# Patient Record
Sex: FEMALE | Race: WHITE | Employment: OTHER | URBAN - METROPOLITAN AREA
[De-identification: names, ages, dates, MRNs, and addresses within clinical notes are randomized per-mention and may not be internally consistent; named-entity substitution may affect disease eponyms.]

---

## 2017-01-27 LAB
CREATININE, URINE: 82.8
CREATININE, URINE: 82.8
MICROALBUMIN/CREAT 24H UR: 22.8 MG/G{CREAT}
MICROALBUMIN/CREAT 24H UR: 22.8 MG/G{CREAT}
MICROALBUMIN/CREAT UR-RTO: 28
MICROALBUMIN/CREAT UR-RTO: 28

## 2017-03-21 ENCOUNTER — APPOINTMENT (OUTPATIENT)
Dept: CT IMAGING | Age: 57
End: 2017-03-21
Payer: COMMERCIAL

## 2017-03-21 ENCOUNTER — HOSPITAL ENCOUNTER (EMERGENCY)
Age: 57
Discharge: HOME | End: 2017-03-21
Attending: STUDENT IN AN ORGANIZED HEALTH CARE EDUCATION/TRAINING PROGRAM
Payer: COMMERCIAL

## 2017-03-21 VITALS
WEIGHT: 175 LBS | OXYGEN SATURATION: 97 % | TEMPERATURE: 98.4 F | SYSTOLIC BLOOD PRESSURE: 142 MMHG | BODY MASS INDEX: 32.2 KG/M2 | RESPIRATION RATE: 21 BRPM | HEART RATE: 104 BPM | HEIGHT: 62 IN | DIASTOLIC BLOOD PRESSURE: 108 MMHG

## 2017-03-21 DIAGNOSIS — G89.29 CHRONIC LEFT SHOULDER PAIN: ICD-10-CM

## 2017-03-21 DIAGNOSIS — M25.512 CHRONIC LEFT SHOULDER PAIN: ICD-10-CM

## 2017-03-21 DIAGNOSIS — R00.0 SINUS TACHYCARDIA: Primary | ICD-10-CM

## 2017-03-21 DIAGNOSIS — F17.200 TOBACCO DEPENDENCE: ICD-10-CM

## 2017-03-21 LAB
ALBUMIN SERPL-MCNC: 4.1 G/DL (ref 3.9–4.9)
ALP BLD-CCNC: 77 U/L (ref 40–130)
ALT SERPL-CCNC: 47 U/L (ref 0–33)
ANION GAP SERPL CALCULATED.3IONS-SCNC: 15 MEQ/L (ref 7–13)
APTT: 26 SEC (ref 21.6–35.4)
AST SERPL-CCNC: 21 U/L (ref 0–35)
BASOPHILS ABSOLUTE: 0.2 K/UL (ref 0–0.2)
BASOPHILS RELATIVE PERCENT: 1.2 %
BILIRUB SERPL-MCNC: 0.1 MG/DL (ref 0–1.2)
BUN BLDV-MCNC: 10 MG/DL (ref 6–20)
C-REACTIVE PROTEIN, HIGH SENSITIVITY: 3.1 MG/L (ref 0–5)
CALCIUM SERPL-MCNC: 9.6 MG/DL (ref 8.6–10.2)
CHLORIDE BLD-SCNC: 98 MEQ/L (ref 98–107)
CO2: 27 MEQ/L (ref 22–29)
CREAT SERPL-MCNC: 0.71 MG/DL (ref 0.5–0.9)
EOSINOPHILS ABSOLUTE: 0.1 K/UL (ref 0–0.7)
EOSINOPHILS RELATIVE PERCENT: 1.1 %
GFR AFRICAN AMERICAN: >60
GFR AFRICAN AMERICAN: >60
GFR NON-AFRICAN AMERICAN: >60
GFR NON-AFRICAN AMERICAN: >60
GLOBULIN: 2.6 G/DL (ref 2.3–3.5)
GLUCOSE BLD-MCNC: 98 MG/DL (ref 74–109)
HCT VFR BLD CALC: 40.5 % (ref 37–47)
HEMOGLOBIN: 13.3 G/DL (ref 12–16)
INR BLD: 0.9
LACTIC ACID: 1.5 MMOL/L (ref 0.5–2.2)
LYMPHOCYTES ABSOLUTE: 2.5 K/UL (ref 1–4.8)
LYMPHOCYTES RELATIVE PERCENT: 20.5 %
MAGNESIUM: 1.7 MG/DL (ref 1.7–2.3)
MCH RBC QN AUTO: 27.2 PG (ref 27–31.3)
MCHC RBC AUTO-ENTMCNC: 32.9 % (ref 33–37)
MCV RBC AUTO: 82.6 FL (ref 82–100)
MONOCYTES ABSOLUTE: 1 K/UL (ref 0.2–0.8)
MONOCYTES RELATIVE PERCENT: 8 %
NEUTROPHILS ABSOLUTE: 8.6 K/UL (ref 1.4–6.5)
NEUTROPHILS RELATIVE PERCENT: 69.2 %
PDW BLD-RTO: 15.4 % (ref 11.5–14.5)
PERFORMED ON: NORMAL
PLATELET # BLD: 267 K/UL (ref 130–400)
POC CREATININE WHOLE BLOOD: 0.9
POC CREATININE: 0.9 MG/DL (ref 0.6–1.1)
POC SAMPLE TYPE: NORMAL
POTASSIUM SERPL-SCNC: 3.5 MEQ/L (ref 3.5–5.1)
PRO-BNP: 16 PG/ML
PROTHROMBIN TIME: 10 SEC (ref 8.1–13.7)
RBC # BLD: 4.91 M/UL (ref 4.2–5.4)
SODIUM BLD-SCNC: 140 MEQ/L (ref 132–144)
TOTAL PROTEIN: 6.7 G/DL (ref 6.4–8.1)
WBC # BLD: 12.4 K/UL (ref 4.8–10.8)

## 2017-03-21 PROCEDURE — 93005 ELECTROCARDIOGRAM TRACING: CPT

## 2017-03-21 PROCEDURE — 83880 ASSAY OF NATRIURETIC PEPTIDE: CPT

## 2017-03-21 PROCEDURE — 83735 ASSAY OF MAGNESIUM: CPT

## 2017-03-21 PROCEDURE — 86141 C-REACTIVE PROTEIN HS: CPT

## 2017-03-21 PROCEDURE — 85730 THROMBOPLASTIN TIME PARTIAL: CPT

## 2017-03-21 PROCEDURE — 94760 N-INVAS EAR/PLS OXIMETRY 1: CPT

## 2017-03-21 PROCEDURE — 6360000004 HC RX CONTRAST MEDICATION: Performed by: RADIOLOGY

## 2017-03-21 PROCEDURE — 83605 ASSAY OF LACTIC ACID: CPT

## 2017-03-21 PROCEDURE — 6370000000 HC RX 637 (ALT 250 FOR IP): Performed by: STUDENT IN AN ORGANIZED HEALTH CARE EDUCATION/TRAINING PROGRAM

## 2017-03-21 PROCEDURE — 96375 TX/PRO/DX INJ NEW DRUG ADDON: CPT

## 2017-03-21 PROCEDURE — 2500000003 HC RX 250 WO HCPCS: Performed by: STUDENT IN AN ORGANIZED HEALTH CARE EDUCATION/TRAINING PROGRAM

## 2017-03-21 PROCEDURE — 99285 EMERGENCY DEPT VISIT HI MDM: CPT

## 2017-03-21 PROCEDURE — 80053 COMPREHEN METABOLIC PANEL: CPT

## 2017-03-21 PROCEDURE — 96374 THER/PROPH/DIAG INJ IV PUSH: CPT

## 2017-03-21 PROCEDURE — 36415 COLL VENOUS BLD VENIPUNCTURE: CPT

## 2017-03-21 PROCEDURE — 85025 COMPLETE CBC W/AUTO DIFF WBC: CPT

## 2017-03-21 PROCEDURE — S0028 INJECTION, FAMOTIDINE, 20 MG: HCPCS | Performed by: STUDENT IN AN ORGANIZED HEALTH CARE EDUCATION/TRAINING PROGRAM

## 2017-03-21 PROCEDURE — 85610 PROTHROMBIN TIME: CPT

## 2017-03-21 PROCEDURE — 6360000002 HC RX W HCPCS: Performed by: STUDENT IN AN ORGANIZED HEALTH CARE EDUCATION/TRAINING PROGRAM

## 2017-03-21 PROCEDURE — 71275 CT ANGIOGRAPHY CHEST: CPT

## 2017-03-21 RX ORDER — DIPHENHYDRAMINE HYDROCHLORIDE 50 MG/ML
25 INJECTION INTRAMUSCULAR; INTRAVENOUS ONCE
Status: COMPLETED | OUTPATIENT
Start: 2017-03-21 | End: 2017-03-21

## 2017-03-21 RX ORDER — FENTANYL 50 UG/H
1 PATCH TRANSDERMAL
Status: DISCONTINUED | OUTPATIENT
Start: 2017-03-21 | End: 2017-03-21 | Stop reason: HOSPADM

## 2017-03-21 RX ORDER — METHYLPREDNISOLONE SODIUM SUCCINATE 125 MG/2ML
125 INJECTION, POWDER, LYOPHILIZED, FOR SOLUTION INTRAMUSCULAR; INTRAVENOUS ONCE
Status: COMPLETED | OUTPATIENT
Start: 2017-03-21 | End: 2017-03-21

## 2017-03-21 RX ADMIN — IOPAMIDOL 100 ML: 755 INJECTION, SOLUTION INTRAVENOUS at 17:57

## 2017-03-21 RX ADMIN — METHYLPREDNISOLONE SODIUM SUCCINATE 125 MG: 125 INJECTION, POWDER, FOR SOLUTION INTRAMUSCULAR; INTRAVENOUS at 17:18

## 2017-03-21 RX ADMIN — DIPHENHYDRAMINE HYDROCHLORIDE 25 MG: 50 INJECTION, SOLUTION INTRAMUSCULAR; INTRAVENOUS at 17:18

## 2017-03-21 RX ADMIN — FAMOTIDINE 20 MG: 10 INJECTION, SOLUTION INTRAVENOUS at 17:18

## 2017-03-21 ASSESSMENT — ENCOUNTER SYMPTOMS
COUGH: 0
VOMITING: 0
DIARRHEA: 0
BACK PAIN: 0
NAUSEA: 0
CHEST TIGHTNESS: 0
SHORTNESS OF BREATH: 0
TROUBLE SWALLOWING: 0
ABDOMINAL PAIN: 0
SINUS PRESSURE: 0

## 2017-03-21 ASSESSMENT — PAIN DESCRIPTION - PAIN TYPE: TYPE: CHRONIC PAIN

## 2017-03-21 ASSESSMENT — PAIN DESCRIPTION - DESCRIPTORS: DESCRIPTORS: ACHING;STABBING

## 2017-03-21 ASSESSMENT — PAIN DESCRIPTION - LOCATION: LOCATION: SHOULDER

## 2017-03-21 ASSESSMENT — PAIN DESCRIPTION - ORIENTATION: ORIENTATION: LEFT

## 2017-03-21 ASSESSMENT — PAIN SCALES - GENERAL
PAINLEVEL_OUTOF10: 9
PAINLEVEL_OUTOF10: 9

## 2017-03-23 LAB
EKG ATRIAL RATE: 113 BPM
EKG P AXIS: 77 DEGREES
EKG P-R INTERVAL: 132 MS
EKG Q-T INTERVAL: 356 MS
EKG QRS DURATION: 92 MS
EKG QTC CALCULATION (BAZETT): 488 MS
EKG R AXIS: 62 DEGREES
EKG T AXIS: 42 DEGREES
EKG VENTRICULAR RATE: 113 BPM

## 2017-03-25 ENCOUNTER — APPOINTMENT (OUTPATIENT)
Dept: GENERAL RADIOLOGY | Age: 57
End: 2017-03-25
Payer: COMMERCIAL

## 2017-03-25 ENCOUNTER — HOSPITAL ENCOUNTER (EMERGENCY)
Age: 57
Discharge: HOME | End: 2017-03-25
Payer: COMMERCIAL

## 2017-03-25 VITALS
OXYGEN SATURATION: 94 % | BODY MASS INDEX: 32.2 KG/M2 | HEIGHT: 62 IN | DIASTOLIC BLOOD PRESSURE: 96 MMHG | WEIGHT: 175 LBS | HEART RATE: 118 BPM | TEMPERATURE: 99.1 F | SYSTOLIC BLOOD PRESSURE: 169 MMHG | RESPIRATION RATE: 18 BRPM

## 2017-03-25 DIAGNOSIS — R00.0 TACHYCARDIA: ICD-10-CM

## 2017-03-25 DIAGNOSIS — E87.6 HYPOKALEMIA: ICD-10-CM

## 2017-03-25 DIAGNOSIS — M25.512 CHRONIC PAIN IN LEFT SHOULDER: Primary | ICD-10-CM

## 2017-03-25 DIAGNOSIS — G89.29 CHRONIC PAIN IN LEFT SHOULDER: Primary | ICD-10-CM

## 2017-03-25 LAB
AMPHETAMINE SCREEN, URINE: ABNORMAL
ANION GAP SERPL CALCULATED.3IONS-SCNC: 10 MEQ/L (ref 7–13)
BARBITURATE SCREEN URINE: ABNORMAL
BASOPHILS ABSOLUTE: 0.2 K/UL (ref 0–0.2)
BASOPHILS RELATIVE PERCENT: 1.5 %
BENZODIAZEPINE SCREEN, URINE: ABNORMAL
BILIRUBIN URINE: NEGATIVE
BLOOD, URINE: NEGATIVE
BUN BLDV-MCNC: 10 MG/DL (ref 6–20)
CALCIUM SERPL-MCNC: 8.4 MG/DL (ref 8.6–10.2)
CANNABINOID SCREEN URINE: POSITIVE
CHLORIDE BLD-SCNC: 98 MEQ/L (ref 98–107)
CLARITY: CLEAR
CO2: 28 MEQ/L (ref 22–29)
COCAINE METABOLITE SCREEN URINE: ABNORMAL
COLOR: YELLOW
CREAT SERPL-MCNC: 0.53 MG/DL (ref 0.5–0.9)
EOSINOPHILS ABSOLUTE: 0.1 K/UL (ref 0–0.7)
EOSINOPHILS RELATIVE PERCENT: 1 %
EPITHELIAL CELLS, UA: NORMAL /HPF
GFR AFRICAN AMERICAN: >60
GFR NON-AFRICAN AMERICAN: >60
GLUCOSE BLD-MCNC: 117 MG/DL (ref 74–109)
GLUCOSE URINE: NEGATIVE MG/DL
HCT VFR BLD CALC: 39.9 % (ref 37–47)
HEMOGLOBIN: 13.2 G/DL (ref 12–16)
KETONES, URINE: NEGATIVE MG/DL
LEUKOCYTE ESTERASE, URINE: ABNORMAL
LYMPHOCYTES ABSOLUTE: 2.1 K/UL (ref 1–4.8)
LYMPHOCYTES RELATIVE PERCENT: 20.2 %
Lab: ABNORMAL
MCH RBC QN AUTO: 27.4 PG (ref 27–31.3)
MCHC RBC AUTO-ENTMCNC: 33.1 % (ref 33–37)
MCV RBC AUTO: 82.9 FL (ref 82–100)
MONOCYTES ABSOLUTE: 0.9 K/UL (ref 0.2–0.8)
MONOCYTES RELATIVE PERCENT: 9.2 %
NEUTROPHILS ABSOLUTE: 6.9 K/UL (ref 1.4–6.5)
NEUTROPHILS RELATIVE PERCENT: 68.1 %
NITRITE, URINE: NEGATIVE
OPIATE SCREEN URINE: ABNORMAL
PDW BLD-RTO: 15.5 % (ref 11.5–14.5)
PH UA: 5.5 (ref 5–9)
PHENCYCLIDINE SCREEN URINE: ABNORMAL
PLATELET # BLD: 231 K/UL (ref 130–400)
POTASSIUM SERPL-SCNC: 2.9 MEQ/L (ref 3.5–5.1)
PROTEIN UA: NEGATIVE MG/DL
RBC # BLD: 4.81 M/UL (ref 4.2–5.4)
RBC UA: NORMAL /HPF (ref 0–2)
SODIUM BLD-SCNC: 136 MEQ/L (ref 132–144)
SPECIFIC GRAVITY UA: 1 (ref 1–1.03)
TOTAL CK: 55 U/L (ref 0–170)
TROPONIN: <0.01 NG/ML (ref 0–0.01)
UROBILINOGEN, URINE: 0.2 E.U./DL
WBC # BLD: 10.2 K/UL (ref 4.8–10.8)
WBC UA: NORMAL /HPF (ref 0–5)

## 2017-03-25 PROCEDURE — 85025 COMPLETE CBC W/AUTO DIFF WBC: CPT

## 2017-03-25 PROCEDURE — 99284 EMERGENCY DEPT VISIT MOD MDM: CPT

## 2017-03-25 PROCEDURE — 6370000000 HC RX 637 (ALT 250 FOR IP): Performed by: NURSE PRACTITIONER

## 2017-03-25 PROCEDURE — 80048 BASIC METABOLIC PNL TOTAL CA: CPT

## 2017-03-25 PROCEDURE — 81001 URINALYSIS AUTO W/SCOPE: CPT

## 2017-03-25 PROCEDURE — 80307 DRUG TEST PRSMV CHEM ANLYZR: CPT

## 2017-03-25 PROCEDURE — 84484 ASSAY OF TROPONIN QUANT: CPT

## 2017-03-25 PROCEDURE — 71020 XR CHEST STANDARD TWO VW: CPT

## 2017-03-25 PROCEDURE — 93005 ELECTROCARDIOGRAM TRACING: CPT

## 2017-03-25 PROCEDURE — 2580000003 HC RX 258: Performed by: NURSE PRACTITIONER

## 2017-03-25 PROCEDURE — 82550 ASSAY OF CK (CPK): CPT

## 2017-03-25 PROCEDURE — 36415 COLL VENOUS BLD VENIPUNCTURE: CPT

## 2017-03-25 RX ORDER — ASPIRIN 81 MG
1 TABLET,CHEWABLE ORAL 3 TIMES DAILY PRN
Qty: 42.5 G | Refills: 0 | Status: SHIPPED | OUTPATIENT
Start: 2017-03-25 | End: 2017-05-30

## 2017-03-25 RX ORDER — POTASSIUM BICARBONATE 25 MEQ/1
50 TABLET, EFFERVESCENT ORAL ONCE
Status: COMPLETED | OUTPATIENT
Start: 2017-03-25 | End: 2017-03-25

## 2017-03-25 RX ORDER — ACETAMINOPHEN 500 MG
1000 TABLET ORAL EVERY 6 HOURS PRN
Qty: 30 TABLET | Refills: 0 | Status: SHIPPED | OUTPATIENT
Start: 2017-03-25 | End: 2017-09-25

## 2017-03-25 RX ORDER — HYDROCODONE BITARTRATE AND ACETAMINOPHEN 5; 325 MG/1; MG/1
1 TABLET ORAL ONCE
Status: COMPLETED | OUTPATIENT
Start: 2017-03-25 | End: 2017-03-25

## 2017-03-25 RX ORDER — 0.9 % SODIUM CHLORIDE 0.9 %
2000 INTRAVENOUS SOLUTION INTRAVENOUS ONCE
Status: COMPLETED | OUTPATIENT
Start: 2017-03-25 | End: 2017-03-25

## 2017-03-25 RX ADMIN — HYDROCODONE BITARTRATE AND ACETAMINOPHEN 1 TABLET: 5; 325 TABLET ORAL at 11:37

## 2017-03-25 RX ADMIN — SODIUM CHLORIDE 2000 ML: 900 INJECTION, SOLUTION INTRAVENOUS at 11:44

## 2017-03-25 RX ADMIN — POTASSIUM BICARBONATE 50 MEQ: 25 TABLET, EFFERVESCENT ORAL at 13:20

## 2017-03-25 ASSESSMENT — ENCOUNTER SYMPTOMS
DIARRHEA: 0
ABDOMINAL PAIN: 0
NAUSEA: 0
SHORTNESS OF BREATH: 0
COUGH: 0
BACK PAIN: 0
VOMITING: 0

## 2017-03-25 ASSESSMENT — PAIN SCALES - GENERAL
PAINLEVEL_OUTOF10: 9
PAINLEVEL_OUTOF10: 9
PAINLEVEL_OUTOF10: 1
PAINLEVEL_OUTOF10: 9

## 2017-03-25 ASSESSMENT — PAIN DESCRIPTION - ORIENTATION: ORIENTATION: LEFT

## 2017-03-25 ASSESSMENT — PAIN DESCRIPTION - FREQUENCY: FREQUENCY: CONTINUOUS

## 2017-03-25 ASSESSMENT — PAIN DESCRIPTION - DESCRIPTORS: DESCRIPTORS: STABBING

## 2017-03-25 ASSESSMENT — PAIN DESCRIPTION - PAIN TYPE
TYPE: ACUTE PAIN
TYPE: CHRONIC PAIN

## 2017-03-25 ASSESSMENT — PAIN DESCRIPTION - LOCATION: LOCATION: SHOULDER

## 2017-03-25 ASSESSMENT — PAIN DESCRIPTION - ONSET: ONSET: ON-GOING

## 2017-03-27 LAB
EKG ATRIAL RATE: 120 BPM
EKG P AXIS: 71 DEGREES
EKG P-R INTERVAL: 134 MS
EKG Q-T INTERVAL: 350 MS
EKG QRS DURATION: 92 MS
EKG QTC CALCULATION (BAZETT): 494 MS
EKG R AXIS: 68 DEGREES
EKG T AXIS: 45 DEGREES
EKG VENTRICULAR RATE: 120 BPM

## 2017-04-06 LAB
CHOLESTEROL, TOTAL: 173 MG/DL
CHOLESTEROL/HDL RATIO: 0.98
HDLC SERPL-MCNC: 56 MG/DL (ref 35–70)
LDL CHOLESTEROL CALCULATED: 55 MG/DL (ref 0–160)
TRIGL SERPL-MCNC: 309 MG/DL
VLDLC SERPL CALC-MCNC: 62 MG/DL

## 2017-04-15 ENCOUNTER — HOSPITAL ENCOUNTER (EMERGENCY)
Age: 57
Discharge: HOME | End: 2017-04-15
Attending: STUDENT IN AN ORGANIZED HEALTH CARE EDUCATION/TRAINING PROGRAM
Payer: COMMERCIAL

## 2017-04-15 ENCOUNTER — APPOINTMENT (OUTPATIENT)
Dept: CT IMAGING | Age: 57
End: 2017-04-15
Payer: COMMERCIAL

## 2017-04-15 VITALS
HEART RATE: 112 BPM | BODY MASS INDEX: 33.13 KG/M2 | OXYGEN SATURATION: 92 % | SYSTOLIC BLOOD PRESSURE: 139 MMHG | RESPIRATION RATE: 18 BRPM | TEMPERATURE: 98.2 F | HEIGHT: 62 IN | WEIGHT: 180 LBS | DIASTOLIC BLOOD PRESSURE: 94 MMHG

## 2017-04-15 DIAGNOSIS — R10.9 LEFT FLANK PAIN: ICD-10-CM

## 2017-04-15 DIAGNOSIS — R35.0 URINARY FREQUENCY: Primary | ICD-10-CM

## 2017-04-15 LAB
AMPHETAMINE SCREEN, URINE: ABNORMAL
BACTERIA: NORMAL /HPF
BARBITURATE SCREEN URINE: ABNORMAL
BENZODIAZEPINE SCREEN, URINE: ABNORMAL
BILIRUBIN URINE: NEGATIVE
BLOOD, URINE: NEGATIVE
CANNABINOID SCREEN URINE: POSITIVE
CHP ED QC CHECK: YES
CLARITY: CLEAR
COCAINE METABOLITE SCREEN URINE: ABNORMAL
COLOR: YELLOW
EPITHELIAL CELLS, UA: NORMAL /HPF
GLUCOSE URINE: NEGATIVE MG/DL
KETONES, URINE: NEGATIVE MG/DL
LEUKOCYTE ESTERASE, URINE: ABNORMAL
Lab: ABNORMAL
NITRITE, URINE: NEGATIVE
OPIATE SCREEN URINE: ABNORMAL
PH UA: 6.5 (ref 5–9)
PHENCYCLIDINE SCREEN URINE: ABNORMAL
PREGNANCY TEST URINE, POC: NEGATIVE
PROTEIN UA: NEGATIVE MG/DL
RBC UA: NORMAL /HPF (ref 0–2)
SPECIFIC GRAVITY UA: 1.01 (ref 1–1.03)
URINE REFLEX TO CULTURE: YES
UROBILINOGEN, URINE: 0.2 E.U./DL
WBC UA: NORMAL /HPF (ref 0–5)

## 2017-04-15 PROCEDURE — 87086 URINE CULTURE/COLONY COUNT: CPT

## 2017-04-15 PROCEDURE — 6370000000 HC RX 637 (ALT 250 FOR IP): Performed by: STUDENT IN AN ORGANIZED HEALTH CARE EDUCATION/TRAINING PROGRAM

## 2017-04-15 PROCEDURE — 87186 SC STD MICRODIL/AGAR DIL: CPT

## 2017-04-15 PROCEDURE — 81001 URINALYSIS AUTO W/SCOPE: CPT

## 2017-04-15 PROCEDURE — 99284 EMERGENCY DEPT VISIT MOD MDM: CPT

## 2017-04-15 PROCEDURE — 87077 CULTURE AEROBIC IDENTIFY: CPT

## 2017-04-15 PROCEDURE — 74150 CT ABDOMEN W/O CONTRAST: CPT

## 2017-04-15 PROCEDURE — 80307 DRUG TEST PRSMV CHEM ANLYZR: CPT

## 2017-04-15 RX ORDER — PHENAZOPYRIDINE HYDROCHLORIDE 200 MG/1
200 TABLET, FILM COATED ORAL ONCE
Status: COMPLETED | OUTPATIENT
Start: 2017-04-15 | End: 2017-04-15

## 2017-04-15 RX ORDER — PHENAZOPYRIDINE HYDROCHLORIDE 200 MG/1
200 TABLET, FILM COATED ORAL 3 TIMES DAILY PRN
Qty: 6 TABLET | Refills: 0 | Status: SHIPPED | OUTPATIENT
Start: 2017-04-15 | End: 2017-04-18

## 2017-04-15 RX ORDER — LIDOCAINE 50 MG/G
1 PATCH TOPICAL DAILY
Qty: 5 PATCH | Refills: 0 | Status: SHIPPED | OUTPATIENT
Start: 2017-04-15 | End: 2017-05-30

## 2017-04-15 RX ADMIN — PHENAZOPYRIDINE HYDROCHLORIDE 200 MG: 200 TABLET, FILM COATED ORAL at 09:21

## 2017-04-15 ASSESSMENT — PAIN DESCRIPTION - FREQUENCY: FREQUENCY: CONTINUOUS

## 2017-04-15 ASSESSMENT — ENCOUNTER SYMPTOMS
VOMITING: 0
BACK PAIN: 1
TROUBLE SWALLOWING: 0
SHORTNESS OF BREATH: 0
SINUS PRESSURE: 0
CHEST TIGHTNESS: 0
DIARRHEA: 0
COUGH: 0
NAUSEA: 0
ABDOMINAL PAIN: 0

## 2017-04-15 ASSESSMENT — PAIN DESCRIPTION - ORIENTATION: ORIENTATION: RIGHT;LEFT;MID

## 2017-04-15 ASSESSMENT — PAIN DESCRIPTION - ONSET: ONSET: ON-GOING

## 2017-04-15 ASSESSMENT — PAIN SCALES - GENERAL
PAINLEVEL_OUTOF10: 7
PAINLEVEL_OUTOF10: 9

## 2017-04-15 ASSESSMENT — PAIN DESCRIPTION - DESCRIPTORS: DESCRIPTORS: PRESSURE

## 2017-04-15 ASSESSMENT — PAIN DESCRIPTION - PAIN TYPE: TYPE: ACUTE PAIN;CHRONIC PAIN

## 2017-04-15 ASSESSMENT — PAIN DESCRIPTION - LOCATION: LOCATION: BACK

## 2017-04-17 LAB
ORGANISM: ABNORMAL
URINE CULTURE, ROUTINE: ABNORMAL

## 2017-04-26 LAB
AVERAGE GLUCOSE: NORMAL
HBA1C MFR BLD: 6.2 %

## 2017-05-21 ENCOUNTER — HOSPITAL ENCOUNTER (EMERGENCY)
Age: 57
Discharge: HOME | End: 2017-05-21
Payer: COMMERCIAL

## 2017-05-21 VITALS
OXYGEN SATURATION: 94 % | SYSTOLIC BLOOD PRESSURE: 162 MMHG | HEIGHT: 62 IN | BODY MASS INDEX: 32.02 KG/M2 | HEART RATE: 123 BPM | RESPIRATION RATE: 18 BRPM | TEMPERATURE: 99.6 F | DIASTOLIC BLOOD PRESSURE: 92 MMHG | WEIGHT: 174 LBS

## 2017-05-21 DIAGNOSIS — Z76.0 ENCOUNTER FOR MEDICATION REFILL: Primary | ICD-10-CM

## 2017-05-21 DIAGNOSIS — F13.939 WITHDRAWAL FROM SEDATIVE, HYPNOTIC, OR ANXIOLYTIC DRUG (HCC): ICD-10-CM

## 2017-05-21 PROCEDURE — 99283 EMERGENCY DEPT VISIT LOW MDM: CPT

## 2017-05-21 RX ORDER — CARISOPRODOL 350 MG/1
350 TABLET ORAL 4 TIMES DAILY PRN
Qty: 12 TABLET | Refills: 0 | Status: SHIPPED | OUTPATIENT
Start: 2017-05-21 | End: 2017-05-24

## 2017-05-21 RX ORDER — FLUCONAZOLE 150 MG/1
150 TABLET ORAL ONCE
Qty: 1 TABLET | Refills: 0 | Status: SHIPPED | OUTPATIENT
Start: 2017-05-21 | End: 2017-05-21

## 2017-05-21 ASSESSMENT — PAIN DESCRIPTION - PAIN TYPE: TYPE: ACUTE PAIN

## 2017-05-21 ASSESSMENT — PAIN DESCRIPTION - DESCRIPTORS: DESCRIPTORS: ACHING;SHOOTING;SHARP

## 2017-05-21 ASSESSMENT — PAIN DESCRIPTION - ONSET: ONSET: ON-GOING

## 2017-05-21 ASSESSMENT — ENCOUNTER SYMPTOMS
WHEEZING: 0
CHEST TIGHTNESS: 0

## 2017-05-21 ASSESSMENT — PAIN SCALES - GENERAL: PAINLEVEL_OUTOF10: 8

## 2017-05-21 ASSESSMENT — PAIN DESCRIPTION - FREQUENCY: FREQUENCY: CONTINUOUS

## 2017-05-21 ASSESSMENT — PAIN DESCRIPTION - LOCATION: LOCATION: HEAD;NECK;SHOULDER

## 2017-05-21 ASSESSMENT — PAIN DESCRIPTION - ORIENTATION: ORIENTATION: LEFT

## 2017-05-26 ENCOUNTER — HOSPITAL ENCOUNTER (EMERGENCY)
Age: 57
Discharge: HOME | End: 2017-05-26
Attending: EMERGENCY MEDICINE
Payer: COMMERCIAL

## 2017-05-26 VITALS
BODY MASS INDEX: 32.2 KG/M2 | WEIGHT: 175 LBS | HEIGHT: 62 IN | DIASTOLIC BLOOD PRESSURE: 78 MMHG | OXYGEN SATURATION: 96 % | SYSTOLIC BLOOD PRESSURE: 136 MMHG | RESPIRATION RATE: 16 BRPM | TEMPERATURE: 98.9 F | HEART RATE: 112 BPM

## 2017-05-26 DIAGNOSIS — R51.9 CHRONIC NONINTRACTABLE HEADACHE, UNSPECIFIED HEADACHE TYPE: ICD-10-CM

## 2017-05-26 DIAGNOSIS — G89.29 CHRONIC NONINTRACTABLE HEADACHE, UNSPECIFIED HEADACHE TYPE: ICD-10-CM

## 2017-05-26 DIAGNOSIS — R60.9 DEPENDENT EDEMA: Primary | ICD-10-CM

## 2017-05-26 PROCEDURE — 6360000002 HC RX W HCPCS: Performed by: EMERGENCY MEDICINE

## 2017-05-26 PROCEDURE — 99284 EMERGENCY DEPT VISIT MOD MDM: CPT

## 2017-05-26 PROCEDURE — 6370000000 HC RX 637 (ALT 250 FOR IP): Performed by: EMERGENCY MEDICINE

## 2017-05-26 RX ORDER — FUROSEMIDE 40 MG/1
40 TABLET ORAL ONCE
Status: COMPLETED | OUTPATIENT
Start: 2017-05-26 | End: 2017-05-26

## 2017-05-26 RX ORDER — FUROSEMIDE 10 MG/ML
40 INJECTION INTRAMUSCULAR; INTRAVENOUS ONCE
Status: DISCONTINUED | OUTPATIENT
Start: 2017-05-26 | End: 2017-05-26

## 2017-05-26 RX ORDER — DEXAMETHASONE 4 MG/1
8 TABLET ORAL ONCE
Status: COMPLETED | OUTPATIENT
Start: 2017-05-26 | End: 2017-05-26

## 2017-05-26 RX ORDER — FUROSEMIDE 40 MG/1
40 TABLET ORAL DAILY
Qty: 30 TABLET | Refills: 0 | Status: SHIPPED | OUTPATIENT
Start: 2017-05-26 | End: 2017-05-30 | Stop reason: SDUPTHER

## 2017-05-26 RX ADMIN — DEXAMETHASONE 8 MG: 4 TABLET ORAL at 12:40

## 2017-05-26 RX ADMIN — FUROSEMIDE 40 MG: 40 TABLET ORAL at 12:40

## 2017-05-26 ASSESSMENT — PAIN DESCRIPTION - LOCATION: LOCATION: HEAD

## 2017-05-26 ASSESSMENT — PAIN DESCRIPTION - PAIN TYPE: TYPE: ACUTE PAIN

## 2017-05-26 ASSESSMENT — PAIN SCALES - GENERAL: PAINLEVEL_OUTOF10: 9

## 2017-05-26 ASSESSMENT — PAIN DESCRIPTION - DESCRIPTORS: DESCRIPTORS: ACHING

## 2017-05-26 ASSESSMENT — ENCOUNTER SYMPTOMS
SHORTNESS OF BREATH: 0
COUGH: 0
VOMITING: 0

## 2017-05-29 ENCOUNTER — HOSPITAL ENCOUNTER (EMERGENCY)
Age: 57
Discharge: HOME | End: 2017-05-29
Payer: COMMERCIAL

## 2017-05-29 VITALS
HEIGHT: 62 IN | BODY MASS INDEX: 32.2 KG/M2 | WEIGHT: 175 LBS | HEART RATE: 128 BPM | DIASTOLIC BLOOD PRESSURE: 73 MMHG | SYSTOLIC BLOOD PRESSURE: 172 MMHG | TEMPERATURE: 99.2 F | OXYGEN SATURATION: 96 % | RESPIRATION RATE: 20 BRPM

## 2017-05-29 DIAGNOSIS — Z76.0 ENCOUNTER FOR MEDICATION REFILL: Primary | ICD-10-CM

## 2017-05-29 PROCEDURE — 96374 THER/PROPH/DIAG INJ IV PUSH: CPT

## 2017-05-29 PROCEDURE — 6360000002 HC RX W HCPCS: Performed by: PHYSICIAN ASSISTANT

## 2017-05-29 PROCEDURE — 99281 EMR DPT VST MAYX REQ PHY/QHP: CPT

## 2017-05-29 RX ORDER — TIZANIDINE 4 MG/1
8 TABLET ORAL EVERY 8 HOURS PRN
Qty: 20 TABLET | Refills: 0 | Status: SHIPPED | OUTPATIENT
Start: 2017-05-29 | End: 2017-05-30

## 2017-05-29 RX ORDER — ORPHENADRINE CITRATE 30 MG/ML
60 INJECTION INTRAMUSCULAR; INTRAVENOUS ONCE
Status: COMPLETED | OUTPATIENT
Start: 2017-05-29 | End: 2017-05-29

## 2017-05-29 RX ADMIN — ORPHENADRINE CITRATE 60 MG: 30 INJECTION INTRAMUSCULAR; INTRAVENOUS at 11:31

## 2017-05-29 ASSESSMENT — PAIN DESCRIPTION - ORIENTATION: ORIENTATION: LOWER

## 2017-05-29 ASSESSMENT — PAIN DESCRIPTION - LOCATION: LOCATION: BACK

## 2017-05-29 ASSESSMENT — ENCOUNTER SYMPTOMS
GASTROINTESTINAL NEGATIVE: 1
EYES NEGATIVE: 1
RESPIRATORY NEGATIVE: 1

## 2017-05-29 ASSESSMENT — PAIN SCALES - GENERAL: PAINLEVEL_OUTOF10: 7

## 2017-05-30 ENCOUNTER — OFFICE VISIT (OUTPATIENT)
Dept: FAMILY MEDICINE CLINIC | Age: 57
End: 2017-05-30

## 2017-05-30 VITALS
SYSTOLIC BLOOD PRESSURE: 118 MMHG | HEART RATE: 115 BPM | DIASTOLIC BLOOD PRESSURE: 76 MMHG | HEIGHT: 62 IN | OXYGEN SATURATION: 97 %

## 2017-05-30 DIAGNOSIS — G43.809 OTHER MIGRAINE WITHOUT STATUS MIGRAINOSUS, NOT INTRACTABLE: ICD-10-CM

## 2017-05-30 DIAGNOSIS — Q07.00 ARNOLD-CHIARI DEFORMITY (HCC): ICD-10-CM

## 2017-05-30 DIAGNOSIS — R14.0 BLOATING: ICD-10-CM

## 2017-05-30 DIAGNOSIS — E03.9 HYPOTHYROIDISM, UNSPECIFIED TYPE: ICD-10-CM

## 2017-05-30 DIAGNOSIS — I26.99 PULMONARY EMBOLISM AND INFARCTION (HCC): ICD-10-CM

## 2017-05-30 DIAGNOSIS — E66.9 OBESITY (BMI 30-39.9): ICD-10-CM

## 2017-05-30 DIAGNOSIS — Z86.69 H/O ENCEPHALOPATHY: ICD-10-CM

## 2017-05-30 DIAGNOSIS — M62.838 MUSCLE SPASM: ICD-10-CM

## 2017-05-30 DIAGNOSIS — J30.1 SEASONAL ALLERGIC RHINITIS DUE TO POLLEN: ICD-10-CM

## 2017-05-30 DIAGNOSIS — F41.9 ANXIETY: ICD-10-CM

## 2017-05-30 DIAGNOSIS — E78.5 HYPERLIPIDEMIA WITH TARGET LDL LESS THAN 100: ICD-10-CM

## 2017-05-30 DIAGNOSIS — I63.9 CEREBRAL INFARCTION, UNSPECIFIED MECHANISM (HCC): ICD-10-CM

## 2017-05-30 DIAGNOSIS — R60.9 EDEMA, UNSPECIFIED TYPE: ICD-10-CM

## 2017-05-30 DIAGNOSIS — J44.9 CHRONIC OBSTRUCTIVE PULMONARY DISEASE, UNSPECIFIED COPD TYPE (HCC): ICD-10-CM

## 2017-05-30 DIAGNOSIS — I69.354 HEMIPARESIS AFFECTING LEFT SIDE AS LATE EFFECT OF CEREBROVASCULAR ACCIDENT (HCC): Primary | ICD-10-CM

## 2017-05-30 PROBLEM — G43.909 MIGRAINE WITHOUT STATUS MIGRAINOSUS, NOT INTRACTABLE: Status: ACTIVE | Noted: 2017-05-30

## 2017-05-30 PROCEDURE — 99214 OFFICE O/P EST MOD 30 MIN: CPT | Performed by: FAMILY MEDICINE

## 2017-05-30 RX ORDER — PRAMIPEXOLE DIHYDROCHLORIDE 0.5 MG/1
0.5 TABLET ORAL
COMMUNITY
Start: 2017-05-17 | End: 2017-07-31 | Stop reason: SDUPTHER

## 2017-05-30 RX ORDER — FOLIC ACID 1 MG/1
1 TABLET ORAL
COMMUNITY
Start: 2015-12-14 | End: 2017-09-19 | Stop reason: SDUPTHER

## 2017-05-30 RX ORDER — PHENOL 1.4 %
1 AEROSOL, SPRAY (ML) MUCOUS MEMBRANE
COMMUNITY
Start: 2016-09-09 | End: 2017-11-15 | Stop reason: SDUPTHER

## 2017-05-30 RX ORDER — FLUCONAZOLE 150 MG/1
150 TABLET ORAL ONCE
Qty: 1 TABLET | Refills: 0 | Status: SHIPPED | OUTPATIENT
Start: 2017-05-30 | End: 2017-05-30

## 2017-05-30 RX ORDER — CHLORHEXIDINE GLUCONATE 0.12 MG/ML
15 RINSE ORAL
COMMUNITY
Start: 2016-04-27 | End: 2017-06-20 | Stop reason: ALTCHOICE

## 2017-05-30 RX ORDER — SIMETHICONE 180 MG
1 CAPSULE ORAL 2 TIMES DAILY
Qty: 60 CAPSULE | Refills: 5 | Status: SHIPPED | OUTPATIENT
Start: 2017-05-30 | End: 2018-01-09 | Stop reason: SDUPTHER

## 2017-05-30 RX ORDER — TIZANIDINE 4 MG/1
8 TABLET ORAL EVERY 8 HOURS PRN
Qty: 180 TABLET | Refills: 5 | Status: SHIPPED | OUTPATIENT
Start: 2017-05-30 | End: 2017-09-07 | Stop reason: SDUPTHER

## 2017-05-30 RX ORDER — RABEPRAZOLE SODIUM 20 MG/1
20 TABLET, DELAYED RELEASE ORAL
COMMUNITY
Start: 2017-04-24 | End: 2017-09-29 | Stop reason: SDUPTHER

## 2017-05-30 RX ORDER — CARISOPRODOL 350 MG/1
TABLET ORAL
COMMUNITY
Start: 2017-05-21 | End: 2017-05-30 | Stop reason: SDUPTHER

## 2017-05-30 RX ORDER — LORATADINE AND PSEUDOEPHEDRINE 10; 240 MG/1; MG/1
1 TABLET, EXTENDED RELEASE ORAL DAILY
Qty: 30 TABLET | Refills: 5 | Status: SHIPPED | OUTPATIENT
Start: 2017-05-30 | End: 2017-11-02 | Stop reason: ALTCHOICE

## 2017-05-30 RX ORDER — 0.9 % SODIUM CHLORIDE 0.9 %
VIAL (ML) INJECTION
COMMUNITY
Start: 2017-04-18 | End: 2017-07-19 | Stop reason: ALTCHOICE

## 2017-05-30 RX ORDER — L. ACIDOPHILUS/BIFID. ANIMALIS 2B CELL
1 CAPSULE ORAL DAILY
Qty: 30 CAPSULE | Refills: 5 | Status: SHIPPED | OUTPATIENT
Start: 2017-05-30 | End: 2017-11-02 | Stop reason: ALTCHOICE

## 2017-05-30 RX ORDER — CARISOPRODOL 350 MG/1
350 TABLET ORAL DAILY PRN
Qty: 12 TABLET | Refills: 5 | Status: SHIPPED | OUTPATIENT
Start: 2017-05-30 | End: 2019-12-13

## 2017-05-30 RX ORDER — AMLODIPINE BESYLATE 2.5 MG/1
2.5 TABLET ORAL
COMMUNITY
Start: 2017-04-10 | End: 2018-06-06 | Stop reason: SDUPTHER

## 2017-05-30 RX ORDER — BENZONATATE 100 MG/1
CAPSULE ORAL
COMMUNITY
Start: 2016-06-08 | End: 2017-06-18

## 2017-05-30 RX ORDER — FUROSEMIDE 40 MG/1
40 TABLET ORAL DAILY
Qty: 30 TABLET | Refills: 5 | Status: SHIPPED | OUTPATIENT
Start: 2017-05-30 | End: 2017-12-06 | Stop reason: SDUPTHER

## 2017-05-31 ENCOUNTER — TELEPHONE (OUTPATIENT)
Dept: FAMILY MEDICINE CLINIC | Age: 57
End: 2017-05-31

## 2017-06-06 RX ORDER — LORATADINE 10 MG/1
10 TABLET ORAL DAILY
Qty: 30 TABLET | Refills: 5 | Status: SHIPPED | OUTPATIENT
Start: 2017-06-06 | End: 2017-11-02 | Stop reason: ALTCHOICE

## 2017-06-10 ENCOUNTER — APPOINTMENT (OUTPATIENT)
Dept: GENERAL RADIOLOGY | Age: 57
End: 2017-06-10
Payer: COMMERCIAL

## 2017-06-10 ENCOUNTER — HOSPITAL ENCOUNTER (EMERGENCY)
Age: 57
Discharge: HOME | End: 2017-06-10
Attending: EMERGENCY MEDICINE
Payer: COMMERCIAL

## 2017-06-10 VITALS
SYSTOLIC BLOOD PRESSURE: 137 MMHG | TEMPERATURE: 97.9 F | DIASTOLIC BLOOD PRESSURE: 82 MMHG | BODY MASS INDEX: 32.2 KG/M2 | WEIGHT: 175 LBS | HEIGHT: 62 IN | OXYGEN SATURATION: 94 % | HEART RATE: 107 BPM | RESPIRATION RATE: 18 BRPM

## 2017-06-10 DIAGNOSIS — S90.02XA CONTUSION OF LEFT ANKLE, INITIAL ENCOUNTER: Primary | ICD-10-CM

## 2017-06-10 PROCEDURE — 73610 X-RAY EXAM OF ANKLE: CPT

## 2017-06-10 PROCEDURE — 6370000000 HC RX 637 (ALT 250 FOR IP): Performed by: EMERGENCY MEDICINE

## 2017-06-10 PROCEDURE — 99283 EMERGENCY DEPT VISIT LOW MDM: CPT

## 2017-06-10 RX ORDER — ACETAMINOPHEN 500 MG
1000 TABLET ORAL ONCE
Status: COMPLETED | OUTPATIENT
Start: 2017-06-10 | End: 2017-06-10

## 2017-06-10 RX ADMIN — ACETAMINOPHEN 1000 MG: 500 TABLET ORAL at 12:01

## 2017-06-10 ASSESSMENT — ENCOUNTER SYMPTOMS
WHEEZING: 0
ABDOMINAL DISTENTION: 0
SHORTNESS OF BREATH: 0
RHINORRHEA: 0
EYE DISCHARGE: 0
ABDOMINAL PAIN: 0
VOMITING: 0
COLOR CHANGE: 0
PHOTOPHOBIA: 0
FACIAL SWELLING: 0

## 2017-06-10 ASSESSMENT — PAIN SCALES - GENERAL
PAINLEVEL_OUTOF10: 9
PAINLEVEL_OUTOF10: 2

## 2017-06-10 ASSESSMENT — PAIN DESCRIPTION - ORIENTATION: ORIENTATION: LEFT

## 2017-06-10 ASSESSMENT — PAIN DESCRIPTION - LOCATION: LOCATION: ANKLE

## 2017-06-12 ENCOUNTER — OFFICE VISIT (OUTPATIENT)
Dept: FAMILY MEDICINE CLINIC | Age: 57
End: 2017-06-12

## 2017-06-12 VITALS
OXYGEN SATURATION: 97 % | WEIGHT: 175 LBS | DIASTOLIC BLOOD PRESSURE: 74 MMHG | HEIGHT: 62 IN | HEART RATE: 103 BPM | BODY MASS INDEX: 32.2 KG/M2 | SYSTOLIC BLOOD PRESSURE: 122 MMHG

## 2017-06-12 DIAGNOSIS — J30.1 SEASONAL ALLERGIC RHINITIS DUE TO POLLEN: Primary | ICD-10-CM

## 2017-06-12 PROCEDURE — 96372 THER/PROPH/DIAG INJ SC/IM: CPT | Performed by: FAMILY MEDICINE

## 2017-06-12 PROCEDURE — 99213 OFFICE O/P EST LOW 20 MIN: CPT | Performed by: FAMILY MEDICINE

## 2017-06-12 RX ORDER — METHYLPREDNISOLONE ACETATE 80 MG/ML
80 INJECTION, SUSPENSION INTRA-ARTICULAR; INTRALESIONAL; INTRAMUSCULAR; SOFT TISSUE ONCE
Status: COMPLETED | OUTPATIENT
Start: 2017-06-12 | End: 2017-06-12

## 2017-06-12 RX ADMIN — METHYLPREDNISOLONE ACETATE 80 MG: 80 INJECTION, SUSPENSION INTRA-ARTICULAR; INTRALESIONAL; INTRAMUSCULAR; SOFT TISSUE at 16:28

## 2017-06-12 ASSESSMENT — PATIENT HEALTH QUESTIONNAIRE - PHQ9
SUM OF ALL RESPONSES TO PHQ QUESTIONS 1-9: 0
SUM OF ALL RESPONSES TO PHQ9 QUESTIONS 1 & 2: 0
1. LITTLE INTEREST OR PLEASURE IN DOING THINGS: 0
2. FEELING DOWN, DEPRESSED OR HOPELESS: 0

## 2017-06-13 ENCOUNTER — TELEPHONE (OUTPATIENT)
Dept: FAMILY MEDICINE CLINIC | Age: 57
End: 2017-06-13

## 2017-06-14 RX ORDER — FLUCONAZOLE 150 MG/1
150 TABLET ORAL ONCE
Qty: 1 TABLET | Refills: 0 | Status: SHIPPED | OUTPATIENT
Start: 2017-06-14 | End: 2017-06-14

## 2017-06-14 RX ORDER — GUAIFENESIN 600 MG/1
600 TABLET, EXTENDED RELEASE ORAL 2 TIMES DAILY
Qty: 60 TABLET | Refills: 5 | Status: SHIPPED | OUTPATIENT
Start: 2017-06-14 | End: 2018-01-12 | Stop reason: SDUPTHER

## 2017-06-15 ENCOUNTER — HOSPITAL ENCOUNTER (EMERGENCY)
Age: 57
Discharge: HOME | End: 2017-06-15
Payer: COMMERCIAL

## 2017-06-15 VITALS
OXYGEN SATURATION: 95 % | TEMPERATURE: 99.5 F | HEART RATE: 124 BPM | WEIGHT: 180 LBS | RESPIRATION RATE: 14 BRPM | BODY MASS INDEX: 33.13 KG/M2 | DIASTOLIC BLOOD PRESSURE: 87 MMHG | HEIGHT: 62 IN | SYSTOLIC BLOOD PRESSURE: 140 MMHG

## 2017-06-15 DIAGNOSIS — S93.402A SPRAIN OF LEFT ANKLE, UNSPECIFIED LIGAMENT, INITIAL ENCOUNTER: Primary | ICD-10-CM

## 2017-06-15 PROCEDURE — 6360000002 HC RX W HCPCS: Performed by: NURSE PRACTITIONER

## 2017-06-15 PROCEDURE — 96372 THER/PROPH/DIAG INJ SC/IM: CPT

## 2017-06-15 PROCEDURE — 99282 EMERGENCY DEPT VISIT SF MDM: CPT

## 2017-06-15 RX ORDER — ORPHENADRINE CITRATE 30 MG/ML
60 INJECTION INTRAMUSCULAR; INTRAVENOUS ONCE
Status: COMPLETED | OUTPATIENT
Start: 2017-06-15 | End: 2017-06-15

## 2017-06-15 RX ORDER — LANOLIN ALCOHOL/MO/W.PET/CERES
1000 CREAM (GRAM) TOPICAL DAILY
COMMUNITY
End: 2017-11-01 | Stop reason: SDUPTHER

## 2017-06-15 RX ORDER — ONDANSETRON 4 MG/1
4 TABLET, ORALLY DISINTEGRATING ORAL EVERY 8 HOURS PRN
COMMUNITY
End: 2017-08-03 | Stop reason: SDUPTHER

## 2017-06-15 RX ADMIN — ORPHENADRINE CITRATE 60 MG: 30 INJECTION INTRAMUSCULAR; INTRAVENOUS at 11:49

## 2017-06-15 ASSESSMENT — ENCOUNTER SYMPTOMS
ABDOMINAL PAIN: 0
SHORTNESS OF BREATH: 0
COUGH: 0
BACK PAIN: 0

## 2017-06-15 ASSESSMENT — PAIN DESCRIPTION - ORIENTATION: ORIENTATION: LEFT

## 2017-06-15 ASSESSMENT — PAIN DESCRIPTION - PAIN TYPE: TYPE: ACUTE PAIN;CHRONIC PAIN

## 2017-06-15 ASSESSMENT — PAIN DESCRIPTION - LOCATION: LOCATION: ANKLE;BACK;SHOULDER

## 2017-06-15 ASSESSMENT — PAIN SCALES - GENERAL: PAINLEVEL_OUTOF10: 9

## 2017-06-16 ENCOUNTER — OFFICE VISIT (OUTPATIENT)
Dept: FAMILY MEDICINE CLINIC | Age: 57
End: 2017-06-16

## 2017-06-16 VITALS
BODY MASS INDEX: 32.2 KG/M2 | WEIGHT: 175 LBS | HEART RATE: 87 BPM | SYSTOLIC BLOOD PRESSURE: 120 MMHG | OXYGEN SATURATION: 97 % | DIASTOLIC BLOOD PRESSURE: 80 MMHG | HEIGHT: 62 IN | TEMPERATURE: 99 F

## 2017-06-16 DIAGNOSIS — E03.9 HYPOTHYROIDISM, UNSPECIFIED TYPE: ICD-10-CM

## 2017-06-16 DIAGNOSIS — S93.402A SPRAIN OF LEFT ANKLE, UNSPECIFIED LIGAMENT, INITIAL ENCOUNTER: ICD-10-CM

## 2017-06-16 DIAGNOSIS — M25.512 CHRONIC LEFT SHOULDER PAIN: Primary | ICD-10-CM

## 2017-06-16 DIAGNOSIS — G89.29 CHRONIC MIDLINE LOW BACK PAIN WITHOUT SCIATICA: ICD-10-CM

## 2017-06-16 DIAGNOSIS — M54.50 CHRONIC MIDLINE LOW BACK PAIN WITHOUT SCIATICA: ICD-10-CM

## 2017-06-16 DIAGNOSIS — G89.29 CHRONIC LEFT SHOULDER PAIN: Primary | ICD-10-CM

## 2017-06-16 PROCEDURE — 99213 OFFICE O/P EST LOW 20 MIN: CPT | Performed by: NURSE PRACTITIONER

## 2017-06-16 RX ORDER — HYDROCODONE BITARTRATE AND ACETAMINOPHEN 5; 325 MG/1; MG/1
1 TABLET ORAL EVERY 8 HOURS PRN
Qty: 9 TABLET | Refills: 0 | Status: SHIPPED | OUTPATIENT
Start: 2017-06-16 | End: 2017-06-19

## 2017-06-16 ASSESSMENT — ENCOUNTER SYMPTOMS
SHORTNESS OF BREATH: 0
BACK PAIN: 1
COUGH: 0

## 2017-06-18 ENCOUNTER — APPOINTMENT (OUTPATIENT)
Dept: GENERAL RADIOLOGY | Age: 57
End: 2017-06-18
Payer: COMMERCIAL

## 2017-06-18 ENCOUNTER — HOSPITAL ENCOUNTER (EMERGENCY)
Age: 57
Discharge: HOME | End: 2017-06-18
Payer: COMMERCIAL

## 2017-06-18 VITALS
HEART RATE: 118 BPM | WEIGHT: 180 LBS | RESPIRATION RATE: 14 BRPM | DIASTOLIC BLOOD PRESSURE: 84 MMHG | OXYGEN SATURATION: 97 % | BODY MASS INDEX: 32.92 KG/M2 | TEMPERATURE: 98.4 F | SYSTOLIC BLOOD PRESSURE: 162 MMHG

## 2017-06-18 DIAGNOSIS — G89.29 OTHER CHRONIC PAIN: ICD-10-CM

## 2017-06-18 DIAGNOSIS — J44.9 CHRONIC OBSTRUCTIVE PULMONARY DISEASE, UNSPECIFIED COPD TYPE (HCC): Primary | ICD-10-CM

## 2017-06-18 PROCEDURE — 99283 EMERGENCY DEPT VISIT LOW MDM: CPT

## 2017-06-18 PROCEDURE — 71020 XR CHEST STANDARD TWO VW: CPT

## 2017-06-18 RX ORDER — PREDNISONE 50 MG/1
50 TABLET ORAL DAILY
Qty: 5 TABLET | Refills: 0 | Status: SHIPPED | OUTPATIENT
Start: 2017-06-18 | End: 2017-06-20 | Stop reason: ALTCHOICE

## 2017-06-18 RX ORDER — ALBUTEROL SULFATE 90 UG/1
AEROSOL, METERED RESPIRATORY (INHALATION)
Qty: 1 INHALER | Refills: 1 | Status: SHIPPED | OUTPATIENT
Start: 2017-06-18 | End: 2017-11-02

## 2017-06-18 RX ORDER — BENZONATATE 100 MG/1
100 CAPSULE ORAL 3 TIMES DAILY PRN
Qty: 20 CAPSULE | Refills: 0 | Status: SHIPPED | OUTPATIENT
Start: 2017-06-18 | End: 2018-01-15

## 2017-06-18 ASSESSMENT — PAIN DESCRIPTION - FREQUENCY: FREQUENCY: CONTINUOUS

## 2017-06-18 ASSESSMENT — ENCOUNTER SYMPTOMS
NAUSEA: 0
STRIDOR: 0
CHOKING: 0
SORE THROAT: 0
EYE DISCHARGE: 0
DIARRHEA: 0
WHEEZING: 0
ABDOMINAL DISTENTION: 0
COUGH: 0
COLOR CHANGE: 0
ABDOMINAL PAIN: 0
VOMITING: 0
SHORTNESS OF BREATH: 1
CONSTIPATION: 0
RHINORRHEA: 0

## 2017-06-18 ASSESSMENT — PAIN DESCRIPTION - LOCATION: LOCATION: BACK;CHEST

## 2017-06-18 ASSESSMENT — PAIN DESCRIPTION - ORIENTATION: ORIENTATION: UPPER;LOWER

## 2017-06-18 ASSESSMENT — PAIN SCALES - GENERAL: PAINLEVEL_OUTOF10: 9

## 2017-06-20 ENCOUNTER — OFFICE VISIT (OUTPATIENT)
Dept: FAMILY MEDICINE CLINIC | Age: 57
End: 2017-06-20

## 2017-06-20 VITALS
DIASTOLIC BLOOD PRESSURE: 74 MMHG | SYSTOLIC BLOOD PRESSURE: 122 MMHG | BODY MASS INDEX: 32.9 KG/M2 | TEMPERATURE: 98.3 F | HEIGHT: 62 IN | OXYGEN SATURATION: 96 % | WEIGHT: 178.8 LBS | HEART RATE: 108 BPM

## 2017-06-20 DIAGNOSIS — J44.9 CHRONIC OBSTRUCTIVE PULMONARY DISEASE, UNSPECIFIED COPD TYPE (HCC): Primary | ICD-10-CM

## 2017-06-20 DIAGNOSIS — S32.010A COMPRESSION FRACTURE OF L1 LUMBAR VERTEBRA, CLOSED, INITIAL ENCOUNTER (HCC): ICD-10-CM

## 2017-06-20 DIAGNOSIS — M19.012 OSTEOARTHRITIS OF LEFT SHOULDER, UNSPECIFIED OSTEOARTHRITIS TYPE: ICD-10-CM

## 2017-06-20 PROCEDURE — 99213 OFFICE O/P EST LOW 20 MIN: CPT | Performed by: NURSE PRACTITIONER

## 2017-06-20 RX ORDER — TERAZOSIN 2 MG/1
CAPSULE ORAL
Refills: 3 | COMMUNITY
Start: 2017-06-15 | End: 2018-06-06 | Stop reason: SDUPTHER

## 2017-06-20 RX ORDER — LEVALBUTEROL INHALATION SOLUTION 0.63 MG/3ML
0.63 SOLUTION RESPIRATORY (INHALATION)
COMMUNITY
Start: 2016-12-03 | End: 2018-07-29

## 2017-06-20 RX ORDER — BISACODYL 10 MG
10 SUPPOSITORY, RECTAL RECTAL
COMMUNITY
Start: 2017-05-12 | End: 2017-11-07 | Stop reason: SDUPTHER

## 2017-06-20 RX ORDER — GENTAMICIN SULFATE 40 MG/ML
INJECTION, SOLUTION INTRAMUSCULAR; INTRAVENOUS
Refills: 5 | COMMUNITY
Start: 2017-06-15 | End: 2017-09-25

## 2017-06-20 RX ORDER — IBUPROFEN 800 MG/1
800 TABLET ORAL
COMMUNITY
Start: 2017-05-12 | End: 2017-06-27 | Stop reason: ALTCHOICE

## 2017-06-20 RX ORDER — LOPERAMIDE HYDROCHLORIDE 2 MG/1
2 TABLET ORAL PRN
COMMUNITY
Start: 2016-01-18 | End: 2021-03-10

## 2017-06-20 RX ORDER — HYDROCODONE BITARTRATE AND ACETAMINOPHEN 5; 325 MG/1; MG/1
1 TABLET ORAL EVERY 6 HOURS PRN
Qty: 28 TABLET | Refills: 0 | Status: SHIPPED | OUTPATIENT
Start: 2017-06-20 | End: 2017-06-27 | Stop reason: ALTCHOICE

## 2017-06-20 RX ORDER — LEVALBUTEROL TARTRATE 45 UG/1
AEROSOL, METERED ORAL
COMMUNITY
Start: 2017-06-19 | End: 2017-06-20 | Stop reason: SDUPTHER

## 2017-06-20 ASSESSMENT — ENCOUNTER SYMPTOMS
BACK PAIN: 1
SHORTNESS OF BREATH: 0
COUGH: 0

## 2017-06-21 ENCOUNTER — TELEPHONE (OUTPATIENT)
Dept: FAMILY MEDICINE CLINIC | Age: 57
End: 2017-06-21

## 2017-06-21 DIAGNOSIS — S32.010A COMPRESSION FRACTURE OF L1 LUMBAR VERTEBRA, CLOSED, INITIAL ENCOUNTER (HCC): Primary | ICD-10-CM

## 2017-06-23 ENCOUNTER — TELEPHONE (OUTPATIENT)
Dept: FAMILY MEDICINE CLINIC | Age: 57
End: 2017-06-23

## 2017-06-23 RX ORDER — NAPROXEN 500 MG/1
500 TABLET ORAL 2 TIMES DAILY WITH MEALS
Qty: 60 TABLET | Refills: 0 | Status: SHIPPED | OUTPATIENT
Start: 2017-06-23 | End: 2017-06-27 | Stop reason: ALTCHOICE

## 2017-06-26 DIAGNOSIS — M19.012 OSTEOARTHRITIS OF LEFT SHOULDER, UNSPECIFIED OSTEOARTHRITIS TYPE: ICD-10-CM

## 2017-06-26 DIAGNOSIS — S32.010A COMPRESSION FRACTURE OF L1 LUMBAR VERTEBRA, CLOSED, INITIAL ENCOUNTER (HCC): ICD-10-CM

## 2017-06-26 RX ORDER — HYDROCODONE BITARTRATE AND ACETAMINOPHEN 5; 325 MG/1; MG/1
1 TABLET ORAL EVERY 6 HOURS PRN
Qty: 28 TABLET | Refills: 0 | OUTPATIENT
Start: 2017-06-26 | End: 2017-07-03

## 2017-06-27 ENCOUNTER — HOSPITAL ENCOUNTER (EMERGENCY)
Age: 57
Discharge: HOME | End: 2017-06-27
Payer: COMMERCIAL

## 2017-06-27 VITALS
BODY MASS INDEX: 32.2 KG/M2 | TEMPERATURE: 98 F | WEIGHT: 175 LBS | OXYGEN SATURATION: 99 % | SYSTOLIC BLOOD PRESSURE: 169 MMHG | HEIGHT: 62 IN | DIASTOLIC BLOOD PRESSURE: 80 MMHG | RESPIRATION RATE: 16 BRPM | HEART RATE: 112 BPM

## 2017-06-27 DIAGNOSIS — M25.512 ACUTE PAIN OF LEFT SHOULDER: ICD-10-CM

## 2017-06-27 DIAGNOSIS — M54.50 ACUTE BILATERAL LOW BACK PAIN WITHOUT SCIATICA: Primary | ICD-10-CM

## 2017-06-27 PROCEDURE — 6370000000 HC RX 637 (ALT 250 FOR IP): Performed by: NURSE PRACTITIONER

## 2017-06-27 PROCEDURE — 99283 EMERGENCY DEPT VISIT LOW MDM: CPT

## 2017-06-27 RX ORDER — NAPROXEN 500 MG/1
500 TABLET ORAL 2 TIMES DAILY
Qty: 20 TABLET | Refills: 0 | Status: SHIPPED | OUTPATIENT
Start: 2017-06-27 | End: 2017-09-19

## 2017-06-27 RX ORDER — HYDROCODONE BITARTRATE AND ACETAMINOPHEN 5; 325 MG/1; MG/1
1 TABLET ORAL EVERY 6 HOURS PRN
Qty: 10 TABLET | Refills: 0 | Status: SHIPPED | OUTPATIENT
Start: 2017-06-27 | End: 2017-07-04

## 2017-06-27 RX ORDER — HYDROCODONE BITARTRATE AND ACETAMINOPHEN 5; 325 MG/1; MG/1
1 TABLET ORAL ONCE
Status: COMPLETED | OUTPATIENT
Start: 2017-06-27 | End: 2017-06-27

## 2017-06-27 RX ADMIN — HYDROCODONE BITARTRATE AND ACETAMINOPHEN 1 TABLET: 5; 325 TABLET ORAL at 11:42

## 2017-06-27 ASSESSMENT — PAIN SCALES - GENERAL
PAINLEVEL_OUTOF10: 9
PAINLEVEL_OUTOF10: 8

## 2017-06-27 ASSESSMENT — ENCOUNTER SYMPTOMS
ABDOMINAL PAIN: 0
COUGH: 0
BACK PAIN: 1
SHORTNESS OF BREATH: 0

## 2017-06-27 ASSESSMENT — PAIN DESCRIPTION - LOCATION: LOCATION: BACK

## 2017-06-29 ENCOUNTER — OFFICE VISIT (OUTPATIENT)
Dept: FAMILY MEDICINE CLINIC | Age: 57
End: 2017-06-29

## 2017-06-29 VITALS
DIASTOLIC BLOOD PRESSURE: 102 MMHG | WEIGHT: 175 LBS | OXYGEN SATURATION: 95 % | BODY MASS INDEX: 32.2 KG/M2 | HEART RATE: 116 BPM | HEIGHT: 62 IN | SYSTOLIC BLOOD PRESSURE: 160 MMHG

## 2017-06-29 PROCEDURE — 99213 OFFICE O/P EST LOW 20 MIN: CPT | Performed by: FAMILY MEDICINE

## 2017-06-29 RX ORDER — HYDROCODONE BITARTRATE AND ACETAMINOPHEN 5; 325 MG/1; MG/1
1 TABLET ORAL EVERY 6 HOURS PRN
Qty: 10 TABLET | Refills: 0 | Status: CANCELLED | OUTPATIENT
Start: 2017-06-29 | End: 2017-07-06

## 2017-06-29 RX ORDER — OXYCODONE HYDROCHLORIDE AND ACETAMINOPHEN 5; 325 MG/1; MG/1
1 TABLET ORAL 2 TIMES DAILY PRN
Qty: 60 TABLET | Refills: 0 | Status: SHIPPED | OUTPATIENT
Start: 2017-06-29 | End: 2017-07-19 | Stop reason: ALTCHOICE

## 2017-07-03 RX ORDER — BUDESONIDE AND FORMOTEROL FUMARATE DIHYDRATE 160; 4.5 UG/1; UG/1
2 AEROSOL RESPIRATORY (INHALATION) 2 TIMES DAILY
Qty: 1 INHALER | Refills: 5 | Status: SHIPPED | OUTPATIENT
Start: 2017-07-03 | End: 2018-01-09 | Stop reason: SDUPTHER

## 2017-07-05 ENCOUNTER — TELEPHONE (OUTPATIENT)
Dept: FAMILY MEDICINE CLINIC | Age: 57
End: 2017-07-05

## 2017-07-07 ENCOUNTER — TELEPHONE (OUTPATIENT)
Dept: FAMILY MEDICINE CLINIC | Age: 57
End: 2017-07-07

## 2017-07-08 ENCOUNTER — HOSPITAL ENCOUNTER (EMERGENCY)
Age: 57
Discharge: HOME | End: 2017-07-08
Payer: COMMERCIAL

## 2017-07-08 VITALS
SYSTOLIC BLOOD PRESSURE: 136 MMHG | WEIGHT: 175 LBS | HEART RATE: 109 BPM | OXYGEN SATURATION: 96 % | BODY MASS INDEX: 32.01 KG/M2 | DIASTOLIC BLOOD PRESSURE: 72 MMHG | TEMPERATURE: 99.2 F

## 2017-07-08 DIAGNOSIS — M54.50 ACUTE BILATERAL LOW BACK PAIN WITHOUT SCIATICA: Primary | ICD-10-CM

## 2017-07-08 PROCEDURE — 99283 EMERGENCY DEPT VISIT LOW MDM: CPT

## 2017-07-08 RX ORDER — LIDOCAINE 50 MG/G
1 PATCH TOPICAL DAILY
Qty: 15 PATCH | Refills: 0 | Status: SHIPPED | OUTPATIENT
Start: 2017-07-08 | End: 2018-07-24 | Stop reason: ALTCHOICE

## 2017-07-08 ASSESSMENT — PAIN DESCRIPTION - FREQUENCY: FREQUENCY: CONTINUOUS

## 2017-07-08 ASSESSMENT — ENCOUNTER SYMPTOMS
SHORTNESS OF BREATH: 0
ABDOMINAL PAIN: 0
COUGH: 0
BACK PAIN: 1

## 2017-07-08 ASSESSMENT — PAIN DESCRIPTION - DESCRIPTORS: DESCRIPTORS: ACHING

## 2017-07-08 ASSESSMENT — PAIN DESCRIPTION - PAIN TYPE: TYPE: CHRONIC PAIN

## 2017-07-08 ASSESSMENT — PAIN DESCRIPTION - LOCATION: LOCATION: BACK

## 2017-07-12 ENCOUNTER — HOSPITAL ENCOUNTER (EMERGENCY)
Age: 57
Discharge: HOME | End: 2017-07-12
Attending: EMERGENCY MEDICINE
Payer: COMMERCIAL

## 2017-07-12 ENCOUNTER — TELEPHONE (OUTPATIENT)
Dept: FAMILY MEDICINE CLINIC | Age: 57
End: 2017-07-12

## 2017-07-12 ENCOUNTER — HOSPITAL ENCOUNTER (OUTPATIENT)
Dept: ORTHOPEDIC SURGERY | Age: 57
Discharge: HOME | End: 2017-07-12
Payer: COMMERCIAL

## 2017-07-12 VITALS
HEART RATE: 111 BPM | SYSTOLIC BLOOD PRESSURE: 157 MMHG | BODY MASS INDEX: 32.2 KG/M2 | HEIGHT: 62 IN | OXYGEN SATURATION: 94 % | DIASTOLIC BLOOD PRESSURE: 90 MMHG | WEIGHT: 175 LBS | RESPIRATION RATE: 18 BRPM | TEMPERATURE: 98.8 F

## 2017-07-12 DIAGNOSIS — M54.50 BILATERAL LOW BACK PAIN WITHOUT SCIATICA, UNSPECIFIED CHRONICITY: Primary | ICD-10-CM

## 2017-07-12 DIAGNOSIS — M25.512 PAIN IN JOINT OF LEFT SHOULDER: ICD-10-CM

## 2017-07-12 PROCEDURE — 96372 THER/PROPH/DIAG INJ SC/IM: CPT

## 2017-07-12 PROCEDURE — 73030 X-RAY EXAM OF SHOULDER: CPT

## 2017-07-12 PROCEDURE — 6370000000 HC RX 637 (ALT 250 FOR IP): Performed by: EMERGENCY MEDICINE

## 2017-07-12 PROCEDURE — 6360000002 HC RX W HCPCS: Performed by: EMERGENCY MEDICINE

## 2017-07-12 PROCEDURE — 99283 EMERGENCY DEPT VISIT LOW MDM: CPT

## 2017-07-12 RX ORDER — DEXAMETHASONE SODIUM PHOSPHATE 10 MG/ML
10 INJECTION, SOLUTION INTRAMUSCULAR; INTRAVENOUS ONCE
Status: DISCONTINUED | OUTPATIENT
Start: 2017-07-12 | End: 2017-07-12

## 2017-07-12 RX ORDER — ORPHENADRINE CITRATE 30 MG/ML
60 INJECTION INTRAMUSCULAR; INTRAVENOUS ONCE
Status: COMPLETED | OUTPATIENT
Start: 2017-07-12 | End: 2017-07-12

## 2017-07-12 RX ORDER — IBUPROFEN 400 MG/1
800 TABLET ORAL ONCE
Status: COMPLETED | OUTPATIENT
Start: 2017-07-12 | End: 2017-07-12

## 2017-07-12 RX ADMIN — IBUPROFEN 800 MG: 400 TABLET, FILM COATED ORAL at 18:51

## 2017-07-12 RX ADMIN — ORPHENADRINE CITRATE 60 MG: 30 INJECTION INTRAMUSCULAR; INTRAVENOUS at 18:49

## 2017-07-12 ASSESSMENT — ENCOUNTER SYMPTOMS
VOMITING: 0
PHOTOPHOBIA: 0
COLOR CHANGE: 0
FACIAL SWELLING: 0
ABDOMINAL DISTENTION: 0
ABDOMINAL PAIN: 0
BACK PAIN: 1
RHINORRHEA: 0
WHEEZING: 0
EYE DISCHARGE: 0
SHORTNESS OF BREATH: 0

## 2017-07-12 ASSESSMENT — PAIN DESCRIPTION - PAIN TYPE: TYPE: ACUTE PAIN

## 2017-07-12 ASSESSMENT — PAIN DESCRIPTION - DESCRIPTORS: DESCRIPTORS: SHARP;SHOOTING

## 2017-07-12 ASSESSMENT — PAIN SCALES - GENERAL
PAINLEVEL_OUTOF10: 9
PAINLEVEL_OUTOF10: 9

## 2017-07-12 ASSESSMENT — PAIN DESCRIPTION - LOCATION: LOCATION: BACK

## 2017-07-12 ASSESSMENT — PAIN DESCRIPTION - FREQUENCY: FREQUENCY: CONTINUOUS

## 2017-07-14 ENCOUNTER — TELEPHONE (OUTPATIENT)
Dept: FAMILY MEDICINE CLINIC | Age: 57
End: 2017-07-14

## 2017-07-14 DIAGNOSIS — M54.5 CHRONIC LOW BACK PAIN, UNSPECIFIED BACK PAIN LATERALITY, WITH SCIATICA PRESENCE UNSPECIFIED: Primary | ICD-10-CM

## 2017-07-14 DIAGNOSIS — G89.29 CHRONIC LOW BACK PAIN, UNSPECIFIED BACK PAIN LATERALITY, WITH SCIATICA PRESENCE UNSPECIFIED: Primary | ICD-10-CM

## 2017-07-19 ENCOUNTER — OFFICE VISIT (OUTPATIENT)
Dept: FAMILY MEDICINE CLINIC | Age: 57
End: 2017-07-19

## 2017-07-19 VITALS
BODY MASS INDEX: 31.47 KG/M2 | OXYGEN SATURATION: 96 % | HEIGHT: 62 IN | HEART RATE: 111 BPM | WEIGHT: 171 LBS | SYSTOLIC BLOOD PRESSURE: 122 MMHG | DIASTOLIC BLOOD PRESSURE: 76 MMHG | TEMPERATURE: 99.3 F

## 2017-07-19 DIAGNOSIS — M54.50 CHRONIC MIDLINE LOW BACK PAIN WITHOUT SCIATICA: ICD-10-CM

## 2017-07-19 DIAGNOSIS — L03.116 CELLULITIS OF LEFT LOWER EXTREMITY: Primary | ICD-10-CM

## 2017-07-19 DIAGNOSIS — G89.29 CHRONIC MIDLINE LOW BACK PAIN WITHOUT SCIATICA: ICD-10-CM

## 2017-07-19 PROCEDURE — 99213 OFFICE O/P EST LOW 20 MIN: CPT | Performed by: NURSE PRACTITIONER

## 2017-07-19 RX ORDER — FLUCONAZOLE 150 MG/1
150 TABLET ORAL ONCE
Qty: 1 TABLET | Refills: 0 | Status: SHIPPED | OUTPATIENT
Start: 2017-07-19 | End: 2017-08-04 | Stop reason: SDUPTHER

## 2017-07-19 RX ORDER — IBUPROFEN 800 MG/1
800 TABLET ORAL
COMMUNITY
Start: 2017-07-14 | End: 2017-09-19

## 2017-07-19 RX ORDER — HYPROMELLOSE 0 G/G
GEL OPHTHALMIC
Refills: 3 | COMMUNITY
Start: 2017-07-13 | End: 2017-09-19 | Stop reason: SDUPTHER

## 2017-07-19 ASSESSMENT — ENCOUNTER SYMPTOMS
SHORTNESS OF BREATH: 0
COLOR CHANGE: 1
COUGH: 0

## 2017-07-24 ENCOUNTER — TELEPHONE (OUTPATIENT)
Dept: FAMILY MEDICINE CLINIC | Age: 57
End: 2017-07-24

## 2017-07-24 RX ORDER — FLUCONAZOLE 150 MG/1
150 TABLET ORAL ONCE
Qty: 1 TABLET | Refills: 0 | Status: SHIPPED | OUTPATIENT
Start: 2017-07-24 | End: 2018-06-21 | Stop reason: SDUPTHER

## 2017-07-28 RX ORDER — OXYCODONE HYDROCHLORIDE AND ACETAMINOPHEN 5; 325 MG/1; MG/1
1 TABLET ORAL 2 TIMES DAILY PRN
Qty: 60 TABLET | Refills: 0 | Status: SHIPPED | OUTPATIENT
Start: 2017-07-28 | End: 2017-08-22 | Stop reason: SDUPTHER

## 2017-07-31 ENCOUNTER — OFFICE VISIT (OUTPATIENT)
Dept: FAMILY MEDICINE CLINIC | Age: 57
End: 2017-07-31

## 2017-07-31 VITALS
SYSTOLIC BLOOD PRESSURE: 138 MMHG | DIASTOLIC BLOOD PRESSURE: 82 MMHG | WEIGHT: 171 LBS | HEART RATE: 107 BPM | BODY MASS INDEX: 31.47 KG/M2 | OXYGEN SATURATION: 97 % | HEIGHT: 62 IN

## 2017-07-31 DIAGNOSIS — S32.000S LUMBAR COMPRESSION FRACTURE, SEQUELA: ICD-10-CM

## 2017-07-31 DIAGNOSIS — Z11.59 ENCOUNTER FOR HEPATITIS C SCREENING TEST FOR LOW RISK PATIENT: ICD-10-CM

## 2017-07-31 DIAGNOSIS — E87.6 HYPOKALEMIA: ICD-10-CM

## 2017-07-31 DIAGNOSIS — G25.81 RLS (RESTLESS LEGS SYNDROME): ICD-10-CM

## 2017-07-31 DIAGNOSIS — E03.9 HYPOTHYROIDISM, UNSPECIFIED TYPE: ICD-10-CM

## 2017-07-31 DIAGNOSIS — Z11.4 SCREENING FOR HIV (HUMAN IMMUNODEFICIENCY VIRUS): ICD-10-CM

## 2017-07-31 DIAGNOSIS — R25.2 CRAMP OF BOTH LOWER EXTREMITIES: Primary | ICD-10-CM

## 2017-07-31 PROCEDURE — 99213 OFFICE O/P EST LOW 20 MIN: CPT | Performed by: FAMILY MEDICINE

## 2017-07-31 PROCEDURE — 96372 THER/PROPH/DIAG INJ SC/IM: CPT | Performed by: FAMILY MEDICINE

## 2017-07-31 RX ORDER — PRAMIPEXOLE DIHYDROCHLORIDE 0.5 MG/1
0.5 TABLET ORAL EVERY EVENING
Qty: 90 TABLET | Refills: 2 | Status: SHIPPED | OUTPATIENT
Start: 2017-07-31 | End: 2018-01-23 | Stop reason: SDUPTHER

## 2017-07-31 RX ORDER — LEVOTHYROXINE SODIUM 0.2 MG/1
300 TABLET ORAL DAILY
Qty: 135 TABLET | Refills: 2 | Status: SHIPPED | OUTPATIENT
Start: 2017-07-31 | End: 2017-08-01 | Stop reason: SDUPTHER

## 2017-07-31 RX ORDER — METHYLPREDNISOLONE ACETATE 80 MG/ML
80 INJECTION, SUSPENSION INTRA-ARTICULAR; INTRALESIONAL; INTRAMUSCULAR; SOFT TISSUE ONCE
Status: COMPLETED | OUTPATIENT
Start: 2017-07-31 | End: 2017-07-31

## 2017-07-31 RX ORDER — POTASSIUM CHLORIDE 20 MEQ/1
20 TABLET, EXTENDED RELEASE ORAL DAILY
Qty: 180 TABLET | Refills: 2 | Status: SHIPPED | OUTPATIENT
Start: 2017-07-31 | End: 2018-10-07 | Stop reason: SDUPTHER

## 2017-07-31 RX ADMIN — METHYLPREDNISOLONE ACETATE 80 MG: 80 INJECTION, SUSPENSION INTRA-ARTICULAR; INTRALESIONAL; INTRAMUSCULAR; SOFT TISSUE at 14:27

## 2017-08-01 ENCOUNTER — TELEPHONE (OUTPATIENT)
Dept: FAMILY MEDICINE CLINIC | Age: 57
End: 2017-08-01

## 2017-08-01 DIAGNOSIS — E03.9 HYPOTHYROIDISM, UNSPECIFIED TYPE: ICD-10-CM

## 2017-08-01 LAB
ANION GAP SERPL CALCULATED.3IONS-SCNC: 19 MEQ/L (ref 7–13)
BUN BLDV-MCNC: 8 MG/DL (ref 6–20)
CALCIUM SERPL-MCNC: 9.1 MG/DL (ref 8.6–10.2)
CHLORIDE BLD-SCNC: 98 MEQ/L (ref 98–107)
CO2: 24 MEQ/L (ref 22–29)
CREAT SERPL-MCNC: 0.52 MG/DL (ref 0.5–0.9)
GFR AFRICAN AMERICAN: >60
GFR NON-AFRICAN AMERICAN: >60
GLUCOSE BLD-MCNC: 94 MG/DL (ref 74–109)
HEPATITIS C ANTIBODY INTERPRETATION: NORMAL
POTASSIUM SERPL-SCNC: 3.7 MEQ/L (ref 3.5–5.1)
SODIUM BLD-SCNC: 141 MEQ/L (ref 132–144)
TSH SERPL DL<=0.05 MIU/L-ACNC: 0.35 UIU/ML (ref 0.27–4.2)

## 2017-08-01 RX ORDER — MISOPROSTOL 100 UG/1
TABLET ORAL
Qty: 270 TABLET | Refills: 3 | Status: SHIPPED | OUTPATIENT
Start: 2017-08-01 | End: 2017-11-02

## 2017-08-01 RX ORDER — LEVOTHYROXINE SODIUM 0.2 MG/1
TABLET ORAL
Qty: 144 TABLET | Refills: 2 | Status: SHIPPED | OUTPATIENT
Start: 2017-08-01 | End: 2018-05-23 | Stop reason: SDUPTHER

## 2017-08-02 LAB — HIV-1 AND HIV-2 ANTIBODIES: NEGATIVE

## 2017-08-03 RX ORDER — ONDANSETRON 4 MG/1
4 TABLET, ORALLY DISINTEGRATING ORAL EVERY 8 HOURS PRN
Qty: 30 TABLET | Refills: 2 | Status: SHIPPED | OUTPATIENT
Start: 2017-08-03 | End: 2018-06-01 | Stop reason: SDUPTHER

## 2017-08-04 RX ORDER — FLUCONAZOLE 150 MG/1
150 TABLET ORAL ONCE
Qty: 1 TABLET | Refills: 0 | Status: SHIPPED | OUTPATIENT
Start: 2017-08-04 | End: 2017-08-04

## 2017-08-14 ENCOUNTER — TELEPHONE (OUTPATIENT)
Dept: FAMILY MEDICINE CLINIC | Age: 57
End: 2017-08-14

## 2017-08-14 RX ORDER — DIAZEPAM 5 MG/1
TABLET ORAL
Qty: 1 TABLET | Refills: 0 | Status: SHIPPED | OUTPATIENT
Start: 2017-08-14 | End: 2017-11-02 | Stop reason: ALTCHOICE

## 2017-08-17 ENCOUNTER — HOSPITAL ENCOUNTER (EMERGENCY)
Age: 57
Discharge: HOME | End: 2017-08-17
Payer: COMMERCIAL

## 2017-08-17 VITALS
SYSTOLIC BLOOD PRESSURE: 134 MMHG | DIASTOLIC BLOOD PRESSURE: 82 MMHG | RESPIRATION RATE: 18 BRPM | BODY MASS INDEX: 31.09 KG/M2 | OXYGEN SATURATION: 95 % | WEIGHT: 170 LBS | TEMPERATURE: 99.2 F | HEART RATE: 109 BPM

## 2017-08-17 DIAGNOSIS — M54.41 CHRONIC BILATERAL LOW BACK PAIN WITH BILATERAL SCIATICA: Primary | ICD-10-CM

## 2017-08-17 DIAGNOSIS — M54.42 CHRONIC BILATERAL LOW BACK PAIN WITH BILATERAL SCIATICA: Primary | ICD-10-CM

## 2017-08-17 DIAGNOSIS — G89.29 CHRONIC BILATERAL LOW BACK PAIN WITH BILATERAL SCIATICA: Primary | ICD-10-CM

## 2017-08-17 PROCEDURE — 96372 THER/PROPH/DIAG INJ SC/IM: CPT

## 2017-08-17 PROCEDURE — 99282 EMERGENCY DEPT VISIT SF MDM: CPT

## 2017-08-17 PROCEDURE — 6360000002 HC RX W HCPCS: Performed by: NURSE PRACTITIONER

## 2017-08-17 RX ORDER — ORPHENADRINE CITRATE 30 MG/ML
60 INJECTION INTRAMUSCULAR; INTRAVENOUS ONCE
Status: COMPLETED | OUTPATIENT
Start: 2017-08-17 | End: 2017-08-17

## 2017-08-17 RX ADMIN — ORPHENADRINE CITRATE 60 MG: 30 INJECTION INTRAMUSCULAR; INTRAVENOUS at 12:27

## 2017-08-17 ASSESSMENT — PAIN DESCRIPTION - ORIENTATION: ORIENTATION: LOWER

## 2017-08-17 ASSESSMENT — PAIN SCALES - GENERAL: PAINLEVEL_OUTOF10: 10

## 2017-08-17 ASSESSMENT — PAIN DESCRIPTION - LOCATION: LOCATION: BACK

## 2017-08-17 ASSESSMENT — ENCOUNTER SYMPTOMS
COUGH: 0
BACK PAIN: 1
ABDOMINAL PAIN: 0
SHORTNESS OF BREATH: 0

## 2017-08-17 ASSESSMENT — PAIN DESCRIPTION - PAIN TYPE: TYPE: CHRONIC PAIN

## 2017-08-25 RX ORDER — OXYCODONE HYDROCHLORIDE AND ACETAMINOPHEN 5; 325 MG/1; MG/1
1 TABLET ORAL 2 TIMES DAILY PRN
Qty: 30 TABLET | Refills: 0 | Status: SHIPPED | OUTPATIENT
Start: 2017-08-25 | End: 2017-09-07 | Stop reason: SDUPTHER

## 2017-08-28 ENCOUNTER — OFFICE VISIT (OUTPATIENT)
Dept: FAMILY MEDICINE CLINIC | Age: 57
End: 2017-08-28

## 2017-08-28 VITALS
BODY MASS INDEX: 31.23 KG/M2 | SYSTOLIC BLOOD PRESSURE: 110 MMHG | WEIGHT: 169.7 LBS | OXYGEN SATURATION: 95 % | TEMPERATURE: 99.6 F | HEIGHT: 62 IN | HEART RATE: 109 BPM | DIASTOLIC BLOOD PRESSURE: 80 MMHG

## 2017-08-28 DIAGNOSIS — J30.9 ALLERGIC RHINITIS, UNSPECIFIED ALLERGIC RHINITIS TRIGGER, UNSPECIFIED RHINITIS SEASONALITY: Primary | ICD-10-CM

## 2017-08-28 DIAGNOSIS — S32.000S LUMBAR COMPRESSION FRACTURE, SEQUELA: ICD-10-CM

## 2017-08-28 PROCEDURE — 96372 THER/PROPH/DIAG INJ SC/IM: CPT | Performed by: FAMILY MEDICINE

## 2017-08-28 PROCEDURE — 99213 OFFICE O/P EST LOW 20 MIN: CPT | Performed by: FAMILY MEDICINE

## 2017-08-28 RX ORDER — METHYLPREDNISOLONE ACETATE 80 MG/ML
80 INJECTION, SUSPENSION INTRA-ARTICULAR; INTRALESIONAL; INTRAMUSCULAR; SOFT TISSUE ONCE
Status: COMPLETED | OUTPATIENT
Start: 2017-08-28 | End: 2017-08-28

## 2017-08-28 RX ORDER — NAPROXEN 500 MG/1
TABLET ORAL
Qty: 180 TABLET | Refills: 5 | Status: SHIPPED | OUTPATIENT
Start: 2017-08-28 | End: 2017-09-19

## 2017-08-28 RX ADMIN — METHYLPREDNISOLONE ACETATE 80 MG: 80 INJECTION, SUSPENSION INTRA-ARTICULAR; INTRALESIONAL; INTRAMUSCULAR; SOFT TISSUE at 11:10

## 2017-08-30 ENCOUNTER — TELEPHONE (OUTPATIENT)
Dept: FAMILY MEDICINE CLINIC | Age: 57
End: 2017-08-30

## 2017-08-30 RX ORDER — FLUCONAZOLE 150 MG/1
150 TABLET ORAL ONCE
Qty: 1 TABLET | Refills: 0 | Status: SHIPPED | OUTPATIENT
Start: 2017-08-30 | End: 2017-09-13 | Stop reason: SDUPTHER

## 2017-08-30 RX ORDER — DOXYCYCLINE 100 MG/1
100 CAPSULE ORAL 2 TIMES DAILY
Qty: 20 CAPSULE | Refills: 0 | Status: SHIPPED | OUTPATIENT
Start: 2017-08-30 | End: 2017-09-25

## 2017-08-31 ENCOUNTER — TELEPHONE (OUTPATIENT)
Dept: FAMILY MEDICINE CLINIC | Age: 57
End: 2017-08-31

## 2017-08-31 RX ORDER — BENZONATATE 200 MG/1
200 CAPSULE ORAL 3 TIMES DAILY PRN
Qty: 30 CAPSULE | Refills: 0 | Status: SHIPPED | OUTPATIENT
Start: 2017-08-31 | End: 2017-09-10

## 2017-09-05 ENCOUNTER — TELEPHONE (OUTPATIENT)
Dept: FAMILY MEDICINE CLINIC | Age: 57
End: 2017-09-05

## 2017-09-06 ENCOUNTER — TELEPHONE (OUTPATIENT)
Dept: FAMILY MEDICINE CLINIC | Age: 57
End: 2017-09-06

## 2017-09-06 DIAGNOSIS — I69.354 HEMIPARESIS AFFECTING LEFT SIDE AS LATE EFFECT OF CEREBROVASCULAR ACCIDENT (HCC): Primary | ICD-10-CM

## 2017-09-07 ENCOUNTER — TELEPHONE (OUTPATIENT)
Dept: FAMILY MEDICINE CLINIC | Age: 57
End: 2017-09-07

## 2017-09-07 DIAGNOSIS — M62.838 MUSCLE SPASM: ICD-10-CM

## 2017-09-07 RX ORDER — TIZANIDINE 4 MG/1
8 TABLET ORAL EVERY 8 HOURS PRN
Qty: 180 TABLET | Refills: 5 | Status: SHIPPED | OUTPATIENT
Start: 2017-09-07 | End: 2018-05-31

## 2017-09-07 RX ORDER — OXYCODONE HYDROCHLORIDE AND ACETAMINOPHEN 5; 325 MG/1; MG/1
1 TABLET ORAL 2 TIMES DAILY PRN
Qty: 14 TABLET | Refills: 0 | Status: SHIPPED | OUTPATIENT
Start: 2017-09-07 | End: 2017-09-19 | Stop reason: SDUPTHER

## 2017-09-13 DIAGNOSIS — M54.9 CHRONIC BACK PAIN, UNSPECIFIED BACK LOCATION, UNSPECIFIED BACK PAIN LATERALITY: Primary | ICD-10-CM

## 2017-09-13 DIAGNOSIS — G89.29 CHRONIC BACK PAIN, UNSPECIFIED BACK LOCATION, UNSPECIFIED BACK PAIN LATERALITY: Primary | ICD-10-CM

## 2017-09-14 ENCOUNTER — HOSPITAL ENCOUNTER (EMERGENCY)
Age: 57
Discharge: HOME | End: 2017-09-14
Attending: EMERGENCY MEDICINE
Payer: MEDICARE

## 2017-09-14 ENCOUNTER — APPOINTMENT (OUTPATIENT)
Dept: GENERAL RADIOLOGY | Age: 57
End: 2017-09-14
Payer: MEDICARE

## 2017-09-14 VITALS
WEIGHT: 165 LBS | RESPIRATION RATE: 18 BRPM | SYSTOLIC BLOOD PRESSURE: 129 MMHG | DIASTOLIC BLOOD PRESSURE: 84 MMHG | OXYGEN SATURATION: 95 % | HEART RATE: 100 BPM | TEMPERATURE: 98.8 F | BODY MASS INDEX: 30.36 KG/M2 | HEIGHT: 62 IN

## 2017-09-14 DIAGNOSIS — S90.02XA CONTUSION OF ANKLE, LEFT: Primary | ICD-10-CM

## 2017-09-14 PROCEDURE — 99283 EMERGENCY DEPT VISIT LOW MDM: CPT

## 2017-09-14 PROCEDURE — 73610 X-RAY EXAM OF ANKLE: CPT

## 2017-09-14 RX ORDER — FLUCONAZOLE 150 MG/1
150 TABLET ORAL ONCE
Qty: 1 TABLET | Refills: 0 | Status: SHIPPED | OUTPATIENT
Start: 2017-09-14 | End: 2017-10-02 | Stop reason: SDUPTHER

## 2017-09-14 ASSESSMENT — ENCOUNTER SYMPTOMS
COUGH: 0
DIARRHEA: 0
BACK PAIN: 0
SHORTNESS OF BREATH: 0
FACIAL SWELLING: 0
ABDOMINAL PAIN: 0

## 2017-09-14 ASSESSMENT — PAIN DESCRIPTION - DESCRIPTORS: DESCRIPTORS: ACHING

## 2017-09-14 ASSESSMENT — PAIN DESCRIPTION - LOCATION: LOCATION: ANKLE;FOOT

## 2017-09-14 ASSESSMENT — PAIN SCALES - GENERAL: PAINLEVEL_OUTOF10: 9

## 2017-09-14 ASSESSMENT — PAIN DESCRIPTION - PAIN TYPE: TYPE: ACUTE PAIN

## 2017-09-14 ASSESSMENT — PAIN DESCRIPTION - ORIENTATION: ORIENTATION: LEFT

## 2017-09-18 RX ORDER — OXYCODONE HYDROCHLORIDE AND ACETAMINOPHEN 5; 325 MG/1; MG/1
1 TABLET ORAL 2 TIMES DAILY PRN
Qty: 14 TABLET | Refills: 0 | Status: CANCELLED | OUTPATIENT
Start: 2017-09-18

## 2017-09-19 ENCOUNTER — OFFICE VISIT (OUTPATIENT)
Dept: FAMILY MEDICINE CLINIC | Age: 57
End: 2017-09-19

## 2017-09-19 VITALS
SYSTOLIC BLOOD PRESSURE: 128 MMHG | HEIGHT: 62 IN | DIASTOLIC BLOOD PRESSURE: 74 MMHG | HEART RATE: 110 BPM | OXYGEN SATURATION: 97 % | BODY MASS INDEX: 30.55 KG/M2 | WEIGHT: 166 LBS

## 2017-09-19 DIAGNOSIS — F41.9 ANXIETY: ICD-10-CM

## 2017-09-19 DIAGNOSIS — M48.00 SPINAL STENOSIS, UNSPECIFIED SPINAL REGION: Primary | ICD-10-CM

## 2017-09-19 DIAGNOSIS — Z23 NEEDS FLU SHOT: ICD-10-CM

## 2017-09-19 DIAGNOSIS — H04.129 DRY EYE: ICD-10-CM

## 2017-09-19 DIAGNOSIS — Z72.0 TOBACCO USE: ICD-10-CM

## 2017-09-19 DIAGNOSIS — J44.9 CHRONIC OBSTRUCTIVE PULMONARY DISEASE, UNSPECIFIED COPD TYPE (HCC): ICD-10-CM

## 2017-09-19 PROCEDURE — 90688 IIV4 VACCINE SPLT 0.5 ML IM: CPT | Performed by: FAMILY MEDICINE

## 2017-09-19 PROCEDURE — G0008 ADMIN INFLUENZA VIRUS VAC: HCPCS | Performed by: FAMILY MEDICINE

## 2017-09-19 PROCEDURE — 99213 OFFICE O/P EST LOW 20 MIN: CPT | Performed by: FAMILY MEDICINE

## 2017-09-19 RX ORDER — MELOXICAM 15 MG/1
15 TABLET ORAL DAILY
Qty: 30 TABLET | Refills: 3 | Status: SHIPPED | OUTPATIENT
Start: 2017-09-19 | End: 2017-11-13 | Stop reason: SDUPTHER

## 2017-09-19 RX ORDER — HYPROMELLOSE 0 G/G
GEL OPHTHALMIC
Qty: 10 G | Refills: 3 | Status: SHIPPED | OUTPATIENT
Start: 2017-09-19 | End: 2018-11-20

## 2017-09-19 RX ORDER — VARENICLINE TARTRATE 1 MG/1
1 TABLET, FILM COATED ORAL 2 TIMES DAILY
Qty: 60 TABLET | Refills: 2 | Status: SHIPPED | OUTPATIENT
Start: 2017-09-19 | End: 2017-11-02

## 2017-09-19 RX ORDER — OXYCODONE HYDROCHLORIDE AND ACETAMINOPHEN 5; 325 MG/1; MG/1
1 TABLET ORAL 2 TIMES DAILY PRN
Qty: 30 TABLET | Refills: 0 | Status: SHIPPED | OUTPATIENT
Start: 2017-09-19 | End: 2017-09-25

## 2017-09-19 RX ORDER — FOLIC ACID 1 MG/1
1 TABLET ORAL DAILY
Qty: 30 TABLET | Refills: 5 | Status: SHIPPED | OUTPATIENT
Start: 2017-09-19 | End: 2018-06-06 | Stop reason: SDUPTHER

## 2017-09-19 RX ORDER — MELOXICAM 15 MG/1
TABLET ORAL
Qty: 90 TABLET | Refills: 3 | OUTPATIENT
Start: 2017-09-19

## 2017-09-19 RX ORDER — VARENICLINE TARTRATE 25 MG
KIT ORAL
Qty: 41 EACH | Refills: 0 | Status: SHIPPED | OUTPATIENT
Start: 2017-09-19 | End: 2017-10-18 | Stop reason: SDUPTHER

## 2017-09-22 ENCOUNTER — TELEPHONE (OUTPATIENT)
Dept: FAMILY MEDICINE CLINIC | Age: 57
End: 2017-09-22

## 2017-09-22 RX ORDER — FLUCONAZOLE 100 MG/1
100 TABLET ORAL DAILY
Qty: 7 TABLET | Refills: 0 | Status: SHIPPED | OUTPATIENT
Start: 2017-09-22 | End: 2017-10-13 | Stop reason: SDUPTHER

## 2017-09-25 ENCOUNTER — HOSPITAL ENCOUNTER (EMERGENCY)
Age: 57
Discharge: HOME | End: 2017-09-25
Payer: MEDICARE

## 2017-09-25 VITALS
WEIGHT: 160 LBS | RESPIRATION RATE: 18 BRPM | SYSTOLIC BLOOD PRESSURE: 132 MMHG | TEMPERATURE: 98.6 F | BODY MASS INDEX: 29.44 KG/M2 | HEIGHT: 62 IN | DIASTOLIC BLOOD PRESSURE: 72 MMHG | HEART RATE: 98 BPM | OXYGEN SATURATION: 95 %

## 2017-09-25 DIAGNOSIS — K08.89 PAIN, DENTAL: ICD-10-CM

## 2017-09-25 DIAGNOSIS — K02.9 DENTAL CARIES: Primary | ICD-10-CM

## 2017-09-25 PROCEDURE — 99282 EMERGENCY DEPT VISIT SF MDM: CPT

## 2017-09-25 RX ORDER — ACETAMINOPHEN 325 MG/1
650 TABLET ORAL EVERY 6 HOURS PRN
Qty: 20 TABLET | Refills: 0 | Status: ON HOLD | OUTPATIENT
Start: 2017-09-25 | End: 2018-06-16 | Stop reason: HOSPADM

## 2017-09-25 RX ORDER — CLINDAMYCIN HYDROCHLORIDE 150 MG/1
150 CAPSULE ORAL 4 TIMES DAILY
Qty: 40 CAPSULE | Refills: 0 | Status: SHIPPED | OUTPATIENT
Start: 2017-09-25 | End: 2017-11-03 | Stop reason: SDUPTHER

## 2017-09-25 ASSESSMENT — PAIN SCALES - GENERAL: PAINLEVEL_OUTOF10: 10

## 2017-09-25 ASSESSMENT — PAIN DESCRIPTION - LOCATION: LOCATION: TEETH

## 2017-09-25 ASSESSMENT — PAIN DESCRIPTION - DESCRIPTORS: DESCRIPTORS: ACHING

## 2017-09-25 ASSESSMENT — ENCOUNTER SYMPTOMS
GASTROINTESTINAL NEGATIVE: 1
EYES NEGATIVE: 1
RESPIRATORY NEGATIVE: 1

## 2017-09-25 ASSESSMENT — PAIN DESCRIPTION - PAIN TYPE: TYPE: ACUTE PAIN

## 2017-09-25 ASSESSMENT — PAIN DESCRIPTION - ORIENTATION: ORIENTATION: LEFT;LOWER

## 2017-09-27 RX ORDER — OXYCODONE HYDROCHLORIDE AND ACETAMINOPHEN 5; 325 MG/1; MG/1
1 TABLET ORAL 2 TIMES DAILY PRN
Qty: 30 TABLET | Refills: 0 | OUTPATIENT
Start: 2017-09-27

## 2017-09-29 ENCOUNTER — HOSPITAL ENCOUNTER (EMERGENCY)
Age: 57
Discharge: HOME | End: 2017-09-29
Attending: EMERGENCY MEDICINE
Payer: COMMERCIAL

## 2017-09-29 VITALS
SYSTOLIC BLOOD PRESSURE: 148 MMHG | RESPIRATION RATE: 16 BRPM | HEART RATE: 102 BPM | OXYGEN SATURATION: 96 % | DIASTOLIC BLOOD PRESSURE: 88 MMHG | HEIGHT: 62 IN | TEMPERATURE: 98.9 F | BODY MASS INDEX: 29.44 KG/M2 | WEIGHT: 160 LBS

## 2017-09-29 DIAGNOSIS — K08.89 TOOTHACHE: Primary | ICD-10-CM

## 2017-09-29 PROCEDURE — 99282 EMERGENCY DEPT VISIT SF MDM: CPT

## 2017-09-29 RX ORDER — RABEPRAZOLE SODIUM 20 MG/1
TABLET, DELAYED RELEASE ORAL
Qty: 90 TABLET | Refills: 2 | Status: SHIPPED | OUTPATIENT
Start: 2017-09-29 | End: 2017-11-02 | Stop reason: ALTCHOICE

## 2017-09-29 RX ORDER — RABEPRAZOLE SODIUM 20 MG/1
20 TABLET, DELAYED RELEASE ORAL DAILY
Qty: 30 TABLET | Refills: 5 | Status: SHIPPED | OUTPATIENT
Start: 2017-09-29 | End: 2017-09-29 | Stop reason: SDUPTHER

## 2017-09-29 ASSESSMENT — PAIN DESCRIPTION - PAIN TYPE: TYPE: ACUTE PAIN

## 2017-09-29 ASSESSMENT — ENCOUNTER SYMPTOMS
ABDOMINAL DISTENTION: 0
VOMITING: 0
ABDOMINAL PAIN: 0
RHINORRHEA: 0
PHOTOPHOBIA: 0
COLOR CHANGE: 0
FACIAL SWELLING: 0
SHORTNESS OF BREATH: 0
WHEEZING: 0
EYE DISCHARGE: 0

## 2017-09-29 ASSESSMENT — PAIN DESCRIPTION - ORIENTATION: ORIENTATION: RIGHT;MID;UPPER

## 2017-09-29 ASSESSMENT — PAIN DESCRIPTION - FREQUENCY: FREQUENCY: CONTINUOUS

## 2017-09-29 ASSESSMENT — PAIN DESCRIPTION - ONSET: ONSET: ON-GOING

## 2017-09-29 ASSESSMENT — PAIN DESCRIPTION - DESCRIPTORS: DESCRIPTORS: STABBING;THROBBING

## 2017-09-29 ASSESSMENT — PAIN SCALES - GENERAL: PAINLEVEL_OUTOF10: 10

## 2017-09-29 ASSESSMENT — PAIN DESCRIPTION - LOCATION: LOCATION: TEETH

## 2017-09-29 NOTE — ED NOTES
Pt given discharge instructions and cab voucher. Pt verbalized understanding.    Pt left er with cane to wait room to wait for cab     Rob Mandujano RN  45/29/05 6927

## 2017-09-29 NOTE — ED PROVIDER NOTES
as noted above the remainder of the review of systems was reviewed and negative. PAST MEDICAL HISTORY     Past Medical History:   Diagnosis Date    Acid reflux     Arnold-Chiari deformity ([facility] [address] 2000    surgery    Chronic back pain greater than 3 months duration     COPD (chronic obstructive pulmonary disease) (HCC)     Dr Elmer Calle at [address] CVA (cerebral infarction)     left hemiparesis, due to ? estrogen + smoking    Depression     Dr Gerhardt Cass H/O encephalopathy     Headache     Dr Silverio Verma    Hepatitis B surface antigen positive     Hyperlipidemia LDL goal < 100     Hypothyroidism     Laryngitis, chronic 2012    scoped by Dr Jeri Riedel, fungal    Marijuana smoker in remission [facility])     Obesity (BMI 30-39. 9)     Pulmonary embolism and infarction (HCC)     Rhinitis, chronic     Rotator cuff tear     Left    S/P colonoscopy with polypectomy 2010    Dr Harpal Baron without myelopathy     Vertebral compression fracture ([facility] [address]          SURGICAL HISTORY       Past Surgical History:   Procedure Laterality Date     SECTION      CRANIOTOMY      Arnold-Chiary malformation    FEMUR FRACTURE SURGERY      TONSILLECTOMY      UPPER GASTROINTESTINAL ENDOSCOPY  8/5/15    w/bx          CURRENT MEDICATIONS       Previous Medications    ACETAMINOPHEN (TYLENOL) 325 MG TABLET    Take 2 tablets by mouth every 6 hours as needed for Pain    ALBUTEROL SULFATE HFA (PROAIR HFA) 108 (90 BASE) MCG/ACT INHALER    Use every 4 hours while awake for 7-10 days then PRN wheezing  Dispense with SPACER and Instruct on use. May sub Ventolin or Proventil as needed per [employer]. AMLODIPINE (NORVASC) 2.5 MG TABLET    Take 2.5 mg by mouth    ASCORBIC ACID (VITAMIN C) 500 MG TABLET    Take 500 mg by mouth daily. ASPIRIN 81 MG TABLET    Take 1 tablet by mouth daily    AZELASTINE (ASTELIN) 137 MCG/SPRAY NASAL SPRAY    1 spray by Nasal route 2 times daily.  Use in each nostril as directed     BENZONATATE (TESSALON PERLES) 100 MG CAPSULE    Take 1 capsule by mouth 3 times daily as needed for Cough    BISACODYL (DULCOLAX) 10 MG SUPPOSITORY    Place 10 mg rectally    BUDESONIDE-FORMOTEROL (SYMBICORT) 160-4.5 MCG/ACT AERO    Inhale 2 puffs into the lungs 2 times daily    CARISOPRODOL (SOMA) 350 MG TABLET    Take 1 tablet by mouth daily as needed (migraine)    CHOLECALCIFEROL (VITAMIN D3) 2000 UNITS TABS        CLINDAMYCIN (CLEOCIN) 150 MG CAPSULE    Take 1 capsule by mouth 4 times daily for 10 days    CRESTOR 20 MG TABLET        DIAZEPAM (VALIUM) 5 MG TABLET    1 tab by mouth 60 minutes prior to MRI    FLUCONAZOLE (DIFLUCAN) 100 MG TABLET    Take 1 tablet by mouth daily for 7 days    FOLIC ACID (FOLVITE) 1 MG TABLET    Take 1 tablet by mouth daily    FUROSEMIDE (LASIX) 40 MG TABLET    Take 1 tablet by mouth daily    GENTEAL SEVERE 0.3 % GEL    10PLACE 1 DROP IN BOTH EYES Q 8 H    GUAIFENESIN (MUCINEX) 600 MG EXTENDED RELEASE TABLET    Take 1 tablet by mouth 2 times daily    IPRATROPIUM (ATROVENT) 0.02 % NEBULIZER SOLUTION    0.5 mg    LACTOBACILLUS (ULTIMATE PROBIOTIC FORMULA) CAPS    TK 1 C PO ONCE D    LEVALBUTEROL (XOPENEX HFA) 45 MCG/ACT INHALER    Inhale 2 puffs into the lungs every 4 hours as needed. LEVALBUTEROL (XOPENEX) 0.63 MG/3ML NEBULIZATION    0.63 mg    LEVOTHYROXINE (SYNTHROID) 200 MCG TABLET    TAKE 1 AND 1/2 TABLETS BY MOUTH DAILY    LIDOCAINE (LIDODERM) 5 %    Place 1 patch onto the skin daily 12 hours on, 12 hours off.     LIDOCAINE VISCOUS (XYLOCAINE) 2 % SOLUTION    Take 1 mL by mouth as needed for Irritation or Dental Pain (apply to gum with q-tip)    LOPERAMIDE (IMODIUM A-D) 2 MG TABLET    Take 2 mg by mouth    LORATADINE (CLARITIN) 10 MG TABLET    Take 1 tablet by mouth daily    LORATADINE-PSEUDOEPHEDRINE (CLARITIN-D 24 HOUR)  MG PER EXTENDED RELEASE TABLET    Take 1 tablet by mouth daily    LYRICA 150 MG CAPSULE        MAGNESIUM OXIDE (MAG-OX) mood and affect. DIAGNOSTIC RESULTS     EKG: All EKG's are interpreted by the Emergency Department Physician who either signs or Co-signs this chart in the absence of a cardiologist.        RADIOLOGY:   Non-plain film images such as CT, Ultrasound and MRI are read by the radiologist. Plain radiographic images are visualized and preliminarily interpreted by the emergency physician with the below findings:        Interpretation per the Radiologist below, if available at the time of this note:    No orders to display         ED BEDSIDE ULTRASOUND:   Performed by ED Physician - none    LABS:  Labs Reviewed - No data to display    All other labs were within normal range or not returned as of this dictation. EMERGENCY DEPARTMENT COURSE and DIFFERENTIAL DIAGNOSIS/MDM:   Vitals:    Vitals:    09/29/17 0752   Pulse: 102   Resp: 16   Temp: 98.9 °F (37.2 °C)   TempSrc: Oral   SpO2: 96%   Weight: 160 lb (72.6 kg)   Height: 5' 2\" (1.575 m)       Patient is encouraged to add Tylenol and with Motrin and continue using the topical anesthetic until she is able to see her dentist next Tuesday. She is agreeable to this plan and declines any medications here in the emergency department. She is discharged home in stable condition. MDM    CRITICAL CARE TIME   Total Critical Care time was 0 minutes, excluding separately reportable procedures. There was a high probability of clinically significant/life threatening deterioration in the patient's condition which required my urgent intervention. CONSULTS:  None    PROCEDURES:  Unless otherwise noted below, none     Procedures    FINAL IMPRESSION      1.  Toothache          DISPOSITION/PLAN   DISPOSITION Decision to Discharge    PATIENT REFERRED TO:        dentist this tuesday as planned      DISCHARGE MEDICATIONS:  New Prescriptions    No medications on file          (Please note that portions of this note were completed with a voice recognition program.  Efforts were made to edit the dictations but occasionally words are mis-transcribed.)    Erica Agosto DO (electronically signed)  Attending Emergency Physician         Erica Agosto DO  09/29/17 7586

## 2017-09-29 NOTE — ED AVS SNAPSHOT
After Visit Summary  (Discharge Instructions)    Medication List for Home    Based on the information you provided to us as well as any changes during this visit, the following is your updated medication list.  Compare this with your prescription bottles at home. If you have any questions or concerns, contact your primary care physician's office. Daily Medication List (This medication list can be shared with any Healthcare provider who is helping you manage your medications)      ASK your doctor about these medications if you have questions     acetaminophen 325 MG tablet   Commonly known as:  APAP EXTRA STRENGTH   Take 2 tablets by mouth every 6 hours as needed for Pain       albuterol sulfate  (90 Base) MCG/ACT inhaler   Commonly known as:  PROAIR HFA   Use every 4 hours while awake for 7-10 days then PRN wheezing  Dispense with SPACER and Instruct on use. May sub Ventolin or Proventil as needed per [employer]. amLODIPine 2.5 MG tablet   Commonly known as:  NORVASC   Take 2.5 mg by mouth       ascorbic acid 500 MG tablet   Commonly known as:  VITAMIN C   Take 500 mg by mouth daily. aspirin 81 MG tablet   Take 1 tablet by mouth daily       azelastine 0.1 % nasal spray   Commonly known as:  ASTELIN   1 spray by Nasal route 2 times daily.  Use in each nostril as directed       benzonatate 100 MG capsule   Commonly known as:  TESSALON PERLES   Take 1 capsule by mouth 3 times daily as needed for Cough       bisacodyl 10 MG suppository   Commonly known as:  DULCOLAX   Place 10 mg rectally       budesonide-formoterol 160-4.5 MCG/ACT Aero   Commonly known as:  SYMBICORT   Inhale 2 puffs into the lungs 2 times daily       carisoprodol 350 MG tablet   Commonly known as:  SOMA   Take 1 tablet by mouth daily as needed (migraine)       clindamycin 150 MG capsule   Commonly known as:  CLEOCIN   Take 1 capsule by mouth 4 times daily for 10 days       CRESTOR 20 MG tablet Generic drug:  rosuvastatin       diazepam 5 MG tablet   Commonly known as:  VALIUM   1 tab by mouth 60 minutes prior to MRI       fluconazole 100 MG tablet   Commonly known as:  DIFLUCAN   Take 1 tablet by mouth daily for 7 days       folic acid 1 MG tablet   Commonly known as:  FOLVITE   Take 1 tablet by mouth daily       furosemide 40 MG tablet   Commonly known as:  LASIX   Take 1 tablet by mouth daily       GENTEAL SEVERE 0.3 % Gel   Generic drug:  Hypromellose   10PLACE 1 DROP IN BOTH EYES Q 8 H       guaiFENesin 600 MG extended release tablet   Commonly known as:  MUCINEX   Take 1 tablet by mouth 2 times daily       ipratropium 0.02 % nebulizer solution   Commonly known as:  ATROVENT   0.5 mg       levalbuterol 0.63 MG/3ML nebulization   Commonly known as:  XOPENEX   0.63 mg       levalbuterol 45 MCG/ACT inhaler   Commonly known as:  XOPENEX HFA   Inhale 2 puffs into the lungs every 4 hours as needed.        levothyroxine 200 MCG tablet   Commonly known as:  SYNTHROID   TAKE 1 AND 1/2 TABLETS BY MOUTH DAILY       lidocaine 5 %   Commonly known as:  LIDODERM   Place 1 patch onto the skin daily 12 hours on, 12 hours off.       lidocaine viscous 2 % solution   Commonly known as:  XYLOCAINE   Take 1 mL by mouth as needed for Irritation or Dental Pain (apply to gum with q-tip)       loperamide 2 MG tablet   Commonly known as:  IMODIUM A-D   Take 2 mg by mouth       loratadine 10 MG tablet   Commonly known as:  CLARITIN   Take 1 tablet by mouth daily       loratadine-pseudoephedrine  MG per extended release tablet   Commonly known as:  CLARITIN-D 24 HOUR   Take 1 tablet by mouth daily       LYRICA 150 MG capsule   Generic drug:  pregabalin       magnesium oxide 400 (240 Mg) MG tablet   Commonly known as:  MAG-OX       Melatonin 10 MG Tabs   Take 1 tablet by mouth       meloxicam 15 MG tablet   Commonly known as:  MOBIC   Take 1 tablet by mouth daily       metFORMIN 500 MG tablet Commonly known as:  GLUCOPHAGE       mirtazapine 30 MG tablet   Commonly known as:  REMERON   Take 30 mg by mouth nightly. misoprostol 100 MCG tablet   Commonly known as:  CYTOTEC   take 1 tablet by mouth three times a day       Multivitamin/Iron Tabs   Take 1 tablet by mouth daily. ondansetron 4 MG disintegrating tablet   Commonly known as:  ZOFRAN-ODT   Take 1 tablet by mouth every 8 hours as needed for Nausea or Vomiting       ONE TOUCH ULTRA TEST strip   Generic drug:  glucose blood VI test strips       ONE TOUCH ULTRASOFT LANCETS Misc       potassium chloride 20 MEQ extended release tablet   Commonly known as:  KLOR-CON M   Take 1 tablet by mouth daily       pramipexole 0.5 MG tablet   Commonly known as:  MIRAPEX   Take 1 tablet by mouth every evening       RABEprazole 20 MG tablet   Commonly known as:  ACIPHEX   Take 20 mg by mouth       Simethicone 180 MG Caps   Commonly known as:  GAS-X ULTRA STRENGTH   Take 180 mg by mouth 2 times daily       terazosin 2 MG capsule   Commonly known as:  HYTRIN   TK 1 C PO D HS       tiZANidine 4 MG tablet   Commonly known as:  ZANAFLEX   Take 2 tablets by mouth every 8 hours as needed (muscle spasm)       triamcinolone 0.1 % ointment   Commonly known as:  KENALOG   Apply topically 2 times daily       TRUBIOTICS Caps   Take 1 capsule by mouth daily       ULTIMATE PROBIOTIC FORMULA Caps   TK 1 C PO ONCE D       * varenicline 0.5 MG X 11 & 1 MG X 42 tablet   Commonly known as:  CHANTIX STARTING MONTH MAURILIO   Take by mouth as directed       * varenicline 1 MG tablet   Commonly known as:  CHANTIX CONTINUING MONTH MAURILIO   Take 1 tablet by mouth 2 times daily       vitamin B-12 1000 MCG tablet   Commonly known as:  CYANOCOBALAMIN   Take 1,000 mcg by mouth daily       Vitamin D3 2000 units Tabs       * Notice: This list has 2 medication(s) that are the same as other medications prescribed for you.  Read the directions carefully, and ask your doctor or other care provider to review them with you. Allergies as of 2017        Reactions    Latex     Imitrex [Sumatriptan] Anaphylaxis    Antivert [Meclizine Hcl]     Azithromycin     Bacitracin     Cefdinir     Codeine     Cyclosporine     Flagyl [Metronidazole]     Iodine     Iv Dye [Iodides]     Ketorolac Tromethamine     Neomycin     Petroleum Jelly [Skin Protectants, Misc.]     Provera [Medroxyprogesterone Acetate] Other (See Comments)    Caused massive stroke    Quinolones     Sulfa Antibiotics     Trovan [Trovafloxacin]     Ultram [Tramadol Hcl]     Zomig [Zolmitriptan]     Bactrim Nausea And Vomiting, Rash    Cefuroxime Axetil Rash      Immunizations as of 2017     Name Date Dose VIS Date Route    Influenza, Quadv, 3 Years and older, IM 2017 0.5 mL 2015 Intramuscular         After Visit Summary    This summary was created for you. Thank you for entrusting your care to us. The following information includes details about your hospital/visit stay along with steps you should take to help with your recovery once you leave the hospital.  In this packet, you will find information about the topics listed below:    · Instructions about your medications including a list of your home medications  · A summary of your hospital visit  · Follow-up appointments once you have left the hospital  · Your care plan at home      You may receive a survey regarding the care you received during your stay. Your input is valuable to us. We encourage you to complete and return your survey in the envelope provided. We hope you will choose us in the future for your healthcare needs.           Patient Information     [place] [facility] [place] 1960      Care Provided at:     Name Address Phone       [address] [address] [phone]            Your Visit    Here you will find information about your visit, including the reason for Preventive Care        Date Due    Tetanus Combination Vaccine (1 - Tdap) 2/10/1981    Mammograms are recommended every 2 years for low/average risk patients aged 48 - 69, and every year for high risk patients per updated national guidelines. However these guidelines can be individualized by your provider. 8/17/2014    Pap Smear 9/12/2015    Cholesterol Screening 12/15/2020    Colonoscopy 9/14/2021                 Care Plan Once You Return Home    This section includes instructions you will need to follow once you leave the hospital.  Your care team will discuss these with you, so you and those caring for you know how to best care for your health needs at home. This section may also include educational information about certain health topics that may be of help to you. Important Information if you smoke or are exposed to smoking       SMOKING: QUIT SMOKING. THIS IS THE MOST IMPORTANT ACTION YOU CAN TAKE TO IMPROVE YOUR CURRENT AND FUTURE HEALTH. Call the [facility] RunAlong at [facility] NOW ([phone])    Smoking harms nonsmokers. When nonsmokers are around people who smoke, they absorb nicotine, carbon monoxide, and other ingredients of tobacco smoke. DO NOT SMOKE AROUND CHILDREN     Children exposed to secondhand smoke are at an increased risk of:  Sudden Infant Death Syndrome (SIDS), acute respiratory infections, inflammation of the middle ear, and severe asthma. Over a longer time, it causes heart disease and lung cancer. There is no safe level of exposure to secondhand smoke. Important information for a smoker       SMOKING: QUIT SMOKING. THIS IS THE MOST IMPORTANT ACTION YOU CAN TAKE TO IMPROVE YOUR CURRENT AND FUTURE HEALTH. Call the [facility] RunAlong at [facility] NOW ([phone])    Smoking harms nonsmokers.  When nonsmokers are around people who smoke, they absorb nicotine, carbon monoxide, and other ingredients of tobacco smoke.     DO NOT SMOKE AROUND CHILDREN     Children exposed to secondhand smoke are at an increased risk of:  Sudden Infant Death Syndrome (SIDS), acute respiratory infections, inflammation of the middle ear, and severe asthma. Over a longer time, it causes heart disease and lung cancer. There is no safe level of exposure to secondhand smoke. [employer] Signup     Our records indicate that you have an active [employer] account. You can view your After Visit Summary by going to [email or web address] and logging in with your [employer] username and password. If you don't have a [employer] username and password but a parent or guardian has access to your record, the parent or guardian should login with their own [employer] username and password and access your record to view the After Visit Summary. Additional Information  If you have questions, please contact the physician practice where you receive care. Remember, [employer] is NOT to be used for urgent needs. For medical emergencies, dial 911. For questions regarding your MyChart account call [phone]. If you have a clinical question, please call your doctor's office. View your information online  ? Review your current list of  medications, immunization, and allergies. ? Review your future test results online . ? Review your discharge instructions provided by your caregivers at discharge    Certain functionality such as prescription refills, scheduling appointments or sending messages to your provider are not activated if your provider does not use [employer] in his/her office    For questions regarding your [employer] account call [phone]. If you have a clinical question, please call your doctor's office.          The information on all pages of the After Visit Summary has been reviewed with me, the patient and/or responsible adult, by my health care If you do not have an account, please click on the \"Sign Up Now\" link. Current as of: August 11, 2016  Content Version: 11.3  © 1819-2219 [employer]. Care instructions adapted under license by [facility] ([facility]). If you have questions about a medical condition or this instruction, always ask your healthcare professional. [address] any warranty or liability for your use of this information.

## 2017-10-02 RX ORDER — FLUCONAZOLE 150 MG/1
150 TABLET ORAL ONCE
Qty: 1 TABLET | Refills: 0 | Status: SHIPPED | OUTPATIENT
Start: 2017-10-02 | End: 2017-10-02

## 2017-10-03 ENCOUNTER — TELEPHONE (OUTPATIENT)
Dept: FAMILY MEDICINE CLINIC | Age: 57
End: 2017-10-03

## 2017-10-03 NOTE — TELEPHONE ENCOUNTER
Pt calling back aware of the message about pain medication. States that she doesn't see pain management until . Pt had another call placed me on hold. She said she has reservations about seeing Dr Henok Brar because she likes to do the spinal blocks and she doesn't feel she can go through that again.  n

## 2017-10-03 NOTE — TELEPHONE ENCOUNTER
Pt calling back states that she forgot she had me on hold. See's pain management 11/2/20177 at 10:30 this is with Dr Davis Cervantes. She is saying that  Riding the bus is wrecking her back and causing pain. I again told pt that the pain medication was not going to be filled.  She then said me either and the call ended

## 2017-10-06 RX ORDER — PREGABALIN 150 MG/1
150 CAPSULE ORAL 2 TIMES DAILY
Qty: 60 CAPSULE | Refills: 5 | Status: SHIPPED | OUTPATIENT
Start: 2017-10-06 | End: 2018-04-09 | Stop reason: SDUPTHER

## 2017-10-11 ENCOUNTER — OFFICE VISIT (OUTPATIENT)
Dept: FAMILY MEDICINE CLINIC | Age: 57
End: 2017-10-11

## 2017-10-11 VITALS
HEART RATE: 106 BPM | WEIGHT: 167 LBS | DIASTOLIC BLOOD PRESSURE: 82 MMHG | SYSTOLIC BLOOD PRESSURE: 130 MMHG | BODY MASS INDEX: 30.73 KG/M2 | HEIGHT: 62 IN | OXYGEN SATURATION: 97 %

## 2017-10-11 PROCEDURE — 4004F PT TOBACCO SCREEN RCVD TLK: CPT | Performed by: FAMILY MEDICINE

## 2017-10-11 PROCEDURE — 3017F COLORECTAL CA SCREEN DOC REV: CPT | Performed by: FAMILY MEDICINE

## 2017-10-11 PROCEDURE — G8598 ASA/ANTIPLAT THER USED: HCPCS | Performed by: FAMILY MEDICINE

## 2017-10-11 PROCEDURE — G8484 FLU IMMUNIZE NO ADMIN: HCPCS | Performed by: FAMILY MEDICINE

## 2017-10-11 PROCEDURE — 99213 OFFICE O/P EST LOW 20 MIN: CPT | Performed by: FAMILY MEDICINE

## 2017-10-11 PROCEDURE — G8417 CALC BMI ABV UP PARAM F/U: HCPCS | Performed by: FAMILY MEDICINE

## 2017-10-11 PROCEDURE — 3014F SCREEN MAMMO DOC REV: CPT | Performed by: FAMILY MEDICINE

## 2017-10-11 PROCEDURE — G8427 DOCREV CUR MEDS BY ELIG CLIN: HCPCS | Performed by: FAMILY MEDICINE

## 2017-10-11 RX ORDER — OXYCODONE HYDROCHLORIDE AND ACETAMINOPHEN 5; 325 MG/1; MG/1
1 TABLET ORAL EVERY 6 HOURS PRN
Qty: 28 TABLET | Refills: 0 | Status: SHIPPED | OUTPATIENT
Start: 2017-10-11 | End: 2017-10-31 | Stop reason: SDUPTHER

## 2017-10-11 NOTE — PROGRESS NOTES
Chief Complaint   Patient presents with    Discuss Medications     cut back on aspirin, bleed bad, legs brusing easily     Back Pain     tired of her back pain       HPI:  Osbaldo Briseno is a 56 y.o. female     Having a hard time dealing with pain  She has upcoming appt with Dr Henok Brar    Follow up lumbar vertebral fracture  Saw Dr. Alfredo Pardo in [place]    He doesn't want to consider surgery until she has failed pain mgmt      She has EXTENSIVE medical history that is contained within [facility] Everywhere    She sees MULTIPLE specialists through [facility]  She does have an appt with internal med at [facility] on the books for tomorrow    She is on disability  Has power wheelchair    Ambulating with walker today   Does this within the home when she can          Past Medical History:   Diagnosis Date    Acid reflux     Arnold-Chiari deformity ([facility] [address] 2000    surgery    Chronic back pain greater than 3 months duration     COPD (chronic obstructive pulmonary disease) ([facility] [address]     Dr Charanjit Strauss at [address] CVA (cerebral infarction)     left hemiparesis, due to ? estrogen + smoking    Depression     Dr Reid Miller H/O encephalopathy 2001    Headache     Dr Mattie Crowe    Hepatitis B surface antigen positive     Hyperlipidemia LDL goal < 100     Hypothyroidism 2001    Laryngitis, chronic 2012    scoped by Dr Tunde Castillo, fungal    Marijuana smoker in remission [facility]) 2011    Obesity (BMI 30-39. 9)     Pulmonary embolism and infarction (HCC)     Rhinitis, chronic     Rotator cuff tear     Left    S/P colonoscopy with polypectomy 2010    Dr Kati Harrington without myelopathy     Vertebral compression fracture [facility])      Past Surgical History:   Procedure Laterality Date     SECTION      CRANIOTOMY      Arnold-Chiary malformation    FEMUR FRACTURE SURGERY      TONSILLECTOMY      UPPER GASTROINTESTINAL ENDOSCOPY  8/5/15    w/bx      Family History   Problem Relation Age of Onset    Osteoarthritis Mother     Asthma Mother     Diabetes Mother     Hypertension Mother     Osteoarthritis Father     Hypertension Father     Asthma Brother     Asthma Sister     Hypertension Sister      Social History     Social History    Marital status:      Spouse name: N/A    Number of children: 1    Years of education: N/A     Occupational History    SSI      Social History Main Topics    Smoking status: Current Every Day Smoker     Packs/day: 0.50     Types: Cigarettes    Smokeless tobacco: Never Used    Alcohol use No    Drug use: No    Sexual activity: Not Asked     Other Topics Concern    None     Social History Narrative    None     Current Outpatient Prescriptions   Medication Sig Dispense Refill    oxyCODONE-acetaminophen (PERCOCET) 5-325 MG per tablet Take 1 tablet by mouth every 6 hours as needed for Pain .  28 tablet 0    pregabalin (LYRICA) 150 MG capsule Take 1 capsule by mouth 2 times daily 60 capsule 5    RABEprazole (ACIPHEX) 20 MG tablet TAKE 1 TABLET BY MOUTH DAILY 90 tablet 2    acetaminophen (TYLENOL) 325 MG tablet Take 2 tablets by mouth every 6 hours as needed for Pain 20 tablet 0    lidocaine viscous (XYLOCAINE) 2 % solution Take 1 mL by mouth as needed for Irritation or Dental Pain (apply to gum with q-tip) 50 mL 0    folic acid (FOLVITE) 1 MG tablet Take 1 tablet by mouth daily 30 tablet 5    meloxicam (MOBIC) 15 MG tablet Take 1 tablet by mouth daily 30 tablet 3    GENTEAL SEVERE 0.3 % GEL 10PLACE 1 DROP IN BOTH EYES Q 8 H 10 g 3    varenicline (CHANTIX STARTING MONTH MAURILIO) 0.5 MG X 11 & 1 MG X 42 tablet Take by mouth as directed 41 each 0    varenicline (CHANTIX CONTINUING MONTH MAURILIO) 1 MG tablet Take 1 tablet by mouth 2 times daily 60 tablet 2    tiZANidine (ZANAFLEX) 4 MG tablet Take 2 tablets by mouth every 8 hours as needed (muscle spasm) 180 tablet 5    diazepam (VALIUM) 5 MG tablet 1 tab by mouth 60 minutes prior to MRI 1 tablet 0    ondansetron (ZOFRAN-ODT) 4 MG disintegrating tablet Take 1 tablet by mouth every 8 hours as needed for Nausea or Vomiting 30 tablet 2    misoprostol (CYTOTEC) 100 MCG tablet take 1 tablet by mouth three times a day 270 tablet 3    levothyroxine (SYNTHROID) 200 MCG tablet TAKE 1 AND 1/2 TABLETS BY MOUTH DAILY 144 tablet 2    pramipexole (MIRAPEX) 0.5 MG tablet Take 1 tablet by mouth every evening 90 tablet 2    potassium chloride (KLOR-CON M) 20 MEQ extended release tablet Take 1 tablet by mouth daily 180 tablet 2    aspirin 81 MG tablet Take 1 tablet by mouth daily 30 tablet 3    lidocaine (LIDODERM) 5 % Place 1 patch onto the skin daily 12 hours on, 12 hours off. 15 patch 0    budesonide-formoterol (SYMBICORT) 160-4.5 MCG/ACT AERO Inhale 2 puffs into the lungs 2 times daily 1 Inhaler 5    ipratropium (ATROVENT) 0.02 % nebulizer solution 0.5 mg      bisacodyl (DULCOLAX) 10 MG suppository Place 10 mg rectally      levalbuterol (XOPENEX) 0.63 MG/3ML nebulization 0.63 mg      loperamide (IMODIUM A-D) 2 MG tablet Take 2 mg by mouth      terazosin (HYTRIN) 2 MG capsule TK 1 C PO D HS  3    albuterol sulfate HFA (PROAIR HFA) 108 (90 Base) MCG/ACT inhaler Use every 4 hours while awake for 7-10 days then PRN wheezing  Dispense with SPACER and Instruct on use. May sub Ventolin or Proventil as needed per [employer].  1 Inhaler 1    vitamin B-12 (CYANOCOBALAMIN) 1000 MCG tablet Take 1,000 mcg by mouth daily      guaiFENesin (MUCINEX) 600 MG extended release tablet Take 1 tablet by mouth 2 times daily 60 tablet 5    triamcinolone (KENALOG) 0.1 % ointment Apply topically 2 times daily 15 g 5    Lactobacillus (ULTIMATE PROBIOTIC FORMULA) CAPS TK 1 C PO ONCE D  5    loratadine (CLARITIN) 10 MG tablet Take 1 tablet by mouth daily 30 tablet 5    amLODIPine (NORVASC) 2.5 MG tablet Take 2.5 mg by mouth      Melatonin 10 MG TABS Take 1 tablet by mouth      Probiotic Product (TRUBIOTICS) CAPS Take 1 capsule by mouth daily 30 Review of Systems:   General ROS: some fatigue  Respiratory ROS: per HPI  Cardiovascular ROS: no chest pain or dyspnea on exertion  Gastrointestinal ROS: no abdominal pain, change in bowel habits, or black or bloody stools  Genito-Urinary ROS: no dysuria, trouble voiding  Musculoskeletal ROS: chronic aches and pains  Neurological ROS: left side weakness s/p stroke and subsequent major leg injury    In general patient otherwise reports feeling well. Physical Exam:  /82 (Site: Right Arm)   Pulse 106   Ht 5' 2\" (1.575 m)   Wt 167 lb (75.8 kg)   LMP  (LMP Unknown)   SpO2 97%   Breastfeeding? No   BMI 30.54 kg/m²     Gen: Well, NAD, Alert, Oriented x 3   HEENT: EOMI, eyes clear, MMM, boggy nasal mucosa, missing many teeth  Skin: without rash or jaundice  Neck: no significant lymphadenopathy or thyromegaly  Lungs: CTA B w/out Rales/Wheezes/Rhonchi, Good respiratory effort   Heart: RRR, S1S2, w/out M/R/G, non-displaced PMI   Neuro: no tremor, left side weakness    Lab Results   Component Value Date    WBC 10.2 03/25/2017    HGB 13.2 03/25/2017    HCT 39.9 03/25/2017     03/25/2017    CHOL 94 12/15/2015    TRIG 76 12/15/2015    HDL 56 12/15/2015    ALT 41 (H) 05/23/2017    AST 25 05/23/2017     07/31/2017    K 3.7 07/31/2017    CL 98 07/31/2017    CREATININE 0.52 07/31/2017    BUN 8 07/31/2017    CO2 24 07/31/2017    TSH 0.346 07/31/2017    INR 0.9 03/21/2017    LABA1C 5.7 02/02/2014         A&P  1.  [facility]  oxyCODONE-acetaminophen (PERCOCET) 5-325 MG per tablet       Since has pain mgmt appt will give 7 day supply percocet at a time until seen    Will anticipate that she'll need refills    She is seeing MULTIPLE [facility] specialists    Stephen Cooper MD

## 2017-10-13 RX ORDER — FLUCONAZOLE 100 MG/1
100 TABLET ORAL DAILY
Qty: 7 TABLET | Refills: 0 | Status: SHIPPED | OUTPATIENT
Start: 2017-10-13 | End: 2018-03-08 | Stop reason: SDUPTHER

## 2017-10-18 DIAGNOSIS — Z72.0 TOBACCO USE: ICD-10-CM

## 2017-10-18 RX ORDER — VARENICLINE TARTRATE
KIT
Qty: 53 TABLET | Refills: 0 | Status: SHIPPED | OUTPATIENT
Start: 2017-10-18 | End: 2017-11-02

## 2017-10-29 PROBLEM — I73.9 PERIPHERAL VASCULAR DISEASE (HCC): Status: ACTIVE | Noted: 2017-10-29

## 2017-10-29 PROBLEM — E11.9 DIABETES MELLITUS, TYPE II (HCC): Status: ACTIVE | Noted: 2017-10-29

## 2017-10-29 PROBLEM — I26.99 PULMONARY EMBOLISM WITH INFARCTION (HCC): Status: ACTIVE | Noted: 2017-08-22

## 2017-10-29 PROBLEM — F43.20 ANACLITIC DEPRESSION: Status: ACTIVE | Noted: 2017-08-22

## 2017-10-29 PROBLEM — M75.02 ADHESIVE CAPSULITIS OF LEFT SHOULDER: Status: ACTIVE | Noted: 2017-07-07

## 2017-10-29 PROBLEM — Z86.73 H/O: CVA (CEREBROVASCULAR ACCIDENT): Status: ACTIVE | Noted: 2017-03-13

## 2017-10-29 PROBLEM — I35.9 AORTIC VALVE DISORDER: Status: ACTIVE | Noted: 2017-10-29

## 2017-10-29 PROBLEM — J30.9 ALLERGIC RHINITIS: Status: ACTIVE | Noted: 2017-10-29

## 2017-10-29 PROBLEM — S32.009A FRACTURE OF LUMBAR VERTEBRA (HCC): Status: ACTIVE | Noted: 2017-07-27

## 2017-10-29 PROBLEM — G93.5 COMPRESSION OF BRAIN (HCC): Status: ACTIVE | Noted: 2017-10-29

## 2017-10-29 PROBLEM — F31.9 BIPOLAR DISORDER (HCC): Status: ACTIVE | Noted: 2017-10-29

## 2017-10-29 PROBLEM — Q89.1 CONGENITAL ANOMALIES OF ADRENAL GLAND: Status: ACTIVE | Noted: 2017-10-29

## 2017-10-29 PROBLEM — G93.1 HYPOXIC BRAIN INJURY (HCC): Status: ACTIVE | Noted: 2017-10-29

## 2017-10-29 PROBLEM — M19.012 PRIMARY OSTEOARTHRITIS OF LEFT SHOULDER: Status: ACTIVE | Noted: 2017-07-07

## 2017-10-31 DIAGNOSIS — M62.838 MUSCLE SPASM: ICD-10-CM

## 2017-10-31 RX ORDER — TIZANIDINE 4 MG/1
TABLET ORAL
Qty: 180 TABLET | Refills: 0 | Status: SHIPPED | OUTPATIENT
Start: 2017-10-31 | End: 2017-11-02 | Stop reason: SDUPTHER

## 2017-11-01 PROBLEM — Z79.899 HIGH RISK MEDICATION USE: Status: ACTIVE | Noted: 2017-11-01

## 2017-11-01 RX ORDER — OXYCODONE HYDROCHLORIDE AND ACETAMINOPHEN 5; 325 MG/1; MG/1
1 TABLET ORAL EVERY 6 HOURS PRN
Qty: 28 TABLET | Refills: 0 | Status: SHIPPED | OUTPATIENT
Start: 2017-11-01 | End: 2017-11-02 | Stop reason: ALTCHOICE

## 2017-11-02 ENCOUNTER — OFFICE VISIT (OUTPATIENT)
Dept: PHYSICAL MEDICINE AND REHAB | Age: 57
End: 2017-11-02

## 2017-11-02 VITALS
WEIGHT: 160 LBS | SYSTOLIC BLOOD PRESSURE: 120 MMHG | DIASTOLIC BLOOD PRESSURE: 84 MMHG | HEIGHT: 62 IN | BODY MASS INDEX: 29.44 KG/M2

## 2017-11-02 DIAGNOSIS — I26.99 PULMONARY EMBOLISM WITH INFARCTION (HCC): ICD-10-CM

## 2017-11-02 DIAGNOSIS — R33.9 CHRONIC RETENTION OF URINE: ICD-10-CM

## 2017-11-02 DIAGNOSIS — I69.90 LATE EFFECTS OF CVA (CEREBROVASCULAR ACCIDENT): ICD-10-CM

## 2017-11-02 DIAGNOSIS — S32.010G: ICD-10-CM

## 2017-11-02 DIAGNOSIS — F41.9 ANXIETY: ICD-10-CM

## 2017-11-02 DIAGNOSIS — M79.10 MYALGIA: ICD-10-CM

## 2017-11-02 DIAGNOSIS — E55.9 VITAMIN D DEFICIENCY: ICD-10-CM

## 2017-11-02 DIAGNOSIS — G44.001 INTRACTABLE CLUSTER HEADACHE SYNDROME, UNSPECIFIED CHRONICITY PATTERN: ICD-10-CM

## 2017-11-02 DIAGNOSIS — G93.5 COMPRESSION OF BRAIN (HCC): ICD-10-CM

## 2017-11-02 DIAGNOSIS — G89.29 CHRONIC MIDLINE THORACIC BACK PAIN: ICD-10-CM

## 2017-11-02 DIAGNOSIS — M70.62 TROCHANTERIC BURSITIS OF LEFT HIP: ICD-10-CM

## 2017-11-02 DIAGNOSIS — F12.90 MARIJUANA USE: Chronic | ICD-10-CM

## 2017-11-02 DIAGNOSIS — I63.89 CEREBRAL INFARCTION DUE TO OTHER MECHANISM (HCC): ICD-10-CM

## 2017-11-02 DIAGNOSIS — S32.000A CLOSED WEDGE FRACTURE OF LUMBAR VERTEBRA, UNSPECIFIED LUMBAR VERTEBRAL LEVEL, INITIAL ENCOUNTER (HCC): ICD-10-CM

## 2017-11-02 DIAGNOSIS — G93.1 HYPOXIC BRAIN INJURY (HCC): ICD-10-CM

## 2017-11-02 DIAGNOSIS — M19.012 PRIMARY OSTEOARTHRITIS OF LEFT SHOULDER: ICD-10-CM

## 2017-11-02 DIAGNOSIS — M54.2 NECK PAIN: ICD-10-CM

## 2017-11-02 DIAGNOSIS — S32.000D CLOSED WEDGE COMPRESSION FRACTURE OF LUMBAR VERTEBRA WITH ROUTINE HEALING, UNSPECIFIED LUMBAR VERTEBRAL LEVEL, SUBSEQUENT ENCOUNTER: ICD-10-CM

## 2017-11-02 DIAGNOSIS — Q07.00 ARNOLD-CHIARI DEFORMITY (HCC): Primary | ICD-10-CM

## 2017-11-02 DIAGNOSIS — M54.6 CHRONIC MIDLINE THORACIC BACK PAIN: ICD-10-CM

## 2017-11-02 DIAGNOSIS — M96.1 POST-LAMINECTOMY SYNDROME: ICD-10-CM

## 2017-11-02 DIAGNOSIS — F31.71 BIPOLAR DISORDER, IN PARTIAL REMISSION, MOST RECENT EPISODE HYPOMANIC (HCC): ICD-10-CM

## 2017-11-02 PROBLEM — R14.0 BLOATING: Status: RESOLVED | Noted: 2017-05-30 | Resolved: 2017-11-02

## 2017-11-02 PROCEDURE — G8484 FLU IMMUNIZE NO ADMIN: HCPCS | Performed by: PHYSICAL MEDICINE & REHABILITATION

## 2017-11-02 PROCEDURE — 99205 OFFICE O/P NEW HI 60 MIN: CPT | Performed by: PHYSICAL MEDICINE & REHABILITATION

## 2017-11-02 PROCEDURE — G8598 ASA/ANTIPLAT THER USED: HCPCS | Performed by: PHYSICAL MEDICINE & REHABILITATION

## 2017-11-02 PROCEDURE — 3014F SCREEN MAMMO DOC REV: CPT | Performed by: PHYSICAL MEDICINE & REHABILITATION

## 2017-11-02 PROCEDURE — G8417 CALC BMI ABV UP PARAM F/U: HCPCS | Performed by: PHYSICAL MEDICINE & REHABILITATION

## 2017-11-02 PROCEDURE — G8427 DOCREV CUR MEDS BY ELIG CLIN: HCPCS | Performed by: PHYSICAL MEDICINE & REHABILITATION

## 2017-11-02 PROCEDURE — 4004F PT TOBACCO SCREEN RCVD TLK: CPT | Performed by: PHYSICAL MEDICINE & REHABILITATION

## 2017-11-02 PROCEDURE — 3017F COLORECTAL CA SCREEN DOC REV: CPT | Performed by: PHYSICAL MEDICINE & REHABILITATION

## 2017-11-02 RX ORDER — LANOLIN ALCOHOL/MO/W.PET/CERES
1000 CREAM (GRAM) TOPICAL DAILY
Qty: 30 TABLET | Refills: 5 | Status: SHIPPED | OUTPATIENT
Start: 2017-11-02 | End: 2018-04-02 | Stop reason: SDUPTHER

## 2017-11-02 RX ORDER — ARIPIPRAZOLE 10 MG/1
TABLET ORAL
Refills: 0 | COMMUNITY
Start: 2017-10-17 | End: 2019-07-05 | Stop reason: SDUPTHER

## 2017-11-02 RX ORDER — ESOMEPRAZOLE MAGNESIUM 20 MG/1
20 FOR SUSPENSION ORAL 2 TIMES DAILY
COMMUNITY
End: 2018-06-06 | Stop reason: SDUPTHER

## 2017-11-02 RX ORDER — GENTAMICIN SULFATE 40 MG/ML
INJECTION, SOLUTION INTRAMUSCULAR; INTRAVENOUS
Refills: 3 | COMMUNITY
Start: 2017-10-23 | End: 2018-07-29

## 2017-11-02 RX ORDER — ESCITALOPRAM OXALATE 20 MG/1
TABLET ORAL
Refills: 0 | COMMUNITY
Start: 2017-10-17 | End: 2019-07-05 | Stop reason: SDUPTHER

## 2017-11-02 RX ORDER — MAGNESIUM HYDROXIDE 1200 MG/15ML
LIQUID ORAL
Refills: 0 | COMMUNITY
Start: 2017-10-18 | End: 2018-11-20

## 2017-11-02 RX ORDER — IBUPROFEN 800 MG/1
TABLET ORAL
Refills: 3 | Status: ON HOLD | COMMUNITY
Start: 2017-10-13 | End: 2018-06-16 | Stop reason: HOSPADM

## 2017-11-02 RX ORDER — LEVOCETIRIZINE DIHYDROCHLORIDE 5 MG/1
TABLET, FILM COATED ORAL
Refills: 3 | COMMUNITY
Start: 2017-10-30 | End: 2019-05-01 | Stop reason: SDUPTHER

## 2017-11-02 RX ORDER — ERGOCALCIFEROL 1.25 MG/1
CAPSULE ORAL
Refills: 1 | COMMUNITY
Start: 2017-10-11 | End: 2018-03-06 | Stop reason: SDUPTHER

## 2017-11-02 ASSESSMENT — ENCOUNTER SYMPTOMS
SHORTNESS OF BREATH: 0
ABDOMINAL PAIN: 0
NAUSEA: 0
PHOTOPHOBIA: 1
WHEEZING: 0
ALLERGIC/IMMUNOLOGIC NEGATIVE: 1
APNEA: 0
BACK PAIN: 1
BOWEL INCONTINENCE: 0
DIARRHEA: 0
CONSTIPATION: 0
SINUS PRESSURE: 1
CHEST TIGHTNESS: 0
VOMITING: 0
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
## 2017-11-03 ENCOUNTER — OFFICE VISIT (OUTPATIENT)
Dept: FAMILY MEDICINE CLINIC | Age: 57
End: 2017-11-03

## 2017-11-03 VITALS
SYSTOLIC BLOOD PRESSURE: 138 MMHG | HEART RATE: 108 BPM | WEIGHT: 129 LBS | BODY MASS INDEX: 23.74 KG/M2 | HEIGHT: 62 IN | DIASTOLIC BLOOD PRESSURE: 80 MMHG | OXYGEN SATURATION: 97 %

## 2017-11-03 DIAGNOSIS — E11.9 TYPE 2 DIABETES MELLITUS WITHOUT COMPLICATION, WITHOUT LONG-TERM CURRENT USE OF INSULIN (HCC): ICD-10-CM

## 2017-11-03 DIAGNOSIS — K08.89 TOOTH PAIN: Primary | ICD-10-CM

## 2017-11-03 PROCEDURE — G8427 DOCREV CUR MEDS BY ELIG CLIN: HCPCS | Performed by: FAMILY MEDICINE

## 2017-11-03 PROCEDURE — 3017F COLORECTAL CA SCREEN DOC REV: CPT | Performed by: FAMILY MEDICINE

## 2017-11-03 PROCEDURE — 3044F HG A1C LEVEL LT 7.0%: CPT | Performed by: FAMILY MEDICINE

## 2017-11-03 PROCEDURE — 3014F SCREEN MAMMO DOC REV: CPT | Performed by: FAMILY MEDICINE

## 2017-11-03 PROCEDURE — G8420 CALC BMI NORM PARAMETERS: HCPCS | Performed by: FAMILY MEDICINE

## 2017-11-03 PROCEDURE — G8598 ASA/ANTIPLAT THER USED: HCPCS | Performed by: FAMILY MEDICINE

## 2017-11-03 PROCEDURE — 99213 OFFICE O/P EST LOW 20 MIN: CPT | Performed by: FAMILY MEDICINE

## 2017-11-03 PROCEDURE — 4004F PT TOBACCO SCREEN RCVD TLK: CPT | Performed by: FAMILY MEDICINE

## 2017-11-03 PROCEDURE — G8484 FLU IMMUNIZE NO ADMIN: HCPCS | Performed by: FAMILY MEDICINE

## 2017-11-03 RX ORDER — FLUCONAZOLE 150 MG/1
150 TABLET ORAL
Qty: 3 TABLET | Refills: 0 | Status: SHIPPED | OUTPATIENT
Start: 2017-11-03 | End: 2018-01-22 | Stop reason: SDUPTHER

## 2017-11-03 RX ORDER — CLINDAMYCIN HYDROCHLORIDE 150 MG/1
150 CAPSULE ORAL 4 TIMES DAILY
Qty: 40 CAPSULE | Refills: 0 | Status: SHIPPED | OUTPATIENT
Start: 2017-11-03 | End: 2017-11-13

## 2017-11-03 NOTE — PROGRESS NOTES
 FEMUR FRACTURE SURGERY  2003    SPINE SURGERY      TONSILLECTOMY      UPPER GASTROINTESTINAL ENDOSCOPY  8/5/15    w/bx      Family History   Problem Relation Age of Onset    Osteoarthritis Mother     Asthma Mother     Diabetes Mother     Hypertension Mother     Cancer Mother      lung    Osteoarthritis Father     Hypertension Father     Other Father      PE    Asthma Brother     Asthma Sister     Hypertension Sister      Social History     Social History    Marital status:      Spouse name: N/A    Number of children: 1    Years of education: N/A     Occupational History    SSI      Social History Main Topics    Smoking status: Current Every Day Smoker     Packs/day: 0.50     Types: Cigarettes    Smokeless tobacco: Never Used    Alcohol use No    Drug use: No    Sexual activity: Not Asked     Other Topics Concern    None     Social History Narrative    None     Current Outpatient Prescriptions   Medication Sig Dispense Refill    clindamycin (CLEOCIN) 150 MG capsule Take 1 capsule by mouth 4 times daily for 10 days 40 capsule 0    fluconazole (DIFLUCAN) 150 MG tablet Take 1 tablet by mouth every 72 hours for 3 doses 3 tablet 0    vitamin B-12 (CYANOCOBALAMIN) 1000 MCG tablet Take 1 tablet by mouth daily 30 tablet 5    esomeprazole Magnesium (NEXIUM) 20 MG PACK Take 20 mg by mouth 2 times daily      escitalopram (LEXAPRO) 20 MG tablet TK 1 T PO QAM  0    ARIPiprazole (ABILIFY) 10 MG tablet TK 1 T PO QAM  0    vitamin D (ERGOCALCIFEROL) 63903 units CAPS capsule TK 1 C PO 1 TIME EACH WK  1    gentamicin (GARAMYCIN) 40 MG/ML injection U 80 MG BID FOR 5 DOSES  3    ibuprofen (ADVIL;MOTRIN) 800 MG tablet TK 1 T PO TID PRN  3    levocetirizine (XYZAL) 5 MG tablet TK 1 T PO QD PRN  3    sodium chloride 0.9 % irrigation USE AS DIRECTED  0    pregabalin (LYRICA) 150 MG capsule Take 1 capsule by mouth 2 times daily 60 capsule 5    acetaminophen (TYLENOL) 325 MG tablet Take 2 tablets by mouth every 6 hours as needed for Pain 20 tablet 0    lidocaine viscous (XYLOCAINE) 2 % solution Take 1 mL by mouth as needed for Irritation or Dental Pain (apply to gum with q-tip) 50 mL 0    folic acid (FOLVITE) 1 MG tablet Take 1 tablet by mouth daily 30 tablet 5    meloxicam (MOBIC) 15 MG tablet Take 1 tablet by mouth daily 30 tablet 3    GENTEAL SEVERE 0.3 % GEL 10PLACE 1 DROP IN BOTH EYES Q 8 H 10 g 3    tiZANidine (ZANAFLEX) 4 MG tablet Take 2 tablets by mouth every 8 hours as needed (muscle spasm) 180 tablet 5    ondansetron (ZOFRAN-ODT) 4 MG disintegrating tablet Take 1 tablet by mouth every 8 hours as needed for Nausea or Vomiting 30 tablet 2    levothyroxine (SYNTHROID) 200 MCG tablet TAKE 1 AND 1/2 TABLETS BY MOUTH DAILY (Patient taking differently: TAKE 1  TABLETS BY MOUTH DAILY) 144 tablet 2    pramipexole (MIRAPEX) 0.5 MG tablet Take 1 tablet by mouth every evening 90 tablet 2    potassium chloride (KLOR-CON M) 20 MEQ extended release tablet Take 1 tablet by mouth daily 180 tablet 2    lidocaine (LIDODERM) 5 % Place 1 patch onto the skin daily 12 hours on, 12 hours off. 15 patch 0    budesonide-formoterol (SYMBICORT) 160-4.5 MCG/ACT AERO Inhale 2 puffs into the lungs 2 times daily 1 Inhaler 5    ipratropium (ATROVENT) 0.02 % nebulizer solution 0.5 mg      bisacodyl (DULCOLAX) 10 MG suppository Place 10 mg rectally      levalbuterol (XOPENEX) 0.63 MG/3ML nebulization 0.63 mg      loperamide (IMODIUM A-D) 2 MG tablet Take 2 mg by mouth      terazosin (HYTRIN) 2 MG capsule TK 1 C PO D HS  3    benzonatate (TESSALON PERLES) 100 MG capsule Take 1 capsule by mouth 3 times daily as needed for Cough 20 capsule 0    guaiFENesin (MUCINEX) 600 MG extended release tablet Take 1 tablet by mouth 2 times daily 60 tablet 5    triamcinolone (KENALOG) 0.1 % ointment Apply topically 2 times daily 15 g 5    Lactobacillus (ULTIMATE PROBIOTIC FORMULA) CAPS TK 1 C PO ONCE D  5   

## 2017-11-07 RX ORDER — BISACODYL 10 MG
10 SUPPOSITORY, RECTAL RECTAL DAILY PRN
Qty: 28 SUPPOSITORY | Refills: 5 | Status: SHIPPED | OUTPATIENT
Start: 2017-11-07 | End: 2018-01-15

## 2017-11-08 ENCOUNTER — TELEPHONE (OUTPATIENT)
Dept: FAMILY MEDICINE CLINIC | Age: 57
End: 2017-11-08

## 2017-11-08 NOTE — TELEPHONE ENCOUNTER
Pt calling wants to know if [facility] can be called in for her for headaches pr migraines ?  [facility]ses [facility] pt can be reached at [phone]

## 2017-11-08 NOTE — TELEPHONE ENCOUNTER
Pt calling and states that she has diarrhea. Would like to know if you can prescribe an antibiotic for her? I told pt about Imodium and the B.R.A.T. diet. Please advise.

## 2017-11-09 NOTE — TELEPHONE ENCOUNTER
She is getting this filled by the headache clinic at [facility]. This is drug seeking behavior and will not be tolerated.

## 2017-11-13 ENCOUNTER — PROCEDURE VISIT (OUTPATIENT)
Dept: PHYSICAL MEDICINE AND REHAB | Age: 57
End: 2017-11-13

## 2017-11-13 DIAGNOSIS — E55.9 VITAMIN D DEFICIENCY: ICD-10-CM

## 2017-11-13 DIAGNOSIS — F12.90 MARIJUANA USE: Chronic | ICD-10-CM

## 2017-11-13 DIAGNOSIS — M54.31 SCIATICA OF RIGHT SIDE: Primary | ICD-10-CM

## 2017-11-13 LAB
HCT VFR BLD CALC: 41.1 % (ref 37–47)
HEMOGLOBIN: 13.3 G/DL (ref 12–16)
MCH RBC QN AUTO: 27.2 PG (ref 27–31.3)
MCHC RBC AUTO-ENTMCNC: 32.4 % (ref 33–37)
MCV RBC AUTO: 83.9 FL (ref 82–100)
PDW BLD-RTO: 15.2 % (ref 11.5–14.5)
PLATELET # BLD: 299 K/UL (ref 130–400)
RBC # BLD: 4.9 M/UL (ref 4.2–5.4)
VITAMIN D 25-HYDROXY: 36.3 NG/ML (ref 30–100)
WBC # BLD: 11 K/UL (ref 4.8–10.8)

## 2017-11-13 PROCEDURE — 64445 NJX AA&/STRD SCIATIC NRV IMG: CPT | Performed by: PHYSICAL MEDICINE & REHABILITATION

## 2017-11-15 ENCOUNTER — OFFICE VISIT (OUTPATIENT)
Dept: FAMILY MEDICINE CLINIC | Age: 57
End: 2017-11-15

## 2017-11-15 VITALS
BODY MASS INDEX: 29.66 KG/M2 | DIASTOLIC BLOOD PRESSURE: 70 MMHG | TEMPERATURE: 99.1 F | HEIGHT: 62 IN | OXYGEN SATURATION: 96 % | HEART RATE: 107 BPM | WEIGHT: 161.2 LBS | SYSTOLIC BLOOD PRESSURE: 124 MMHG

## 2017-11-15 DIAGNOSIS — G89.29 ACUTE EXACERBATION OF CHRONIC LOW BACK PAIN: Primary | ICD-10-CM

## 2017-11-15 DIAGNOSIS — M54.50 ACUTE EXACERBATION OF CHRONIC LOW BACK PAIN: Primary | ICD-10-CM

## 2017-11-15 DIAGNOSIS — Z12.31 ENCOUNTER FOR SCREENING MAMMOGRAM FOR BREAST CANCER: ICD-10-CM

## 2017-11-15 DIAGNOSIS — G47.00 INSOMNIA, UNSPECIFIED TYPE: ICD-10-CM
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
## 2017-11-15 LAB
6-ACETYLMORPHINE: NOT DETECTED
7-AMINOCLONAZEPAM: NOT DETECTED
ALPHA-OH-ALPRAZOLAM: NOT DETECTED
ALPRAZOLAM: NOT DETECTED
AMPHETAMINE: NOT DETECTED
BARBITURATES: NOT DETECTED
BENZOYLECGONINE: NOT DETECTED
BUPRENORPHINE: NOT DETECTED
CARISOPRODOL: NOT DETECTED
CLONAZEPAM: NOT DETECTED
CODEINE: NOT DETECTED
CREATININE URINE: 22.9 MG/DL (ref 20–400)
DIAZEPAM: NOT DETECTED
EER PAIN MGT DRUG PANEL, HIGH RES/EMIT U: NORMAL
ETHYL GLUCURONIDE: NOT DETECTED
FENTANYL: NOT DETECTED
HYDROCODONE: NOT DETECTED
HYDROMORPHONE: NOT DETECTED
LORAZEPAM: NOT DETECTED
MARIJUANA METABOLITE: PRESENT
MDA: NOT DETECTED
MDEA: NOT DETECTED
MDMA URINE: NOT DETECTED
MEPERIDINE: NOT DETECTED
METHADONE: NOT DETECTED
METHAMPHETAMINE: NOT DETECTED
METHYLPHENIDATE: NOT DETECTED
MIDAZOLAM: NOT DETECTED
MORPHINE: NOT DETECTED
NORBUPRENORPHINE, FREE: NOT DETECTED
NORDIAZEPAM: NOT DETECTED
NORFENTANYL: NOT DETECTED
NORHYDROCODONE, URINE: NOT DETECTED
NOROXYCODONE: NOT DETECTED
NOROXYMORPHONE, URINE: NOT DETECTED
OXAZEPAM: NOT DETECTED
OXYCODONE: NOT DETECTED
OXYMORPHONE: NOT DETECTED
PAIN MANAGEMENT DRUG PANEL: NORMAL
PCP: NOT DETECTED
PHENTERMINE: NOT DETECTED
PROPOXYPHENE: NOT DETECTED
TAPENTADOL, URINE: NOT DETECTED
TAPENTADOL-O-SULFATE, URINE: NOT DETECTED
TEMAZEPAM: NOT DETECTED
TRAMADOL: NOT DETECTED
ZOLPIDEM: NOT DETECTED

## 2017-11-15 PROCEDURE — 3014F SCREEN MAMMO DOC REV: CPT | Performed by: NURSE PRACTITIONER

## 2017-11-15 PROCEDURE — G8427 DOCREV CUR MEDS BY ELIG CLIN: HCPCS | Performed by: NURSE PRACTITIONER

## 2017-11-15 PROCEDURE — 3017F COLORECTAL CA SCREEN DOC REV: CPT | Performed by: NURSE PRACTITIONER

## 2017-11-15 PROCEDURE — G8484 FLU IMMUNIZE NO ADMIN: HCPCS | Performed by: NURSE PRACTITIONER

## 2017-11-15 PROCEDURE — G8417 CALC BMI ABV UP PARAM F/U: HCPCS | Performed by: NURSE PRACTITIONER

## 2017-11-15 PROCEDURE — G8598 ASA/ANTIPLAT THER USED: HCPCS | Performed by: NURSE PRACTITIONER

## 2017-11-15 PROCEDURE — 4004F PT TOBACCO SCREEN RCVD TLK: CPT | Performed by: NURSE PRACTITIONER

## 2017-11-15 PROCEDURE — 99213 OFFICE O/P EST LOW 20 MIN: CPT | Performed by: NURSE PRACTITIONER

## 2017-11-15 RX ORDER — PHENOL 1.4 %
1 AEROSOL, SPRAY (ML) MUCOUS MEMBRANE NIGHTLY
Qty: 30 TABLET | Refills: 3 | Status: SHIPPED | OUTPATIENT
Start: 2017-11-15 | End: 2018-03-19 | Stop reason: SDUPTHER

## 2017-11-15 ASSESSMENT — ENCOUNTER SYMPTOMS
COUGH: 0
BACK PAIN: 1
SHORTNESS OF BREATH: 0
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
## 2017-11-16 LAB
AVERAGE GLUCOSE: NORMAL
HBA1C MFR BLD: 5.7 %

## 2017-11-20 ENCOUNTER — HOSPITAL ENCOUNTER (OUTPATIENT)
Dept: OCCUPATIONAL THERAPY | Age: 57
Setting detail: THERAPIES SERIES
Discharge: HOME | End: 2017-11-20
Payer: COMMERCIAL

## 2017-11-20 PROCEDURE — 97166 OT EVAL MOD COMPLEX 45 MIN: CPT

## 2017-11-20 PROCEDURE — G8991 OTHER PT/OT GOAL STATUS: HCPCS

## 2017-11-20 PROCEDURE — G8990 OTHER PT/OT CURRENT STATUS: HCPCS
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
## 2017-11-21 ENCOUNTER — TELEPHONE (OUTPATIENT)
Dept: FAMILY MEDICINE CLINIC | Age: 57
End: 2017-11-21

## 2017-11-27 ENCOUNTER — PROCEDURE VISIT (OUTPATIENT)
Dept: PHYSICAL MEDICINE AND REHAB | Age: 57
End: 2017-11-27

## 2017-11-27 DIAGNOSIS — M62.838 MUSCLE SPASM: ICD-10-CM

## 2017-11-27 DIAGNOSIS — M79.7 FIBROMYALGIA: Primary | ICD-10-CM

## 2017-11-27 DIAGNOSIS — M79.10 MYALGIA: ICD-10-CM

## 2017-11-27 PROCEDURE — 20552 NJX 1/MLT TRIGGER POINT 1/2: CPT | Performed by: PHYSICAL MEDICINE & REHABILITATION

## 2017-11-27 RX ORDER — TIZANIDINE 4 MG/1
TABLET ORAL
Qty: 180 TABLET | Refills: 0 | Status: SHIPPED | OUTPATIENT
Start: 2017-11-27 | End: 2017-12-13 | Stop reason: SDUPTHER

## 2017-11-27 NOTE — TELEPHONE ENCOUNTER
Pt calling states that she went to the pharmacy to get her script for  percocet. Asking if Dr was in this morning. I explained to pt that when scripts are requested we ask for a 72 hr time frame.

## 2017-11-28 ENCOUNTER — TELEPHONE (OUTPATIENT)
Dept: FAMILY MEDICINE CLINIC | Age: 57
End: 2017-11-28

## 2017-11-28 RX ORDER — OXYCODONE HYDROCHLORIDE AND ACETAMINOPHEN 5; 325 MG/1; MG/1
1 TABLET ORAL EVERY 6 HOURS PRN
Qty: 28 TABLET | Refills: 0 | OUTPATIENT
Start: 2017-11-28

## 2017-11-28 RX ORDER — SELENIUM 50 MCG
TABLET ORAL
Qty: 30 CAPSULE | Refills: 0 | Status: SHIPPED | OUTPATIENT
Start: 2017-11-28 | End: 2018-04-30

## 2017-11-28 NOTE — TELEPHONE ENCOUNTER
Pt lost her cane and needs a new one ordered. Pt states her insurance [employer]) needs for us to contact them to give them a code to order a new cane. Pt requesting padded handle, single prong cane.       [phone]

## 2017-11-28 NOTE — TELEPHONE ENCOUNTER
Dr. Patrick Carr has clearly documented that she should not be on this ongoing and should be focusing on non-narcotic pain relief

## 2017-11-28 NOTE — TELEPHONE ENCOUNTER
Pt states that her back is killing her and it is physicaly and emotionally draining. States that you usually give her small script and she makes it work. She wanted to let you know but she is aware that does not mean you are going to fill this.

## 2017-11-29 ENCOUNTER — HOSPITAL ENCOUNTER (OUTPATIENT)
Dept: OCCUPATIONAL THERAPY | Age: 57
Setting detail: THERAPIES SERIES
Discharge: HOME | End: 2017-11-29
Payer: COMMERCIAL

## 2017-11-29 PROCEDURE — 97140 MANUAL THERAPY 1/> REGIONS: CPT

## 2017-11-29 NOTE — PROGRESS NOTES
Occupational Therapy  Daily Treatment Note  Date: 2017  Patient Name: Magali Pineda  :  1960  MRN: [id number]       Subjective   OT Visit Information  Onset Date: 01  OT Insurance Information: [id number]  Total # of Visits Approved: 30  Total # of Visits to Date: 2  Progress Note Counter: 2 after evaluation  Subjective  Subjective: \"My back and my knees are killing me\" per pt report   General Comment  Pt reported \" My primary doctor contacted me and said that Dr Crystal Joseph doesn't want taking the Percocet anymore. \" and \"The shots don't help, they're a waste of time\"    Comments: Pt reports she was unable to schedule an appointment the week of  as OT had no open schedule times. Treatment Activities: Direct treatment of manual techniques completed with pt in side lying and supine. Techniques included unilateral leg pulls,  (pt required use of bolster under one LE as the other was treated), bilateral psoas, transverse planes, deep releases at lateral trunk musculature and repeat leg pulls. Pt reported decreased pain at end of treatment session. Assessment Pt required frequent changes of position for comfort during manual therapy. Pt reported decreased pain at the end of treatment stating, \" I feel better, it's more comfortable\" re: back               Plan Continue with established treatment plan. Goals addressed- 1 and 2  Long term goals  Time Frame for Long term goals : 2-3 x a week for 6-8 weeks  Long term goal 1: Pt will participate in therapeutic interventions to decrease pain of indicated areas to 2-3/10 on VAS. Long term goal 2: Pt will report decreased frequency of pain for daily activities   Long term goal 3: Pt will describe 5-6 body mechanics principles she can incorporate into her daily lifestyle.   Long term goal 4: Pt will identify 4-5 relaxation techniques she can use independently as an adjunct to the medical management of pain   Long

## 2017-12-05 ENCOUNTER — TELEPHONE (OUTPATIENT)
Dept: FAMILY MEDICINE CLINIC | Age: 57
End: 2017-12-05

## 2017-12-05 NOTE — TELEPHONE ENCOUNTER
Pt calling and needs prescription for stockings for her swollen calfs. Pt uses Debbi Narayan. Can you please do?

## 2017-12-06 DIAGNOSIS — R60.9 EDEMA, UNSPECIFIED TYPE: ICD-10-CM

## 2017-12-07 RX ORDER — FUROSEMIDE 40 MG/1
40 TABLET ORAL DAILY
Qty: 30 TABLET | Refills: 5 | Status: SHIPPED | OUTPATIENT
Start: 2017-12-07 | End: 2018-01-12 | Stop reason: SDUPTHER

## 2017-12-07 RX ORDER — SELENIUM 50 MCG
TABLET ORAL
Qty: 30 CAPSULE | Refills: 5 | Status: SHIPPED | OUTPATIENT
Start: 2017-12-07 | End: 2018-10-20 | Stop reason: SDUPTHER

## 2017-12-11 ENCOUNTER — HOSPITAL ENCOUNTER (OUTPATIENT)
Dept: OCCUPATIONAL THERAPY | Age: 57
Setting detail: THERAPIES SERIES
Discharge: HOME | End: 2017-12-11
Payer: COMMERCIAL

## 2017-12-13 ENCOUNTER — APPOINTMENT (OUTPATIENT)
Dept: OCCUPATIONAL THERAPY | Age: 57
End: 2017-12-13
Payer: COMMERCIAL

## 2017-12-13 ENCOUNTER — OFFICE VISIT (OUTPATIENT)
Dept: FAMILY MEDICINE CLINIC | Age: 57
End: 2017-12-13

## 2017-12-13 VITALS
DIASTOLIC BLOOD PRESSURE: 64 MMHG | HEART RATE: 102 BPM | SYSTOLIC BLOOD PRESSURE: 130 MMHG | WEIGHT: 167 LBS | HEIGHT: 62 IN | BODY MASS INDEX: 30.73 KG/M2 | OXYGEN SATURATION: 98 %

## 2017-12-13 DIAGNOSIS — M62.838 MUSCLE SPASM: ICD-10-CM

## 2017-12-13 DIAGNOSIS — I69.90 LATE EFFECTS OF CVA (CEREBROVASCULAR ACCIDENT): ICD-10-CM

## 2017-12-13 DIAGNOSIS — S32.009G CLOSED FRACTURE OF LUMBAR VERTEBRA WITH DELAYED HEALING, UNSPECIFIED FRACTURE MORPHOLOGY, UNSPECIFIED LUMBAR VERTEBRAL LEVEL, SUBSEQUENT ENCOUNTER: Primary | ICD-10-CM

## 2017-12-13 PROCEDURE — G8417 CALC BMI ABV UP PARAM F/U: HCPCS | Performed by: FAMILY MEDICINE

## 2017-12-13 PROCEDURE — 99213 OFFICE O/P EST LOW 20 MIN: CPT | Performed by: FAMILY MEDICINE

## 2017-12-13 PROCEDURE — G8427 DOCREV CUR MEDS BY ELIG CLIN: HCPCS | Performed by: FAMILY MEDICINE

## 2017-12-13 PROCEDURE — 4004F PT TOBACCO SCREEN RCVD TLK: CPT | Performed by: FAMILY MEDICINE

## 2017-12-13 PROCEDURE — 3014F SCREEN MAMMO DOC REV: CPT | Performed by: FAMILY MEDICINE

## 2017-12-13 PROCEDURE — G8484 FLU IMMUNIZE NO ADMIN: HCPCS | Performed by: FAMILY MEDICINE

## 2017-12-13 PROCEDURE — G8598 ASA/ANTIPLAT THER USED: HCPCS | Performed by: FAMILY MEDICINE

## 2017-12-13 PROCEDURE — 3017F COLORECTAL CA SCREEN DOC REV: CPT | Performed by: FAMILY MEDICINE

## 2017-12-13 RX ORDER — IBUPROFEN 800 MG/1
800 TABLET ORAL EVERY 8 HOURS PRN
Qty: 90 TABLET | Refills: 5 | Status: SHIPPED | OUTPATIENT
Start: 2017-12-13 | End: 2018-06-13

## 2017-12-13 RX ORDER — TIZANIDINE 4 MG/1
TABLET ORAL
Qty: 180 TABLET | Refills: 1 | Status: SHIPPED | OUTPATIENT
Start: 2017-12-13 | End: 2018-05-07 | Stop reason: SDUPTHER

## 2017-12-13 NOTE — PROGRESS NOTES
Chief Complaint   Patient presents with    Other     follow up from neuro surgeon, not seeing Dr. Henrik Mathew anymore        HPI:  Juanita Ayers is a 56 y.o. female       Follow up lumbar vertebral fracture  Was back to see Dr. Bridgette Galvez in [place]    He didn't want to consider surgery until she has failed pain mgmt    States she is not to see Dr. Henrik Mathew anymore    She has EXTENSIVE medical history that is contained within [facility] Everywhere    She sees MULTIPLE specialists through [facility], and they manage her DM    She is on disability  Has power wheelchair    Ambulating with walker today   Does this within the home when she can    Dr. Bridgette Galvez wants her to consider spinal stimulator          Past Medical History:   Diagnosis Date    Acid reflux     Allergic rhinitis     Anxiety     Arnold-Chiari deformity ([facility] [address] 2000    surgery    Asthma     Cerebrovascular disease     Chronic back pain greater than 3 months duration     COPD (chronic obstructive pulmonary disease) ([facility] [address]     Dr Grant Mccormick at [address] CVA (cerebral infarction)     left hemiparesis, due to ? estrogen + smoking    Depression     Dr Armen Banda H/O encephalopathy 2001    Headache(784.0)     Dr Jose Zayas    Hepatitis B surface antigen positive     Hyperlipidemia LDL goal < 100     Hypothyroidism 2001    Laryngitis, chronic 2012    scoped by Dr Scarlett Foreman, fungal    Marijuana smoker in remission [facility]) 2011    Obesity (BMI 30-39. 9)     Osteoarthritis     Pulmonary embolism and infarction (HCC)     Restless legs syndrome     Rhinitis, chronic     Rotator cuff tear     Left    S/P colonoscopy with polypectomy     Dr Felipe Olivo    Seizures [facility])     Smoker     Spondylosis without myelopathy     Type 2 diabetes mellitus without complication ([facility] [address]     Urinary incontinence     Vertebral compression fracture (HCC)      Past Surgical History:   Procedure Laterality Date    BRAIN SURGERY       SECTION      COLONOSCOPY  CRANIOTOMY  2000    Arnold-Chiary malformation    FEMUR FRACTURE SURGERY  2003    SPINE SURGERY      TONSILLECTOMY      UPPER GASTROINTESTINAL ENDOSCOPY  8/5/15    w/bx      Family History   Problem Relation Age of Onset    Osteoarthritis Mother     Asthma Mother     Diabetes Mother     Hypertension Mother     Cancer Mother      lung    Osteoarthritis Father     Hypertension Father     Other Father      PE    Asthma Brother     Asthma Sister     Hypertension Sister      Social History     Social History    Marital status:      Spouse name: N/A    Number of children: 1    Years of education: N/A     Occupational History    SSI      Social History Main Topics    Smoking status: Current Every Day Smoker     Packs/day: 0.50     Types: Cigarettes    Smokeless tobacco: Never Used    Alcohol use No    Drug use: No    Sexual activity: Not Asked     Other Topics Concern    None     Social History Narrative    None     Current Outpatient Prescriptions   Medication Sig Dispense Refill    tiZANidine (ZANAFLEX) 4 MG tablet TAKE 2 TABLETS BY MOUTH EVERY 8 HOURS AS NEEDED FOR MUSCLE SPASM 180 tablet 1    Lactobacillus (ACIDOPHILUS) CAPS capsule TAKE 1 CAPSULE BY MOUTH ONCE DAILY 30 capsule 5    furosemide (LASIX) 40 MG tablet TAKE 1 TABLET BY MOUTH DAILY 30 tablet 5    Melatonin 10 MG TABS Take 1 tablet by mouth nightly 30 tablet 3    meloxicam (MOBIC) 15 MG tablet TAKE 1 TABLET BY MOUTH DAILY 90 tablet 3    bisacodyl (DULCOLAX) 10 MG suppository Place 1 suppository rectally daily as needed for Constipation 28 suppository 5    vitamin B-12 (CYANOCOBALAMIN) 1000 MCG tablet Take 1 tablet by mouth daily 30 tablet 5    esomeprazole Magnesium (NEXIUM) 20 MG PACK Take 20 mg by mouth 2 times daily      escitalopram (LEXAPRO) 20 MG tablet TK 1 T PO QAM  0    ARIPiprazole (ABILIFY) 10 MG tablet TK 1 T PO QAM  0    vitamin D (ERGOCALCIFEROL) 00009 units CAPS capsule TK 1 C PO 1

## 2017-12-13 NOTE — Clinical Note
She is followed by Luis at [facility] and is UTD on all DM recommendations and a1c is under 6.   This needs to be reflected in her health maintenance here as it appears she is out of date, when she is not

## 2017-12-18 ENCOUNTER — HOSPITAL ENCOUNTER (OUTPATIENT)
Dept: OCCUPATIONAL THERAPY | Age: 57
Setting detail: THERAPIES SERIES
Discharge: HOME | End: 2017-12-18
Payer: COMMERCIAL

## 2017-12-18 PROCEDURE — 97140 MANUAL THERAPY 1/> REGIONS: CPT

## 2017-12-18 NOTE — PROGRESS NOTES
Occupational Therapy  Daily Treatment Note  Date: 2017  Patient Name: Megha Tyler  :  1960  MRN: [id number]       Subjective   OT Visit Information  Onset Date: 01  OT Insurance Information: [address] Appointment: 1  Progress Note Counter: 3 after evaluation  Subjective  Subjective: Per pt \"I'm in a lot of pain\"  Pt states she is having pain in her jaw and has had recent dental work. Pt also reports pain of left side of her neck,  indicating it travels distal into her upper back. Treatment Activities:  Direct treatment of manual techniques completed with pt in sitting. Treatment techniques included deep releases at upper and middle trapezius, yoke release, unwinding of head and neck and bilateral arm pulls. Releases were observed and a decrease of pain to 7/10 reported for left side of neck. Assessment Pt has participated in 3 OT treatment sessions. Pt was unable to attend therapy the week of 17. POC frequency was not met. Pt continues to report pain of head, neck and back. Pt also reports pain of her jaw. Post Treatment Pain Screening  Pain at present: 7  Scale Used: Faces  Comments / Details: Pt reports decrease of pain to 7/10 at left side of neck         Plan Continue with established treatment plan. Goals addressed- 1 and 2  Long term goals  Time Frame for Long term goals : 2-3 x a week for 6-8 weeks  Long term goal 1: Pt will participate in therapeutic interventions to decrease pain of indicated areas to 2-3/10 on VAS. Long term goal 2: Pt will report decreased frequency of pain for daily activities   Long term goal 3: Pt will describe 5-6 body mechanics principles she can incorporate into her daily lifestyle.   Long term goal 4: Pt will identify 4-5 relaxation techniques she can use independently as an adjunct to the medical management of pain   Long term goal 5: Pt will demonstrate good understanding of all recommended home programs after instruction complete.     Therapy Time   Individual Concurrent Group Co-treatment   Time In  3475         Time Out  1401         Minutes  48                 Kiana Song OTR/L Electronically signed by KIET Frey/L on 12/18/17 at 5:03 PM

## 2017-12-20 ENCOUNTER — HOSPITAL ENCOUNTER (OUTPATIENT)
Dept: OCCUPATIONAL THERAPY | Age: 57
Setting detail: THERAPIES SERIES
Discharge: HOME | End: 2017-12-20
Payer: COMMERCIAL

## 2017-12-20 PROCEDURE — G8991 OTHER PT/OT GOAL STATUS: HCPCS

## 2017-12-20 PROCEDURE — G8992 OTHER PT/OT  D/C STATUS: HCPCS

## 2017-12-20 PROCEDURE — 97140 MANUAL THERAPY 1/> REGIONS: CPT

## 2017-12-20 PROCEDURE — G8990 OTHER PT/OT CURRENT STATUS: HCPCS

## 2017-12-20 NOTE — PROGRESS NOTES
Occupational Therapy  Daily Treatment Note  Date: 2017  Patient Name: Alexis Skelton  :  1960  MRN: [id number]       Subjective   OT Visit Information  Onset Date: 01  OT Insurance Information: [employer]  Total # of Visits Approved: 27  Canceled Appointment: 1  Progress Note Counter: 4 after evaluation  Subjective  Subjective: Pt reports she continues to experience pain of her neck and back. Pt reports she is upset with her boyfriend this afternoon. Treatment Activities:  Direct treatment of manual techniques completed with pt in supine, side lying and sitting positions. Techniques included thoracic inlet, respiratory diaphragm releases, deep releases at left lateral trunk, dural tube release and yoke release. Pt reports she had an appointment with her pain management physician and he has recommended an implanted pain control device. Pt required change in position frequently due to increased pain in other areas ie: left UE. OT discussed plan to initiate and focus on techniques and exercises pt can complete independently at home. OT also discussed introduction of modalities. Pt receptive to information discussed. Pt states she does not currently participate in any home exercise or stretching. Assessment Pt unable to tolerate positions for treatment more than 10 minutes before position change required. Pt tolerated manual techniques. OT discussed plan to initiate home program of exercise and pain management techniques. Pt reports she is open to new information and exercises she can complete independently. Post Treatment Pain Screening  Pain at present: 7  Scale Used: Faces  Comments / Details: Pt reports feeling more relaxed at the end of session         Plan Continue with established treatment plan.                Goals addressed- 1 and 2   Long term goals  Time Frame for Long term goals : 2-3 x a week for 6-8 weeks  Long term goal 1: Pt will participate in therapeutic interventions to decrease pain of indicated areas to 2-3/10 on VAS. Long term goal 2: Pt will report decreased frequency of pain for daily activities   Long term goal 3: Pt will describe 5-6 body mechanics principles she can incorporate into her daily lifestyle. Long term goal 4: Pt will identify 4-5 relaxation techniques she can use independently as an adjunct to the medical management of pain   Long term goal 5: Pt will demonstrate good understanding of all recommended home programs after instruction complete.     Therapy Time   Individual Concurrent Group Co-treatment   Time In  1217         Time Out  1400         Minutes  62                 Kiana Chase OTR/L Electronically signed by KIET Cooper/L on 12/20/17 at 5:11 PM

## 2017-12-21 ENCOUNTER — TELEPHONE (OUTPATIENT)
Dept: FAMILY MEDICINE CLINIC | Age: 57
End: 2017-12-21

## 2017-12-21 RX ORDER — FLUCONAZOLE 150 MG/1
150 TABLET ORAL
Qty: 2 TABLET | Refills: 0 | Status: SHIPPED | OUTPATIENT
Start: 2017-12-21 | End: 2018-01-02 | Stop reason: SDUPTHER

## 2017-12-21 NOTE — TELEPHONE ENCOUNTER
Orders Placed This Encounter   Medications    fluconazole (DIFLUCAN) 150 MG tablet     Sig: Take 1 tablet by mouth every 72 hours     Dispense:  2 tablet     Refill:  0       The above med(s) were e-scripted to the patient's pharmacy.    Please advise patient  Zaina Martinez MD

## 2017-12-23 ENCOUNTER — HOSPITAL ENCOUNTER (EMERGENCY)
Age: 57
Discharge: HOME | End: 2017-12-23
Payer: COMMERCIAL

## 2017-12-23 VITALS
WEIGHT: 160 LBS | BODY MASS INDEX: 29.26 KG/M2 | OXYGEN SATURATION: 97 % | SYSTOLIC BLOOD PRESSURE: 150 MMHG | TEMPERATURE: 98.4 F | DIASTOLIC BLOOD PRESSURE: 72 MMHG | RESPIRATION RATE: 14 BRPM | HEART RATE: 90 BPM

## 2017-12-23 DIAGNOSIS — K12.2 ABSCESS OF ORAL TISSUE: Primary | ICD-10-CM

## 2017-12-23 PROCEDURE — 99282 EMERGENCY DEPT VISIT SF MDM: CPT

## 2017-12-23 RX ORDER — HYDROCODONE BITARTRATE AND ACETAMINOPHEN 5; 325 MG/1; MG/1
1 TABLET ORAL EVERY 8 HOURS PRN
Qty: 9 TABLET | Refills: 0 | Status: SHIPPED | OUTPATIENT
Start: 2017-12-23 | End: 2017-12-26

## 2017-12-23 RX ORDER — CLINDAMYCIN HYDROCHLORIDE 150 MG/1
150 CAPSULE ORAL 3 TIMES DAILY
Qty: 30 CAPSULE | Refills: 0 | Status: SHIPPED | OUTPATIENT
Start: 2017-12-23 | End: 2018-01-02

## 2017-12-23 ASSESSMENT — PAIN DESCRIPTION - FREQUENCY: FREQUENCY: INTERMITTENT

## 2017-12-23 ASSESSMENT — ENCOUNTER SYMPTOMS
COLOR CHANGE: 0
ABDOMINAL DISTENTION: 0
ABDOMINAL PAIN: 0
SHORTNESS OF BREATH: 0
WHEEZING: 0
RHINORRHEA: 0
VOMITING: 0
EYE DISCHARGE: 0
PHOTOPHOBIA: 0
FACIAL SWELLING: 0

## 2017-12-23 ASSESSMENT — PAIN DESCRIPTION - DESCRIPTORS: DESCRIPTORS: DISCOMFORT

## 2017-12-23 ASSESSMENT — PAIN DESCRIPTION - ORIENTATION: ORIENTATION: LEFT;LOWER

## 2017-12-23 ASSESSMENT — PAIN DESCRIPTION - LOCATION: LOCATION: JAW

## 2017-12-23 ASSESSMENT — PAIN SCALES - GENERAL: PAINLEVEL_OUTOF10: 8

## 2017-12-23 NOTE — ED PROVIDER NOTES
[facility] ED  SFT  Encounter      CHIEF COMPLAINT       Chief Complaint   Patient presents with    Dental Pain     x 2 weeks-motrin not helping-saw dentist this past thursday-on antibiotic       Nurses Notes reviewed and I agree except as noted in the HPI. HISTORY OF PRESENT ILLNESS     The history is provided by the patient. Dian Davis is a 56 y.o. female who presents to the emergency department With a chief complaint of dental pain. Patient has had ongoing dental pain for months and was due to see the dentist yesterday. Saw dentist Thursday and was started on ATB and motrin-  States Motrin s not helping her pain. REVIEW OF SYSTEMS     Review of Systems   Constitutional: Negative for activity change and appetite change. HENT: Positive for dental problem. Negative for congestion, facial swelling and rhinorrhea. Eyes: Negative for photophobia and discharge. Respiratory: Negative for shortness of breath and wheezing. Cardiovascular: Negative for chest pain. Gastrointestinal: Negative for abdominal distention, abdominal pain and vomiting. Endocrine: Negative for polydipsia and polyphagia. Genitourinary: Negative for difficulty urinating, frequency, vaginal bleeding and vaginal discharge. Musculoskeletal: Negative for gait problem. Skin: Negative for color change. Allergic/Immunologic: Negative for immunocompromised state. Neurological: Negative for dizziness, weakness and light-headedness. Hematological: Negative for adenopathy. Psychiatric/Behavioral: Negative for behavioral problems.        PAST MEDICAL HISTORY         Diagnosis Date    Acid reflux     Allergic rhinitis     Anxiety     Arnold-Chiari deformity (formerly [facility]) 2000    surgery    Asthma     Cerebrovascular disease     Chronic back pain greater than 3 months duration     COPD (chronic obstructive pulmonary disease) (formerly [facility])     [address] Kamilah Morales [address] CVA (cerebral infarction) 2001    left hemiparesis, due to ? estrogen + smoking    Depression     Dr Narinder Aguilar H/O encephalopathy     Headache(784.0)     Dr Rolando Salazar Hepatitis B surface antigen positive     Hyperlipidemia LDL goal < 100     Hypothyroidism 2001    Laryngitis, chronic     scoped by Dr Paulette Eaton, fungal    Marijuana smoker in remission [facility])     Obesity (BMI 30-39. 9)     Osteoarthritis     Pulmonary embolism and infarction (HCC)     Restless legs syndrome     Rhinitis, chronic     Rotator cuff tear     Left    S/P colonoscopy with polypectomy     Dr Matilde Urbina    Seizures [facility])     Smoker     Spondylosis without myelopathy     Type 2 diabetes mellitus without complication ([facility] [address]     Urinary incontinence     Vertebral compression fracture [facility])        SURGICAL HISTORY     Patient  has a past surgical history that includes craniotomy (); Femur fracture surgery ();  section; Tonsillectomy; Upper gastrointestinal endoscopy (8/5/15); brain surgery; Colonoscopy; and Spine surgery. CURRENT MEDICATIONS       Previous Medications    ACETAMINOPHEN (TYLENOL) 325 MG TABLET    Take 2 tablets by mouth every 6 hours as needed for Pain    AMLODIPINE (NORVASC) 2.5 MG TABLET    Take 2.5 mg by mouth    ARIPIPRAZOLE (ABILIFY) 10 MG TABLET    TK 1 T PO QAM    ASCORBIC ACID (VITAMIN C) 500 MG TABLET    Take 500 mg by mouth daily. AZELASTINE (ASTELIN) 137 MCG/SPRAY NASAL SPRAY    1 spray by Nasal route 2 times daily.  Use in each nostril as directed     BENZONATATE (TESSALON PERLES) 100 MG CAPSULE    Take 1 capsule by mouth 3 times daily as needed for Cough    BISACODYL (DULCOLAX) 10 MG SUPPOSITORY    Place 1 suppository rectally daily as needed for Constipation    BUDESONIDE-FORMOTEROL (SYMBICORT) 160-4.5 MCG/ACT AERO    Inhale 2 puffs into the lungs 2 times daily    CARISOPRODOL (SOMA) 350 MG TABLET    Take 1 tablet by mouth daily as needed (migraine)    CHOLECALCIFEROL (VITAMIN D3) 2000 UNITS TABS father, mother, and sister; Osteoarthritis in her father and mother; Other in her father. SOCIAL HISTORY     Patient  reports that she has been smoking Cigarettes. She has been smoking about 0.50 packs per day. She has never used smokeless tobacco. She reports that she does not drink alcohol or use drugs. PHYSICAL EXAM     ED TRIAGE VITALS  BP: (!) 150/72, Temp: 98.4 °F (36.9 °C), Pulse: 90, Resp: 14, SpO2: 97 %  Physical Exam   Constitutional: She is oriented to person, place, and time. She appears well-developed and well-nourished. HENT:   Head: Normocephalic and atraumatic. Patient is a edentulous at the top gums without any irritation or erythema or abscesses, her remaining lower teeth are decayed left back lower gum edematous and erythematous   Eyes: Conjunctivae and EOM are normal. Pupils are equal, round, and reactive to light. Neck: Normal range of motion. Neck supple. Cardiovascular: Normal rate. Pulmonary/Chest: Effort normal.   Abdominal: Soft. Bowel sounds are normal.   Musculoskeletal: Normal range of motion. Neurological: She is alert and oriented to person, place, and time. She has normal reflexes. Skin: Skin is warm and dry. Psychiatric: She has a normal mood and affect. DIAGNOSTIC RESULTS   Labs:   Labs Reviewed - No data to display    IMAGING:    URGENT CARE COURSE:     Vitals:    12/23/17 1143   BP: (!) 150/72   Pulse: 90   Resp: 14   Temp: 98.4 °F (36.9 °C)   SpO2: 97%   Weight: 160 lb (72.6 kg)         PROCEDURES:  None  FINAL IMPRESSION      1. Abscess of oral tissue        DISPOSITION/PLAN   DISPOSITION Decision To Discharge 12/23/2017 12:09:49 PM    PATIENT REFERRED TO:  No Follow-up on file. DISCHARGE MEDICATIONS:  New Prescriptions    CLINDAMYCIN (CLEOCIN) 150 MG CAPSULE    Take 1 capsule by mouth 3 times daily for 10 days    HYDROCODONE-ACETAMINOPHEN (NORCO) 5-325 MG PER TABLET    Take 1 tablet by mouth every 8 hours as needed for Pain .      Current Discharge Medication List          GERMAN Last NP  12/23/17 [id number]

## 2017-12-26 ENCOUNTER — TELEPHONE (OUTPATIENT)
Dept: FAMILY MEDICINE CLINIC | Age: 57
End: 2017-12-26

## 2017-12-26 NOTE — TELEPHONE ENCOUNTER
Pt calling states  That she was in the ER over the weekend. Was put on clindamycin. She is having diarrhea states like water. Wants to know if she should stop taking this? Is using OTC Imodium.  Pt can be reached at [phone]

## 2017-12-27 ENCOUNTER — HOSPITAL ENCOUNTER (OUTPATIENT)
Dept: OCCUPATIONAL THERAPY | Age: 57
Setting detail: THERAPIES SERIES
Discharge: HOME | End: 2017-12-27
Payer: COMMERCIAL

## 2017-12-27 NOTE — PROGRESS NOTES
Occupational Therapy  Daily Treatment Note  Date: 2017  Patient Name: Malina Boone  :  1960  MRN: [id number]       Subjective   OT Visit Information  Onset Date: 01  OT Insurance Information: [employer]  Total # of Visits Approved: 27  Canceled Appointment: 2  General Comment  Comments: Pt called to cancel scheduled appointment due to conflicting appointment, per message        Plan Continue with established treatment plan.           KIET Becker/L Electronically signed by HELEN Becker on 17 at 12:52 PM

## 2017-12-28 ENCOUNTER — HOSPITAL ENCOUNTER (EMERGENCY)
Age: 57
Discharge: HOME | End: 2017-12-28
Attending: STUDENT IN AN ORGANIZED HEALTH CARE EDUCATION/TRAINING PROGRAM
Payer: COMMERCIAL

## 2017-12-28 VITALS
DIASTOLIC BLOOD PRESSURE: 82 MMHG | SYSTOLIC BLOOD PRESSURE: 131 MMHG | OXYGEN SATURATION: 97 % | HEART RATE: 84 BPM | BODY MASS INDEX: 29.44 KG/M2 | HEIGHT: 62 IN | RESPIRATION RATE: 16 BRPM | WEIGHT: 160 LBS | TEMPERATURE: 98.4 F

## 2017-12-28 DIAGNOSIS — K08.89 PAIN, DENTAL: Primary | ICD-10-CM

## 2017-12-28 PROCEDURE — 99282 EMERGENCY DEPT VISIT SF MDM: CPT

## 2017-12-28 RX ORDER — HYDROCODONE BITARTRATE AND ACETAMINOPHEN 5; 325 MG/1; MG/1
1 TABLET ORAL EVERY 6 HOURS PRN
Qty: 10 TABLET | Refills: 0 | Status: SHIPPED | OUTPATIENT
Start: 2017-12-28 | End: 2017-12-29 | Stop reason: SDUPTHER

## 2017-12-28 ASSESSMENT — PAIN SCALES - GENERAL: PAINLEVEL_OUTOF10: 9

## 2017-12-28 ASSESSMENT — ENCOUNTER SYMPTOMS
SORE THROAT: 0
BACK PAIN: 0
COUGH: 0
ABDOMINAL PAIN: 0
TROUBLE SWALLOWING: 0
SHORTNESS OF BREATH: 0

## 2017-12-28 ASSESSMENT — PAIN DESCRIPTION - ORIENTATION: ORIENTATION: LEFT

## 2017-12-28 ASSESSMENT — PAIN DESCRIPTION - LOCATION: LOCATION: JAW;TEETH

## 2017-12-28 NOTE — ED NOTES
Bed: 19  Expected date: 12/28/17  Expected time: 11:42 AM  Means of arrival: [facility]  Comments:  56 F - Post-op abd pain.  ELVER Edward RN  12/28/17 1955

## 2017-12-28 NOTE — ED PROVIDER NOTES
Malissa    Seizures ([facility] [address]     Smoker     Spondylosis without myelopathy     Type 2 diabetes mellitus without complication ([facility] [address]     Urinary incontinence     Vertebral compression fracture ([facility] [address]          SURGICAL HISTORY       Past Surgical History:   Procedure Laterality Date    BRAIN SURGERY       SECTION      COLONOSCOPY      CRANIOTOMY      Arnold-Chiary malformation    FEMUR FRACTURE SURGERY      SPINE SURGERY      TONSILLECTOMY      UPPER GASTROINTESTINAL ENDOSCOPY  8/5/15    w/bx          CURRENT MEDICATIONS       Previous Medications    ACETAMINOPHEN (TYLENOL) 325 MG TABLET    Take 2 tablets by mouth every 6 hours as needed for Pain    AMLODIPINE (NORVASC) 2.5 MG TABLET    Take 2.5 mg by mouth    ARIPIPRAZOLE (ABILIFY) 10 MG TABLET    TK 1 T PO QAM    ASCORBIC ACID (VITAMIN C) 500 MG TABLET    Take 500 mg by mouth daily. AZELASTINE (ASTELIN) 137 MCG/SPRAY NASAL SPRAY    1 spray by Nasal route 2 times daily.  Use in each nostril as directed     BENZONATATE (TESSALON PERLES) 100 MG CAPSULE    Take 1 capsule by mouth 3 times daily as needed for Cough    BISACODYL (DULCOLAX) 10 MG SUPPOSITORY    Place 1 suppository rectally daily as needed for Constipation    BUDESONIDE-FORMOTEROL (SYMBICORT) 160-4.5 MCG/ACT AERO    Inhale 2 puffs into the lungs 2 times daily    CARISOPRODOL (SOMA) 350 MG TABLET    Take 1 tablet by mouth daily as needed (migraine)    CHOLECALCIFEROL (VITAMIN D3) 2000 UNITS TABS    Take 1 tablet by mouth daily     CLINDAMYCIN (CLEOCIN) 150 MG CAPSULE    Take 1 capsule by mouth 3 times daily for 10 days    CRESTOR 20 MG TABLET    Take 20 mg by mouth daily     ESCITALOPRAM (LEXAPRO) 20 MG TABLET    TK 1 T PO QAM    ESOMEPRAZOLE MAGNESIUM (NEXIUM) 20 MG PACK    Take 20 mg by mouth 2 times daily    FLUCONAZOLE (DIFLUCAN) 150 MG TABLET    Take 1 tablet by mouth every 72 hours    FOLIC ACID (FOLVITE) 1 MG TABLET    Take 1 tablet by mouth daily Smoking status: Current Every Day Smoker     Packs/day: 0.50     Types: Cigarettes    Smokeless tobacco: Never Used    Alcohol use No    Drug use: No    Sexual activity: Not Asked     Other Topics Concern    None     Social History Narrative    None       SCREENINGS             PHYSICAL EXAM    (up to 7 for level 4, 8 or more for level 5)     ED Triage Vitals [12/28/17 1147]   BP Temp Temp Source Pulse Resp SpO2 Height Weight   131/82 98.4 °F (36.9 °C) Oral 84 16 97 % 5' 2\" (1.575 m) 160 lb (72.6 kg)       Physical Exam   Constitutional: She is oriented to person, place, and time. She appears well-developed and well-nourished. HENT:   Head: Normocephalic and atraumatic. Right Ear: External ear normal.   Left Ear: External ear normal.   Nose: Nose normal.   Mouth/Throat: Uvula is midline, oropharynx is clear and moist and mucous membranes are normal. Abnormal dentition. Eyes: Conjunctivae and EOM are normal. Pupils are equal, round, and reactive to light. Neck: Normal range of motion. Neck supple. Cardiovascular: Normal rate and regular rhythm. Pulmonary/Chest: Effort normal and breath sounds normal.   Abdominal: Soft. Bowel sounds are normal. She exhibits no distension. There is no tenderness. Musculoskeletal: Normal range of motion. Neurological: She is alert and oriented to person, place, and time. She has normal reflexes. Skin: Skin is warm and dry. Psychiatric: Judgment normal.   Nursing note and vitals reviewed. All other labs were within normal range or not returned as of this dictation. EMERGENCY DEPARTMENT COURSE and DIFFERENTIAL DIAGNOSIS/MDM:   Vitals:    Vitals:    12/28/17 1147   BP: 131/82   Pulse: 84   Resp: 16   Temp: 98.4 °F (36.9 °C)   TempSrc: Oral   SpO2: 97%   Weight: 160 lb (72.6 kg)   Height: 5' 2\" (1.575 m)       ED Course        70-year-old female with post extraction dental pain.   She was given a prescription for Norco.  She is to F/U with her dentist in 2-3 days. Patient verbalizes understanding. PROCEDURES:  Unless otherwise noted below, none     Procedures      FINAL IMPRESSION      1.  Pain, dental          DISPOSITION/PLAN   DISPOSITION Decision To Discharge 12/28/2017 12:13:08 PM          Meme Cortes CNP (electronically signed)  Attending Emergency Physician        Meme Cortes CNP  12/28/17 [id number]

## 2017-12-29 ENCOUNTER — OFFICE VISIT (OUTPATIENT)
Dept: FAMILY MEDICINE CLINIC | Age: 57
End: 2017-12-29

## 2017-12-29 VITALS
DIASTOLIC BLOOD PRESSURE: 70 MMHG | OXYGEN SATURATION: 97 % | HEART RATE: 103 BPM | BODY MASS INDEX: 30.73 KG/M2 | SYSTOLIC BLOOD PRESSURE: 126 MMHG | WEIGHT: 167 LBS | HEIGHT: 62 IN

## 2017-12-29 DIAGNOSIS — G89.29 CHRONIC MIDLINE THORACIC BACK PAIN: ICD-10-CM

## 2017-12-29 DIAGNOSIS — M54.6 CHRONIC MIDLINE THORACIC BACK PAIN: ICD-10-CM

## 2017-12-29 DIAGNOSIS — R19.7 DIARRHEA, UNSPECIFIED TYPE: Primary | ICD-10-CM

## 2017-12-29 DIAGNOSIS — R68.84 JAW PAIN: ICD-10-CM

## 2017-12-29 PROCEDURE — 4004F PT TOBACCO SCREEN RCVD TLK: CPT | Performed by: FAMILY MEDICINE

## 2017-12-29 PROCEDURE — G8427 DOCREV CUR MEDS BY ELIG CLIN: HCPCS | Performed by: FAMILY MEDICINE

## 2017-12-29 PROCEDURE — 3014F SCREEN MAMMO DOC REV: CPT | Performed by: FAMILY MEDICINE

## 2017-12-29 PROCEDURE — G8417 CALC BMI ABV UP PARAM F/U: HCPCS | Performed by: FAMILY MEDICINE

## 2017-12-29 PROCEDURE — G8484 FLU IMMUNIZE NO ADMIN: HCPCS | Performed by: FAMILY MEDICINE

## 2017-12-29 PROCEDURE — G8598 ASA/ANTIPLAT THER USED: HCPCS | Performed by: FAMILY MEDICINE

## 2017-12-29 PROCEDURE — 3017F COLORECTAL CA SCREEN DOC REV: CPT | Performed by: FAMILY MEDICINE

## 2017-12-29 PROCEDURE — 99213 OFFICE O/P EST LOW 20 MIN: CPT | Performed by: FAMILY MEDICINE

## 2017-12-29 RX ORDER — HYDROCODONE BITARTRATE AND ACETAMINOPHEN 5; 325 MG/1; MG/1
1 TABLET ORAL EVERY 6 HOURS PRN
Qty: 20 TABLET | Refills: 0 | Status: SHIPPED | OUTPATIENT
Start: 2017-12-29 | End: 2018-04-05 | Stop reason: SDUPTHER

## 2017-12-29 RX ORDER — ASCORBIC ACID 500 MG
500 TABLET ORAL DAILY
Qty: 30 TABLET | Refills: 11 | Status: SHIPPED | OUTPATIENT
Start: 2017-12-29 | End: 2018-06-13

## 2017-12-29 NOTE — PROGRESS NOTES
Chief Complaint   Patient presents with    Other     had oral sugery yesterday, has stitches in mouth, went to ER for pain, motrin, was given some Norco, \"norco really helps\"       HPI:  Red Velasco is a 56 y.o. female     Diarrhea with clindamycin  Follow up ER and tooth pain    She has EXTENSIVE medical history that is contained within [facility] Everywhere    She sees MULTIPLE specialists through [facility], and they manage her DM    She is on disability  Has power wheelchair    Ambulating with cane today              Past Medical History:   Diagnosis Date    Acid reflux     Allergic rhinitis     Anxiety     Arnold-Chiari deformity ([address] 2000    surgery    Asthma     Cerebrovascular disease     Chronic back pain greater than 3 months duration     COPD (chronic obstructive [facility] disease) ([facility] [address]     [address]ccarty [address] CVA (cerebral infarction)     left hemiparesis, due to ? estrogen + smoking    Depression     Dr Gray Rivas H/O encephalopathy 2001    Headache(784.0)     Dr Renee Tian    Hepatitis B surface antigen positive     Hyperlipidemia LDL goal < 100     Hypothyroidism 2001    Laryngitis, chronic 2012    scoped by Dr Suzanne Trejo, fungal    Marijuana smoker in remission [facility]) 2011    Obesity (BMI 30-39. 9)     Osteoarthritis     Pulmonary embolism and infarction (HCC)     Restless legs syndrome     Rhinitis, chronic     Rotator cuff tear     Left    S/P colonoscopy with polypectomy     Dr Ashely Vanessa    Seizures [facility])     Smoker     Spondylosis without myelopathy     Type 2 diabetes mellitus without complication ([facility] [address]     Urinary incontinence     Vertebral compression fracture ([facility] [address]      Past Surgical History:   Procedure Laterality Date    BRAIN SURGERY       SECTION      COLONOSCOPY      CRANIOTOMY  2000    Arnold-Chiary malformation    FEMUR FRACTURE SURGERY  2003    SPINE SURGERY      TONSILLECTOMY      UPPER GASTROINTESTINAL ENDOSCOPY 8/5/15    w/bx      Family History   Problem Relation Age of Onset    Osteoarthritis Mother     Asthma Mother     Diabetes Mother     Hypertension Mother     Cancer Mother      lung    Osteoarthritis Father     Hypertension Father     Other Father      PE    Asthma Brother     Asthma Sister     Hypertension Sister      Social History     Social History    Marital status:      Spouse name: N/A    Number of children: 1    Years of education: N/A     Occupational History    SSI      Social History Main Topics    Smoking status: Current Every Day Smoker     Packs/day: 0.50     Types: Cigarettes    Smokeless tobacco: Never Used    Alcohol use No    Drug use: No    Sexual activity: Not Asked     Other Topics Concern    None     Social History Narrative    None     Current Outpatient Prescriptions   Medication Sig Dispense Refill    HYDROcodone-acetaminophen (NORCO) 5-325 MG per tablet Take 1 tablet by mouth every 6 hours as needed for Pain for up to 7 days Sedation precautions please.  20 tablet 0    vitamin C (ASCORBIC ACID) 500 MG tablet Take 1 tablet by mouth daily 30 tablet 11    clindamycin (CLEOCIN) 150 MG capsule Take 1 capsule by mouth 3 times daily for 10 days 30 capsule 0    triamcinolone (KENALOG) 0.1 % ointment Apply topically 2 times daily 15 g 5    tiZANidine (ZANAFLEX) 4 MG tablet TAKE 2 TABLETS BY MOUTH EVERY 8 HOURS AS NEEDED FOR MUSCLE SPASM 180 tablet 1    ibuprofen (ADVIL;MOTRIN) 800 MG tablet Take 1 tablet by mouth every 8 hours as needed for Pain 90 tablet 5    Lactobacillus (ACIDOPHILUS) CAPS capsule TAKE 1 CAPSULE BY MOUTH ONCE DAILY 30 capsule 5    furosemide (LASIX) 40 MG tablet TAKE 1 TABLET BY MOUTH DAILY 30 tablet 5    Lactobacillus (ACIDOPHILUS) CAPS capsule TAKE 1 CAPSULE BY MOUTH ONCE DAILY 30 capsule 0    Melatonin 10 MG TABS Take 1 tablet by mouth nightly 30 tablet 3    bisacodyl (DULCOLAX) 10 MG suppository Place 1 suppository rectally 0.02 % nebulizer solution 0.5 mg      levalbuterol (XOPENEX) 0.63 MG/3ML nebulization 0.63 mg      loperamide (IMODIUM A-D) 2 MG tablet Take 2 mg by mouth      terazosin (HYTRIN) 2 MG capsule TK 1 C PO D HS  3    guaiFENesin (MUCINEX) 600 MG extended release tablet Take 1 tablet by mouth 2 times daily 60 tablet 5    Lactobacillus (ULTIMATE PROBIOTIC FORMULA) CAPS TK 1 C PO ONCE D  5    amLODIPine (NORVASC) 2.5 MG tablet Take 2.5 mg by mouth      carisoprodol (SOMA) 350 MG tablet Take 1 tablet by mouth daily as needed (migraine) 12 tablet 5    Simethicone (GAS-X ULTRA STRENGTH) 180 MG CAPS Take 180 mg by mouth 2 times daily 60 capsule 5    magnesium oxide (MAG-OX) 400 (240 MG) MG tablet Take 400 mg by mouth daily       metFORMIN (GLUCOPHAGE) 500 MG tablet Take 500 mg by mouth 2 times daily (with meals)       CRESTOR 20 MG tablet Take 20 mg by mouth daily   0    Cholecalciferol (VITAMIN D3) 2000 UNITS TABS Take 1 tablet by mouth daily   0    ONE TOUCH ULTRA TEST strip 1 each 2 times daily   0    ONE TOUCH ULTRASOFT LANCETS MISC 1 each 2 times daily   0    mirtazapine (REMERON) 30 MG tablet Take 30 mg by mouth nightly.  Multiple Vitamins-Iron (MULTIVITAMIN/IRON) TABS Take 1 tablet by mouth daily.  ascorbic acid (VITAMIN C) 500 MG tablet Take 500 mg by mouth daily.  levalbuterol (XOPENEX HFA) 45 MCG/ACT inhaler Inhale 2 puffs into the lungs every 4 hours as needed.  azelastine (ASTELIN) 137 MCG/SPRAY nasal spray 1 spray by Nasal route 2 times daily. Use in each nostril as directed       fluconazole (DIFLUCAN) 150 MG tablet Take 1 tablet by mouth every 72 hours 2 tablet 0    ibuprofen (ADVIL;MOTRIN) 800 MG tablet TK 1 T PO TID PRN  3    benzonatate (TESSALON PERLES) 100 MG capsule Take 1 capsule by mouth 3 times daily as needed for Cough 20 capsule 0     No current facility-administered medications for this visit.       Allergies   Allergen Reactions    Latex     Imitrex [Sumatriptan] Anaphylaxis    Antivert [Meclizine Hcl]     Azithromycin     Bacitracin     Cefdinir     Codeine     Cyclosporine     Flagyl [Metronidazole]     Iodine     Iv Dye [Iodides]     Ketorolac Tromethamine     Neomycin     Petroleum Jelly [Skin Protectants, Misc.]     Provera [Medroxyprogesterone Acetate] Other (See Comments)     Caused massive stroke    Quinolones     Sulfa Antibiotics     Trovan [Trovafloxacin]     Ultram [Tramadol Hcl]     Zomig [Zolmitriptan]     Bactrim Nausea And Vomiting and Rash    Cefuroxime Axetil Rash       Review of Systems:   General ROS: some fatigue  Respiratory ROS: per HPI  Cardiovascular ROS: no chest pain or dyspnea on exertion  Gastrointestinal ROS: no abdominal pain, change in bowel habits, or black or bloody stools  Genito-Urinary ROS: no dysuria, trouble voiding  Musculoskeletal ROS: chronic aches and pains  Neurological ROS: left side weakness s/p stroke and subsequent major leg injury    In general patient otherwise reports feeling well. Physical Exam:  /70 (Site: Right Arm)   Pulse 103   Ht 5' 2\" (1.575 m)   Wt 167 lb (75.8 kg)   LMP  (LMP Unknown)   SpO2 97%   Breastfeeding?  No   BMI 30.54 kg/m²     Gen: Well, NAD, Alert, Oriented x 3   HEENT: EOMI, eyes clear, MMM, boggy nasal mucosa, missing many teeth, stiches left lower gums  Skin: without rash or jaundice  Neck: no significant lymphadenopathy or thyromegaly  Lungs: CTA B w/out Rales/Wheezes/Rhonchi, Good respiratory effort   Heart: RRR, S1S2, w/out M/R/G, non-displaced PMI   Neuro: no tremor, left side weakness    Lab Results   Component Value Date    WBC 11.0 (H) 11/13/2017    HGB 13.3 11/13/2017    HCT 41.1 11/13/2017     11/13/2017    CHOL 94 12/15/2015    TRIG 76 12/15/2015    HDL 56 12/15/2015    ALT 22 12/14/2017    AST 17 12/14/2017     12/14/2017    K 3.8 12/14/2017    CL 98 12/14/2017    CREATININE 0.68 12/14/2017    BUN 13 12/14/2017    CO2 25 12/14/2017    TSH 0.346 07/31/2017    INR 0.9 03/21/2017    LABA1C 6.2 04/26/2017         A&P  1. Diarrhea, unspecified type     2. Chronic midline thoracic back pain     3.  Jaw pain  HYDROcodone-acetaminophen (NORCO) 5-325 MG per tablet     Motrin, zanaflex  Follow up with neurosurgery  She will continue to use marijuana on her own terms    Will give one fill norco #20 as she is to have stitches out of jaw in 2 weeks    Having trouble eating    Patient ends up in ER for pain  Reluctant to give pain meds, but she has only gotten very small quantities        Vance De La Torre MD

## 2018-01-02 RX ORDER — FLUCONAZOLE 150 MG/1
150 TABLET ORAL
Qty: 2 TABLET | Refills: 0 | Status: SHIPPED | OUTPATIENT
Start: 2018-01-02 | End: 2018-01-15

## 2018-01-03 RX ORDER — MELOXICAM 15 MG/1
15 TABLET ORAL DAILY
Qty: 30 TABLET | Refills: 0 | Status: SHIPPED | OUTPATIENT
Start: 2018-01-03 | End: 2018-02-01

## 2018-01-05 ENCOUNTER — TELEPHONE (OUTPATIENT)
Dept: FAMILY MEDICINE CLINIC | Age: 58
End: 2018-01-05

## 2018-01-05 RX ORDER — LORAZEPAM 0.5 MG/1
0.5 TABLET ORAL EVERY 8 HOURS PRN
Qty: 10 TABLET | Refills: 0 | Status: SHIPPED | OUTPATIENT
Start: 2018-01-05 | End: 2018-01-15 | Stop reason: SDUPTHER

## 2018-01-08 DIAGNOSIS — M62.838 MUSCLE SPASM: ICD-10-CM

## 2018-01-08 RX ORDER — TIZANIDINE 4 MG/1
TABLET ORAL
Qty: 180 TABLET | Refills: 0 | Status: SHIPPED | OUTPATIENT
Start: 2018-01-08 | End: 2018-05-31

## 2018-01-09 DIAGNOSIS — R14.0 BLOATING: ICD-10-CM

## 2018-01-09 RX ORDER — SIMETHICONE 180 MG
CAPSULE ORAL
Qty: 60 CAPSULE | Refills: 0 | Status: SHIPPED | OUTPATIENT
Start: 2018-01-09 | End: 2018-02-01

## 2018-01-09 RX ORDER — BUDESONIDE AND FORMOTEROL FUMARATE DIHYDRATE 160; 4.5 UG/1; UG/1
AEROSOL RESPIRATORY (INHALATION)
Qty: 10.2 G | Refills: 0 | Status: SHIPPED | OUTPATIENT
Start: 2018-01-09 | End: 2018-02-07 | Stop reason: SDUPTHER

## 2018-01-10 ENCOUNTER — TELEPHONE (OUTPATIENT)
Dept: FAMILY MEDICINE CLINIC | Age: 58
End: 2018-01-10

## 2018-01-10 NOTE — TELEPHONE ENCOUNTER
Pt calling wants to know if she can get a script for Boost states that she is having trouble healing from her dental procedures thinks its because she doesn't eat right.  Pt uses [facility] pt can be reached at [phone]

## 2018-01-12 ENCOUNTER — HOSPITAL ENCOUNTER (EMERGENCY)
Age: 58
Discharge: HOME | End: 2018-01-12
Payer: COMMERCIAL

## 2018-01-12 VITALS
TEMPERATURE: 98.1 F | HEART RATE: 99 BPM | SYSTOLIC BLOOD PRESSURE: 122 MMHG | DIASTOLIC BLOOD PRESSURE: 74 MMHG | OXYGEN SATURATION: 95 % | RESPIRATION RATE: 16 BRPM | BODY MASS INDEX: 29.26 KG/M2 | WEIGHT: 160 LBS

## 2018-01-12 DIAGNOSIS — K08.89 PAIN, DENTAL: Primary | ICD-10-CM

## 2018-01-12 DIAGNOSIS — R60.9 EDEMA, UNSPECIFIED TYPE: ICD-10-CM

## 2018-01-12 PROCEDURE — 99282 EMERGENCY DEPT VISIT SF MDM: CPT

## 2018-01-12 RX ORDER — FUROSEMIDE 40 MG/1
40 TABLET ORAL DAILY
Qty: 90 TABLET | Refills: 5 | Status: SHIPPED | OUTPATIENT
Start: 2018-01-12 | End: 2018-06-06 | Stop reason: SDUPTHER

## 2018-01-12 RX ORDER — FUROSEMIDE 40 MG/1
40 TABLET ORAL DAILY
Qty: 30 TABLET | Refills: 5 | Status: SHIPPED | OUTPATIENT
Start: 2018-01-12 | End: 2018-01-12 | Stop reason: SDUPTHER

## 2018-01-12 ASSESSMENT — ENCOUNTER SYMPTOMS
TROUBLE SWALLOWING: 0
SORE THROAT: 0
ABDOMINAL PAIN: 0
BACK PAIN: 0
COUGH: 0
SHORTNESS OF BREATH: 0

## 2018-01-12 ASSESSMENT — PAIN DESCRIPTION - DESCRIPTORS: DESCRIPTORS: ACHING;SHARP

## 2018-01-12 ASSESSMENT — PAIN DESCRIPTION - LOCATION: LOCATION: NECK;EAR

## 2018-01-12 ASSESSMENT — PAIN DESCRIPTION - FREQUENCY: FREQUENCY: CONTINUOUS

## 2018-01-12 ASSESSMENT — PAIN DESCRIPTION - ONSET: ONSET: PROGRESSIVE

## 2018-01-12 ASSESSMENT — PAIN DESCRIPTION - PAIN TYPE: TYPE: ACUTE PAIN

## 2018-01-12 ASSESSMENT — PAIN SCALES - GENERAL: PAINLEVEL_OUTOF10: 9

## 2018-01-12 ASSESSMENT — PAIN DESCRIPTION - ORIENTATION: ORIENTATION: LEFT

## 2018-01-12 NOTE — ED PROVIDER NOTES
tablet by mouth every 8 hours as needed for Nausea or Vomiting    ONE TOUCH ULTRA TEST STRIP    1 each 2 times daily     ONE TOUCH ULTRASOFT LANCETS MISC    1 each 2 times daily     POTASSIUM CHLORIDE (KLOR-CON M) 20 MEQ EXTENDED RELEASE TABLET    Take 1 tablet by mouth daily    PRAMIPEXOLE (MIRAPEX) 0.5 MG TABLET    Take 1 tablet by mouth every evening    PREGABALIN (LYRICA) 150 MG CAPSULE    Take 1 capsule by mouth 2 times daily    SIMETHICONE 180 MG CAPS    TAKE 1 CAPSULE BY MOUTH TWICE DAILY    SODIUM CHLORIDE 0.9 % IRRIGATION    USE AS DIRECTED    SYMBICORT 160-4.5 MCG/ACT AERO    INHALE 2 PUFFS INTO THE LUNGS TWICE DAILY    TERAZOSIN (HYTRIN) 2 MG CAPSULE    TK 1 C PO D HS    TIZANIDINE (ZANAFLEX) 4 MG TABLET    Take 2 tablets by mouth every 8 hours as needed (muscle spasm)    TIZANIDINE (ZANAFLEX) 4 MG TABLET    TAKE 2 TABLETS BY MOUTH EVERY 8 HOURS AS NEEDED FOR MUSCLE SPASM    TIZANIDINE (ZANAFLEX) 4 MG TABLET    TAKE 2 TABLETS BY MOUTH EVERY 8 HOURS AS NEEDED FOR MUSCLE SPASM    TRIAMCINOLONE (KENALOG) 0.1 % OINTMENT    Apply topically 2 times daily    VITAMIN B-12 (CYANOCOBALAMIN) 1000 MCG TABLET    Take 1 tablet by mouth daily    VITAMIN C (ASCORBIC ACID) 500 MG TABLET    Take 1 tablet by mouth daily    VITAMIN D (ERGOCALCIFEROL) 20777 UNITS CAPS CAPSULE    TK 1 C PO 1 TIME EACH WK       ALLERGIES     Latex; Imitrex [sumatriptan]; Antivert [meclizine hcl]; Azithromycin; Bacitracin; Cefdinir; Codeine; Cyclosporine; Flagyl [metronidazole]; Iodine; Iv dye [iodides]; Ketorolac tromethamine; Neomycin; Petroleum jelly [skin protectants, misc.]; Provera [medroxyprogesterone acetate]; Quinolones; Sulfa antibiotics; Trovan [trovafloxacin]; Ultram [tramadol hcl]; Zomig [zolmitriptan];  Bactrim; and Cefuroxime axetil    FAMILY HISTORY       Family History   Problem Relation Age of Onset    Osteoarthritis Mother     Asthma Mother     Diabetes Mother     Hypertension Mother     Cancer Mother      lung   

## 2018-01-15 ENCOUNTER — OFFICE VISIT (OUTPATIENT)
Dept: FAMILY MEDICINE CLINIC | Age: 58
End: 2018-01-15

## 2018-01-15 VITALS
OXYGEN SATURATION: 97 % | HEIGHT: 62 IN | DIASTOLIC BLOOD PRESSURE: 84 MMHG | HEART RATE: 106 BPM | BODY MASS INDEX: 30.66 KG/M2 | WEIGHT: 166.6 LBS | SYSTOLIC BLOOD PRESSURE: 120 MMHG

## 2018-01-15 DIAGNOSIS — J44.9 CHRONIC OBSTRUCTIVE PULMONARY DISEASE, UNSPECIFIED COPD TYPE (HCC): ICD-10-CM

## 2018-01-15 DIAGNOSIS — E03.9 HYPOTHYROIDISM, UNSPECIFIED TYPE: ICD-10-CM

## 2018-01-15 DIAGNOSIS — F41.9 ANXIETY: Primary | ICD-10-CM

## 2018-01-15 PROCEDURE — 3014F SCREEN MAMMO DOC REV: CPT | Performed by: FAMILY MEDICINE

## 2018-01-15 PROCEDURE — G8484 FLU IMMUNIZE NO ADMIN: HCPCS | Performed by: FAMILY MEDICINE

## 2018-01-15 PROCEDURE — G8427 DOCREV CUR MEDS BY ELIG CLIN: HCPCS | Performed by: FAMILY MEDICINE

## 2018-01-15 PROCEDURE — 4004F PT TOBACCO SCREEN RCVD TLK: CPT | Performed by: FAMILY MEDICINE

## 2018-01-15 PROCEDURE — G8599 NO ASA/ANTIPLAT THER USE RNG: HCPCS | Performed by: FAMILY MEDICINE

## 2018-01-15 PROCEDURE — 99213 OFFICE O/P EST LOW 20 MIN: CPT | Performed by: FAMILY MEDICINE

## 2018-01-15 PROCEDURE — 3017F COLORECTAL CA SCREEN DOC REV: CPT | Performed by: FAMILY MEDICINE

## 2018-01-15 PROCEDURE — G8926 SPIRO NO PERF OR DOC: HCPCS | Performed by: FAMILY MEDICINE

## 2018-01-15 PROCEDURE — G8417 CALC BMI ABV UP PARAM F/U: HCPCS | Performed by: FAMILY MEDICINE

## 2018-01-15 PROCEDURE — 3023F SPIROM DOC REV: CPT | Performed by: FAMILY MEDICINE

## 2018-01-15 RX ORDER — LORAZEPAM 0.5 MG/1
0.5 TABLET ORAL DAILY PRN
Qty: 20 TABLET | Refills: 1 | Status: SHIPPED | OUTPATIENT
Start: 2018-01-15 | End: 2018-02-12 | Stop reason: SDUPTHER

## 2018-01-15 NOTE — PROGRESS NOTES
Chief Complaint   Patient presents with    Other     check up, discuss Ativan, makes her feel better       HPI:  Kathy Abdi is a 56 y.o. female       Follow up ER and tooth pain  She had her gum stitches out  Has viscous lidocaine    Had to reschedule her CT of chest (cancer screen)  Ride didn't show up    She has EXTENSIVE medical history that is contained within [facility] Everywhere    She sees MULTIPLE specialists through [facility], and they manage her DM    She is on disability  Has power wheelchair    Ambulating with cane today      States her breathing has been great    Would like to have ativan for prn use    Helped calm her down    Awaiting follow up with Dr. Felipe Mojica                  Past Medical History:   Diagnosis Date    Acid reflux     Allergic rhinitis     Anxiety     Arnold-Chiari deformity ([facility] [address] 2000    surgery    Asthma     Cerebrovascular disease     Chronic back pain greater than 3 months duration     COPD (chronic obstructive pulmonary disease) ([facility] [address]     [address] Maryann Cordon [address] CVA (cerebral infarction)     left hemiparesis, due to ? estrogen + smoking    Depression     Dr Valeri Rosado H/O encephalopathy 2001    Headache(784.0)     Dr Lora Flores    Hepatitis B surface antigen positive     Hyperlipidemia LDL goal < 100     Hypothyroidism 2001    Laryngitis, chronic 2012    scoped by Dr Андрей Braden, fungal    Marijuana smoker in remission [facility]) 2011    Obesity (BMI 30-39. 9)     Osteoarthritis     Pulmonary embolism and infarction (HCC)     Restless legs syndrome     Rhinitis, chronic     Rotator cuff tear     Left    S/P colonoscopy with polypectomy 2010    Dr Jose Juan Miller    Seizures [facility])     Smoker     Spondylosis without myelopathy     Type 2 diabetes mellitus without complication ([facility] [address]     Urinary incontinence     Vertebral compression fracture (HCC)      Past Surgical History:   Procedure Laterality Date    BRAIN SURGERY       SECTION      COLONOSCOPY      CRANIOTOMY  2000    Arnold-Chiary malformation    FEMUR FRACTURE SURGERY  2003    SPINE SURGERY      TONSILLECTOMY      UPPER GASTROINTESTINAL ENDOSCOPY  8/5/15    w/bx      Family History   Problem Relation Age of Onset    Osteoarthritis Mother     Asthma Mother     Diabetes Mother     Hypertension Mother     Cancer Mother      lung    Osteoarthritis Father     Hypertension Father     Other Father      PE    Asthma Brother     Asthma Sister     Hypertension Sister      Social History     Social History    Marital status:      Spouse name: N/A    Number of children: 1    Years of education: N/A     Occupational History    SSI      Social History Main Topics    Smoking status: Current Every Day Smoker     Packs/day: 0.50     Types: Cigarettes    Smokeless tobacco: Never Used    Alcohol use No    Drug use: No    Sexual activity: Not Asked     Other Topics Concern    None     Social History Narrative    None     Current Outpatient Prescriptions   Medication Sig Dispense Refill    LORazepam (ATIVAN) 0.5 MG tablet Take 1 tablet by mouth daily as needed for Anxiety for up to 60 days.  20 tablet 1    MUCINEX 600 MG extended release tablet TAKE 1 TABLET BY MOUTH TWICE DAILY 60 tablet 0    furosemide (LASIX) 40 MG tablet TAKE 1 TABLET BY MOUTH DAILY 90 tablet 5    SYMBICORT 160-4.5 MCG/ACT AERO INHALE 2 PUFFS INTO THE LUNGS TWICE DAILY 10.2 g 0    Simethicone 180 MG CAPS TAKE 1 CAPSULE BY MOUTH TWICE DAILY 60 capsule 0    tiZANidine (ZANAFLEX) 4 MG tablet TAKE 2 TABLETS BY MOUTH EVERY 8 HOURS AS NEEDED FOR MUSCLE SPASM 180 tablet 0    meloxicam (MOBIC) 15 MG tablet TAKE 1 TABLET BY MOUTH DAILY 30 tablet 0    vitamin C (ASCORBIC ACID) 500 MG tablet Take 1 tablet by mouth daily 30 tablet 11    triamcinolone (KENALOG) 0.1 % ointment Apply topically 2 times daily 15 g 5    tiZANidine (ZANAFLEX) 4 MG tablet TAKE 2 TABLETS BY MOUTH EVERY 8 HOURS AS NEEDED FOR MUSCLE SPASM 180 tablet 1    ibuprofen (ADVIL;MOTRIN) 800 MG tablet Take 1 tablet by mouth every 8 hours as needed for Pain 90 tablet 5    Lactobacillus (ACIDOPHILUS) CAPS capsule TAKE 1 CAPSULE BY MOUTH ONCE DAILY 30 capsule 5    Lactobacillus (ACIDOPHILUS) CAPS capsule TAKE 1 CAPSULE BY MOUTH ONCE DAILY 30 capsule 0    Melatonin 10 MG TABS Take 1 tablet by mouth nightly 30 tablet 3    vitamin B-12 (CYANOCOBALAMIN) 1000 MCG tablet Take 1 tablet by mouth daily 30 tablet 5    esomeprazole Magnesium (NEXIUM) 20 MG PACK Take 20 mg by mouth 2 times daily      escitalopram (LEXAPRO) 20 MG tablet TK 1 T PO QAM  0    ARIPiprazole (ABILIFY) 10 MG tablet TK 1 T PO QAM  0    vitamin D (ERGOCALCIFEROL) 35705 units CAPS capsule TK 1 C PO 1 TIME EACH WK  1    gentamicin (GARAMYCIN) 40 MG/ML injection U 80 MG BID FOR 5 DOSES  3    ibuprofen (ADVIL;MOTRIN) 800 MG tablet TK 1 T PO TID PRN  3    levocetirizine (XYZAL) 5 MG tablet TK 1 T PO QD PRN  3    sodium chloride 0.9 % irrigation USE AS DIRECTED  0    pregabalin (LYRICA) 150 MG capsule Take 1 capsule by mouth 2 times daily 60 capsule 5    acetaminophen (TYLENOL) 325 MG tablet Take 2 tablets by mouth every 6 hours as needed for Pain 20 tablet 0    lidocaine viscous (XYLOCAINE) 2 % solution Take 1 mL by mouth as needed for Irritation or Dental Pain (apply to gum with q-tip) 50 mL 0    folic acid (FOLVITE) 1 MG tablet Take 1 tablet by mouth daily 30 tablet 5    GENTEAL SEVERE 0.3 % GEL 10PLACE 1 DROP IN BOTH EYES Q 8 H 10 g 3    tiZANidine (ZANAFLEX) 4 MG tablet Take 2 tablets by mouth every 8 hours as needed (muscle spasm) 180 tablet 5    ondansetron (ZOFRAN-ODT) 4 MG disintegrating tablet Take 1 tablet by mouth every 8 hours as needed for Nausea or Vomiting 30 tablet 2    levothyroxine (SYNTHROID) 200 MCG tablet TAKE 1 AND 1/2 TABLETS BY MOUTH DAILY (Patient taking differently: TAKE 1  TABLETS BY MOUTH DAILY) 144 tablet 2    pramipexole (MIRAPEX) 0.5 MG

## 2018-01-17 NOTE — PROGRESS NOTES
Spoke to pt states she would like me to cancel order. Having GYN order for her at [facility].

## 2018-01-19 ENCOUNTER — TELEPHONE (OUTPATIENT)
Dept: FAMILY MEDICINE CLINIC | Age: 58
End: 2018-01-19

## 2018-01-19 DIAGNOSIS — M54.6 CHRONIC MIDLINE THORACIC BACK PAIN: Primary | ICD-10-CM

## 2018-01-19 DIAGNOSIS — G89.29 CHRONIC MIDLINE THORACIC BACK PAIN: Primary | ICD-10-CM

## 2018-01-22 DIAGNOSIS — K08.89 TOOTH PAIN: ICD-10-CM

## 2018-01-22 RX ORDER — FLUCONAZOLE 150 MG/1
150 TABLET ORAL
Qty: 3 TABLET | Refills: 0 | Status: SHIPPED | OUTPATIENT
Start: 2018-01-22 | End: 2018-02-15 | Stop reason: SDUPTHER

## 2018-01-23 DIAGNOSIS — G25.81 RLS (RESTLESS LEGS SYNDROME): ICD-10-CM

## 2018-01-23 RX ORDER — PRAMIPEXOLE DIHYDROCHLORIDE 0.5 MG/1
0.5 TABLET ORAL EVERY EVENING
Qty: 90 TABLET | Refills: 0 | Status: SHIPPED | OUTPATIENT
Start: 2018-01-23 | End: 2018-06-06 | Stop reason: SDUPTHER

## 2018-02-01 ENCOUNTER — OFFICE VISIT (OUTPATIENT)
Dept: FAMILY MEDICINE CLINIC | Age: 58
End: 2018-02-01
Payer: MEDICARE

## 2018-02-01 VITALS
HEART RATE: 117 BPM | OXYGEN SATURATION: 95 % | HEIGHT: 62 IN | BODY MASS INDEX: 30.55 KG/M2 | SYSTOLIC BLOOD PRESSURE: 120 MMHG | WEIGHT: 166 LBS | DIASTOLIC BLOOD PRESSURE: 76 MMHG

## 2018-02-01 DIAGNOSIS — M19.90 ARTHRITIS: Primary | ICD-10-CM

## 2018-02-01 DIAGNOSIS — R53.83 FATIGUE, UNSPECIFIED TYPE: ICD-10-CM

## 2018-02-01 PROCEDURE — G8484 FLU IMMUNIZE NO ADMIN: HCPCS | Performed by: FAMILY MEDICINE

## 2018-02-01 PROCEDURE — G8417 CALC BMI ABV UP PARAM F/U: HCPCS | Performed by: FAMILY MEDICINE

## 2018-02-01 PROCEDURE — 3017F COLORECTAL CA SCREEN DOC REV: CPT | Performed by: FAMILY MEDICINE

## 2018-02-01 PROCEDURE — 4004F PT TOBACCO SCREEN RCVD TLK: CPT | Performed by: FAMILY MEDICINE

## 2018-02-01 PROCEDURE — 96372 THER/PROPH/DIAG INJ SC/IM: CPT | Performed by: FAMILY MEDICINE

## 2018-02-01 PROCEDURE — 99213 OFFICE O/P EST LOW 20 MIN: CPT | Performed by: FAMILY MEDICINE

## 2018-02-01 PROCEDURE — G8599 NO ASA/ANTIPLAT THER USE RNG: HCPCS | Performed by: FAMILY MEDICINE

## 2018-02-01 PROCEDURE — 3014F SCREEN MAMMO DOC REV: CPT | Performed by: FAMILY MEDICINE

## 2018-02-01 PROCEDURE — G8427 DOCREV CUR MEDS BY ELIG CLIN: HCPCS | Performed by: FAMILY MEDICINE

## 2018-02-01 RX ORDER — CYANOCOBALAMIN 1000 UG/ML
1000 INJECTION INTRAMUSCULAR; SUBCUTANEOUS ONCE
Status: COMPLETED | OUTPATIENT
Start: 2018-02-01 | End: 2018-02-01

## 2018-02-01 RX ORDER — METHYLPREDNISOLONE ACETATE 80 MG/ML
80 INJECTION, SUSPENSION INTRA-ARTICULAR; INTRALESIONAL; INTRAMUSCULAR; SOFT TISSUE ONCE
Status: COMPLETED | OUTPATIENT
Start: 2018-02-01 | End: 2018-02-01

## 2018-02-01 RX ADMIN — METHYLPREDNISOLONE ACETATE 80 MG: 80 INJECTION, SUSPENSION INTRA-ARTICULAR; INTRALESIONAL; INTRAMUSCULAR; SOFT TISSUE at 12:23

## 2018-02-01 RX ADMIN — CYANOCOBALAMIN 1000 MCG: 1000 INJECTION INTRAMUSCULAR; SUBCUTANEOUS at 12:22

## 2018-02-01 NOTE — PROGRESS NOTES
CRANIOTOMY  2000    Arnold-Chiary malformation    FEMUR FRACTURE SURGERY  2003    SPINE SURGERY      TONSILLECTOMY      UPPER GASTROINTESTINAL ENDOSCOPY  8/5/15    w/bx      Family History   Problem Relation Age of Onset    Osteoarthritis Mother     Asthma Mother     Diabetes Mother     Hypertension Mother     Cancer Mother      lung    Osteoarthritis Father     Hypertension Father     Other Father      PE    Asthma Brother     Asthma Sister     Hypertension Sister      Social History     Social History    Marital status:      Spouse name: N/A    Number of children: 1    Years of education: N/A     Occupational History    SSI      Social History Main Topics    Smoking status: Current Every Day Smoker     Packs/day: 0.50     Types: Cigarettes    Smokeless tobacco: Never Used    Alcohol use No    Drug use: No    Sexual activity: Not Asked     Other Topics Concern    None     Social History Narrative    None     Current Outpatient Prescriptions   Medication Sig Dispense Refill    pramipexole (MIRAPEX) 0.5 MG tablet TAKE 1 TABLET BY MOUTH EVERY EVENING 90 tablet 0    LORazepam (ATIVAN) 0.5 MG tablet Take 1 tablet by mouth daily as needed for Anxiety for up to 60 days.  20 tablet 1    MUCINEX 600 MG extended release tablet TAKE 1 TABLET BY MOUTH TWICE DAILY 60 tablet 0    furosemide (LASIX) 40 MG tablet TAKE 1 TABLET BY MOUTH DAILY 90 tablet 5    SYMBICORT 160-4.5 MCG/ACT AERO INHALE 2 PUFFS INTO THE LUNGS TWICE DAILY 10.2 g 0    tiZANidine (ZANAFLEX) 4 MG tablet TAKE 2 TABLETS BY MOUTH EVERY 8 HOURS AS NEEDED FOR MUSCLE SPASM 180 tablet 0    vitamin C (ASCORBIC ACID) 500 MG tablet Take 1 tablet by mouth daily 30 tablet 11    triamcinolone (KENALOG) 0.1 % ointment Apply topically 2 times daily 15 g 5    tiZANidine (ZANAFLEX) 4 MG tablet TAKE 2 TABLETS BY MOUTH EVERY 8 HOURS AS NEEDED FOR MUSCLE SPASM 180 tablet 1    ibuprofen (ADVIL;MOTRIN) 800 MG tablet Take 1 lidocaine (LIDODERM) 5 % Place 1 patch onto the skin daily 12 hours on, 12 hours off. 15 patch 0    ipratropium (ATROVENT) 0.02 % nebulizer solution 0.5 mg      levalbuterol (XOPENEX) 0.63 MG/3ML nebulization 0.63 mg      loperamide (IMODIUM A-D) 2 MG tablet Take 2 mg by mouth      terazosin (HYTRIN) 2 MG capsule TK 1 C PO D HS  3    Lactobacillus (ULTIMATE PROBIOTIC FORMULA) CAPS TK 1 C PO ONCE D  5    amLODIPine (NORVASC) 2.5 MG tablet Take 2.5 mg by mouth      carisoprodol (SOMA) 350 MG tablet Take 1 tablet by mouth daily as needed (migraine) 12 tablet 5    magnesium oxide (MAG-OX) 400 (240 MG) MG tablet Take 400 mg by mouth daily       metFORMIN (GLUCOPHAGE) 500 MG tablet Take 500 mg by mouth 2 times daily (with meals)       CRESTOR 20 MG tablet Take 20 mg by mouth daily   0    Cholecalciferol (VITAMIN D3) 2000 UNITS TABS Take 1 tablet by mouth daily   0    ONE TOUCH ULTRA TEST strip 1 each 2 times daily   0    ONE TOUCH ULTRASOFT LANCETS MISC 1 each 2 times daily   0    mirtazapine (REMERON) 30 MG tablet Take 30 mg by mouth nightly.  Multiple Vitamins-Iron (MULTIVITAMIN/IRON) TABS Take 1 tablet by mouth daily.  ascorbic acid (VITAMIN C) 500 MG tablet Take 500 mg by mouth daily.  levalbuterol (XOPENEX HFA) 45 MCG/ACT inhaler Inhale 2 puffs into the lungs every 4 hours as needed.          Current Facility-Administered Medications   Medication Dose Route Frequency Provider Last Rate Last Dose    cyanocobalamin injection 1,000 mcg  1,000 mcg Intramuscular Once Yarelis Reagan MD        methylPREDNISolone acetate (DEPO-MEDROL) injection 80 mg  80 mg Intramuscular Once Yarelis Reagan MD         Allergies   Allergen Reactions    Latex     Imitrex [Sumatriptan] Anaphylaxis    Antivert [Meclizine Hcl]     Azithromycin     Bacitracin     Cefdinir     Codeine     Cyclosporine     Flagyl [Metronidazole]     Iodine     Iv Dye [Iodides]     Ketorolac Tromethamine     Neomycin    

## 2018-02-06 DIAGNOSIS — M62.838 MUSCLE SPASM: ICD-10-CM

## 2018-02-06 RX ORDER — TIZANIDINE 4 MG/1
TABLET ORAL
Qty: 180 TABLET | Refills: 0 | Status: SHIPPED | OUTPATIENT
Start: 2018-02-06 | End: 2018-05-31

## 2018-02-07 DIAGNOSIS — R14.0 BLOATING: ICD-10-CM

## 2018-02-07 RX ORDER — BUDESONIDE AND FORMOTEROL FUMARATE DIHYDRATE 160; 4.5 UG/1; UG/1
AEROSOL RESPIRATORY (INHALATION)
Qty: 10.2 G | Refills: 0 | Status: SHIPPED | OUTPATIENT
Start: 2018-02-07 | End: 2018-03-01 | Stop reason: SDUPTHER

## 2018-02-07 RX ORDER — SIMETHICONE 180 MG
CAPSULE ORAL
Qty: 60 CAPSULE | Refills: 0 | Status: SHIPPED | OUTPATIENT
Start: 2018-02-07 | End: 2018-03-19 | Stop reason: SDUPTHER

## 2018-02-12 DIAGNOSIS — F41.9 ANXIETY: ICD-10-CM

## 2018-02-14 RX ORDER — LORAZEPAM 0.5 MG/1
0.5 TABLET ORAL DAILY PRN
Qty: 20 TABLET | Refills: 1 | Status: SHIPPED | OUTPATIENT
Start: 2018-02-14 | End: 2018-03-29 | Stop reason: SDUPTHER

## 2018-02-15 DIAGNOSIS — K08.89 TOOTH PAIN: ICD-10-CM

## 2018-02-15 RX ORDER — FLUCONAZOLE 150 MG/1
150 TABLET ORAL
Qty: 3 TABLET | Refills: 0 | Status: SHIPPED | OUTPATIENT
Start: 2018-02-15 | End: 2018-02-22

## 2018-02-21 ENCOUNTER — TELEPHONE (OUTPATIENT)
Dept: FAMILY MEDICINE CLINIC | Age: 58
End: 2018-02-21

## 2018-02-21 NOTE — TELEPHONE ENCOUNTER
Pt's lidocaine is denied. Insurance will cover Diclofenac do you want to change?   Call back [phone] #[phone]  Ref: [id number]

## 2018-02-25 ENCOUNTER — HOSPITAL ENCOUNTER (EMERGENCY)
Age: 58
Discharge: HOME | End: 2018-02-25
Payer: COMMERCIAL

## 2018-02-25 ENCOUNTER — APPOINTMENT (OUTPATIENT)
Dept: GENERAL RADIOLOGY | Age: 58
End: 2018-02-25
Payer: COMMERCIAL

## 2018-02-25 VITALS
HEIGHT: 62 IN | HEART RATE: 97 BPM | WEIGHT: 159 LBS | RESPIRATION RATE: 18 BRPM | TEMPERATURE: 98.7 F | OXYGEN SATURATION: 96 % | SYSTOLIC BLOOD PRESSURE: 125 MMHG | BODY MASS INDEX: 29.26 KG/M2 | DIASTOLIC BLOOD PRESSURE: 81 MMHG

## 2018-02-25 DIAGNOSIS — S90.32XA CONTUSION OF LEFT FOOT, INITIAL ENCOUNTER: Primary | ICD-10-CM

## 2018-02-25 PROCEDURE — 99283 EMERGENCY DEPT VISIT LOW MDM: CPT

## 2018-02-25 PROCEDURE — 73630 X-RAY EXAM OF FOOT: CPT

## 2018-02-25 ASSESSMENT — ENCOUNTER SYMPTOMS
SHORTNESS OF BREATH: 0
ABDOMINAL PAIN: 0
BACK PAIN: 0
COUGH: 0

## 2018-02-25 ASSESSMENT — PAIN DESCRIPTION - LOCATION: LOCATION: FOOT

## 2018-02-25 ASSESSMENT — PAIN SCALES - GENERAL: PAINLEVEL_OUTOF10: 9

## 2018-02-25 ASSESSMENT — PAIN DESCRIPTION - ORIENTATION: ORIENTATION: LEFT
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
## 2018-02-28 ENCOUNTER — HOSPITAL ENCOUNTER (EMERGENCY)
Age: 58
Discharge: HOME | End: 2018-02-28
Payer: COMMERCIAL

## 2018-02-28 VITALS
DIASTOLIC BLOOD PRESSURE: 95 MMHG | HEART RATE: 94 BPM | HEIGHT: 62 IN | BODY MASS INDEX: 29.26 KG/M2 | TEMPERATURE: 98.8 F | RESPIRATION RATE: 18 BRPM | WEIGHT: 159 LBS | OXYGEN SATURATION: 95 % | SYSTOLIC BLOOD PRESSURE: 138 MMHG

## 2018-02-28 DIAGNOSIS — S90.32XD CONTUSION OF LEFT FOOT, SUBSEQUENT ENCOUNTER: Primary | ICD-10-CM

## 2018-02-28 PROCEDURE — 29540 STRAPPING ANKLE &/FOOT: CPT

## 2018-02-28 PROCEDURE — 99283 EMERGENCY DEPT VISIT LOW MDM: CPT

## 2018-02-28 RX ORDER — ACETAMINOPHEN 500 MG
1000 TABLET ORAL EVERY 6 HOURS PRN
Qty: 30 TABLET | Refills: 0 | Status: SHIPPED | OUTPATIENT
Start: 2018-02-28 | End: 2018-07-24 | Stop reason: DRUGHIGH

## 2018-02-28 ASSESSMENT — PAIN DESCRIPTION - PAIN TYPE: TYPE: ACUTE PAIN

## 2018-02-28 ASSESSMENT — PAIN DESCRIPTION - ORIENTATION: ORIENTATION: LEFT

## 2018-02-28 ASSESSMENT — PAIN SCALES - GENERAL: PAINLEVEL_OUTOF10: 9

## 2018-02-28 ASSESSMENT — PAIN DESCRIPTION - LOCATION: LOCATION: FOOT

## 2018-02-28 NOTE — ED TRIAGE NOTES
Pt in ed24 via w/c, c/o left foot pain, slight swelling and discoloration noted.  Positive pedal pulse, < 3 sec cap refill

## 2018-03-02 ASSESSMENT — ENCOUNTER SYMPTOMS
BACK PAIN: 0
COUGH: 0
DIARRHEA: 0
CONSTIPATION: 0
NAUSEA: 0
ABDOMINAL PAIN: 0
TROUBLE SWALLOWING: 0
SHORTNESS OF BREATH: 0
VOICE CHANGE: 0
SORE THROAT: 0
VOMITING: 0
COLOR CHANGE: 0

## 2018-03-06 ENCOUNTER — OFFICE VISIT (OUTPATIENT)
Dept: FAMILY MEDICINE CLINIC | Age: 58
End: 2018-03-06
Payer: COMMERCIAL

## 2018-03-06 VITALS
WEIGHT: 162 LBS | HEIGHT: 62 IN | DIASTOLIC BLOOD PRESSURE: 70 MMHG | HEART RATE: 107 BPM | SYSTOLIC BLOOD PRESSURE: 132 MMHG | BODY MASS INDEX: 29.81 KG/M2 | OXYGEN SATURATION: 93 %

## 2018-03-06 DIAGNOSIS — F12.90 MARIJUANA USE: ICD-10-CM

## 2018-03-06 DIAGNOSIS — M19.90 ARTHRITIS: ICD-10-CM

## 2018-03-06 DIAGNOSIS — R53.83 FATIGUE, UNSPECIFIED TYPE: ICD-10-CM

## 2018-03-06 DIAGNOSIS — E53.8 B12 DEFICIENCY: ICD-10-CM

## 2018-03-06 DIAGNOSIS — E55.9 VITAMIN D DEFICIENCY: Primary | ICD-10-CM

## 2018-03-06 DIAGNOSIS — J44.9 CHRONIC OBSTRUCTIVE PULMONARY DISEASE, UNSPECIFIED COPD TYPE (HCC): ICD-10-CM

## 2018-03-06 PROCEDURE — 99213 OFFICE O/P EST LOW 20 MIN: CPT | Performed by: FAMILY MEDICINE

## 2018-03-06 PROCEDURE — 3014F SCREEN MAMMO DOC REV: CPT | Performed by: FAMILY MEDICINE

## 2018-03-06 PROCEDURE — 4004F PT TOBACCO SCREEN RCVD TLK: CPT | Performed by: FAMILY MEDICINE

## 2018-03-06 PROCEDURE — 3023F SPIROM DOC REV: CPT | Performed by: FAMILY MEDICINE

## 2018-03-06 PROCEDURE — G8417 CALC BMI ABV UP PARAM F/U: HCPCS | Performed by: FAMILY MEDICINE

## 2018-03-06 PROCEDURE — G8427 DOCREV CUR MEDS BY ELIG CLIN: HCPCS | Performed by: FAMILY MEDICINE

## 2018-03-06 PROCEDURE — G8926 SPIRO NO PERF OR DOC: HCPCS | Performed by: FAMILY MEDICINE

## 2018-03-06 PROCEDURE — 3017F COLORECTAL CA SCREEN DOC REV: CPT | Performed by: FAMILY MEDICINE

## 2018-03-06 PROCEDURE — 96372 THER/PROPH/DIAG INJ SC/IM: CPT | Performed by: FAMILY MEDICINE

## 2018-03-06 PROCEDURE — G8599 NO ASA/ANTIPLAT THER USE RNG: HCPCS | Performed by: FAMILY MEDICINE

## 2018-03-06 PROCEDURE — G8484 FLU IMMUNIZE NO ADMIN: HCPCS | Performed by: FAMILY MEDICINE

## 2018-03-06 RX ORDER — ERGOCALCIFEROL 1.25 MG/1
CAPSULE ORAL
Qty: 30 CAPSULE | Refills: 1 | Status: SHIPPED | OUTPATIENT
Start: 2018-03-06 | End: 2018-04-05 | Stop reason: SDUPTHER

## 2018-03-06 RX ORDER — METHYLPREDNISOLONE ACETATE 80 MG/ML
80 INJECTION, SUSPENSION INTRA-ARTICULAR; INTRALESIONAL; INTRAMUSCULAR; SOFT TISSUE ONCE
Status: COMPLETED | OUTPATIENT
Start: 2018-03-06 | End: 2018-03-06

## 2018-03-06 RX ORDER — MELOXICAM 15 MG/1
15 TABLET ORAL DAILY
Qty: 30 TABLET | Refills: 5 | Status: SHIPPED | OUTPATIENT
Start: 2018-03-06 | End: 2018-05-23 | Stop reason: SDUPTHER

## 2018-03-06 RX ORDER — CYANOCOBALAMIN 1000 UG/ML
1000 INJECTION INTRAMUSCULAR; SUBCUTANEOUS ONCE
Status: COMPLETED | OUTPATIENT
Start: 2018-03-06 | End: 2018-03-06

## 2018-03-06 RX ADMIN — CYANOCOBALAMIN 1000 MCG: 1000 INJECTION INTRAMUSCULAR; SUBCUTANEOUS at 10:57

## 2018-03-06 RX ADMIN — METHYLPREDNISOLONE ACETATE 80 MG: 80 INJECTION, SUSPENSION INTRA-ARTICULAR; INTRALESIONAL; INTRAMUSCULAR; SOFT TISSUE at 10:58

## 2018-03-06 NOTE — PROGRESS NOTES
[Sumatriptan] Anaphylaxis    Antivert [Meclizine Hcl]     Azithromycin     Bacitracin     Cefdinir     Codeine     Cyclosporine     Flagyl [Metronidazole]     Iodine     Iv Dye [Iodides]     Ketorolac Tromethamine     Neomycin     Petroleum Jelly [Skin Protectants, Misc.]     Provera [Medroxyprogesterone Acetate] Other (See Comments)     Caused massive stroke    Quinolones     Sulfa Antibiotics     Trovan [Trovafloxacin]     Ultram [Tramadol Hcl]     Zomig [Zolmitriptan]     Bactrim Nausea And Vomiting and Rash    Cefuroxime Axetil Rash       Review of Systems:   General ROS: some fatigue  Respiratory ROS: per HPI  Cardiovascular ROS: no chest pain or dyspnea on exertion  Gastrointestinal ROS: no abdominal pain, change in bowel habits, or black or bloody stools  Genito-Urinary ROS: no dysuria, trouble voiding  Musculoskeletal ROS: chronic aches and pains  Neurological ROS: left side weakness s/p stroke and subsequent major leg injury    In general patient otherwise reports feeling well. Physical Exam:  /70 (Site: Right Arm)   Pulse 107   Ht 5' 2\" (1.575 m)   Wt 162 lb (73.5 kg)   LMP  (LMP Unknown)   SpO2 93%   Breastfeeding?  No   BMI 29.63 kg/m²     Gen: Well, NAD, Alert, Oriented x 3   HEENT: EOMI, eyes clear, MMM, boggy nasal mucosa, missing many teeth  Skin: without rash or jaundice  Neck: no significant lymphadenopathy or thyromegaly  Lungs: CTA B w/out Rales/Wheezes/Rhonchi, Good respiratory effort   Heart: RRR, S1S2, w/out M/R/G, non-displaced PMI   Neuro: no tremor, left side weakness    Lab Results   Component Value Date    WBC 11.0 (H) 11/13/2017    HGB 13.3 11/13/2017    HCT 41.1 11/13/2017     11/13/2017    CHOL 94 12/15/2015    TRIG 76 12/15/2015    HDL 56 12/15/2015    ALT 22 12/14/2017    AST 17 12/14/2017     12/14/2017    K 3.8 12/14/2017    CL 98 12/14/2017    CREATININE 0.68 12/14/2017    BUN 13 12/14/2017    CO2 25 12/14/2017    TSH 0.346 07/31/2017    INR 0.9 03/21/2017    LABA1C 6.2 04/26/2017         A&P  No diagnosis found.          She will continue to use marijuana on her own terms    Patient ends up in ER for pain  Not refilling narcotics         Skylar Boggs MD

## 2018-03-08 RX ORDER — FLUCONAZOLE 100 MG/1
100 TABLET ORAL DAILY
Qty: 7 TABLET | Refills: 0 | Status: SHIPPED | OUTPATIENT
Start: 2018-03-08 | End: 2018-05-03 | Stop reason: SDUPTHER

## 2018-03-19 ENCOUNTER — TELEPHONE (OUTPATIENT)
Dept: FAMILY MEDICINE CLINIC | Age: 58
End: 2018-03-19

## 2018-03-21 ENCOUNTER — HOSPITAL ENCOUNTER (OUTPATIENT)
Dept: MRI IMAGING | Age: 58
Discharge: HOME | End: 2018-03-23
Payer: COMMERCIAL

## 2018-03-21 DIAGNOSIS — M75.102 TEAR OF LEFT ROTATOR CUFF, UNSPECIFIED TEAR EXTENT: ICD-10-CM

## 2018-03-21 PROCEDURE — 73221 MRI JOINT UPR EXTREM W/O DYE: CPT

## 2018-03-22 ENCOUNTER — TELEPHONE (OUTPATIENT)
Dept: FAMILY MEDICINE CLINIC | Age: 58
End: 2018-03-22

## 2018-03-22 NOTE — TELEPHONE ENCOUNTER
[facility] calling to see if we received the Omega 3 capsule PA that was sent on 3/14, and to see if it was accepted or denied. Please advise.  [phone]

## 2018-03-26 ENCOUNTER — HOSPITAL ENCOUNTER (EMERGENCY)
Age: 58
Discharge: HOME | End: 2018-03-26
Payer: COMMERCIAL

## 2018-03-26 VITALS
TEMPERATURE: 98.5 F | RESPIRATION RATE: 18 BRPM | HEART RATE: 106 BPM | DIASTOLIC BLOOD PRESSURE: 65 MMHG | OXYGEN SATURATION: 95 % | WEIGHT: 160 LBS | HEIGHT: 62 IN | BODY MASS INDEX: 29.44 KG/M2 | SYSTOLIC BLOOD PRESSURE: 120 MMHG

## 2018-03-26 DIAGNOSIS — G89.29 CHRONIC BILATERAL LOW BACK PAIN, WITH SCIATICA PRESENCE UNSPECIFIED: Primary | ICD-10-CM

## 2018-03-26 DIAGNOSIS — M54.5 CHRONIC BILATERAL LOW BACK PAIN, WITH SCIATICA PRESENCE UNSPECIFIED: Primary | ICD-10-CM

## 2018-03-26 PROCEDURE — 99283 EMERGENCY DEPT VISIT LOW MDM: CPT

## 2018-03-26 PROCEDURE — 96372 THER/PROPH/DIAG INJ SC/IM: CPT

## 2018-03-26 PROCEDURE — 6360000002 HC RX W HCPCS: Performed by: PHYSICIAN ASSISTANT

## 2018-03-26 RX ORDER — ORPHENADRINE CITRATE 30 MG/ML
60 INJECTION INTRAMUSCULAR; INTRAVENOUS ONCE
Status: COMPLETED | OUTPATIENT
Start: 2018-03-26 | End: 2018-03-26

## 2018-03-26 RX ADMIN — ORPHENADRINE CITRATE 60 MG: 30 INJECTION INTRAMUSCULAR; INTRAVENOUS at 15:04

## 2018-03-26 ASSESSMENT — ENCOUNTER SYMPTOMS
BACK PAIN: 1
TROUBLE SWALLOWING: 0
ABDOMINAL PAIN: 0
SHORTNESS OF BREATH: 0
VOMITING: 0
SORE THROAT: 0
PHOTOPHOBIA: 0
COUGH: 0

## 2018-03-26 ASSESSMENT — PAIN SCALES - GENERAL: PAINLEVEL_OUTOF10: 9

## 2018-03-26 ASSESSMENT — PAIN DESCRIPTION - PAIN TYPE: TYPE: CHRONIC PAIN

## 2018-03-26 ASSESSMENT — PAIN DESCRIPTION - LOCATION: LOCATION: BACK

## 2018-03-26 NOTE — ED PROVIDER NOTES
(GARAMYCIN) 40 MG/ML INJECTION    U 80 MG BID FOR 5 DOSES    GENTEAL SEVERE 0.3 % GEL    10PLACE 1 DROP IN BOTH EYES Q 8 H    IBUPROFEN (ADVIL;MOTRIN) 800 MG TABLET    TK 1 T PO TID PRN    IBUPROFEN (ADVIL;MOTRIN) 800 MG TABLET    Take 1 tablet by mouth every 8 hours as needed for Pain    IPRATROPIUM (ATROVENT) 0.02 % NEBULIZER SOLUTION    0.5 mg    LACTOBACILLUS (ACIDOPHILUS) CAPS CAPSULE    TAKE 1 CAPSULE BY MOUTH ONCE DAILY    LACTOBACILLUS (ACIDOPHILUS) CAPS CAPSULE    TAKE 1 CAPSULE BY MOUTH ONCE DAILY    LACTOBACILLUS (ULTIMATE PROBIOTIC FORMULA) CAPS    TK 1 C PO ONCE D    LEVALBUTEROL (XOPENEX HFA) 45 MCG/ACT INHALER    Inhale 2 puffs into the lungs every 4 hours as needed. LEVALBUTEROL (XOPENEX) 0.63 MG/3ML NEBULIZATION    0.63 mg    LEVOCETIRIZINE (XYZAL) 5 MG TABLET    TK 1 T PO QD PRN    LEVOTHYROXINE (SYNTHROID) 200 MCG TABLET    TAKE 1 AND 1/2 TABLETS BY MOUTH DAILY    LIDOCAINE (LIDODERM) 5 %    Place 1 patch onto the skin daily 12 hours on, 12 hours off. LIDOCAINE VISCOUS (XYLOCAINE) 2 % SOLUTION    Take 1 mL by mouth as needed for Irritation or Dental Pain (apply to gum with q-tip)    LOPERAMIDE (IMODIUM A-D) 2 MG TABLET    Take 2 mg by mouth    LORATADINE (CLARITIN) 10 MG TABLET    TAKE 1 TABLET BY MOUTH DAILY    LORAZEPAM (ATIVAN) 0.5 MG TABLET    Take 1 tablet by mouth daily as needed for Anxiety for up to 60 days. MAGNESIUM OXIDE (MAG-OX) 400 (240 MG) MG TABLET    Take 400 mg by mouth daily     MELATONIN 10 MG TABS    TAKE 1 TABLET BY MOUTH EVERY NIGHT    MELOXICAM (MOBIC) 15 MG TABLET    Take 1 tablet by mouth daily    METFORMIN (GLUCOPHAGE) 500 MG TABLET    Take 500 mg by mouth 2 times daily (with meals)     MIRTAZAPINE (REMERON) 30 MG TABLET    Take 30 mg by mouth nightly. MUCINEX 600 MG EXTENDED RELEASE TABLET    TAKE 1 TABLET BY MOUTH TWICE DAILY    MULTIPLE VITAMINS-IRON (MULTIVITAMIN/IRON) TABS    Take 1 tablet by mouth daily.       NUTRITIONAL SUPPLEMENTS (BOOST HIGH

## 2018-03-29 DIAGNOSIS — F41.9 ANXIETY: ICD-10-CM

## 2018-03-30 RX ORDER — LORAZEPAM 0.5 MG/1
0.5 TABLET ORAL DAILY PRN
Qty: 20 TABLET | Refills: 1 | Status: SHIPPED | OUTPATIENT
Start: 2018-03-30 | End: 2018-05-15 | Stop reason: SDUPTHER

## 2018-04-02 ENCOUNTER — HOSPITAL ENCOUNTER (OUTPATIENT)
Dept: PHYSICAL THERAPY | Age: 58
Setting detail: THERAPIES SERIES
Discharge: HOME | End: 2018-04-02
Payer: COMMERCIAL

## 2018-04-02 PROCEDURE — 97162 PT EVAL MOD COMPLEX 30 MIN: CPT

## 2018-04-02 PROCEDURE — G8978 MOBILITY CURRENT STATUS: HCPCS

## 2018-04-02 PROCEDURE — G8979 MOBILITY GOAL STATUS: HCPCS

## 2018-04-02 PROCEDURE — 97110 THERAPEUTIC EXERCISES: CPT

## 2018-04-02 ASSESSMENT — PAIN DESCRIPTION - PAIN TYPE: TYPE: CHRONIC PAIN

## 2018-04-02 ASSESSMENT — PAIN DESCRIPTION - LOCATION: LOCATION: BACK

## 2018-04-02 ASSESSMENT — PAIN SCALES - GENERAL: PAINLEVEL_OUTOF10: 7

## 2018-04-02 ASSESSMENT — PAIN DESCRIPTION - DESCRIPTORS: DESCRIPTORS: SORE

## 2018-04-02 ASSESSMENT — PAIN DESCRIPTION - ORIENTATION: ORIENTATION: MID;LOWER

## 2018-04-03 RX ORDER — LANOLIN ALCOHOL/MO/W.PET/CERES
1000 CREAM (GRAM) TOPICAL DAILY
Qty: 30 TABLET | Refills: 0 | Status: SHIPPED | OUTPATIENT
Start: 2018-04-03 | End: 2018-06-08 | Stop reason: SDUPTHER

## 2018-04-04 ENCOUNTER — HOSPITAL ENCOUNTER (OUTPATIENT)
Dept: ORTHOPEDIC SURGERY | Age: 58
Discharge: HOME | End: 2018-04-06
Payer: COMMERCIAL

## 2018-04-04 DIAGNOSIS — M25.512 PAIN IN JOINT OF LEFT SHOULDER: ICD-10-CM

## 2018-04-04 PROCEDURE — 73030 X-RAY EXAM OF SHOULDER: CPT

## 2018-04-05 ENCOUNTER — OFFICE VISIT (OUTPATIENT)
Dept: FAMILY MEDICINE CLINIC | Age: 58
End: 2018-04-05
Payer: COMMERCIAL

## 2018-04-05 VITALS
HEIGHT: 62 IN | WEIGHT: 161.6 LBS | DIASTOLIC BLOOD PRESSURE: 84 MMHG | BODY MASS INDEX: 29.74 KG/M2 | HEART RATE: 106 BPM | SYSTOLIC BLOOD PRESSURE: 138 MMHG | OXYGEN SATURATION: 98 %

## 2018-04-05 DIAGNOSIS — E03.9 HYPOTHYROIDISM, UNSPECIFIED TYPE: ICD-10-CM

## 2018-04-05 DIAGNOSIS — R53.83 FATIGUE, UNSPECIFIED TYPE: ICD-10-CM

## 2018-04-05 DIAGNOSIS — E55.9 VITAMIN D DEFICIENCY: ICD-10-CM

## 2018-04-05 DIAGNOSIS — M19.012 GLENOHUMERAL ARTHRITIS, LEFT: Primary | ICD-10-CM

## 2018-04-05 PROCEDURE — G8427 DOCREV CUR MEDS BY ELIG CLIN: HCPCS | Performed by: FAMILY MEDICINE

## 2018-04-05 PROCEDURE — G8599 NO ASA/ANTIPLAT THER USE RNG: HCPCS | Performed by: FAMILY MEDICINE

## 2018-04-05 PROCEDURE — G8417 CALC BMI ABV UP PARAM F/U: HCPCS | Performed by: FAMILY MEDICINE

## 2018-04-05 PROCEDURE — 3017F COLORECTAL CA SCREEN DOC REV: CPT | Performed by: FAMILY MEDICINE

## 2018-04-05 PROCEDURE — 3014F SCREEN MAMMO DOC REV: CPT | Performed by: FAMILY MEDICINE

## 2018-04-05 PROCEDURE — 4004F PT TOBACCO SCREEN RCVD TLK: CPT | Performed by: FAMILY MEDICINE

## 2018-04-05 PROCEDURE — 99213 OFFICE O/P EST LOW 20 MIN: CPT | Performed by: FAMILY MEDICINE

## 2018-04-05 PROCEDURE — 96372 THER/PROPH/DIAG INJ SC/IM: CPT | Performed by: FAMILY MEDICINE

## 2018-04-05 RX ORDER — CYANOCOBALAMIN 1000 UG/ML
1000 INJECTION INTRAMUSCULAR; SUBCUTANEOUS ONCE
Status: COMPLETED | OUTPATIENT
Start: 2018-04-05 | End: 2018-04-05

## 2018-04-05 RX ORDER — ERGOCALCIFEROL 1.25 MG/1
CAPSULE ORAL
Qty: 8 CAPSULE | Refills: 12 | Status: SHIPPED | OUTPATIENT
Start: 2018-04-05 | End: 2018-11-20

## 2018-04-05 RX ORDER — HYDROCODONE BITARTRATE AND ACETAMINOPHEN 5; 325 MG/1; MG/1
1 TABLET ORAL EVERY 6 HOURS PRN
Qty: 28 TABLET | Refills: 0 | Status: SHIPPED | OUTPATIENT
Start: 2018-04-05 | End: 2018-04-30 | Stop reason: SDUPTHER

## 2018-04-05 RX ADMIN — CYANOCOBALAMIN 1000 MCG: 1000 INJECTION INTRAMUSCULAR; SUBCUTANEOUS at 11:37

## 2018-04-11 ENCOUNTER — HOSPITAL ENCOUNTER (OUTPATIENT)
Dept: PHYSICAL THERAPY | Age: 58
Setting detail: THERAPIES SERIES
Discharge: HOME | End: 2018-04-11
Payer: COMMERCIAL

## 2018-04-11 PROCEDURE — 97110 THERAPEUTIC EXERCISES: CPT

## 2018-04-16 ENCOUNTER — HOSPITAL ENCOUNTER (OUTPATIENT)
Dept: PHYSICAL THERAPY | Age: 58
Setting detail: THERAPIES SERIES
Discharge: HOME | End: 2018-04-16
Payer: COMMERCIAL

## 2018-04-16 PROCEDURE — 97110 THERAPEUTIC EXERCISES: CPT

## 2018-04-16 ASSESSMENT — PAIN DESCRIPTION - PAIN TYPE: TYPE: CHRONIC PAIN

## 2018-04-16 ASSESSMENT — PAIN DESCRIPTION - LOCATION: LOCATION: BACK

## 2018-04-16 ASSESSMENT — PAIN SCALES - GENERAL: PAINLEVEL_OUTOF10: 5

## 2018-04-16 ASSESSMENT — PAIN DESCRIPTION - ORIENTATION: ORIENTATION: LOWER

## 2018-04-16 ASSESSMENT — PAIN DESCRIPTION - DESCRIPTORS: DESCRIPTORS: ACHING

## 2018-04-18 ENCOUNTER — HOSPITAL ENCOUNTER (OUTPATIENT)
Dept: PHYSICAL THERAPY | Age: 58
Setting detail: THERAPIES SERIES
Discharge: HOME | End: 2018-04-18
Payer: COMMERCIAL

## 2018-04-18 PROCEDURE — 97110 THERAPEUTIC EXERCISES: CPT

## 2018-04-18 ASSESSMENT — PAIN DESCRIPTION - PAIN TYPE: TYPE: CHRONIC PAIN

## 2018-04-18 ASSESSMENT — PAIN SCALES - GENERAL: PAINLEVEL_OUTOF10: 5

## 2018-04-18 ASSESSMENT — PAIN DESCRIPTION - DESCRIPTORS: DESCRIPTORS: ACHING

## 2018-04-18 ASSESSMENT — PAIN DESCRIPTION - LOCATION: LOCATION: BACK

## 2018-04-23 ENCOUNTER — APPOINTMENT (OUTPATIENT)
Dept: PHYSICAL THERAPY | Age: 58
End: 2018-04-23
Payer: COMMERCIAL

## 2018-04-24 ENCOUNTER — HOSPITAL ENCOUNTER (OUTPATIENT)
Dept: PHYSICAL THERAPY | Age: 58
Setting detail: THERAPIES SERIES
Discharge: HOME | End: 2018-04-24
Payer: COMMERCIAL

## 2018-04-24 ENCOUNTER — HOSPITAL ENCOUNTER (OUTPATIENT)
Dept: GENERAL RADIOLOGY | Age: 58
Discharge: HOME | End: 2018-04-26
Payer: MEDICARE

## 2018-04-24 DIAGNOSIS — M75.102 TEAR OF LEFT ROTATOR CUFF, UNSPECIFIED TEAR EXTENT: ICD-10-CM

## 2018-04-24 DIAGNOSIS — M19.012 OSTEOARTHRITIS OF LEFT SHOULDER, UNSPECIFIED OSTEOARTHRITIS TYPE: ICD-10-CM

## 2018-04-24 PROCEDURE — 20610 DRAIN/INJ JOINT/BURSA W/O US: CPT

## 2018-04-24 PROCEDURE — 2500000003 HC RX 250 WO HCPCS: Performed by: ORTHOPAEDIC SURGERY

## 2018-04-24 PROCEDURE — 6360000002 HC RX W HCPCS: Performed by: ORTHOPAEDIC SURGERY

## 2018-04-24 RX ORDER — TRIAMCINOLONE ACETONIDE 40 MG/ML
40 INJECTION, SUSPENSION INTRA-ARTICULAR; INTRAMUSCULAR ONCE
Status: COMPLETED | OUTPATIENT
Start: 2018-04-24 | End: 2018-04-24

## 2018-04-24 RX ORDER — BUPIVACAINE HYDROCHLORIDE 5 MG/ML
30 INJECTION, SOLUTION EPIDURAL; INTRACAUDAL ONCE
Status: COMPLETED | OUTPATIENT
Start: 2018-04-24 | End: 2018-04-24

## 2018-04-24 RX ORDER — LIDOCAINE HYDROCHLORIDE 20 MG/ML
20 INJECTION, SOLUTION INFILTRATION; PERINEURAL ONCE
Status: COMPLETED | OUTPATIENT
Start: 2018-04-24 | End: 2018-04-24

## 2018-04-24 RX ADMIN — LIDOCAINE HYDROCHLORIDE 6 ML: 20 INJECTION, SOLUTION EPIDURAL; INFILTRATION; INTRACAUDAL; PERINEURAL at 13:57

## 2018-04-24 RX ADMIN — TRIAMCINOLONE ACETONIDE 40 MG: 40 INJECTION, SUSPENSION INTRA-ARTICULAR; INTRAMUSCULAR at 13:57

## 2018-04-24 RX ADMIN — BUPIVACAINE HYDROCHLORIDE 3 ML: 5 INJECTION, SOLUTION EPIDURAL; INTRACAUDAL at 13:56

## 2018-04-24 ASSESSMENT — PAIN SCALES - GENERAL
PAINLEVEL_OUTOF10: 5
PAINLEVEL_OUTOF10: 5

## 2018-04-25 ENCOUNTER — APPOINTMENT (OUTPATIENT)
Dept: PHYSICAL THERAPY | Age: 58
End: 2018-04-25
Payer: COMMERCIAL

## 2018-04-26 ENCOUNTER — HOSPITAL ENCOUNTER (OUTPATIENT)
Dept: PHYSICAL THERAPY | Age: 58
Setting detail: THERAPIES SERIES
Discharge: HOME | End: 2018-04-26
Payer: COMMERCIAL

## 2018-04-30 ENCOUNTER — OFFICE VISIT (OUTPATIENT)
Dept: FAMILY MEDICINE CLINIC | Age: 58
End: 2018-04-30
Payer: COMMERCIAL

## 2018-04-30 VITALS
HEIGHT: 62 IN | DIASTOLIC BLOOD PRESSURE: 80 MMHG | SYSTOLIC BLOOD PRESSURE: 138 MMHG | HEART RATE: 97 BPM | WEIGHT: 174.2 LBS | BODY MASS INDEX: 32.06 KG/M2 | OXYGEN SATURATION: 98 %

## 2018-04-30 DIAGNOSIS — E03.9 HYPOTHYROIDISM, UNSPECIFIED TYPE: ICD-10-CM

## 2018-04-30 DIAGNOSIS — R53.83 OTHER FATIGUE: ICD-10-CM

## 2018-04-30 DIAGNOSIS — I63.89 CEREBRAL INFARCTION DUE TO OTHER MECHANISM (HCC): ICD-10-CM

## 2018-04-30 DIAGNOSIS — M19.012 GLENOHUMERAL ARTHRITIS, LEFT: ICD-10-CM

## 2018-04-30 DIAGNOSIS — J44.9 CHRONIC OBSTRUCTIVE PULMONARY DISEASE, UNSPECIFIED COPD TYPE (HCC): Primary | ICD-10-CM

## 2018-04-30 PROCEDURE — G8926 SPIRO NO PERF OR DOC: HCPCS | Performed by: FAMILY MEDICINE

## 2018-04-30 PROCEDURE — 3023F SPIROM DOC REV: CPT | Performed by: FAMILY MEDICINE

## 2018-04-30 PROCEDURE — 4004F PT TOBACCO SCREEN RCVD TLK: CPT | Performed by: FAMILY MEDICINE

## 2018-04-30 PROCEDURE — 3017F COLORECTAL CA SCREEN DOC REV: CPT | Performed by: FAMILY MEDICINE

## 2018-04-30 PROCEDURE — 96372 THER/PROPH/DIAG INJ SC/IM: CPT | Performed by: FAMILY MEDICINE

## 2018-04-30 PROCEDURE — G8427 DOCREV CUR MEDS BY ELIG CLIN: HCPCS | Performed by: FAMILY MEDICINE

## 2018-04-30 PROCEDURE — G8417 CALC BMI ABV UP PARAM F/U: HCPCS | Performed by: FAMILY MEDICINE

## 2018-04-30 PROCEDURE — 99213 OFFICE O/P EST LOW 20 MIN: CPT | Performed by: FAMILY MEDICINE

## 2018-04-30 PROCEDURE — G8599 NO ASA/ANTIPLAT THER USE RNG: HCPCS | Performed by: FAMILY MEDICINE

## 2018-04-30 RX ORDER — HYDROCODONE BITARTRATE AND ACETAMINOPHEN 5; 325 MG/1; MG/1
1 TABLET ORAL EVERY 6 HOURS PRN
Qty: 28 TABLET | Refills: 0 | Status: SHIPPED | OUTPATIENT
Start: 2018-04-30 | End: 2018-05-14 | Stop reason: SDUPTHER

## 2018-04-30 RX ORDER — CYANOCOBALAMIN 1000 UG/ML
1000 INJECTION INTRAMUSCULAR; SUBCUTANEOUS ONCE
Status: COMPLETED | OUTPATIENT
Start: 2018-04-30 | End: 2018-04-30

## 2018-04-30 RX ADMIN — CYANOCOBALAMIN 1000 MCG: 1000 INJECTION INTRAMUSCULAR; SUBCUTANEOUS at 13:33

## 2018-05-01 ENCOUNTER — HOSPITAL ENCOUNTER (OUTPATIENT)
Dept: PHYSICAL THERAPY | Age: 58
Setting detail: THERAPIES SERIES
Discharge: HOME | End: 2018-05-01
Payer: COMMERCIAL

## 2018-05-01 PROCEDURE — 97110 THERAPEUTIC EXERCISES: CPT

## 2018-05-01 PROCEDURE — G8980 MOBILITY D/C STATUS: HCPCS

## 2018-05-01 PROCEDURE — G8979 MOBILITY GOAL STATUS: HCPCS

## 2018-05-01 PROCEDURE — G8978 MOBILITY CURRENT STATUS: HCPCS

## 2018-05-01 ASSESSMENT — PAIN DESCRIPTION - DESCRIPTORS: DESCRIPTORS: ACHING;TIGHTNESS;THROBBING

## 2018-05-01 ASSESSMENT — PAIN DESCRIPTION - PAIN TYPE: TYPE: CHRONIC PAIN

## 2018-05-01 ASSESSMENT — PAIN DESCRIPTION - FREQUENCY: FREQUENCY: CONTINUOUS

## 2018-05-01 ASSESSMENT — PAIN SCALES - GENERAL: PAINLEVEL_OUTOF10: 5

## 2018-05-01 ASSESSMENT — PAIN DESCRIPTION - ORIENTATION: ORIENTATION: LOWER

## 2018-05-01 ASSESSMENT — PAIN DESCRIPTION - LOCATION: LOCATION: BACK

## 2018-05-03 ENCOUNTER — TELEPHONE (OUTPATIENT)
Dept: FAMILY MEDICINE CLINIC | Age: 58
End: 2018-05-03

## 2018-05-03 RX ORDER — FLUCONAZOLE 100 MG/1
100 TABLET ORAL DAILY
Qty: 7 TABLET | Refills: 0 | Status: SHIPPED | OUTPATIENT
Start: 2018-05-03 | End: 2018-08-24 | Stop reason: SDUPTHER

## 2018-05-05 ENCOUNTER — HOSPITAL ENCOUNTER (EMERGENCY)
Age: 58
Discharge: HOME | End: 2018-05-05
Attending: FAMILY MEDICINE
Payer: COMMERCIAL

## 2018-05-05 VITALS
HEART RATE: 86 BPM | RESPIRATION RATE: 18 BRPM | WEIGHT: 160 LBS | BODY MASS INDEX: 29.44 KG/M2 | TEMPERATURE: 99 F | DIASTOLIC BLOOD PRESSURE: 63 MMHG | SYSTOLIC BLOOD PRESSURE: 127 MMHG | OXYGEN SATURATION: 95 % | HEIGHT: 62 IN

## 2018-05-05 DIAGNOSIS — N30.00 ACUTE CYSTITIS WITHOUT HEMATURIA: Primary | ICD-10-CM

## 2018-05-05 LAB
BACTERIA: ABNORMAL /HPF
BACTERIA: ABNORMAL /HPF
BILIRUBIN URINE: NEGATIVE
BILIRUBIN URINE: NEGATIVE
BLOOD, URINE: NEGATIVE
BLOOD, URINE: NEGATIVE
CLARITY: CLEAR
CLARITY: CLEAR
COLOR: YELLOW
COLOR: YELLOW
EPITHELIAL CELLS, UA: ABNORMAL /HPF
EPITHELIAL CELLS, UA: ABNORMAL /HPF
GLUCOSE URINE: NEGATIVE MG/DL
GLUCOSE URINE: NEGATIVE MG/DL
KETONES, URINE: NEGATIVE MG/DL
KETONES, URINE: NEGATIVE MG/DL
LEUKOCYTE ESTERASE, URINE: ABNORMAL
LEUKOCYTE ESTERASE, URINE: ABNORMAL
NITRITE, URINE: NEGATIVE
NITRITE, URINE: NEGATIVE
PH UA: 6 (ref 5–9)
PH UA: 6 (ref 5–9)
PROTEIN UA: NEGATIVE MG/DL
PROTEIN UA: NEGATIVE MG/DL
RBC UA: ABNORMAL /HPF (ref 0–2)
RBC UA: ABNORMAL /HPF (ref 0–2)
SPECIFIC GRAVITY UA: 1 (ref 1–1.03)
SPECIFIC GRAVITY UA: 1 (ref 1–1.03)
URINE REFLEX TO CULTURE: YES
UROBILINOGEN, URINE: 0.2 E.U./DL
UROBILINOGEN, URINE: 0.2 E.U./DL
WBC UA: ABNORMAL /HPF (ref 0–5)
WBC UA: ABNORMAL /HPF (ref 0–5)

## 2018-05-05 PROCEDURE — 87077 CULTURE AEROBIC IDENTIFY: CPT

## 2018-05-05 PROCEDURE — 87186 SC STD MICRODIL/AGAR DIL: CPT

## 2018-05-05 PROCEDURE — 99283 EMERGENCY DEPT VISIT LOW MDM: CPT

## 2018-05-05 PROCEDURE — 81001 URINALYSIS AUTO W/SCOPE: CPT

## 2018-05-05 PROCEDURE — 87086 URINE CULTURE/COLONY COUNT: CPT

## 2018-05-05 RX ORDER — DOXYCYCLINE 100 MG/1
100 TABLET ORAL 2 TIMES DAILY
Qty: 20 TABLET | Refills: 0 | Status: SHIPPED | OUTPATIENT
Start: 2018-05-05 | End: 2018-05-15

## 2018-05-05 ASSESSMENT — ENCOUNTER SYMPTOMS
RESPIRATORY NEGATIVE: 1
GASTROINTESTINAL NEGATIVE: 1
VOMITING: 0
NAUSEA: 0

## 2018-05-05 ASSESSMENT — PAIN DESCRIPTION - LOCATION: LOCATION: BACK

## 2018-05-05 ASSESSMENT — PAIN DESCRIPTION - FREQUENCY: FREQUENCY: CONTINUOUS

## 2018-05-05 ASSESSMENT — PAIN SCALES - GENERAL: PAINLEVEL_OUTOF10: 8

## 2018-05-05 ASSESSMENT — PAIN DESCRIPTION - DESCRIPTORS: DESCRIPTORS: ACHING

## 2018-05-05 ASSESSMENT — PAIN DESCRIPTION - ORIENTATION: ORIENTATION: LOWER

## 2018-05-05 ASSESSMENT — PAIN DESCRIPTION - PAIN TYPE: TYPE: ACUTE PAIN

## 2018-05-07 ENCOUNTER — HOSPITAL ENCOUNTER (OUTPATIENT)
Dept: PHYSICAL THERAPY | Age: 58
Setting detail: THERAPIES SERIES
Discharge: HOME | End: 2018-05-07
Payer: COMMERCIAL

## 2018-05-07 ENCOUNTER — TELEPHONE (OUTPATIENT)
Dept: FAMILY MEDICINE CLINIC | Age: 58
End: 2018-05-07

## 2018-05-07 DIAGNOSIS — M62.838 MUSCLE SPASM: ICD-10-CM

## 2018-05-07 RX ORDER — TIZANIDINE 4 MG/1
TABLET ORAL
Qty: 180 TABLET | Refills: 0 | Status: SHIPPED | OUTPATIENT
Start: 2018-05-07 | End: 2018-05-31

## 2018-05-08 LAB
ORGANISM: ABNORMAL
URINE CULTURE, ROUTINE: ABNORMAL
URINE CULTURE, ROUTINE: ABNORMAL

## 2018-05-14 DIAGNOSIS — M19.012 GLENOHUMERAL ARTHRITIS, LEFT: ICD-10-CM

## 2018-05-14 RX ORDER — HYDROCODONE BITARTRATE AND ACETAMINOPHEN 5; 325 MG/1; MG/1
1 TABLET ORAL EVERY 6 HOURS PRN
Qty: 28 TABLET | Refills: 0 | Status: SHIPPED | OUTPATIENT
Start: 2018-05-14 | End: 2018-06-12 | Stop reason: SDUPTHER

## 2018-05-15 DIAGNOSIS — F41.9 ANXIETY: ICD-10-CM

## 2018-05-15 RX ORDER — LORAZEPAM 0.5 MG/1
0.5 TABLET ORAL DAILY PRN
Qty: 20 TABLET | Refills: 1 | Status: SHIPPED | OUTPATIENT
Start: 2018-05-15 | End: 2018-06-12 | Stop reason: SDUPTHER

## 2018-05-23 DIAGNOSIS — E03.9 HYPOTHYROIDISM, UNSPECIFIED TYPE: ICD-10-CM

## 2018-05-23 DIAGNOSIS — M19.90 ARTHRITIS: ICD-10-CM

## 2018-05-23 RX ORDER — MELOXICAM 15 MG/1
15 TABLET ORAL DAILY
Qty: 90 TABLET | Refills: 5 | Status: SHIPPED | OUTPATIENT
Start: 2018-05-23 | End: 2018-06-06 | Stop reason: SDUPTHER

## 2018-05-23 RX ORDER — LEVOTHYROXINE SODIUM 0.2 MG/1
TABLET ORAL
Qty: 144 TABLET | Refills: 0 | Status: SHIPPED | OUTPATIENT
Start: 2018-05-23 | End: 2018-11-15 | Stop reason: SDUPTHER

## 2018-05-24 ENCOUNTER — TELEPHONE (OUTPATIENT)
Dept: FAMILY MEDICINE CLINIC | Age: 58
End: 2018-05-24

## 2018-05-24 RX ORDER — AZITHROMYCIN 250 MG/1
TABLET, FILM COATED ORAL
Qty: 1 PACKET | Refills: 0 | Status: SHIPPED | OUTPATIENT
Start: 2018-05-24 | End: 2018-05-31

## 2018-05-24 RX ORDER — FLUCONAZOLE 150 MG/1
150 TABLET ORAL
Qty: 2 TABLET | Refills: 0 | Status: SHIPPED | OUTPATIENT
Start: 2018-05-24 | End: 2018-06-01 | Stop reason: SDUPTHER

## 2018-05-29 RX ORDER — LANOLIN ALCOHOL/MO/W.PET/CERES
400 CREAM (GRAM) TOPICAL DAILY
Qty: 30 TABLET | Refills: 5 | Status: SHIPPED | OUTPATIENT
Start: 2018-05-29 | End: 2020-02-21 | Stop reason: SDUPTHER

## 2018-05-30 DIAGNOSIS — M62.838 MUSCLE SPASM: ICD-10-CM

## 2018-05-30 RX ORDER — TIZANIDINE 4 MG/1
TABLET ORAL
Qty: 540 TABLET | Refills: 2 | Status: ON HOLD | OUTPATIENT
Start: 2018-05-30 | End: 2018-11-30 | Stop reason: HOSPADM

## 2018-05-31 ENCOUNTER — OFFICE VISIT (OUTPATIENT)
Dept: FAMILY MEDICINE CLINIC | Age: 58
End: 2018-05-31
Payer: COMMERCIAL

## 2018-05-31 VITALS
SYSTOLIC BLOOD PRESSURE: 138 MMHG | WEIGHT: 161 LBS | BODY MASS INDEX: 29.63 KG/M2 | DIASTOLIC BLOOD PRESSURE: 82 MMHG | HEIGHT: 62 IN | OXYGEN SATURATION: 97 % | HEART RATE: 106 BPM

## 2018-05-31 DIAGNOSIS — F31.71 BIPOLAR DISORDER, IN PARTIAL REMISSION, MOST RECENT EPISODE HYPOMANIC (HCC): ICD-10-CM

## 2018-05-31 DIAGNOSIS — G43.809 OTHER MIGRAINE WITHOUT STATUS MIGRAINOSUS, NOT INTRACTABLE: ICD-10-CM

## 2018-05-31 DIAGNOSIS — I69.354 HEMIPARESIS AFFECTING LEFT SIDE AS LATE EFFECT OF CEREBROVASCULAR ACCIDENT (HCC): ICD-10-CM

## 2018-05-31 DIAGNOSIS — I26.99 PULMONARY EMBOLISM AND INFARCTION (HCC): ICD-10-CM

## 2018-05-31 DIAGNOSIS — F32.A DEPRESSION, UNSPECIFIED DEPRESSION TYPE: ICD-10-CM

## 2018-05-31 DIAGNOSIS — Q07.00 ARNOLD-CHIARI DEFORMITY (HCC): ICD-10-CM

## 2018-05-31 DIAGNOSIS — M19.012 GLENOHUMERAL ARTHRITIS, LEFT: ICD-10-CM

## 2018-05-31 DIAGNOSIS — I73.9 PERIPHERAL VASCULAR DISEASE (HCC): ICD-10-CM

## 2018-05-31 DIAGNOSIS — M19.012 PRIMARY OSTEOARTHRITIS OF LEFT SHOULDER: Primary | ICD-10-CM

## 2018-05-31 DIAGNOSIS — K05.329 CHRONIC PERIODONTITIS, GENERALIZED: ICD-10-CM

## 2018-05-31 DIAGNOSIS — E03.9 HYPOTHYROIDISM, UNSPECIFIED TYPE: ICD-10-CM

## 2018-05-31 DIAGNOSIS — I63.89 CEREBRAL INFARCTION DUE TO OTHER MECHANISM (HCC): ICD-10-CM

## 2018-05-31 DIAGNOSIS — E11.9 TYPE 2 DIABETES MELLITUS WITHOUT COMPLICATION, WITHOUT LONG-TERM CURRENT USE OF INSULIN (HCC): ICD-10-CM

## 2018-05-31 DIAGNOSIS — J44.9 CHRONIC OBSTRUCTIVE PULMONARY DISEASE, UNSPECIFIED COPD TYPE (HCC): ICD-10-CM

## 2018-05-31 DIAGNOSIS — F41.9 ANXIETY: ICD-10-CM

## 2018-05-31 DIAGNOSIS — J30.1 SEASONAL ALLERGIC RHINITIS DUE TO POLLEN, UNSPECIFIED CHRONICITY: ICD-10-CM

## 2018-05-31 PROCEDURE — 2022F DILAT RTA XM EVC RTNOPTHY: CPT | Performed by: FAMILY MEDICINE

## 2018-05-31 PROCEDURE — 4004F PT TOBACCO SCREEN RCVD TLK: CPT | Performed by: FAMILY MEDICINE

## 2018-05-31 PROCEDURE — 3023F SPIROM DOC REV: CPT | Performed by: FAMILY MEDICINE

## 2018-05-31 PROCEDURE — 99213 OFFICE O/P EST LOW 20 MIN: CPT | Performed by: FAMILY MEDICINE

## 2018-05-31 PROCEDURE — G8926 SPIRO NO PERF OR DOC: HCPCS | Performed by: FAMILY MEDICINE

## 2018-05-31 PROCEDURE — G8599 NO ASA/ANTIPLAT THER USE RNG: HCPCS | Performed by: FAMILY MEDICINE

## 2018-05-31 PROCEDURE — G8417 CALC BMI ABV UP PARAM F/U: HCPCS | Performed by: FAMILY MEDICINE

## 2018-05-31 PROCEDURE — 3046F HEMOGLOBIN A1C LEVEL >9.0%: CPT | Performed by: FAMILY MEDICINE

## 2018-05-31 PROCEDURE — 3017F COLORECTAL CA SCREEN DOC REV: CPT | Performed by: FAMILY MEDICINE

## 2018-05-31 PROCEDURE — G8427 DOCREV CUR MEDS BY ELIG CLIN: HCPCS | Performed by: FAMILY MEDICINE

## 2018-06-01 RX ORDER — ONDANSETRON 4 MG/1
4 TABLET, ORALLY DISINTEGRATING ORAL EVERY 8 HOURS PRN
Qty: 30 TABLET | Refills: 2 | Status: SHIPPED | OUTPATIENT
Start: 2018-06-01 | End: 2019-04-15 | Stop reason: SDUPTHER

## 2018-06-01 RX ORDER — FLUCONAZOLE 150 MG/1
150 TABLET ORAL
Qty: 2 TABLET | Refills: 0 | Status: SHIPPED | OUTPATIENT
Start: 2018-06-01 | End: 2018-06-05

## 2018-06-01 RX ORDER — HYDROCODONE BITARTRATE AND ACETAMINOPHEN 5; 325 MG/1; MG/1
1 TABLET ORAL EVERY 6 HOURS PRN
Qty: 28 TABLET | Refills: 0 | OUTPATIENT
Start: 2018-06-01 | End: 2018-06-08

## 2018-06-03 ENCOUNTER — HOSPITAL ENCOUNTER (EMERGENCY)
Age: 58
Discharge: HOME | End: 2018-06-03
Payer: COMMERCIAL

## 2018-06-03 VITALS
HEART RATE: 101 BPM | HEIGHT: 62 IN | WEIGHT: 165 LBS | OXYGEN SATURATION: 97 % | BODY MASS INDEX: 30.36 KG/M2 | SYSTOLIC BLOOD PRESSURE: 134 MMHG | TEMPERATURE: 97.9 F | DIASTOLIC BLOOD PRESSURE: 82 MMHG | RESPIRATION RATE: 16 BRPM

## 2018-06-03 DIAGNOSIS — M25.512 CHRONIC LEFT SHOULDER PAIN: Primary | ICD-10-CM

## 2018-06-03 DIAGNOSIS — G89.29 CHRONIC LEFT SHOULDER PAIN: Primary | ICD-10-CM

## 2018-06-03 PROCEDURE — 99282 EMERGENCY DEPT VISIT SF MDM: CPT

## 2018-06-03 ASSESSMENT — PAIN DESCRIPTION - LOCATION: LOCATION: SHOULDER

## 2018-06-03 ASSESSMENT — PAIN SCALES - GENERAL: PAINLEVEL_OUTOF10: 9

## 2018-06-03 ASSESSMENT — PAIN DESCRIPTION - ORIENTATION: ORIENTATION: LEFT

## 2018-06-03 ASSESSMENT — ENCOUNTER SYMPTOMS
RESPIRATORY NEGATIVE: 1
GASTROINTESTINAL NEGATIVE: 1
EYES NEGATIVE: 1
ALLERGIC/IMMUNOLOGIC NEGATIVE: 1

## 2018-06-03 ASSESSMENT — PAIN DESCRIPTION - FREQUENCY: FREQUENCY: CONTINUOUS

## 2018-06-03 ASSESSMENT — PAIN DESCRIPTION - PAIN TYPE: TYPE: CHRONIC PAIN

## 2018-06-06 DIAGNOSIS — M19.90 ARTHRITIS: ICD-10-CM

## 2018-06-06 DIAGNOSIS — G25.81 RLS (RESTLESS LEGS SYNDROME): ICD-10-CM

## 2018-06-06 DIAGNOSIS — R60.9 EDEMA, UNSPECIFIED TYPE: ICD-10-CM

## 2018-06-06 RX ORDER — TERAZOSIN 2 MG/1
2 CAPSULE ORAL NIGHTLY
Qty: 90 CAPSULE | Refills: 1 | Status: SHIPPED | OUTPATIENT
Start: 2018-06-06 | End: 2019-01-15 | Stop reason: SDUPTHER

## 2018-06-06 RX ORDER — ESOMEPRAZOLE MAGNESIUM 20 MG/1
20 FOR SUSPENSION ORAL 2 TIMES DAILY
Qty: 180 PACKET | Refills: 1 | Status: SHIPPED | OUTPATIENT
Start: 2018-06-06 | End: 2018-06-13

## 2018-06-06 RX ORDER — FOLIC ACID 1 MG/1
1 TABLET ORAL DAILY
Qty: 90 TABLET | Refills: 1 | Status: SHIPPED | OUTPATIENT
Start: 2018-06-06 | End: 2018-11-28 | Stop reason: SDUPTHER

## 2018-06-06 RX ORDER — PRAMIPEXOLE DIHYDROCHLORIDE 0.5 MG/1
0.5 TABLET ORAL EVERY EVENING
Qty: 90 TABLET | Refills: 0 | Status: SHIPPED | OUTPATIENT
Start: 2018-06-06 | End: 2018-07-25 | Stop reason: SDUPTHER

## 2018-06-06 RX ORDER — FUROSEMIDE 40 MG/1
40 TABLET ORAL DAILY
Qty: 90 TABLET | Refills: 5 | Status: SHIPPED | OUTPATIENT
Start: 2018-06-06 | End: 2019-05-06 | Stop reason: SDUPTHER

## 2018-06-06 RX ORDER — MELOXICAM 15 MG/1
15 TABLET ORAL DAILY
Qty: 90 TABLET | Refills: 5 | Status: ON HOLD | OUTPATIENT
Start: 2018-06-06 | End: 2018-11-30 | Stop reason: HOSPADM

## 2018-06-06 RX ORDER — AMLODIPINE BESYLATE 2.5 MG/1
2.5 TABLET ORAL DAILY
Qty: 90 TABLET | Refills: 1 | Status: SHIPPED | OUTPATIENT
Start: 2018-06-06 | End: 2018-09-05 | Stop reason: SDUPTHER

## 2018-06-08 DIAGNOSIS — G89.4 CHRONIC PAIN SYNDROME: Primary | ICD-10-CM

## 2018-06-08 RX ORDER — LANOLIN ALCOHOL/MO/W.PET/CERES
1000 CREAM (GRAM) TOPICAL DAILY
Qty: 90 TABLET | Refills: 3 | Status: SHIPPED | OUTPATIENT
Start: 2018-06-08 | End: 2021-03-10

## 2018-06-08 RX ORDER — PREGABALIN 150 MG/1
150 CAPSULE ORAL 2 TIMES DAILY
Qty: 180 CAPSULE | Refills: 1 | Status: SHIPPED | OUTPATIENT
Start: 2018-06-08 | End: 2018-11-20

## 2018-06-12 DIAGNOSIS — M19.012 GLENOHUMERAL ARTHRITIS, LEFT: ICD-10-CM

## 2018-06-12 DIAGNOSIS — F41.9 ANXIETY: ICD-10-CM

## 2018-06-12 RX ORDER — HYDROCODONE BITARTRATE AND ACETAMINOPHEN 5; 325 MG/1; MG/1
1 TABLET ORAL EVERY 6 HOURS PRN
Qty: 20 TABLET | Refills: 0 | Status: SHIPPED | OUTPATIENT
Start: 2018-06-12 | End: 2018-06-13

## 2018-06-12 RX ORDER — ESOMEPRAZOLE MAGNESIUM 40 MG/1
80 CAPSULE, DELAYED RELEASE ORAL DAILY
Qty: 180 CAPSULE | Refills: 1 | Status: SHIPPED | OUTPATIENT
Start: 2018-06-12 | End: 2018-06-28 | Stop reason: SDUPTHER

## 2018-06-12 RX ORDER — LORAZEPAM 0.5 MG/1
0.5 TABLET ORAL DAILY PRN
Qty: 20 TABLET | Refills: 1 | Status: SHIPPED | OUTPATIENT
Start: 2018-06-12 | End: 2018-06-13

## 2018-06-13 ENCOUNTER — TELEPHONE (OUTPATIENT)
Dept: FAMILY MEDICINE CLINIC | Age: 58
End: 2018-06-13

## 2018-06-13 ENCOUNTER — HOSPITAL ENCOUNTER (OUTPATIENT)
Dept: PREADMISSION TESTING | Age: 58
Discharge: HOME | DRG: 483 | End: 2018-06-17
Payer: MEDICARE

## 2018-06-13 VITALS
DIASTOLIC BLOOD PRESSURE: 82 MMHG | BODY MASS INDEX: 29.81 KG/M2 | SYSTOLIC BLOOD PRESSURE: 156 MMHG | HEART RATE: 113 BPM | OXYGEN SATURATION: 95 % | WEIGHT: 162 LBS | HEIGHT: 62 IN | RESPIRATION RATE: 16 BRPM | TEMPERATURE: 97.5 F

## 2018-06-13 DIAGNOSIS — M70.62 TROCHANTERIC BURSITIS OF LEFT HIP: ICD-10-CM

## 2018-06-13 DIAGNOSIS — J44.9 CHRONIC OBSTRUCTIVE PULMONARY DISEASE, UNSPECIFIED COPD TYPE (HCC): Primary | ICD-10-CM

## 2018-06-13 PROBLEM — M19.012 DJD OF LEFT SHOULDER: Status: ACTIVE | Noted: 2018-06-13

## 2018-06-13 LAB
ABO/RH: NORMAL
ANION GAP SERPL CALCULATED.3IONS-SCNC: 14 MEQ/L (ref 7–13)
ANTIBODY SCREEN: NORMAL
BACTERIA: NORMAL /HPF
BILIRUBIN URINE: NEGATIVE
BLOOD, URINE: NEGATIVE
BUN BLDV-MCNC: 16 MG/DL (ref 6–20)
CALCIUM SERPL-MCNC: 9 MG/DL (ref 8.6–10.2)
CHLORIDE BLD-SCNC: 99 MEQ/L (ref 98–107)
CLARITY: CLEAR
CO2: 28 MEQ/L (ref 22–29)
COLOR: YELLOW
CREAT SERPL-MCNC: 0.59 MG/DL (ref 0.5–0.9)
EKG ATRIAL RATE: 98 BPM
EKG P AXIS: 81 DEGREES
EKG P-R INTERVAL: 138 MS
EKG Q-T INTERVAL: 356 MS
EKG QRS DURATION: 92 MS
EKG QTC CALCULATION (BAZETT): 454 MS
EKG R AXIS: 58 DEGREES
EKG T AXIS: 41 DEGREES
EKG VENTRICULAR RATE: 98 BPM
GFR AFRICAN AMERICAN: >60
GFR NON-AFRICAN AMERICAN: >60
GLUCOSE BLD-MCNC: 107 MG/DL (ref 74–109)
GLUCOSE URINE: NEGATIVE MG/DL
HCT VFR BLD CALC: 40.4 % (ref 37–47)
HEMOGLOBIN: 13.2 G/DL (ref 12–16)
INR BLD: 1
KETONES, URINE: NEGATIVE MG/DL
LEUKOCYTE ESTERASE, URINE: ABNORMAL
MCH RBC QN AUTO: 27.8 PG (ref 27–31.3)
MCHC RBC AUTO-ENTMCNC: 32.7 % (ref 33–37)
MCV RBC AUTO: 85 FL (ref 82–100)
NITRITE, URINE: NEGATIVE
PDW BLD-RTO: 15.7 % (ref 11.5–14.5)
PH UA: 5 (ref 5–9)
PLATELET # BLD: 260 K/UL (ref 130–400)
POTASSIUM SERPL-SCNC: 3.7 MEQ/L (ref 3.5–5.1)
PROTEIN UA: NEGATIVE MG/DL
PROTHROMBIN TIME: 10.2 SEC (ref 9.6–12.3)
RBC # BLD: 4.76 M/UL (ref 4.2–5.4)
RBC UA: NORMAL /HPF (ref 0–2)
SODIUM BLD-SCNC: 141 MEQ/L (ref 132–144)
SPECIFIC GRAVITY UA: 1 (ref 1–1.03)
URINE REFLEX TO CULTURE: YES
UROBILINOGEN, URINE: 0.2 E.U./DL
WBC # BLD: 9.8 K/UL (ref 4.8–10.8)
WBC UA: NORMAL /HPF (ref 0–5)

## 2018-06-13 PROCEDURE — 80048 BASIC METABOLIC PNL TOTAL CA: CPT

## 2018-06-13 PROCEDURE — 87086 URINE CULTURE/COLONY COUNT: CPT

## 2018-06-13 PROCEDURE — 81001 URINALYSIS AUTO W/SCOPE: CPT

## 2018-06-13 PROCEDURE — 85027 COMPLETE CBC AUTOMATED: CPT

## 2018-06-13 PROCEDURE — 86850 RBC ANTIBODY SCREEN: CPT

## 2018-06-13 PROCEDURE — 86900 BLOOD TYPING SEROLOGIC ABO: CPT

## 2018-06-13 PROCEDURE — 85610 PROTHROMBIN TIME: CPT

## 2018-06-13 PROCEDURE — 93005 ELECTROCARDIOGRAM TRACING: CPT

## 2018-06-13 PROCEDURE — 86901 BLOOD TYPING SEROLOGIC RH(D): CPT

## 2018-06-13 RX ORDER — SODIUM CHLORIDE, SODIUM LACTATE, POTASSIUM CHLORIDE, CALCIUM CHLORIDE 600; 310; 30; 20 MG/100ML; MG/100ML; MG/100ML; MG/100ML
INJECTION, SOLUTION INTRAVENOUS CONTINUOUS
Status: CANCELLED | OUTPATIENT
Start: 2018-06-13

## 2018-06-13 RX ORDER — SODIUM CHLORIDE 0.9 % (FLUSH) 0.9 %
10 SYRINGE (ML) INJECTION EVERY 12 HOURS SCHEDULED
Status: CANCELLED | OUTPATIENT
Start: 2018-06-13

## 2018-06-13 RX ORDER — LIDOCAINE HYDROCHLORIDE 10 MG/ML
1 INJECTION, SOLUTION EPIDURAL; INFILTRATION; INTRACAUDAL; PERINEURAL
Status: CANCELLED | OUTPATIENT
Start: 2018-06-13 | End: 2018-06-13

## 2018-06-13 RX ORDER — SODIUM CHLORIDE 0.9 % (FLUSH) 0.9 %
10 SYRINGE (ML) INJECTION PRN
Status: CANCELLED | OUTPATIENT
Start: 2018-06-13

## 2018-06-14 LAB — URINE CULTURE, ROUTINE: NORMAL

## 2018-06-14 PROCEDURE — 93010 ELECTROCARDIOGRAM REPORT: CPT | Performed by: INTERNAL MEDICINE

## 2018-06-15 ENCOUNTER — HOSPITAL ENCOUNTER (INPATIENT)
Age: 58
LOS: 1 days | Discharge: HOME | DRG: 483 | End: 2018-06-16
Attending: ORTHOPAEDIC SURGERY | Admitting: ORTHOPAEDIC SURGERY
Payer: MEDICARE

## 2018-06-15 ENCOUNTER — ANESTHESIA EVENT (OUTPATIENT)
Dept: OPERATING ROOM | Age: 58
DRG: 483 | End: 2018-06-15
Payer: MEDICARE

## 2018-06-15 ENCOUNTER — APPOINTMENT (OUTPATIENT)
Dept: GENERAL RADIOLOGY | Age: 58
DRG: 483 | End: 2018-06-15
Attending: ORTHOPAEDIC SURGERY
Payer: MEDICARE

## 2018-06-15 ENCOUNTER — ANESTHESIA (OUTPATIENT)
Dept: OPERATING ROOM | Age: 58
DRG: 483 | End: 2018-06-15
Payer: MEDICARE

## 2018-06-15 VITALS — OXYGEN SATURATION: 96 % | TEMPERATURE: 99 F | DIASTOLIC BLOOD PRESSURE: 66 MMHG | SYSTOLIC BLOOD PRESSURE: 170 MMHG

## 2018-06-15 PROBLEM — Z96.611 STATUS POST TOTAL SHOULDER REPLACEMENT, RIGHT: Status: ACTIVE | Noted: 2018-06-15

## 2018-06-15 PROBLEM — Z96.612 STATUS POST TOTAL SHOULDER REPLACEMENT, LEFT: Status: ACTIVE | Noted: 2018-06-15

## 2018-06-15 LAB
GLUCOSE BLD-MCNC: 131 MG/DL (ref 60–115)
GLUCOSE BLD-MCNC: 133 MG/DL (ref 60–115)
GLUCOSE BLD-MCNC: 219 MG/DL (ref 60–115)
GLUCOSE BLD-MCNC: 88 MG/DL (ref 60–115)
PERFORMED ON: ABNORMAL
PERFORMED ON: NORMAL

## 2018-06-15 PROCEDURE — 3600000014 HC SURGERY LEVEL 4 ADDTL 15MIN: Performed by: ORTHOPAEDIC SURGERY

## 2018-06-15 PROCEDURE — 6360000002 HC RX W HCPCS: Performed by: NURSE PRACTITIONER

## 2018-06-15 PROCEDURE — 3700000000 HC ANESTHESIA ATTENDED CARE: Performed by: ORTHOPAEDIC SURGERY

## 2018-06-15 PROCEDURE — 1210000000 HC MED SURG R&B

## 2018-06-15 PROCEDURE — 73030 X-RAY EXAM OF SHOULDER: CPT

## 2018-06-15 PROCEDURE — 2500000003 HC RX 250 WO HCPCS: Performed by: ORTHOPAEDIC SURGERY

## 2018-06-15 PROCEDURE — C1713 ANCHOR/SCREW BN/BN,TIS/BN: HCPCS | Performed by: ORTHOPAEDIC SURGERY

## 2018-06-15 PROCEDURE — 88305 TISSUE EXAM BY PATHOLOGIST: CPT

## 2018-06-15 PROCEDURE — 6370000000 HC RX 637 (ALT 250 FOR IP): Performed by: ORTHOPAEDIC SURGERY

## 2018-06-15 PROCEDURE — 0LS40ZZ REPOSITION LEFT UPPER ARM TENDON, OPEN APPROACH: ICD-10-PCS | Performed by: ORTHOPAEDIC SURGERY

## 2018-06-15 PROCEDURE — 2580000003 HC RX 258: Performed by: NURSE PRACTITIONER

## 2018-06-15 PROCEDURE — C1776 JOINT DEVICE (IMPLANTABLE): HCPCS | Performed by: ORTHOPAEDIC SURGERY

## 2018-06-15 PROCEDURE — 7100000000 HC PACU RECOVERY - FIRST 15 MIN: Performed by: ORTHOPAEDIC SURGERY

## 2018-06-15 PROCEDURE — 2580000003 HC RX 258: Performed by: ORTHOPAEDIC SURGERY

## 2018-06-15 PROCEDURE — 2500000003 HC RX 250 WO HCPCS: Performed by: ANESTHESIOLOGY

## 2018-06-15 PROCEDURE — 2500000003 HC RX 250 WO HCPCS: Performed by: NURSE ANESTHETIST, CERTIFIED REGISTERED

## 2018-06-15 PROCEDURE — 3700000001 HC ADD 15 MINUTES (ANESTHESIA): Performed by: ORTHOPAEDIC SURGERY

## 2018-06-15 PROCEDURE — 3600000004 HC SURGERY LEVEL 4 BASE: Performed by: ORTHOPAEDIC SURGERY

## 2018-06-15 PROCEDURE — 88311 DECALCIFY TISSUE: CPT

## 2018-06-15 PROCEDURE — 0RRK0JZ REPLACEMENT OF LEFT SHOULDER JOINT WITH SYNTHETIC SUBSTITUTE, OPEN APPROACH: ICD-10-PCS | Performed by: ORTHOPAEDIC SURGERY

## 2018-06-15 PROCEDURE — 6370000000 HC RX 637 (ALT 250 FOR IP): Performed by: INTERNAL MEDICINE

## 2018-06-15 PROCEDURE — 7100000001 HC PACU RECOVERY - ADDTL 15 MIN: Performed by: ORTHOPAEDIC SURGERY

## 2018-06-15 PROCEDURE — 2580000003 HC RX 258: Performed by: ANESTHESIOLOGY

## 2018-06-15 PROCEDURE — 64415 NJX AA&/STRD BRCH PLXS IMG: CPT | Performed by: ANESTHESIOLOGY

## 2018-06-15 PROCEDURE — 6360000002 HC RX W HCPCS: Performed by: ORTHOPAEDIC SURGERY

## 2018-06-15 PROCEDURE — 6360000002 HC RX W HCPCS: Performed by: NURSE ANESTHETIST, CERTIFIED REGISTERED

## 2018-06-15 PROCEDURE — 6360000002 HC RX W HCPCS: Performed by: ANESTHESIOLOGY

## 2018-06-15 DEVICE — CEMENT BNE RADIOPAQUE FAST SET ACRYL RESIN HI VISC SIMPLEXHV: Type: IMPLANTABLE DEVICE | Site: SHOULDER | Status: FUNCTIONAL

## 2018-06-15 DEVICE — PIN FIX L60MM DIA3MM STD S STL THRD SGL SHRP FOR HUM RESECT: Type: IMPLANTABLE DEVICE | Status: FUNCTIONAL

## 2018-06-15 DEVICE — IMPLANTABLE DEVICE: Type: IMPLANTABLE DEVICE | Status: FUNCTIONAL

## 2018-06-15 DEVICE — HEAD HUM H18MM OD53MM ID46MM SHLDR CO CHROM MOD NECKLESS: Type: IMPLANTABLE DEVICE | Site: SHOULDER | Status: FUNCTIONAL

## 2018-06-15 DEVICE — IMPLANTABLE DEVICE: Type: IMPLANTABLE DEVICE | Site: SHOULDER | Status: FUNCTIONAL

## 2018-06-15 DEVICE — BASE GLEN SM COMPHSVE MOD HYBRID: Type: IMPLANTABLE DEVICE | Site: SHOULDER | Status: FUNCTIONAL

## 2018-06-15 DEVICE — STEM HUM 9MM MINI SHLDR CO CHROM COMPHSVE REV PRI CEM: Type: IMPLANTABLE DEVICE | Site: SHOULDER | Status: FUNCTIONAL

## 2018-06-15 RX ORDER — ONDANSETRON 2 MG/ML
INJECTION INTRAMUSCULAR; INTRAVENOUS PRN
Status: DISCONTINUED | OUTPATIENT
Start: 2018-06-15 | End: 2018-06-15 | Stop reason: SDUPTHER

## 2018-06-15 RX ORDER — LIDOCAINE HYDROCHLORIDE 10 MG/ML
INJECTION, SOLUTION INFILTRATION; PERINEURAL PRN
Status: DISCONTINUED | OUTPATIENT
Start: 2018-06-15 | End: 2018-06-15 | Stop reason: SDUPTHER

## 2018-06-15 RX ORDER — SODIUM CHLORIDE, SODIUM LACTATE, POTASSIUM CHLORIDE, CALCIUM CHLORIDE 600; 310; 30; 20 MG/100ML; MG/100ML; MG/100ML; MG/100ML
INJECTION, SOLUTION INTRAVENOUS CONTINUOUS
Status: DISCONTINUED | OUTPATIENT
Start: 2018-06-15 | End: 2018-06-15

## 2018-06-15 RX ORDER — OXYCODONE HYDROCHLORIDE AND ACETAMINOPHEN 5; 325 MG/1; MG/1
2 TABLET ORAL EVERY 4 HOURS PRN
Status: DISCONTINUED | OUTPATIENT
Start: 2018-06-15 | End: 2018-06-16 | Stop reason: HOSPADM

## 2018-06-15 RX ORDER — ONDANSETRON 2 MG/ML
4 INJECTION INTRAMUSCULAR; INTRAVENOUS
Status: DISCONTINUED | OUTPATIENT
Start: 2018-06-15 | End: 2018-06-15 | Stop reason: HOSPADM

## 2018-06-15 RX ORDER — OXYCODONE HYDROCHLORIDE AND ACETAMINOPHEN 5; 325 MG/1; MG/1
1 TABLET ORAL EVERY 4 HOURS PRN
Status: DISCONTINUED | OUTPATIENT
Start: 2018-06-15 | End: 2018-06-16 | Stop reason: HOSPADM

## 2018-06-15 RX ORDER — KETOROLAC TROMETHAMINE 30 MG/ML
30 INJECTION, SOLUTION INTRAMUSCULAR; INTRAVENOUS EVERY 6 HOURS
Status: ACTIVE | OUTPATIENT
Start: 2018-06-15 | End: 2018-06-16

## 2018-06-15 RX ORDER — PREGABALIN 150 MG/1
150 CAPSULE ORAL 2 TIMES DAILY
Status: DISCONTINUED | OUTPATIENT
Start: 2018-06-15 | End: 2018-06-16 | Stop reason: HOSPADM

## 2018-06-15 RX ORDER — ONDANSETRON 2 MG/ML
4 INJECTION INTRAMUSCULAR; INTRAVENOUS EVERY 6 HOURS PRN
Status: DISCONTINUED | OUTPATIENT
Start: 2018-06-15 | End: 2018-06-16 | Stop reason: HOSPADM

## 2018-06-15 RX ORDER — ACETAMINOPHEN 325 MG/1
650 TABLET ORAL EVERY 6 HOURS
Status: DISCONTINUED | OUTPATIENT
Start: 2018-06-15 | End: 2018-06-16 | Stop reason: HOSPADM

## 2018-06-15 RX ORDER — SODIUM CHLORIDE 0.9 % (FLUSH) 0.9 %
10 SYRINGE (ML) INJECTION PRN
Status: DISCONTINUED | OUTPATIENT
Start: 2018-06-15 | End: 2018-06-15 | Stop reason: HOSPADM

## 2018-06-15 RX ORDER — BUDESONIDE AND FORMOTEROL FUMARATE DIHYDRATE 160; 4.5 UG/1; UG/1
1 AEROSOL RESPIRATORY (INHALATION) DAILY
Status: DISCONTINUED | OUTPATIENT
Start: 2018-06-15 | End: 2018-06-15 | Stop reason: CLARIF

## 2018-06-15 RX ORDER — METOCLOPRAMIDE HYDROCHLORIDE 5 MG/ML
10 INJECTION INTRAMUSCULAR; INTRAVENOUS
Status: COMPLETED | OUTPATIENT
Start: 2018-06-15 | End: 2018-06-15

## 2018-06-15 RX ORDER — SIMETHICONE 80 MG
180 TABLET,CHEWABLE ORAL DAILY
Status: DISCONTINUED | OUTPATIENT
Start: 2018-06-15 | End: 2018-06-16 | Stop reason: HOSPADM

## 2018-06-15 RX ORDER — HYDROCODONE BITARTRATE AND ACETAMINOPHEN 5; 325 MG/1; MG/1
2 TABLET ORAL PRN
Status: DISCONTINUED | OUTPATIENT
Start: 2018-06-15 | End: 2018-06-15 | Stop reason: HOSPADM

## 2018-06-15 RX ORDER — SENNA AND DOCUSATE SODIUM 50; 8.6 MG/1; MG/1
1 TABLET, FILM COATED ORAL DAILY
Status: DISCONTINUED | OUTPATIENT
Start: 2018-06-15 | End: 2018-06-16 | Stop reason: HOSPADM

## 2018-06-15 RX ORDER — MAGNESIUM HYDROXIDE 1200 MG/15ML
LIQUID ORAL CONTINUOUS PRN
Status: COMPLETED | OUTPATIENT
Start: 2018-06-15 | End: 2018-06-15

## 2018-06-15 RX ORDER — MEPERIDINE HYDROCHLORIDE 25 MG/ML
12.5 INJECTION INTRAMUSCULAR; INTRAVENOUS; SUBCUTANEOUS EVERY 5 MIN PRN
Status: DISCONTINUED | OUTPATIENT
Start: 2018-06-15 | End: 2018-06-15 | Stop reason: HOSPADM

## 2018-06-15 RX ORDER — LIDOCAINE HYDROCHLORIDE 10 MG/ML
1 INJECTION, SOLUTION EPIDURAL; INFILTRATION; INTRACAUDAL; PERINEURAL
Status: COMPLETED | OUTPATIENT
Start: 2018-06-15 | End: 2018-06-15

## 2018-06-15 RX ORDER — SODIUM CHLORIDE 0.9 % (FLUSH) 0.9 %
10 SYRINGE (ML) INJECTION EVERY 12 HOURS SCHEDULED
Status: DISCONTINUED | OUTPATIENT
Start: 2018-06-15 | End: 2018-06-16 | Stop reason: HOSPADM

## 2018-06-15 RX ORDER — DEXAMETHASONE SODIUM PHOSPHATE 10 MG/ML
INJECTION INTRAMUSCULAR; INTRAVENOUS PRN
Status: DISCONTINUED | OUTPATIENT
Start: 2018-06-15 | End: 2018-06-15 | Stop reason: SDUPTHER

## 2018-06-15 RX ORDER — DEXTROSE MONOHYDRATE 25 G/50ML
12.5 INJECTION, SOLUTION INTRAVENOUS PRN
Status: DISCONTINUED | OUTPATIENT
Start: 2018-06-15 | End: 2018-06-16 | Stop reason: HOSPADM

## 2018-06-15 RX ORDER — NICOTINE POLACRILEX 4 MG
15 LOZENGE BUCCAL PRN
Status: DISCONTINUED | OUTPATIENT
Start: 2018-06-15 | End: 2018-06-16 | Stop reason: HOSPADM

## 2018-06-15 RX ORDER — AMLODIPINE BESYLATE 2.5 MG/1
2.5 TABLET ORAL DAILY
Status: DISCONTINUED | OUTPATIENT
Start: 2018-06-15 | End: 2018-06-16 | Stop reason: HOSPADM

## 2018-06-15 RX ORDER — ROPIVACAINE HYDROCHLORIDE 5 MG/ML
INJECTION, SOLUTION EPIDURAL; INFILTRATION; PERINEURAL PRN
Status: DISCONTINUED | OUTPATIENT
Start: 2018-06-15 | End: 2018-06-15 | Stop reason: SDUPTHER

## 2018-06-15 RX ORDER — TIZANIDINE 4 MG/1
4 TABLET ORAL EVERY 8 HOURS PRN
Status: DISCONTINUED | OUTPATIENT
Start: 2018-06-15 | End: 2018-06-16 | Stop reason: HOSPADM

## 2018-06-15 RX ORDER — FENTANYL CITRATE 50 UG/ML
50 INJECTION, SOLUTION INTRAMUSCULAR; INTRAVENOUS EVERY 10 MIN PRN
Status: DISCONTINUED | OUTPATIENT
Start: 2018-06-15 | End: 2018-06-15 | Stop reason: HOSPADM

## 2018-06-15 RX ORDER — MORPHINE SULFATE 2 MG/ML
2 INJECTION, SOLUTION INTRAMUSCULAR; INTRAVENOUS
Status: DISCONTINUED | OUTPATIENT
Start: 2018-06-15 | End: 2018-06-16 | Stop reason: HOSPADM

## 2018-06-15 RX ORDER — SODIUM CHLORIDE 0.9 % (FLUSH) 0.9 %
10 SYRINGE (ML) INJECTION PRN
Status: DISCONTINUED | OUTPATIENT
Start: 2018-06-15 | End: 2018-06-16 | Stop reason: HOSPADM

## 2018-06-15 RX ORDER — MIRTAZAPINE 15 MG/1
30 TABLET, FILM COATED ORAL NIGHTLY
Status: DISCONTINUED | OUTPATIENT
Start: 2018-06-15 | End: 2018-06-16 | Stop reason: HOSPADM

## 2018-06-15 RX ORDER — ARIPIPRAZOLE 10 MG/1
10 TABLET ORAL DAILY
Status: DISCONTINUED | OUTPATIENT
Start: 2018-06-15 | End: 2018-06-16 | Stop reason: HOSPADM

## 2018-06-15 RX ORDER — DIPHENHYDRAMINE HYDROCHLORIDE 50 MG/ML
12.5 INJECTION INTRAMUSCULAR; INTRAVENOUS
Status: DISCONTINUED | OUTPATIENT
Start: 2018-06-15 | End: 2018-06-15 | Stop reason: HOSPADM

## 2018-06-15 RX ORDER — DEXTROSE MONOHYDRATE 50 MG/ML
100 INJECTION, SOLUTION INTRAVENOUS PRN
Status: DISCONTINUED | OUTPATIENT
Start: 2018-06-15 | End: 2018-06-16 | Stop reason: HOSPADM

## 2018-06-15 RX ORDER — FENTANYL CITRATE 50 UG/ML
INJECTION, SOLUTION INTRAMUSCULAR; INTRAVENOUS PRN
Status: DISCONTINUED | OUTPATIENT
Start: 2018-06-15 | End: 2018-06-15 | Stop reason: SDUPTHER

## 2018-06-15 RX ORDER — SODIUM CHLORIDE 0.9 % (FLUSH) 0.9 %
10 SYRINGE (ML) INJECTION EVERY 12 HOURS SCHEDULED
Status: DISCONTINUED | OUTPATIENT
Start: 2018-06-15 | End: 2018-06-15 | Stop reason: HOSPADM

## 2018-06-15 RX ORDER — PRAMIPEXOLE DIHYDROCHLORIDE 0.5 MG/1
0.5 TABLET ORAL EVERY EVENING
Status: DISCONTINUED | OUTPATIENT
Start: 2018-06-15 | End: 2018-06-16 | Stop reason: HOSPADM

## 2018-06-15 RX ORDER — DOCUSATE SODIUM 100 MG/1
100 CAPSULE, LIQUID FILLED ORAL 2 TIMES DAILY
Status: DISCONTINUED | OUTPATIENT
Start: 2018-06-15 | End: 2018-06-16 | Stop reason: HOSPADM

## 2018-06-15 RX ORDER — HYDROCODONE BITARTRATE AND ACETAMINOPHEN 5; 325 MG/1; MG/1
1 TABLET ORAL PRN
Status: DISCONTINUED | OUTPATIENT
Start: 2018-06-15 | End: 2018-06-15 | Stop reason: HOSPADM

## 2018-06-15 RX ORDER — ROCURONIUM BROMIDE 10 MG/ML
INJECTION, SOLUTION INTRAVENOUS PRN
Status: DISCONTINUED | OUTPATIENT
Start: 2018-06-15 | End: 2018-06-15 | Stop reason: SDUPTHER

## 2018-06-15 RX ORDER — TIZANIDINE 4 MG/1
2 TABLET ORAL DAILY PRN
Status: DISCONTINUED | OUTPATIENT
Start: 2018-06-15 | End: 2018-06-16 | Stop reason: HOSPADM

## 2018-06-15 RX ORDER — PROPOFOL 10 MG/ML
INJECTION, EMULSION INTRAVENOUS PRN
Status: DISCONTINUED | OUTPATIENT
Start: 2018-06-15 | End: 2018-06-15 | Stop reason: SDUPTHER

## 2018-06-15 RX ORDER — CARISOPRODOL 350 MG/1
350 TABLET ORAL DAILY PRN
Status: DISCONTINUED | OUTPATIENT
Start: 2018-06-15 | End: 2018-06-15 | Stop reason: CLARIF

## 2018-06-15 RX ORDER — MORPHINE SULFATE 4 MG/ML
4 INJECTION, SOLUTION INTRAMUSCULAR; INTRAVENOUS
Status: DISCONTINUED | OUTPATIENT
Start: 2018-06-15 | End: 2018-06-16 | Stop reason: HOSPADM

## 2018-06-15 RX ORDER — MIDAZOLAM HYDROCHLORIDE 1 MG/ML
INJECTION INTRAMUSCULAR; INTRAVENOUS PRN
Status: DISCONTINUED | OUTPATIENT
Start: 2018-06-15 | End: 2018-06-15 | Stop reason: SDUPTHER

## 2018-06-15 RX ORDER — LEVOTHYROXINE SODIUM 0.1 MG/1
200 TABLET ORAL DAILY
Status: DISCONTINUED | OUTPATIENT
Start: 2018-06-15 | End: 2018-06-16 | Stop reason: HOSPADM

## 2018-06-15 RX ORDER — SODIUM CHLORIDE 9 MG/ML
INJECTION, SOLUTION INTRAVENOUS CONTINUOUS
Status: DISCONTINUED | OUTPATIENT
Start: 2018-06-15 | End: 2018-06-16 | Stop reason: HOSPADM

## 2018-06-15 RX ADMIN — ROPIVACAINE HYDROCHLORIDE 40 ML: 5 INJECTION, SOLUTION EPIDURAL; INFILTRATION; PERINEURAL at 10:24

## 2018-06-15 RX ADMIN — DEXTROSE MONOHYDRATE 2 G: 50 INJECTION, SOLUTION INTRAVENOUS at 17:50

## 2018-06-15 RX ADMIN — AMLODIPINE BESYLATE 2.5 MG: 2.5 TABLET ORAL at 17:50

## 2018-06-15 RX ADMIN — ONDANSETRON 4 MG: 2 INJECTION INTRAMUSCULAR; INTRAVENOUS at 12:28

## 2018-06-15 RX ADMIN — PROPOFOL 150 MG: 10 INJECTION, EMULSION INTRAVENOUS at 11:19

## 2018-06-15 RX ADMIN — LIDOCAINE HYDROCHLORIDE 50 MG: 10 INJECTION, SOLUTION INFILTRATION; PERINEURAL at 11:19

## 2018-06-15 RX ADMIN — TIZANIDINE 4 MG: 4 TABLET ORAL at 17:50

## 2018-06-15 RX ADMIN — SODIUM CHLORIDE, POTASSIUM CHLORIDE, SODIUM LACTATE AND CALCIUM CHLORIDE: 600; 310; 30; 20 INJECTION, SOLUTION INTRAVENOUS at 12:20

## 2018-06-15 RX ADMIN — ONDANSETRON 4 MG: 2 INJECTION INTRAMUSCULAR; INTRAVENOUS at 15:22

## 2018-06-15 RX ADMIN — SODIUM CHLORIDE, POTASSIUM CHLORIDE, SODIUM LACTATE AND CALCIUM CHLORIDE: 600; 310; 30; 20 INJECTION, SOLUTION INTRAVENOUS at 09:53

## 2018-06-15 RX ADMIN — SODIUM CHLORIDE, POTASSIUM CHLORIDE, SODIUM LACTATE AND CALCIUM CHLORIDE: 600; 310; 30; 20 INJECTION, SOLUTION INTRAVENOUS at 10:00

## 2018-06-15 RX ADMIN — DEXTROSE MONOHYDRATE 2 G: 50 INJECTION, SOLUTION INTRAVENOUS at 23:36

## 2018-06-15 RX ADMIN — SODIUM CHLORIDE: 9 INJECTION, SOLUTION INTRAVENOUS at 17:50

## 2018-06-15 RX ADMIN — OXYCODONE HYDROCHLORIDE AND ACETAMINOPHEN 2 TABLET: 5; 325 TABLET ORAL at 15:28

## 2018-06-15 RX ADMIN — DEXAMETHASONE SODIUM PHOSPHATE 4 MG: 10 INJECTION INTRAMUSCULAR; INTRAVENOUS at 11:30

## 2018-06-15 RX ADMIN — LIDOCAINE HYDROCHLORIDE 0.2 ML: 10 INJECTION, SOLUTION EPIDURAL; INFILTRATION; INTRACAUDAL; PERINEURAL at 09:52

## 2018-06-15 RX ADMIN — MIDAZOLAM HYDROCHLORIDE 2 MG: 1 INJECTION, SOLUTION INTRAMUSCULAR; INTRAVENOUS at 10:20

## 2018-06-15 RX ADMIN — ROCURONIUM BROMIDE 40 MG: 10 INJECTION INTRAVENOUS at 11:19

## 2018-06-15 RX ADMIN — MIRTAZAPINE 30 MG: 15 TABLET, FILM COATED ORAL at 21:01

## 2018-06-15 RX ADMIN — METOCLOPRAMIDE 10 MG: 5 INJECTION, SOLUTION INTRAMUSCULAR; INTRAVENOUS at 13:35

## 2018-06-15 RX ADMIN — FENTANYL CITRATE 50 MCG: 50 INJECTION, SOLUTION INTRAMUSCULAR; INTRAVENOUS at 13:46

## 2018-06-15 RX ADMIN — SUGAMMADEX 200 MG: 100 INJECTION, SOLUTION INTRAVENOUS at 13:00

## 2018-06-15 RX ADMIN — PRAMIPEXOLE DIHYDROCHLORIDE 0.5 MG: 0.5 TABLET ORAL at 17:50

## 2018-06-15 RX ADMIN — PREGABALIN 150 MG: 150 CAPSULE ORAL at 21:10

## 2018-06-15 RX ADMIN — VANCOMYCIN HYDROCHLORIDE 1000 MG: 1 INJECTION, POWDER, LYOPHILIZED, FOR SOLUTION INTRAVENOUS at 10:28

## 2018-06-15 RX ADMIN — FENTANYL CITRATE 100 MCG: 50 INJECTION, SOLUTION INTRAMUSCULAR; INTRAVENOUS at 10:20

## 2018-06-15 RX ADMIN — OXYCODONE HYDROCHLORIDE AND ACETAMINOPHEN 1 TABLET: 5; 325 TABLET ORAL at 21:01

## 2018-06-15 ASSESSMENT — PULMONARY FUNCTION TESTS
PIF_VALUE: 20
PIF_VALUE: 21
PIF_VALUE: 8
PIF_VALUE: 21
PIF_VALUE: 21
PIF_VALUE: 19
PIF_VALUE: 22
PIF_VALUE: 21
PIF_VALUE: 0
PIF_VALUE: 19
PIF_VALUE: 21
PIF_VALUE: 22
PIF_VALUE: 1
PIF_VALUE: 22
PIF_VALUE: 21
PIF_VALUE: 22
PIF_VALUE: 21
PIF_VALUE: 20
PIF_VALUE: 20
PIF_VALUE: 21
PIF_VALUE: 18
PIF_VALUE: 19
PIF_VALUE: 21
PIF_VALUE: 22
PIF_VALUE: 22
PIF_VALUE: 21
PIF_VALUE: 22
PIF_VALUE: 20
PIF_VALUE: 20
PIF_VALUE: 22
PIF_VALUE: 20
PIF_VALUE: 20
PIF_VALUE: 22
PIF_VALUE: 21
PIF_VALUE: 18
PIF_VALUE: 21
PIF_VALUE: 22
PIF_VALUE: 21
PIF_VALUE: 19
PIF_VALUE: 21
PIF_VALUE: 1
PIF_VALUE: 19
PIF_VALUE: 20
PIF_VALUE: 2
PIF_VALUE: 21
PIF_VALUE: 21
PIF_VALUE: 22
PIF_VALUE: 25
PIF_VALUE: 22
PIF_VALUE: 20
PIF_VALUE: 22
PIF_VALUE: 21
PIF_VALUE: 22
PIF_VALUE: 22
PIF_VALUE: 21
PIF_VALUE: 0
PIF_VALUE: 22
PIF_VALUE: 6
PIF_VALUE: 19
PIF_VALUE: 22
PIF_VALUE: 18
PIF_VALUE: 0
PIF_VALUE: 17
PIF_VALUE: 21
PIF_VALUE: 22
PIF_VALUE: 21
PIF_VALUE: 24
PIF_VALUE: 20
PIF_VALUE: 19
PIF_VALUE: 2
PIF_VALUE: 22
PIF_VALUE: 22
PIF_VALUE: 21
PIF_VALUE: 22
PIF_VALUE: 21
PIF_VALUE: 20
PIF_VALUE: 38
PIF_VALUE: 0
PIF_VALUE: 21
PIF_VALUE: 22
PIF_VALUE: 21
PIF_VALUE: 26
PIF_VALUE: 21
PIF_VALUE: 20
PIF_VALUE: 17
PIF_VALUE: 43
PIF_VALUE: 21
PIF_VALUE: 22
PIF_VALUE: 22
PIF_VALUE: 1
PIF_VALUE: 22
PIF_VALUE: 19
PIF_VALUE: 1
PIF_VALUE: 22
PIF_VALUE: 25
PIF_VALUE: 2
PIF_VALUE: 21
PIF_VALUE: 3
PIF_VALUE: 21
PIF_VALUE: 24
PIF_VALUE: 22
PIF_VALUE: 21
PIF_VALUE: 30
PIF_VALUE: 22
PIF_VALUE: 19
PIF_VALUE: 0
PIF_VALUE: 21
PIF_VALUE: 22
PIF_VALUE: 39
PIF_VALUE: 19
PIF_VALUE: 21
PIF_VALUE: 21
PIF_VALUE: 0
PIF_VALUE: 22
PIF_VALUE: 18
PIF_VALUE: 21
PIF_VALUE: 22

## 2018-06-15 ASSESSMENT — ENCOUNTER SYMPTOMS
BLURRED VISION: 0
HEARTBURN: 0
COUGH: 0

## 2018-06-15 ASSESSMENT — PAIN - FUNCTIONAL ASSESSMENT: PAIN_FUNCTIONAL_ASSESSMENT: 0-10

## 2018-06-15 ASSESSMENT — PAIN DESCRIPTION - DESCRIPTORS: DESCRIPTORS: ACHING

## 2018-06-15 ASSESSMENT — PAIN SCALES - GENERAL
PAINLEVEL_OUTOF10: 10
PAINLEVEL_OUTOF10: 10
PAINLEVEL_OUTOF10: 4
PAINLEVEL_OUTOF10: 8
PAINLEVEL_OUTOF10: 8
PAINLEVEL_OUTOF10: 10

## 2018-06-15 ASSESSMENT — COPD QUESTIONNAIRES: CAT_SEVERITY: NO INTERVAL CHANGE

## 2018-06-16 VITALS
WEIGHT: 162 LBS | DIASTOLIC BLOOD PRESSURE: 89 MMHG | RESPIRATION RATE: 18 BRPM | HEIGHT: 62 IN | OXYGEN SATURATION: 97 % | BODY MASS INDEX: 29.81 KG/M2 | SYSTOLIC BLOOD PRESSURE: 158 MMHG | HEART RATE: 98 BPM | TEMPERATURE: 98.1 F

## 2018-06-16 LAB
ANION GAP SERPL CALCULATED.3IONS-SCNC: 10 MEQ/L (ref 7–13)
ANISOCYTOSIS: ABNORMAL
BASOPHILS ABSOLUTE: 0.1 K/UL (ref 0–0.2)
BASOPHILS RELATIVE PERCENT: 0.7 %
BUN BLDV-MCNC: 12 MG/DL (ref 6–20)
CALCIUM SERPL-MCNC: 8.7 MG/DL (ref 8.6–10.2)
CHLORIDE BLD-SCNC: 104 MEQ/L (ref 98–107)
CO2: 30 MEQ/L (ref 22–29)
CREAT SERPL-MCNC: 0.5 MG/DL (ref 0.5–0.9)
EOSINOPHILS ABSOLUTE: 0 K/UL (ref 0–0.7)
EOSINOPHILS RELATIVE PERCENT: 0.3 %
GFR AFRICAN AMERICAN: >60
GFR NON-AFRICAN AMERICAN: >60
GLUCOSE BLD-MCNC: 168 MG/DL (ref 60–115)
GLUCOSE BLD-MCNC: 93 MG/DL (ref 74–109)
GLUCOSE BLD-MCNC: 98 MG/DL (ref 60–115)
HCT VFR BLD CALC: 35.7 % (ref 37–47)
HEMOGLOBIN: 10 G/DL (ref 12–16)
LYMPHOCYTES ABSOLUTE: 1.6 K/UL (ref 1–4.8)
LYMPHOCYTES RELATIVE PERCENT: 19.7 %
MCH RBC QN AUTO: 24 PG (ref 27–31.3)
MCHC RBC AUTO-ENTMCNC: 28 % (ref 33–37)
MCV RBC AUTO: 85.6 FL (ref 82–100)
MICROCYTES: ABNORMAL
MONOCYTES ABSOLUTE: 0.8 K/UL (ref 0.2–0.8)
MONOCYTES RELATIVE PERCENT: 9.3 %
NEUTROPHILS ABSOLUTE: 5.7 K/UL (ref 1.4–6.5)
NEUTROPHILS RELATIVE PERCENT: 70 %
PDW BLD-RTO: 15.6 % (ref 11.5–14.5)
PERFORMED ON: ABNORMAL
PERFORMED ON: NORMAL
PLATELET # BLD: 220 K/UL (ref 130–400)
POTASSIUM REFLEX MAGNESIUM: 3.7 MEQ/L (ref 3.5–5.1)
RBC # BLD: 4.17 M/UL (ref 4.2–5.4)
SODIUM BLD-SCNC: 144 MEQ/L (ref 132–144)
WBC # BLD: 8.1 K/UL (ref 4.8–10.8)

## 2018-06-16 PROCEDURE — 94640 AIRWAY INHALATION TREATMENT: CPT

## 2018-06-16 PROCEDURE — 2580000003 HC RX 258: Performed by: ORTHOPAEDIC SURGERY

## 2018-06-16 PROCEDURE — 80048 BASIC METABOLIC PNL TOTAL CA: CPT

## 2018-06-16 PROCEDURE — 6370000000 HC RX 637 (ALT 250 FOR IP): Performed by: INTERNAL MEDICINE

## 2018-06-16 PROCEDURE — G8980 MOBILITY D/C STATUS: HCPCS

## 2018-06-16 PROCEDURE — 6370000000 HC RX 637 (ALT 250 FOR IP): Performed by: ORTHOPAEDIC SURGERY

## 2018-06-16 PROCEDURE — G8979 MOBILITY GOAL STATUS: HCPCS

## 2018-06-16 PROCEDURE — 94760 N-INVAS EAR/PLS OXIMETRY 1: CPT

## 2018-06-16 PROCEDURE — 36415 COLL VENOUS BLD VENIPUNCTURE: CPT

## 2018-06-16 PROCEDURE — 97162 PT EVAL MOD COMPLEX 30 MIN: CPT

## 2018-06-16 PROCEDURE — 2700000000 HC OXYGEN THERAPY PER DAY

## 2018-06-16 PROCEDURE — 85025 COMPLETE CBC W/AUTO DIFF WBC: CPT

## 2018-06-16 PROCEDURE — G8978 MOBILITY CURRENT STATUS: HCPCS

## 2018-06-16 RX ADMIN — SENNOSIDES AND DOCUSATE SODIUM 1 TABLET: 8.6; 5 TABLET ORAL at 07:18

## 2018-06-16 RX ADMIN — OXYCODONE HYDROCHLORIDE AND ACETAMINOPHEN 2 TABLET: 5; 325 TABLET ORAL at 07:19

## 2018-06-16 RX ADMIN — PREGABALIN 150 MG: 150 CAPSULE ORAL at 07:19

## 2018-06-16 RX ADMIN — LEVOTHYROXINE SODIUM 200 MCG: 100 TABLET ORAL at 06:18

## 2018-06-16 RX ADMIN — SIMETHICONE CHEW TAB 80 MG 180 MG: 80 TABLET ORAL at 07:18

## 2018-06-16 RX ADMIN — ACETAMINOPHEN 650 MG: 325 TABLET ORAL at 07:18

## 2018-06-16 RX ADMIN — ARIPIPRAZOLE 10 MG: 10 TABLET ORAL at 07:19

## 2018-06-16 RX ADMIN — DOCUSATE SODIUM 100 MG: 100 CAPSULE, LIQUID FILLED ORAL at 07:19

## 2018-06-16 RX ADMIN — AMLODIPINE BESYLATE 2.5 MG: 2.5 TABLET ORAL at 07:27

## 2018-06-16 RX ADMIN — Medication 10 ML: at 07:18

## 2018-06-16 RX ADMIN — Medication 2 PUFF: at 05:34

## 2018-06-16 RX ADMIN — OXYCODONE HYDROCHLORIDE AND ACETAMINOPHEN 1 TABLET: 5; 325 TABLET ORAL at 03:54

## 2018-06-16 RX ADMIN — SODIUM CHLORIDE: 9 INJECTION, SOLUTION INTRAVENOUS at 06:18

## 2018-06-16 RX ADMIN — TIZANIDINE 4 MG: 4 TABLET ORAL at 07:19

## 2018-06-16 ASSESSMENT — PAIN SCALES - GENERAL
PAINLEVEL_OUTOF10: 10
PAINLEVEL_OUTOF10: 10
PAINLEVEL_OUTOF10: 5
PAINLEVEL_OUTOF10: 10

## 2018-06-16 ASSESSMENT — PAIN DESCRIPTION - PAIN TYPE: TYPE: ACUTE PAIN;SURGICAL PAIN

## 2018-06-16 ASSESSMENT — PAIN DESCRIPTION - ORIENTATION: ORIENTATION: LEFT

## 2018-06-16 ASSESSMENT — PAIN DESCRIPTION - DESCRIPTORS: DESCRIPTORS: ACHING

## 2018-06-16 ASSESSMENT — PAIN DESCRIPTION - FREQUENCY: FREQUENCY: INTERMITTENT

## 2018-06-16 ASSESSMENT — PAIN DESCRIPTION - LOCATION: LOCATION: SHOULDER

## 2018-06-17 ENCOUNTER — CARE COORDINATION (OUTPATIENT)
Dept: CASE MANAGEMENT | Age: 58
End: 2018-06-17

## 2018-06-17 DIAGNOSIS — Z96.612 STATUS POST TOTAL SHOULDER REPLACEMENT, LEFT: Primary | ICD-10-CM

## 2018-06-17 PROCEDURE — 1111F DSCHRG MED/CURRENT MED MERGE: CPT

## 2018-06-18 ENCOUNTER — TELEPHONE (OUTPATIENT)
Dept: FAMILY MEDICINE CLINIC | Age: 58
End: 2018-06-18

## 2018-06-21 ENCOUNTER — TELEPHONE (OUTPATIENT)
Dept: FAMILY MEDICINE CLINIC | Age: 58
End: 2018-06-21

## 2018-06-21 ENCOUNTER — OFFICE VISIT (OUTPATIENT)
Dept: FAMILY MEDICINE CLINIC | Age: 58
End: 2018-06-21
Payer: COMMERCIAL

## 2018-06-21 VITALS
OXYGEN SATURATION: 97 % | HEIGHT: 62 IN | WEIGHT: 161 LBS | DIASTOLIC BLOOD PRESSURE: 80 MMHG | SYSTOLIC BLOOD PRESSURE: 156 MMHG | BODY MASS INDEX: 29.63 KG/M2 | HEART RATE: 123 BPM

## 2018-06-21 DIAGNOSIS — I69.90 LATE EFFECTS OF CVA (CEREBROVASCULAR ACCIDENT): ICD-10-CM

## 2018-06-21 DIAGNOSIS — Z96.612 STATUS POST TOTAL SHOULDER REPLACEMENT, LEFT: ICD-10-CM

## 2018-06-21 DIAGNOSIS — I69.354 HEMIPARESIS AFFECTING LEFT SIDE AS LATE EFFECT OF CEREBROVASCULAR ACCIDENT (HCC): ICD-10-CM

## 2018-06-21 DIAGNOSIS — E06.3 HYPOTHYROIDISM DUE TO HASHIMOTO'S THYROIDITIS: ICD-10-CM

## 2018-06-21 DIAGNOSIS — F41.9 ANXIETY: ICD-10-CM

## 2018-06-21 DIAGNOSIS — E03.8 HYPOTHYROIDISM DUE TO HASHIMOTO'S THYROIDITIS: ICD-10-CM

## 2018-06-21 DIAGNOSIS — I73.9 PERIPHERAL VASCULAR DISEASE (HCC): ICD-10-CM

## 2018-06-21 DIAGNOSIS — Z86.73 H/O: CVA (CEREBROVASCULAR ACCIDENT): ICD-10-CM

## 2018-06-21 DIAGNOSIS — F12.90 MARIJUANA USE: ICD-10-CM

## 2018-06-21 DIAGNOSIS — J44.9 CHRONIC OBSTRUCTIVE PULMONARY DISEASE, UNSPECIFIED COPD TYPE (HCC): ICD-10-CM

## 2018-06-21 DIAGNOSIS — E11.9 TYPE 2 DIABETES MELLITUS WITHOUT COMPLICATION, WITHOUT LONG-TERM CURRENT USE OF INSULIN (HCC): ICD-10-CM

## 2018-06-21 DIAGNOSIS — M19.012 OSTEOARTHRITIS OF LEFT SHOULDER, UNSPECIFIED OSTEOARTHRITIS TYPE: Primary | ICD-10-CM

## 2018-06-21 DIAGNOSIS — F31.71 BIPOLAR DISORDER, IN PARTIAL REMISSION, MOST RECENT EPISODE HYPOMANIC (HCC): ICD-10-CM

## 2018-06-21 PROCEDURE — 99213 OFFICE O/P EST LOW 20 MIN: CPT | Performed by: FAMILY MEDICINE

## 2018-06-21 PROCEDURE — 1111F DSCHRG MED/CURRENT MED MERGE: CPT | Performed by: FAMILY MEDICINE

## 2018-06-21 RX ORDER — LORAZEPAM 0.5 MG/1
0.5 TABLET ORAL 2 TIMES DAILY PRN
Qty: 30 TABLET | Refills: 1 | Status: SHIPPED | OUTPATIENT
Start: 2018-06-21 | End: 2018-07-27 | Stop reason: SDUPTHER

## 2018-06-21 RX ORDER — OXYCODONE HYDROCHLORIDE AND ACETAMINOPHEN 5; 325 MG/1; MG/1
TABLET ORAL
Refills: 0 | COMMUNITY
Start: 2018-06-16 | End: 2018-09-20

## 2018-06-21 RX ORDER — FLUCONAZOLE 150 MG/1
150 TABLET ORAL ONCE
Qty: 1 TABLET | Refills: 0 | Status: SHIPPED | OUTPATIENT
Start: 2018-06-21 | End: 2018-06-21

## 2018-06-25 ENCOUNTER — TELEPHONE (OUTPATIENT)
Dept: FAMILY MEDICINE CLINIC | Age: 58
End: 2018-06-25

## 2018-06-28 RX ORDER — IBUPROFEN 800 MG/1
800 TABLET ORAL EVERY 8 HOURS PRN
Qty: 90 TABLET | Refills: 0 | Status: SHIPPED | OUTPATIENT
Start: 2018-06-28 | End: 2018-07-28 | Stop reason: SDUPTHER

## 2018-06-28 RX ORDER — ESOMEPRAZOLE MAGNESIUM 40 MG/1
80 CAPSULE, DELAYED RELEASE ORAL DAILY
Qty: 180 CAPSULE | Refills: 1 | Status: SHIPPED | OUTPATIENT
Start: 2018-06-28 | End: 2018-07-06 | Stop reason: SDUPTHER

## 2018-07-06 ENCOUNTER — TELEPHONE (OUTPATIENT)
Dept: FAMILY MEDICINE CLINIC | Age: 58
End: 2018-07-06

## 2018-07-06 RX ORDER — ESOMEPRAZOLE MAGNESIUM 40 MG/1
80 CAPSULE, DELAYED RELEASE ORAL DAILY
Qty: 180 CAPSULE | Refills: 1 | Status: CANCELLED | OUTPATIENT
Start: 2018-07-06

## 2018-07-06 RX ORDER — ESOMEPRAZOLE MAGNESIUM 40 MG/1
80 CAPSULE, DELAYED RELEASE ORAL DAILY
Qty: 180 CAPSULE | Refills: 1 | Status: SHIPPED | OUTPATIENT
Start: 2018-07-06 | End: 2019-04-09 | Stop reason: SDUPTHER

## 2018-07-07 DIAGNOSIS — G25.81 RLS (RESTLESS LEGS SYNDROME): ICD-10-CM

## 2018-07-07 DIAGNOSIS — M62.838 MUSCLE SPASM: ICD-10-CM

## 2018-07-09 RX ORDER — TIZANIDINE 4 MG/1
TABLET ORAL
Qty: 180 TABLET | Refills: 0 | Status: SHIPPED | OUTPATIENT
Start: 2018-07-09 | End: 2018-07-27 | Stop reason: ALTCHOICE

## 2018-07-09 RX ORDER — PRAMIPEXOLE DIHYDROCHLORIDE 0.5 MG/1
0.5 TABLET ORAL EVERY EVENING
Qty: 90 TABLET | Refills: 0 | Status: SHIPPED | OUTPATIENT
Start: 2018-07-09 | End: 2019-04-09 | Stop reason: SDUPTHER

## 2018-07-12 RX ORDER — LANOLIN ALCOHOL/MO/W.PET/CERES
1000 CREAM (GRAM) TOPICAL DAILY
Qty: 30 TABLET | Refills: 0 | Status: SHIPPED | OUTPATIENT
Start: 2018-07-12 | End: 2018-09-10 | Stop reason: SDUPTHER

## 2018-07-19 RX ORDER — BUDESONIDE AND FORMOTEROL FUMARATE DIHYDRATE 160; 4.5 UG/1; UG/1
2 AEROSOL RESPIRATORY (INHALATION) 2 TIMES DAILY
Qty: 3 INHALER | Refills: 3 | Status: SHIPPED | OUTPATIENT
Start: 2018-07-19 | End: 2018-12-03

## 2018-07-20 ENCOUNTER — TELEPHONE (OUTPATIENT)
Dept: FAMILY MEDICINE CLINIC | Age: 58
End: 2018-07-20

## 2018-07-24 ENCOUNTER — TELEPHONE (OUTPATIENT)
Dept: FAMILY MEDICINE CLINIC | Age: 58
End: 2018-07-24

## 2018-07-24 ENCOUNTER — APPOINTMENT (OUTPATIENT)
Dept: GENERAL RADIOLOGY | Age: 58
End: 2018-07-24
Payer: MEDICARE

## 2018-07-24 ENCOUNTER — HOSPITAL ENCOUNTER (EMERGENCY)
Age: 58
Discharge: HOME | End: 2018-07-24
Payer: MEDICARE

## 2018-07-24 VITALS
HEIGHT: 62 IN | WEIGHT: 155 LBS | DIASTOLIC BLOOD PRESSURE: 82 MMHG | OXYGEN SATURATION: 95 % | HEART RATE: 102 BPM | BODY MASS INDEX: 28.52 KG/M2 | TEMPERATURE: 98.5 F | RESPIRATION RATE: 16 BRPM | SYSTOLIC BLOOD PRESSURE: 143 MMHG

## 2018-07-24 DIAGNOSIS — S70.02XA CONTUSION OF LEFT HIP, INITIAL ENCOUNTER: ICD-10-CM

## 2018-07-24 DIAGNOSIS — S70.01XA CONTUSION OF RIGHT HIP, INITIAL ENCOUNTER: Primary | ICD-10-CM

## 2018-07-24 PROCEDURE — 6370000000 HC RX 637 (ALT 250 FOR IP): Performed by: PHYSICIAN ASSISTANT

## 2018-07-24 PROCEDURE — 73522 X-RAY EXAM HIPS BI 3-4 VIEWS: CPT

## 2018-07-24 PROCEDURE — 99283 EMERGENCY DEPT VISIT LOW MDM: CPT

## 2018-07-24 RX ORDER — ACETAMINOPHEN 325 MG/1
650 TABLET ORAL ONCE
Status: COMPLETED | OUTPATIENT
Start: 2018-07-24 | End: 2018-07-24

## 2018-07-24 RX ADMIN — ACETAMINOPHEN 650 MG: 325 TABLET ORAL at 17:20

## 2018-07-24 ASSESSMENT — PAIN SCALES - GENERAL
PAINLEVEL_OUTOF10: 10
PAINLEVEL_OUTOF10: 10

## 2018-07-24 ASSESSMENT — ENCOUNTER SYMPTOMS
EYES NEGATIVE: 1
RESPIRATORY NEGATIVE: 1
GASTROINTESTINAL NEGATIVE: 1

## 2018-07-24 ASSESSMENT — PAIN DESCRIPTION - DESCRIPTORS: DESCRIPTORS: ACHING

## 2018-07-24 ASSESSMENT — PAIN DESCRIPTION - LOCATION: LOCATION: HIP

## 2018-07-24 ASSESSMENT — PAIN DESCRIPTION - PAIN TYPE: TYPE: CHRONIC PAIN;ACUTE PAIN

## 2018-07-24 ASSESSMENT — PAIN DESCRIPTION - FREQUENCY: FREQUENCY: CONTINUOUS

## 2018-07-24 ASSESSMENT — PAIN DESCRIPTION - ORIENTATION: ORIENTATION: RIGHT;LEFT
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
## 2018-07-24 NOTE — ED TRIAGE NOTES
Pt states she fell about 2 weeks ago. Pt states she missed her dr joyner today to inquire about how she gest admitted into a rehab. Pt is a+o x4 msps intact pt states she is having bilat hip pain that has been going on x1 month but has increased since fall. Pt denies any fever or chills at this time.

## 2018-07-24 NOTE — ED PROVIDER NOTES
DISINTEGRATING TABLET    Take 1 tablet by mouth every 8 hours as needed for Nausea or Vomiting    ONE TOUCH ULTRA TEST STRIP    1 each 2 times daily     ONE TOUCH ULTRASOFT LANCETS MISC    1 each 2 times daily     OXYCODONE-ACETAMINOPHEN (PERCOCET) 5-325 MG PER TABLET    TK 1 TO 2 TS PO Q 6 H PRN    POTASSIUM CHLORIDE (KLOR-CON M) 20 MEQ EXTENDED RELEASE TABLET    Take 1 tablet by mouth daily    PRAMIPEXOLE (MIRAPEX) 0.5 MG TABLET    Take 1 tablet by mouth every evening    PRAMIPEXOLE (MIRAPEX) 0.5 MG TABLET    TAKE 1 TABLET BY MOUTH EVERY EVENING    PREGABALIN (LYRICA) 150 MG CAPSULE    Take 1 capsule by mouth 2 times daily for 180 days. Keyanna Rowe SIMETHICONE 180 MG CAPS    TAKE 1 CAPSULE BY MOUTH TWICE DAILY    SODIUM CHLORIDE 0.9 % IRRIGATION    USE AS DIRECTED    TERAZOSIN (HYTRIN) 2 MG CAPSULE    Take 1 capsule by mouth nightly    TIZANIDINE (ZANAFLEX) 4 MG TABLET    TAKE 2 TABLETS BY MOUTH EVERY 8 HOURS AS NEEDED FOR MUSCLE SPASM    TIZANIDINE (ZANAFLEX) 4 MG TABLET    TAKE 2 TABLETS BY MOUTH EVERY 8 HOURS AS NEEDED FOR MUSCLE SPASM    TRIAMCINOLONE (KENALOG) 0.1 % OINTMENT    Apply topically 2 times daily    VITAMIN B-12 (CYANOCOBALAMIN) 1000 MCG TABLET    Take 1 tablet by mouth daily    VITAMIN B-12 (CYANOCOBALAMIN) 1000 MCG TABLET    TAKE 1 TABLET BY MOUTH DAILY    VITAMIN D (ERGOCALCIFEROL) 48332 UNITS CAPS CAPSULE    TK 1 C PO TWICE WEEKLY       ALLERGIES     Latex; Imitrex [sumatriptan]; Zomig [zolmitriptan]; Antivert [meclizine hcl]; Flagyl [metronidazole]; Ketorolac tromethamine; Provera [medroxyprogesterone acetate]; Ultram [tramadol hcl]; Bacitracin; Bactrim; Cefdinir; Cefuroxime axetil; Codeine; Cyclosporine; Iodine; Iv dye [iodides]; Neomycin; Petroleum jelly [skin protectants, misc.]; Quinolones; Sulfa antibiotics;  Toradol [ketorolac tromethamine]; and Trovan [trovafloxacin]    FAMILY HISTORY       Family History   Problem Relation Age of Onset    Osteoarthritis Mother     Asthma Mother     Diabetes place, and time. No cranial nerve deficit. Skin: Skin is warm and dry. No rash noted. No erythema. Psychiatric: She has a normal mood and affect. Judgment normal.         All other labs were within normal range or not returned as of this dictation. EMERGENCY DEPARTMENT COURSE and DIFFERENTIAL DIAGNOSIS/MDM:   Vitals:    Vitals:    07/24/18 1545   BP: (!) 143/82   Pulse: 102   Resp: 16   Temp: 98.5 °F (36.9 °C)   TempSrc: Oral   SpO2: 95%   Weight: 155 lb (70.3 kg)   Height: 5' 2\" (1.575 m)          Patient given Tylenol for pain while in the emergency room. X-ray reveals no acute fracture. Hardware intact on the left hip. Patient has physical therapy appointment scheduled for the morning. She is advised to be evaluated for possible extended care facility placement. Patient also already has an appt scehduled with a family doctor in 2 days for re-evaluation and treatment. Patient states she has a long term boyfriend that lives with her at home. Return if symptoms worsen. Patient instructed to move all coffee tables and other hard surfaces that pose a safety risk. She verbalizes understanding of discharge and has no further questions. Patient ambulated to bathroom prior to discharge. PROCEDURES:  Unless otherwise noted below, none     Procedures      FINAL IMPRESSION      1. Contusion of right hip, initial encounter    2.  Contusion of left hip, initial encounter          DISPOSITION/PLAN   DISPOSITION            Toshia Webster PA-C (electronically signed)  Attending Emergency Physician  [facility]  07/24/18 9844

## 2018-07-25 DIAGNOSIS — G25.81 RLS (RESTLESS LEGS SYNDROME): ICD-10-CM

## 2018-07-25 RX ORDER — PRAMIPEXOLE DIHYDROCHLORIDE 0.5 MG/1
0.5 TABLET ORAL EVERY EVENING
Qty: 90 TABLET | Refills: 5 | Status: SHIPPED | OUTPATIENT
Start: 2018-07-25 | End: 2018-11-20

## 2018-07-26 ENCOUNTER — CARE COORDINATION (OUTPATIENT)
Dept: CARE COORDINATION | Age: 58
End: 2018-07-26

## 2018-07-26 NOTE — CARE COORDINATION
Ambulatory Care Coordination  ED Follow up Call    Reason for ED visit:  Contusion of Bilateral Hips   Status:     not changed: Pt reports no change in her condition. Pt c/o bilateral hip and leg pain. Pt rates pain a 10 out of 10. Pt describes pain as being \"achy and sharp\". Pain is relieved by \"nothing\". Pt states that she did not  medications ordered in ED d/t her insurance not covering them. Pt also is c/o \"Extreme\" bilateral leg weakness; pt stated that her leg movements are very stiff. Pt states that she is using her walker to get around the house. Did you call your PCP prior to going to the ED? No      Did you receive a discharge instructions from the Emergency Room? Yes  Review of Instructions:     Understands what to report/when to return?:  Yes   Understands discharge instructions?:  Yes   Following discharge instructions?:  Yes   If not why? Are there any new complaints of pain? No  New Pain Meds? No    Constipation prophylaxis needed? N/A    If you have a wound is the dressing clean, dry, and intact? N/A  Understands wound care regimen? N/A    Are there any other complaints/concerns that you wish to tell your provider? Pt has several requests, which she states she will talk with PCP at appt tomorrow. FU appts/Provider:    Future Appointments  Date Time Provider Tessy Jean   7/27/2018 4:15 PM Celia Skinner MD [facility]   9/20/2018 2:45 PM Celia Skinner MD [facility] [place]       New Medications?:   Yes:     Acetaminophen 325 mg, take 650 mg Q 6 hrs PRN   Capsaicin 0.035% cream, apply topically TID      Medication Reconciliation by phone - Yes  Understands Medications? Yes  Taking Medications? No, Pt did not fill scripts  Can you swallow your pills? Yes    Any further needs in the home i.e. Equipment?   Yes    Link to services in community?:  N/A   Which services:

## 2018-07-27 ENCOUNTER — OFFICE VISIT (OUTPATIENT)
Dept: FAMILY MEDICINE CLINIC | Age: 58
End: 2018-07-27
Payer: MEDICARE

## 2018-07-27 VITALS
OXYGEN SATURATION: 98 % | HEART RATE: 113 BPM | SYSTOLIC BLOOD PRESSURE: 146 MMHG | HEIGHT: 62 IN | BODY MASS INDEX: 30.4 KG/M2 | DIASTOLIC BLOOD PRESSURE: 88 MMHG | WEIGHT: 165.2 LBS

## 2018-07-27 DIAGNOSIS — G89.29 CHRONIC LOW BACK PAIN, UNSPECIFIED BACK PAIN LATERALITY, WITH SCIATICA PRESENCE UNSPECIFIED: ICD-10-CM

## 2018-07-27 DIAGNOSIS — E11.51 TYPE 2 DIABETES MELLITUS WITH DIABETIC PERIPHERAL ANGIOPATHY WITHOUT GANGRENE, WITHOUT LONG-TERM CURRENT USE OF INSULIN (HCC): ICD-10-CM

## 2018-07-27 DIAGNOSIS — M54.5 CHRONIC LOW BACK PAIN, UNSPECIFIED BACK PAIN LATERALITY, WITH SCIATICA PRESENCE UNSPECIFIED: ICD-10-CM

## 2018-07-27 DIAGNOSIS — F31.71 BIPOLAR DISORDER, IN PARTIAL REMISSION, MOST RECENT EPISODE HYPOMANIC (HCC): ICD-10-CM

## 2018-07-27 DIAGNOSIS — F41.9 ANXIETY: ICD-10-CM

## 2018-07-27 DIAGNOSIS — E03.9 HYPOTHYROIDISM, UNSPECIFIED TYPE: ICD-10-CM

## 2018-07-27 DIAGNOSIS — I69.354 HEMIPARESIS AFFECTING LEFT SIDE AS LATE EFFECT OF CEREBROVASCULAR ACCIDENT (HCC): ICD-10-CM

## 2018-07-27 DIAGNOSIS — J44.9 CHRONIC OBSTRUCTIVE PULMONARY DISEASE, UNSPECIFIED COPD TYPE (HCC): Primary | ICD-10-CM

## 2018-07-27 DIAGNOSIS — I73.9 PERIPHERAL VASCULAR DISEASE (HCC): ICD-10-CM

## 2018-07-27 PROCEDURE — G8427 DOCREV CUR MEDS BY ELIG CLIN: HCPCS | Performed by: FAMILY MEDICINE

## 2018-07-27 PROCEDURE — G8599 NO ASA/ANTIPLAT THER USE RNG: HCPCS | Performed by: FAMILY MEDICINE

## 2018-07-27 PROCEDURE — 3046F HEMOGLOBIN A1C LEVEL >9.0%: CPT | Performed by: FAMILY MEDICINE

## 2018-07-27 PROCEDURE — 3023F SPIROM DOC REV: CPT | Performed by: FAMILY MEDICINE

## 2018-07-27 PROCEDURE — 2022F DILAT RTA XM EVC RTNOPTHY: CPT | Performed by: FAMILY MEDICINE

## 2018-07-27 PROCEDURE — 99214 OFFICE O/P EST MOD 30 MIN: CPT | Performed by: FAMILY MEDICINE

## 2018-07-27 PROCEDURE — 3017F COLORECTAL CA SCREEN DOC REV: CPT | Performed by: FAMILY MEDICINE

## 2018-07-27 PROCEDURE — G8926 SPIRO NO PERF OR DOC: HCPCS | Performed by: FAMILY MEDICINE

## 2018-07-27 PROCEDURE — 4004F PT TOBACCO SCREEN RCVD TLK: CPT | Performed by: FAMILY MEDICINE

## 2018-07-27 PROCEDURE — G8417 CALC BMI ABV UP PARAM F/U: HCPCS | Performed by: FAMILY MEDICINE

## 2018-07-27 RX ORDER — LORAZEPAM 0.5 MG/1
0.5 TABLET ORAL 2 TIMES DAILY PRN
Qty: 30 TABLET | Refills: 1 | Status: SHIPPED | OUTPATIENT
Start: 2018-07-27 | End: 2018-09-05 | Stop reason: SDUPTHER

## 2018-07-27 RX ORDER — FLUCONAZOLE 150 MG/1
150 TABLET ORAL
Qty: 3 TABLET | Refills: 0 | Status: SHIPPED | OUTPATIENT
Start: 2018-07-27 | End: 2018-07-29

## 2018-07-27 NOTE — PROGRESS NOTES
lumbar vertebra ([address]    H/O: CVA (cerebrovascular accident)    Hearing difficulty    History of HPV infection    Hypothyroidism due to Hashimoto's thyroiditis    Hypoxic brain injury ([address]    Late effects of CVA (cerebrovascular accident)    Neurogenic bladder    Nonallopathic lesion of cervical region, not elsewhere classified    Nonallopathic lesion of sacral region    Peripheral vascular disease ([address]    Post-laminectomy syndrome    Primary osteoarthritis of left shoulder    Pulmonary embolism (HCC)    Pulmonary embolism with infarction ([address]    Recurrent UTI    Retention of urine    Trochanteric bursitis of left hip    Vitamin D deficiency    High risk medication use - 11/01/17 OARRS PM&R, 11/13/17 Tox Screen: positive marijuana PM&R    Marijuana use    Chronic midline thoracic back pain    Vertebral compression fracture (HCC)    Closed wedge compression fracture of first lumbar vertebra with delayed healing    DJD of left shoulder    Status post total shoulder replacement, right    Status post total shoulder replacement, left       Allergies   Allergen Reactions    Latex     Imitrex [Sumatriptan] Anaphylaxis    Zomig [Zolmitriptan] Anaphylaxis    Antivert [Meclizine Hcl] Other (See Comments)     confusion    Flagyl [Metronidazole] Nausea Only     Nausea with rash    Ketorolac Tromethamine Nausea Only    Provera [Medroxyprogesterone Acetate] Other (See Comments)     Caused massive stroke    Ultram [Tramadol Hcl] Nausea Only    Bacitracin Rash    Bactrim Nausea And Vomiting and Rash    Cefdinir Rash    Cefuroxime Axetil Rash    Codeine Rash    Cyclosporine Rash    Iodine Rash     Rash with SOB    Iv Dye [Iodides] Rash     Rash with SOB    Neomycin Rash    Petroleum Jelly [Skin Protectants, Misc.] Rash    Quinolones Rash    Sulfa Antibiotics Rash    Toradol [Ketorolac Tromethamine] Rash    Trovan [Trovafloxacin] Rash       Medications listed as ordered at the time of discharge from hospital   Olga Lidia Wagner   Home Medication Instructions TAMIA:    Printed on:07/27/18 6175   Medication Information                      Acetaminophen (APAP) 325 MG TABS  Take 650 mg by mouth every 6 hours as needed for Pain             amLODIPine (NORVASC) 2.5 MG tablet  Take 1 tablet by mouth daily             ARIPiprazole (ABILIFY) 10 MG tablet  TK 1 T PO QAM             ascorbic acid (VITAMIN C) 500 MG tablet  Take 500 mg by mouth daily.                budesonide-formoterol (SYMBICORT) 160-4.5 MCG/ACT AERO  Inhale 2 puffs into the lungs 2 times daily             Capsaicin (ZOSTRIX) 0.035 % CREA cream  Apply topically 3 times daily Okay to substitute with alternate percentage if prescribed unavailable             carisoprodol (SOMA) 350 MG tablet  Take 1 tablet by mouth daily as needed (migraine)             Cholecalciferol (VITAMIN D3) 2000 UNITS TABS  Take 1 tablet by mouth daily              CRESTOR 20 MG tablet  Take 20 mg by mouth daily              escitalopram (LEXAPRO) 20 MG tablet  TK 1 T PO QAM             esomeprazole (NEXIUM) 40 MG delayed release capsule  Take 2 capsules by mouth daily             fluconazole (DIFLUCAN) 150 MG tablet  Take 1 tablet by mouth every 72 hours for 3 doses             folic acid (FOLVITE) 1 MG tablet  Take 1 tablet by mouth daily             furosemide (LASIX) 40 MG tablet  Take 1 tablet by mouth daily             gentamicin (GARAMYCIN) 40 MG/ML injection  U 80 MG BID FOR 5 DOSES             GENTEAL SEVERE 0.3 % GEL  10PLACE 1 DROP IN BOTH EYES Q 8 H             Handicap Placard MISC  by Does not apply route Exp 5 years             ibuprofen (ADVIL;MOTRIN) 800 MG tablet  TAKE 1 TABLET BY MOUTH EVERY 8 HOURS AS NEEDED FOR PAIN             ipratropium (ATROVENT) 0.02 % nebulizer solution  0.5 mg             Lactobacillus (ACIDOPHILUS) CAPS capsule  TAKE 1 CAPSULE BY MOUTH ONCE DAILY             levalbuterol (XOPENEX HFA) 45 MCG/ACT inhaler  Inhale 2 puffs into the lungs every 4 hours as needed. levalbuterol (XOPENEX) 0.63 MG/3ML nebulization  0.63 mg             levocetirizine (XYZAL) 5 MG tablet  TK 1 T PO QD PRN             levothyroxine (SYNTHROID) 200 MCG tablet  TAKE 1 AND 1/2 TABLETS BY MOUTH DAILY             lidocaine viscous (XYLOCAINE) 2 % solution  Take 1 mL by mouth as needed for Irritation or Dental Pain (apply to gum with q-tip)             loperamide (IMODIUM A-D) 2 MG tablet  Take 2 mg by mouth             LORazepam (ATIVAN) 0.5 MG tablet  Take 1 tablet by mouth 2 times daily as needed for Anxiety for up to 30 days. .             magnesium oxide (MAG-OX) 400 (240 Mg) MG tablet  Take 1 tablet by mouth daily             Melatonin 10 MG TABS  TAKE 1 TABLET BY MOUTH EVERY NIGHT             meloxicam (MOBIC) 15 MG tablet  Take 1 tablet by mouth daily             metFORMIN (GLUCOPHAGE) 500 MG tablet  Take 1 tablet by mouth 2 times daily (with meals)             mirtazapine (REMERON) 30 MG tablet  Take 30 mg by mouth nightly. MUCINEX 600 MG extended release tablet  TAKE 1 TABLET BY MOUTH TWICE DAILY             Multiple Vitamins-Iron (MULTIVITAMIN/IRON) TABS  Take 1 tablet by mouth daily.                Nutritional Supplements (BOOST HIGH PROTEIN) LIQD  DRINK 1 CAN BY MOUTH TWICE DAILY             ondansetron (ZOFRAN-ODT) 4 MG disintegrating tablet  Take 1 tablet by mouth every 8 hours as needed for Nausea or Vomiting             ONE TOUCH ULTRA TEST strip  1 each 2 times daily              ONE TOUCH ULTRASOFT LANCETS MISC  1 each 2 times daily              oxyCODONE-acetaminophen (PERCOCET) 5-325 MG per tablet  TK 1 TO 2 TS PO Q 6 H PRN             potassium chloride (KLOR-CON M) 20 MEQ extended release tablet  Take 1 tablet by mouth daily             pramipexole (MIRAPEX) 0.5 MG tablet  TAKE 1 TABLET BY MOUTH EVERY EVENING             pramipexole (MIRAPEX) 0.5 MG tablet  TAKE 1 TABLET BY MOUTH  EVERY EVENING             pregabalin up          Miah Rockwell MD

## 2018-07-29 ENCOUNTER — HOSPITAL ENCOUNTER (EMERGENCY)
Age: 58
Discharge: HOME | End: 2018-07-29
Attending: EMERGENCY MEDICINE
Payer: MEDICARE

## 2018-07-29 VITALS
BODY MASS INDEX: 27.6 KG/M2 | HEIGHT: 62 IN | TEMPERATURE: 99.7 F | SYSTOLIC BLOOD PRESSURE: 138 MMHG | HEART RATE: 124 BPM | DIASTOLIC BLOOD PRESSURE: 81 MMHG | OXYGEN SATURATION: 93 % | RESPIRATION RATE: 18 BRPM | WEIGHT: 150 LBS

## 2018-07-29 DIAGNOSIS — M54.9 CHRONIC BACK PAIN GREATER THAN 3 MONTHS DURATION: Primary | ICD-10-CM

## 2018-07-29 DIAGNOSIS — G89.29 CHRONIC BACK PAIN GREATER THAN 3 MONTHS DURATION: Primary | ICD-10-CM

## 2018-07-29 PROCEDURE — 96372 THER/PROPH/DIAG INJ SC/IM: CPT

## 2018-07-29 PROCEDURE — 6360000002 HC RX W HCPCS: Performed by: EMERGENCY MEDICINE

## 2018-07-29 PROCEDURE — 99284 EMERGENCY DEPT VISIT MOD MDM: CPT

## 2018-07-29 RX ORDER — ORPHENADRINE CITRATE 30 MG/ML
60 INJECTION INTRAMUSCULAR; INTRAVENOUS ONCE
Status: COMPLETED | OUTPATIENT
Start: 2018-07-29 | End: 2018-07-29

## 2018-07-29 RX ADMIN — ORPHENADRINE CITRATE 60 MG: 30 INJECTION INTRAMUSCULAR; INTRAVENOUS at 15:20

## 2018-07-29 ASSESSMENT — ENCOUNTER SYMPTOMS
WHEEZING: 0
RHINORRHEA: 0
EYE DISCHARGE: 0
ABDOMINAL PAIN: 0
COLOR CHANGE: 0
PHOTOPHOBIA: 0
ABDOMINAL DISTENTION: 0
SHORTNESS OF BREATH: 0
FACIAL SWELLING: 0
BACK PAIN: 1
VOMITING: 0

## 2018-07-29 ASSESSMENT — PAIN DESCRIPTION - ORIENTATION: ORIENTATION: RIGHT;LEFT;MID

## 2018-07-29 ASSESSMENT — PAIN DESCRIPTION - DESCRIPTORS
DESCRIPTORS: SHARP;SHOOTING
DESCRIPTORS: SHARP;SHOOTING

## 2018-07-29 ASSESSMENT — PAIN DESCRIPTION - PAIN TYPE
TYPE: CHRONIC PAIN
TYPE: ACUTE PAIN;CHRONIC PAIN

## 2018-07-29 ASSESSMENT — PAIN SCALES - GENERAL: PAINLEVEL_OUTOF10: 10

## 2018-07-29 ASSESSMENT — PAIN SCALES - WONG BAKER: WONGBAKER_NUMERICALRESPONSE: 8

## 2018-07-29 ASSESSMENT — PAIN DESCRIPTION - LOCATION
LOCATION: BACK
LOCATION: BACK;HIP

## 2018-07-29 ASSESSMENT — PAIN DESCRIPTION - FREQUENCY
FREQUENCY: CONTINUOUS
FREQUENCY: CONTINUOUS

## 2018-07-29 NOTE — ED PROVIDER NOTES
[facility] ED  eMERGENCY dEPARTMENT eNCOUnter      Pt Name: Leanna Phillips  MRN: [id number]  [place] 1960  Date of evaluation: 7/29/2018  Provider: Sharon Andrea, [address] [place] [address]       Chief Complaint   Patient presents with    Extremity Weakness     pt c/o increased generalized weakness and increased chronic back pain for the past 2 weeks         HISTORY OF PRESENT ILLNESS   (Location/Symptom, Timing/Onset, Context/Setting, Quality, Duration, Modifying Factors, Severity)  Note limiting factors. Leanna Phillips is a 66 y.o. female who presents to the emergency department Well known to us. Patient is stating that she is having trouble walking secondary to an exacerbation of her chronic back pain. She denies any new trauma. She has presented multiple times under my personal care with similar symptoms. She has plans to be admitted to an extended care facility in the near future for rehabilitation. Her boyfriend at home is able to help her with her activities of daily living in the interim. HPI    Nursing Notes were reviewed. REVIEW OF SYSTEMS    (2-9 systems for level 4, 10 or more for level 5)     Review of Systems   Constitutional: Negative for activity change and appetite change. HENT: Negative for congestion, facial swelling and rhinorrhea. Eyes: Negative for photophobia and discharge. Respiratory: Negative for shortness of breath and wheezing. Cardiovascular: Negative for chest pain. Gastrointestinal: Negative for abdominal distention, abdominal pain and vomiting. Endocrine: Negative for polydipsia and polyphagia. Genitourinary: Negative for difficulty urinating, frequency, vaginal bleeding and vaginal discharge. Musculoskeletal: Positive for back pain. Negative for gait problem. Skin: Negative for color change. Allergic/Immunologic: Negative for immunocompromised state. Neurological: Negative for dizziness, weakness and light-headedness. LOPERAMIDE (IMODIUM A-D) 2 MG TABLET    Take 2 mg by mouth    LORAZEPAM (ATIVAN) 0.5 MG TABLET    Take 1 tablet by mouth 2 times daily as needed for Anxiety for up to 30 days. Lucas Ridaniel MAGNESIUM OXIDE (MAG-OX) 400 (240 MG) MG TABLET    Take 1 tablet by mouth daily    MELATONIN 10 MG TABS    TAKE 1 TABLET BY MOUTH EVERY NIGHT    MELOXICAM (MOBIC) 15 MG TABLET    Take 1 tablet by mouth daily    METFORMIN (GLUCOPHAGE) 500 MG TABLET    Take 1 tablet by mouth 2 times daily (with meals)    MIRTAZAPINE (REMERON) 30 MG TABLET    Take 30 mg by mouth nightly. MUCINEX 600 MG EXTENDED RELEASE TABLET    TAKE 1 TABLET BY MOUTH TWICE DAILY    MULTIPLE VITAMINS-IRON (MULTIVITAMIN/IRON) TABS    Take 1 tablet by mouth daily. ONDANSETRON (ZOFRAN-ODT) 4 MG DISINTEGRATING TABLET    Take 1 tablet by mouth every 8 hours as needed for Nausea or Vomiting    ONE TOUCH ULTRA TEST STRIP    1 each 2 times daily     ONE TOUCH ULTRASOFT LANCETS MISC    1 each 2 times daily     OXYCODONE-ACETAMINOPHEN (PERCOCET) 5-325 MG PER TABLET    TK 1 TO 2 TS PO Q 6 H PRN    POTASSIUM CHLORIDE (KLOR-CON M) 20 MEQ EXTENDED RELEASE TABLET    Take 1 tablet by mouth daily    PRAMIPEXOLE (MIRAPEX) 0.5 MG TABLET    TAKE 1 TABLET BY MOUTH EVERY EVENING    PRAMIPEXOLE (MIRAPEX) 0.5 MG TABLET    TAKE 1 TABLET BY MOUTH  EVERY EVENING    PREGABALIN (LYRICA) 150 MG CAPSULE    Take 1 capsule by mouth 2 times daily for 180 days. Lucas Wilks     SIMETHICONE 180 MG CAPS    TAKE 1 CAPSULE BY MOUTH TWICE DAILY    SODIUM CHLORIDE 0.9 % IRRIGATION    USE AS DIRECTED    TERAZOSIN (HYTRIN) 2 MG CAPSULE    Take 1 capsule by mouth nightly    TIZANIDINE (ZANAFLEX) 4 MG TABLET    TAKE 2 TABLETS BY MOUTH EVERY 8 HOURS AS NEEDED FOR MUSCLE SPASM    VITAMIN B-12 (CYANOCOBALAMIN) 1000 MCG TABLET    Take 1 tablet by mouth daily    VITAMIN B-12 (CYANOCOBALAMIN) 1000 MCG TABLET    TAKE 1 TABLET BY MOUTH DAILY    VITAMIN D (ERGOCALCIFEROL) 40891 UNITS CAPS CAPSULE    TK 1 C PO TWICE WEEKLY

## 2018-07-30 ENCOUNTER — TELEPHONE (OUTPATIENT)
Dept: FAMILY MEDICINE CLINIC | Age: 58
End: 2018-07-30

## 2018-07-30 RX ORDER — IBUPROFEN 800 MG/1
800 TABLET ORAL EVERY 8 HOURS PRN
Qty: 90 TABLET | Refills: 0 | Status: SHIPPED | OUTPATIENT
Start: 2018-07-30 | End: 2018-09-07 | Stop reason: SDUPTHER

## 2018-07-30 NOTE — TELEPHONE ENCOUNTER
Edilia Arevalo, from [facility], stating the pt is requesting a skilled nursing facility. Please advise. Edilia Arevalo [phone].

## 2018-07-30 NOTE — TELEPHONE ENCOUNTER
Pt calling to request washable pads for a twin size bed, and a bedside free-standing toilet. Pt states when she wakes up in the morning, she sometimes doesn't make it to the bathroom on time. Please advise.

## 2018-07-31 ENCOUNTER — TELEPHONE (OUTPATIENT)
Dept: FAMILY MEDICINE CLINIC | Age: 58
End: 2018-07-31

## 2018-07-31 NOTE — TELEPHONE ENCOUNTER
Pt is currently at the [facility]  They are sending her downtown to THE [facility]  Pt lost all control of bowels and bladder over the weekend. She just wanted you to be aware on where she will be.

## 2018-08-03 NOTE — TELEPHONE ENCOUNTER
Patient currently at [facility], 6th floor, [place]. She will most likely be transferred to inpatient rehab as she is currently only able to sit at the side of the bed for a couple of minutes.

## 2018-08-13 ENCOUNTER — TELEPHONE (OUTPATIENT)
Dept: FAMILY MEDICINE CLINIC | Age: 58
End: 2018-08-13

## 2018-08-13 NOTE — TELEPHONE ENCOUNTER
Pt calling to let Dr Ramu Cassidy know she had to have a back decompression. And requesting a meds list be faxed to [facility]. On alex eng. To Dr Kelly Mason Pt does not know the number for the fax.

## 2018-08-24 RX ORDER — FLUCONAZOLE 100 MG/1
100 TABLET ORAL DAILY
Qty: 7 TABLET | Refills: 0 | Status: SHIPPED | OUTPATIENT
Start: 2018-08-24 | End: 2018-08-30

## 2018-08-24 NOTE — TELEPHONE ENCOUNTER
Pt has been discharged from [facility] today, pt is requesting a patch for a opened wound that is located where her buttock and thigh meet. Please send to jen.

## 2018-08-27 ENCOUNTER — TELEPHONE (OUTPATIENT)
Dept: FAMILY MEDICINE CLINIC | Age: 58
End: 2018-08-27

## 2018-08-27 NOTE — TELEPHONE ENCOUNTER
Need to confirm with the NH what they were using    Will she have homecare?   They usually order this sort of wound care supply in the home care orders    Most pharmacies don't carry these and has to go to wound clinic

## 2018-08-30 ENCOUNTER — OFFICE VISIT (OUTPATIENT)
Dept: FAMILY MEDICINE CLINIC | Age: 58
End: 2018-08-30
Payer: MEDICARE

## 2018-08-30 VITALS
OXYGEN SATURATION: 95 % | HEIGHT: 62 IN | HEART RATE: 89 BPM | BODY MASS INDEX: 27.44 KG/M2 | SYSTOLIC BLOOD PRESSURE: 124 MMHG | DIASTOLIC BLOOD PRESSURE: 66 MMHG

## 2018-08-30 DIAGNOSIS — T81.89XA RETAINED SUTURE, INITIAL ENCOUNTER: ICD-10-CM

## 2018-08-30 DIAGNOSIS — G83.4 CAUDA EQUINA COMPRESSION (HCC): ICD-10-CM

## 2018-08-30 DIAGNOSIS — Z18.9 RETAINED SUTURE, INITIAL ENCOUNTER: ICD-10-CM

## 2018-08-30 DIAGNOSIS — L89.322 DECUBITUS ULCER OF LEFT BUTTOCK, STAGE 2 (HCC): Primary | ICD-10-CM

## 2018-08-30 PROCEDURE — 4004F PT TOBACCO SCREEN RCVD TLK: CPT | Performed by: FAMILY MEDICINE

## 2018-08-30 PROCEDURE — G8417 CALC BMI ABV UP PARAM F/U: HCPCS | Performed by: FAMILY MEDICINE

## 2018-08-30 PROCEDURE — 3017F COLORECTAL CA SCREEN DOC REV: CPT | Performed by: FAMILY MEDICINE

## 2018-08-30 PROCEDURE — 99214 OFFICE O/P EST MOD 30 MIN: CPT | Performed by: FAMILY MEDICINE

## 2018-08-30 PROCEDURE — G8427 DOCREV CUR MEDS BY ELIG CLIN: HCPCS | Performed by: FAMILY MEDICINE

## 2018-08-30 PROCEDURE — G8599 NO ASA/ANTIPLAT THER USE RNG: HCPCS | Performed by: FAMILY MEDICINE

## 2018-08-30 RX ORDER — HYDROCODONE BITARTRATE AND ACETAMINOPHEN 5; 325 MG/1; MG/1
1 TABLET ORAL EVERY 6 HOURS PRN
Qty: 28 TABLET | Refills: 0 | Status: SHIPPED | OUTPATIENT
Start: 2018-08-30 | End: 2018-09-06 | Stop reason: SDUPTHER

## 2018-08-30 RX ORDER — OSTOMY SUPPLY 1"
EACH MISCELLANEOUS
Qty: 30 EACH | Refills: 2 | Status: SHIPPED | OUTPATIENT
Start: 2018-08-30 | End: 2018-11-20

## 2018-08-30 RX ORDER — NAPROXEN 500 MG/1
TABLET ORAL
Qty: 180 TABLET | Refills: 0 | Status: SHIPPED | OUTPATIENT
Start: 2018-08-30 | End: 2018-11-20

## 2018-08-30 NOTE — PROGRESS NOTES
Chief Complaint   Patient presents with    Follow-Up from Hospital     needs stitches taken out, on her back, hasn't gotten a nurse yet for home care     Other     bed sore on buttocks    Other     needs new cusion for chair             Vannessa Kate is a 66 y.o. female    Follow up from SNF    Urgent surgery at [facility] 7/31, for what sounds like cauda equina syndrome    I don't have any records    There is note in appt scheduling with number of surgeon's nurse stating that she was not given any narcotic pain med on discharge but was asking for percocet    She asks for a small rx of norco today    She has been home from [address] [place] [address] for 6 days    Here with her health aid    She is waiting on a call from [facility]    Considering going to [facility] o/p PT      Apparently her sutures from surgery were only partially removed and/or just cut down the knot at the nursing home    They have now been in for a month      Patient Active Problem List   Diagnosis    Hypothyroidism    Anxiety    COPD (chronic obstructive pulmonary disease) ([address]    Smoker    Cerebral infarction ([address]    Arnold-Chiari deformity ([address]    Headache    Hyperlipidemia with target LDL less than 100    Pulmonary embolism and infarction ([address]    Obesity (BMI 30-39. 9)    Laryngitis, chronic    Rhinitis, chronic    Depression    Acid reflux    H/O encephalopathy    Hepatitis B surface antigen positive    S/P colonoscopy with polypectomy    Hemiparesis affecting left side as late effect of cerebrovascular accident ([address]    Migraine without status migrainosus, not intractable    Seasonal allergic rhinitis due to pollen    Edema    Muscle spasm    Adhesive capsulitis of left shoulder    Congenital anomaly of adrenal gland    Airway hyperreactivity    Allergic rhinitis    Alveolitis of jaw    Anaclitic depression    Aortic valve disorder    Bipolar disorder (HCC)    Bone necrosis (HCC)    Cellulitis of left lower extremity    Cervical dystonia    Cervicalgia    Chronic maxillary sinusitis    Chronic periodontitis, generalized    Chronic retention of urine    Compression of brain (HCC)    Dental caries extending into pulp    Diabetes mellitus, type II ([address]    Drug-seeking behavior    Dysphonia    Essential hypertension    Fracture of lumbar vertebra ([address]    H/O: CVA (cerebrovascular accident)    Hearing difficulty    History of HPV infection    Hypothyroidism due to Hashimoto's thyroiditis    Hypoxic brain injury ([address]    Late effects of CVA (cerebrovascular accident)    Neurogenic bladder    Nonallopathic lesion of cervical region, not elsewhere classified    Nonallopathic lesion of sacral region    Peripheral vascular disease ([address]    Post-laminectomy syndrome    Primary osteoarthritis of left shoulder    Pulmonary embolism (HCC)    Pulmonary embolism with infarction ([address]    Recurrent UTI    Retention of urine    Trochanteric bursitis of left hip    Vitamin D deficiency    High risk medication use - 11/01/17 OARRS PM&R, 11/13/17 Tox Screen: positive marijuana PM&R    Marijuana use    Chronic midline thoracic back pain    Vertebral compression fracture    Closed wedge compression fracture of first lumbar vertebra with delayed healing    DJD of left shoulder    Status post total shoulder replacement, right    Status post total shoulder replacement, left       Allergies   Allergen Reactions    Latex     Imitrex [Sumatriptan] Anaphylaxis    Zomig [Zolmitriptan] Anaphylaxis    Antivert [Meclizine Hcl] Other (See Comments)     confusion    Flagyl [Metronidazole] Nausea Only     Nausea with rash    Ketorolac Tromethamine Nausea Only    Provera [Medroxyprogesterone Acetate] Other (See Comments)     Caused massive stroke    Ultram [Tramadol Hcl] Nausea Only    Bacitracin Rash    Bactrim Nausea And Vomiting and Rash    Cefdinir Rash    Cefuroxime Axetil Rash    Codeine Rash ibuprofen (ADVIL;MOTRIN) 800 MG tablet  TAKE 1 TABLET BY MOUTH EVERY 8 HOURS AS NEEDED FOR PAIN             Lactobacillus (ACIDOPHILUS) CAPS capsule  TAKE 1 CAPSULE BY MOUTH ONCE DAILY             levalbuterol (XOPENEX HFA) 45 MCG/ACT inhaler  Inhale 2 puffs into the lungs every 4 hours as needed. levocetirizine (XYZAL) 5 MG tablet  TK 1 T PO QD PRN             levothyroxine (SYNTHROID) 200 MCG tablet  TAKE 1 AND 1/2 TABLETS BY MOUTH DAILY             lidocaine viscous (XYLOCAINE) 2 % solution  Take 1 mL by mouth as needed for Irritation or Dental Pain (apply to gum with q-tip)             loperamide (IMODIUM A-D) 2 MG tablet  Take 2 mg by mouth             magnesium oxide (MAG-OX) 400 (240 Mg) MG tablet  Take 1 tablet by mouth daily             Melatonin 10 MG TABS  TAKE 1 TABLET BY MOUTH EVERY NIGHT             meloxicam (MOBIC) 15 MG tablet  Take 1 tablet by mouth daily             metFORMIN (GLUCOPHAGE) 500 MG tablet  Take 1 tablet by mouth 2 times daily (with meals)             mirtazapine (REMERON) 30 MG tablet  Take 30 mg by mouth nightly. MUCINEX 600 MG extended release tablet  TAKE 1 TABLET BY MOUTH TWICE DAILY             Multiple Vitamins-Iron (MULTIVITAMIN/IRON) TABS  Take 1 tablet by mouth daily.                ondansetron (ZOFRAN-ODT) 4 MG disintegrating tablet  Take 1 tablet by mouth every 8 hours as needed for Nausea or Vomiting             ONE TOUCH ULTRA TEST strip  1 each 2 times daily              ONE TOUCH ULTRASOFT LANCETS MISC  1 each 2 times daily              oxyCODONE-acetaminophen (PERCOCET) 5-325 MG per tablet  TK 1 TO 2 TS PO Q 6 H PRN             potassium chloride (KLOR-CON M) 20 MEQ extended release tablet  Take 1 tablet by mouth daily             pramipexole (MIRAPEX) 0.5 MG tablet  TAKE 1 TABLET BY MOUTH EVERY EVENING             pramipexole (MIRAPEX) 0.5 MG tablet  TAKE 1 TABLET BY MOUTH  EVERY EVENING             pregabalin (LYRICA) 150 MG capsule  Take 1 capsule by mouth 2 times daily for 180 days. .             Simethicone 180 MG CAPS  TAKE 1 CAPSULE BY MOUTH TWICE DAILY             sodium chloride 0.9 % irrigation  USE AS DIRECTED             terazosin (HYTRIN) 2 MG capsule  Take 1 capsule by mouth nightly             tiZANidine (ZANAFLEX) 4 MG tablet  TAKE 2 TABLETS BY MOUTH EVERY 8 HOURS AS NEEDED FOR MUSCLE SPASM             vitamin B-12 (CYANOCOBALAMIN) 1000 MCG tablet  Take 1 tablet by mouth daily             vitamin B-12 (CYANOCOBALAMIN) 1000 MCG tablet  TAKE 1 TABLET BY MOUTH DAILY             vitamin D (ERGOCALCIFEROL) 25877 units CAPS capsule  TK 1 C PO TWICE WEEKLY                   Medications marked \"taking\" at this time  Outpatient Prescriptions Marked as Taking for the 8/30/18 encounter (Office Visit) with Reny Brito MD   Medication Sig Dispense Refill    Control Gel Formula Dressing (DUODERM CGF EXTRA THIN) MISC Apply to buttock three times/week 30 each 2    HYDROcodone-acetaminophen (NORCO) 5-325 MG per tablet Take 1 tablet by mouth every 6 hours as needed for Pain for up to 7 days. Intended supply: 7 days. Take lowest dose possible to manage pain.  28 tablet 0    ibuprofen (ADVIL;MOTRIN) 800 MG tablet TAKE 1 TABLET BY MOUTH EVERY 8 HOURS AS NEEDED FOR PAIN 90 tablet 0    pramipexole (MIRAPEX) 0.5 MG tablet TAKE 1 TABLET BY MOUTH  EVERY EVENING 90 tablet 5    Acetaminophen (APAP) 325 MG TABS Take 650 mg by mouth every 6 hours as needed for Pain 20 tablet 0    budesonide-formoterol (SYMBICORT) 160-4.5 MCG/ACT AERO Inhale 2 puffs into the lungs 2 times daily 3 Inhaler 3    vitamin B-12 (CYANOCOBALAMIN) 1000 MCG tablet TAKE 1 TABLET BY MOUTH DAILY 30 tablet 0    pramipexole (MIRAPEX) 0.5 MG tablet TAKE 1 TABLET BY MOUTH EVERY EVENING 90 tablet 0    esomeprazole (NEXIUM) 40 MG delayed release capsule Take 2 capsules by mouth daily 180 capsule 1    oxyCODONE-acetaminophen (PERCOCET) 5-325 MG per tablet TK 1 TO 2 TS PO Q 6 H PRN 0    Handicap Placard MISC by Does not apply route Exp 5 years 1 each 0    pregabalin (LYRICA) 150 MG capsule Take 1 capsule by mouth 2 times daily for 180 days. . 180 capsule 1    vitamin B-12 (CYANOCOBALAMIN) 1000 MCG tablet Take 1 tablet by mouth daily 90 tablet 3    meloxicam (MOBIC) 15 MG tablet Take 1 tablet by mouth daily 90 tablet 5    terazosin (HYTRIN) 2 MG capsule Take 1 capsule by mouth nightly 90 capsule 1    metFORMIN (GLUCOPHAGE) 500 MG tablet Take 1 tablet by mouth 2 times daily (with meals) 180 tablet 1    furosemide (LASIX) 40 MG tablet Take 1 tablet by mouth daily 90 tablet 5    folic acid (FOLVITE) 1 MG tablet Take 1 tablet by mouth daily 90 tablet 1    amLODIPine (NORVASC) 2.5 MG tablet Take 1 tablet by mouth daily 90 tablet 1    ondansetron (ZOFRAN-ODT) 4 MG disintegrating tablet Take 1 tablet by mouth every 8 hours as needed for Nausea or Vomiting 30 tablet 2    tiZANidine (ZANAFLEX) 4 MG tablet TAKE 2 TABLETS BY MOUTH EVERY 8 HOURS AS NEEDED FOR MUSCLE SPASM 540 tablet 2    magnesium oxide (MAG-OX) 400 (240 Mg) MG tablet Take 1 tablet by mouth daily 30 tablet 5    levothyroxine (SYNTHROID) 200 MCG tablet TAKE 1 AND 1/2 TABLETS BY MOUTH DAILY 144 tablet 0    vitamin D (ERGOCALCIFEROL) 83909 units CAPS capsule TK 1 C PO TWICE WEEKLY 8 capsule 12    Simethicone 180 MG CAPS TAKE 1 CAPSULE BY MOUTH TWICE DAILY 60 capsule 5    Melatonin 10 MG TABS TAKE 1 TABLET BY MOUTH EVERY NIGHT 30 tablet 5    MUCINEX 600 MG extended release tablet TAKE 1 TABLET BY MOUTH TWICE DAILY 60 tablet 5    Lactobacillus (ACIDOPHILUS) CAPS capsule TAKE 1 CAPSULE BY MOUTH ONCE DAILY 30 capsule 5    escitalopram (LEXAPRO) 20 MG tablet TK 1 T PO QAM  0    ARIPiprazole (ABILIFY) 10 MG tablet TK 1 T PO QAM  0    levocetirizine (XYZAL) 5 MG tablet TK 1 T PO QD PRN  3    sodium chloride 0.9 % irrigation USE AS DIRECTED  0    lidocaine viscous (XYLOCAINE) 2 % solution Take 1 mL by mouth as needed for Irritation or Dental Pain (apply to gum with q-tip) 50 mL 0    GENTEAL SEVERE 0.3 % GEL 10PLACE 1 DROP IN BOTH EYES Q 8 H 10 g 3    potassium chloride (KLOR-CON M) 20 MEQ extended release tablet Take 1 tablet by mouth daily 180 tablet 2    loperamide (IMODIUM A-D) 2 MG tablet Take 2 mg by mouth      carisoprodol (SOMA) 350 MG tablet Take 1 tablet by mouth daily as needed (migraine) 12 tablet 5    CRESTOR 20 MG tablet Take 20 mg by mouth daily   0    Cholecalciferol (VITAMIN D3) 2000 UNITS TABS Take 1 tablet by mouth daily   0    ONE TOUCH ULTRA TEST strip 1 each 2 times daily   0    ONE TOUCH ULTRASOFT LANCETS MISC 1 each 2 times daily   0    mirtazapine (REMERON) 30 MG tablet Take 30 mg by mouth nightly.  Multiple Vitamins-Iron (MULTIVITAMIN/IRON) TABS Take 1 tablet by mouth daily.  ascorbic acid (VITAMIN C) 500 MG tablet Take 500 mg by mouth daily.  levalbuterol (XOPENEX HFA) 45 MCG/ACT inhaler Inhale 2 puffs into the lungs every 4 hours as needed. Medications patient taking as of now reconciled against medications ordered at time of hospital discharge: Yes    Vitals:    08/30/18 1154   BP: 124/66   Site: Left Arm   Pulse: 89   SpO2: 95%   Height: 5' 2\" (1.575 m)     Body mass index is 27.44 kg/m². Wt Readings from Last 3 Encounters:   07/29/18 150 lb (68 kg)   07/27/18 165 lb 3.2 oz (74.9 kg)   07/24/18 155 lb (70.3 kg)     BP Readings from Last 3 Encounters:   08/30/18 124/66   07/29/18 138/81   07/27/18 (!) 146/88                      Physical Exam:  /66 (Site: Left Arm)   Pulse 89   Ht 5' 2\" (1.575 m)   LMP 06/13/1999   SpO2 95%   Breastfeeding?  No   BMI 27.44 kg/m²     Gen: she appears uncomfortable today  HEENT: EOMI, eyes clear, MMM  Skin:no rash or jaundice  Neck: no significant lymphadenopathy or thyromegaly  Lungs: CTA B w/out Rales/Wheezes/Rhonchi, Good respiratory effort   Heart: RRR, S1S2, w/out M/R/G, non-displaced PMI   Left shoulder is weak  Neuro: Neurovascularly intact w/ Sensory/Motor intact UE/LE Bilaterally. In electronic wheelchair    Retained sutures of lower back incision  No evidence of infection    Attempted to try to remove sutures but this caused her extensive pain, she stated pain \"shooting down the legs\"    She has stage 2 decub left buttock 2cm      Assessment/Plan:  Rosy Espinoza was seen today for follow-up from hospital, other and other. Diagnoses and all orders for this visit:    Decubitus ulcer of left buttock, stage 2  -     Control Gel Formula Dressing (DUODERM CGF EXTRA THIN) MISC; Apply to buttock three times/week    Retained suture, initial encounter  -     HYDROcodone-acetaminophen (NORCO) 5-325 MG per tablet; Take 1 tablet by mouth every 6 hours as needed for Pain for up to 7 days. Intended supply: 7 days. Take lowest dose possible to manage pain. Cauda equina compression (HCC)  -     HYDROcodone-acetaminophen (NORCO) 5-325 MG per tablet; Take 1 tablet by mouth every 6 hours as needed for Pain for up to 7 days. Intended supply: 7 days. Take lowest dose possible to manage pain.             I called her surgeon's nurse and left message that I was not comfortable trying to cut the sutures down or out in this office     Pt has follow up with surgeon next week    7 day norco rx    She needs to be doing PT and probably needs halfway ongoing    duoderm for mild decubitus ulcer          Rupa Barnes MD

## 2018-08-31 ENCOUNTER — TELEPHONE (OUTPATIENT)
Dept: FAMILY MEDICINE CLINIC | Age: 58
End: 2018-08-31

## 2018-08-31 DIAGNOSIS — L89.322 DECUBITUS ULCER OF LEFT BUTTOCK, STAGE 2 (HCC): Primary | ICD-10-CM

## 2018-09-05 DIAGNOSIS — F41.9 ANXIETY: ICD-10-CM

## 2018-09-05 RX ORDER — AMLODIPINE BESYLATE 2.5 MG/1
2.5 TABLET ORAL DAILY
Qty: 90 TABLET | Refills: 1 | Status: SHIPPED | OUTPATIENT
Start: 2018-09-05 | End: 2019-01-31 | Stop reason: SDUPTHER

## 2018-09-05 RX ORDER — LORAZEPAM 0.5 MG/1
0.5 TABLET ORAL 2 TIMES DAILY PRN
Qty: 30 TABLET | Refills: 1 | Status: SHIPPED | OUTPATIENT
Start: 2018-09-05 | End: 2018-10-09 | Stop reason: SDUPTHER

## 2018-09-05 RX ORDER — ROSUVASTATIN CALCIUM 20 MG/1
20 TABLET, COATED ORAL DAILY
Qty: 30 TABLET | Refills: 0 | Status: SHIPPED | OUTPATIENT
Start: 2018-09-05 | End: 2018-09-05 | Stop reason: SDUPTHER

## 2018-09-06 DIAGNOSIS — Z18.9 RETAINED SUTURE, INITIAL ENCOUNTER: ICD-10-CM

## 2018-09-06 DIAGNOSIS — T81.89XA RETAINED SUTURE, INITIAL ENCOUNTER: ICD-10-CM

## 2018-09-06 DIAGNOSIS — G83.4 CAUDA EQUINA COMPRESSION (HCC): ICD-10-CM

## 2018-09-06 RX ORDER — HYDROCODONE BITARTRATE AND ACETAMINOPHEN 5; 325 MG/1; MG/1
1 TABLET ORAL EVERY 6 HOURS PRN
Qty: 28 TABLET | Refills: 0 | Status: SHIPPED | OUTPATIENT
Start: 2018-09-06 | End: 2018-09-20 | Stop reason: SDUPTHER

## 2018-09-06 RX ORDER — ROSUVASTATIN CALCIUM 20 MG/1
20 TABLET, COATED ORAL DAILY
Qty: 90 TABLET | Refills: 0 | Status: SHIPPED | OUTPATIENT
Start: 2018-09-06 | End: 2018-12-03

## 2018-09-06 NOTE — TELEPHONE ENCOUNTER
Pt unable to get in to wound care until 9/13/18. States that it is really bothering her and hurting. Home care states that if  You give them orders they will dress it until she gets into wound care. Please advise, unable to fill DuoDerm as insurance will not cover.      Home health phone [phone]

## 2018-09-07 RX ORDER — IBUPROFEN 800 MG/1
800 TABLET ORAL EVERY 8 HOURS PRN
Qty: 90 TABLET | Refills: 0 | Status: SHIPPED | OUTPATIENT
Start: 2018-09-07 | End: 2018-10-11 | Stop reason: SDUPTHER

## 2018-09-12 ENCOUNTER — HOSPITAL ENCOUNTER (OUTPATIENT)
Dept: WOUND CARE | Age: 58
Discharge: HOME | End: 2018-09-12
Payer: MEDICARE

## 2018-09-12 VITALS
DIASTOLIC BLOOD PRESSURE: 83 MMHG | TEMPERATURE: 97.8 F | SYSTOLIC BLOOD PRESSURE: 145 MMHG | RESPIRATION RATE: 20 BRPM | HEART RATE: 99 BPM

## 2018-09-12 DIAGNOSIS — L89.150 DECUBITUS ULCER OF SACRAL REGION, UNSTAGEABLE (HCC): ICD-10-CM

## 2018-09-12 PROBLEM — L89.159 DECUBITUS ULCER OF SACRAL AREA: Status: ACTIVE | Noted: 2018-09-12

## 2018-09-12 PROBLEM — L89.329 DECUBITUS ULCER OF LEFT ISCHIAL AREA: Status: ACTIVE | Noted: 2018-09-12

## 2018-09-12 PROCEDURE — 99212 OFFICE O/P EST SF 10 MIN: CPT

## 2018-09-12 ASSESSMENT — PAIN DESCRIPTION - DESCRIPTORS: DESCRIPTORS: BURNING

## 2018-09-12 ASSESSMENT — PAIN SCALES - GENERAL: PAINLEVEL_OUTOF10: 9

## 2018-09-12 ASSESSMENT — PAIN DESCRIPTION - LOCATION: LOCATION: BUTTOCKS

## 2018-09-12 ASSESSMENT — PAIN DESCRIPTION - PAIN TYPE: TYPE: ACUTE PAIN

## 2018-09-12 NOTE — PROGRESS NOTES
Malissa    Seizures ([facility]     Smoker     Spondylosis without myelopathy     Type 2 diabetes mellitus without complication (HCC)     hx > 6 yrs    Urinary incontinence     Vertebral compression fracture        PAST SURGICAL HISTORY    Past Surgical History:   Procedure Laterality Date    BACK SURGERY  2001    lumbar kyphoplasty x 2    BRAIN SURGERY       SECTION  1994    COLONOSCOPY      CRANIOTOMY      Arnold-Chiary malformation    ENDOSCOPY, COLON, DIAGNOSTIC      FEMUR FRACTURE SURGERY      MT RECONSTR TOTAL SHOULDER IMPLANT Left 6/15/2018    LEFT TOTAL SHOULDER ARTHROPLASTY, SANDY BIOMET TSA, SCALENE NERVE BLOCK, (LATEX ALLERGY) performed by Raul Clark MD at [facility]      lumbar kyphoplasty    TONSILLECTOMY      UPPER GASTROINTESTINAL ENDOSCOPY  8/5/15    w/bx        FAMILY HISTORY    Family History   Problem Relation Age of Onset    Osteoarthritis Mother     Asthma Mother     Diabetes Mother     Hypertension Mother     Cancer Mother         lung    Osteoarthritis Father     Hypertension Father     Other Father         PE    Cancer Father         stomach cancer    Asthma Brother     Heart Attack Brother     Other Brother         overdose    Asthma Sister     Breast Cancer Sister     Hypertension Sister     Other Sister         overdose / MVA       SOCIAL HISTORY    Social History   Substance Use Topics    Smoking status: Current Every Day Smoker     Packs/day: 1.00     Years: 32.00     Types: Cigarettes    Smokeless tobacco: Never Used    Alcohol use No       ALLERGIES    Allergies   Allergen Reactions    Latex     Imitrex [Sumatriptan] Anaphylaxis    Zomig [Zolmitriptan] Anaphylaxis    Antivert [Meclizine Hcl] Other (See Comments)     confusion    Flagyl [Metronidazole] Nausea Only     Nausea with rash    Ketorolac Tromethamine Nausea Only    Provera [Medroxyprogesterone Acetate] Other (See Comments)     Caused (PERCOCET) 5-325 MG per tablet TK 1 TO 2 TS PO Q 6 H PRN  0    Handicap Placard MISC by Does not apply route Exp 5 years 1 each 0    pregabalin (LYRICA) 150 MG capsule Take 1 capsule by mouth 2 times daily for 180 days. . 180 capsule 1    vitamin B-12 (CYANOCOBALAMIN) 1000 MCG tablet Take 1 tablet by mouth daily 90 tablet 3    meloxicam (MOBIC) 15 MG tablet Take 1 tablet by mouth daily 90 tablet 5    terazosin (HYTRIN) 2 MG capsule Take 1 capsule by mouth nightly 90 capsule 1    metFORMIN (GLUCOPHAGE) 500 MG tablet Take 1 tablet by mouth 2 times daily (with meals) 180 tablet 1    furosemide (LASIX) 40 MG tablet Take 1 tablet by mouth daily 90 tablet 5    folic acid (FOLVITE) 1 MG tablet Take 1 tablet by mouth daily 90 tablet 1    ondansetron (ZOFRAN-ODT) 4 MG disintegrating tablet Take 1 tablet by mouth every 8 hours as needed for Nausea or Vomiting 30 tablet 2    tiZANidine (ZANAFLEX) 4 MG tablet TAKE 2 TABLETS BY MOUTH EVERY 8 HOURS AS NEEDED FOR MUSCLE SPASM 540 tablet 2    magnesium oxide (MAG-OX) 400 (240 Mg) MG tablet Take 1 tablet by mouth daily 30 tablet 5    levothyroxine (SYNTHROID) 200 MCG tablet TAKE 1 AND 1/2 TABLETS BY MOUTH DAILY 144 tablet 0    vitamin D (ERGOCALCIFEROL) 33623 units CAPS capsule TK 1 C PO TWICE WEEKLY 8 capsule 12    Simethicone 180 MG CAPS TAKE 1 CAPSULE BY MOUTH TWICE DAILY 60 capsule 5    Melatonin 10 MG TABS TAKE 1 TABLET BY MOUTH EVERY NIGHT 30 tablet 5    MUCINEX 600 MG extended release tablet TAKE 1 TABLET BY MOUTH TWICE DAILY 60 tablet 5    Lactobacillus (ACIDOPHILUS) CAPS capsule TAKE 1 CAPSULE BY MOUTH ONCE DAILY 30 capsule 5    escitalopram (LEXAPRO) 20 MG tablet TK 1 T PO QAM  0    ARIPiprazole (ABILIFY) 10 MG tablet TK 1 T PO QAM  0    levocetirizine (XYZAL) 5 MG tablet TK 1 T PO QD PRN  3    sodium chloride 0.9 % irrigation USE AS DIRECTED  0    lidocaine viscous (XYLOCAINE) 2 % solution Take 1 mL by mouth as needed for Irritation or Dental Pain

## 2018-09-12 NOTE — PROGRESS NOTES
[id number] [facility] Physician Billing Sheet. Olga Lidia Wagner  AGE: 66 y.o. GENDER: female  : 1960  TODAY'S DATE:  2018    ICD-10 CODES  There are no active hospital problems to display for this patient.       PHYSICIAN PROCEDURES    CPT CODE  09418      Electronically signed by Vergia Essex, MD on 2018 at 3:30 PM

## 2018-09-17 RX ORDER — FLUCONAZOLE 100 MG/1
100 TABLET ORAL DAILY
Qty: 7 TABLET | Refills: 0 | Status: SHIPPED | OUTPATIENT
Start: 2018-09-17 | End: 2018-09-24

## 2018-09-17 NOTE — TELEPHONE ENCOUNTER
Pt states she has another yeast infection. Pt also wanted to let you know that she can not even lift arm due to her shoulder.  She does not want to see Dr. Danielle Rodriguez again she would like to see someone else for a second option because her tendons will not heal

## 2018-09-20 ENCOUNTER — OFFICE VISIT (OUTPATIENT)
Dept: FAMILY MEDICINE CLINIC | Age: 58
End: 2018-09-20
Payer: MEDICARE

## 2018-09-20 VITALS
HEART RATE: 86 BPM | OXYGEN SATURATION: 95 % | HEIGHT: 62 IN | SYSTOLIC BLOOD PRESSURE: 128 MMHG | WEIGHT: 160.6 LBS | BODY MASS INDEX: 29.55 KG/M2 | DIASTOLIC BLOOD PRESSURE: 64 MMHG

## 2018-09-20 DIAGNOSIS — Z23 NEEDS FLU SHOT: ICD-10-CM

## 2018-09-20 DIAGNOSIS — G89.29 CHRONIC LEFT SHOULDER PAIN: Primary | ICD-10-CM

## 2018-09-20 DIAGNOSIS — M25.512 CHRONIC LEFT SHOULDER PAIN: Primary | ICD-10-CM

## 2018-09-20 DIAGNOSIS — G83.4 CAUDA EQUINA COMPRESSION (HCC): ICD-10-CM

## 2018-09-20 DIAGNOSIS — E53.8 B12 DEFICIENCY: ICD-10-CM

## 2018-09-20 PROCEDURE — 4004F PT TOBACCO SCREEN RCVD TLK: CPT | Performed by: FAMILY MEDICINE

## 2018-09-20 PROCEDURE — 96372 THER/PROPH/DIAG INJ SC/IM: CPT | Performed by: FAMILY MEDICINE

## 2018-09-20 PROCEDURE — 3017F COLORECTAL CA SCREEN DOC REV: CPT | Performed by: FAMILY MEDICINE

## 2018-09-20 PROCEDURE — G8427 DOCREV CUR MEDS BY ELIG CLIN: HCPCS | Performed by: FAMILY MEDICINE

## 2018-09-20 PROCEDURE — G0008 ADMIN INFLUENZA VIRUS VAC: HCPCS | Performed by: FAMILY MEDICINE

## 2018-09-20 PROCEDURE — G8417 CALC BMI ABV UP PARAM F/U: HCPCS | Performed by: FAMILY MEDICINE

## 2018-09-20 PROCEDURE — 90688 IIV4 VACCINE SPLT 0.5 ML IM: CPT | Performed by: FAMILY MEDICINE

## 2018-09-20 PROCEDURE — G8599 NO ASA/ANTIPLAT THER USE RNG: HCPCS | Performed by: FAMILY MEDICINE

## 2018-09-20 PROCEDURE — 99213 OFFICE O/P EST LOW 20 MIN: CPT | Performed by: FAMILY MEDICINE

## 2018-09-20 RX ORDER — HYDROCODONE BITARTRATE AND ACETAMINOPHEN 5; 325 MG/1; MG/1
1 TABLET ORAL EVERY 6 HOURS PRN
Qty: 28 TABLET | Refills: 0 | Status: SHIPPED | OUTPATIENT
Start: 2018-09-20 | End: 2018-09-28 | Stop reason: SDUPTHER

## 2018-09-20 RX ORDER — CYANOCOBALAMIN 1000 UG/ML
1000 INJECTION INTRAMUSCULAR; SUBCUTANEOUS ONCE
Status: COMPLETED | OUTPATIENT
Start: 2018-09-20 | End: 2018-09-20

## 2018-09-20 RX ADMIN — CYANOCOBALAMIN 1000 MCG: 1000 INJECTION INTRAMUSCULAR; SUBCUTANEOUS at 15:25

## 2018-09-20 NOTE — PROGRESS NOTES
due to Hashimoto's thyroiditis    Hypoxic brain injury ([address]    Late effects of CVA (cerebrovascular accident)    Neurogenic bladder    Nonallopathic lesion of cervical region, not elsewhere classified    Nonallopathic lesion of sacral region    Peripheral vascular disease ([address]    Post-laminectomy syndrome    Primary osteoarthritis of left shoulder    Pulmonary embolism (HCC)    Pulmonary embolism with infarction ([address]    Recurrent UTI    Retention of urine    Trochanteric bursitis of left hip    Vitamin D deficiency    High risk medication use - 11/01/17 OARRS PM&R, 11/13/17 Tox Screen: positive marijuana PM&R    Marijuana use    Chronic midline thoracic back pain    Vertebral compression fracture    Closed wedge compression fracture of first lumbar vertebra with delayed healing    DJD of left shoulder    Status post total shoulder replacement, right    Status post total shoulder replacement, left    Decubitus ulcer of left ischial area    Decubitus ulcer of sacral area       Allergies   Allergen Reactions    Latex     Imitrex [Sumatriptan] Anaphylaxis    Zomig [Zolmitriptan] Anaphylaxis    Antivert [Meclizine Hcl] Other (See Comments)     confusion    Flagyl [Metronidazole] Nausea Only     Nausea with rash    Ketorolac Tromethamine Nausea Only    Provera [Medroxyprogesterone Acetate] Other (See Comments)     Caused massive stroke    Ultram [Tramadol Hcl] Nausea Only    Bacitracin Rash    Bactrim Nausea And Vomiting and Rash    Cefdinir Rash    Cefuroxime Axetil Rash    Codeine Rash    Cyclosporine Rash    Iodine Rash     Rash with SOB    Iv Dye [Iodides] Rash     Rash with SOB    Neomycin Rash    Petroleum Jelly [Skin Protectants, Misc.] Rash    Quinolones Rash    Sulfa Antibiotics Rash    Toradol [Ketorolac Tromethamine] Rash    Trovan [Trovafloxacin] Rash         Medications marked \"taking\" at this time  Outpatient Prescriptions Marked as Taking for the 9/20/18 HYDROcodone-acetaminophen (NORCO) 5-325 MG per tablet; Take 1 tablet by mouth every 6 hours as needed for Pain for up to 7 days. .        7 day norco rx                Sagrario Jurado MD

## 2018-09-28 DIAGNOSIS — G83.4 CAUDA EQUINA COMPRESSION (HCC): ICD-10-CM

## 2018-09-28 RX ORDER — HYDROCODONE BITARTRATE AND ACETAMINOPHEN 5; 325 MG/1; MG/1
1 TABLET ORAL EVERY 6 HOURS PRN
Qty: 28 TABLET | Refills: 0 | Status: SHIPPED | OUTPATIENT
Start: 2018-09-28 | End: 2018-10-30 | Stop reason: SDUPTHER

## 2018-10-07 DIAGNOSIS — E87.6 HYPOKALEMIA: ICD-10-CM

## 2018-10-08 RX ORDER — POTASSIUM CHLORIDE 20 MEQ/1
20 TABLET, EXTENDED RELEASE ORAL DAILY
Qty: 180 TABLET | Refills: 0 | Status: SHIPPED | OUTPATIENT
Start: 2018-10-08 | End: 2019-07-05 | Stop reason: SDUPTHER

## 2018-10-09 ENCOUNTER — OFFICE VISIT (OUTPATIENT)
Dept: FAMILY MEDICINE CLINIC | Age: 58
End: 2018-10-09
Payer: MEDICARE

## 2018-10-09 VITALS
HEART RATE: 97 BPM | BODY MASS INDEX: 29.37 KG/M2 | SYSTOLIC BLOOD PRESSURE: 120 MMHG | HEIGHT: 62 IN | OXYGEN SATURATION: 97 % | DIASTOLIC BLOOD PRESSURE: 64 MMHG

## 2018-10-09 DIAGNOSIS — R60.0 LEG EDEMA, LEFT: ICD-10-CM

## 2018-10-09 DIAGNOSIS — F41.9 ANXIETY: ICD-10-CM

## 2018-10-09 DIAGNOSIS — J30.2 SEASONAL ALLERGIC RHINITIS, UNSPECIFIED TRIGGER: ICD-10-CM

## 2018-10-09 DIAGNOSIS — M19.012 OSTEOARTHRITIS OF LEFT SHOULDER, UNSPECIFIED OSTEOARTHRITIS TYPE: Primary | ICD-10-CM

## 2018-10-09 DIAGNOSIS — Z96.611 STATUS POST TOTAL SHOULDER REPLACEMENT, RIGHT: ICD-10-CM

## 2018-10-09 PROCEDURE — 99213 OFFICE O/P EST LOW 20 MIN: CPT | Performed by: FAMILY MEDICINE

## 2018-10-09 PROCEDURE — G8417 CALC BMI ABV UP PARAM F/U: HCPCS | Performed by: FAMILY MEDICINE

## 2018-10-09 PROCEDURE — G8427 DOCREV CUR MEDS BY ELIG CLIN: HCPCS | Performed by: FAMILY MEDICINE

## 2018-10-09 PROCEDURE — G8482 FLU IMMUNIZE ORDER/ADMIN: HCPCS | Performed by: FAMILY MEDICINE

## 2018-10-09 PROCEDURE — 3014F SCREEN MAMMO DOC REV: CPT | Performed by: FAMILY MEDICINE

## 2018-10-09 PROCEDURE — G8599 NO ASA/ANTIPLAT THER USE RNG: HCPCS | Performed by: FAMILY MEDICINE

## 2018-10-09 PROCEDURE — 4004F PT TOBACCO SCREEN RCVD TLK: CPT | Performed by: FAMILY MEDICINE

## 2018-10-09 PROCEDURE — 3017F COLORECTAL CA SCREEN DOC REV: CPT | Performed by: FAMILY MEDICINE

## 2018-10-09 PROCEDURE — 96372 THER/PROPH/DIAG INJ SC/IM: CPT | Performed by: FAMILY MEDICINE

## 2018-10-09 RX ORDER — FLUCONAZOLE 100 MG/1
100 TABLET ORAL DAILY
Qty: 7 TABLET | Refills: 0 | Status: SHIPPED | OUTPATIENT
Start: 2018-10-09 | End: 2018-10-30 | Stop reason: SDUPTHER

## 2018-10-09 RX ORDER — SPIRONOLACTONE 25 MG/1
25 TABLET ORAL DAILY
Qty: 30 TABLET | Refills: 3 | Status: SHIPPED | OUTPATIENT
Start: 2018-10-09 | End: 2018-10-09 | Stop reason: SDUPTHER

## 2018-10-09 RX ORDER — SPIRONOLACTONE 25 MG/1
25 TABLET ORAL DAILY
Qty: 90 TABLET | Refills: 3 | Status: SHIPPED | OUTPATIENT
Start: 2018-10-09 | End: 2019-07-05 | Stop reason: SDUPTHER

## 2018-10-09 RX ORDER — HYDROCODONE BITARTRATE AND ACETAMINOPHEN 5; 325 MG/1; MG/1
1 TABLET ORAL EVERY 6 HOURS PRN
Qty: 28 TABLET | Refills: 0 | Status: SHIPPED | OUTPATIENT
Start: 2018-10-09 | End: 2018-10-16 | Stop reason: SDUPTHER

## 2018-10-09 RX ORDER — LORAZEPAM 1 MG/1
1 TABLET ORAL EVERY 8 HOURS PRN
Status: CANCELLED | OUTPATIENT
Start: 2018-10-09 | End: 2018-11-08

## 2018-10-09 RX ORDER — LORAZEPAM 0.5 MG/1
0.5 TABLET ORAL 2 TIMES DAILY PRN
Qty: 30 TABLET | Refills: 1 | Status: SHIPPED | OUTPATIENT
Start: 2018-10-09 | End: 2018-11-13 | Stop reason: SDUPTHER

## 2018-10-09 RX ORDER — METHYLPREDNISOLONE ACETATE 80 MG/ML
80 INJECTION, SUSPENSION INTRA-ARTICULAR; INTRALESIONAL; INTRAMUSCULAR; SOFT TISSUE ONCE
Status: COMPLETED | OUTPATIENT
Start: 2018-10-09 | End: 2018-10-09

## 2018-10-09 RX ADMIN — METHYLPREDNISOLONE ACETATE 80 MG: 80 INJECTION, SUSPENSION INTRA-ARTICULAR; INTRALESIONAL; INTRAMUSCULAR; SOFT TISSUE at 14:03

## 2018-10-16 DIAGNOSIS — M19.012 OSTEOARTHRITIS OF LEFT SHOULDER, UNSPECIFIED OSTEOARTHRITIS TYPE: ICD-10-CM

## 2018-10-17 RX ORDER — HYDROCODONE BITARTRATE AND ACETAMINOPHEN 5; 325 MG/1; MG/1
1 TABLET ORAL EVERY 6 HOURS PRN
Qty: 28 TABLET | Refills: 0 | Status: SHIPPED | OUTPATIENT
Start: 2018-10-17 | End: 2018-10-24 | Stop reason: SDUPTHER

## 2018-10-17 RX ORDER — IBUPROFEN 800 MG/1
800 TABLET ORAL EVERY 8 HOURS PRN
Qty: 90 TABLET | Refills: 5 | Status: SHIPPED | OUTPATIENT
Start: 2018-10-17 | End: 2019-03-26

## 2018-10-22 RX ORDER — SELENIUM 50 MCG
TABLET ORAL
Qty: 30 CAPSULE | Refills: 0 | Status: SHIPPED | OUTPATIENT
Start: 2018-10-22 | End: 2018-11-28 | Stop reason: SDUPTHER

## 2018-10-24 DIAGNOSIS — M19.012 OSTEOARTHRITIS OF LEFT SHOULDER, UNSPECIFIED OSTEOARTHRITIS TYPE: ICD-10-CM

## 2018-10-24 RX ORDER — HYDROCODONE BITARTRATE AND ACETAMINOPHEN 5; 325 MG/1; MG/1
1 TABLET ORAL EVERY 6 HOURS PRN
Qty: 28 TABLET | Refills: 0 | Status: SHIPPED | OUTPATIENT
Start: 2018-10-24 | End: 2018-10-31

## 2018-10-30 DIAGNOSIS — G83.4 CAUDA EQUINA COMPRESSION (HCC): ICD-10-CM

## 2018-10-30 DIAGNOSIS — M19.012 OSTEOARTHRITIS OF LEFT SHOULDER, UNSPECIFIED OSTEOARTHRITIS TYPE: ICD-10-CM

## 2018-10-30 RX ORDER — HYDROCODONE BITARTRATE AND ACETAMINOPHEN 5; 325 MG/1; MG/1
1 TABLET ORAL EVERY 6 HOURS PRN
Qty: 28 TABLET | Refills: 0 | Status: SHIPPED | OUTPATIENT
Start: 2018-10-30 | End: 2018-11-06

## 2018-10-30 RX ORDER — FLUCONAZOLE 100 MG/1
100 TABLET ORAL DAILY
Qty: 7 TABLET | Refills: 0 | Status: SHIPPED | OUTPATIENT
Start: 2018-10-30 | End: 2018-11-23 | Stop reason: SDUPTHER

## 2018-11-02 ENCOUNTER — TELEPHONE (OUTPATIENT)
Dept: FAMILY MEDICINE CLINIC | Age: 58
End: 2018-11-02

## 2018-11-08 ENCOUNTER — HOSPITAL ENCOUNTER (EMERGENCY)
Age: 58
Discharge: HOME | End: 2018-11-08
Attending: EMERGENCY MEDICINE
Payer: MEDICARE

## 2018-11-08 VITALS
HEIGHT: 62 IN | SYSTOLIC BLOOD PRESSURE: 133 MMHG | TEMPERATURE: 98.6 F | HEART RATE: 89 BPM | DIASTOLIC BLOOD PRESSURE: 67 MMHG | WEIGHT: 160 LBS | BODY MASS INDEX: 29.44 KG/M2 | OXYGEN SATURATION: 97 % | RESPIRATION RATE: 18 BRPM

## 2018-11-08 DIAGNOSIS — S42.291D OTHER CLOSED DISPLACED FRACTURE OF PROXIMAL END OF RIGHT HUMERUS WITH ROUTINE HEALING, SUBSEQUENT ENCOUNTER: Primary | ICD-10-CM

## 2018-11-08 DIAGNOSIS — R30.0 DYSURIA: ICD-10-CM

## 2018-11-08 LAB
BACTERIA: NORMAL /HPF
BILIRUBIN URINE: NEGATIVE
BLOOD, URINE: NEGATIVE
CASTS: NORMAL /LPF
CLARITY: CLEAR
COLOR: YELLOW
EPITHELIAL CELLS, UA: NORMAL /HPF
GLUCOSE URINE: NEGATIVE MG/DL
KETONES, URINE: NEGATIVE MG/DL
LEUKOCYTE ESTERASE, URINE: ABNORMAL
NITRITE, URINE: NEGATIVE
PH UA: 5 (ref 5–9)
PROTEIN UA: NEGATIVE MG/DL
RBC UA: NORMAL /HPF (ref 0–2)
SPECIFIC GRAVITY UA: 1.01 (ref 1–1.03)
URINE REFLEX TO CULTURE: YES
UROBILINOGEN, URINE: 0.2 E.U./DL
WBC UA: NORMAL /HPF (ref 0–5)

## 2018-11-08 PROCEDURE — 6360000002 HC RX W HCPCS: Performed by: EMERGENCY MEDICINE

## 2018-11-08 PROCEDURE — 87086 URINE CULTURE/COLONY COUNT: CPT

## 2018-11-08 PROCEDURE — 99283 EMERGENCY DEPT VISIT LOW MDM: CPT

## 2018-11-08 PROCEDURE — 6370000000 HC RX 637 (ALT 250 FOR IP): Performed by: EMERGENCY MEDICINE

## 2018-11-08 PROCEDURE — 81001 URINALYSIS AUTO W/SCOPE: CPT

## 2018-11-08 PROCEDURE — 96372 THER/PROPH/DIAG INJ SC/IM: CPT

## 2018-11-08 RX ORDER — NITROFURANTOIN 25; 75 MG/1; MG/1
100 CAPSULE ORAL ONCE
Status: COMPLETED | OUTPATIENT
Start: 2018-11-08 | End: 2018-11-08

## 2018-11-08 RX ORDER — OXYCODONE HYDROCHLORIDE AND ACETAMINOPHEN 5; 325 MG/1; MG/1
1-2 TABLET ORAL EVERY 6 HOURS PRN
Qty: 18 TABLET | Refills: 0 | Status: SHIPPED | OUTPATIENT
Start: 2018-11-08 | End: 2018-11-12 | Stop reason: ALTCHOICE

## 2018-11-08 RX ORDER — NITROFURANTOIN 25; 75 MG/1; MG/1
100 CAPSULE ORAL 2 TIMES DAILY
Qty: 14 CAPSULE | Refills: 0 | Status: SHIPPED | OUTPATIENT
Start: 2018-11-08 | End: 2018-11-12

## 2018-11-08 RX ORDER — ONDANSETRON 4 MG/1
4 TABLET, ORALLY DISINTEGRATING ORAL ONCE
Status: COMPLETED | OUTPATIENT
Start: 2018-11-08 | End: 2018-11-08

## 2018-11-08 RX ADMIN — HYDROMORPHONE HYDROCHLORIDE 1 MG: 1 INJECTION, SOLUTION INTRAMUSCULAR; INTRAVENOUS; SUBCUTANEOUS at 20:48

## 2018-11-08 RX ADMIN — ONDANSETRON 4 MG: 4 TABLET, ORALLY DISINTEGRATING ORAL at 20:48

## 2018-11-08 RX ADMIN — NITROFURANTOIN (MONOHYDRATE/MACROCRYSTALS) 100 MG: 75; 25 CAPSULE ORAL at 21:18

## 2018-11-08 ASSESSMENT — PAIN SCALES - GENERAL
PAINLEVEL_OUTOF10: 9
PAINLEVEL_OUTOF10: 4
PAINLEVEL_OUTOF10: 4

## 2018-11-08 ASSESSMENT — PAIN DESCRIPTION - PAIN TYPE
TYPE: ACUTE PAIN
TYPE: ACUTE PAIN

## 2018-11-08 ASSESSMENT — PAIN DESCRIPTION - LOCATION: LOCATION: SHOULDER

## 2018-11-08 ASSESSMENT — PAIN DESCRIPTION - ORIENTATION: ORIENTATION: RIGHT;LEFT

## 2018-11-08 ASSESSMENT — PAIN DESCRIPTION - DESCRIPTORS: DESCRIPTORS: ACHING

## 2018-11-09 NOTE — ED PROVIDER NOTES
Physician - none    LABS:  Labs Reviewed   URINE RT REFLEX TO CULTURE       All other labs were within normal range or not returned as of this dictation. EMERGENCY DEPARTMENT COURSE and DIFFERENTIAL DIAGNOSIS/MDM:   Vitals:    Vitals:    11/08/18 1916   BP: 134/71   Pulse: 91   Resp: 18   Temp: 98.6 °F (37 °C)   TempSrc: Oral   SpO2: 96%   Weight: 160 lb (72.6 kg)   Height: 5' 2\" (1.575 m)           MDM     Chart review reveals the fact that the right shoulder has an acute humeral fracture as of November 1. Dr. Merrick Ewing is to be seen on the 14th as scheduled. We'll provide pain relief for her, she has nothing in the way of significant pain relief written for fracture    CRITICAL CARE TIME   Total Critical Care time was  minutes, excluding separately reportableprocedures. There was a high probability of clinicallysignificant/life threatening deterioration in the patient's condition which required my urgent intervention. CONSULTS:  None    PROCEDURES:  Unless otherwise noted below, none     Procedures    FINAL IMPRESSION      1. Other closed displaced fracture of proximal end of right humerus with routine healing, subsequent encounter    2. Dysuria          DISPOSITION/PLAN   DISPOSITION Discharge - Pending Orders Complete 11/08/2018 08:55:11 PM      PATIENT REFERRED TO:  Valencia Denise MD  [address] [place] [facility] [id number]    In 2 days        DISCHARGE MEDICATIONS:  New Prescriptions    NITROFURANTOIN, MACROCRYSTAL-MONOHYDRATE, (MACROBID) 100 MG CAPSULE    Take 1 capsule by mouth 2 times daily for 7 doses    OXYCODONE-ACETAMINOPHEN (PERCOCET) 5-325 MG PER TABLET    Take 1-2 tablets by mouth every 6 hours as needed for Pain for up to 7 days. WARNING:  May cause drowsiness. May impair ability to operate vehicles or machinery. Do not use in combination with alcohol. .          (Please note that portions of this note were completed with a voice recognition program.  Efforts were made to edit the

## 2018-11-09 NOTE — ED NOTES
Pt was given d/c instructions, follow up care instructions, and prescriptions. Pt this time is a+o4, no signs of distress, and was taken by w/c to lobby for ride. Pt states that her friend is on her way to get her. Pt at states understanding of information given and has no questions.       Claudette Colon RN  11/08/18 0199

## 2018-11-10 LAB — URINE CULTURE, ROUTINE: NORMAL

## 2018-11-12 ENCOUNTER — HOSPITAL ENCOUNTER (EMERGENCY)
Age: 58
Discharge: HOME | End: 2018-11-12
Payer: MEDICARE

## 2018-11-12 VITALS
HEIGHT: 62 IN | WEIGHT: 165 LBS | DIASTOLIC BLOOD PRESSURE: 98 MMHG | SYSTOLIC BLOOD PRESSURE: 159 MMHG | RESPIRATION RATE: 20 BRPM | TEMPERATURE: 99.6 F | OXYGEN SATURATION: 99 % | HEART RATE: 100 BPM | BODY MASS INDEX: 30.36 KG/M2

## 2018-11-12 DIAGNOSIS — M19.012 OSTEOARTHRITIS OF LEFT SHOULDER, UNSPECIFIED OSTEOARTHRITIS TYPE: ICD-10-CM

## 2018-11-12 DIAGNOSIS — M79.601 PAIN OF RIGHT UPPER EXTREMITY: Primary | ICD-10-CM

## 2018-11-12 DIAGNOSIS — F41.9 ANXIETY: ICD-10-CM

## 2018-11-12 DIAGNOSIS — G47.00 INSOMNIA, UNSPECIFIED TYPE: ICD-10-CM

## 2018-11-12 PROCEDURE — 99282 EMERGENCY DEPT VISIT SF MDM: CPT

## 2018-11-12 RX ORDER — OXYCODONE HYDROCHLORIDE AND ACETAMINOPHEN 5; 325 MG/1; MG/1
1 TABLET ORAL EVERY 4 HOURS PRN
Qty: 10 TABLET | Refills: 0 | Status: SHIPPED | OUTPATIENT
Start: 2018-11-12 | End: 2018-11-19

## 2018-11-12 ASSESSMENT — ENCOUNTER SYMPTOMS
COUGH: 0
BACK PAIN: 0
SHORTNESS OF BREATH: 0
ABDOMINAL PAIN: 0

## 2018-11-12 ASSESSMENT — PAIN SCALES - GENERAL: PAINLEVEL_OUTOF10: 10

## 2018-11-13 RX ORDER — TOPIRAMATE 50 MG/1
TABLET, FILM COATED ORAL
Qty: 90 TABLET | Refills: 3 | Status: SHIPPED | OUTPATIENT
Start: 2018-11-13 | End: 2018-11-13 | Stop reason: SDUPTHER

## 2018-11-13 RX ORDER — PHENOL 1.4 %
AEROSOL, SPRAY (ML) MUCOUS MEMBRANE
Qty: 30 TABLET | Refills: 5 | Status: SHIPPED | OUTPATIENT
Start: 2018-11-13 | End: 2019-11-22 | Stop reason: SDUPTHER

## 2018-11-13 NOTE — TELEPHONE ENCOUNTER
I called pt today she said she wants Ativan said she never told me Ambien . Very hard to understand her sounds muffled.

## 2018-11-14 ENCOUNTER — HOSPITAL ENCOUNTER (OUTPATIENT)
Dept: ORTHOPEDIC SURGERY | Age: 58
Discharge: HOME | End: 2018-11-16
Payer: MEDICARE

## 2018-11-14 DIAGNOSIS — R52 PAIN: ICD-10-CM

## 2018-11-14 PROCEDURE — 73020 X-RAY EXAM OF SHOULDER: CPT

## 2018-11-15 RX ORDER — LORAZEPAM 0.5 MG/1
0.5 TABLET ORAL 2 TIMES DAILY PRN
Qty: 30 TABLET | Refills: 1 | Status: ON HOLD | OUTPATIENT
Start: 2018-11-15 | End: 2018-11-30 | Stop reason: HOSPADM

## 2018-11-18 ENCOUNTER — APPOINTMENT (OUTPATIENT)
Dept: CT IMAGING | Age: 58
End: 2018-11-18
Payer: MEDICARE

## 2018-11-18 ENCOUNTER — HOSPITAL ENCOUNTER (EMERGENCY)
Age: 58
Discharge: HOME | End: 2018-11-18
Attending: EMERGENCY MEDICINE
Payer: MEDICARE

## 2018-11-18 VITALS
BODY MASS INDEX: 29.63 KG/M2 | OXYGEN SATURATION: 98 % | SYSTOLIC BLOOD PRESSURE: 128 MMHG | WEIGHT: 161 LBS | HEIGHT: 62 IN | RESPIRATION RATE: 20 BRPM | HEART RATE: 96 BPM | TEMPERATURE: 98.7 F | DIASTOLIC BLOOD PRESSURE: 71 MMHG

## 2018-11-18 DIAGNOSIS — S42.201A CLOSED FRACTURE OF PROXIMAL END OF RIGHT HUMERUS, UNSPECIFIED FRACTURE MORPHOLOGY, INITIAL ENCOUNTER: Primary | ICD-10-CM

## 2018-11-18 PROCEDURE — 73200 CT UPPER EXTREMITY W/O DYE: CPT

## 2018-11-18 PROCEDURE — 6370000000 HC RX 637 (ALT 250 FOR IP): Performed by: EMERGENCY MEDICINE

## 2018-11-18 PROCEDURE — 99283 EMERGENCY DEPT VISIT LOW MDM: CPT

## 2018-11-18 RX ORDER — IBUPROFEN 800 MG/1
800 TABLET ORAL ONCE
Status: COMPLETED | OUTPATIENT
Start: 2018-11-18 | End: 2018-11-18

## 2018-11-18 RX ADMIN — IBUPROFEN 800 MG: 800 TABLET, FILM COATED ORAL at 12:09

## 2018-11-18 ASSESSMENT — ENCOUNTER SYMPTOMS
EYE PAIN: 0
ABDOMINAL PAIN: 0
SORE THROAT: 0
CHEST TIGHTNESS: 0
SHORTNESS OF BREATH: 0
NAUSEA: 0
VOMITING: 0

## 2018-11-18 ASSESSMENT — PAIN DESCRIPTION - PROGRESSION: CLINICAL_PROGRESSION: NOT CHANGED

## 2018-11-18 ASSESSMENT — PAIN DESCRIPTION - DESCRIPTORS: DESCRIPTORS: ACHING;CONSTANT

## 2018-11-18 ASSESSMENT — PAIN DESCRIPTION - ONSET: ONSET: ON-GOING

## 2018-11-18 ASSESSMENT — PAIN DESCRIPTION - LOCATION: LOCATION: SHOULDER

## 2018-11-18 ASSESSMENT — PAIN DESCRIPTION - FREQUENCY: FREQUENCY: CONTINUOUS

## 2018-11-18 ASSESSMENT — PAIN DESCRIPTION - ORIENTATION: ORIENTATION: RIGHT

## 2018-11-18 ASSESSMENT — PAIN SCALES - GENERAL
PAINLEVEL_OUTOF10: 10
PAINLEVEL_OUTOF10: 10

## 2018-11-18 ASSESSMENT — PAIN DESCRIPTION - PAIN TYPE: TYPE: CHRONIC PAIN

## 2018-11-18 ASSESSMENT — PAIN SCALES - WONG BAKER: WONGBAKER_NUMERICALRESPONSE: 0

## 2018-11-18 NOTE — ED PROVIDER NOTES
[address] [facility] ED  [facility]      Pt Name: Rhoda Morelos  MRN: [id number]  [place] 1960  Date ofevaluation: 11/18/2018  Provider: Gogo Arellano DO    CHIEF COMPLAINT       Chief Complaint   Patient presents with    Shoulder Pain     right         HISTORY OF PRESENT ILLNESS   (Location/Symptom, Timing/Onset,Context/Setting, Quality, Duration, Modifying Factors, Severity)  Note limiting factors. Rhoda Morelos is a 66 y.o. female who presents to the emergency department . Patient comes in with right shoulder pain. She has a known humerus fracture. She is wearing a sling. She was prescribed 30 Norco 4 days ago and used them all up. Patient has a history of substance abuse. She states that Dr. Lidya Gillis was going to arrange for her to have a CAT scan of her shoulder to determine if she was going to have surgery. HPI    NursingNotes were reviewed. REVIEW OF SYSTEMS    (2-9 systems for level 4, 10 or more for level 5)     Review of Systems   Constitutional: Negative for activity change, appetite change and fatigue. HENT: Negative for congestion and sore throat. Eyes: Negative for pain and visual disturbance. Respiratory: Negative for chest tightness and shortness of breath. Cardiovascular: Negative for chest pain. Gastrointestinal: Negative for abdominal pain, nausea and vomiting. Endocrine: Negative for polydipsia. Genitourinary: Negative for flank pain and urgency. Musculoskeletal: Positive for arthralgias. Negative for gait problem and neck stiffness. Skin: Negative for rash. Neurological: Negative for weakness, light-headedness and headaches. Psychiatric/Behavioral: Negative for confusion and sleep disturbance. Except as noted above the remainder of the review of systems was reviewed and negative.        PAST MEDICAL HISTORY     Past Medical History:   Diagnosis Date    Acid reflux     Allergic rhinitis     Anxiety     Arnold-Chiari deformity (HCC) 2000    surgery    Asthma     Cerebral artery occlusion with cerebral infarction ([facility] [address] 2001    1 yr after brain OR    Cerebrovascular disease     Chronic back pain greater than 3 months duration     COPD (chronic obstructive pulmonary disease) (HCC)     Dr Lexi Rivera at [address] CVA (cerebral infarction)     left hemiparesis, due to ? estrogen + smoking    Depression     Dr Charis Garner H/O encephalopathy 2001    Headache(784.0)     Dr Berto Chau Hepatitis B surface antigen positive     Hx of blood clots 2010    PE / trx with coumadin x 6 months    Hyperlipidemia LDL goal < 100     meds > 10 yrs    Hypertension     meds > 2 yrs    Hypothyroidism 2001    meds > 18 yrs    Laryngitis, chronic 2012    scoped by Dr Molly Rosario, fungal    Marijuana smoker in remission [facility])     Obesity (BMI 30-39. 9)     Osteoarthritis     Pulmonary embolism and infarction (HCC)     Restless legs syndrome     Rhinitis, chronic     Rotator cuff tear     Left    S/P colonoscopy with polypectomy     Dr Lokesh Freedman    Seizures [facility])     Smoker     Spondylosis without myelopathy     Type 2 diabetes mellitus without complication (HCC)     hx > 6 yrs    Urinary incontinence     Vertebral compression fracture (HCC)          SURGICALHISTORY       Past Surgical History:   Procedure Laterality Date    BACK SURGERY  2001    lumbar kyphoplasty x 2    BRAIN SURGERY       SECTION      COLONOSCOPY      CRANIOTOMY      Arnold-Chiary malformation    ENDOSCOPY, COLON, DIAGNOSTIC      FEMUR FRACTURE SURGERY  2003    AL RECONSTR TOTAL SHOULDER IMPLANT Left 6/15/2018    LEFT TOTAL SHOULDER ARTHROPLASTY, SANDY BIOMET TSA, SCALENE NERVE BLOCK, (LATEX ALLERGY) performed by Diane Mahajan MD at [address] [facility]      lumbar kyphoplasty    [facility] ENDOSCOPY  8/5/15    w/bx          CURRENT MEDICATIONS 400 (240 MG) MG TABLET    Take 1 tablet by mouth daily    MELATONIN 10 MG TABS    TAKE 1 TABLET BY MOUTH EVERY NIGHT    MELOXICAM (MOBIC) 15 MG TABLET    Take 1 tablet by mouth daily    METFORMIN (GLUCOPHAGE) 500 MG TABLET    Take 1 tablet by mouth 2 times daily (with meals)    MIRTAZAPINE (REMERON) 30 MG TABLET    Take 30 mg by mouth nightly. MUCINEX 600 MG EXTENDED RELEASE TABLET    TAKE 1 TABLET BY MOUTH TWICE DAILY    MULTIPLE VITAMINS-IRON (MULTIVITAMIN/IRON) TABS    Take 1 tablet by mouth daily. NAPROXEN (NAPROSYN) 500 MG TABLET    TAKE 1 TABLET BY MOUTH TWICE DAILY WITH MEALS    ONDANSETRON (ZOFRAN-ODT) 4 MG DISINTEGRATING TABLET    Take 1 tablet by mouth every 8 hours as needed for Nausea or Vomiting    ONE TOUCH ULTRA TEST STRIP    1 each 2 times daily     ONE TOUCH ULTRASOFT LANCETS MISC    1 each 2 times daily     OXYCODONE-ACETAMINOPHEN (PERCOCET) 5-325 MG PER TABLET    Take 1 tablet by mouth every 4 hours as needed for Pain for up to 7 days. Jolapippa Long POTASSIUM CHLORIDE (KLOR-CON M) 20 MEQ EXTENDED RELEASE TABLET    TAKE 1 TABLET BY MOUTH DAILY    PRAMIPEXOLE (MIRAPEX) 0.5 MG TABLET    TAKE 1 TABLET BY MOUTH EVERY EVENING    PRAMIPEXOLE (MIRAPEX) 0.5 MG TABLET    TAKE 1 TABLET BY MOUTH  EVERY EVENING    PREGABALIN (LYRICA) 150 MG CAPSULE    Take 1 capsule by mouth 2 times daily for 180 days. Jolaine Long     ROSUVASTATIN (CRESTOR) 20 MG TABLET    TAKE 1 TABLET BY MOUTH DAILY    ROSUVASTATIN (CRESTOR) 20 MG TABLET    TAKE 1 TABLET BY MOUTH DAILY    SIMETHICONE 180 MG CAPS    TAKE 1 CAPSULE BY MOUTH TWICE DAILY    SODIUM CHLORIDE 0.9 % IRRIGATION    USE AS DIRECTED    SPIRONOLACTONE (ALDACTONE) 25 MG TABLET    TAKE 1 TABLET BY MOUTH DAILY    TERAZOSIN (HYTRIN) 2 MG CAPSULE    Take 1 capsule by mouth nightly    TIZANIDINE (ZANAFLEX) 4 MG TABLET    TAKE 2 TABLETS BY MOUTH EVERY 8 HOURS AS NEEDED FOR MUSCLE SPASM    TOPIRAMATE (TOPAMAX) 50 MG TABLET    TAKE 1 TABLET BY MOUTH DAILY IN THE MORNING AND 2 TABLETS DAILY AT BEDTIME    VITAMIN B-12 (CYANOCOBALAMIN) 1000 MCG TABLET    Take 1 tablet by mouth daily    VITAMIN B-12 (CYANOCOBALAMIN) 1000 MCG TABLET    TAKE 1 TABLET BY MOUTH DAILY    VITAMIN D (ERGOCALCIFEROL) 01053 UNITS CAPS CAPSULE    TK 1 C PO TWICE WEEKLY       ALLERGIES     Latex; Imitrex [sumatriptan]; Zomig [zolmitriptan]; Antivert [meclizine hcl]; Flagyl [metronidazole]; Ketorolac tromethamine; Provera [medroxyprogesterone acetate]; Ultram [tramadol hcl]; Bacitracin; Bactrim; Cefdinir; Cefuroxime axetil; Codeine; Cyclosporine; Iodine; Iv dye [iodides]; Neomycin; Petroleum jelly [skin protectants, misc.]; Quinolones; Sulfa antibiotics;  Toradol [ketorolac tromethamine]; and Trovan [trovafloxacin]    FAMILY HISTORY       Family History   Problem Relation Age of Onset    Osteoarthritis Mother     Asthma Mother     Diabetes Mother     Hypertension Mother     Cancer Mother         lung    Osteoarthritis Father     Hypertension Father     Other Father         PE    Cancer Father         stomach cancer    Asthma Brother     Heart Attack Brother     Other Brother         overdose    Asthma Sister     Breast Cancer Sister     Hypertension Sister     Other Sister         overdose / MVA          SOCIAL HISTORY       Social History     Social History    Marital status:      Spouse name: N/A    Number of children: 1    Years of education: N/A     Occupational History    SSI      Social History Main Topics    Smoking status: Current Every Day Smoker     Packs/day: 1.00     Years: 32.00     Types: Cigarettes    Smokeless tobacco: Never Used    Alcohol use No    Drug use: No      Comment: daily for pain    Sexual activity: Not Asked     Other Topics Concern    None     Social History Narrative    None       SCREENINGS             PHYSICAL EXAM    (up to 7 for level 4, 8 or more for level 5)     ED Triage Vitals [11/18/18 1155]   BP Temp Temp Source Pulse Resp SpO2 Height Weight   128/71 98.7 °F (37.1 °C) Oral 96 20 98 % 5' 2\" (1.575 m) 161 lb (73 kg)       Physical Exam   Constitutional: She is oriented to person, place, and time. She appears well-developed and well-nourished. No distress. HENT:   Head: Normocephalic and atraumatic. Right Ear: External ear normal.   Left Ear: External ear normal.   Mouth/Throat: No oropharyngeal exudate. Eyes: Pupils are equal, round, and reactive to light. Conjunctivae are normal.   Neck: Normal range of motion. Neck supple. No JVD present. No tracheal deviation present. No thyromegaly present. Cardiovascular: Normal rate and normal heart sounds. No murmur heard. Pulmonary/Chest: Effort normal and breath sounds normal. No respiratory distress. She has no wheezes. Abdominal: Soft. Bowel sounds are normal. There is no tenderness. There is no guarding. Musculoskeletal: She exhibits tenderness. She exhibits no edema. Right upper extremity is in a sling   Neurological: She is alert and oriented to person, place, and time. No cranial nerve deficit. Skin: Skin is warm and dry. No rash noted. She is not diaphoretic. Psychiatric: She has a normal mood and affect.  Her behavior is normal.       DIAGNOSTIC RESULTS     EKG: All EKG's are interpreted by the Emergency Department Physician who either signs or Co-signsthis chart in the absence of a cardiologist.        RADIOLOGY:   Coleman Bolder such as CT, Ultrasound and MRI are read by the radiologist. Zara Plata radiographic images are visualized and preliminarily interpreted by the emergency physician with the below findings:    CT of the right shoulder shows the humeral neck fracture    Interpretation per the Radiologist below, if available at the time ofthis note:    [facility]    (Results Pending)         ED BEDSIDE ULTRASOUND:   Performed by ED Physician - none    LABS:  Labs Reviewed - No data to display    All other labs were within normal range or not returned as of this

## 2018-11-19 RX ORDER — CHLORZOXAZONE 250 MG/1
250 TABLET ORAL 4 TIMES DAILY PRN
Qty: 240 TABLET | Refills: 0 | Status: SHIPPED | OUTPATIENT
Start: 2018-11-19 | End: 2018-11-20

## 2018-11-20 ENCOUNTER — TELEPHONE (OUTPATIENT)
Dept: FAMILY MEDICINE CLINIC | Age: 58
End: 2018-11-20

## 2018-11-20 ENCOUNTER — OFFICE VISIT (OUTPATIENT)
Dept: FAMILY MEDICINE CLINIC | Age: 58
End: 2018-11-20
Payer: MEDICARE

## 2018-11-20 VITALS
SYSTOLIC BLOOD PRESSURE: 132 MMHG | HEART RATE: 99 BPM | HEIGHT: 62 IN | OXYGEN SATURATION: 95 % | DIASTOLIC BLOOD PRESSURE: 74 MMHG | WEIGHT: 161 LBS | BODY MASS INDEX: 29.63 KG/M2

## 2018-11-20 DIAGNOSIS — L89.152 SACRAL DECUBITUS ULCER, STAGE II (HCC): ICD-10-CM

## 2018-11-20 DIAGNOSIS — R29.6 RECURRENT FALLS: ICD-10-CM

## 2018-11-20 DIAGNOSIS — E11.51 TYPE 2 DIABETES MELLITUS WITH DIABETIC PERIPHERAL ANGIOPATHY WITHOUT GANGRENE, WITHOUT LONG-TERM CURRENT USE OF INSULIN (HCC): ICD-10-CM

## 2018-11-20 DIAGNOSIS — J44.9 CHRONIC OBSTRUCTIVE PULMONARY DISEASE, UNSPECIFIED COPD TYPE (HCC): ICD-10-CM

## 2018-11-20 DIAGNOSIS — F31.71 BIPOLAR DISORDER, IN PARTIAL REMISSION, MOST RECENT EPISODE HYPOMANIC (HCC): ICD-10-CM

## 2018-11-20 DIAGNOSIS — S42.209A CLOSED FRACTURE OF PROXIMAL END OF HUMERUS, UNSPECIFIED FRACTURE MORPHOLOGY, UNSPECIFIED LATERALITY, INITIAL ENCOUNTER: Primary | ICD-10-CM

## 2018-11-20 DIAGNOSIS — I69.354 HEMIPARESIS AFFECTING LEFT SIDE AS LATE EFFECT OF CEREBROVASCULAR ACCIDENT (HCC): ICD-10-CM

## 2018-11-20 DIAGNOSIS — I63.89 CEREBRAL INFARCTION DUE TO OTHER MECHANISM (HCC): ICD-10-CM

## 2018-11-20 DIAGNOSIS — M87.9 BONE NECROSIS (HCC): ICD-10-CM

## 2018-11-20 PROCEDURE — 3045F PR MOST RECENT HEMOGLOBIN A1C LEVEL 7.0-9.0%: CPT | Performed by: FAMILY MEDICINE

## 2018-11-20 PROCEDURE — G8482 FLU IMMUNIZE ORDER/ADMIN: HCPCS | Performed by: FAMILY MEDICINE

## 2018-11-20 PROCEDURE — 4004F PT TOBACCO SCREEN RCVD TLK: CPT | Performed by: FAMILY MEDICINE

## 2018-11-20 PROCEDURE — G8599 NO ASA/ANTIPLAT THER USE RNG: HCPCS | Performed by: FAMILY MEDICINE

## 2018-11-20 PROCEDURE — G8926 SPIRO NO PERF OR DOC: HCPCS | Performed by: FAMILY MEDICINE

## 2018-11-20 PROCEDURE — 3023F SPIROM DOC REV: CPT | Performed by: FAMILY MEDICINE

## 2018-11-20 PROCEDURE — 2022F DILAT RTA XM EVC RTNOPTHY: CPT | Performed by: FAMILY MEDICINE

## 2018-11-20 PROCEDURE — 3014F SCREEN MAMMO DOC REV: CPT | Performed by: FAMILY MEDICINE

## 2018-11-20 PROCEDURE — G8417 CALC BMI ABV UP PARAM F/U: HCPCS | Performed by: FAMILY MEDICINE

## 2018-11-20 PROCEDURE — G8427 DOCREV CUR MEDS BY ELIG CLIN: HCPCS | Performed by: FAMILY MEDICINE

## 2018-11-20 PROCEDURE — 3017F COLORECTAL CA SCREEN DOC REV: CPT | Performed by: FAMILY MEDICINE

## 2018-11-20 PROCEDURE — 99214 OFFICE O/P EST MOD 30 MIN: CPT | Performed by: FAMILY MEDICINE

## 2018-11-23 RX ORDER — FLUCONAZOLE 100 MG/1
100 TABLET ORAL DAILY
Qty: 7 TABLET | Refills: 0 | Status: SHIPPED | OUTPATIENT
Start: 2018-11-23 | End: 2019-01-07 | Stop reason: SDUPTHER

## 2018-11-27 RX ORDER — CHLORZOXAZONE 250 MG/1
250 TABLET ORAL 4 TIMES DAILY PRN
Qty: 240 TABLET | Refills: 2 | Status: SHIPPED | OUTPATIENT
Start: 2018-11-27 | End: 2018-12-03 | Stop reason: SDUPTHER

## 2018-11-28 ENCOUNTER — HOSPITAL ENCOUNTER (OUTPATIENT)
Age: 58
Setting detail: OBSERVATION
Discharge: NURSING, REHAB OR LONG-TERM CARE | End: 2018-11-30
Attending: INTERNAL MEDICINE | Admitting: INTERNAL MEDICINE
Payer: MEDICARE

## 2018-11-28 ENCOUNTER — APPOINTMENT (OUTPATIENT)
Dept: GENERAL RADIOLOGY | Age: 58
End: 2018-11-28
Payer: MEDICARE

## 2018-11-28 DIAGNOSIS — M25.512 BILATERAL SHOULDER PAIN, UNSPECIFIED CHRONICITY: ICD-10-CM

## 2018-11-28 DIAGNOSIS — G61.0 GUILLAIN BARRÉ SYNDROME (HCC): Primary | ICD-10-CM

## 2018-11-28 DIAGNOSIS — M25.511 BILATERAL SHOULDER PAIN, UNSPECIFIED CHRONICITY: ICD-10-CM

## 2018-11-28 DIAGNOSIS — R09.02 HYPOXIA: ICD-10-CM

## 2018-11-28 DIAGNOSIS — R53.1 GENERALIZED WEAKNESS: ICD-10-CM

## 2018-11-28 DIAGNOSIS — Z78.9 IMPAIRED MOBILITY AND ADLS: ICD-10-CM

## 2018-11-28 DIAGNOSIS — R33.8 ACUTE URINARY RETENTION: ICD-10-CM

## 2018-11-28 DIAGNOSIS — R29.6 MULTIPLE FALLS: ICD-10-CM

## 2018-11-28 DIAGNOSIS — Z74.09 IMPAIRED MOBILITY AND ADLS: ICD-10-CM

## 2018-11-28 LAB
ALBUMIN SERPL-MCNC: 3.7 G/DL (ref 3.9–4.9)
ALP BLD-CCNC: 156 U/L (ref 40–130)
ALT SERPL-CCNC: 9 U/L (ref 0–33)
AMPHETAMINE SCREEN, URINE: ABNORMAL
ANION GAP SERPL CALCULATED.3IONS-SCNC: 13 MEQ/L (ref 7–13)
AST SERPL-CCNC: 13 U/L (ref 0–35)
BARBITURATE SCREEN URINE: ABNORMAL
BASOPHILS ABSOLUTE: 0 K/UL (ref 0–0.2)
BASOPHILS RELATIVE PERCENT: 0.1 %
BENZODIAZEPINE SCREEN, URINE: ABNORMAL
BILIRUB SERPL-MCNC: <0.2 MG/DL (ref 0–1.2)
BILIRUBIN URINE: NEGATIVE
BLOOD, URINE: NEGATIVE
BUN BLDV-MCNC: 25 MG/DL (ref 6–20)
CALCIUM SERPL-MCNC: 8.8 MG/DL (ref 8.6–10.2)
CANNABINOID SCREEN URINE: POSITIVE
CHLORIDE BLD-SCNC: 103 MEQ/L (ref 98–107)
CLARITY: CLEAR
CO2: 24 MEQ/L (ref 22–29)
COCAINE METABOLITE SCREEN URINE: ABNORMAL
COLOR: YELLOW
CREAT SERPL-MCNC: 0.82 MG/DL (ref 0.5–0.9)
EKG ATRIAL RATE: 84 BPM
EKG P AXIS: 70 DEGREES
EKG P-R INTERVAL: 146 MS
EKG Q-T INTERVAL: 374 MS
EKG QRS DURATION: 96 MS
EKG QTC CALCULATION (BAZETT): 441 MS
EKG R AXIS: 40 DEGREES
EKG T AXIS: 36 DEGREES
EKG VENTRICULAR RATE: 84 BPM
EOSINOPHILS ABSOLUTE: 0.3 K/UL (ref 0–0.7)
EOSINOPHILS RELATIVE PERCENT: 3.4 %
GFR AFRICAN AMERICAN: >60
GFR NON-AFRICAN AMERICAN: >60
GLOBULIN: 3.1 G/DL (ref 2.3–3.5)
GLUCOSE BLD-MCNC: 119 MG/DL (ref 74–109)
GLUCOSE URINE: NEGATIVE MG/DL
HCT VFR BLD CALC: 30.3 % (ref 37–47)
HEMOGLOBIN: 9.7 G/DL (ref 12–16)
KETONES, URINE: NEGATIVE MG/DL
LACTIC ACID: 1 MMOL/L (ref 0.5–2.2)
LACTIC ACID: 1.3 MMOL/L (ref 0.5–2.2)
LEUKOCYTE ESTERASE, URINE: NEGATIVE
LYMPHOCYTES ABSOLUTE: 2.4 K/UL (ref 1–4.8)
LYMPHOCYTES RELATIVE PERCENT: 27.2 %
Lab: ABNORMAL
MCH RBC QN AUTO: 25.1 PG (ref 27–31.3)
MCHC RBC AUTO-ENTMCNC: 32.1 % (ref 33–37)
MCV RBC AUTO: 78 FL (ref 82–100)
MONOCYTES ABSOLUTE: 0.8 K/UL (ref 0.2–0.8)
MONOCYTES RELATIVE PERCENT: 8.5 %
NEUTROPHILS ABSOLUTE: 5.4 K/UL (ref 1.4–6.5)
NEUTROPHILS RELATIVE PERCENT: 60.8 %
NITRITE, URINE: NEGATIVE
OPIATE SCREEN URINE: ABNORMAL
PDW BLD-RTO: 19.5 % (ref 11.5–14.5)
PH UA: 5.5 (ref 5–9)
PHENCYCLIDINE SCREEN URINE: ABNORMAL
PLATELET # BLD: 260 K/UL (ref 130–400)
POTASSIUM SERPL-SCNC: 4.8 MEQ/L (ref 3.5–5.1)
PROTEIN UA: NEGATIVE MG/DL
RBC # BLD: 3.88 M/UL (ref 4.2–5.4)
SODIUM BLD-SCNC: 140 MEQ/L (ref 132–144)
SPECIFIC GRAVITY UA: 1.01 (ref 1–1.03)
TOTAL CK: 57 U/L (ref 0–170)
TOTAL PROTEIN: 6.8 G/DL (ref 6.4–8.1)
TROPONIN: <0.01 NG/ML (ref 0–0.01)
TSH SERPL DL<=0.05 MIU/L-ACNC: 0.62 UIU/ML (ref 0.27–4.2)
URINE REFLEX TO CULTURE: NORMAL
UROBILINOGEN, URINE: 0.2 E.U./DL
WBC # BLD: 9 K/UL (ref 4.8–10.8)

## 2018-11-28 PROCEDURE — 81003 URINALYSIS AUTO W/O SCOPE: CPT

## 2018-11-28 PROCEDURE — 99285 EMERGENCY DEPT VISIT HI MDM: CPT

## 2018-11-28 PROCEDURE — 83605 ASSAY OF LACTIC ACID: CPT

## 2018-11-28 PROCEDURE — 84484 ASSAY OF TROPONIN QUANT: CPT

## 2018-11-28 PROCEDURE — 80307 DRUG TEST PRSMV CHEM ANLYZR: CPT

## 2018-11-28 PROCEDURE — 96374 THER/PROPH/DIAG INJ IV PUSH: CPT

## 2018-11-28 PROCEDURE — 6360000002 HC RX W HCPCS: Performed by: NURSE PRACTITIONER

## 2018-11-28 PROCEDURE — 82550 ASSAY OF CK (CPK): CPT

## 2018-11-28 PROCEDURE — 71045 X-RAY EXAM CHEST 1 VIEW: CPT

## 2018-11-28 PROCEDURE — 93005 ELECTROCARDIOGRAM TRACING: CPT

## 2018-11-28 PROCEDURE — 36415 COLL VENOUS BLD VENIPUNCTURE: CPT

## 2018-11-28 PROCEDURE — 85025 COMPLETE CBC W/AUTO DIFF WBC: CPT

## 2018-11-28 PROCEDURE — 84443 ASSAY THYROID STIM HORMONE: CPT

## 2018-11-28 PROCEDURE — 87040 BLOOD CULTURE FOR BACTERIA: CPT

## 2018-11-28 PROCEDURE — 80053 COMPREHEN METABOLIC PANEL: CPT

## 2018-11-28 PROCEDURE — 2580000003 HC RX 258: Performed by: NURSE PRACTITIONER

## 2018-11-28 RX ORDER — FOLIC ACID 1 MG/1
1 TABLET ORAL DAILY
Qty: 90 TABLET | Refills: 1 | Status: SHIPPED | OUTPATIENT
Start: 2018-11-28 | End: 2019-07-05 | Stop reason: SDUPTHER

## 2018-11-28 RX ORDER — SELENIUM 50 MCG
TABLET ORAL
Qty: 30 CAPSULE | Refills: 0 | Status: SHIPPED | OUTPATIENT
Start: 2018-11-28 | End: 2019-05-22

## 2018-11-28 RX ORDER — 0.9 % SODIUM CHLORIDE 0.9 %
1000 INTRAVENOUS SOLUTION INTRAVENOUS ONCE
Status: COMPLETED | OUTPATIENT
Start: 2018-11-28 | End: 2018-11-28

## 2018-11-28 RX ORDER — MORPHINE SULFATE 2 MG/ML
2 INJECTION, SOLUTION INTRAMUSCULAR; INTRAVENOUS
Status: COMPLETED | OUTPATIENT
Start: 2018-11-28 | End: 2018-11-29

## 2018-11-28 RX ADMIN — SODIUM CHLORIDE 1000 ML: 9 INJECTION, SOLUTION INTRAVENOUS at 20:40

## 2018-11-28 RX ADMIN — MORPHINE SULFATE 2 MG: 2 INJECTION, SOLUTION INTRAMUSCULAR; INTRAVENOUS at 21:58

## 2018-11-28 ASSESSMENT — PAIN DESCRIPTION - LOCATION: LOCATION: BACK;ABDOMEN

## 2018-11-28 ASSESSMENT — PAIN DESCRIPTION - DESCRIPTORS: DESCRIPTORS: BURNING

## 2018-11-28 ASSESSMENT — PAIN SCALES - GENERAL
PAINLEVEL_OUTOF10: 9
PAINLEVEL_OUTOF10: 9

## 2018-11-28 NOTE — TELEPHONE ENCOUNTER
Pt calling asking for a script for a toilet wand. States that this will help her wipe after using the bathroom.  Uses [facility]

## 2018-11-29 ENCOUNTER — APPOINTMENT (OUTPATIENT)
Dept: ULTRASOUND IMAGING | Age: 58
End: 2018-11-29
Payer: MEDICARE

## 2018-11-29 ENCOUNTER — APPOINTMENT (OUTPATIENT)
Dept: MRI IMAGING | Age: 58
End: 2018-11-29
Payer: MEDICARE

## 2018-11-29 PROBLEM — G61.0 GBS (GUILLAIN-BARRE SYNDROME) (HCC): Status: ACTIVE | Noted: 2018-11-29

## 2018-11-29 LAB
FOLATE: >20 NG/ML (ref 7.3–26.1)
VITAMIN B-12: 899 PG/ML (ref 232–1245)

## 2018-11-29 PROCEDURE — 6360000002 HC RX W HCPCS: Performed by: PSYCHIATRY & NEUROLOGY

## 2018-11-29 PROCEDURE — G0378 HOSPITAL OBSERVATION PER HR: HCPCS

## 2018-11-29 PROCEDURE — 97530 THERAPEUTIC ACTIVITIES: CPT

## 2018-11-29 PROCEDURE — 6370000000 HC RX 637 (ALT 250 FOR IP): Performed by: INTERNAL MEDICINE

## 2018-11-29 PROCEDURE — 96376 TX/PRO/DX INJ SAME DRUG ADON: CPT

## 2018-11-29 PROCEDURE — 94761 N-INVAS EAR/PLS OXIMETRY MLT: CPT

## 2018-11-29 PROCEDURE — 72141 MRI NECK SPINE W/O DYE: CPT

## 2018-11-29 PROCEDURE — G8979 MOBILITY GOAL STATUS: HCPCS

## 2018-11-29 PROCEDURE — G8988 SELF CARE GOAL STATUS: HCPCS

## 2018-11-29 PROCEDURE — G8987 SELF CARE CURRENT STATUS: HCPCS

## 2018-11-29 PROCEDURE — 94640 AIRWAY INHALATION TREATMENT: CPT

## 2018-11-29 PROCEDURE — 97163 PT EVAL HIGH COMPLEX 45 MIN: CPT

## 2018-11-29 PROCEDURE — 2580000003 HC RX 258: Performed by: INTERNAL MEDICINE

## 2018-11-29 PROCEDURE — 82607 VITAMIN B-12: CPT

## 2018-11-29 PROCEDURE — 97167 OT EVAL HIGH COMPLEX 60 MIN: CPT

## 2018-11-29 PROCEDURE — 96375 TX/PRO/DX INJ NEW DRUG ADDON: CPT

## 2018-11-29 PROCEDURE — G8978 MOBILITY CURRENT STATUS: HCPCS

## 2018-11-29 PROCEDURE — 6360000002 HC RX W HCPCS: Performed by: NURSE PRACTITIONER

## 2018-11-29 PROCEDURE — 84446 ASSAY OF VITAMIN E: CPT

## 2018-11-29 PROCEDURE — 36415 COLL VENOUS BLD VENIPUNCTURE: CPT

## 2018-11-29 PROCEDURE — 93880 EXTRACRANIAL BILAT STUDY: CPT

## 2018-11-29 PROCEDURE — 82746 ASSAY OF FOLIC ACID SERUM: CPT

## 2018-11-29 PROCEDURE — 2700000000 HC OXYGEN THERAPY PER DAY

## 2018-11-29 PROCEDURE — 93010 ELECTROCARDIOGRAM REPORT: CPT | Performed by: INTERNAL MEDICINE

## 2018-11-29 RX ORDER — LIDOCAINE HYDROCHLORIDE 20 MG/ML
5 INJECTION, SOLUTION INFILTRATION; PERINEURAL
Status: DISCONTINUED | OUTPATIENT
Start: 2018-11-29 | End: 2018-11-29

## 2018-11-29 RX ORDER — PREGABALIN 150 MG/1
150 CAPSULE ORAL DAILY
Status: DISCONTINUED | OUTPATIENT
Start: 2018-11-29 | End: 2018-11-30 | Stop reason: HOSPADM

## 2018-11-29 RX ORDER — AMLODIPINE BESYLATE 2.5 MG/1
2.5 TABLET ORAL DAILY
Status: DISCONTINUED | OUTPATIENT
Start: 2018-11-29 | End: 2018-11-30 | Stop reason: HOSPADM

## 2018-11-29 RX ORDER — ARIPIPRAZOLE 10 MG/1
10 TABLET ORAL DAILY
Status: DISCONTINUED | OUTPATIENT
Start: 2018-11-29 | End: 2018-11-30 | Stop reason: HOSPADM

## 2018-11-29 RX ORDER — ONDANSETRON 2 MG/ML
4 INJECTION INTRAMUSCULAR; INTRAVENOUS EVERY 6 HOURS PRN
Status: DISCONTINUED | OUTPATIENT
Start: 2018-11-29 | End: 2018-11-29 | Stop reason: SDUPTHER

## 2018-11-29 RX ORDER — IBUPROFEN 400 MG/1
800 TABLET ORAL EVERY 8 HOURS PRN
Status: DISCONTINUED | OUTPATIENT
Start: 2018-11-29 | End: 2018-11-30 | Stop reason: HOSPADM

## 2018-11-29 RX ORDER — SODIUM CHLORIDE 0.9 % (FLUSH) 0.9 %
10 SYRINGE (ML) INJECTION EVERY 12 HOURS SCHEDULED
Status: DISCONTINUED | OUTPATIENT
Start: 2018-11-29 | End: 2018-11-30 | Stop reason: HOSPADM

## 2018-11-29 RX ORDER — MIRTAZAPINE 30 MG/1
30 TABLET, FILM COATED ORAL NIGHTLY
Status: DISCONTINUED | OUTPATIENT
Start: 2018-11-29 | End: 2018-11-30 | Stop reason: HOSPADM

## 2018-11-29 RX ORDER — ONDANSETRON 2 MG/ML
4 INJECTION INTRAMUSCULAR; INTRAVENOUS EVERY 6 HOURS PRN
Status: DISCONTINUED | OUTPATIENT
Start: 2018-11-29 | End: 2018-11-30 | Stop reason: HOSPADM

## 2018-11-29 RX ORDER — ESCITALOPRAM OXALATE 20 MG/1
20 TABLET ORAL DAILY
Status: DISCONTINUED | OUTPATIENT
Start: 2018-11-29 | End: 2018-11-30 | Stop reason: HOSPADM

## 2018-11-29 RX ORDER — PANTOPRAZOLE SODIUM 40 MG/1
40 TABLET, DELAYED RELEASE ORAL
Status: DISCONTINUED | OUTPATIENT
Start: 2018-11-29 | End: 2018-11-30 | Stop reason: HOSPADM

## 2018-11-29 RX ORDER — LORAZEPAM 2 MG/ML
1 INJECTION INTRAMUSCULAR
Status: COMPLETED | OUTPATIENT
Start: 2018-11-29 | End: 2018-11-29

## 2018-11-29 RX ORDER — LEVOTHYROXINE SODIUM 0.2 MG/1
200 TABLET ORAL DAILY
Status: DISCONTINUED | OUTPATIENT
Start: 2018-11-29 | End: 2018-11-30 | Stop reason: HOSPADM

## 2018-11-29 RX ORDER — PRAMIPEXOLE DIHYDROCHLORIDE 0.25 MG/1
0.5 TABLET ORAL EVERY EVENING
Status: DISCONTINUED | OUTPATIENT
Start: 2018-11-29 | End: 2018-11-30 | Stop reason: HOSPADM

## 2018-11-29 RX ORDER — SODIUM CHLORIDE 0.9 % (FLUSH) 0.9 %
10 SYRINGE (ML) INJECTION PRN
Status: DISCONTINUED | OUTPATIENT
Start: 2018-11-29 | End: 2018-11-30 | Stop reason: HOSPADM

## 2018-11-29 RX ORDER — ACETAMINOPHEN 325 MG/1
650 TABLET ORAL EVERY 6 HOURS PRN
Status: DISCONTINUED | OUTPATIENT
Start: 2018-11-29 | End: 2018-11-30 | Stop reason: HOSPADM

## 2018-11-29 RX ORDER — ROSUVASTATIN CALCIUM 20 MG/1
20 TABLET, COATED ORAL DAILY
Status: DISCONTINUED | OUTPATIENT
Start: 2018-11-29 | End: 2018-11-30 | Stop reason: HOSPADM

## 2018-11-29 RX ORDER — PANTOPRAZOLE SODIUM 40 MG/1
40 TABLET, DELAYED RELEASE ORAL
Status: DISCONTINUED | OUTPATIENT
Start: 2018-11-30 | End: 2018-11-29

## 2018-11-29 RX ADMIN — AMLODIPINE BESYLATE 2.5 MG: 2.5 TABLET ORAL at 11:33

## 2018-11-29 RX ADMIN — IBUPROFEN 800 MG: 400 TABLET, FILM COATED ORAL at 12:51

## 2018-11-29 RX ADMIN — ARIPIPRAZOLE 10 MG: 10 TABLET ORAL at 11:33

## 2018-11-29 RX ADMIN — Medication 10 ML: at 22:02

## 2018-11-29 RX ADMIN — Medication 10 ML: at 09:59

## 2018-11-29 RX ADMIN — PANTOPRAZOLE SODIUM 40 MG: 40 TABLET, DELAYED RELEASE ORAL at 12:51

## 2018-11-29 RX ADMIN — METFORMIN HYDROCHLORIDE 500 MG: 500 TABLET ORAL at 18:20

## 2018-11-29 RX ADMIN — PRAMIPEXOLE DIHYDROCHLORIDE 0.5 MG: 0.25 TABLET ORAL at 18:20

## 2018-11-29 RX ADMIN — MORPHINE SULFATE 2 MG: 2 INJECTION, SOLUTION INTRAMUSCULAR; INTRAVENOUS at 05:28

## 2018-11-29 RX ADMIN — MIRTAZAPINE 30 MG: 30 TABLET, FILM COATED ORAL at 22:01

## 2018-11-29 RX ADMIN — Medication 2 PUFF: at 20:12

## 2018-11-29 RX ADMIN — LORAZEPAM 1 MG: 2 INJECTION INTRAMUSCULAR; INTRAVENOUS at 13:40

## 2018-11-29 RX ADMIN — ROSUVASTATIN CALCIUM 20 MG: 20 TABLET, FILM COATED ORAL at 22:01

## 2018-11-29 RX ADMIN — ESCITALOPRAM OXALATE 20 MG: 20 TABLET ORAL at 11:34

## 2018-11-29 RX ADMIN — PREGABALIN 150 MG: 150 CAPSULE ORAL at 11:33

## 2018-11-29 RX ADMIN — LEVOTHYROXINE SODIUM 200 MCG: 200 TABLET ORAL at 11:33

## 2018-11-29 ASSESSMENT — ENCOUNTER SYMPTOMS
NAUSEA: 0
COUGH: 0
SORE THROAT: 0
COLOR CHANGE: 0
CHEST TIGHTNESS: 0
CONSTIPATION: 0
RHINORRHEA: 0
SINUS PAIN: 0
BACK PAIN: 0
WHEEZING: 0
SHORTNESS OF BREATH: 0
DIARRHEA: 0
ABDOMINAL PAIN: 1
SINUS PRESSURE: 0
VOMITING: 0
TROUBLE SWALLOWING: 0
ABDOMINAL DISTENTION: 1

## 2018-11-29 ASSESSMENT — PAIN SCALES - GENERAL
PAINLEVEL_OUTOF10: 9
PAINLEVEL_OUTOF10: 9
PAINLEVEL_OUTOF10: 7
PAINLEVEL_OUTOF10: 9

## 2018-11-29 ASSESSMENT — PAIN DESCRIPTION - ONSET: ONSET: ON-GOING

## 2018-11-29 ASSESSMENT — PAIN DESCRIPTION - ORIENTATION
ORIENTATION: RIGHT;LEFT
ORIENTATION: RIGHT;LEFT

## 2018-11-29 ASSESSMENT — PAIN DESCRIPTION - DESCRIPTORS
DESCRIPTORS: SHARP;SHOOTING
DESCRIPTORS: SHARP

## 2018-11-29 ASSESSMENT — PAIN DESCRIPTION - LOCATION
LOCATION: SHOULDER
LOCATION: ARM

## 2018-11-29 ASSESSMENT — PAIN DESCRIPTION - PAIN TYPE: TYPE: CHRONIC PAIN;ACUTE PAIN

## 2018-11-29 ASSESSMENT — PAIN DESCRIPTION - FREQUENCY: FREQUENCY: CONTINUOUS

## 2018-11-29 ASSESSMENT — PAIN DESCRIPTION - PROGRESSION: CLINICAL_PROGRESSION: GRADUALLY WORSENING

## 2018-11-29 NOTE — CONSULTS
the  left upper and lower extremities from the same. She then sustained a  rotator cuff injury on the left. This was nontraumatic. The right one  appears to be traumatic of a recent fall. She has a history of anxiety,  previous history of cerebellar infarctions, abnormal MRI with white  matter disease, she has a history of fibromyalgia, hepatitis B,  hyperlipidemia, hypertension, laryngitis, rest legs syndrome, question  of seizures, urinary incontinence. MEDICATIONS:  Are those of Norvasc, she is on Abilify, Lexapro,  Synthroid, Glucophage, Remeron, Zofran, Mirapex, Lyrica, and Crestor. PHYSICAL EXAMINATION:  GENERAL:  She is in no distress. NECK:  Supple. There are no meningeal signs. CARDIOVASCULAR SYSTEM:  S1 and S2 are normal.  No murmurs are  appreciated. No carotid bruits. RESPIRATORY SYSTEM:  Clear to auscultation. CNS EXAMINATION:  She is awake and alert and oriented to self, place,  and month. She is mildly dysarthric. There is no nystagmus even with  upward movement. Tongue is midline. Palate moves symmetrically. She  has some minor vestibular findings on movement of her head. EXTREMITIES:  Examination of extremities reveals no pronator drift. Fine finger movements are symmetrical.  Strength is 5/5. She is  significantly hyperreflexic throughout. There are no sensory levels. Reflexes are asymmetric to the left compared to the right. We did not  attempt to walk her as yet. She has limitation of shoulder abduction  bilaterally due to the surgical interventions. These are not  significant though. Lab examination is therefore reviewed. Her urine drug screen is  positive for cannabinoids. Urine cultures are pending. TSH level is  normal at 0.62 with a CPK level of 57. Comprehensive metabolic profile,  sodium 140, potassium 4.8, BUN of 25, creatinine 0.82. WBC count of  9.0, hemoglobin 9.7, hematocrit 30.3, and platelets of 195,030.     IMAGE STUDIES:  We do not have any

## 2018-11-29 NOTE — ED PROVIDER NOTES
[address] [facility] ED  eMERGENCY dEPARTMENT eNCOUnter      Pt Name: Shawna Chris  MRN: [id number]  [place] 1960  Date of evaluation: 11/28/2018  Provider: Chicho Redman       Chief Complaint   Patient presents with    Fatigue     pt states she feels tired and \"out if it\" since Friday. HISTORY OF PRESENT ILLNESS   (Location/Symptom, Timing/Onset,Context/Setting, Quality, Duration, Modifying Factors, Severity)  Note limiting factors. Shawna Chris is a 66 y.o. female who presents to the emergency department For complaint of increasing generalized weakness and fatigue starting Friday notably much worse over the past 24 hours. Patient reports that she was being treated for urinary tract infection has finished antibiotics 2 days ago but states that she feels very rundown and weak and fatigued. Patient additionally states that she is a pain 9 out of 10 of the right arm due to a recent fracture that is aren't and treatment per orthopedics. Patient reports that she continues to have urgency frequency without any dysuria or obvious blood in urine. She states recently her urine has been decreased she is having increasing difficulty with urinating producing urine. Patient was recently diagnosed with a left humeral fracture approximately one month ago with a new right humeral fracture occurring last week. Patient states she continues to have general weakness and multiple falls daily having difficulty using her upper extremities. She denies any other abdominal complaints and denies nausea vomiting or diarrhea. She denies any headaches dizziness lightheadedness denies any chest pain shortness breath cough or congestion. Nursing Notes were reviewed. REVIEW OF SYSTEMS    (2-9 systems for level 4, 10 or more for level 5)     Review of Systems   Constitutional: Negative for activity change, appetite change, chills, diaphoresis, fatigue and fever. 1. Guillain Barré syndrome ([facility] [address]    2. Generalized weakness    3. Bilateral shoulder pain, unspecified chronicity    4. Acute urinary retention    5. Multiple falls    6. Impaired mobility and ADLs    7.  Hypoxia          DISPOSITION/PLAN   DISPOSITION Admitted 11/29/2018 03:04:11 AM      PATIENT REFERRED TO:  Kiko Perkins MD  [address]  [phone]            DISCHARGE MEDICATIONS:  New Prescriptions    No medications on file          (Please notethat portions of this note were completed with a voice recognition program.  Efforts were made to edit the dictations but occasionally words are mis-transcribed.)    MAYURI Bee CNP (electronically signed)  Attending Emergency Physician         MAYURI Bee CNP  11/29/18 1402 MAYURI Persaud CNP  11/29/18 [id number]

## 2018-11-29 NOTE — PROGRESS NOTES
Improve B UE strength and endurance to Fair in order to participate in self-care activities as projected. [x]  Access appropriate D/C site with as few architectural barriers as possible. Treatment Plan will consist of:   [x] ADL Training   [] Strengthening   [x] Endurance   [x] Transfer Training   [x]  DME ed      [x] HEP  [x] Manual Therapy   [x] AROM/PROM    [x] Coordination   [] Cognitive Training   [x]Safety training   [] Other :    Patient Goal: D/C with therapy  Discussed and agreed upon: [x] Yes   [] No Comment:     Assessment/Discharge Disposition:     Performance deficits / Impairments: Decreased functional mobility , Decreased ROM, Decreased ADL status, Decreased strength, Decreased safe awareness, Decreased cognition, Decreased endurance, Decreased balance, Decreased high-level IADLs, Decreased fine motor control, Decreased coordination  Prognosis: Good  Discharge Recommendations: Continue to assess pending progress  History: multi comorb  Exam: 11 deficits  Assistance / Modification: Max A      Barriers to Improvement:  B UE fxs      Discharge Recommendations:  therapy    Six Click Score  How much help for putting on and taking off regular lower body clothing?: A Lot  How much help for Bathing?: A Lot  How much help for Toileting?: A Lot     How much help for taking care of personal grooming?: A Lot  How much help for eating meals?: A Lot             Recommended DME:  [] W/W   [] Marga Murrell   [] Rollator   [] W/C   [] Yoel Sin  [] Shower Chair   []Dressing AD []  BSC  [x] Other:has AD  Plan:Times per week: 1-3x,      G-Codes:  OT G-codes  Functional Limitation: Self care  Self Care Current Status (): At least 80 percent but less than 100 percent impaired, limited or restricted  Self Care Goal Status ():  At least 20 percent but less than 40 percent impaired, limited or restricted    Time in:  11:15  Time out:  11:36  Total minutes:  21  Timed treatment minutes:  10  Tx: pt instructed on NWB

## 2018-11-29 NOTE — ED TRIAGE NOTES
Pt to ER with c/o feeling tired and weak. Pt states she thinks she has a UTI however she just finished an antibiotic for a UTi 2 days ago.  Pt states \"Im not peeing like I should be\"

## 2018-11-29 NOTE — H&P
tablet by mouth every 8 hours as needed for Nausea or Vomiting 6/1/18  Yes Elva Reed MD   tiZANidine (ZANAFLEX) 4 MG tablet TAKE 2 TABLETS BY MOUTH EVERY 8 HOURS AS NEEDED FOR MUSCLE SPASM 5/30/18  Yes Elva Reed MD   magnesium oxide (MAG-OX) 400 (240 Mg) MG tablet Take 1 tablet by mouth daily 5/29/18  Yes Elva Reed MD   Simethicone 180 MG CAPS TAKE 1 CAPSULE BY MOUTH TWICE DAILY 3/19/18  Yes Elva Reed MD   MUCINEX 600 MG extended release tablet TAKE 1 TABLET BY MOUTH TWICE DAILY 2/12/18  Yes Elva Reed MD   escitalopram (LEXAPRO) 20 MG tablet one tab at night 10/17/17  Yes Historical Provider, MD   ARIPiprazole (ABILIFY) 10 MG tablet take 1 tab q night 10/17/17  Yes Historical Provider, MD   levocetirizine (XYZAL) 5 MG tablet take 1 tab daily 10/30/17  Yes Historical Provider, MD   loperamide (IMODIUM A-D) 2 MG tablet Take 2 mg by mouth as needed for Diarrhea  1/18/16  Yes Historical Provider, MD   carisoprodol (SOMA) 350 MG tablet Take 1 tablet by mouth daily as needed (migraine) 5/30/17  Yes Elva Reed MD   Cholecalciferol (VITAMIN D3) 2000 UNITS TABS Take 1 tablet by mouth daily  8/6/15  Yes Historical Provider, MD   ONE TOUCH ULTRA TEST strip 1 each 2 times daily  8/16/15  Yes Historical Provider, MD   ONE TOUCH ULTRASOFT LANCETS MISC 1 each 2 times daily  8/28/15  Yes Historical Provider, MD   mirtazapine (REMERON) 30 MG tablet Take 30 mg by mouth nightly. Yes Historical Provider, MD   ascorbic acid (VITAMIN C) 500 MG tablet Take 500 mg by mouth daily. Yes Historical Provider, MD   chlorzoxazone (PARAFON FORTE) 250 MG tablet Take 1 tablet by mouth 4 times daily as needed for Muscle spasms 11/27/18   Elva Reed MD   lidocaine viscous (XYLOCAINE) 2 % solution Take 1 mL by mouth as needed for Irritation or Dental Pain (apply to gum with q-tip) 9/25/17   Cathie Esqueda PA-C       Allergies:  Latex; Imitrex [sumatriptan]; Zomig [zolmitriptan]; Antivert [meclizine hcl];  Flagyl [metronidazole]; Ketorolac tromethamine; Provera [medroxyprogesterone acetate]; Ultram [tramadol hcl]; Bacitracin; Bactrim; Cefdinir; Cefuroxime axetil; Codeine; Cyclosporine; Iodine; Iv dye [iodides]; Neomycin; Petroleum jelly [skin protectants, misc.]; Quinolones; Sulfa antibiotics; Toradol [ketorolac tromethamine]; and Vlad Stalls [trovafloxacin]    Social History:   TOBACCO:   reports that she has been smoking Cigarettes. She has a 32.00 pack-year smoking history. She has never used smokeless tobacco.  ETOH:   reports that she does not drink alcohol. OCCUPATION:  none    Family History:   Family History   Problem Relation Age of Onset    Osteoarthritis Mother     Asthma Mother     Diabetes Mother     Hypertension Mother     Cancer Mother         lung    Osteoarthritis Father     Hypertension Father     Other Father         PE    Cancer Father         stomach cancer    Asthma Brother     Heart Attack Brother     Other Brother         overdose    Asthma Sister     Breast Cancer Sister     Hypertension Sister     Other Sister         overdose / MVA       REVIEW OF SYSTEMS:  Ten systems reviewed and negative except for as above. Physical Exam:    Vitals: BP (!) 173/74   Pulse 86   Temp 98.4 °F (36.9 °C) (Oral)   Resp 18   Ht 5' 2\" (1.575 m)   Wt 161 lb (73 kg)   LMP 06/13/1999   SpO2 97%   BMI 29.45 kg/m²   Constitutional: alert, appears stated age and cooperative  Skin: Skin color, texture, turgor normal. No rashes or lesions  Eyes:Eye: Normal external eye, conjunctiva, EZRA. Decreased occular ROM, however possible limitation due to cognitive status  ENT: Head: Normocephalic, no lesions, without obvious abnormality.   Neck: no adenopathy, no carotid bruit, no JVD, supple, symmetrical, trachea midline and thyroid not enlarged, symmetric, no tenderness/mass/nodules  Respiratory: clear to auscultation bilaterally  Cardiovascular: regular rate and rhythm, S1, S2 normal, no murmur, click, rub or gallop  Gastrointestinal: soft, non-tender; bowel sounds normal; no masses,  no organomegaly  Genitourinary: Deferred  Musculoskeletal:extremities normal, atraumatic, no cyanosis or edema  Neurologic: Mental status AAOx3 No facial asymmetry or droop. Normal muscle strength b/l. Psychiatric: odd mood and affect. poor insight and judgement  Hematologic: No obvious bruising or bleeding    Recent Labs      11/28/18 2000   WBC  9.0   HGB  9.7*   PLT  260     Recent Labs      11/28/18 2000   NA  140   K  4.8   CL  103   CO2  24   BUN  25*   CREATININE  0.82   GLUCOSE  119*   AST  13   ALT  9   BILITOT  <0.2   ALKPHOS  156*     Troponin T:   Recent Labs      11/28/18 2000   TROPONINI  <0.010     Lactate:1.3  ABGs:   Lab Results   Component Value Date    PHART 7.458 12/22/2015    PO2ART 73 12/22/2015    ZNC0VQS 44 12/22/2015     INR: No results for input(s): INR in the last 72 hours. URINALYSIS:  Recent Labs      11/28/18   2225   COLORU  Yellow   PHUR  5.5   CLARITYU  Clear   SPECGRAV  1.014   LEUKOCYTESUR  Negative   UROBILINOGEN  0.2   BILIRUBINUR  Negative   BLOODU  Negative   GLUCOSEU  Negative     -----------------------------------------------------------------   Xr Chest Portable    Result Date: 11/28/2018  EXAMINATION: CHEST PORTABLE VIEW  CLINICAL HISTORY: Fatigue COMPARISONS: June 18, 2017 and CT scan right shoulder November 18, 2018  FINDINGS: Single  views of the chest is submitted. The cardiac silhouette is enlarged unchanged configuration. .  The mediastinum is unremarkable. Pulmonary vascular unremarkable. Right sided trachea. Some small areas atelectasis both bases. No focal infiltrates. No Pneumothoraces. There is a comminuted fracture of the right shoulder. There is a left total humeral arthroplasty. NO ACUTE ACTIVE CARDIOPULMONARY PROCESS      EKG: NSR    Assessment and Plan   1.  \"Ascending weakness\" No

## 2018-11-30 VITALS
HEART RATE: 93 BPM | WEIGHT: 161 LBS | TEMPERATURE: 98.4 F | RESPIRATION RATE: 17 BRPM | OXYGEN SATURATION: 98 % | HEIGHT: 62 IN | DIASTOLIC BLOOD PRESSURE: 74 MMHG | BODY MASS INDEX: 29.63 KG/M2 | SYSTOLIC BLOOD PRESSURE: 178 MMHG

## 2018-11-30 PROBLEM — R53.1 WEAKNESS: Status: ACTIVE | Noted: 2018-11-29

## 2018-11-30 LAB
ALBUMIN SERPL-MCNC: 3.6 G/DL (ref 3.9–4.9)
ALP BLD-CCNC: 158 U/L (ref 40–130)
ALT SERPL-CCNC: 8 U/L (ref 0–33)
ANION GAP SERPL CALCULATED.3IONS-SCNC: 11 MEQ/L (ref 7–13)
AST SERPL-CCNC: 10 U/L (ref 0–35)
BASOPHILS ABSOLUTE: 0.1 K/UL (ref 0–0.2)
BASOPHILS RELATIVE PERCENT: 1.2 %
BILIRUB SERPL-MCNC: <0.2 MG/DL (ref 0–1.2)
BILIRUBIN DIRECT: <0.2 MG/DL (ref 0–0.3)
BILIRUBIN, INDIRECT: ABNORMAL MG/DL (ref 0–0.6)
BUN BLDV-MCNC: 14 MG/DL (ref 6–20)
CALCIUM SERPL-MCNC: 9.3 MG/DL (ref 8.6–10.2)
CHLORIDE BLD-SCNC: 106 MEQ/L (ref 98–107)
CO2: 29 MEQ/L (ref 22–29)
CREAT SERPL-MCNC: 0.53 MG/DL (ref 0.5–0.9)
EOSINOPHILS ABSOLUTE: 0.2 K/UL (ref 0–0.7)
EOSINOPHILS RELATIVE PERCENT: 3.5 %
GFR AFRICAN AMERICAN: >60
GFR NON-AFRICAN AMERICAN: >60
GLUCOSE BLD-MCNC: 110 MG/DL (ref 74–109)
HCT VFR BLD CALC: 34 % (ref 37–47)
HEMOGLOBIN: 11 G/DL (ref 12–16)
LACTIC ACID: 1.9 MMOL/L (ref 0.5–2.2)
LYMPHOCYTES ABSOLUTE: 2 K/UL (ref 1–4.8)
LYMPHOCYTES RELATIVE PERCENT: 28 %
MAGNESIUM: 1.8 MG/DL (ref 1.7–2.3)
MCH RBC QN AUTO: 25.1 PG (ref 27–31.3)
MCHC RBC AUTO-ENTMCNC: 32.5 % (ref 33–37)
MCV RBC AUTO: 77.4 FL (ref 82–100)
MONOCYTES ABSOLUTE: 0.7 K/UL (ref 0.2–0.8)
MONOCYTES RELATIVE PERCENT: 9.4 %
NEUTROPHILS ABSOLUTE: 4 K/UL (ref 1.4–6.5)
NEUTROPHILS RELATIVE PERCENT: 57.9 %
PDW BLD-RTO: 19.1 % (ref 11.5–14.5)
PLATELET # BLD: 263 K/UL (ref 130–400)
POTASSIUM REFLEX MAGNESIUM: 4.3 MEQ/L (ref 3.5–5.1)
RBC # BLD: 4.4 M/UL (ref 4.2–5.4)
SODIUM BLD-SCNC: 146 MEQ/L (ref 132–144)
TOTAL PROTEIN: 6.9 G/DL (ref 6.4–8.1)
WBC # BLD: 7 K/UL (ref 4.8–10.8)

## 2018-11-30 PROCEDURE — G0378 HOSPITAL OBSERVATION PER HR: HCPCS

## 2018-11-30 PROCEDURE — 36415 COLL VENOUS BLD VENIPUNCTURE: CPT

## 2018-11-30 PROCEDURE — 94640 AIRWAY INHALATION TREATMENT: CPT

## 2018-11-30 PROCEDURE — 97110 THERAPEUTIC EXERCISES: CPT

## 2018-11-30 PROCEDURE — 83735 ASSAY OF MAGNESIUM: CPT

## 2018-11-30 PROCEDURE — 97116 GAIT TRAINING THERAPY: CPT

## 2018-11-30 PROCEDURE — 80048 BASIC METABOLIC PNL TOTAL CA: CPT

## 2018-11-30 PROCEDURE — 83605 ASSAY OF LACTIC ACID: CPT

## 2018-11-30 PROCEDURE — 6370000000 HC RX 637 (ALT 250 FOR IP): Performed by: INTERNAL MEDICINE

## 2018-11-30 PROCEDURE — 2580000003 HC RX 258: Performed by: INTERNAL MEDICINE

## 2018-11-30 PROCEDURE — 94760 N-INVAS EAR/PLS OXIMETRY 1: CPT

## 2018-11-30 PROCEDURE — 85025 COMPLETE CBC W/AUTO DIFF WBC: CPT

## 2018-11-30 PROCEDURE — 2700000000 HC OXYGEN THERAPY PER DAY

## 2018-11-30 PROCEDURE — 80076 HEPATIC FUNCTION PANEL: CPT

## 2018-11-30 RX ADMIN — AMLODIPINE BESYLATE 2.5 MG: 2.5 TABLET ORAL at 09:34

## 2018-11-30 RX ADMIN — PANTOPRAZOLE SODIUM 40 MG: 40 TABLET, DELAYED RELEASE ORAL at 06:12

## 2018-11-30 RX ADMIN — Medication 2 PUFF: at 07:31

## 2018-11-30 RX ADMIN — LEVOTHYROXINE SODIUM 200 MCG: 200 TABLET ORAL at 06:12

## 2018-11-30 RX ADMIN — ACETAMINOPHEN 650 MG: 325 TABLET ORAL at 09:41

## 2018-11-30 RX ADMIN — IBUPROFEN 800 MG: 400 TABLET, FILM COATED ORAL at 06:12

## 2018-11-30 RX ADMIN — ARIPIPRAZOLE 10 MG: 10 TABLET ORAL at 09:34

## 2018-11-30 RX ADMIN — Medication 10 ML: at 11:03

## 2018-11-30 RX ADMIN — ESCITALOPRAM OXALATE 20 MG: 20 TABLET ORAL at 09:32

## 2018-11-30 RX ADMIN — PREGABALIN 150 MG: 150 CAPSULE ORAL at 09:32

## 2018-11-30 RX ADMIN — METFORMIN HYDROCHLORIDE 500 MG: 500 TABLET ORAL at 09:34

## 2018-11-30 ASSESSMENT — PAIN SCALES - GENERAL
PAINLEVEL_OUTOF10: 9
PAINLEVEL_OUTOF10: 5
PAINLEVEL_OUTOF10: 0
PAINLEVEL_OUTOF10: 0
PAINLEVEL_OUTOF10: 8

## 2018-11-30 ASSESSMENT — PAIN DESCRIPTION - ORIENTATION: ORIENTATION: RIGHT

## 2018-11-30 ASSESSMENT — PAIN DESCRIPTION - LOCATION: LOCATION: SHOULDER

## 2018-11-30 ASSESSMENT — PAIN DESCRIPTION - PAIN TYPE: TYPE: ACUTE PAIN

## 2018-11-30 ASSESSMENT — PAIN DESCRIPTION - DESCRIPTORS: DESCRIPTORS: ACHING

## 2018-11-30 NOTE — DISCHARGE SUMMARY
AM  ----------------------------------------------------------------------------------------------------------------------    Yuliya Crane,

## 2018-11-30 NOTE — DISCHARGE INSTR - COC
Continuity of Care Form    Patient Name: Deborah Walls   :  1960  MRN:  [id number]    Admit date:  2018  Discharge date:  18    Code Status Order: Full Code   Advance Directives:     Admitting Physician:  Carri Mario DO  PCP: Harry Valencia MD    Discharging Nurse: Electronically signed by Lien Roberts RN on 2018 at 3:21 PM    [address] Unit/Room#: O477/S685-95  Discharging Unit Phone Number: [phone]    Emergency Contact:   Extended Emergency Contact Information  Primary Emergency Contact: [address] Phone: [phone]  Relation: Brother/Sister    Past Surgical History:  Past Surgical History:   Procedure Laterality Date    BACK SURGERY      lumbar kyphoplasty x 2    BRAIN SURGERY       SECTION      COLONOSCOPY      CRANIOTOMY      Arnold-Chiary malformation    ENDOSCOPY, COLON, DIAGNOSTIC      FEMUR FRACTURE SURGERY      NE RECONSTR TOTAL SHOULDER IMPLANT Left 6/15/2018    LEFT TOTAL SHOULDER ARTHROPLASTY, SANDY BIOMET TSA, SCALENE NERVE BLOCK, (LATEX ALLERGY) performed by Tomas Poe MD at [address] [place] [address]      lumbar kyphoplasty    TONSILLECTOMY      UPPER GASTROINTESTINAL ENDOSCOPY  8/5/15    w/bx        Immunization History:   Immunization History   Administered Date(s) Administered    Influenza, Mack Carmela, 3 Years and older, IM (Fluzone 3 yrs and older or Afluria 5 yrs and older) 2017, 2018       Active Problems:  Patient Active Problem List   Diagnosis Code    Hypothyroidism E03.9    Anxiety F41.9    COPD (chronic obstructive pulmonary disease) ([address] J44.9    Smoker F17.200    Cerebral infarction ([address] I63.9    Arnold-Chiari deformity (HCC) Q07.00    Headache R51    Hyperlipidemia with target LDL less than 100 E78.5    Pulmonary embolism and infarction ([address] I26.99    Obesity (BMI 30-39. 9) E66.9    Laryngitis, chronic J37.0    Rhinitis, - Oral Diet:  General    Routes of Feeding: Oral  Liquids: Thin Liquids  Daily Fluid Restriction: no  Last Modified Barium Swallow with Video (Video Swallowing Test): not done    Treatments at the Time of Hospital Discharge:   Respiratory Treatments: Symbicort  Oxygen Therapy:  is not on home oxygen therapy. Ventilator:    - No ventilator support    Rehab Therapies: Physical Therapy and Occupational Therapy  Weight Bearing Status/Restrictions: No weight bearing restirctions  Other Medical Equipment (for information only, NOT a DME order):  none  Other Treatments: none    Patient's personal belongings (please select all that are sent with patient):  Glasses    RN SIGNATURE:  Electronically signed by Maeve Lee RN on 11/30/18 at 3:26 PM    CASE MANAGEMENT/SOCIAL WORK SECTION    Inpatient Status Date: ***    Readmission Risk Assessment Score:  Readmission Risk              Risk of Unplanned Readmission:        27           Discharging to Facility/ Agency   · Name:   · Address:  · Phone:  · Fax:    Dialysis Facility (if applicable)   · Name:  · Address:  · Dialysis Schedule:  · Phone:  · Fax:    / signature: {Esignature:[id number]}    PHYSICIAN SECTION    Prognosis: Good    Condition at Discharge: Stable    Rehab Potential (if transferring to Rehab):     Recommended Labs or Other Treatments After Discharge: f/u with orthopedic surgery and PCP    Physician Certification: I certify the above information and transfer of Kip Otero  is necessary for the continuing treatment of the diagnosis listed and that she requires [place] for less 30 days.      Update Admission H&P: No change in H&P    PHYSICIAN SIGNATURE:  Electronically signed by Christian Perrin MD on 11/30/18 at 9:31 AM

## 2018-11-30 NOTE — PROGRESS NOTES
Chronic maxillary sinusitis 11/11/2016    Cellulitis of left lower extremity 11/01/2016    Cervical dystonia 06/03/2016    Hypothyroidism due to Hashimoto's thyroiditis 04/19/2016    Essential hypertension 03/07/2016    Late effects of CVA (cerebrovascular accident) 04/07/2015    Hearing difficulty 11/04/2013    History of HPV infection 08/20/2013    Hemiparesis affecting left side as late effect of cerebrovascular accident ([address] 02/19/2013    Dysphonia 02/05/2013    COPD (chronic obstructive pulmonary disease) ([address]     Smoker     Cerebral infarction (HCC)     Arnold-Chiari deformity (HCC)     Headache     Hyperlipidemia with target LDL less than 100     Pulmonary embolism and infarction (HCC)     Obesity (BMI 30-39. 9)     Laryngitis, chronic     Rhinitis, chronic     Depression     Acid reflux     H/O encephalopathy     Hepatitis B surface antigen positive     S/P colonoscopy with polypectomy     Recurrent UTI 08/15/2012    Chronic retention of urine 06/08/2012    Hypothyroidism 10/07/2011    Anxiety 10/07/2011    Nonallopathic lesion of sacral region 09/14/2011    Nonallopathic lesion of cervical region, not elsewhere classified 08/12/2011    Drug-seeking behavior 01/10/2011    Bone necrosis ([address] 10/29/2010    Chronic periodontitis, generalized 09/24/2010    Alveolitis of jaw 07/09/2010    Dental caries extending into pulp 06/25/2010    Pulmonary embolism ([address] 06/21/2010    Retention of urine 01/22/2010    Neurogenic bladder 01/05/2010    Vitamin D deficiency 11/17/2009    Airway hyperreactivity 09/16/2009    Cervicalgia 09/11/2009    Post-laminectomy syndrome 08/24/2000        Past Medical History:   Diagnosis Date    Acid reflux     Allergic rhinitis     Anxiety     Arnold-Chiari deformity (HCC) 2000    surgery    Asthma     Cerebral artery occlusion with cerebral infarction ([address] 2001    1 yr after brain OR    Cerebrovascular disease     Chronic back pain greater than 3 months duration     COPD (chronic obstructive pulmonary disease) (HCC)     Dr Urbano Grew at [address] CVA (cerebral infarction)     left hemiparesis, due to ? estrogen + smoking    Depression     Dr Viktoria Perkins H/O encephalopathy 2001    Headache(784.0)     Dr Gabbi Navas Hepatitis B surface antigen positive     Hx of blood clots     PE / trx with coumadin x 6 months    Hyperlipidemia LDL goal < 100     meds > 10 yrs    Hypertension     meds > 2 yrs    Hypothyroidism 2001    meds > 18 yrs    Laryngitis, chronic     scoped by Dr Patricia Solorzano, fungal    Marijuana smoker in remission [facility])     Obesity (BMI 30-39. 9)     Osteoarthritis     Pulmonary embolism and infarction (HCC)     Restless legs syndrome     Rhinitis, chronic     Rotator cuff tear     Left    S/P colonoscopy with polypectomy     Dr Jalen Alejo    Seizures [facility])     Smoker     Spondylosis without myelopathy     Type 2 diabetes mellitus without complication (HCC)     hx > 6 yrs    Urinary incontinence     Vertebral compression fracture ([address]      Past Surgical History:   Procedure Laterality Date    BACK SURGERY      lumbar kyphoplasty x 2    BRAIN SURGERY       SECTION      COLONOSCOPY      CRANIOTOMY      Arnold-Chiary malformation    ENDOSCOPY, COLON, DIAGNOSTIC      FEMUR FRACTURE SURGERY      SD RECONSTR TOTAL SHOULDER IMPLANT Left 6/15/2018    LEFT TOTAL SHOULDER ARTHROPLASTY, SANDY BIOMET TSA, SCALENE NERVE BLOCK, (LATEX ALLERGY) performed by Any Goel MD at [address] [facility]      lumbar kyphoplasty    [facility] ENDOSCOPY  8/5/15    w/bx          Restrictions  Restrictions/Precautions: Weight Bearing, Fall Risk  Upper Extremity Weight Bearing Restrictions  Right Upper Extremity Weight Bearing: Non Weight Bearing  Left Upper Extremity Weight Bearing:  (no restrictions to LUE)  Other: No ROM (R)

## 2018-12-03 LAB
ALPHA-TOCOPHEROL: 8.9 MG/L (ref 5.5–18)
BLOOD CULTURE, ROUTINE: NORMAL
CULTURE, BLOOD 2: NORMAL
GAMMA-TOCOPHEROL: 0.7 MG/L (ref 0–6)

## 2018-12-03 RX ORDER — ATORVASTATIN CALCIUM 40 MG/1
40 TABLET, FILM COATED ORAL DAILY
COMMUNITY
End: 2019-01-15 | Stop reason: SDUPTHER

## 2018-12-03 RX ORDER — FLUTICASONE FUROATE AND VILANTEROL 100; 25 UG/1; UG/1
1 POWDER RESPIRATORY (INHALATION) DAILY
COMMUNITY
End: 2019-03-27 | Stop reason: ALTCHOICE

## 2018-12-03 RX ORDER — CHLORZOXAZONE 250 MG/1
250 TABLET ORAL 4 TIMES DAILY PRN
Qty: 240 TABLET | Refills: 2 | Status: SHIPPED | OUTPATIENT
Start: 2018-12-03 | End: 2019-12-13

## 2018-12-06 ENCOUNTER — TELEPHONE (OUTPATIENT)
Dept: FAMILY MEDICINE CLINIC | Age: 58
End: 2018-12-06

## 2018-12-06 NOTE — TELEPHONE ENCOUNTER
[facility] calling to let Dr know that pt is in [facility]. Pt was not able  To care for herself. 2 broken arms.        DRUI

## 2018-12-17 ENCOUNTER — TELEPHONE (OUTPATIENT)
Dept: FAMILY MEDICINE CLINIC | Age: 58
End: 2018-12-17

## 2018-12-17 NOTE — TELEPHONE ENCOUNTER
Lorazepam 0.5 mg is on back order now. [employer] from [place] wants to know if they can instead give the 1 mg with the directions of 1/2 a tablet daily? Please advice.

## 2018-12-26 ENCOUNTER — OFFICE VISIT (OUTPATIENT)
Dept: GERIATRIC MEDICINE | Age: 58
End: 2018-12-26
Payer: MEDICARE

## 2018-12-26 DIAGNOSIS — G43.009 MIGRAINE WITHOUT AURA AND WITHOUT STATUS MIGRAINOSUS, NOT INTRACTABLE: Primary | ICD-10-CM

## 2018-12-26 DIAGNOSIS — B37.31 VAGINAL YEAST INFECTION: ICD-10-CM

## 2018-12-26 PROCEDURE — G8482 FLU IMMUNIZE ORDER/ADMIN: HCPCS | Performed by: NURSE PRACTITIONER

## 2018-12-26 PROCEDURE — 99309 SBSQ NF CARE MODERATE MDM 30: CPT | Performed by: NURSE PRACTITIONER

## 2018-12-26 PROCEDURE — 3017F COLORECTAL CA SCREEN DOC REV: CPT | Performed by: NURSE PRACTITIONER

## 2019-01-01 NOTE — ED PROVIDER NOTES
for color change. Neurological: Negative for dizziness, weakness, light-headedness and headaches. Psychiatric/Behavioral: Negative for agitation and behavioral problems. Except as noted above the remainder of the review of systems was reviewed and negative. PAST MEDICAL HISTORY     Past Medical History:   Diagnosis Date    Acid reflux     Allergic rhinitis     Anxiety     Arnold-Chiari deformity (HCC) 2000    surgery    Asthma     Cerebrovascular disease     Chronic back pain greater than 3 months duration     COPD (chronic obstructive pulmonary disease) ([facility])     Dr Marine Jordan at [address] CVA (cerebral infarction)     left hemiparesis, due to ? estrogen + smoking    Depression     Dr Katerin Green H/O encephalopathy     Headache(784.0)     Dr Jayshree Salazar    Hepatitis B surface antigen positive     Hyperlipidemia LDL goal < 100     Hypothyroidism     Laryngitis, chronic     scoped by Dr Sin Valadez, fungal    Marijuana smoker in remission [facility])     Obesity (BMI 30-39. 9)     Osteoarthritis     Pulmonary embolism and infarction ([facility])     Restless legs syndrome     Rhinitis, chronic     Rotator cuff tear     Left    S/P colonoscopy with polypectomy     Dr Mary Healy    Seizures [facility])     Smoker     Spondylosis without myelopathy     Type 2 diabetes mellitus without complication ([address]     Urinary incontinence     Vertebral compression fracture ([address]        SURGICAL HISTORY       Past Surgical History:   Procedure Laterality Date    BRAIN SURGERY       SECTION      COLONOSCOPY      CRANIOTOMY  2000    Arnold-Chiary malformation    FEMUR FRACTURE SURGERY      SPINE SURGERY      TONSILLECTOMY      UPPER GASTROINTESTINAL ENDOSCOPY  8/5/15    w/bx        CURRENT MEDICATIONS       Discharge Medication List as of 2018  6:25 PM      CONTINUE these medications which have NOT CHANGED    Details   LORazepam (ATIVAN) 0.5 MG tablet 80 F pt with HTN, DM2, PMHx presents to ED c/o L hip pain s/p trip and fall. Patient admitted to medicine floor for left hip fracture. Underwent left IM nailing on 12/27/18. Patient brought to ICU for post op monitoring. Now undergoing multi-organ failure. R-0Normal      Melatonin 10 MG TABS Take 1 tablet by mouth nightly, Disp-30 tablet, R-3Normal      vitamin B-12 (CYANOCOBALAMIN) 1000 MCG tablet Take 1 tablet by mouth daily, Disp-30 tablet, R-5Normal      esomeprazole Magnesium (NEXIUM) 20 MG PACK Take 20 mg by mouth 2 times dailyHistorical Med      escitalopram (LEXAPRO) 20 MG tablet TK 1 T PO QAM, R-0Historical Med      ARIPiprazole (ABILIFY) 10 MG tablet TK 1 T PO QAM, R-0Historical Med      vitamin D (ERGOCALCIFEROL) 10423 units CAPS capsule TK 1 C PO 1 TIME EACH WK, R-1Historical Med      gentamicin (GARAMYCIN) 40 MG/ML injection U 80 MG BID FOR 5 DOSES, R-3Historical Med      !! ibuprofen (ADVIL;MOTRIN) 800 MG tablet TK 1 T PO TID PRN, R-3Historical Med      levocetirizine (XYZAL) 5 MG tablet TK 1 T PO QD PRN, R-3Historical Med      sodium chloride 0.9 % irrigation USE AS DIRECTED, R-0Historical Med      pregabalin (LYRICA) 150 MG capsule Take 1 capsule by mouth 2 times daily, Disp-60 capsule, R-5Normal      !! acetaminophen (TYLENOL) 325 MG tablet Take 2 tablets by mouth every 6 hours as needed for Pain, Disp-20 tablet, R-0Normal      lidocaine viscous (XYLOCAINE) 2 % solution Take 1 mL by mouth as needed for Irritation or Dental Pain (apply to gum with q-tip), Disp-50 mL, [id number]      folic acid (FOLVITE) 1 MG tablet Take 1 tablet by mouth daily, Disp-30 tablet, R-5Normal      GENTEAL SEVERE 0.3 % GEL 10PLACE 1 DROP IN BOTH EYES Q 8 H, Disp-10 g, R-3, DAWNormal      !!  tiZANidine (ZANAFLEX) 4 MG tablet Take 2 tablets by mouth every 8 hours as needed (muscle spasm), Disp-180 tablet, R-5Normal      ondansetron (ZOFRAN-ODT) 4 MG disintegrating tablet Take 1 tablet by mouth every 8 hours as needed for Nausea or Vomiting, Disp-30 tablet, R-2Normal      levothyroxine (SYNTHROID) 200 MCG tablet TAKE 1 AND 1/2 TABLETS BY MOUTH DAILY, Disp-144 tablet, R-2**Patient requests 90 days supply**Normal      potassium chloride (KLOR-CON M) 20 MEQ extended release tablet jelly [skin protectants, misc.]; Provera [medroxyprogesterone acetate]; Quinolones; Sulfa antibiotics; Trovan [trovafloxacin]; Ultram [tramadol hcl]; Zomig [zolmitriptan]; Bactrim; and Cefuroxime axetil    FAMILY HISTORY       Family History   Problem Relation Age of Onset    Osteoarthritis Mother     Asthma Mother     Diabetes Mother     Hypertension Mother     Cancer Mother      lung    Osteoarthritis Father     Hypertension Father     Other Father      PE    Asthma Brother     Asthma Sister     Hypertension Sister           SOCIAL HISTORY       Social History     Social History    Marital status:      Spouse name: N/A    Number of children: 1    Years of education: N/A     Occupational History    SSI      Social History Main Topics    Smoking status: Current Every Day Smoker     Packs/day: 0.50     Types: Cigarettes    Smokeless tobacco: Never Used    Alcohol use No    Drug use: No    Sexual activity: Not Asked     Other Topics Concern    None     Social History Narrative    None       SCREENINGS           PHYSICAL EXAM    (up to 7 for level 4, 8 or more for level 5)     ED Triage Vitals [02/28/18 1755]   BP Temp Temp src Pulse Resp SpO2 Height Weight   (!) 138/95 98.8 °F (37.1 °C) -- 94 18 95 % 5' 2\" (1.575 m) 159 lb (72.1 kg)       Physical Exam   Constitutional: She is oriented to person, place, and time. She appears well-developed and well-nourished. No distress. HENT:   Head: Normocephalic and atraumatic. Eyes: Conjunctivae are normal. Right eye exhibits no discharge. Left eye exhibits no discharge. Neck: Normal range of motion. Neck supple. Cardiovascular: Normal rate and regular rhythm. Pulmonary/Chest: Effort normal and breath sounds normal.   Abdominal: Soft. Bowel sounds are normal.   Musculoskeletal: Normal range of motion. Feet:    Neurological: She is alert and oriented to person, place, and time. Skin: Skin is warm and dry.    Psychiatric: She has a

## 2019-01-02 ENCOUNTER — OFFICE VISIT (OUTPATIENT)
Dept: GERIATRIC MEDICINE | Age: 59
End: 2019-01-02
Payer: MEDICARE

## 2019-01-02 DIAGNOSIS — A09 DIARRHEA OF INFECTIOUS ORIGIN: Primary | ICD-10-CM

## 2019-01-02 DIAGNOSIS — M54.2 NECK PAIN: ICD-10-CM

## 2019-01-02 PROCEDURE — G8482 FLU IMMUNIZE ORDER/ADMIN: HCPCS | Performed by: NURSE PRACTITIONER

## 2019-01-02 PROCEDURE — 99309 SBSQ NF CARE MODERATE MDM 30: CPT | Performed by: NURSE PRACTITIONER

## 2019-01-02 PROCEDURE — 3017F COLORECTAL CA SCREEN DOC REV: CPT | Performed by: NURSE PRACTITIONER

## 2019-01-03 DIAGNOSIS — F41.9 ANXIETY: ICD-10-CM

## 2019-01-03 DIAGNOSIS — M19.012 OSTEOARTHRITIS OF LEFT SHOULDER, UNSPECIFIED OSTEOARTHRITIS TYPE: ICD-10-CM

## 2019-01-04 RX ORDER — LORAZEPAM 0.5 MG/1
0.5 TABLET ORAL 2 TIMES DAILY PRN
Qty: 30 TABLET | Refills: 1 | Status: SHIPPED | OUTPATIENT
Start: 2019-01-04 | End: 2019-01-31 | Stop reason: SDUPTHER

## 2019-01-07 RX ORDER — FLUCONAZOLE 100 MG/1
100 TABLET ORAL DAILY
Qty: 7 TABLET | Refills: 0 | Status: SHIPPED | OUTPATIENT
Start: 2019-01-07 | End: 2019-02-11 | Stop reason: SDUPTHER

## 2019-01-11 ENCOUNTER — TELEPHONE (OUTPATIENT)
Dept: FAMILY MEDICINE CLINIC | Age: 59
End: 2019-01-11

## 2019-01-15 RX ORDER — LEVOTHYROXINE SODIUM 300 UG/1
TABLET ORAL
Qty: 14 TABLET | Refills: 0 | Status: SHIPPED | OUTPATIENT
Start: 2019-01-15 | End: 2019-04-09 | Stop reason: SDUPTHER

## 2019-01-15 RX ORDER — ASCORBIC ACID 500 MG
500 TABLET ORAL DAILY
Qty: 90 TABLET | Refills: 2 | Status: SHIPPED | OUTPATIENT
Start: 2019-01-15 | End: 2019-05-22

## 2019-01-15 RX ORDER — ASCORBIC ACID 500 MG
500 TABLET ORAL DAILY
Qty: 30 TABLET | Refills: 0 | Status: SHIPPED | OUTPATIENT
Start: 2019-01-15 | End: 2019-05-07

## 2019-01-15 RX ORDER — ATORVASTATIN CALCIUM 40 MG/1
40 TABLET, FILM COATED ORAL DAILY
Qty: 30 TABLET | Refills: 5 | Status: SHIPPED | OUTPATIENT
Start: 2019-01-15 | End: 2019-01-15 | Stop reason: SDUPTHER

## 2019-01-15 RX ORDER — ATORVASTATIN CALCIUM 40 MG/1
40 TABLET, FILM COATED ORAL DAILY
Qty: 90 TABLET | Refills: 5 | Status: SHIPPED | OUTPATIENT
Start: 2019-01-15 | End: 2019-07-05 | Stop reason: SDUPTHER

## 2019-01-15 RX ORDER — TERAZOSIN 2 MG/1
2 CAPSULE ORAL NIGHTLY
Qty: 90 CAPSULE | Refills: 1 | Status: SHIPPED | OUTPATIENT
Start: 2019-01-15 | End: 2019-07-05 | Stop reason: SDUPTHER

## 2019-01-16 ENCOUNTER — TELEPHONE (OUTPATIENT)
Dept: FAMILY MEDICINE CLINIC | Age: 59
End: 2019-01-16

## 2019-01-17 ENCOUNTER — TELEPHONE (OUTPATIENT)
Dept: FAMILY MEDICINE CLINIC | Age: 59
End: 2019-01-17

## 2019-01-21 ENCOUNTER — TELEPHONE (OUTPATIENT)
Dept: FAMILY MEDICINE CLINIC | Age: 59
End: 2019-01-21

## 2019-01-22 RX ORDER — NYSTATIN 100000 [USP'U]/G
POWDER TOPICAL
Refills: 0 | Status: ON HOLD | COMMUNITY
Start: 2019-01-11 | End: 2020-06-30 | Stop reason: HOSPADM

## 2019-01-22 RX ORDER — OLOPATADINE HYDROCHLORIDE 7 MG/ML
SOLUTION OPHTHALMIC
Refills: 11 | COMMUNITY
Start: 2019-01-04 | End: 2019-12-13

## 2019-01-22 RX ORDER — BUDESONIDE AND FORMOTEROL FUMARATE DIHYDRATE 160; 4.5 UG/1; UG/1
AEROSOL RESPIRATORY (INHALATION)
Refills: 2 | COMMUNITY
Start: 2019-01-15 | End: 2019-12-21 | Stop reason: SDUPTHER

## 2019-01-22 RX ORDER — OMEPRAZOLE 40 MG/1
CAPSULE, DELAYED RELEASE ORAL
Refills: 0 | COMMUNITY
Start: 2019-01-13 | End: 2019-03-27 | Stop reason: ALTCHOICE

## 2019-01-22 RX ORDER — TRAMADOL HYDROCHLORIDE 50 MG/1
TABLET ORAL
COMMUNITY
Start: 2018-11-20 | End: 2019-05-07

## 2019-01-22 RX ORDER — TOPIRAMATE 50 MG/1
TABLET, FILM COATED ORAL
COMMUNITY
Start: 2018-11-14 | End: 2019-03-27 | Stop reason: ALTCHOICE

## 2019-01-22 RX ORDER — HYDROCODONE BITARTRATE AND ACETAMINOPHEN 5; 325 MG/1; MG/1
TABLET ORAL
COMMUNITY
Start: 2018-11-14 | End: 2019-05-01

## 2019-01-22 RX ORDER — TIZANIDINE 4 MG/1
TABLET ORAL
Refills: 2 | COMMUNITY
Start: 2019-01-06 | End: 2019-05-01

## 2019-01-24 ENCOUNTER — TELEPHONE (OUTPATIENT)
Dept: FAMILY MEDICINE CLINIC | Age: 59
End: 2019-01-24

## 2019-01-24 ENCOUNTER — CARE COORDINATION (OUTPATIENT)
Dept: CASE MANAGEMENT | Age: 59
End: 2019-01-24

## 2019-01-24 RX ORDER — CLOTRIMAZOLE AND BETAMETHASONE DIPROPIONATE 10; .64 MG/G; MG/G
CREAM TOPICAL
Qty: 45 G | Refills: 0 | Status: SHIPPED | OUTPATIENT
Start: 2019-01-24 | End: 2019-05-22

## 2019-01-31 ENCOUNTER — OFFICE VISIT (OUTPATIENT)
Dept: FAMILY MEDICINE CLINIC | Age: 59
End: 2019-01-31
Payer: MEDICARE

## 2019-01-31 VITALS
WEIGHT: 150.6 LBS | SYSTOLIC BLOOD PRESSURE: 136 MMHG | BODY MASS INDEX: 27.71 KG/M2 | DIASTOLIC BLOOD PRESSURE: 80 MMHG | HEIGHT: 62 IN | HEART RATE: 118 BPM | OXYGEN SATURATION: 98 %

## 2019-01-31 DIAGNOSIS — L89.152 DECUBITUS ULCER OF COCCYGEAL REGION, STAGE 2 (HCC): ICD-10-CM

## 2019-01-31 DIAGNOSIS — I26.99 PULMONARY EMBOLISM WITH INFARCTION (HCC): ICD-10-CM

## 2019-01-31 DIAGNOSIS — J44.9 CHRONIC OBSTRUCTIVE PULMONARY DISEASE, UNSPECIFIED COPD TYPE (HCC): ICD-10-CM

## 2019-01-31 DIAGNOSIS — H10.32 ACUTE BACTERIAL CONJUNCTIVITIS OF LEFT EYE: ICD-10-CM

## 2019-01-31 DIAGNOSIS — E03.9 HYPOTHYROIDISM, UNSPECIFIED TYPE: ICD-10-CM

## 2019-01-31 DIAGNOSIS — F31.71 BIPOLAR DISORDER, IN PARTIAL REMISSION, MOST RECENT EPISODE HYPOMANIC (HCC): ICD-10-CM

## 2019-01-31 DIAGNOSIS — F41.9 ANXIETY: Primary | ICD-10-CM

## 2019-01-31 DIAGNOSIS — Z96.611 STATUS POST TOTAL SHOULDER REPLACEMENT, RIGHT: ICD-10-CM

## 2019-01-31 DIAGNOSIS — I73.9 PERIPHERAL VASCULAR DISEASE (HCC): ICD-10-CM

## 2019-01-31 DIAGNOSIS — G93.1 HYPOXIC BRAIN INJURY (HCC): ICD-10-CM

## 2019-01-31 DIAGNOSIS — G93.5 COMPRESSION OF BRAIN (HCC): ICD-10-CM

## 2019-01-31 DIAGNOSIS — I69.90 LATE EFFECTS OF CVA (CEREBROVASCULAR ACCIDENT): ICD-10-CM

## 2019-01-31 DIAGNOSIS — Z96.612 STATUS POST TOTAL SHOULDER REPLACEMENT, LEFT: ICD-10-CM

## 2019-01-31 DIAGNOSIS — I69.354 HEMIPARESIS AFFECTING LEFT SIDE AS LATE EFFECT OF CEREBROVASCULAR ACCIDENT (HCC): ICD-10-CM

## 2019-01-31 DIAGNOSIS — E55.9 VITAMIN D DEFICIENCY: ICD-10-CM

## 2019-01-31 DIAGNOSIS — M19.012 OSTEOARTHRITIS OF LEFT SHOULDER, UNSPECIFIED OSTEOARTHRITIS TYPE: ICD-10-CM

## 2019-01-31 DIAGNOSIS — E11.51 TYPE 2 DIABETES MELLITUS WITH DIABETIC PERIPHERAL ANGIOPATHY WITHOUT GANGRENE, WITHOUT LONG-TERM CURRENT USE OF INSULIN (HCC): ICD-10-CM

## 2019-01-31 DIAGNOSIS — M48.50XA VERTEBRAL COMPRESSION FRACTURE (HCC): ICD-10-CM

## 2019-01-31 DIAGNOSIS — I10 ESSENTIAL HYPERTENSION: ICD-10-CM

## 2019-01-31 LAB
ALBUMIN SERPL-MCNC: 4.2 G/DL (ref 3.9–4.9)
ALP BLD-CCNC: 132 U/L (ref 40–130)
ALT SERPL-CCNC: 17 U/L (ref 0–33)
ANION GAP SERPL CALCULATED.3IONS-SCNC: 18 MEQ/L (ref 7–13)
AST SERPL-CCNC: 14 U/L (ref 0–35)
BILIRUB SERPL-MCNC: <0.2 MG/DL (ref 0–1.2)
BUN BLDV-MCNC: 30 MG/DL (ref 6–20)
CALCIUM SERPL-MCNC: 9.5 MG/DL (ref 8.6–10.2)
CHLORIDE BLD-SCNC: 107 MEQ/L (ref 98–107)
CO2: 21 MEQ/L (ref 22–29)
CREAT SERPL-MCNC: 0.92 MG/DL (ref 0.5–0.9)
GFR AFRICAN AMERICAN: >60
GFR NON-AFRICAN AMERICAN: >60
GLOBULIN: 3.1 G/DL (ref 2.3–3.5)
GLUCOSE BLD-MCNC: 93 MG/DL (ref 74–109)
HBA1C MFR BLD: 5.8 % (ref 4.8–5.9)
POTASSIUM SERPL-SCNC: 3.5 MEQ/L (ref 3.5–5.1)
SODIUM BLD-SCNC: 146 MEQ/L (ref 132–144)
TOTAL PROTEIN: 7.3 G/DL (ref 6.4–8.1)
TSH SERPL DL<=0.05 MIU/L-ACNC: <0.01 UIU/ML (ref 0.27–4.2)

## 2019-01-31 PROCEDURE — G8427 DOCREV CUR MEDS BY ELIG CLIN: HCPCS | Performed by: FAMILY MEDICINE

## 2019-01-31 PROCEDURE — 3046F HEMOGLOBIN A1C LEVEL >9.0%: CPT | Performed by: FAMILY MEDICINE

## 2019-01-31 PROCEDURE — 3014F SCREEN MAMMO DOC REV: CPT | Performed by: FAMILY MEDICINE

## 2019-01-31 PROCEDURE — G8599 NO ASA/ANTIPLAT THER USE RNG: HCPCS | Performed by: FAMILY MEDICINE

## 2019-01-31 PROCEDURE — 3017F COLORECTAL CA SCREEN DOC REV: CPT | Performed by: FAMILY MEDICINE

## 2019-01-31 PROCEDURE — 3023F SPIROM DOC REV: CPT | Performed by: FAMILY MEDICINE

## 2019-01-31 PROCEDURE — 99214 OFFICE O/P EST MOD 30 MIN: CPT | Performed by: FAMILY MEDICINE

## 2019-01-31 PROCEDURE — G8926 SPIRO NO PERF OR DOC: HCPCS | Performed by: FAMILY MEDICINE

## 2019-01-31 PROCEDURE — G8417 CALC BMI ABV UP PARAM F/U: HCPCS | Performed by: FAMILY MEDICINE

## 2019-01-31 PROCEDURE — 2022F DILAT RTA XM EVC RTNOPTHY: CPT | Performed by: FAMILY MEDICINE

## 2019-01-31 PROCEDURE — G8482 FLU IMMUNIZE ORDER/ADMIN: HCPCS | Performed by: FAMILY MEDICINE

## 2019-01-31 PROCEDURE — 4004F PT TOBACCO SCREEN RCVD TLK: CPT | Performed by: FAMILY MEDICINE

## 2019-01-31 RX ORDER — IBUPROFEN 800 MG/1
800 TABLET ORAL 4 TIMES DAILY PRN
Qty: 120 TABLET | Refills: 5 | Status: ON HOLD | OUTPATIENT
Start: 2019-01-31 | End: 2019-05-20 | Stop reason: HOSPADM

## 2019-01-31 RX ORDER — LORAZEPAM 0.5 MG/1
0.5 TABLET ORAL 2 TIMES DAILY PRN
Qty: 30 TABLET | Refills: 2 | Status: SHIPPED | OUTPATIENT
Start: 2019-01-31 | End: 2019-03-02

## 2019-01-31 RX ORDER — AMLODIPINE BESYLATE 2.5 MG/1
2.5 TABLET ORAL DAILY
Qty: 90 TABLET | Refills: 1 | Status: SHIPPED | OUTPATIENT
Start: 2019-01-31 | End: 2019-05-01 | Stop reason: SDUPTHER

## 2019-02-01 ENCOUNTER — TELEPHONE (OUTPATIENT)
Dept: FAMILY MEDICINE CLINIC | Age: 59
End: 2019-02-01

## 2019-02-04 ENCOUNTER — TELEPHONE (OUTPATIENT)
Dept: FAMILY MEDICINE CLINIC | Age: 59
End: 2019-02-04

## 2019-02-04 RX ORDER — AZITHROMYCIN 250 MG/1
TABLET, FILM COATED ORAL
Qty: 1 PACKET | Refills: 0 | Status: SHIPPED | OUTPATIENT
Start: 2019-02-04 | End: 2019-02-14

## 2019-02-06 ENCOUNTER — TELEPHONE (OUTPATIENT)
Dept: FAMILY MEDICINE CLINIC | Age: 59
End: 2019-02-06

## 2019-02-07 ENCOUNTER — TELEPHONE (OUTPATIENT)
Dept: FAMILY MEDICINE CLINIC | Age: 59
End: 2019-02-07

## 2019-02-08 ENCOUNTER — TELEPHONE (OUTPATIENT)
Dept: FAMILY MEDICINE CLINIC | Age: 59
End: 2019-02-08

## 2019-02-08 RX ORDER — METHYLPREDNISOLONE 4 MG/1
TABLET ORAL
Qty: 1 KIT | Refills: 0 | Status: SHIPPED | OUTPATIENT
Start: 2019-02-08 | End: 2019-02-20 | Stop reason: SDUPTHER

## 2019-02-11 RX ORDER — FLUCONAZOLE 100 MG/1
100 TABLET ORAL DAILY
Qty: 7 TABLET | Refills: 0 | Status: SHIPPED | OUTPATIENT
Start: 2019-02-11 | End: 2019-02-18

## 2019-02-15 ENCOUNTER — OFFICE VISIT (OUTPATIENT)
Dept: FAMILY MEDICINE CLINIC | Age: 59
End: 2019-02-15
Payer: MEDICARE

## 2019-02-15 VITALS
SYSTOLIC BLOOD PRESSURE: 136 MMHG | BODY MASS INDEX: 28.67 KG/M2 | HEIGHT: 62 IN | HEART RATE: 112 BPM | TEMPERATURE: 99.1 F | WEIGHT: 155.8 LBS | DIASTOLIC BLOOD PRESSURE: 80 MMHG | OXYGEN SATURATION: 97 %

## 2019-02-15 DIAGNOSIS — J01.00 ACUTE MAXILLARY SINUSITIS, RECURRENCE NOT SPECIFIED: Primary | ICD-10-CM

## 2019-02-15 PROCEDURE — G8427 DOCREV CUR MEDS BY ELIG CLIN: HCPCS | Performed by: NURSE PRACTITIONER

## 2019-02-15 PROCEDURE — 4004F PT TOBACCO SCREEN RCVD TLK: CPT | Performed by: NURSE PRACTITIONER

## 2019-02-15 PROCEDURE — G8417 CALC BMI ABV UP PARAM F/U: HCPCS | Performed by: NURSE PRACTITIONER

## 2019-02-15 PROCEDURE — G8599 NO ASA/ANTIPLAT THER USE RNG: HCPCS | Performed by: NURSE PRACTITIONER

## 2019-02-15 PROCEDURE — 3014F SCREEN MAMMO DOC REV: CPT | Performed by: NURSE PRACTITIONER

## 2019-02-15 PROCEDURE — 3017F COLORECTAL CA SCREEN DOC REV: CPT | Performed by: NURSE PRACTITIONER

## 2019-02-15 PROCEDURE — 99213 OFFICE O/P EST LOW 20 MIN: CPT | Performed by: NURSE PRACTITIONER

## 2019-02-15 PROCEDURE — G8482 FLU IMMUNIZE ORDER/ADMIN: HCPCS | Performed by: NURSE PRACTITIONER

## 2019-02-15 RX ORDER — PREDNISOLONE ACETATE 10 MG/ML
SUSPENSION/ DROPS OPHTHALMIC
COMMUNITY
Start: 2019-02-13 | End: 2019-03-27 | Stop reason: ALTCHOICE

## 2019-02-15 RX ORDER — AZITHROMYCIN 250 MG/1
250 TABLET, FILM COATED ORAL SEE ADMIN INSTRUCTIONS
Qty: 6 TABLET | Refills: 0 | Status: SHIPPED | OUTPATIENT
Start: 2019-02-15 | End: 2019-02-21 | Stop reason: SDUPTHER

## 2019-02-15 ASSESSMENT — ENCOUNTER SYMPTOMS
CHEST TIGHTNESS: 1
SINUS PRESSURE: 1
SINUS PAIN: 1
SHORTNESS OF BREATH: 0
WHEEZING: 1
RHINORRHEA: 0
COUGH: 1
SORE THROAT: 0

## 2019-02-18 ENCOUNTER — TELEPHONE (OUTPATIENT)
Dept: FAMILY MEDICINE CLINIC | Age: 59
End: 2019-02-18

## 2019-02-18 RX ORDER — FLUCONAZOLE 150 MG/1
150 TABLET ORAL ONCE
Qty: 1 TABLET | Refills: 0 | Status: SHIPPED | OUTPATIENT
Start: 2019-02-18 | End: 2019-02-18

## 2019-02-20 VITALS
WEIGHT: 156 LBS | HEART RATE: 106 BPM | TEMPERATURE: 97.4 F | BODY MASS INDEX: 28.53 KG/M2 | OXYGEN SATURATION: 98 % | SYSTOLIC BLOOD PRESSURE: 119 MMHG | DIASTOLIC BLOOD PRESSURE: 84 MMHG | RESPIRATION RATE: 18 BRPM

## 2019-02-21 DIAGNOSIS — J01.00 ACUTE MAXILLARY SINUSITIS, RECURRENCE NOT SPECIFIED: ICD-10-CM

## 2019-02-21 RX ORDER — AZITHROMYCIN 250 MG/1
250 TABLET, FILM COATED ORAL SEE ADMIN INSTRUCTIONS
Qty: 6 TABLET | Refills: 0 | Status: SHIPPED | OUTPATIENT
Start: 2019-02-21 | End: 2019-02-26

## 2019-02-21 RX ORDER — METHYLPREDNISOLONE 4 MG/1
TABLET ORAL
Qty: 1 KIT | Refills: 0 | Status: SHIPPED | OUTPATIENT
Start: 2019-02-21 | End: 2019-03-26

## 2019-02-26 ENCOUNTER — TELEPHONE (OUTPATIENT)
Dept: FAMILY MEDICINE CLINIC | Age: 59
End: 2019-02-26

## 2019-02-26 DIAGNOSIS — I69.354 HEMIPARESIS AFFECTING LEFT SIDE AS LATE EFFECT OF CEREBROVASCULAR ACCIDENT (HCC): Primary | ICD-10-CM

## 2019-02-27 LAB — DIABETIC RETINOPATHY: NEGATIVE

## 2019-02-28 ENCOUNTER — TELEPHONE (OUTPATIENT)
Dept: FAMILY MEDICINE CLINIC | Age: 59
End: 2019-02-28

## 2019-02-28 DIAGNOSIS — E11.51 TYPE 2 DIABETES MELLITUS WITH DIABETIC PERIPHERAL ANGIOPATHY WITHOUT GANGRENE, WITHOUT LONG-TERM CURRENT USE OF INSULIN (HCC): Primary | ICD-10-CM

## 2019-03-06 ENCOUNTER — TELEPHONE (OUTPATIENT)
Dept: FAMILY MEDICINE CLINIC | Age: 59
End: 2019-03-06

## 2019-03-06 RX ORDER — BENZONATATE 200 MG/1
200 CAPSULE ORAL 3 TIMES DAILY PRN
Qty: 30 CAPSULE | Refills: 0 | Status: SHIPPED | OUTPATIENT
Start: 2019-03-06 | End: 2019-03-13

## 2019-03-06 RX ORDER — FLUCONAZOLE 150 MG/1
150 TABLET ORAL ONCE
Qty: 1 TABLET | Refills: 0 | Status: SHIPPED | OUTPATIENT
Start: 2019-03-06 | End: 2019-03-06

## 2019-03-12 ENCOUNTER — TELEPHONE (OUTPATIENT)
Dept: FAMILY MEDICINE CLINIC | Age: 59
End: 2019-03-12

## 2019-03-18 ENCOUNTER — TELEPHONE (OUTPATIENT)
Dept: FAMILY MEDICINE CLINIC | Age: 59
End: 2019-03-18

## 2019-03-18 RX ORDER — FLUCONAZOLE 150 MG/1
150 TABLET ORAL ONCE
Qty: 1 TABLET | Refills: 0 | Status: SHIPPED | OUTPATIENT
Start: 2019-03-18 | End: 2019-04-15 | Stop reason: SDUPTHER

## 2019-03-19 DIAGNOSIS — M19.012 OSTEOARTHRITIS OF LEFT SHOULDER, UNSPECIFIED OSTEOARTHRITIS TYPE: ICD-10-CM

## 2019-03-19 DIAGNOSIS — F41.9 ANXIETY: ICD-10-CM

## 2019-03-20 RX ORDER — LORAZEPAM 0.5 MG/1
0.5 TABLET ORAL 2 TIMES DAILY PRN
Qty: 30 TABLET | Refills: 1 | Status: SHIPPED | OUTPATIENT
Start: 2019-03-20 | End: 2019-04-14 | Stop reason: SDUPTHER

## 2019-03-26 ENCOUNTER — OFFICE VISIT (OUTPATIENT)
Dept: FAMILY MEDICINE CLINIC | Age: 59
End: 2019-03-26
Payer: MEDICARE

## 2019-03-26 VITALS
HEART RATE: 127 BPM | DIASTOLIC BLOOD PRESSURE: 80 MMHG | HEIGHT: 62 IN | WEIGHT: 158.6 LBS | OXYGEN SATURATION: 93 % | BODY MASS INDEX: 29.19 KG/M2 | SYSTOLIC BLOOD PRESSURE: 128 MMHG

## 2019-03-26 DIAGNOSIS — E11.51 TYPE 2 DIABETES MELLITUS WITH DIABETIC PERIPHERAL ANGIOPATHY WITHOUT GANGRENE, WITHOUT LONG-TERM CURRENT USE OF INSULIN (HCC): ICD-10-CM

## 2019-03-26 DIAGNOSIS — I26.99 OTHER PULMONARY EMBOLISM WITHOUT ACUTE COR PULMONALE, UNSPECIFIED CHRONICITY (HCC): ICD-10-CM

## 2019-03-26 DIAGNOSIS — G93.1 HYPOXIC BRAIN INJURY (HCC): ICD-10-CM

## 2019-03-26 DIAGNOSIS — M19.112 POST-TRAUMATIC OSTEOARTHRITIS OF LEFT SHOULDER: Primary | ICD-10-CM

## 2019-03-26 DIAGNOSIS — I69.354 HEMIPARESIS AFFECTING LEFT SIDE AS LATE EFFECT OF CEREBROVASCULAR ACCIDENT (HCC): ICD-10-CM

## 2019-03-26 DIAGNOSIS — M48.50XA VERTEBRAL COMPRESSION FRACTURE (HCC): ICD-10-CM

## 2019-03-26 DIAGNOSIS — I73.9 PERIPHERAL VASCULAR DISEASE (HCC): ICD-10-CM

## 2019-03-26 DIAGNOSIS — F31.71 BIPOLAR DISORDER, IN PARTIAL REMISSION, MOST RECENT EPISODE HYPOMANIC (HCC): ICD-10-CM

## 2019-03-26 PROCEDURE — 3017F COLORECTAL CA SCREEN DOC REV: CPT | Performed by: FAMILY MEDICINE

## 2019-03-26 PROCEDURE — 3044F HG A1C LEVEL LT 7.0%: CPT | Performed by: FAMILY MEDICINE

## 2019-03-26 PROCEDURE — 2022F DILAT RTA XM EVC RTNOPTHY: CPT | Performed by: FAMILY MEDICINE

## 2019-03-26 PROCEDURE — G8482 FLU IMMUNIZE ORDER/ADMIN: HCPCS | Performed by: FAMILY MEDICINE

## 2019-03-26 PROCEDURE — G8417 CALC BMI ABV UP PARAM F/U: HCPCS | Performed by: FAMILY MEDICINE

## 2019-03-26 PROCEDURE — 99213 OFFICE O/P EST LOW 20 MIN: CPT | Performed by: FAMILY MEDICINE

## 2019-03-26 PROCEDURE — 3014F SCREEN MAMMO DOC REV: CPT | Performed by: FAMILY MEDICINE

## 2019-03-26 PROCEDURE — G8427 DOCREV CUR MEDS BY ELIG CLIN: HCPCS | Performed by: FAMILY MEDICINE

## 2019-03-26 PROCEDURE — G8599 NO ASA/ANTIPLAT THER USE RNG: HCPCS | Performed by: FAMILY MEDICINE

## 2019-03-26 PROCEDURE — 4004F PT TOBACCO SCREEN RCVD TLK: CPT | Performed by: FAMILY MEDICINE

## 2019-03-27 ENCOUNTER — HOSPITAL ENCOUNTER (OUTPATIENT)
Dept: PREADMISSION TESTING | Age: 59
Discharge: HOME | End: 2019-03-31
Payer: COMMERCIAL

## 2019-03-27 VITALS
HEART RATE: 114 BPM | DIASTOLIC BLOOD PRESSURE: 77 MMHG | RESPIRATION RATE: 16 BRPM | OXYGEN SATURATION: 95 % | TEMPERATURE: 98.2 F | BODY MASS INDEX: 29.08 KG/M2 | SYSTOLIC BLOOD PRESSURE: 151 MMHG | HEIGHT: 62 IN | WEIGHT: 158 LBS

## 2019-03-27 LAB
ABO/RH: NORMAL
ANION GAP SERPL CALCULATED.3IONS-SCNC: 19 MEQ/L (ref 9–15)
ANTIBODY SCREEN: NORMAL
BILIRUBIN URINE: NEGATIVE
BLOOD, URINE: NEGATIVE
BUN BLDV-MCNC: 18 MG/DL (ref 6–20)
CALCIUM SERPL-MCNC: 9 MG/DL (ref 8.5–9.9)
CHLORIDE BLD-SCNC: 102 MEQ/L (ref 95–107)
CLARITY: CLEAR
CO2: 22 MEQ/L (ref 20–31)
COLOR: YELLOW
CREAT SERPL-MCNC: 1.27 MG/DL (ref 0.5–0.9)
GFR AFRICAN AMERICAN: 52.5
GFR NON-AFRICAN AMERICAN: 43.4
GLUCOSE BLD-MCNC: 106 MG/DL (ref 70–99)
GLUCOSE URINE: NEGATIVE MG/DL
HCT VFR BLD CALC: 37.6 % (ref 37–47)
HEMOGLOBIN: 12.1 G/DL (ref 12–16)
KETONES, URINE: NEGATIVE MG/DL
LEUKOCYTE ESTERASE, URINE: NEGATIVE
MCH RBC QN AUTO: 25.1 PG (ref 27–31.3)
MCHC RBC AUTO-ENTMCNC: 32.2 % (ref 33–37)
MCV RBC AUTO: 77.9 FL (ref 82–100)
NITRITE, URINE: NEGATIVE
PDW BLD-RTO: 18.8 % (ref 11.5–14.5)
PH UA: 5 (ref 5–9)
PLATELET # BLD: 275 K/UL (ref 130–400)
POTASSIUM SERPL-SCNC: 4 MEQ/L (ref 3.4–4.9)
PROTEIN UA: NEGATIVE MG/DL
RBC # BLD: 4.82 M/UL (ref 4.2–5.4)
SODIUM BLD-SCNC: 143 MEQ/L (ref 135–144)
SPECIFIC GRAVITY UA: 1.01 (ref 1–1.03)
URINE REFLEX TO CULTURE: NORMAL
UROBILINOGEN, URINE: 0.2 E.U./DL
WBC # BLD: 11.4 K/UL (ref 4.8–10.8)

## 2019-03-27 PROCEDURE — 81003 URINALYSIS AUTO W/O SCOPE: CPT

## 2019-03-27 PROCEDURE — 86901 BLOOD TYPING SEROLOGIC RH(D): CPT

## 2019-03-27 PROCEDURE — 85027 COMPLETE CBC AUTOMATED: CPT

## 2019-03-27 PROCEDURE — 80048 BASIC METABOLIC PNL TOTAL CA: CPT

## 2019-03-27 PROCEDURE — 86900 BLOOD TYPING SEROLOGIC ABO: CPT

## 2019-03-27 PROCEDURE — 86850 RBC ANTIBODY SCREEN: CPT

## 2019-03-27 RX ORDER — OLOPATADINE HYDROCHLORIDE 7 MG/ML
SOLUTION OPHTHALMIC
Refills: 11 | Status: CANCELLED | OUTPATIENT
Start: 2019-03-27

## 2019-03-27 RX ORDER — SODIUM CHLORIDE 0.9 % (FLUSH) 0.9 %
10 SYRINGE (ML) INJECTION EVERY 12 HOURS SCHEDULED
Status: CANCELLED | OUTPATIENT
Start: 2019-03-27

## 2019-03-27 RX ORDER — LIDOCAINE HYDROCHLORIDE 10 MG/ML
1 INJECTION, SOLUTION EPIDURAL; INFILTRATION; INTRACAUDAL; PERINEURAL
Status: CANCELLED | OUTPATIENT
Start: 2019-03-27 | End: 2019-03-27

## 2019-03-27 RX ORDER — SODIUM CHLORIDE, SODIUM LACTATE, POTASSIUM CHLORIDE, CALCIUM CHLORIDE 600; 310; 30; 20 MG/100ML; MG/100ML; MG/100ML; MG/100ML
INJECTION, SOLUTION INTRAVENOUS CONTINUOUS
Status: CANCELLED | OUTPATIENT
Start: 2019-03-27

## 2019-03-27 RX ORDER — SODIUM CHLORIDE 0.9 % (FLUSH) 0.9 %
10 SYRINGE (ML) INJECTION PRN
Status: CANCELLED | OUTPATIENT
Start: 2019-03-27

## 2019-03-27 NOTE — PROGRESS NOTES
Pt has some difficulty with transportation. Call to surgery scheduler to request a 48 hour notice of arrival time.

## 2019-03-28 ENCOUNTER — HOSPITAL ENCOUNTER (OUTPATIENT)
Dept: PHYSICAL THERAPY | Age: 59
Setting detail: THERAPIES SERIES
Discharge: HOME | End: 2019-03-28
Payer: MEDICARE

## 2019-03-28 RX ORDER — GUAIFENESIN 600 MG/1
1200 TABLET, EXTENDED RELEASE ORAL 2 TIMES DAILY
Qty: 60 TABLET | Refills: 3 | Status: SHIPPED | OUTPATIENT
Start: 2019-03-28 | End: 2019-07-31

## 2019-03-28 RX ORDER — ACETAMINOPHEN 500 MG
1000 TABLET ORAL EVERY 6 HOURS PRN
Qty: 120 TABLET | Refills: 3 | Status: SHIPPED | OUTPATIENT
Start: 2019-03-28 | End: 2019-05-22

## 2019-04-01 ENCOUNTER — TELEPHONE (OUTPATIENT)
Dept: FAMILY MEDICINE CLINIC | Age: 59
End: 2019-04-01

## 2019-04-01 RX ORDER — FLUCONAZOLE 150 MG/1
150 TABLET ORAL ONCE
Qty: 1 TABLET | Refills: 0 | Status: SHIPPED | OUTPATIENT
Start: 2019-04-01 | End: 2019-04-23 | Stop reason: SDUPTHER

## 2019-04-01 RX ORDER — AZITHROMYCIN 250 MG/1
TABLET, FILM COATED ORAL
Qty: 1 PACKET | Refills: 0 | Status: SHIPPED | OUTPATIENT
Start: 2019-04-01 | End: 2019-04-11

## 2019-04-01 NOTE — TELEPHONE ENCOUNTER
lov  3/26/19 Pt is having surgery on 4/5/19 for her shoulder. Pt has a bad cough, is requesting a zpack to be sent into [facility] on [address]. Also please send in diflucan pt states when she takes an antibiotic she gets a yeast infection. Pt phone number is [phone].

## 2019-04-01 NOTE — TELEPHONE ENCOUNTER
Orders Placed This Encounter   Medications    azithromycin (ZITHROMAX) 250 MG tablet     Si tabs orally on first day, then one tab daily for four days     Dispense:  1 packet     Refill:  0    fluconazole (DIFLUCAN) 150 MG tablet     Sig: Take 1 tablet by mouth once for 1 dose     Dispense:  1 tablet     Refill:  0       The above med(s) were e-scripted to the patient's pharmacy.    Please advise patient  Evan Simmons MD

## 2019-04-08 ENCOUNTER — TELEPHONE (OUTPATIENT)
Dept: FAMILY MEDICINE CLINIC | Age: 59
End: 2019-04-08

## 2019-04-09 DIAGNOSIS — G25.81 RLS (RESTLESS LEGS SYNDROME): ICD-10-CM

## 2019-04-09 RX ORDER — PRAMIPEXOLE DIHYDROCHLORIDE 0.5 MG/1
0.5 TABLET ORAL EVERY EVENING
Qty: 90 TABLET | Refills: 0 | Status: SHIPPED | OUTPATIENT
Start: 2019-04-09 | End: 2019-07-05 | Stop reason: SDUPTHER

## 2019-04-09 RX ORDER — LEVOTHYROXINE SODIUM 300 UG/1
TABLET ORAL
Qty: 14 TABLET | Refills: 0 | Status: SHIPPED | OUTPATIENT
Start: 2019-04-09 | End: 2019-04-09 | Stop reason: SDUPTHER

## 2019-04-09 RX ORDER — ESOMEPRAZOLE MAGNESIUM 40 MG/1
80 CAPSULE, DELAYED RELEASE ORAL DAILY
Qty: 180 CAPSULE | Refills: 0 | Status: ON HOLD | OUTPATIENT
Start: 2019-04-09 | End: 2019-05-17 | Stop reason: ALTCHOICE

## 2019-04-10 RX ORDER — LEVOTHYROXINE SODIUM 300 UG/1
TABLET ORAL
Qty: 90 TABLET | Refills: 0 | Status: ON HOLD | OUTPATIENT
Start: 2019-04-10 | End: 2019-05-20 | Stop reason: HOSPADM

## 2019-04-12 ENCOUNTER — TELEPHONE (OUTPATIENT)
Dept: FAMILY MEDICINE CLINIC | Age: 59
End: 2019-04-12

## 2019-04-12 NOTE — TELEPHONE ENCOUNTER
Pt has hospital bed and is requesting a new mattress for it. Pt [facility] please fax order with pt demographics to [phone] attn. [place]. Their phone number is [phone].

## 2019-04-14 DIAGNOSIS — F41.9 ANXIETY: ICD-10-CM

## 2019-04-14 DIAGNOSIS — M19.012 OSTEOARTHRITIS OF LEFT SHOULDER, UNSPECIFIED OSTEOARTHRITIS TYPE: ICD-10-CM

## 2019-04-15 ENCOUNTER — TELEPHONE (OUTPATIENT)
Dept: FAMILY MEDICINE CLINIC | Age: 59
End: 2019-04-15

## 2019-04-15 RX ORDER — ONDANSETRON 4 MG/1
4 TABLET, ORALLY DISINTEGRATING ORAL EVERY 8 HOURS PRN
Qty: 30 TABLET | Refills: 2 | Status: SHIPPED | OUTPATIENT
Start: 2019-04-15 | End: 2020-01-10

## 2019-04-15 RX ORDER — FLUCONAZOLE 150 MG/1
150 TABLET ORAL ONCE
Qty: 1 TABLET | Refills: 0 | Status: SHIPPED | OUTPATIENT
Start: 2019-04-15 | End: 2019-04-15

## 2019-04-15 RX ORDER — LORAZEPAM 0.5 MG/1
TABLET ORAL
Qty: 30 TABLET | Refills: 0 | Status: SHIPPED | OUTPATIENT
Start: 2019-04-15 | End: 2019-04-26 | Stop reason: SDUPTHER

## 2019-04-15 NOTE — TELEPHONE ENCOUNTER
Orders Placed This Encounter   Medications    ondansetron (ZOFRAN-ODT) 4 MG disintegrating tablet     Sig: Take 1 tablet by mouth every 8 hours as needed for Nausea or Vomiting     Dispense:  30 tablet     Refill:  2       The above med(s) were e-scripted to the patient's pharmacy.    Please advise patient  Jeane Paul MD

## 2019-04-15 NOTE — TELEPHONE ENCOUNTER
Pt has had diarrhea and nausea for the last 3 days. She thinks is may be a stomach virus. Will you send in [facility] for her.

## 2019-04-21 ENCOUNTER — HOSPITAL ENCOUNTER (EMERGENCY)
Age: 59
Discharge: HOME | End: 2019-04-21
Payer: MEDICARE

## 2019-04-21 VITALS
RESPIRATION RATE: 16 BRPM | DIASTOLIC BLOOD PRESSURE: 49 MMHG | HEIGHT: 62 IN | SYSTOLIC BLOOD PRESSURE: 141 MMHG | TEMPERATURE: 98.2 F | OXYGEN SATURATION: 93 % | HEART RATE: 106 BPM | BODY MASS INDEX: 27.6 KG/M2 | WEIGHT: 150 LBS

## 2019-04-21 DIAGNOSIS — R51.9 NONINTRACTABLE EPISODIC HEADACHE, UNSPECIFIED HEADACHE TYPE: Primary | ICD-10-CM

## 2019-04-21 LAB
BILIRUBIN URINE: NEGATIVE
BLOOD, URINE: NEGATIVE
CLARITY: CLEAR
COLOR: YELLOW
GLUCOSE URINE: NEGATIVE MG/DL
KETONES, URINE: NEGATIVE MG/DL
LEUKOCYTE ESTERASE, URINE: NEGATIVE
NITRITE, URINE: NEGATIVE
PH UA: 5 (ref 5–9)
PROTEIN UA: NEGATIVE MG/DL
SPECIFIC GRAVITY UA: 1.01 (ref 1–1.03)
URINE REFLEX TO CULTURE: NORMAL
UROBILINOGEN, URINE: 0.2 E.U./DL

## 2019-04-21 PROCEDURE — 6360000002 HC RX W HCPCS: Performed by: PHYSICIAN ASSISTANT

## 2019-04-21 PROCEDURE — 96372 THER/PROPH/DIAG INJ SC/IM: CPT

## 2019-04-21 PROCEDURE — 99284 EMERGENCY DEPT VISIT MOD MDM: CPT

## 2019-04-21 PROCEDURE — 81003 URINALYSIS AUTO W/O SCOPE: CPT

## 2019-04-21 RX ORDER — PROMETHAZINE HYDROCHLORIDE 25 MG/ML
25 INJECTION, SOLUTION INTRAMUSCULAR; INTRAVENOUS ONCE
Status: COMPLETED | OUTPATIENT
Start: 2019-04-21 | End: 2019-04-21

## 2019-04-21 RX ORDER — MORPHINE SULFATE 2 MG/ML
4 INJECTION, SOLUTION INTRAMUSCULAR; INTRAVENOUS ONCE
Status: COMPLETED | OUTPATIENT
Start: 2019-04-21 | End: 2019-04-21

## 2019-04-21 RX ORDER — PROCHLORPERAZINE EDISYLATE 5 MG/ML
5 INJECTION INTRAMUSCULAR; INTRAVENOUS ONCE
Status: COMPLETED | OUTPATIENT
Start: 2019-04-21 | End: 2019-04-21

## 2019-04-21 RX ORDER — BUTALBITAL, ACETAMINOPHEN AND CAFFEINE 300; 40; 50 MG/1; MG/1; MG/1
1 CAPSULE ORAL EVERY 6 HOURS PRN
Qty: 16 CAPSULE | Refills: 0 | Status: SHIPPED | OUTPATIENT
Start: 2019-04-21 | End: 2019-05-07

## 2019-04-21 RX ADMIN — PROMETHAZINE HYDROCHLORIDE 25 MG: 25 INJECTION INTRAMUSCULAR; INTRAVENOUS at 18:20

## 2019-04-21 RX ADMIN — PROCHLORPERAZINE EDISYLATE 5 MG: 5 INJECTION INTRAMUSCULAR; INTRAVENOUS at 19:47

## 2019-04-21 RX ADMIN — MORPHINE SULFATE 4 MG: 2 INJECTION, SOLUTION INTRAMUSCULAR; INTRAVENOUS at 18:20

## 2019-04-21 ASSESSMENT — PAIN SCALES - GENERAL
PAINLEVEL_OUTOF10: 8
PAINLEVEL_OUTOF10: 9

## 2019-04-21 ASSESSMENT — ENCOUNTER SYMPTOMS
ABDOMINAL PAIN: 0
SHORTNESS OF BREATH: 0
COLOR CHANGE: 0
APNEA: 0
ALLERGIC/IMMUNOLOGIC NEGATIVE: 1
EYE PAIN: 0
TROUBLE SWALLOWING: 0
NAUSEA: 1

## 2019-04-21 ASSESSMENT — PAIN DESCRIPTION - LOCATION: LOCATION: HEAD

## 2019-04-21 ASSESSMENT — PAIN DESCRIPTION - PAIN TYPE: TYPE: ACUTE PAIN

## 2019-04-21 NOTE — ED NOTES
Bed: 19  Expected date: 4/21/19  Expected time:   Means of arrival:   Comments:  81 female with migraine     Carter Lai, [place]  04/21/19 9932

## 2019-04-21 NOTE — ED PROVIDER NOTES
[facility] ED  eMERGENCYdEPARTMENT eNCOUnter      Pt Name: David Romero  MRN: [id number]  [place] 1960  Date of evaluation: 4/21/2019  Provider:Ky Vazquez PA-C    CHIEF COMPLAINT       Chief Complaint   Patient presents with    Headache     Since friday taking soma, not helping         HISTORY OF PRESENT ILLNESS  (Location/Symptom, Timing/Onset, Context/Setting, Quality, Duration, Modifying Factors, Severity.)   David Romero is a 64 y.o. female who presents to the emergency department with complaints of \"migraine\" headache, ongoing for the last 10 days. Patient states that she is on Soma for her headaches and she took that twice a day for 5 days without any relief of symptoms. Patient states history of frequent migraine headaches and the have similar presentation today. Patient denies this being the worse headache of her life. Patient denies sudden onset of headache and states that it is gradually been getting worse. Patient also states that she's been having nausea and is currently being treated for a urinary tract infection with Macrobid, which she has been on for the last 10 days as well. Patient denies any fevers. Patient denies any neck pain or back pain. Patient denies any numbness or tingling. HPI    Nursing Notes were reviewed and I agree. REVIEW OF SYSTEMS    (2-9 systems for level 4, 10 or more for level 5)     Review of Systems   Constitutional: Negative for diaphoresis and fever. HENT: Negative for hearing loss and trouble swallowing. Eyes: Negative for pain. Respiratory: Negative for apnea and shortness of breath. Cardiovascular: Negative for chest pain. Gastrointestinal: Positive for nausea. Negative for abdominal pain. Endocrine: Negative. Genitourinary: Positive for dysuria. Negative for hematuria. Musculoskeletal: Negative for neck pain and neck stiffness. Skin: Negative for color change. Allergic/Immunologic: Negative. Neurological: Positive for headaches. Negative for dizziness and numbness. Hematological: Negative. Psychiatric/Behavioral: Negative. All other systems reviewed and are negative. Except as noted above the remainder of the review of systems was reviewed and negative. PAST MEDICAL HISTORY     Past Medical History:   Diagnosis Date    Acid reflux     Allergic rhinitis     Anxiety     Arnold-Chiari deformity ([facility] [address] 2000    surgery    Asthma     Cerebral artery occlusion with cerebral infarction ([facility] [address] 2001    1 yr after brain OR    Cerebrovascular disease     Chronic back pain greater than 3 months duration     COPD (chronic obstructive pulmonary disease) (HCC)     Dr Soniya Bennett at [address] CVA (cerebral infarction)     left hemiparesis, due to ? estrogen + smoking    Depression     Dr Andre Goetz H/O encephalopathy 2001    Headache(784.0)     Dr Misty Merritt Hepatitis B surface antigen positive     Hx of blood clots 2010    PE / trx with coumadin x 6 months    Hyperlipidemia LDL goal < 100     meds > 10 yrs    Hypertension     meds > 2 yrs    Hypothyroidism 2001    meds > 18 yrs    Laryngitis, chronic 2012    scoped by Dr Lindsey Post, fungal    Marijuana smoker in remission [facility]) 2011    Obesity (BMI 30-39. 9)     Osteoarthritis     Pulmonary embolism and infarction (HCC)     Restless legs syndrome     Rhinitis, chronic     Rotator cuff tear     Left    S/P colonoscopy with polypectomy 2010    Dr Ivonne Almeida    Seizures [facility])     Smoker     Spondylosis without myelopathy     Type 2 diabetes mellitus without complication (HCC)     hx > 6 yrs    Urinary incontinence     Vertebral compression fracture ([facility] [address]          SURGICAL HISTORY       Past Surgical History:   Procedure Laterality Date    BACK SURGERY  2001    lumbar kyphoplasty x 2    BRAIN SURGERY       SECTION  1994    COLONOSCOPY      CRANIOTOMY  2000    Arnold-Chiary malformation    ENDOSCOPY, COLON, DIAGNOSTIC      FEMUR FRACTURE SURGERY  2003    RI RECONSTR TOTAL SHOULDER IMPLANT Left 6/15/2018    LEFT TOTAL SHOULDER ARTHROPLASTY, SANDY BIOMET TSA, SCALENE NERVE BLOCK, (LATEX ALLERGY) performed by Mame Quezada MD at [facility]      lumbar kyphoplasty    TONSILLECTOMY      UPPER GASTROINTESTINAL ENDOSCOPY  8/5/15    w/bx          CURRENT MEDICATIONS       Previous Medications    ACETAMINOPHEN (APAP EXTRA STRENGTH) 500 MG TABLET    Take 2 tablets by mouth every 6 hours as needed for Pain    ACETAMINOPHEN (APAP) 325 MG TABS    Take 650 mg by mouth every 6 hours as needed for Pain    AMLODIPINE (NORVASC) 2.5 MG TABLET    Take 1 tablet by mouth daily    ARIPIPRAZOLE (ABILIFY) 10 MG TABLET    take 1 tab q night    ASCORBIC ACID (VITAMIN C) 500 MG TABLET    Take 1 tablet by mouth daily    ATORVASTATIN (LIPITOR) 40 MG TABLET    TAKE 1 TABLET BY MOUTH DAILY    CARISOPRODOL (SOMA) 350 MG TABLET    Take 1 tablet by mouth daily as needed (migraine)    CHLORZOXAZONE (PARAFON FORTE) 250 MG TABLET    Take 1 tablet by mouth 4 times daily as needed for Muscle spasms    CLOTRIMAZOLE-BETAMETHASONE (LOTRISONE) 1-0.05 % CREAM    Apply topically 2 times daily.     ESCITALOPRAM (LEXAPRO) 20 MG TABLET    one tab at night    ESOMEPRAZOLE (NEXIUM) 40 MG DELAYED RELEASE CAPSULE    TAKE 2 CAPSULES BY MOUTH DAILY    FOLIC ACID (FOLVITE) 1 MG TABLET    Take 1 tablet by mouth daily    FUROSEMIDE (LASIX) 40 MG TABLET    Take 1 tablet by mouth daily    GUAIFENESIN (MUCINEX) 600 MG EXTENDED RELEASE TABLET    Take 2 tablets by mouth 2 times daily    HYDROCODONE-ACETAMINOPHEN (NORCO) 5-325 MG PER TABLET    TK 1 T PO  Q 6 H PRN    IBUPROFEN (ADVIL;MOTRIN) 800 MG TABLET    Take 1 tablet by mouth 4 times daily as needed for Pain    LACTOBACILLUS (ACIDOPHILUS) CAPS CAPSULE    TAKE 1 CAPSULE BY MOUTH ONCE DAILY    LEVOCETIRIZINE (XYZAL) 5 MG TABLET    take 1 tab daily    LEVOTHYROXINE (SYNTHROID) 200 MCG TABLET    TAKE 1 AND 1/2 TABLETS BY MOUTH DAILY    LEVOTHYROXINE (SYNTHROID) 300 MCG TABLET    TAKE 1 TABLET BY MOUTH EVERY MORNING    LIDOCAINE VISCOUS (XYLOCAINE) 2 % SOLUTION    Take 1 mL by mouth as needed for Irritation or Dental Pain (apply to gum with q-tip)    LOPERAMIDE (IMODIUM A-D) 2 MG TABLET    Take 2 mg by mouth as needed for Diarrhea     LORAZEPAM (ATIVAN) 0.5 MG TABLET    TAKE 1 TABLET BY MOUTH TWICE DAILY AS NEEDED FOR ANXIETY    LYRICA 150 MG CAPSULE    TAKE ONE CAPSULE BY MOUTH TWICE DAILY    MAGNESIUM OXIDE (MAG-OX) 400 (240 MG) MG TABLET    Take 1 tablet by mouth daily    MELATONIN 10 MG TABS    TAKE 1 TABLET BY MOUTH EVERY NIGHT    MELATONIN-LEMON BALM  MG-MCG TABS    Take 1 tablet by mouth nightly    METFORMIN (GLUCOPHAGE) 500 MG TABLET    Take 1 tablet by mouth 2 times daily (with meals)    MIRTAZAPINE (REMERON) 30 MG TABLET    Take 30 mg by mouth nightly. MUCINEX 600 MG EXTENDED RELEASE TABLET    TAKE 1 TABLET BY MOUTH TWICE DAILY    MUPIROCIN (BACTROBAN) 2 % OINTMENT    CINDY EXT AA TID    NYSTATIN 051326 UNIT/GM POWDER    CINDY EXT AA ONCE D    ONDANSETRON (ZOFRAN-ODT) 4 MG DISINTEGRATING TABLET    Take 1 tablet by mouth every 8 hours as needed for Nausea or Vomiting    ONE TOUCH ULTRA TEST STRIP    Test blood glucose TID.   Dx: DM2    PAZEO 0.7 % SOLN        POTASSIUM CHLORIDE (KLOR-CON M) 20 MEQ EXTENDED RELEASE TABLET    TAKE 1 TABLET BY MOUTH DAILY    PRAMIPEXOLE (MIRAPEX) 0.5 MG TABLET    TAKE 1 TABLET BY MOUTH EVERY EVENING    SIMETHICONE 180 MG CAPS    TAKE 1 CAPSULE BY MOUTH TWICE DAILY    SPIRONOLACTONE (ALDACTONE) 25 MG TABLET    TAKE 1 TABLET BY MOUTH DAILY    SYMBICORT 160-4.5 MCG/ACT AERO    INL 2 PFS PO BID UTD    TERAZOSIN (HYTRIN) 2 MG CAPSULE    Take 1 capsule by mouth nightly    TIZANIDINE (ZANAFLEX) 4 MG TABLET    TK 2 TS PO Q 8 H PRF MUSCLE SPASM    TRAMADOL (ULTRAM) 50 MG TABLET    TK 1 T PO Q 8 H PRN    VITAMIN B-12 (CYANOCOBALAMIN) 1000 MCG TABLET    Take 1 tablet by mouth daily    VITAMIN C (ASCORBIC ACID) 500 MG TABLET    TAKE 1 TABLET BY MOUTH DAILY    VITAMIN D (ERGOCALCIFEROL) 40217 UNITS CAPS CAPSULE    Take 1 capsule by mouth every 7 days       ALLERGIES     Latex; Imitrex [sumatriptan]; Zomig [zolmitriptan]; Antivert [meclizine hcl]; Flagyl [metronidazole]; Ketorolac tromethamine; Provera [medroxyprogesterone acetate]; Ultram [tramadol hcl]; Bacitracin; Bactrim; Cefdinir; Cefuroxime axetil; Codeine; Cyclosporine; Iodine; Iv dye [iodides]; Neomycin; Petroleum jelly [skin protectants, misc.]; Quinolones; Sulfa antibiotics;  Toradol [ketorolac tromethamine]; and Trovan [trovafloxacin]    FAMILY HISTORY       Family History   Problem Relation Age of Onset    Osteoarthritis Mother     Asthma Mother     Diabetes Mother     Hypertension Mother     Cancer Mother         lung    Osteoarthritis Father     Hypertension Father     Other Father         PE    Cancer Father         stomach cancer    Asthma Brother     Heart Attack Brother     Other Brother         overdose    Asthma Sister     Breast Cancer Sister     Hypertension Sister     Other Sister         overdose / MVA          SOCIAL HISTORY       Social History     Socioeconomic History    Marital status:      Spouse name: None    Number of children: 1    Years of education: None    Highest education level: None   Occupational History    Occupation: SSI   Social Needs    Financial resource strain: None    Food insecurity:     Worry: None     Inability: None    Transportation needs:     Medical: None     Non-medical: None   Tobacco Use    Smoking status: Current Every Day Smoker     Packs/day: 1.00     Years: 32.00     Pack years: 32.00     Types: Cigarettes    Smokeless tobacco: Never Used   Substance and Sexual Activity    Alcohol use: No     Alcohol/week: 0.0 oz    Drug use: No     Types: Marijuana     Comment: daily for pain    Sexual activity: None   Lifestyle    Physical activity:     Days per week: None     Minutes per session: None    Stress: None   Relationships    Social connections:     Talks on phone: None     Gets together: None     Attends Bahai service: None     Active member of club or organization: None     Attends meetings of clubs or organizations: None     Relationship status: None    Intimate partner violence:     Fear of current or ex partner: None     Emotionally abused: None     Physically abused: None     Forced sexual activity: None   Other Topics Concern    None   Social History Narrative    None       SCREENINGS           PHYSICAL EXAM    (up to 7 forlevel 4, 8 or more for level 5)     ED Triage Vitals   BP Temp Temp src Pulse Resp SpO2 Height Weight   -- -- -- -- -- -- -- --       Physical Exam   Constitutional: She is oriented to person, place, and time. She appears well-developed and well-nourished. No distress. HENT:   Head: Normocephalic and atraumatic. Mouth/Throat: No oropharyngeal exudate. Eyes: Pupils are equal, round, and reactive to light. Conjunctivae and EOM are normal. No scleral icterus. Neck: Normal range of motion. Neck supple. No tracheal deviation present. Cardiovascular: Normal rate, normal heart sounds and intact distal pulses. Pulmonary/Chest: Effort normal and breath sounds normal. No respiratory distress. Abdominal: Soft. Bowel sounds are normal. She exhibits no distension. Musculoskeletal: Normal range of motion. Neurological: She is alert and oriented to person, place, and time. No cranial nerve deficit. She exhibits normal muscle tone. Skin: Skin is warm and dry. No rash noted. She is not diaphoretic. No erythema. Psychiatric: She has a normal mood and affect. Her behavior is normal. Judgment and thought content normal.   Nursing note and vitals reviewed.         DIAGNOSTIC RESULTS     RADIOLOGY:   Non-plain film images such as CT, Ultrasound and MRI are read by the radiologist. Plain radiographic images

## 2019-04-21 NOTE — ED NOTES
Pt medicated per orders, ronda. Well. Pt a&ox4, skin w/d/pink, pulses palp. msp's intact, 0 n&v, 0 distress. Will monitor.      Ada Martins RN  04/21/19 1950

## 2019-04-22 ENCOUNTER — TELEPHONE (OUTPATIENT)
Dept: FAMILY MEDICINE CLINIC | Age: 59
End: 2019-04-22

## 2019-04-22 NOTE — TELEPHONE ENCOUNTER
[facility] ([facility]) ED Follow up Call    Reason for ED visit:  headache           FU appts/Provider:    Future Appointments   Date Time Provider Tessy Jean   5/1/2019 11:15 AM MD SHANIKA Chowdhury   5/7/2019 10:00 AM MLOZ PAT RM 2 MLOZ PAT [facility], this message is for  geo  This is Deisi Seek from [address] [place] [address] office. Just calling to see how you are doing after your recent visit to the Emergency Room. [address] [place] [address] wants to make sure you were able to fill any prescriptions and that you understand your discharge instructions. Please return our call if you need to make a follow up appointment with your provider or have any further needs. Our phone number is [phone]. Have a great day.

## 2019-04-23 DIAGNOSIS — E03.9 HYPOTHYROIDISM, UNSPECIFIED TYPE: ICD-10-CM

## 2019-04-23 RX ORDER — LEVOTHYROXINE SODIUM 0.2 MG/1
TABLET ORAL
Qty: 135 TABLET | Refills: 1 | Status: SHIPPED | OUTPATIENT
Start: 2019-04-23 | End: 2019-05-07

## 2019-04-23 RX ORDER — FLUCONAZOLE 150 MG/1
150 TABLET ORAL ONCE
Qty: 1 TABLET | Refills: 0 | Status: SHIPPED | OUTPATIENT
Start: 2019-04-23 | End: 2019-04-23

## 2019-04-23 NOTE — TELEPHONE ENCOUNTER
Orders Placed This Encounter   Medications    fluconazole (DIFLUCAN) 150 MG tablet     Sig: Take 1 tablet by mouth once for 1 dose     Dispense:  1 tablet     Refill:  0    fluconazole (DIFLUCAN) 150 MG tablet     Sig: Take 1 tablet by mouth once for 1 dose     Dispense:  1 tablet     Refill:  0       The above med(s) were e-scripted to the patient's pharmacy.    Please advise patient  Eduardo Seaman MD

## 2019-04-23 NOTE — TELEPHONE ENCOUNTER
Pt calling requesting another dose of the diflucan, please send to [facility] [address], pt was in [facility] er for a uti,  pt is still taking the antibiotic, pt has two days left of the antibiotic. Please advise.

## 2019-04-25 RX ORDER — ERGOCALCIFEROL 1.25 MG/1
50000 CAPSULE ORAL
Qty: 30 CAPSULE | Refills: 5 | Status: SHIPPED | OUTPATIENT
Start: 2019-04-25 | End: 2019-12-13 | Stop reason: SDUPTHER

## 2019-04-26 DIAGNOSIS — F41.9 ANXIETY: ICD-10-CM

## 2019-04-26 DIAGNOSIS — M19.012 OSTEOARTHRITIS OF LEFT SHOULDER, UNSPECIFIED OSTEOARTHRITIS TYPE: ICD-10-CM

## 2019-04-27 RX ORDER — LORAZEPAM 0.5 MG/1
TABLET ORAL
Qty: 30 TABLET | Refills: 0 | Status: SHIPPED | OUTPATIENT
Start: 2019-04-27 | End: 2019-05-01 | Stop reason: DRUGHIGH

## 2019-05-01 ENCOUNTER — OFFICE VISIT (OUTPATIENT)
Dept: FAMILY MEDICINE CLINIC | Age: 59
End: 2019-05-01
Payer: MEDICARE

## 2019-05-01 VITALS
OXYGEN SATURATION: 97 % | WEIGHT: 153.8 LBS | DIASTOLIC BLOOD PRESSURE: 68 MMHG | BODY MASS INDEX: 28.3 KG/M2 | HEART RATE: 85 BPM | SYSTOLIC BLOOD PRESSURE: 138 MMHG | HEIGHT: 62 IN

## 2019-05-01 DIAGNOSIS — M19.012 OSTEOARTHRITIS OF LEFT SHOULDER, UNSPECIFIED OSTEOARTHRITIS TYPE: Primary | ICD-10-CM

## 2019-05-01 DIAGNOSIS — I10 ESSENTIAL HYPERTENSION: ICD-10-CM

## 2019-05-01 DIAGNOSIS — F41.9 ANXIETY: ICD-10-CM

## 2019-05-01 DIAGNOSIS — Z01.818 PRE-OP EXAM: ICD-10-CM

## 2019-05-01 DIAGNOSIS — E11.51 TYPE 2 DIABETES MELLITUS WITH DIABETIC PERIPHERAL ANGIOPATHY WITHOUT GANGRENE, WITHOUT LONG-TERM CURRENT USE OF INSULIN (HCC): ICD-10-CM

## 2019-05-01 LAB
BILIRUBIN, POC: NORMAL
BLOOD URINE, POC: NORMAL
CLARITY, POC: CLEAR
COLOR, POC: YELLOW
GLUCOSE URINE, POC: NORMAL
KETONES, POC: NORMAL
LEUKOCYTE EST, POC: NORMAL
NITRITE, POC: NORMAL
PH, POC: 5.5
PROTEIN, POC: NORMAL
SPECIFIC GRAVITY, POC: 1.01
UROBILINOGEN, POC: 0.2

## 2019-05-01 PROCEDURE — 81002 URINALYSIS NONAUTO W/O SCOPE: CPT | Performed by: FAMILY MEDICINE

## 2019-05-01 PROCEDURE — 99213 OFFICE O/P EST LOW 20 MIN: CPT | Performed by: FAMILY MEDICINE

## 2019-05-01 RX ORDER — TIZANIDINE 4 MG/1
TABLET ORAL
Refills: 2 | COMMUNITY
Start: 2019-05-01 | End: 2019-05-06 | Stop reason: SDUPTHER

## 2019-05-01 RX ORDER — LORAZEPAM 0.5 MG/1
TABLET ORAL
Refills: 0 | Status: SHIPPED | COMMUNITY
Start: 2019-05-01 | End: 2019-05-13 | Stop reason: SDUPTHER

## 2019-05-01 RX ORDER — LEVOCETIRIZINE DIHYDROCHLORIDE 5 MG/1
TABLET, FILM COATED ORAL
Qty: 30 TABLET | Refills: 5 | Status: SHIPPED | OUTPATIENT
Start: 2019-05-01 | End: 2019-05-01 | Stop reason: SDUPTHER

## 2019-05-01 RX ORDER — AMLODIPINE BESYLATE 5 MG/1
5 TABLET ORAL DAILY
Qty: 90 TABLET | Refills: 1 | Status: SHIPPED | OUTPATIENT
Start: 2019-05-01 | End: 2019-05-22

## 2019-05-01 NOTE — PROGRESS NOTES
Chief Complaint   Patient presents with    Pre-op Exam     clearence, would like letter to get ISMAEL in hospital and nursing home    Discuss Medications     zanaflex, not suppose to take motrin, tylenol does not work, needs stronger bp med, took 2 yestereday and it was 110/78, xizol script            Sharmaine Mejia is a 64 y.o. female    Pre op exam for left shoulder TKA    Surgery 5/17    Wants to be able to take ambien and zanaflex in the hospital    Had both arms fractured from falls    Right now not really taking anything for pain more than motrin          Lab Results   Component Value Date    LABA1C 5.8 01/31/2019     No results found for: EAG      Sugars have been running very good      Patient Active Problem List   Diagnosis    Hypothyroidism    Anxiety    COPD (chronic obstructive pulmonary disease) ([address]    Smoker    Cerebral infarction ([address]    Arnold-Chiari deformity ([address]    Headache    Hyperlipidemia with target LDL less than 100    Pulmonary embolism and infarction ([address]    Laryngitis, chronic    Rhinitis, chronic    Depression    Acid reflux    H/O encephalopathy    Hepatitis B surface antigen positive    S/P colonoscopy with polypectomy    Hemiparesis affecting left side as late effect of cerebrovascular accident ([address]    Migraine without status migrainosus, not intractable    Seasonal allergic rhinitis due to pollen    Edema    Muscle spasm    Adhesive capsulitis of left shoulder    Congenital anomaly of adrenal gland    Airway hyperreactivity    Allergic rhinitis    Alveolitis of jaw    Anaclitic depression    Aortic valve disorder    Bipolar disorder ([address]    Bone necrosis ([address]    Cellulitis of left lower extremity    Cervical dystonia    Cervicalgia    Chronic maxillary sinusitis    Chronic periodontitis, generalized    Chronic retention of urine    Compression of brain ([address]    Dental caries extending into pulp    Diabetes mellitus, type II ([address]    Drug-seeking behavior    Dysphonia    Essential hypertension    Fracture of lumbar vertebra ([address]    H/O: CVA (cerebrovascular accident)    Hearing difficulty    History of HPV infection    Hypothyroidism due to Hashimoto's thyroiditis    Hypoxic brain injury ([address]    Late effects of CVA (cerebrovascular accident)    Neurogenic bladder    Nonallopathic lesion of cervical region, not elsewhere classified    Nonallopathic lesion of sacral region    Peripheral vascular disease ([address]    Post-laminectomy syndrome    Primary osteoarthritis of left shoulder    Pulmonary embolism (HCC)    Pulmonary embolism with infarction ([address]    Recurrent UTI    Retention of urine    Trochanteric bursitis of left hip    Vitamin D deficiency    High risk medication use - 11/01/17 OARRS PM&R, 11/13/17 Tox Screen: positive marijuana PM&R    Marijuana use    Chronic midline thoracic back pain    Vertebral compression fracture (HCC)    Closed wedge compression fracture of first lumbar vertebra with delayed healing    DJD of left shoulder    Status post total shoulder replacement, right    Status post total shoulder replacement, left    Decubitus ulcer of left ischial area    Decubitus ulcer of sacral area    Weakness       Allergies   Allergen Reactions    Latex     Imitrex [Sumatriptan] Anaphylaxis    Zomig [Zolmitriptan] Anaphylaxis    Antivert [Meclizine Hcl] Other (See Comments)     confusion    Flagyl [Metronidazole] Nausea Only     Nausea with rash    Ketorolac Tromethamine Nausea Only    Provera [Medroxyprogesterone Acetate] Other (See Comments)     Caused massive stroke    Ultram [Tramadol Hcl] Nausea Only    Bacitracin Rash    Bactrim Nausea And Vomiting and Rash    Cefdinir Rash    Cefuroxime Axetil Rash    Codeine Rash    Cyclosporine Rash    Iodine Rash     Rash with SOB    Iv Dye [Iodides] Rash     Rash with SOB    Neomycin Rash    Petroleum Jelly [Skin Protectants, Misc.] Rash tablet by mouth 4 times daily as needed for Pain 120 tablet 5    clotrimazole-betamethasone (LOTRISONE) 1-0.05 % cream Apply topically 2 times daily.  45 g 0    Melatonin-Lemon Balm  MG-MCG TABS Take 1 tablet by mouth nightly      mupirocin (BACTROBAN) 2 % ointment CINDY EXT AA TID      traMADol (ULTRAM) 50 MG tablet TK 1 T PO Q 8 H PRN      NYSTATIN 199083 UNIT/GM powder CINDY EXT AA ONCE D  0    PAZEO 0.7 % SOLN   11    SYMBICORT 160-4.5 MCG/ACT AERO INL 2 PFS PO BID UTD  2    vitamin C (ASCORBIC ACID) 500 MG tablet TAKE 1 TABLET BY MOUTH DAILY 30 tablet 0    terazosin (HYTRIN) 2 MG capsule Take 1 capsule by mouth nightly 90 capsule 1    ascorbic acid (VITAMIN C) 500 MG tablet Take 1 tablet by mouth daily 90 tablet 2    atorvastatin (LIPITOR) 40 MG tablet TAKE 1 TABLET BY MOUTH DAILY 90 tablet 5    chlorzoxazone (PARAFON FORTE) 250 MG tablet Take 1 tablet by mouth 4 times daily as needed for Muscle spasms 240 tablet 2    Lactobacillus (ACIDOPHILUS) CAPS capsule TAKE 1 CAPSULE BY MOUTH ONCE DAILY 30 capsule 0    folic acid (FOLVITE) 1 MG tablet Take 1 tablet by mouth daily 90 tablet 1    LYRICA 150 MG capsule TAKE ONE CAPSULE BY MOUTH TWICE DAILY 60 capsule 5    Melatonin 10 MG TABS TAKE 1 TABLET BY MOUTH EVERY NIGHT 30 tablet 5    spironolactone (ALDACTONE) 25 MG tablet TAKE 1 TABLET BY MOUTH DAILY 90 tablet 3    potassium chloride (KLOR-CON M) 20 MEQ extended release tablet TAKE 1 TABLET BY MOUTH DAILY 180 tablet 0    Acetaminophen (APAP) 325 MG TABS Take 650 mg by mouth every 6 hours as needed for Pain 20 tablet 0    vitamin B-12 (CYANOCOBALAMIN) 1000 MCG tablet Take 1 tablet by mouth daily 90 tablet 3    metFORMIN (GLUCOPHAGE) 500 MG tablet Take 1 tablet by mouth 2 times daily (with meals) 180 tablet 1    furosemide (LASIX) 40 MG tablet Take 1 tablet by mouth daily 90 tablet 5    magnesium oxide (MAG-OX) 400 (240 Mg) MG tablet Take 1 tablet by mouth daily 30 tablet 5    Simethicone 180 without long-term current use of insulin (HCC)    Pre-op exam  -     POCT Urinalysis no Micro    Other orders  -     levocetirizine (XYZAL) 5 MG tablet; take 1 tab daily    ativan and zanaflex changed to scheduled dosing    Ok for surgery    norvasc to 5mg     Donovan Delatorre MD

## 2019-05-02 RX ORDER — LEVOCETIRIZINE DIHYDROCHLORIDE 5 MG/1
TABLET, FILM COATED ORAL
Qty: 90 TABLET | Refills: 5 | Status: SHIPPED | OUTPATIENT
Start: 2019-05-02 | End: 2019-06-13 | Stop reason: SDUPTHER

## 2019-05-03 RX ORDER — OMEGA-3-ACID ETHYL ESTERS 1 G/1
2 CAPSULE, LIQUID FILLED ORAL 2 TIMES DAILY
Qty: 60 CAPSULE | Refills: 3 | Status: SHIPPED | OUTPATIENT
Start: 2019-05-03 | End: 2019-12-13

## 2019-05-06 DIAGNOSIS — R60.9 EDEMA, UNSPECIFIED TYPE: ICD-10-CM

## 2019-05-06 RX ORDER — TIZANIDINE 4 MG/1
TABLET ORAL
Qty: 180 TABLET | Refills: 2 | Status: SHIPPED | OUTPATIENT
Start: 2019-05-06 | End: 2019-05-06 | Stop reason: SDUPTHER

## 2019-05-06 RX ORDER — FUROSEMIDE 40 MG/1
40 TABLET ORAL DAILY
Qty: 90 TABLET | Refills: 5 | Status: SHIPPED | OUTPATIENT
Start: 2019-05-06 | End: 2019-07-05 | Stop reason: SDUPTHER

## 2019-05-07 ENCOUNTER — HOSPITAL ENCOUNTER (OUTPATIENT)
Dept: PREADMISSION TESTING | Age: 59
Discharge: HOME | End: 2019-05-11
Payer: MEDICARE

## 2019-05-07 ENCOUNTER — TELEPHONE (OUTPATIENT)
Dept: FAMILY MEDICINE CLINIC | Age: 59
End: 2019-05-07

## 2019-05-07 VITALS
TEMPERATURE: 98 F | RESPIRATION RATE: 16 BRPM | OXYGEN SATURATION: 98 % | HEIGHT: 62 IN | WEIGHT: 155 LBS | SYSTOLIC BLOOD PRESSURE: 146 MMHG | DIASTOLIC BLOOD PRESSURE: 66 MMHG | BODY MASS INDEX: 28.52 KG/M2 | HEART RATE: 103 BPM

## 2019-05-07 PROBLEM — M25.512 LEFT SHOULDER PAIN: Status: ACTIVE | Noted: 2019-05-07

## 2019-05-07 LAB
ABO/RH: NORMAL
ANION GAP SERPL CALCULATED.3IONS-SCNC: 17 MEQ/L (ref 9–15)
ANTIBODY SCREEN: NORMAL
BACTERIA: NEGATIVE /HPF
BILIRUBIN URINE: NEGATIVE
BLOOD, URINE: NEGATIVE
BUN BLDV-MCNC: 17 MG/DL (ref 6–20)
CALCIUM SERPL-MCNC: 8.8 MG/DL (ref 8.5–9.9)
CHLORIDE BLD-SCNC: 101 MEQ/L (ref 95–107)
CLARITY: CLEAR
CO2: 21 MEQ/L (ref 20–31)
COLOR: YELLOW
CREAT SERPL-MCNC: 1.2 MG/DL (ref 0.5–0.9)
EPITHELIAL CELLS, UA: NORMAL /HPF (ref 0–5)
GFR AFRICAN AMERICAN: 56
GFR NON-AFRICAN AMERICAN: 46.3
GLUCOSE BLD-MCNC: 111 MG/DL (ref 70–99)
GLUCOSE URINE: NEGATIVE MG/DL
HCT VFR BLD CALC: 35.2 % (ref 37–47)
HEMOGLOBIN: 11.2 G/DL (ref 12–16)
HYALINE CASTS: NORMAL /HPF (ref 0–5)
INR BLD: 1
KETONES, URINE: NEGATIVE MG/DL
LEUKOCYTE ESTERASE, URINE: ABNORMAL
MCH RBC QN AUTO: 25.1 PG (ref 27–31.3)
MCHC RBC AUTO-ENTMCNC: 31.7 % (ref 33–37)
MCV RBC AUTO: 79 FL (ref 82–100)
NITRITE, URINE: NEGATIVE
PDW BLD-RTO: 19.3 % (ref 11.5–14.5)
PH UA: 5 (ref 5–9)
PLATELET # BLD: 254 K/UL (ref 130–400)
POTASSIUM SERPL-SCNC: 4.5 MEQ/L (ref 3.4–4.9)
PROTEIN UA: NEGATIVE MG/DL
PROTHROMBIN TIME: 10.1 SEC (ref 9–11.5)
RBC # BLD: 4.46 M/UL (ref 4.2–5.4)
RBC UA: NORMAL /HPF (ref 0–5)
SODIUM BLD-SCNC: 139 MEQ/L (ref 135–144)
SPECIFIC GRAVITY UA: 1.01 (ref 1–1.03)
URINE REFLEX TO CULTURE: YES
UROBILINOGEN, URINE: 0.2 E.U./DL
WBC # BLD: 8.5 K/UL (ref 4.8–10.8)
WBC UA: NORMAL /HPF (ref 0–5)

## 2019-05-07 PROCEDURE — 85027 COMPLETE CBC AUTOMATED: CPT

## 2019-05-07 PROCEDURE — 86850 RBC ANTIBODY SCREEN: CPT

## 2019-05-07 PROCEDURE — 85610 PROTHROMBIN TIME: CPT

## 2019-05-07 PROCEDURE — 86900 BLOOD TYPING SEROLOGIC ABO: CPT

## 2019-05-07 PROCEDURE — 86901 BLOOD TYPING SEROLOGIC RH(D): CPT

## 2019-05-07 PROCEDURE — 81001 URINALYSIS AUTO W/SCOPE: CPT

## 2019-05-07 PROCEDURE — 87086 URINE CULTURE/COLONY COUNT: CPT

## 2019-05-07 PROCEDURE — 80048 BASIC METABOLIC PNL TOTAL CA: CPT

## 2019-05-07 RX ORDER — SODIUM CHLORIDE 0.9 % (FLUSH) 0.9 %
10 SYRINGE (ML) INJECTION EVERY 12 HOURS SCHEDULED
Status: CANCELLED | OUTPATIENT
Start: 2019-05-07

## 2019-05-07 RX ORDER — LIDOCAINE HYDROCHLORIDE 10 MG/ML
1 INJECTION, SOLUTION EPIDURAL; INFILTRATION; INTRACAUDAL; PERINEURAL
Status: CANCELLED | OUTPATIENT
Start: 2019-05-07 | End: 2019-05-07

## 2019-05-07 RX ORDER — SODIUM CHLORIDE, SODIUM LACTATE, POTASSIUM CHLORIDE, CALCIUM CHLORIDE 600; 310; 30; 20 MG/100ML; MG/100ML; MG/100ML; MG/100ML
INJECTION, SOLUTION INTRAVENOUS CONTINUOUS
Status: CANCELLED | OUTPATIENT
Start: 2019-05-07

## 2019-05-07 RX ORDER — TIZANIDINE 4 MG/1
TABLET ORAL
Qty: 540 TABLET | Refills: 2 | Status: SHIPPED | OUTPATIENT
Start: 2019-05-07 | End: 2019-07-20 | Stop reason: ALTCHOICE

## 2019-05-07 RX ORDER — SODIUM CHLORIDE 0.9 % (FLUSH) 0.9 %
10 SYRINGE (ML) INJECTION PRN
Status: CANCELLED | OUTPATIENT
Start: 2019-05-07

## 2019-05-07 NOTE — PROGRESS NOTES
EKG done 11/28/2018 & on Epic,  TXA Therapy protocol completed & paper copy on chart. Hibiclens wash neck to toes, front & back, left shoulder on Tuesday 5/14/2019, Wednesday 5/15/2019, Thursday 5/16/2019.

## 2019-05-08 LAB — URINE CULTURE, ROUTINE: NORMAL

## 2019-05-10 ENCOUNTER — TELEPHONE (OUTPATIENT)
Dept: FAMILY MEDICINE CLINIC | Age: 59
End: 2019-05-10

## 2019-05-10 RX ORDER — FLUCONAZOLE 150 MG/1
150 TABLET ORAL ONCE
Qty: 1 TABLET | Refills: 0 | Status: SHIPPED | OUTPATIENT
Start: 2019-05-10 | End: 2019-05-10

## 2019-05-10 RX ORDER — AZITHROMYCIN 250 MG/1
TABLET, FILM COATED ORAL
Qty: 1 PACKET | Refills: 0 | Status: ON HOLD | OUTPATIENT
Start: 2019-05-10 | End: 2019-05-20 | Stop reason: HOSPADM

## 2019-05-10 NOTE — TELEPHONE ENCOUNTER
Orders Placed This Encounter   Medications    azithromycin (ZITHROMAX) 250 MG tablet     Si tabs orally on first day, then one tab daily for four days     Dispense:  1 packet     Refill:  0    fluconazole (DIFLUCAN) 150 MG tablet     Sig: Take 1 tablet by mouth once for 1 dose     Dispense:  1 tablet     Refill:  0       The above med(s) were e-scripted to the patient's pharmacy.    Please advise patient  Donovan Delatorre MD

## 2019-05-10 NOTE — TELEPHONE ENCOUNTER
Pt called requesting medication for her cough and phlegm, mentioned a zpak  And would like Diflucan too    I advised that she should be seen in the walk in clinic. States she doesn't have a ride to get there.       [facility] on [address]

## 2019-05-13 DIAGNOSIS — M19.012 OSTEOARTHRITIS OF LEFT SHOULDER, UNSPECIFIED OSTEOARTHRITIS TYPE: ICD-10-CM

## 2019-05-13 DIAGNOSIS — F41.9 ANXIETY: ICD-10-CM

## 2019-05-14 RX ORDER — LORAZEPAM 0.5 MG/1
TABLET ORAL
Qty: 60 TABLET | Refills: 2 | Status: ON HOLD | OUTPATIENT
Start: 2019-05-14 | End: 2019-05-20 | Stop reason: SDUPTHER

## 2019-05-15 ENCOUNTER — ANESTHESIA EVENT (OUTPATIENT)
Dept: OPERATING ROOM | Age: 59
DRG: 483 | End: 2019-05-15
Payer: MEDICARE

## 2019-05-16 NOTE — H&P
[address]                      [address], [address] [facility]                              HISTORY AND PHYSICAL    PATIENT NAME: Eyal Velasco                    :        1960  MED REC NO:   [id number]                            ROOM:  ACCOUNT NO:   [de-identified]                           ADMIT DATE: 2019  PROVIDER:     Bernida Sandhoff, PA-C    CHIEF COMPLAINT:  Left shoulder pain and loss of range of motion. HISTORY OF PRESENT ILLNESS:  The patient underwent left total shoulder  replacement in 2018. She subsequently fell at least 10 times on the  shoulder, completely tore her subscapularis, and has complete loss of  range of motion. After risks, benefits, and alternatives were  discussed, she elected to proceed with removal of implant and conversion  to reverse total shoulder. Surgery will be performed on 2019 at  [facility] in [facility]. PAST MEDICAL HISTORY:  Significant for osteoarthritis, hypertension,  diabetes, macular degeneration, severe balance issues, COPD, irritable  bowel, urinary incontinence, history of stroke. CURRENT MEDICATIONS:  Amlodipine, aripiprazole, carisoprodol,  citalopram, Lasix, levothyroxine, Nexium, Nystop, and Xyzal.    ALLERGIES:  Are to BACITRACIN, BACTRIM, CODEINE, IMITREX, IODINE, LATEX,  SULFA, TORADOL. PAST SURGICAL HISTORY:  Significant for left total shoulder replacement,  tonsillectomy, back surgery. Denies any problems with the anesthetic. SOCIAL HISTORY:  The patient denies substance abuse. Denies current  alcohol use, but does smoke cigarettes. FAMILY HISTORY:  Significant for lung cancer and coronary artery  disease. REVIEW OF SYSTEMS:  Noncontributory. PHYSICAL EXAMINATION:  GENERAL:  This is a 53-year-old  female. VITAL SIGNS:  Height 5 feet 2 inches and weight 150 pounds. HEENT:  Her speech is hesitant.   Eyes, pupils are equal, round, reactive  to light and

## 2019-05-17 ENCOUNTER — APPOINTMENT (OUTPATIENT)
Dept: GENERAL RADIOLOGY | Age: 59
DRG: 483 | End: 2019-05-17
Attending: ORTHOPAEDIC SURGERY
Payer: MEDICARE

## 2019-05-17 ENCOUNTER — HOSPITAL ENCOUNTER (INPATIENT)
Age: 59
LOS: 4 days | Discharge: NURSING, REHAB OR LONG-TERM CARE | DRG: 483 | End: 2019-05-21
Attending: ORTHOPAEDIC SURGERY | Admitting: ORTHOPAEDIC SURGERY
Payer: MEDICARE

## 2019-05-17 ENCOUNTER — ANESTHESIA (OUTPATIENT)
Dept: OPERATING ROOM | Age: 59
DRG: 483 | End: 2019-05-17
Payer: MEDICARE

## 2019-05-17 VITALS — DIASTOLIC BLOOD PRESSURE: 60 MMHG | OXYGEN SATURATION: 99 % | SYSTOLIC BLOOD PRESSURE: 124 MMHG | TEMPERATURE: 98.6 F

## 2019-05-17 DIAGNOSIS — E03.9 HYPOTHYROIDISM, UNSPECIFIED TYPE: ICD-10-CM

## 2019-05-17 DIAGNOSIS — M19.012 OSTEOARTHRITIS OF LEFT SHOULDER, UNSPECIFIED OSTEOARTHRITIS TYPE: ICD-10-CM

## 2019-05-17 DIAGNOSIS — F41.9 ANXIETY: ICD-10-CM

## 2019-05-17 DIAGNOSIS — Z96.612 STATUS POST REVERSE TOTAL SHOULDER REPLACEMENT, LEFT: Primary | ICD-10-CM

## 2019-05-17 LAB
GLUCOSE BLD-MCNC: 111 MG/DL (ref 60–115)
GLUCOSE BLD-MCNC: 117 MG/DL (ref 60–115)
GLUCOSE BLD-MCNC: 149 MG/DL (ref 60–115)
PERFORMED ON: ABNORMAL
PERFORMED ON: ABNORMAL
PERFORMED ON: NORMAL

## 2019-05-17 PROCEDURE — 7100000001 HC PACU RECOVERY - ADDTL 15 MIN: Performed by: ORTHOPAEDIC SURGERY

## 2019-05-17 PROCEDURE — 94664 DEMO&/EVAL PT USE INHALER: CPT

## 2019-05-17 PROCEDURE — 2709999900 HC NON-CHARGEABLE SUPPLY: Performed by: ORTHOPAEDIC SURGERY

## 2019-05-17 PROCEDURE — C1713 ANCHOR/SCREW BN/BN,TIS/BN: HCPCS | Performed by: ORTHOPAEDIC SURGERY

## 2019-05-17 PROCEDURE — 6360000002 HC RX W HCPCS: Performed by: NURSE ANESTHETIST, CERTIFIED REGISTERED

## 2019-05-17 PROCEDURE — 94150 VITAL CAPACITY TEST: CPT

## 2019-05-17 PROCEDURE — 1210000000 HC MED SURG R&B

## 2019-05-17 PROCEDURE — C9359 IMPLNT,BON VOID FILLER-PUTTY: HCPCS | Performed by: ORTHOPAEDIC SURGERY

## 2019-05-17 PROCEDURE — 87075 CULTR BACTERIA EXCEPT BLOOD: CPT

## 2019-05-17 PROCEDURE — 6370000000 HC RX 637 (ALT 250 FOR IP): Performed by: ORTHOPAEDIC SURGERY

## 2019-05-17 PROCEDURE — 2580000003 HC RX 258: Performed by: NURSE PRACTITIONER

## 2019-05-17 PROCEDURE — 87070 CULTURE OTHR SPECIMN AEROBIC: CPT

## 2019-05-17 PROCEDURE — 6360000002 HC RX W HCPCS: Performed by: NURSE PRACTITIONER

## 2019-05-17 PROCEDURE — 2720000010 HC SURG SUPPLY STERILE: Performed by: ORTHOPAEDIC SURGERY

## 2019-05-17 PROCEDURE — 3600000014 HC SURGERY LEVEL 4 ADDTL 15MIN: Performed by: ORTHOPAEDIC SURGERY

## 2019-05-17 PROCEDURE — 0RPK0JZ REMOVAL OF SYNTHETIC SUBSTITUTE FROM LEFT SHOULDER JOINT, OPEN APPROACH: ICD-10-PCS | Performed by: ORTHOPAEDIC SURGERY

## 2019-05-17 PROCEDURE — 0RRK00Z REPLACEMENT OF LEFT SHOULDER JOINT WITH REVERSE BALL AND SOCKET SYNTHETIC SUBSTITUTE, OPEN APPROACH: ICD-10-PCS | Performed by: ORTHOPAEDIC SURGERY

## 2019-05-17 PROCEDURE — 73020 X-RAY EXAM OF SHOULDER: CPT

## 2019-05-17 PROCEDURE — 6360000002 HC RX W HCPCS: Performed by: ORTHOPAEDIC SURGERY

## 2019-05-17 PROCEDURE — 64415 NJX AA&/STRD BRCH PLXS IMG: CPT | Performed by: STUDENT IN AN ORGANIZED HEALTH CARE EDUCATION/TRAINING PROGRAM

## 2019-05-17 PROCEDURE — 87205 SMEAR GRAM STAIN: CPT

## 2019-05-17 PROCEDURE — 6360000002 HC RX W HCPCS: Performed by: STUDENT IN AN ORGANIZED HEALTH CARE EDUCATION/TRAINING PROGRAM

## 2019-05-17 PROCEDURE — 3700000000 HC ANESTHESIA ATTENDED CARE: Performed by: ORTHOPAEDIC SURGERY

## 2019-05-17 PROCEDURE — 7100000000 HC PACU RECOVERY - FIRST 15 MIN: Performed by: ORTHOPAEDIC SURGERY

## 2019-05-17 PROCEDURE — 2580000003 HC RX 258: Performed by: NURSE ANESTHETIST, CERTIFIED REGISTERED

## 2019-05-17 PROCEDURE — L3650 SO 8 ABD RESTRAINT PRE OTS: HCPCS | Performed by: ORTHOPAEDIC SURGERY

## 2019-05-17 PROCEDURE — 2500000003 HC RX 250 WO HCPCS: Performed by: NURSE ANESTHETIST, CERTIFIED REGISTERED

## 2019-05-17 PROCEDURE — 3700000001 HC ADD 15 MINUTES (ANESTHESIA): Performed by: ORTHOPAEDIC SURGERY

## 2019-05-17 PROCEDURE — 3600000004 HC SURGERY LEVEL 4 BASE: Performed by: ORTHOPAEDIC SURGERY

## 2019-05-17 PROCEDURE — 2580000003 HC RX 258: Performed by: ORTHOPAEDIC SURGERY

## 2019-05-17 PROCEDURE — 2500000003 HC RX 250 WO HCPCS: Performed by: ORTHOPAEDIC SURGERY

## 2019-05-17 PROCEDURE — C1776 JOINT DEVICE (IMPLANTABLE): HCPCS | Performed by: ORTHOPAEDIC SURGERY

## 2019-05-17 DEVICE — SPHERE GLEN DIA36MM THK11MM STD SHLDR POLY TRABECULAR MTL: Type: IMPLANTABLE DEVICE | Site: SHOULDER | Status: FUNCTIONAL

## 2019-05-17 DEVICE — IMPLANTABLE DEVICE: Type: IMPLANTABLE DEVICE | Site: SHOULDER | Status: FUNCTIONAL

## 2019-05-17 DEVICE — BEARING HUM DIA36-44MM STD ARCOMXL FOR COMPHSVE REV SHLDR: Type: IMPLANTABLE DEVICE | Site: SHOULDER | Status: FUNCTIONAL

## 2019-05-17 DEVICE — DBX PUTTY, 2.5CC
Type: IMPLANTABLE DEVICE | Site: SHOULDER | Status: FUNCTIONAL
Brand: DBX®

## 2019-05-17 DEVICE — INVERSE/REVERSE SCREW SYSTEM, 4.5-42
Type: IMPLANTABLE DEVICE | Site: SHOULDER | Status: FUNCTIONAL
Brand: INVERSE/REVERSE

## 2019-05-17 RX ORDER — SODIUM CHLORIDE 0.9 % (FLUSH) 0.9 %
10 SYRINGE (ML) INJECTION PRN
Status: DISCONTINUED | OUTPATIENT
Start: 2019-05-17 | End: 2019-05-21 | Stop reason: HOSPADM

## 2019-05-17 RX ORDER — ONDANSETRON 2 MG/ML
4 INJECTION INTRAMUSCULAR; INTRAVENOUS EVERY 6 HOURS PRN
Status: DISCONTINUED | OUTPATIENT
Start: 2019-05-17 | End: 2019-05-21 | Stop reason: HOSPADM

## 2019-05-17 RX ORDER — MORPHINE SULFATE 4 MG/ML
4 INJECTION, SOLUTION INTRAMUSCULAR; INTRAVENOUS
Status: DISCONTINUED | OUTPATIENT
Start: 2019-05-17 | End: 2019-05-19

## 2019-05-17 RX ORDER — ONDANSETRON 2 MG/ML
4 INJECTION INTRAMUSCULAR; INTRAVENOUS
Status: DISCONTINUED | OUTPATIENT
Start: 2019-05-17 | End: 2019-05-17 | Stop reason: HOSPADM

## 2019-05-17 RX ORDER — MORPHINE SULFATE 2 MG/ML
2 INJECTION, SOLUTION INTRAMUSCULAR; INTRAVENOUS
Status: DISCONTINUED | OUTPATIENT
Start: 2019-05-17 | End: 2019-05-19

## 2019-05-17 RX ORDER — ESCITALOPRAM OXALATE 20 MG/1
20 TABLET ORAL NIGHTLY
Status: DISCONTINUED | OUTPATIENT
Start: 2019-05-17 | End: 2019-05-21 | Stop reason: HOSPADM

## 2019-05-17 RX ORDER — PROPOFOL 10 MG/ML
INJECTION, EMULSION INTRAVENOUS PRN
Status: DISCONTINUED | OUTPATIENT
Start: 2019-05-17 | End: 2019-05-17 | Stop reason: SDUPTHER

## 2019-05-17 RX ORDER — DIPHENHYDRAMINE HYDROCHLORIDE 50 MG/ML
12.5 INJECTION INTRAMUSCULAR; INTRAVENOUS
Status: DISCONTINUED | OUTPATIENT
Start: 2019-05-17 | End: 2019-05-17 | Stop reason: HOSPADM

## 2019-05-17 RX ORDER — OXYCODONE HYDROCHLORIDE AND ACETAMINOPHEN 5; 325 MG/1; MG/1
2 TABLET ORAL EVERY 4 HOURS PRN
Status: DISCONTINUED | OUTPATIENT
Start: 2019-05-17 | End: 2019-05-19

## 2019-05-17 RX ORDER — LEVOTHYROXINE SODIUM 0.15 MG/1
300 TABLET ORAL DAILY
Status: DISCONTINUED | OUTPATIENT
Start: 2019-05-18 | End: 2019-05-18

## 2019-05-17 RX ORDER — VANCOMYCIN HYDROCHLORIDE 1 G/20ML
INJECTION, POWDER, LYOPHILIZED, FOR SOLUTION INTRAVENOUS PRN
Status: DISCONTINUED | OUTPATIENT
Start: 2019-05-17 | End: 2019-05-17 | Stop reason: SDUPTHER

## 2019-05-17 RX ORDER — DOCUSATE SODIUM 100 MG/1
100 CAPSULE, LIQUID FILLED ORAL 2 TIMES DAILY
Status: DISCONTINUED | OUTPATIENT
Start: 2019-05-17 | End: 2019-05-21 | Stop reason: HOSPADM

## 2019-05-17 RX ORDER — ATORVASTATIN CALCIUM 40 MG/1
40 TABLET, FILM COATED ORAL DAILY
Status: DISCONTINUED | OUTPATIENT
Start: 2019-05-18 | End: 2019-05-21 | Stop reason: HOSPADM

## 2019-05-17 RX ORDER — SODIUM CHLORIDE 9 MG/ML
INJECTION, SOLUTION INTRAVENOUS CONTINUOUS
Status: DISCONTINUED | OUTPATIENT
Start: 2019-05-17 | End: 2019-05-21 | Stop reason: HOSPADM

## 2019-05-17 RX ORDER — SODIUM CHLORIDE, SODIUM LACTATE, POTASSIUM CHLORIDE, CALCIUM CHLORIDE 600; 310; 30; 20 MG/100ML; MG/100ML; MG/100ML; MG/100ML
INJECTION, SOLUTION INTRAVENOUS CONTINUOUS
Status: DISCONTINUED | OUTPATIENT
Start: 2019-05-17 | End: 2019-05-17

## 2019-05-17 RX ORDER — METOCLOPRAMIDE HYDROCHLORIDE 5 MG/ML
10 INJECTION INTRAMUSCULAR; INTRAVENOUS
Status: DISCONTINUED | OUTPATIENT
Start: 2019-05-17 | End: 2019-05-17 | Stop reason: HOSPADM

## 2019-05-17 RX ORDER — MIDAZOLAM HYDROCHLORIDE 1 MG/ML
INJECTION INTRAMUSCULAR; INTRAVENOUS PRN
Status: DISCONTINUED | OUTPATIENT
Start: 2019-05-17 | End: 2019-05-17 | Stop reason: SDUPTHER

## 2019-05-17 RX ORDER — ARIPIPRAZOLE 10 MG/1
10 TABLET ORAL DAILY
Status: DISCONTINUED | OUTPATIENT
Start: 2019-05-18 | End: 2019-05-21 | Stop reason: HOSPADM

## 2019-05-17 RX ORDER — DEXTROSE MONOHYDRATE 25 G/50ML
12.5 INJECTION, SOLUTION INTRAVENOUS PRN
Status: DISCONTINUED | OUTPATIENT
Start: 2019-05-17 | End: 2019-05-21 | Stop reason: HOSPADM

## 2019-05-17 RX ORDER — AMLODIPINE BESYLATE 5 MG/1
5 TABLET ORAL DAILY
Status: DISCONTINUED | OUTPATIENT
Start: 2019-05-18 | End: 2019-05-21 | Stop reason: HOSPADM

## 2019-05-17 RX ORDER — IPRATROPIUM BROMIDE AND ALBUTEROL SULFATE 2.5; .5 MG/3ML; MG/3ML
1 SOLUTION RESPIRATORY (INHALATION) EVERY 8 HOURS
Status: DISCONTINUED | OUTPATIENT
Start: 2019-05-18 | End: 2019-05-17

## 2019-05-17 RX ORDER — FENTANYL CITRATE 50 UG/ML
INJECTION, SOLUTION INTRAMUSCULAR; INTRAVENOUS PRN
Status: DISCONTINUED | OUTPATIENT
Start: 2019-05-17 | End: 2019-05-17 | Stop reason: SDUPTHER

## 2019-05-17 RX ORDER — ONDANSETRON 2 MG/ML
INJECTION INTRAMUSCULAR; INTRAVENOUS PRN
Status: DISCONTINUED | OUTPATIENT
Start: 2019-05-17 | End: 2019-05-17 | Stop reason: SDUPTHER

## 2019-05-17 RX ORDER — OXYCODONE HYDROCHLORIDE AND ACETAMINOPHEN 5; 325 MG/1; MG/1
1 TABLET ORAL EVERY 4 HOURS PRN
Status: DISCONTINUED | OUTPATIENT
Start: 2019-05-17 | End: 2019-05-19

## 2019-05-17 RX ORDER — FENTANYL CITRATE 50 UG/ML
50 INJECTION, SOLUTION INTRAMUSCULAR; INTRAVENOUS EVERY 10 MIN PRN
Status: DISCONTINUED | OUTPATIENT
Start: 2019-05-17 | End: 2019-05-17 | Stop reason: HOSPADM

## 2019-05-17 RX ORDER — HYDRALAZINE HYDROCHLORIDE 20 MG/ML
10 INJECTION INTRAMUSCULAR; INTRAVENOUS EVERY 4 HOURS PRN
Status: DISCONTINUED | OUTPATIENT
Start: 2019-05-17 | End: 2019-05-21 | Stop reason: HOSPADM

## 2019-05-17 RX ORDER — DEXTROSE MONOHYDRATE 50 MG/ML
100 INJECTION, SOLUTION INTRAVENOUS PRN
Status: DISCONTINUED | OUTPATIENT
Start: 2019-05-17 | End: 2019-05-21 | Stop reason: HOSPADM

## 2019-05-17 RX ORDER — ROPIVACAINE HYDROCHLORIDE 5 MG/ML
INJECTION, SOLUTION EPIDURAL; INFILTRATION; PERINEURAL
Status: COMPLETED | OUTPATIENT
Start: 2019-05-17 | End: 2019-05-17

## 2019-05-17 RX ORDER — UREA 10 %
5 LOTION (ML) TOPICAL NIGHTLY
Status: DISCONTINUED | OUTPATIENT
Start: 2019-05-17 | End: 2019-05-21 | Stop reason: HOSPADM

## 2019-05-17 RX ORDER — LIDOCAINE HYDROCHLORIDE 20 MG/ML
INJECTION, SOLUTION INTRAVENOUS PRN
Status: DISCONTINUED | OUTPATIENT
Start: 2019-05-17 | End: 2019-05-17 | Stop reason: SDUPTHER

## 2019-05-17 RX ORDER — MEPERIDINE HYDROCHLORIDE 25 MG/ML
12.5 INJECTION INTRAMUSCULAR; INTRAVENOUS; SUBCUTANEOUS EVERY 5 MIN PRN
Status: DISCONTINUED | OUTPATIENT
Start: 2019-05-17 | End: 2019-05-17 | Stop reason: HOSPADM

## 2019-05-17 RX ORDER — NICOTINE POLACRILEX 4 MG
15 LOZENGE BUCCAL PRN
Status: DISCONTINUED | OUTPATIENT
Start: 2019-05-17 | End: 2019-05-21 | Stop reason: HOSPADM

## 2019-05-17 RX ORDER — ROCURONIUM BROMIDE 10 MG/ML
INJECTION, SOLUTION INTRAVENOUS PRN
Status: DISCONTINUED | OUTPATIENT
Start: 2019-05-17 | End: 2019-05-17 | Stop reason: SDUPTHER

## 2019-05-17 RX ORDER — MAGNESIUM HYDROXIDE 1200 MG/15ML
LIQUID ORAL CONTINUOUS PRN
Status: COMPLETED | OUTPATIENT
Start: 2019-05-17 | End: 2019-05-17

## 2019-05-17 RX ORDER — CEFAZOLIN SODIUM 2 G/50ML
2 SOLUTION INTRAVENOUS EVERY 8 HOURS
Status: DISCONTINUED | OUTPATIENT
Start: 2019-05-17 | End: 2019-05-17

## 2019-05-17 RX ORDER — SODIUM CHLORIDE 0.9 % (FLUSH) 0.9 %
10 SYRINGE (ML) INJECTION PRN
Status: DISCONTINUED | OUTPATIENT
Start: 2019-05-17 | End: 2019-05-17 | Stop reason: HOSPADM

## 2019-05-17 RX ORDER — PANTOPRAZOLE SODIUM 40 MG/1
40 TABLET, DELAYED RELEASE ORAL
Status: DISCONTINUED | OUTPATIENT
Start: 2019-05-18 | End: 2019-05-21 | Stop reason: HOSPADM

## 2019-05-17 RX ORDER — OMEPRAZOLE 20 MG/1
40 CAPSULE, DELAYED RELEASE ORAL 2 TIMES DAILY
COMMUNITY
End: 2019-12-13

## 2019-05-17 RX ORDER — LIDOCAINE HYDROCHLORIDE 10 MG/ML
1 INJECTION, SOLUTION EPIDURAL; INFILTRATION; INTRACAUDAL; PERINEURAL
Status: DISCONTINUED | OUTPATIENT
Start: 2019-05-17 | End: 2019-05-17 | Stop reason: HOSPADM

## 2019-05-17 RX ORDER — DEXAMETHASONE SODIUM PHOSPHATE 10 MG/ML
INJECTION INTRAMUSCULAR; INTRAVENOUS PRN
Status: DISCONTINUED | OUTPATIENT
Start: 2019-05-17 | End: 2019-05-17 | Stop reason: SDUPTHER

## 2019-05-17 RX ORDER — PREGABALIN 150 MG/1
150 CAPSULE ORAL DAILY
Status: DISCONTINUED | OUTPATIENT
Start: 2019-05-18 | End: 2019-05-21 | Stop reason: HOSPADM

## 2019-05-17 RX ORDER — SODIUM CHLORIDE 0.9 % (FLUSH) 0.9 %
10 SYRINGE (ML) INJECTION EVERY 12 HOURS SCHEDULED
Status: DISCONTINUED | OUTPATIENT
Start: 2019-05-17 | End: 2019-05-17 | Stop reason: HOSPADM

## 2019-05-17 RX ORDER — SODIUM CHLORIDE 0.9 % (FLUSH) 0.9 %
10 SYRINGE (ML) INJECTION EVERY 12 HOURS SCHEDULED
Status: DISCONTINUED | OUTPATIENT
Start: 2019-05-17 | End: 2019-05-21 | Stop reason: HOSPADM

## 2019-05-17 RX ORDER — IPRATROPIUM BROMIDE AND ALBUTEROL SULFATE 2.5; .5 MG/3ML; MG/3ML
1 SOLUTION RESPIRATORY (INHALATION) EVERY 4 HOURS PRN
Status: DISCONTINUED | OUTPATIENT
Start: 2019-05-17 | End: 2019-05-21 | Stop reason: HOSPADM

## 2019-05-17 RX ADMIN — DEXAMETHASONE SODIUM PHOSPHATE 8 MG: 10 INJECTION INTRAMUSCULAR; INTRAVENOUS at 09:05

## 2019-05-17 RX ADMIN — PROPOFOL 180 MG: 10 INJECTION, EMULSION INTRAVENOUS at 08:43

## 2019-05-17 RX ADMIN — VANCOMYCIN HYDROCHLORIDE 1000 MG: 1 INJECTION, POWDER, LYOPHILIZED, FOR SOLUTION INTRAVENOUS at 21:42

## 2019-05-17 RX ADMIN — ROCURONIUM BROMIDE 25 MG: 10 INJECTION INTRAVENOUS at 10:00

## 2019-05-17 RX ADMIN — DOCUSATE SODIUM 100 MG: 100 CAPSULE, LIQUID FILLED ORAL at 21:43

## 2019-05-17 RX ADMIN — MORPHINE SULFATE 2 MG: 2 INJECTION, SOLUTION INTRAMUSCULAR; INTRAVENOUS at 15:03

## 2019-05-17 RX ADMIN — LIDOCAINE HYDROCHLORIDE 50 MG: 20 INJECTION, SOLUTION INTRAVENOUS at 08:43

## 2019-05-17 RX ADMIN — MIDAZOLAM HYDROCHLORIDE 2 MG: 1 INJECTION, SOLUTION INTRAMUSCULAR; INTRAVENOUS at 08:20

## 2019-05-17 RX ADMIN — SODIUM CHLORIDE: 9 INJECTION, SOLUTION INTRAVENOUS at 15:04

## 2019-05-17 RX ADMIN — VANCOMYCIN HYDROCHLORIDE 1000 MG: 1 INJECTION, POWDER, LYOPHILIZED, FOR SOLUTION INTRAVENOUS at 08:35

## 2019-05-17 RX ADMIN — PHENYLEPHRINE HYDROCHLORIDE 50 MCG: 10 INJECTION INTRAVENOUS at 09:25

## 2019-05-17 RX ADMIN — SODIUM CHLORIDE, POTASSIUM CHLORIDE, SODIUM LACTATE AND CALCIUM CHLORIDE: 600; 310; 30; 20 INJECTION, SOLUTION INTRAVENOUS at 08:02

## 2019-05-17 RX ADMIN — FENTANYL CITRATE 100 MCG: 50 INJECTION, SOLUTION INTRAMUSCULAR; INTRAVENOUS at 08:43

## 2019-05-17 RX ADMIN — PHENYLEPHRINE HYDROCHLORIDE 50 MCG: 10 INJECTION INTRAVENOUS at 09:37

## 2019-05-17 RX ADMIN — PHENYLEPHRINE HYDROCHLORIDE 50 MCG: 10 INJECTION INTRAVENOUS at 09:57

## 2019-05-17 RX ADMIN — ONDANSETRON 4 MG: 2 INJECTION INTRAMUSCULAR; INTRAVENOUS at 09:05

## 2019-05-17 RX ADMIN — ROPIVACAINE HYDROCHLORIDE 30 ML: 5 INJECTION, SOLUTION EPIDURAL; INFILTRATION; PERINEURAL at 08:29

## 2019-05-17 RX ADMIN — SUGAMMADEX 100 MG: 100 INJECTION, SOLUTION INTRAVENOUS at 10:30

## 2019-05-17 ASSESSMENT — PULMONARY FUNCTION TESTS
PIF_VALUE: 18
PIF_VALUE: 16
PIF_VALUE: 15
PIF_VALUE: 18
PIF_VALUE: 17
PIF_VALUE: 17
PIF_VALUE: 12
PIF_VALUE: 18
PIF_VALUE: 24
PIF_VALUE: 18
PIF_VALUE: 15
PIF_VALUE: 18
PIF_VALUE: 16
PIF_VALUE: 16
PIF_VALUE: 37
PIF_VALUE: 18
PIF_VALUE: 20
PIF_VALUE: 11
PIF_VALUE: 14
PIF_VALUE: 18
PIF_VALUE: 15
PIF_VALUE: 35
PIF_VALUE: 18
PIF_VALUE: 2
PIF_VALUE: 14
PIF_VALUE: 18
PIF_VALUE: 17
PIF_VALUE: 17
PIF_VALUE: 18
PIF_VALUE: 3
PIF_VALUE: 18
PIF_VALUE: 0
PIF_VALUE: 18
PIF_VALUE: 18
PIF_VALUE: 17
PIF_VALUE: 35
PIF_VALUE: 18
PIF_VALUE: 18
PIF_VALUE: 0
PIF_VALUE: 18
PIF_VALUE: 17
PIF_VALUE: 2
PIF_VALUE: 18
PIF_VALUE: 18
PIF_VALUE: 21
PIF_VALUE: 0
PIF_VALUE: 18
PIF_VALUE: 17
PIF_VALUE: 1
PIF_VALUE: 15
PIF_VALUE: 18
PIF_VALUE: 18
PIF_VALUE: 15
PIF_VALUE: 0
PIF_VALUE: 16
PIF_VALUE: 18
PIF_VALUE: 16
PIF_VALUE: 18
PIF_VALUE: 17
PIF_VALUE: 18
PIF_VALUE: 26
PIF_VALUE: 16
PIF_VALUE: 18
PIF_VALUE: 18
PIF_VALUE: 17
PIF_VALUE: 18
PIF_VALUE: 17
PIF_VALUE: 9
PIF_VALUE: 18
PIF_VALUE: 15
PIF_VALUE: 18
PIF_VALUE: 15
PIF_VALUE: 17
PIF_VALUE: 7
PIF_VALUE: 18
PIF_VALUE: 18
PIF_VALUE: 2
PIF_VALUE: 16
PIF_VALUE: 16
PIF_VALUE: 18
PIF_VALUE: 17
PIF_VALUE: 18
PIF_VALUE: 15
PIF_VALUE: 18
PIF_VALUE: 16
PIF_VALUE: 18
PIF_VALUE: 23
PIF_VALUE: 18
PIF_VALUE: 1
PIF_VALUE: 18
PIF_VALUE: 17
PIF_VALUE: 17
PIF_VALUE: 20
PIF_VALUE: 2
PIF_VALUE: 15
PIF_VALUE: 18
PIF_VALUE: 15
PIF_VALUE: 18
PIF_VALUE: 17
PIF_VALUE: 18

## 2019-05-17 ASSESSMENT — PAIN SCALES - GENERAL: PAINLEVEL_OUTOF10: 5

## 2019-05-17 ASSESSMENT — LIFESTYLE VARIABLES: SMOKING_STATUS: 1

## 2019-05-17 ASSESSMENT — PAIN - FUNCTIONAL ASSESSMENT: PAIN_FUNCTIONAL_ASSESSMENT: 0-10

## 2019-05-17 NOTE — OP NOTE
Kimi Olguin La Kristi [address] [place] [address], [address] [facility]                                OPERATIVE REPORT    PATIENT NAME: Thang Amador                  :        1960  MED REC NO:   [id number]                            ROOM:  ACCOUNT NO:   [de-identified]                           ADMIT DATE: 2019  PROVIDER:     Tianna Evans MD    DATE OF PROCEDURE:  2019    SURGEON:  Tianna Evans MD.    ASSISTANT:  Misty Brown PA-C was present throughout the entire case. Given the nature of the disease process and the procedure, a skilled  surgical first assistant was necessary during the case. The assistant  was necessary to hold retractors and manipulate the extremity during the  procedure. A certified scrub tech was at the back table managing the  instruments and supplies for the surgical case. PREOPERATIVE DIAGNOSES:  Rotator cuff tear and instability status post  total shoulder replacement. POSTOPERATIVE DIAGNOSES:  Rotator cuff tear and instability status post  total shoulder replacement. PROCEDURE PERFORMED:  Removal of deep implant and revision of total  shoulder replacement to reverse total shoulder replacement, left  shoulder. BLOOD LOSS:  Minimal.    FLUIDS:  Less than 2 L. ANESTHESIA:  General with the regional block. COMPLICATIONS:  None. INDICATIONS:  A 72-year-old female with history of multiple falls status  post total shoulder replacement, now with complete disruption of the  supraspinatus, infraspinatus, and subscapularis with anterior complete  dislocation of her glenohumeral joint, now for revision surgery. SUMMATION OF EVENT:  The patient was brought to the operating room. After induction of anesthesia, routine prep and drape, we opened the old  incision, split the skin for roughly 8 cm. The humeral head had already  perforated superiorly, anteriorly, and posteriorly.   Through the  deltoid, we opened the skin and subcutaneous tissue and easily made an  arthrotomy, splitting deltoid tissue. The humeral head was externally  rotated and easily removed. We saw multiple fracture fragments and  pieces old tuberosity bone behind the glenoid. This was removed and  retractors were placed in and around the glenoid component. Using a  high speed saw, we removed the old total shoulder glenoid component,  centering anchor and a trephine drill that was used to remove the center  peg. The glenoid was then prepared with the centering pin, reamed,  flushed, and smoothed and we placed an extended offset 25-mm trabecular  metal baseplate from [employer]ael with bone graft into the centering hole. Two bicortical screws of 42-mm were placed in the baseplate with  excellent fixation and locking heads were placed on the screws. The  36-mm glenosphere was then affixed in the baseplate and its mechanism  was meticulously confirmed. We then directed ourself to the humeral  side, freshening the bone around the humeral side, and then trialing  different trays. We elected for a 44-mm tray, standard with the 36-mm  standard receptive poly liner. The shoulder was reduced. It was  checked and confirmed and it was stable in all planes. We lavage  irrigated throughout the case. The original comprehensive 10 mini stem  was made in place. The lavage irrigation completed. We had done some  deep tissue cultures that has not returned. The skin all looks clean. There was no sign of infection. Lavage irrigation was repeated  throughout. Hemostasis was obtained. We closed the arthrotomy with #1  Vicryl in a figure-of-eight manner and also some #2 fiber wire,  interrupted 2-0 running, 4-0 Monocryl, compressive dressing, sling, and  the patient was taken to the recovery room.         Kayla Baltazar MD    D: 05/17/2019 10:46:01       T: 05/17/2019 14:11:29     [email or web address]  Job#: [id number]     Doc#: [id number]    CC:

## 2019-05-17 NOTE — ANESTHESIA PROCEDURE NOTES
Peripheral Block    Patient location during procedure: pre-op  Start time: 5/17/2019 8:19 AM  End time: 5/17/2019 8:29 AM  Staffing  Anesthesiologist: Telma Narayan DO  Performed: anesthesiologist   Preanesthetic Checklist  Completed: patient identified, site marked, surgical consent, pre-op evaluation, timeout performed, IV checked, risks and benefits discussed, monitors and equipment checked, anesthesia consent given, oxygen available and patient being monitored  Peripheral Block  Patient position: supine  Prep: ChloraPrep  Patient monitoring: cardiac monitor, continuous pulse ox, frequent blood pressure checks and IV access  Block type: Brachial plexus  Laterality: left  Injection technique: single-shot  Procedures: ultrasound guided and nerve stimulator  Local infiltration: ropivacaine  Infiltration strength: 0.5 %  Dose: 30 mL  Interscalene  Provider prep: mask and sterile gloves  Local infiltration: ropivacaine  Needle  Needle type: combined needle/nerve stimulator   Needle gauge: 22 G  Needle length: 5 cm  Needle localization: anatomical landmarks, ultrasound guidance and nerve stimulator  Assessment  Injection assessment: negative aspiration for heme, no paresthesia on injection and local visualized surrounding nerve on ultrasound  Paresthesia pain: immediately resolved  Slow fractionated injection: yes  Hemodynamics: stable  Additional Notes  Ultrasound image printed and saved in patient chart    Reason for block: post-op pain management and at surgeon's request

## 2019-05-17 NOTE — ANESTHESIA PRE PROCEDURE
Department of Anesthesiology  Preprocedure Note       Name:  Mi Laird   Age:  64 y.o.  :  1960                                          MRN:  [id number]         Date:  2019      Surgeon: Harry Perez):  Katie Ramos MD    Procedure: LEFT REMOVAL GLENOID AND CONVERSION TO REVERSE TOTAL SHOULDER ARTHROPLASTY, SANDY REVERSE TSA, JOSE DAVID EQUIPMENT FLEXIBLE OSTEOTOMES, SCALENE NERVE BLOCK, LATEX ALLERGY (Left )    Medications prior to admission:   Prior to Admission medications    Medication Sig Start Date End Date Taking? Authorizing Provider   omeprazole (PRILOSEC) 20 MG delayed release capsule Take 40 mg by mouth 2 times daily   Yes Historical Provider, MD   azithromycin (ZITHROMAX) 250 MG tablet 2 tabs orally on first day, then one tab daily for four days 5/10/19 5/20/19 Yes Shankar Fallon MD   omega-3 acid ethyl esters (LOVAZA) 1 g capsule Take 2 capsules by mouth 2 times daily 5/3/19  Yes hSankar Fallon MD   vitamin D (ERGOCALCIFEROL) 23081 units CAPS capsule Take 1 capsule by mouth every 7 days 19  Yes Shankar Fallon MD   ibuprofen (ADVIL;MOTRIN) 800 MG tablet Take 1 tablet by mouth 4 times daily as needed for Pain 19  Yes Shankar Fallon MD   clotrimazole-betamethasone (LOTRISONE) 1-0.05 % cream Apply topically 2 times daily.  19  Yes Shankar Fallon MD   Melatonin-Lemon Balm  MG-MCG TABS Take 1 tablet by mouth nightly 18  Yes Historical Provider, MD   ascorbic acid (VITAMIN C) 500 MG tablet Take 1 tablet by mouth daily 1/15/19  Yes Shankar Fallon MD   Lactobacillus (ACIDOPHILUS) CAPS capsule TAKE 1 CAPSULE BY MOUTH ONCE DAILY 18  Yes Shankar Fallon MD   folic acid (FOLVITE) 1 MG tablet Take 1 tablet by mouth daily 18  Yes Shankar Fallon MD   Melatonin 10 MG TABS TAKE 1 TABLET BY MOUTH EVERY NIGHT 18  Yes Shankar Fallon MD   Acetaminophen (APAP) 325 MG TABS Take 650 mg by mouth every 6 hours as needed for Pain 18  Yes Francine Lopez PA-C   vitamin B-12 (CYANOCOBALAMIN) 1000 MCG tablet Take 1 tablet by mouth daily 6/8/18  Yes Puneet Izquierdo MD   LORazepam (ATIVAN) 0.5 MG tablet TAKE 1 TABLET BY MOUTH TWICE DAILY 5/14/19 7/14/19  Puneet Izquierdo MD   tiZANidine (ZANAFLEX) 4 MG tablet TAKE 2 TABLETS BY MOUTH EVERY 8 HOURS 5/7/19   Puneet Izquierdo MD   furosemide (LASIX) 40 MG tablet Take 1 tablet by mouth daily 5/6/19   Puneet Izquierdo MD   levocetirizine (XYZAL) 5 MG tablet TAKE 1 TABLET BY MOUTH DAILY 5/2/19   Puneet Izquierdo MD   amLODIPine (NORVASC) 5 MG tablet Take 1 tablet by mouth daily 5/1/19   Puneet Izquierdo MD   triamcinolone (KENALOG) 0.1 % ointment APPLY EXTERNALLY TO THE AFFECTED AREA TWICE DAILY 4/22/19   Puneet Izquierdo MD   ondansetron (ZOFRAN-ODT) 4 MG disintegrating tablet Take 1 tablet by mouth every 8 hours as needed for Nausea or Vomiting 4/15/19   Puneet Izquierdo MD   levothyroxine (SYNTHROID) 300 MCG tablet TAKE 1 TABLET BY MOUTH EVERY MORNING 4/10/19   Puneet Izquierdo MD   pramipexole (MIRAPEX) 0.5 MG tablet TAKE 1 TABLET BY MOUTH EVERY EVENING 4/9/19   Puneet Izquierdo MD   acetaminophen (APAP EXTRA STRENGTH) 500 MG tablet Take 2 tablets by mouth every 6 hours as needed for Pain 3/28/19   Puneet Izquierdo MD   gu[facility]FENesin [facility]) 600 MG extended release tablet Take 2 tablets by mouth 2 times daily 3/28/19   Puneet Izquierdo MD   ONE TOUCH ULTRA TEST strip Test blood glucose TID.   Dx: DM2 2/28/19   Puneet Izquierdo MD   lidocaine viscous (XYLOCAINE) 2 % solution Take 1 mL by mouth as needed for Irritation or Dental Pain (apply to gum with q-tip) 2/14/19   Puneet Izquierdo MD   mupirocin (BACTROBAN) 2 % ointment CINDY EXT AA TID 9/28/18   Historical Provider, MD   NYSTATIN 074540 UNIT/GM powder CINDY EXT AA ONCE D 1/11/19   Historical Provider, MD CASILLASEO 0.7 % SOLN  1/4/19   Historical Provider, MD   SYMBICORT 160-4.5 MCG/ACT AERO INL 2 PFS PO BID UTD 1/15/19   Historical Provider, MD   terazosin (HYTRIN) 2 MG capsule Take 1 capsule by mouth nightly 1/15/19   Puneet Izquierdo MD   atorvastatin (LIPITOR) 40 MG tablet TAKE 1 TABLET BY MOUTH DAILY 1/15/19   Dominick Sales MD   chlorzoxazone (PARAFON FORTE) 250 MG tablet Take 1 tablet by mouth 4 times daily as needed for Muscle spasms 12/3/18   Dominick Sales MD   LYRICA 150 MG capsule TAKE ONE CAPSULE BY MOUTH TWICE DAILY 11/15/18 5/14/19  Dominick Sales MD   spironolactone (ALDACTONE) 25 MG tablet TAKE 1 TABLET BY MOUTH DAILY 10/9/18   Dominick Sales MD   potassium chloride (KLOR-CON M) 20 MEQ extended release tablet TAKE 1 TABLET BY MOUTH DAILY 10/8/18   Dominick Sales MD   metFORMIN (GLUCOPHAGE) 500 MG tablet Take 1 tablet by mouth 2 times daily (with meals) 6/6/18   Dominick Sales MD   magnesium oxide (MAG-OX) 400 (240 Mg) MG tablet Take 1 tablet by mouth daily 5/29/18   Dominick Sales MD   Simethicone 180 MG CAPS TAKE 1 CAPSULE BY MOUTH TWICE DAILY 3/19/18   Dominick Sales MD   escitalopram (LEXAPRO) 20 MG tablet one tab at night 10/17/17   Historical Provider, MD   ARIPiprazole (ABILIFY) 10 MG tablet take 1 tab q night 10/17/17   Historical Provider, MD   loperamide (IMODIUM A-D) 2 MG tablet Take 2 mg by mouth as needed for Diarrhea  1/18/16   Historical Provider, MD   carisoprodol (SOMA) 350 MG tablet Take 1 tablet by mouth daily as needed (migraine) 5/30/17   Dominick Sales MD   mirtazapine (REMERON) 30 MG tablet Take 30 mg by mouth nightly.       Historical Provider, MD       Current medications:    Current Facility-Administered Medications   Medication Dose Route Frequency Provider Last Rate Last Dose    vancomycin 1000 mg IVPB in 250 mL D5W addavial  1,000 mg Intravenous Once Joaquín Matson MD        lactated ringers infusion   Intravenous Continuous Orvis Cho, APRN - CNP        lidocaine PF 1 % injection 1 mL  1 mL Intradermal Once PRN Orvis Cho, APRN - CNP        sodium chloride flush 0.9 % injection 10 mL  10 mL Intravenous 2 times per day Orvis Cho, APRN - CNP        sodium chloride flush 0.9 % injection 10 mL  10 mL Intravenous PRN Ace Zenon, APRN - CNP           Allergies:     Allergies   Allergen Reactions    Latex Rash    Imitrex [Sumatriptan] Anaphylaxis    Zomig [Zolmitriptan] Anaphylaxis    Antivert [Meclizine Hcl] Other (See Comments)     confusion    Flagyl [Metronidazole] Nausea Only     Nausea with rash    Ketorolac Tromethamine Nausea Only    Provera [Medroxyprogesterone Acetate] Other (See Comments)     Caused massive stroke    Ultram [Tramadol Hcl] Nausea Only    Bacitracin Rash    Bactrim Nausea And Vomiting and Rash    Cefdinir Rash    Cefuroxime Axetil Rash    Codeine Rash    Cyclosporine Rash    Iodine Rash     Rash with SOB    Iv Dye [Iodides] Rash     Rash with SOB    Neomycin Rash    Petroleum Jelly [Skin Protectants, Misc.] Rash    Quinolones Rash    Sulfa Antibiotics Rash    Toradol [Ketorolac Tromethamine] Rash    Trovan [Trovafloxacin] Rash       Problem List:    Patient Active Problem List   Diagnosis Code    Hypothyroidism E03.9    Anxiety F41.9    COPD (chronic obstructive pulmonary disease) (HCC) J44.9    Smoker F17.200    Cerebral infarction (HCC) I63.9    Arnold-Chiari deformity (HCC) Q07.00    Headache R51    Hyperlipidemia with target LDL less than 100 E78.5    Pulmonary embolism and infarction (HCC) I26.99    Laryngitis, chronic J37.0    Rhinitis, chronic J31.0    Depression F32.9    Acid reflux K21.9    H/O encephalopathy Z86.69    Hepatitis B surface antigen positive R76.8    S/P colonoscopy with polypectomy Z98.890    Hemiparesis affecting left side as late effect of cerebrovascular accident ([facility] [address] T72.654    Migraine without status migrainosus, not intractable G43.909    Seasonal allergic rhinitis due to pollen J30.1    Edema R60.9    Muscle spasm M62.838    Adhesive capsulitis of left shoulder M75.02    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 116    Cervical dystonia G24.3    Cervicalgia M54.2    Chronic maxillary sinusitis J32.0    Chronic periodontitis, generalized K05.329    Chronic retention of urine R33.9    Compression of brain (HCC) G93.5    Dental caries extending into pulp K02.9    Diabetes mellitus, type II ([facility]) E11.9    Drug-seeking behavior Z76.5    Dysphonia R49.0    Essential hypertension I10    Fracture of lumbar vertebra ([facility] [address] S32.009A    H/O: CVA (cerebrovascular accident) Z80.78    Hearing difficulty H91.90    History of HPV infection Z86.19    Hypothyroidism due to Hashimoto's thyroiditis E03.8, E06.3    Hypoxic brain injury ([facility]) G93.1    Late effects of CVA (cerebrovascular accident) I76.80    Neurogenic bladder N31.9    Nonallopathic lesion of cervical region, not elsewhere classified M99.81    Nonallopathic lesion of sacral region M99.9    Peripheral vascular disease (HCC) I73.9    Post-laminectomy syndrome M96.1    Primary osteoarthritis of left shoulder M19.012    Pulmonary embolism ([facility]) I26.99    Pulmonary embolism with infarction ([address] I26.99    Recurrent UTI N39.0    Retention of urine R33.9    Trochanteric bursitis of left hip M70.62    Vitamin D deficiency E55.9    High risk medication use - 11/01/17 OARRS PM&R, 11/13/17 Tox Screen: positive marijuana PM&R Z79.899    Marijuana use F12.90    Chronic midline thoracic back pain M54.6, G89.29    Vertebral compression fracture ([facility]) M48.50XA    Closed wedge compression fracture of first lumbar vertebra with delayed healing S32.010G    DJD of left shoulder M19.012    Status post total shoulder replacement, right Z96.611    Status post total shoulder replacement, left Z96.612    Decubitus ulcer of left ischial area L89.329    Decubitus ulcer of sacral area L89.159    Weakness ALKPHOS 92 04/16/2019    AST 12 04/16/2019    ALT 13 04/16/2019       POC Tests: No results for input(s): POCGLU, POCNA, POCK, POCCL, POCBUN, POCHEMO, POCHCT in the last 72 hours. Coags:   Lab Results   Component Value Date    PROTIME 10.1 05/07/2019    PROTIME 10.9 12/10/2011    INR 1.0 05/07/2019    APTT 26.0 03/21/2017       HCG (If Applicable):   Lab Results   Component Value Date    PREGTESTUR negative 04/15/2017        ABGs:   Lab Results   Component Value Date    PHART 7.458 12/22/2015    PO2ART 73 12/22/2015    XBL7RKG 44 12/22/2015    ILV2WFA 31.4 12/22/2015    BEART 8 12/22/2015    N4RLAVLT 95 12/22/2015        Type & Screen (If Applicable):  No results found for: LABABO, [address]    Anesthesia Evaluation  Patient summary reviewed and Nursing notes reviewed no history of anesthetic complications:   Airway: Mallampati: II  TM distance: >3 FB   Neck ROM: full  Mouth opening: > = 3 FB Dental: normal exam         Pulmonary:normal exam  breath sounds clear to auscultation  (+) COPD:  asthma: current smoker          Patient did not smoke on day of surgery. Cardiovascular:  Exercise tolerance: good (>4 METS),   (+) hypertension:,       ECG reviewed  Rhythm: regular  Rate: normal           Beta Blocker:  Not on Beta Blocker      ROS comment: Normal sinus rhythm  Possible Left atrial enlargement  Borderline ECG  When compared with ECG of 13-JUN-2018 08:19,  No significant change was found     Neuro/Psych:   (+) seizures:, CVA:, neuromuscular disease:, headaches:, psychiatric history:            GI/Hepatic/Renal:   (+) GERD:,           Endo/Other:    (+) DiabetesType II DM, , hypothyroidism, blood dyscrasia: anemia:., .          Pt had PAT visit. Abdominal:           Vascular: negative vascular ROS. Anesthesia Plan      general and regional     ASA 3     (ETT  Interscalene nerve block with US)  Induction: intravenous.     MIPS: Postoperative opioids

## 2019-05-17 NOTE — DISCHARGE INSTR - COC
Continuity of Care Form    Patient Name: Reid Goss   :  1960  MRN:  [id number]    Admit date:  2019  Discharge date:  2019    Code Status Order: Full Code   Advance Directives:     Admitting Physician:  Sisi Wiseman MD  PCP: Talon De La Rosa MD    Discharging Nurse: [facility] LTCU Unit/Room#: N229/N229-01  Discharging Unit Phone Number: [phone]    Emergency Contact:   Extended Emergency Contact Information  Primary Emergency Contact: Lilliam Love [address] Phone:   Work Phone:   Mobile Phone:   Relation: Brother/Sister    Past Surgical History:  Past Surgical History:   Procedure Laterality Date    BACK SURGERY      lumbar kyphoplasty x 2    BRAIN SURGERY      due to Arnold-Chiari deformity / [facility]     SECTION      COLONOSCOPY      CRANIOTOMY      Arnold-Chiary malformation    ENDOSCOPY, COLON, DIAGNOSTIC      FEMUR FRACTURE SURGERY Left     VA RECONSTR TOTAL SHOULDER IMPLANT Left 6/15/2018    LEFT TOTAL SHOULDER ARTHROPLASTY, SANDY BIOMET TSA, SCALENE NERVE BLOCK, (LATEX ALLERGY) performed by Sisi Wiseman MD at [facility]      lumbar kyphoplasty    [facility] [address] ENDOSCOPY  8/5/15    w/bx        Immunization History:   Immunization History   Administered Date(s) Administered    Influenza Nasal 2006    Influenza Vaccine, unspecified formulation 10/15/2010, 10/29/2013, 2014, 2015, 2017    Influenza, Alyson Agreste, 3 Years and older, IM (Fluzone 3 yrs and older or Afluria 5 yrs and older) 2017, 2018    Pneumococcal Polysaccharide ([id number]) 2008, 2015    Tdap (Boostrix, Adacel) 2013       Active Problems:  Patient Active Problem List   Diagnosis Code    Hypothyroidism E03.9    Anxiety F41.9    COPD (chronic obstructive pulmonary disease) ([facility] [address] J44.9    Smoker Post-laminectomy syndrome M96.1    Primary osteoarthritis of left shoulder M19.012    Pulmonary embolism (HCC) I26.99    Pulmonary embolism with infarction (HCC) I26.99    Recurrent UTI N39.0    Retention of urine R33.9    Trochanteric bursitis of left hip M70.62    Vitamin D deficiency E55.9    High risk medication use - 11/01/17 OARRS PM&R, 11/13/17 Tox Screen: positive marijuana PM&R Z79.899    Marijuana use F12.90    Chronic midline thoracic back pain M54.6, G89.29    Vertebral compression fracture (HCC) M48.50XA    Closed wedge compression fracture of first lumbar vertebra with delayed healing S32.010G    DJD of left shoulder M19.012    Status post total shoulder replacement, right Z96.611    Status post total shoulder replacement, left Z96.612    Decubitus ulcer of left ischial area L89.329    Decubitus ulcer of sacral area L89.159    Weakness R53.1    Left shoulder pain M25.512    Status post reverse total shoulder replacement, left Z96.612       Isolation/Infection:   Isolation          No Isolation            Nurse Assessment:  Last Vital Signs: BP (!) 152/67   Pulse 115   Temp 98.2 °F (36.8 °C) (Temporal)   Resp 17   Ht 5' 2\" (1.575 m)   Wt 155 lb (70.3 kg)   LMP 06/13/1999   SpO2 92%   BMI 28.35 kg/m²     Last documented pain score (0-10 scale): Pain Level: 5  Last Weight:   Wt Readings from Last 1 Encounters:   05/17/19 155 lb (70.3 kg)     Mental Status:  oriented and alert    IV Access:  - None    Nursing Mobility/ADLs:  Walking   Assisted  Transfer  Assisted  Bathing  Assisted  Dressing  Assisted  Toileting  Assisted  Feeding  Independent  Med Admin  Assisted  Med Delivery   whole    Wound Care Documentation and Therapy:  Incision 06/15/18 Shoulder Left (Active)   Number of days: 336        Elimination:  Continence:   · Bowel:  Yes  · Bladder: Yes and No  Urinary Catheter: None   Colostomy/Ileostomy/Ileal Conduit: No       Date of Last BM: 05/19/2019  No intake or output information and transfer of Edil Eckert  is necessary for the continuing treatment of the diagnosis listed and that she requires {Admit to Appropriate Level of Care:30786} for {GREATER/LESS:957038013} 30 days.      Update Admission H&P: {CHP DME Changes in DTAGI:953098191}    PHYSICIAN SIGNATURE:  Electronically signed by Shahzad Gonzales MD on 5/20/19 at 12:02 PM

## 2019-05-17 NOTE — BRIEF OP NOTE
Brief Postoperative Note  ______________________________________________________________    Patient: Charo Lazcano  YOB: 1960  MRN: [id number]  Date of Procedure: 5/17/2019    Pre-Op Diagnosis: LEFT RCT S/P BIOMET TSA    Post-Op Diagnosis: Same       Procedure(s):  LEFT REMOVAL GLENOID AND CONVERSION TO REVERSE TOTAL SHOULDER ARTHROPLASTY, SANDY REVERSE TSA, JOSE DAVID EQUIPMENT FLEXIBLE OSTEOTOMES, SCALENE NERVE BLOCK, LATEX ALLERGY    Anesthesia: Regional, General    Surgeon(s):  Alfonso Davila MD    Assistant: cecelia alvarez    Estimated Blood Loss (mL): less than 50     Complications: None    Specimens:   ID Type Source Tests Collected by Time Destination   1 : culture left shoulder Swab Joint, Shoulder SURGICAL CULTURE Alfonso Davila MD 5/17/2019 7952        Implants:  Implant Name Type Inv.  Item Serial No.  Lot No. LRB No. Used   SCREW SHOULDER CANC 4.5X42MM Screw/Plate/Nail/Moises SCREW SHOULDER CANC 4.5X42MM  [employer] [id number] Left 1   IMPL SHOULDER GLENOSPHERE 36MM Shoulder/Arm/Wrist/Hand IMPL SHOULDER GLENOSPHERE 36MM  [employer] [id number] Left 1   SCREW SHOULDER CANC 4.5X42MM Screw/Plate/Nail/Moises SCREW SHOULDER CANC 4.5X42MM  [employer] [id number] Left 1   IMPL SHOULDER TM RVS BASE PLT 255MM POS Shoulder/Arm/Wrist/Hand IMPL SHOULDER TM RVS BASE PLT 255MM POS  [employer] [id number] Left 1   PUTTY GRAFT BONE DEMINRL SUB 2.5ML FD - [id number] Bone/Graft/Tissue/Human/Synth PUTTY GRAFT BONE DEMINRL SUB 2.5ML  [id number] MUSCULOSKELETAL TRANSPLANT FND  Left 1   TRAY COMPR RVS HMRL CO 44MM Shoulder/Arm/Wrist/Hand TRAY COMPR RVS HMRL CO 44MM  [employer] [id number] Left 1   IMPL SHOULDER HUM BEARING 54P98QI Shoulder/Arm/Wrist/Hand IMPL SHOULDER HUM BEARING 62D69WX  [employer] [id number] Left 1         Drains: * No LDAs found *    Findings: rct and unstable tsa    Alfonso Davila MD  Date: 5/17/2019  Time: 10:27 AM

## 2019-05-18 LAB
ANION GAP SERPL CALCULATED.3IONS-SCNC: 11 MEQ/L (ref 9–15)
BASOPHILS ABSOLUTE: 0.1 K/UL (ref 0–0.2)
BASOPHILS RELATIVE PERCENT: 0.4 %
BUN BLDV-MCNC: 15 MG/DL (ref 6–20)
CALCIUM SERPL-MCNC: 8.3 MG/DL (ref 8.5–9.9)
CHLORIDE BLD-SCNC: 105 MEQ/L (ref 95–107)
CO2: 25 MEQ/L (ref 20–31)
CREAT SERPL-MCNC: 0.66 MG/DL (ref 0.5–0.9)
EKG ATRIAL RATE: 110 BPM
EKG P AXIS: 82 DEGREES
EKG P-R INTERVAL: 132 MS
EKG Q-T INTERVAL: 342 MS
EKG QRS DURATION: 92 MS
EKG QTC CALCULATION (BAZETT): 462 MS
EKG R AXIS: 50 DEGREES
EKG T AXIS: 37 DEGREES
EKG VENTRICULAR RATE: 110 BPM
EOSINOPHILS ABSOLUTE: 0 K/UL (ref 0–0.7)
EOSINOPHILS RELATIVE PERCENT: 0.1 %
GFR AFRICAN AMERICAN: >60
GFR NON-AFRICAN AMERICAN: >60
GLUCOSE BLD-MCNC: 107 MG/DL (ref 60–115)
GLUCOSE BLD-MCNC: 109 MG/DL (ref 60–115)
GLUCOSE BLD-MCNC: 111 MG/DL (ref 60–115)
GLUCOSE BLD-MCNC: 129 MG/DL (ref 60–115)
GLUCOSE BLD-MCNC: 99 MG/DL (ref 70–99)
HBA1C MFR BLD: 5.8 % (ref 4.8–5.9)
HCT VFR BLD CALC: 33.1 % (ref 37–47)
HEMOGLOBIN: 10.4 G/DL (ref 12–16)
LYMPHOCYTES ABSOLUTE: 1.9 K/UL (ref 1–4.8)
LYMPHOCYTES RELATIVE PERCENT: 14.7 %
MCH RBC QN AUTO: 24.4 PG (ref 27–31.3)
MCHC RBC AUTO-ENTMCNC: 31.5 % (ref 33–37)
MCV RBC AUTO: 77.7 FL (ref 82–100)
MONOCYTES ABSOLUTE: 1.5 K/UL (ref 0.2–0.8)
MONOCYTES RELATIVE PERCENT: 11.3 %
NEUTROPHILS ABSOLUTE: 9.6 K/UL (ref 1.4–6.5)
NEUTROPHILS RELATIVE PERCENT: 73.5 %
PDW BLD-RTO: 18.1 % (ref 11.5–14.5)
PERFORMED ON: ABNORMAL
PERFORMED ON: NORMAL
PLATELET # BLD: 249 K/UL (ref 130–400)
POTASSIUM REFLEX MAGNESIUM: 3.9 MEQ/L (ref 3.4–4.9)
RBC # BLD: 4.26 M/UL (ref 4.2–5.4)
SODIUM BLD-SCNC: 141 MEQ/L (ref 135–144)
T4 FREE: 2.27 NG/DL (ref 0.84–1.68)
TSH SERPL DL<=0.05 MIU/L-ACNC: <0.01 UIU/ML (ref 0.44–3.86)
WBC # BLD: 13.1 K/UL (ref 4.8–10.8)

## 2019-05-18 PROCEDURE — 80048 BASIC METABOLIC PNL TOTAL CA: CPT

## 2019-05-18 PROCEDURE — 6370000000 HC RX 637 (ALT 250 FOR IP): Performed by: ORTHOPAEDIC SURGERY

## 2019-05-18 PROCEDURE — 2700000000 HC OXYGEN THERAPY PER DAY

## 2019-05-18 PROCEDURE — 97163 PT EVAL HIGH COMPLEX 45 MIN: CPT

## 2019-05-18 PROCEDURE — 94760 N-INVAS EAR/PLS OXIMETRY 1: CPT

## 2019-05-18 PROCEDURE — 84443 ASSAY THYROID STIM HORMONE: CPT

## 2019-05-18 PROCEDURE — 97760 ORTHOTIC MGMT&TRAING 1ST ENC: CPT

## 2019-05-18 PROCEDURE — 6370000000 HC RX 637 (ALT 250 FOR IP): Performed by: INTERNAL MEDICINE

## 2019-05-18 PROCEDURE — 85025 COMPLETE CBC W/AUTO DIFF WBC: CPT

## 2019-05-18 PROCEDURE — 83036 HEMOGLOBIN GLYCOSYLATED A1C: CPT

## 2019-05-18 PROCEDURE — 97167 OT EVAL HIGH COMPLEX 60 MIN: CPT

## 2019-05-18 PROCEDURE — 99222 1ST HOSP IP/OBS MODERATE 55: CPT | Performed by: INTERNAL MEDICINE

## 2019-05-18 PROCEDURE — 2580000003 HC RX 258: Performed by: ORTHOPAEDIC SURGERY

## 2019-05-18 PROCEDURE — 36415 COLL VENOUS BLD VENIPUNCTURE: CPT

## 2019-05-18 PROCEDURE — 93005 ELECTROCARDIOGRAM TRACING: CPT

## 2019-05-18 PROCEDURE — 1210000000 HC MED SURG R&B

## 2019-05-18 PROCEDURE — 94640 AIRWAY INHALATION TREATMENT: CPT

## 2019-05-18 PROCEDURE — 97535 SELF CARE MNGMENT TRAINING: CPT

## 2019-05-18 PROCEDURE — 84439 ASSAY OF FREE THYROXINE: CPT

## 2019-05-18 RX ORDER — LEVOTHYROXINE SODIUM 0.15 MG/1
150 TABLET ORAL DAILY
Status: DISCONTINUED | OUTPATIENT
Start: 2019-05-19 | End: 2019-05-18

## 2019-05-18 RX ORDER — LEVOTHYROXINE SODIUM 0.1 MG/1
100 TABLET ORAL DAILY
Status: DISCONTINUED | OUTPATIENT
Start: 2019-05-19 | End: 2019-05-21 | Stop reason: HOSPADM

## 2019-05-18 RX ADMIN — DOCUSATE SODIUM 100 MG: 100 CAPSULE, LIQUID FILLED ORAL at 21:27

## 2019-05-18 RX ADMIN — LEVOTHYROXINE SODIUM 300 MCG: 150 TABLET ORAL at 06:22

## 2019-05-18 RX ADMIN — ATORVASTATIN CALCIUM 40 MG: 40 TABLET, FILM COATED ORAL at 21:27

## 2019-05-18 RX ADMIN — MOMETASONE FUROATE AND FORMOTEROL FUMARATE DIHYDRATE 2 PUFF: 100; 5 AEROSOL RESPIRATORY (INHALATION) at 19:43

## 2019-05-18 RX ADMIN — PANTOPRAZOLE SODIUM 40 MG: 40 TABLET, DELAYED RELEASE ORAL at 06:22

## 2019-05-18 RX ADMIN — METFORMIN HYDROCHLORIDE 500 MG: 500 TABLET ORAL at 08:43

## 2019-05-18 RX ADMIN — MOMETASONE FUROATE AND FORMOTEROL FUMARATE DIHYDRATE 2 PUFF: 100; 5 AEROSOL RESPIRATORY (INHALATION) at 08:05

## 2019-05-18 RX ADMIN — Medication 5 MG: at 21:27

## 2019-05-18 RX ADMIN — AMLODIPINE BESYLATE 5 MG: 5 TABLET ORAL at 08:44

## 2019-05-18 RX ADMIN — Medication 5 MG: at 00:45

## 2019-05-18 RX ADMIN — OXYCODONE HYDROCHLORIDE AND ACETAMINOPHEN 1 TABLET: 5; 325 TABLET ORAL at 03:20

## 2019-05-18 RX ADMIN — METFORMIN HYDROCHLORIDE 500 MG: 500 TABLET ORAL at 18:35

## 2019-05-18 RX ADMIN — ESCITALOPRAM OXALATE 20 MG: 20 TABLET ORAL at 21:27

## 2019-05-18 RX ADMIN — PREGABALIN 150 MG: 150 CAPSULE ORAL at 08:44

## 2019-05-18 RX ADMIN — ARIPIPRAZOLE 10 MG: 10 TABLET ORAL at 08:43

## 2019-05-18 RX ADMIN — Medication 10 ML: at 21:27

## 2019-05-18 RX ADMIN — ESCITALOPRAM OXALATE 20 MG: 20 TABLET ORAL at 00:45

## 2019-05-18 RX ADMIN — DOCUSATE SODIUM 100 MG: 100 CAPSULE, LIQUID FILLED ORAL at 08:44

## 2019-05-18 ASSESSMENT — PAIN DESCRIPTION - ORIENTATION
ORIENTATION: LEFT
ORIENTATION: LEFT

## 2019-05-18 ASSESSMENT — PAIN DESCRIPTION - FREQUENCY: FREQUENCY: INTERMITTENT

## 2019-05-18 ASSESSMENT — PAIN DESCRIPTION - LOCATION
LOCATION: SHOULDER
LOCATION: FOOT;SHOULDER

## 2019-05-18 ASSESSMENT — PAIN SCALES - WONG BAKER: WONGBAKER_NUMERICALRESPONSE: 4

## 2019-05-18 ASSESSMENT — PAIN DESCRIPTION - DESCRIPTORS: DESCRIPTORS: PATIENT UNABLE TO DESCRIBE

## 2019-05-18 ASSESSMENT — PAIN DESCRIPTION - PAIN TYPE: TYPE: CHRONIC PAIN

## 2019-05-18 ASSESSMENT — PAIN SCALES - GENERAL
PAINLEVEL_OUTOF10: 5
PAINLEVEL_OUTOF10: 6

## 2019-05-18 NOTE — PROGRESS NOTES
Doing well.  AVSS.   Cont with PT.  SS for d/c planning    Pt needs to keep sling on AAT as directed

## 2019-05-18 NOTE — PROGRESS NOTES
Physical Therapy Med Surg Initial Assessment  Fac[facility]Department: [facility]  Room: N229/N229-01       NAME: Josselin Chapman  : 1960 (64 y.o.)  MRN: [id number]  CODE STATUS: Full Code    Date of Service: 2019    Patient Diagnosis(es): Status post reverse total shoulder replacement, left [Z96.612]   No chief complaint on file.     Patient Active Problem List    Diagnosis Date Noted    Status post reverse total shoulder replacement, left 2019    Left shoulder pain 2019    Weakness 2018    Decubitus ulcer of left ischial area 2018    Decubitus ulcer of sacral area 2018    Status post total shoulder replacement, right 06/15/2018    Status post total shoulder replacement, left 06/15/2018    DJD of left shoulder 2018    Marijuana use 2017    Chronic midline thoracic back pain 2017    Closed wedge compression fracture of first lumbar vertebra with delayed healing 2017    Vertebral compression fracture (HCC)     High risk medication use - 17 OARRS PM&R, 17 Tox Screen: positive marijuana PM&R 2017    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    Allergic rhinitis 10/29/2017    Aortic valve disorder 10/29/2017    Bipolar disorder ([address] 10/29/2017    Compression of brain ([address] 10/29/2017    Diabetes mellitus, type II ([address] 10/29/2017    Hypoxic brain injury ([address] 10/29/2017    Peripheral vascular disease ([address] 10/29/2017    Anaclitic depression 2017    Pulmonary embolism with infarction ([address] 2017    Fracture of lumbar vertebra ([address] 2017    Adhesive capsulitis of left shoulder 2017    Primary osteoarthritis of left shoulder 2017    Migraine without status migrainosus, not intractable 2017    Seasonal allergic rhinitis due to pollen 2017    Edema 2017    Muscle spasm 2017    H/O: CVA (cerebrovascular accident) 2017    Trochanteric bursitis of left hip 12/22/2016    Chronic maxillary sinusitis 11/11/2016    Cellulitis of left lower extremity 11/01/2016    Cervical dystonia 06/03/2016    Hypothyroidism due to Hashimoto's thyroiditis 04/19/2016    Essential hypertension 03/07/2016    Late effects of CVA (cerebrovascular accident) 04/07/2015    Hearing difficulty 11/04/2013    History of HPV infection 08/20/2013    Hemiparesis affecting left side as late effect of cerebrovascular accident ([address] 02/19/2013    Dysphonia 02/05/2013    COPD (chronic obstructive pulmonary disease) ([address]     Smoker     Cerebral infarction (HCC)     Arnold-Chiari deformity (HCC)     Headache     Hyperlipidemia with target LDL less than 100     Pulmonary embolism and infarction (HCC)     Laryngitis, chronic     Rhinitis, chronic     Depression     Acid reflux     H/O encephalopathy     Hepatitis B surface antigen positive     S/P colonoscopy with polypectomy     Recurrent UTI 08/15/2012    Chronic retention of urine 06/08/2012    Hypothyroidism 10/07/2011    Anxiety 10/07/2011    Nonallopathic lesion of sacral region 09/14/2011    Nonallopathic lesion of cervical region, not elsewhere classified 08/12/2011    Drug-seeking behavior 01/10/2011    Bone necrosis ([address] 10/29/2010    Chronic periodontitis, generalized 09/24/2010    Alveolitis of jaw 07/09/2010    Dental caries extending into pulp 06/25/2010    Pulmonary embolism ([address] 06/21/2010    Retention of urine 01/22/2010    Neurogenic bladder 01/05/2010    Vitamin D deficiency 11/17/2009    Airway hyperreactivity 09/16/2009    Cervicalgia 09/11/2009    Post-laminectomy syndrome 08/24/2000        Past Medical History:   Diagnosis Date    Acid reflux     Allergic rhinitis     Anxiety     Arnold-Chiari deformity ([address] 2000    surgery    Asthma     Cerebral artery occlusion with cerebral infarction ([address] 2001    1 yr after brain OR    Cerebrovascular disease     Chronic back pain greater than 3 months duration     COPD (chronic obstructive pulmonary disease) (HCC)     Dr Tresa Perez at [address] CVA (cerebral infarction)     left hemiparesis, due to ? estrogen + smoking    Depression     Dr Armstrong Fitting H/O encephalopathy 2001    Headache(784.0)     Dr Deisi Puckett Hepatitis B surface antigen positive     Hx of blood clots     PE / trx with coumadin x 6 months    Hyperlipidemia LDL goal < 100     meds > 10 yrs    Hypertension     meds > 2 yrs    Hypothyroidism 2001    meds > 18 yrs    Laryngitis, chronic     scoped by Dr Sharmila Leigh, fungal    Marijuana smoker in remission [facility])     Obesity (BMI 30-39. 9)     Osteoarthritis     Pulmonary embolism and infarction (HCC)     Restless legs syndrome     Rhinitis, chronic     Rotator cuff tear     Left    S/P colonoscopy with polypectomy     Dr Gabbi Singh    Seizures [facility])    [address] Spinal headache     past partum     Spondylosis without myelopathy     Type 2 diabetes mellitus without complication (HCC)     hx > 6 yrs    Urinary incontinence     Vertebral compression fracture ([address]      Past Surgical History:   Procedure Laterality Date    BACK SURGERY      lumbar kyphoplasty x 2    BRAIN SURGERY      due to Arnold-Chiari deformity / [facility]     SECTION      COLONOSCOPY      CRANIOTOMY      Arnold-Chiary malformation    ENDOSCOPY, COLON, DIAGNOSTIC      FEMUR FRACTURE SURGERY Left     MD RECONSTR TOTAL SHOULDER IMPLANT Left 6/15/2018    LEFT TOTAL SHOULDER ARTHROPLASTY, SANDY BIOMET TSA, SCALENE NERVE BLOCK, (LATEX ALLERGY) performed by Nancy Jackson MD at [address] [facility]      lumbar kyphoplasty    [facility] ENDOSCOPY  8/5/15    w/bx        Chart Reviewed: Yes  Patient assessed for rehabilitation services?: Yes  Additional Pertinent Hx: Removal of deep implant and revision of total shoulder replacement to reverse total shoulder replacement, left 5/17/2019  Family / Caregiver Present: No    Restrictions:  Restrictions/Precautions: Weight Bearing, Fall Risk, ROM Restrictions  Upper Extremity Weight Bearing Restrictions  Left Upper Extremity Weight Bearing: Non Weight Bearing  Position Activity Restriction  Other position/activity restrictions: OK AROM to elbow, hand, wrist; No pendulums; assist with transfers into w/c     SUBJECTIVE: Subjective: \"I need to go\" Pt perseverating about trying to get out of bed. Pt non compliant with immobilizer, taking off constantly this AM despite avysis and attempts made from therapy and nursing staff  Response To Previous Treatment: Not applicable  Pre Treatment Pain Screening  Pain at present: 5  Scale Used: Faces  Intervention List: Patient able to continue with treatment;Patient declined any intervention  Comments / Details: Ice given    Post Treatment Pain Screening:   Pain Screening  Patient Currently in Pain: Yes  Pain Assessment  Pain Assessment: Faces  Espino-Baker Pain Rating: Hurts little more  Patient's Stated Pain Goal: 4  Pain Type: Chronic pain  Pain Location: Shoulder  Pain Orientation: Left  Pain Descriptors: Patient unable to describe  Pain Frequency: Intermittent(worse when trying to put immobilizer back on)    Prior Level of Function:  Social/Functional History  Lives With: Significant other  Type of Home: House  Home Layout: One level  Bathroom Equipment: Shower chair, Grab bars in shower  Home Equipment: [employer]  ADL Assistance: [address] [place] [address] Avenue: Independent  Homemaking Responsibilities: No  Ambulation Assistance: Independent(non-ambulatory, indep with w/c mobility)  Transfer Assistance: Independent  Additional Comments: Pt is an inconsistant and unreliable historian. Impulsive and difficulty to redirect.     OBJECTIVE:   Vision/Hearing:  Vision: Impaired  Vision Exceptions: Wears glasses for distance  Hearing: Within functional limits    Cognition:  Overall Orientation Status: Impaired  Orientation Level: Disoriented to place, Disoriented to person, Disoriented to time, Disoriented to situation  Follows Commands: Impaired(difficult to redirect; impulsive)    Observation/Palpation  Observation: confused, impulsive, taking off shoulder immobilizer despite multiple attempts at redirection    ROM:  RLE AROM: WFL  LLE General PROM: NT per protocol  LLE General AROM: NT per protocol    Strength:  Strength RLE  Strength RLE: WFL  Strength LLE  Strength LLE: WFL  Strength RUE  Strength RUE: WFL  Strength LUE  Comment: NT per protocol    Neuro:  Balance  Posture: Good  Sitting - Static: Good  Sitting - Dynamic: Good  Standing - Static: Fair  Standing - Dynamic: Fair;-     Motor Control  Gross Motor?: WFL(B LEs and R UE WFL)       Bed mobility  Supine to Sit: Minimal assistance  Sit to Supine: Minimal assistance  Comment: Max VC and tactile cues to maintain sx protocol for L shoulder. Pt with very poor carry over of restrictions    Transfers  Sit to Stand: Contact guard assistance  Stand to sit: Contact guard assistance  Bed to Chair: Contact guard assistance  Stand Pivot Transfers: Minimal Assistance  Comment: x 5 trials, Impulsive, Max VC and PT positioning for tactile cues to ensure pt does not use L LE. Increased time prior to  adjust immobilizer to prevent pt from removing. Ambulation  Ambulation?: Yes  Ambulation 1  Surface: level tile  Device: No Device  Assistance: Minimal assistance  Quality of Gait: wide GURJIT  Distance: 2ft  Comments: pt able to take pivoting sidesteps to reach bedside chair. Impulsive. poor redirection to safety. Activity Tolerance  Activity Tolerance: Patient limited by cognitive status;Treatment limited secondary to agitation          ASSESSMENT:   Body structures, Functions, Activity limitations: Decreased functional mobility ; Decreased strength;Decreased ADL status; Decreased ROM; Decreased balance; Increased Pain;Vestibular Impairment;Decreased high-level IADLs;Decreased safe awareness;Decreased cognition  Decision Making: High Complexity  History: high  Exam: high  Clinical Presentation: high    Prognosis: Fair(d/t cognitive status, non-adherance to immobilizer, and impulsiveness)  Patient Education: PT POC, call light, immobilizer wear, no ROM, falls risk  Barriers to Learning: congition/memory, impulsiveness    DISCHARGE RECOMMENDATIONS:  Discharge Recommendations: Continue to assess pending progress    Assessment: Pt demonstrates the above deficits and decline in functional mobility status placing them at increased risk for falls. Pt would benefit from physical therapy to address above deficits and allow for safe return home at highest level of function, decrease risk for falls, and improve QOL.     REQUIRES PT FOLLOW UP: Yes      PLAN OF CARE:  Plan  Times per week: 5-7 QD  Times per day: Daily  Current Treatment Recommendations: Strengthening, Functional Mobility Training, Wheelchair Mobility Training, Neuromuscular Re-education, Home Exercise Program, Balance Training, Manual Therapy - Soft Tissue Mobilization, Gait Training, Safety Education & Training, Modalities, Positioning, Patient/Caregiver Education & Training, Pain Management, Equipment Evaluation, Education, & procurement  Safety Devices  Type of devices: Telesitter in use, All fall risk precautions in place, Call light within reach, Left in chair, Nurse notified, Chair alarm in place    Goals:  Long term goals  Long term goal 1: bed mobility with mod I  Long term goal 2: functional transfers with mod I  Long term goal 3: standing 2 min with mod I and no LOB  Long term goal 4: adherance to shoulder immobilizer with Min VC    [facility] (6 CLICK) [address] Mobility Raw Score : 11     Therapy Time:   Individual   Time In 0848   Time Out 0927   Minutes 39   Timed Code Treatment Minutes: 23 Minutes bed mobility/transfers       Luca Fowler Diana Bhatia, PT, 05/18/19 at 10:34 AM

## 2019-05-18 NOTE — PROGRESS NOTES
[facility] OCCUPATIONAL THERAPY EVALUATION - ACUTE     Date: 2019  Patient Name: Sheng Mckinley        MRN: [id number]  Account: [de-identified]   : 1960  (64 y.o.)  Room: [place] [address]    Chart Review:  Diagnosis:  There were no encounter diagnoses. Past Medical History:   Diagnosis Date    Acid reflux     Allergic rhinitis     Anxiety     Arnold-Chiari deformity (HCC) 2000    surgery    Asthma     Cerebral artery occlusion with cerebral infarction ([facility] 2001    1 yr after brain OR    Cerebrovascular disease     Chronic back pain greater than 3 months duration     COPD (chronic obstructive pulmonary disease) (HCC)     [address] Emmanuel Brock [address] CVA (cerebral infarction)     left hemiparesis, due to ? estrogen + smoking    Depression     Dr Fang Henderson H/O encephalopathy 2001    Headache(784.0)     Dr Dedra Humphries Hepatitis B surface antigen positive     Hx of blood clots 2010    PE / trx with coumadin x 6 months    Hyperlipidemia LDL goal < 100     meds > 10 yrs    Hypertension     meds > 2 yrs    Hypothyroidism 2001    meds > 18 yrs    Laryngitis, chronic     scoped by Dr Lissette Bocanegra, fungal    Marijuana smoker in remission [facility])     Obesity (BMI 30-39. 9)     Osteoarthritis     Pulmonary embolism and infarction (HCC)     Restless legs syndrome     Rhinitis, chronic     Rotator cuff tear     Left    S/P colonoscopy with polypectomy     Dr Caprice Villanueva    Seizures [facility])     Smoker     Spinal headache     past partum     Spondylosis without myelopathy     Type 2 diabetes mellitus without complication (HCC)     hx > 6 yrs    Urinary incontinence     Vertebral compression fracture ([facility]      Past Surgical History:   Procedure Laterality Date    BACK SURGERY      lumbar kyphoplasty x 2    BRAIN SURGERY      due to Arnold-Chiari deformity / [facility]     SECTION      COLONOSCOPY      CRANIOTOMY  2000    Arnold-Chiary malformation    review. OBJECTIVE:     Orientation Status:  Orientation  Overall Orientation Status: Impaired(Name only )  Orientation Level: Oriented to person    Observation:  Observation/Palpation  Observation: confused, impulsive, sling donned LUE    Cognition Status:  Cognition  Overall Cognitive Status: Exceptions  Arousal/Alertness: Delayed responses to stimuli  Following Commands: Inconsistently follows commands  Attention Span: Difficulty attending to directions  Memory: Decreased short term memory, Decreased recall of precautions, Decreased recall of recent events  Safety Judgement: Decreased awareness of need for assistance, Decreased awareness of need for safety  Problem Solving: Assistance required to generate solutions, Assistance required to implement solutions, Assistance required to correct errors made, Decreased awareness of errors  Insights: Not aware of deficits  Initiation: Requires cues for all  Sequencing: Requires cues for all    Perception Status:  Perception  Overall Perceptual Status: WFL    Sensation Status:  Sensation  Overall Sensation Status: (pt did not answer )    Vision and Hearing Status:  Vision  Vision: Impaired  Vision Exceptions: Wears glasses for distance  Hearing  Hearing: Within functional limits     ROM:   LUE AROM (degrees)  LUE AROM : Exceptions  LUE General AROM: No arom shoulder, Pt not following commands   Left Hand AROM (degrees)  Left Hand AROM: WFL  RUE AROM (degrees)  RUE AROM : WFL  Right Hand AROM (degrees)  Right Hand AROM: WFL    Strength:  LUE Strength  L Hand Grasp: 3-/5  L Hand Release: 3-/5  LUE Strength Comment: NT LUE  RUE Strength  R Hand Grasp: 3+/5  R Hand Release: 3+/5  RUE Strength Comment: 3+/5     Coordination, Tone, Quality of Movement:    Tone RUE  RUE Tone: Normotonic  Tone LUE  LUE Tone: Not tested(pt in sling )  Coordination  Movements Are Fluid And Coordinated: No  Coordination and Movement description: Decreased speed, Decreased accuracy, Right UE, Left UE    Hand Dominance:  Hand Dominance  Hand Dominance: Right    ADL Status:  ADL  Feeding: Setup, Verbal cueing, Supervision  Grooming: Minimal assistance  UE Bathing: Increased time to complete, Verbal cueing, Moderate assistance  LE Bathing: Maximum assistance  UE Dressing: Maximum assistance  LE Dressing: Dependent/Total  Toileting: Maximum assistance  Additional Comments: Pt difficulty following commands. ADL's simulated bed side. Toilet Transfers  Toilet Transfer: Unable to assess       Functional Mobility:  Functional Mobility  Functional Mobility Comments: Unable to assess for safety   Transfers  Sit to stand: Unable to assess  Stand to sit: Unable to assess  Transfer Comments: pt unable to stand completely erect     Bed Mobility  Bed mobility  Supine to Sit: Dependent/Total  Sit to Supine: Moderate assistance  Comment: Verbal cues for technique and to continue     Seated and Standing Balance:  Balance  Sitting Balance: Stand by assistance  Standing Balance: Unable to assess(comment)(pt unble to stand erect, kept sitting down half-way through attmept )    Functional Endurance:  Activity Tolerance  Activity Tolerance: Treatment limited secondary to decreased cognition, Patient limited by pain    D/C Recommendations:    Equipment Recommendations:      OT Follow Up:  OT D/C RECOMMENDATIONS  REQUIRES OT FOLLOW UP: Yes       Assessment/Discharge Disposition:     Performance deficits / Impairments: Decreased functional mobility , Decreased ADL status, Decreased strength, Decreased endurance, Decreased high-level IADLs, Decreased balance, Decreased fine motor control, Decreased coordination, Decreased safe awareness, Decreased cognition  Prognosis: Fair  Discharge Recommendations: Continue to assess pending progress  History: Multi comorb   Exam: 10 perf imp   Assistance / Modification: Max A   Pt educated on NWB LUE and how to don/doff sling./ Pt with poor follow through.      Six Click Score   How much help for putting on and taking off regular lower body clothing?: Total  How much help for Bathing?: A Lot  How much help for Toileting?: A Lot  How much help for putting on and taking off regular upper body clothing?: A Lot  How much help for taking care of personal grooming?: A Little  How much help for eating meals?: A Little  AM-[facility] Inpatient Daily Activity Raw Score: 13  AM-[facility] Inpatient ADL T-Scale Score : 32.03  ADL Inpatient CMS 0-100% Score: 63.03    Plan:  Plan  Times per week: 1-3   Current Treatment Recommendations: Strengthening, Balance Training, Functional Mobility Training, Endurance Training, Cognitive Reorientation, Safety Education & Training, Pain Management, Equipment Evaluation, Education, & procurement, Patient/Caregiver Education & Training, Positioning, Self-Care / ADL, Cognitive/Perceptual Training    Goals:   Patient will:    - Improve functional endurance to tolerate/complete 30 mins of ADL's  - Be Mod A in UB ADLs   - Be Mod A in LB ADLs  - Be Min A in ADL transfers without LOB  - Be Mod A in toileting tasks  - Improve R UE strength and endurance to Good- in order to participate in self-care activities as projected. - Access appropriate D/C site with as few architectural barriers as possible. - Sequence self-care tasks with 2 or less verbal cues. Therapy Time:   OT Individual Minutes  Time In: 1115  Time Out: 1130  Minutes: 15    Electronically signed by:     HELEN Griffith  5/18/2019, 11:50 AM

## 2019-05-18 NOTE — PROGRESS NOTES
Hospitalist Progress Note      PCP: Jim Abad MD    Date of Admission: 5/17/2019    Chief Complaint:    No chief complaint on file. Subjective:  Patient denies fevers, chills, sweats, CP, SOB. 12 point ROS negative other than mentioned above     Medications:  Reviewed    Infusion Medications    sodium chloride 50 mL/hr at 05/17/19 1504    dextrose       Scheduled Medications    sodium chloride flush  10 mL Intravenous 2 times per day    docusate sodium  100 mg Oral BID    atorvastatin  40 mg Oral Daily    ARIPiprazole  10 mg Oral Daily    amLODIPine  5 mg Oral Daily    escitalopram  20 mg Oral Nightly    levothyroxine  300 mcg Oral Daily    pregabalin  150 mg Oral Daily    melatonin  5 mg Oral Nightly    metFORMIN  500 mg Oral BID WC    pantoprazole  40 mg Oral QAM AC    mometasone-formoterol  2 puff Inhalation BID    insulin lispro  0-12 Units Subcutaneous TID WC    insulin lispro  0-6 Units Subcutaneous Nightly     PRN Meds: sodium chloride flush, oxyCODONE-acetaminophen **OR** oxyCODONE-acetaminophen, morphine **OR** morphine, ondansetron, hydrALAZINE, glucose, dextrose, glucagon (rDNA), dextrose, ipratropium-albuterol    No intake or output data in the 24 hours ending 05/18/19 1258    Exam:    BP (!) 146/69   Pulse 116   Temp 98.6 °F (37 °C)   Resp 18   Ht 5' 2\" (1.575 m)   Wt 155 lb (70.3 kg)   LMP 06/13/1999   SpO2 99%   BMI 28.35 kg/m²     General appearance: No apparent distress, appears stated age and cooperative. HEENT:  Conjunctivae/corneas clear. Neck:  Trachea midline. Respiratory:  Normal respiratory effort. Clear to auscultation  Cardiovascular: Tachycardic  Abdomen: Soft, non-tender, non-distended with normal bowel sounds. Musculoskeletal: No clubbing, cyanosis or edema bilaterally.  Left arm in sling  Neuro: Non Focal.  Capillary Refill: Brisk,< 3 seconds   Peripheral Pulses: +2 palpable, equal bilaterally     Labs:   Recent Labs     05/18/19  0540   WBC 13.1*   HGB 10.4*   HCT 33.1*        Recent Labs     05/18/19  0540      K 3.9      CO2 25   BUN 15   CREATININE 0.66   CALCIUM 8.3*     No results for input(s): AST, ALT, BILIDIR, BILITOT, ALKPHOS in the last 72 hours. No results for input(s): INR in the last 72 hours. No results for input(s): Moose Relic in the last 72 hours. Urinalysis:      Lab Results   Component Value Date    NITRU Negative 05/07/2019    WBCUA 3-5 05/07/2019    WBCUA 20-50 11/01/2018    BACTERIA Negative 05/07/2019    RBCUA 0-2 05/07/2019    RBCUA 0-2 11/01/2018    BLOODU Negative 05/07/2019    SPECGRAV 1.010 05/07/2019    GLUCOSEU Negative 05/07/2019    GLUCOSEU NEG 12/12/2011     Radiology:  XR Shoulder Left 1 VW   Final Result      UNREMARKABLE RECENT TOTAL LEFT SHOULDER ARTHROPLASTY PLACEMENT. Assessment/Plan:    1. Left frozen shoulder status post reverse total arthroplasty: Immediate post op management per primary team  2. Hypertension: Continue Norvasc and hydralazine; holding diuretics for now  3. Hypothyroid:  TSH < 0.10; will consult endocrine and reduce synthroid dose from 300 to 150  4. COPD: Continue Dulera continue when necessary breathing treatments  5. GERD: Continue Protonix. 6. Diabetes: Continue Metformin, insulin sliding scale  7. Bipolar disorder: Continue Abilify, Lexapro, Lyrica. 8. DVT prophylaxis as per primary    Active Hospital Problems    Diagnosis Date Noted    Status post reverse total shoulder replacement, left [Z96.612] 05/17/2019        Additional work up or/and treatment plan may be added today or then after based on clinical progression. I am managing a portion of pt care. Some medical issues are handled by other specialists. Additional work up and treatment should be done in out pt setting by pt PCP and other out pt providers. In addition to examining and evaluating pt, I spent additional time explaining care, normal and abnormal findings, and treatment plan.  All of pt questions were answered. Counseling, diet and education were  provided. Case will be discussed with nursing staff when appropriate. Family will be updated if and when appropriate. Diet: DIET DYSPHAGIA I PUREED;  Carb Control: 4 carb choices (60 gms)/meal    Code Status: Full Code    PT/OT Eval     Electronically signed by Ronnie Guzman MD on 5/18/2019 at 12:58 PM

## 2019-05-18 NOTE — CONSULTS
Hospital Medicine  Consult Note    Patient:  Reid Goss  MRN: [id number]    Reason for Consult: Medical Management    History Obtained From:  patient, electronic medical record  Primary Care Physician: Talon De La Rosa MD    HISTORY OF PRESENT ILLNESS:   The patient is a 64 y.o. female admitted to Dr. Alma Delia Malik for left-sided reverse total shoulder arthroplasty which was performed today without complications. Patient is a poor historian and unable to list her medical history or problems information is further obtained from chart. As per [facility] the patient has a history of GERD, anxiety, Arnold GERD deformity, previous CVA with left hemiparesis, chronic back pain, COPD, depression, fibromyalgia, hypertension, hyperlipidemia, hypothyroid, restless leg syndrome, type 2 diabetes, previous PE, tobacco abuse, marijuana abuse, asthma, Hep B, Bipolar D/O. patient denies any chest pains palpitations shortness of breath or wheezing she denies any nausea or vomiting she denies any surgical pain. Patient plans to go to rehab following discharge. She is unable to really tell which rehab center she plans to go to.   She is currently tachycardic but otherwise vitals are stable    Past Medical History:      Diagnosis Date    Acid reflux     Allergic rhinitis     Anxiety     Arnold-Chiari deformity (HCC) 2000    surgery    Asthma     Cerebral artery occlusion with cerebral infarction ([facility] [address] 2001    1 yr after brain OR    Cerebrovascular disease     Chronic back pain greater than 3 months duration     COPD (chronic obstructive pulmonary disease) (HCC)     Dr Pedro Whitten at [facility] CVA (cerebral infarction) 2001    left hemiparesis, due to ? estrogen + smoking    Depression 1993    Dr Kyra Tafoya H/O encephalopathy 2001    Headache(784.0)     Dr Cosme Dhaliwal Hepatitis B surface antigen positive     Hx of blood clots 2010    PE / trx with coumadin x 6 months    Hyperlipidemia LDL goal < 100     meds > 10 yrs    tablet Take 1 tablet by mouth daily 5/6/19  Yes Zane Hills MD   omega-3 acid ethyl esters (LOVAZA) 1 g capsule Take 2 capsules by mouth 2 times daily 5/3/19  Yes Zane Hills MD   levocetirizine (XYZAL) 5 MG tablet TAKE 1 TABLET BY MOUTH DAILY 5/2/19  Yes Zane Hills MD   amLODIPine (NORVASC) 5 MG tablet Take 1 tablet by mouth daily 5/1/19  Yes Zane Hills MD   vitamin D (ERGOCALCIFEROL) 02279 units CAPS capsule Take 1 capsule by mouth every 7 days 4/25/19  Yes Zane Hills MD   triamcinolone (KENALOG) 0.1 % ointment APPLY EXTERNALLY TO THE AFFECTED AREA TWICE DAILY 4/22/19  Yes Zane Hills MD   ondansetron (ZOFRAN-ODT) 4 MG disintegrating tablet Take 1 tablet by mouth every 8 hours as needed for Nausea or Vomiting 4/15/19  Yes Zane Hills MD   levothyroxine (SYNTHROID) 300 MCG tablet TAKE 1 TABLET BY MOUTH EVERY MORNING 4/10/19  Yes Zane Hills MD   pramipexole (MIRAPEX) 0.5 MG tablet TAKE 1 TABLET BY MOUTH EVERY EVENING 4/9/19  Yes Zane Hills MD   guaiFENesin (MUCINEX) 600 MG extended release tablet Take 2 tablets by mouth 2 times daily 3/28/19  Yes Zane Hills MD   ibuprofen (ADVIL;MOTRIN) 800 MG tablet Take 1 tablet by mouth 4 times daily as needed for Pain 1/31/19  Yes Zane Hills MD   clotrimazole-betamethasone (LOTRISONE) 1-0.05 % cream Apply topically 2 times daily.  1/24/19  Yes Zane Hills MD   Melatonin-Lemon Balm  MG-MCG TABS Take 1 tablet by mouth nightly 11/13/18  Yes Historical Provider, MD   mupirocin (BACTROBAN) 2 % ointment CINDY EXT AA TID 9/28/18  Yes Historical Provider, MD   NYSTATIN 748637 UNIT/GM powder CINDY EXT AA ONCE D 1/11/19  Yes Historical Provider, MD   PAZEO 0.7 % SOLN  1/4/19  Yes Historical Provider, MD   SYMBICORT 160-4.5 MCG/ACT AERO INL 2 PFS PO BID UTD 1/15/19  Yes Historical Provider, MD   terazosin (HYTRIN) 2 MG capsule Take 1 capsule by mouth nightly 1/15/19  Yes Zane Hills MD   ascorbic acid (VITAMIN C) 500 MG tablet Take 1 tablet by mouth daily 1/15/19  Yes Sadaf Workman MD   atorvastatin (LIPITOR) 40 MG tablet TAKE 1 TABLET BY MOUTH DAILY 1/15/19  Yes Sadaf Workman MD   chlorzoxazone (PARAFON FORTE) 250 MG tablet Take 1 tablet by mouth 4 times daily as needed for Muscle spasms 12/3/18  Yes Sadaf Workman MD   Lactobacillus (ACIDOPHILUS) CAPS capsule TAKE 1 CAPSULE BY MOUTH ONCE DAILY 11/28/18  Yes Sadaf Workman MD   folic acid (FOLVITE) 1 MG tablet Take 1 tablet by mouth daily 11/28/18  Yes Sadaf Workman MD   LYRICA 150 MG capsule TAKE ONE CAPSULE BY MOUTH TWICE DAILY 11/15/18 5/17/19 Yes Sadaf Workman MD   Melatonin 10 MG TABS TAKE 1 TABLET BY MOUTH EVERY NIGHT 11/13/18  Yes Sadaf Workman MD   spironolactone (ALDACTONE) 25 MG tablet TAKE 1 TABLET BY MOUTH DAILY 10/9/18  Yes Sadaf Workman MD   potassium chloride (KLOR-CON M) 20 MEQ extended release tablet TAKE 1 TABLET BY MOUTH DAILY 10/8/18  Yes Sadaf Workman MD   Acetaminophen (APAP) 325 MG TABS Take 650 mg by mouth every 6 hours as needed for Pain 7/24/18  Yes Iftikhar Alvarez PA-C   vitamin B-12 (CYANOCOBALAMIN) 1000 MCG tablet Take 1 tablet by mouth daily 6/8/18  Yes Sadaf Workman MD   metFORMIN (GLUCOPHAGE) 500 MG tablet Take 1 tablet by mouth 2 times daily (with meals) 6/6/18  Yes Sadaf Workman MD   magnesium oxide (MAG-OX) 400 (240 Mg) MG tablet Take 1 tablet by mouth daily 5/29/18  Yes Sadaf Workman MD   Simethicone 180 MG CAPS TAKE 1 CAPSULE BY MOUTH TWICE DAILY 3/19/18  Yes Sadaf Workman MD   escitalopram (LEXAPRO) 20 MG tablet one tab at night 10/17/17  Yes Historical Provider, MD   ARIPiprazole (ABILIFY) 10 MG tablet take 1 tab q night 10/17/17  Yes Historical Provider, MD   loperamide (IMODIUM A-D) 2 MG tablet Take 2 mg by mouth as needed for Diarrhea  1/18/16  Yes Historical Provider, MD   carisoprodol (SOMA) 350 MG tablet Take 1 tablet by mouth daily as needed (migraine) 5/30/17  Yes Sadaf Workman MD   mirtazapine (REMERON) 30 MG tablet Take 30 mg by mouth nightly.      Yes Historical Provider, MD   acetaminophen (APAP EXTRA STRENGTH) 500 MG tablet Take 2 tablets by mouth every 6 hours as needed for Pain 3/28/19   Dominick Sales MD   ONE TOUCH ULTRA TEST strip Test blood glucose TID. Dx: DM2 2/28/19   Dominick Sales MD   lidocaine viscous (XYLOCAINE) 2 % solution Take 1 mL by mouth as needed for Irritation or Dental Pain (apply to gum with q-tip) 2/14/19   Dominick Sales MD       Allergies:  Latex; Imitrex [sumatriptan]; Zomig [zolmitriptan]; Antivert [meclizine hcl]; Flagyl [metronidazole]; Ketorolac tromethamine; Provera [medroxyprogesterone acetate]; Ultram [tramadol hcl]; Bacitracin; Bactrim; Cefdinir; Cefuroxime axetil; Codeine; Cyclosporine; Iodine; Iv dye [iodides]; Neomycin; Petroleum jelly [skin protectants, misc.]; Quinolones; Sulfa antibiotics; Toradol [ketorolac tromethamine]; and Cathalene Suarez [trovafloxacin]    Social History:   TOBACCO:   reports that she has been smoking cigarettes. She has a 32.00 pack-year smoking history. She has never used smokeless tobacco.  ETOH:   reports that she does not drink alcohol. OCCUPATION:  none    Family History:       Problem Relation Age of Onset    Osteoarthritis Mother     Asthma Mother     Diabetes Mother     Hypertension Mother     Cancer Mother         lung    Osteoarthritis Father     Hypertension Father     Other Father         PE    Cancer Father         stomach cancer    Asthma Brother     Heart Attack Brother     Other Brother         overdose    Asthma Sister     Breast Cancer Sister     Hypertension Sister     Other Sister         overdose / MVA       REVIEW OF SYSTEMS:  Ten systems reviewed and negative except for as above.      Physical Exam:    Vitals: BP (!) 140/106   Pulse 132   Temp 98.2 °F (36.8 °C) (Temporal)   Resp 17   Ht 5' 2\" (1.575 m)   Wt 155 lb (70.3 kg)   LMP 06/13/1999   SpO2 92%   BMI 28.35 kg/m²   Constitutional: alert, appears stated age and cooperative   Skin: Skin color, texture, turgor normal. No rashes or lesions  Eyes:Eye: Normal external eye, conjunctiva, EZRA. ENT: Head: Normocephalic, no lesions, without obvious abnormality. Neck: no adenopathy, no carotid bruit, no JVD, supple, symmetrical, trachea midline and thyroid not enlarged, symmetric, no tenderness/mass/nodules  Respiratory: clear to auscultation bilaterally  Cardiovascular: regular rate and rhythm, S1, S2 normal, no murmur, click, rub or gallop  Gastrointestinal: soft, non-tender; bowel sounds normal; no masses,  no organomegaly  Genitourinary: Deferred  Musculoskeletal: Left upper extremity with severe muscle atrophy incision clean and dry dressed arm in sling intact digital range of motion and strength  Neurologic: Mental status AAOx3 No facial asymmetry or droop. Normal muscle strength b/l. Psychiatric: odd mood and affect. Poor insight and judgement  Hematologic: No obvious bruising or bleeding    ABGs:   Lab Results   Component Value Date    PHART 7.458 12/22/2015    PO2ART 73 12/22/2015    BRB4GDR 44 12/22/2015     INR: No results for input(s): INR in the last 72 hours. URINALYSIS:No results for input(s): NITRITE, COLORU, PHUR, LABCAST, WBCUA, RBCUA, MUCUS, TRICHOMONAS, YEAST, BACTERIA, CLARITYU, SPECGRAV, LEUKOCYTESUR, UROBILINOGEN, BILIRUBINUR, BLOODU, GLUCOSEU, AMORPHOUS in the last 72 hours. Invalid input(s): [place] Getting  -----------------------------------------------------------------   No results found. Assessment and Plan   1. Left frozen shoulder status post reverse total arthroplasty: Pain control DVT prophylaxis range of motion and therapy recommendations as per primary. 2. Hypertension: Continue Norvasc and hydralazine IV when necessary hold diuretics for management until hydration is completed  3. Hypothyroid continue Synthroid check a TSH is 300 µg daily his very large dose and likely appropriate  4. COPD: Continue Dulera continue when necessary breathing treatments  5. GERD: Continue Protonix.   6. Diabetes: Metformin, insulin sliding scale, A1c  7. Bipolar disorder: Continue Abilify, Lexapro, Lyrica. Consult to psychiatry if prolonged hospitalization or if rehab here to adjust medications as needed  8. DVT prophylaxis as per primary    Patient Active Problem List   Diagnosis Code    Hypothyroidism E03.9    Anxiety F41.9    COPD (chronic obstructive pulmonary disease) ([facility] [address] J44.9    Smoker F17.200    Cerebral infarction ([facility] [address] I63.9    Arnold-Chiari deformity (HCC) Q07.00    Headache R51    Hyperlipidemia with target LDL less than 100 E78.5    Pulmonary embolism and infarction (HCC) I26.99    Laryngitis, chronic J37.0    Rhinitis, chronic J31.0    Depression F32.9    Acid reflux K21.9    H/O encephalopathy Z86.69    Hepatitis B surface antigen positive R76.8    S/P colonoscopy with polypectomy Z98.890    Hemiparesis affecting left side as late effect of cerebrovascular accident [facility]) S81.698    Migraine without status migrainosus, not intractable G43.909    Seasonal allergic rhinitis due to pollen J30.1    Edema R60.9    Muscle spasm M62.838    Adhesive capsulitis of left shoulder M75.02    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 116    Cervical dystonia G24.3    Cervicalgia M54.2    Chronic maxillary sinusitis J32.0    Chronic periodontitis, generalized K05.329    Chronic retention of urine R33.9    Compression of brain (HCC) G93.5    Dental caries extending into pulp K02.9    Diabetes mellitus, type II (HCC) E11.9    Drug-seeking behavior Z76.5    Dysphonia R49.0    Essential hypertension I10    Fracture of lumbar vertebra ([facility] [address] S32.009A    H/O: CVA (cerebrovascular accident) Z80.78    Hearing difficulty H91.90    History of HPV infection Z86.19    Hypothyroidism due to Hashimoto's thyroiditis E03.8, E06.3    Hypoxic brain injury ([facility] [address] G93.1    Late effects of CVA (cerebrovascular accident) I69.90    Neurogenic bladder N31.9    Nonallopathic lesion of cervical region, not elsewhere classified M99.81    Nonallopathic lesion of sacral region M99.9    Peripheral vascular disease (HCC) I73.9    Post-laminectomy syndrome M96.1    Primary osteoarthritis of left shoulder M19.012    Pulmonary embolism (HCC) I26.99    Pulmonary embolism with infarction ([address] I26.99    Recurrent UTI N39.0    Retention of urine R33.9    Trochanteric bursitis of left hip M70.62    Vitamin D deficiency E55.9    High risk medication use - 11/01/17 OARRS PM&R, 11/13/17 Tox Screen: positive marijuana PM&R Z79.899    Marijuana use F12.90    Chronic midline thoracic back pain M54.6, G89.29    Vertebral compression fracture (HCC) M48.50XA    Closed wedge compression fracture of first lumbar vertebra with delayed healing S32.010G    DJD of left shoulder M19.012    Status post total shoulder replacement, right Z96.611    Status post total shoulder replacement, left Z96.612    Decubitus ulcer of left ischial area L89.329    Decubitus ulcer of sacral area L89.159    Weakness R53.1    Left shoulder pain M25.512    Status post reverse total shoulder replacement, left V11.140       Billy Mendoza MD  Admitting Hospitalist    Emergency Contact:

## 2019-05-18 NOTE — PROGRESS NOTES
[facility] EMERGENCY [facility] AT [place] Respiratory Therapy Evaluation   Current Order:  Larry Yeh Q8     Home Regimen: None     Ordering Physician: Julius Monroy  Re-evaluation Date:  ---    Diagnosis: Post Op     Patient Status: Stable / Unstable + Physician notified    The following MDI Criteria must be met in order to convert aerosol to MDI with spacer. If unable to meet, MDI will be converted to aerosol:  []  Patient able to demonstrate the ability to use MDI effectively  []  Patient alert and cooperative  []  Patient able to take deep breath with 5-10 second hold  []  Medication(s) available in this delivery method   []  Peak flow greater than or equal to 200 ml/min            Current Order Substituted To  (same drug, same frequency)   Aerosol to MDI [] Albuterol Sulfate 0.083% unit dose by aerosol Albuterol Sulfate MDI 2 puffs by inhalation with spacer    [] Levalbuterol 1.25 mg unit dose by aerosol Levalbuterol MDI 2 puffs by inhalation with spacer    [] Levalbuterol 0.63 mg unit dose by aerosol Levalbuterol MDI 2 puffs by inhalation with spacer    [] Ipratropium Bromide 0.02% unit dose by aerosol Ipratropium Bromide MDI 2 puffs by inhalation with spacer    [] Duoneb (Ipratropium + Albuterol) unit dose by aerosol Ipratropium MDI + Albuterol MDI 2 puffs by inhalation w/spacer   MDI to Aerosol [] Albuterol Sulfate MDI Albuterol Sulfate 0.083% unit dose by aerosol    [] Levalbuterol MDI 2 puffs by inhalation Levalbuterol 1.25 mg unit dose by aerosol    [] Ipratropium Bromide MDI by inhalation Ipratropium Bromide 0.02% unit dose by aerosol    [] Combivent (Ipratropium + Albuterol) MDI by inhalation Duoneb (Ipratropium + Albuterol) unit dose by aerosol   Treatment Assessment [Frequency/Schedule]:  Change frequency to: ________Duoneb Q4 PRN__________________________________________per Protocol, P&T, MEC      Points 0 1 2 3 4   Pulmonary Status  Non-Smoker  []   Smoking history   < 20 pack years  []   Smoking history  ?  20 pack years  []   Pulmonary Disorder  (acute or chronic)  [x]   Severe or Chronic w/ Exacerbation  []     Surgical Status No []   Surgeries     General [x]   Surgery Lower []   Abdominal Thoracic or []   Upper Abdominal Thoracic with  PulmonaryDisorder  []     Chest X-ray Clear/Not  Ordered     [x]  Chronic Changes  Results Pending  []  Infiltrates, atelectasis, pleural effusion, or edema  []  Infiltrates in more than one lobe []  Infiltrate + Atelectasis, &/or pleural effusion  []    Respiratory Pattern Regular,  RR = 12-20 [x]  Increased,  RR = 21-25 []  AIKEN, irregular,  or RR = 26-30 []  Decreased FEV1  or RR = 31-35 []  Severe SOB, use  of accessory muscles, or RR ? 35  []    Mental Status Alert, oriented,  Cooperative [x]  Confused but Follows commands []  Lethargic or unable to follow commands []  Obtunded  []  Comatose  []    Breath Sounds Clear to  auscultation  [x]  Decreased unilaterally or  in bases only []  Decreased  bilaterally  []  Crackles or intermittent wheezes []  Wheezes []    Cough Strong, Spontan., & nonproductive [x]  Strong,  spontaneous, &  productive []  Weak,  Nonproductive []  Weak, productive or  with wheezes []  No spontaneous  cough or may require suctioning []    Level of Activity Ambulatory []  Ambulatory w/ Assist  [x]  Non-ambulatory []  Paraplegic []  Quadriplegic []    Total    Score:___4____     Triage Score:___5_____      Tri       Triage:     1. (>20) Freq: Q3    2. (16-20) Freq: Q4   3. (11-15) Freq: QID & Albuterol Q2 PRN    4. (6-10) Freq: TID & Albuterol Q2 PRN    5. (0-5) Freq Q4prn

## 2019-05-19 LAB
GLUCOSE BLD-MCNC: 100 MG/DL (ref 60–115)
GLUCOSE BLD-MCNC: 104 MG/DL (ref 60–115)
GLUCOSE BLD-MCNC: 111 MG/DL (ref 60–115)
GLUCOSE BLD-MCNC: 120 MG/DL (ref 60–115)
PERFORMED ON: ABNORMAL
PERFORMED ON: NORMAL

## 2019-05-19 PROCEDURE — 6370000000 HC RX 637 (ALT 250 FOR IP): Performed by: INTERNAL MEDICINE

## 2019-05-19 PROCEDURE — 2580000003 HC RX 258: Performed by: ORTHOPAEDIC SURGERY

## 2019-05-19 PROCEDURE — 1210000000 HC MED SURG R&B

## 2019-05-19 PROCEDURE — 6370000000 HC RX 637 (ALT 250 FOR IP): Performed by: ORTHOPAEDIC SURGERY

## 2019-05-19 PROCEDURE — 97116 GAIT TRAINING THERAPY: CPT

## 2019-05-19 PROCEDURE — 6370000000 HC RX 637 (ALT 250 FOR IP): Performed by: NURSE PRACTITIONER

## 2019-05-19 PROCEDURE — 94760 N-INVAS EAR/PLS OXIMETRY 1: CPT

## 2019-05-19 PROCEDURE — 2700000000 HC OXYGEN THERAPY PER DAY

## 2019-05-19 PROCEDURE — 94640 AIRWAY INHALATION TREATMENT: CPT

## 2019-05-19 RX ORDER — OXYCODONE HYDROCHLORIDE 5 MG/1
2.5 TABLET ORAL EVERY 4 HOURS PRN
Status: DISCONTINUED | OUTPATIENT
Start: 2019-05-19 | End: 2019-05-21 | Stop reason: HOSPADM

## 2019-05-19 RX ADMIN — PREGABALIN 150 MG: 150 CAPSULE ORAL at 08:58

## 2019-05-19 RX ADMIN — OXYCODONE HYDROCHLORIDE AND ACETAMINOPHEN 1 TABLET: 5; 325 TABLET ORAL at 01:24

## 2019-05-19 RX ADMIN — METFORMIN HYDROCHLORIDE 500 MG: 500 TABLET ORAL at 08:58

## 2019-05-19 RX ADMIN — PANTOPRAZOLE SODIUM 40 MG: 40 TABLET, DELAYED RELEASE ORAL at 07:30

## 2019-05-19 RX ADMIN — LEVOTHYROXINE SODIUM 100 MCG: 100 TABLET ORAL at 07:30

## 2019-05-19 RX ADMIN — Medication 10 ML: at 09:02

## 2019-05-19 RX ADMIN — MOMETASONE FUROATE AND FORMOTEROL FUMARATE DIHYDRATE 2 PUFF: 100; 5 AEROSOL RESPIRATORY (INHALATION) at 19:58

## 2019-05-19 RX ADMIN — MOMETASONE FUROATE AND FORMOTEROL FUMARATE DIHYDRATE 2 PUFF: 100; 5 AEROSOL RESPIRATORY (INHALATION) at 07:27

## 2019-05-19 RX ADMIN — OXYCODONE HYDROCHLORIDE 2.5 MG: 5 TABLET ORAL at 21:57

## 2019-05-19 RX ADMIN — ARIPIPRAZOLE 10 MG: 10 TABLET ORAL at 08:58

## 2019-05-19 RX ADMIN — Medication 10 ML: at 21:57

## 2019-05-19 RX ADMIN — DOCUSATE SODIUM 100 MG: 100 CAPSULE, LIQUID FILLED ORAL at 21:57

## 2019-05-19 RX ADMIN — AMLODIPINE BESYLATE 5 MG: 5 TABLET ORAL at 08:58

## 2019-05-19 RX ADMIN — Medication 5 MG: at 21:57

## 2019-05-19 RX ADMIN — ESCITALOPRAM OXALATE 20 MG: 20 TABLET ORAL at 21:57

## 2019-05-19 RX ADMIN — ATORVASTATIN CALCIUM 40 MG: 40 TABLET, FILM COATED ORAL at 21:57

## 2019-05-19 ASSESSMENT — PAIN DESCRIPTION - ONSET: ONSET: GRADUAL

## 2019-05-19 ASSESSMENT — PAIN DESCRIPTION - DESCRIPTORS
DESCRIPTORS: SORE
DESCRIPTORS: HEADACHE;SORE
DESCRIPTORS: SORE

## 2019-05-19 ASSESSMENT — PAIN SCALES - GENERAL
PAINLEVEL_OUTOF10: 8
PAINLEVEL_OUTOF10: 10
PAINLEVEL_OUTOF10: 6

## 2019-05-19 ASSESSMENT — PAIN DESCRIPTION - LOCATION
LOCATION: HEAD;SHOULDER
LOCATION: SHOULDER
LOCATION: SHOULDER

## 2019-05-19 ASSESSMENT — PAIN DESCRIPTION - PAIN TYPE
TYPE: SURGICAL PAIN
TYPE: ACUTE PAIN;SURGICAL PAIN

## 2019-05-19 ASSESSMENT — PAIN DESCRIPTION - FREQUENCY: FREQUENCY: INTERMITTENT

## 2019-05-19 ASSESSMENT — PAIN DESCRIPTION - ORIENTATION
ORIENTATION: LEFT

## 2019-05-19 ASSESSMENT — PAIN DESCRIPTION - PROGRESSION
CLINICAL_PROGRESSION: GRADUALLY WORSENING
CLINICAL_PROGRESSION: GRADUALLY WORSENING

## 2019-05-19 NOTE — CONSULTS
[facility] [address] [place] [address], [id number] [facility]                                  CONSULTATION    PATIENT NAME: Kendy Cornejo                  :        1960  MED REC NO:   [id number]                            ROOM:       [id number]  ACCOUNT NO:   [de-identified]                           ADMIT DATE: 2019  PROVIDER:     Regi Uriarte MD    CONSULT DATE:  2019    REASON FOR CONSULT:  Management of hypothyroidism. CHIEF COMPLAINT AND HISTORY OF PRESENT ILLNESS:  The patient is a  61-year-old female admitted here for left shoulder replacement. She had  shoulder replacement in 2018 after that she has had falls on it  resulting in tear of her subscapularis muscle with complete loss of  range of motion. The patient has a history of hypothyroidism status  post radioactive iodine ablation of Grave's disease. Her TSH was  suppressed here with elevation of her free T4. TSH was suppressed at  less than 0.01, free T4 was 2.27. Prior TSH was also suppressed in  2019. Last normal TSH was in 2018. The patient is on Synthroid  150 mcg daily. I reviewed records from [facility]. She was on  200 mcg of levothyroxine. The patient at this time denies any heart  palpitations, tremors, has multiple other comorbid conditions including  CVA, history of fibromyalgia, hypertension, and osteoarthritis. PAST MEDICAL HISTORY:  Significant for hypothyroidism, history of type 2  diabetes, rotator cuff tear, hypertension, hypercholesterolemia,  depression, CVA, COPD, asthma, and anxiety. PAST SURGICAL HISTORY:  Upper GI endoscopy, tonsillectomy, spine  surgery, femoral fracture surgery and colonoscopy, , _____  surgery. FAMILY HISTORY:  Osteoarthritis, diabetes, hypertension, and cancer. PERSONAL SOCIAL HISTORY:  Currently does smoke cigarettes. Denies any  alcohol.     ALLERGIES:  Include IMITREX, ZOMIG, ANTIVERT, FLAGYL, KETOROLAC,

## 2019-05-19 NOTE — PROGRESS NOTES
left hip 12/22/2016    Chronic maxillary sinusitis 11/11/2016    Cellulitis of left lower extremity 11/01/2016    Cervical dystonia 06/03/2016    Hypothyroidism due to Hashimoto's thyroiditis 04/19/2016    Essential hypertension 03/07/2016    Late effects of CVA (cerebrovascular accident) 04/07/2015    Hearing difficulty 11/04/2013    History of HPV infection 08/20/2013    Hemiparesis affecting left side as late effect of cerebrovascular accident ([address] 02/19/2013    Dysphonia 02/05/2013    COPD (chronic obstructive pulmonary disease) ([address]     Smoker     Cerebral infarction (HCC)     Arnold-Chiari deformity (HCC)     Headache     Hyperlipidemia with target LDL less than 100     Pulmonary embolism and infarction (HCC)     Laryngitis, chronic     Rhinitis, chronic     Depression     Acid reflux     H/O encephalopathy     Hepatitis B surface antigen positive     S/P colonoscopy with polypectomy     Recurrent UTI 08/15/2012    Chronic retention of urine 06/08/2012    Hypothyroidism 10/07/2011    Anxiety 10/07/2011    Nonallopathic lesion of sacral region 09/14/2011    Nonallopathic lesion of cervical region, not elsewhere classified 08/12/2011    Drug-seeking behavior 01/10/2011    Bone necrosis ([address] 10/29/2010    Chronic periodontitis, generalized 09/24/2010    Alveolitis of jaw 07/09/2010    Dental caries extending into pulp 06/25/2010    Pulmonary embolism ([address] 06/21/2010    Retention of urine 01/22/2010    Neurogenic bladder 01/05/2010    Vitamin D deficiency 11/17/2009    Airway hyperreactivity 09/16/2009    Cervicalgia 09/11/2009    Post-laminectomy syndrome 08/24/2000        Past Medical History:   Diagnosis Date    Acid reflux     Allergic rhinitis     Anxiety     Arnold-Chiari deformity ([address] 2000    surgery    Asthma     Cerebral artery occlusion with cerebral infarction ([address] 2001    1 yr after brain OR    Cerebrovascular disease     Chronic back pain greater than 3 months duration     COPD (chronic obstructive pulmonary disease) (HCC)     Dr Bruna Mcmullen at [address] CVA (cerebral infarction)     left hemiparesis, due to ? estrogen + smoking    Depression     Dr Angely Murcia H/O encephalopathy 2001    Headache(784.0)     Dr Carter Trinidad Hepatitis B surface antigen positive     Hx of blood clots     PE / trx with coumadin x 6 months    Hyperlipidemia LDL goal < 100     meds > 10 yrs    Hypertension     meds > 2 yrs    Hypothyroidism 2001    meds > 18 yrs    Laryngitis, chronic 2012    scoped by Dr Filippo Burgos, fungal    Marijuana smoker in remission [facility])     Obesity (BMI 30-39. 9)     Osteoarthritis     Pulmonary embolism and infarction (HCC)     Restless legs syndrome     Rhinitis, chronic     Rotator cuff tear     Left    S/P colonoscopy with polypectomy     Dr Jorge Alberto Mitchell    Seizures [facility])    [address] Spinal headache     past partum     Spondylosis without myelopathy     Type 2 diabetes mellitus without complication (HCC)     hx > 6 yrs    Urinary incontinence     Vertebral compression fracture ([address]      Past Surgical History:   Procedure Laterality Date    BACK SURGERY      lumbar kyphoplasty x 2    BRAIN SURGERY      due to Arnold-Chiari deformity / [facility]     SECTION      COLONOSCOPY      CRANIOTOMY      Arnold-Chiary malformation    ENDOSCOPY, COLON, DIAGNOSTIC      FEMUR FRACTURE SURGERY Left     MT RECONSTR TOTAL SHOULDER IMPLANT Left 6/15/2018    LEFT TOTAL SHOULDER ARTHROPLASTY, SANDY BIOMET TSA, SCALENE NERVE BLOCK, (LATEX ALLERGY) performed by Joaquín Matson MD at [address] [facility]      lumbar kyphoplasty    [facility] ENDOSCOPY  8/5/15    w/bx          Restrictions  Restrictions/Precautions: Weight Bearing, Fall Risk, ROM Restrictions  Upper Extremity Weight Bearing Restrictions  Left Upper Extremity Weight Bearing: Non Weight Bearing  Position Activity Restriction  Other position/activity restrictions: OK AROM to elbow, hand, wrist; No pendulums; assist with transfers into w/c. Jo Ann sys in room    Subjective   General  Chart Reviewed: Yes  Subjective  Subjective: Pt reports \"not too much pain\" however rates pain 10/10. Pre Treatment Pain Screening  Intervention List: Nurse/physician notified    Pain Screening  Patient Currently in Pain: Yes     Pain Reassessment:   Pain Assessment  Pain Level: 10  Pain Location: Shoulder  Pain Orientation: Left  Pain Descriptors: Sore       Orientation  Orientation  Overall Orientation Status: Within Functional Limits    Objective   Bed mobility  Supine to Sit: Minimal assistance    Transfers  Sit to Stand: Contact guard assistance  Stand to sit: Contact guard assistance  Comment: Decreased eccentric control with stand to sit     Ambulation  Ambulation?: Yes  Ambulation 1  Surface: level tile  Device: Hand-Held Assist  Quality of Gait: Shuffling gait, wide GURJIT with HHA to steady  Distance: 3ft to chair, 4ft fwd and 4ft retro    Neuromuscular Education  Neuromuscular Comments: Static stand with unsteadiness without UE support     Exercises  Hip Flexion: x10  Knee Long Arc Quad: x10  Ankle Pumps: x10                     Assessment    Post-Pain  Pain rating (0-10 scale): 10/10  Location and pain description same as pre-treatment unless indicated. Action: [] N/A  [x] Nursing notified  [] Pt declined intervention    Body structures, Functions, Activity limitations: Decreased functional mobility ; Decreased strength;Decreased ADL status; Decreased ROM; Decreased balance; Increased Pain;Vestibular Impairment;Decreased high-level IADLs;Decreased safe awareness;Decreased cognition  Assessment: Pt able to increase ambulatory distance with HHA to steady. Demos decreased Marcio foot clearance with gait and decreased control with transfers.  Sling properly placed back on as pt had shoulder strap off while

## 2019-05-19 NOTE — PROGRESS NOTES
Somewhat better today, still sore    avss    Wound clear  NVI    OOB with PT.   Sling aat except showers  D/c when stable, f/u with DARLENE Schneider 2 weeks

## 2019-05-19 NOTE — PROGRESS NOTES
05/18/19  0540   WBC 13.1*   HGB 10.4*   HCT 33.1*        Recent Labs     05/18/19  0540      K 3.9      CO2 25   BUN 15   CREATININE 0.66   CALCIUM 8.3*     No results for input(s): AST, ALT, BILIDIR, BILITOT, ALKPHOS in the last 72 hours. No results for input(s): INR in the last 72 hours. No results for input(s): Eyvonne Ape in the last 72 hours. Urinalysis:      Lab Results   Component Value Date    NITRU Negative 05/07/2019    WBCUA 3-5 05/07/2019    WBCUA 20-50 11/01/2018    BACTERIA Negative 05/07/2019    RBCUA 0-2 05/07/2019    RBCUA 0-2 11/01/2018    BLOODU Negative 05/07/2019    SPECGRAV 1.010 05/07/2019    GLUCOSEU Negative 05/07/2019    GLUCOSEU NEG 12/12/2011     Radiology:  XR Shoulder Left 1 VW   Final Result      UNREMARKABLE RECENT TOTAL LEFT SHOULDER ARTHROPLASTY PLACEMENT. Assessment/Plan:    1. Left frozen shoulder status post reverse total arthroplasty: Immediate post op management per primary team  2. Hypertension: Continue Norvasc and hydralazine; resume diuretics tomorrow   3. Hypothyroid:  Consulted endocrine; synthroid reduced to 100  4. COPD: Continue Dulera continue when necessary breathing treatments  5. GERD: Continue Protonix. 6. Diabetes: Continue Metformin, insulin sliding scale  7. Bipolar disorder: Continue Abilify, Lexapro, Lyrica. 8. DVT prophylaxis as per primary    Active Hospital Problems    Diagnosis Date Noted    Status post reverse total shoulder replacement, left [Z96.612] 05/17/2019        Additional work up or/and treatment plan may be added today or then after based on clinical progression. I am managing a portion of pt care. Some medical issues are handled by other specialists. Additional work up and treatment should be done in out pt setting by pt PCP and other out pt providers. In addition to examining and evaluating pt, I spent additional time explaining care, normal and abnormal findings, and treatment plan.  All of pt questions were answered. Counseling, diet and education were  provided. Case will be discussed with nursing staff when appropriate. Family will be updated if and when appropriate. Diet: DIET DYSPHAGIA I PUREED;  Carb Control: 4 carb choices (60 gms)/meal    Code Status: Full Code    PT/OT Eval     Electronically signed by Abel Fajardo MD on 5/19/2019 at 2:14 PM

## 2019-05-20 LAB
ANAEROBIC CULTURE: NORMAL
CULTURE SURGICAL: NORMAL
GLUCOSE BLD-MCNC: 103 MG/DL (ref 60–115)
GLUCOSE BLD-MCNC: 110 MG/DL (ref 60–115)
GLUCOSE BLD-MCNC: 127 MG/DL (ref 60–115)
GLUCOSE BLD-MCNC: 94 MG/DL (ref 60–115)
GRAM STAIN RESULT: NORMAL
PERFORMED ON: ABNORMAL
PERFORMED ON: NORMAL

## 2019-05-20 PROCEDURE — 6370000000 HC RX 637 (ALT 250 FOR IP): Performed by: INTERNAL MEDICINE

## 2019-05-20 PROCEDURE — 2580000003 HC RX 258: Performed by: ORTHOPAEDIC SURGERY

## 2019-05-20 PROCEDURE — 6370000000 HC RX 637 (ALT 250 FOR IP): Performed by: NURSE PRACTITIONER

## 2019-05-20 PROCEDURE — 97116 GAIT TRAINING THERAPY: CPT

## 2019-05-20 PROCEDURE — 94640 AIRWAY INHALATION TREATMENT: CPT

## 2019-05-20 PROCEDURE — 1210000000 HC MED SURG R&B

## 2019-05-20 PROCEDURE — 6370000000 HC RX 637 (ALT 250 FOR IP): Performed by: ORTHOPAEDIC SURGERY

## 2019-05-20 RX ORDER — PSEUDOEPHEDRINE HCL 30 MG
100 TABLET ORAL 2 TIMES DAILY
Qty: 60 CAPSULE | Refills: 0
Start: 2019-05-20 | End: 2019-06-19

## 2019-05-20 RX ORDER — LORAZEPAM 0.5 MG/1
TABLET ORAL
Qty: 10 TABLET | Refills: 0 | Status: SHIPPED | OUTPATIENT
Start: 2019-05-20 | End: 2019-07-20

## 2019-05-20 RX ORDER — LEVOTHYROXINE SODIUM 0.1 MG/1
100 TABLET ORAL DAILY
Qty: 30 TABLET | Refills: 3 | Status: SHIPPED | OUTPATIENT
Start: 2019-05-21 | End: 2019-07-31 | Stop reason: CLARIF

## 2019-05-20 RX ORDER — OXYCODONE HYDROCHLORIDE 5 MG/1
2.5 TABLET ORAL EVERY 4 HOURS PRN
Qty: 30 TABLET | Refills: 0 | Status: SHIPPED | OUTPATIENT
Start: 2019-05-20 | End: 2019-05-27

## 2019-05-20 RX ADMIN — Medication 10 ML: at 08:43

## 2019-05-20 RX ADMIN — MOMETASONE FUROATE AND FORMOTEROL FUMARATE DIHYDRATE 2 PUFF: 100; 5 AEROSOL RESPIRATORY (INHALATION) at 07:52

## 2019-05-20 RX ADMIN — DOCUSATE SODIUM 100 MG: 100 CAPSULE, LIQUID FILLED ORAL at 08:37

## 2019-05-20 RX ADMIN — OXYCODONE HYDROCHLORIDE 2.5 MG: 5 TABLET ORAL at 21:41

## 2019-05-20 RX ADMIN — LEVOTHYROXINE SODIUM 100 MCG: 100 TABLET ORAL at 06:20

## 2019-05-20 RX ADMIN — AMLODIPINE BESYLATE 5 MG: 5 TABLET ORAL at 08:37

## 2019-05-20 RX ADMIN — METFORMIN HYDROCHLORIDE 500 MG: 500 TABLET ORAL at 17:36

## 2019-05-20 RX ADMIN — PREGABALIN 150 MG: 150 CAPSULE ORAL at 08:37

## 2019-05-20 RX ADMIN — ATORVASTATIN CALCIUM 40 MG: 40 TABLET, FILM COATED ORAL at 21:42

## 2019-05-20 RX ADMIN — PANTOPRAZOLE SODIUM 40 MG: 40 TABLET, DELAYED RELEASE ORAL at 06:20

## 2019-05-20 RX ADMIN — OXYCODONE HYDROCHLORIDE 2.5 MG: 5 TABLET ORAL at 13:09

## 2019-05-20 RX ADMIN — ARIPIPRAZOLE 10 MG: 10 TABLET ORAL at 08:37

## 2019-05-20 RX ADMIN — Medication 5 MG: at 21:41

## 2019-05-20 RX ADMIN — OXYCODONE HYDROCHLORIDE 2.5 MG: 5 TABLET ORAL at 08:37

## 2019-05-20 RX ADMIN — METFORMIN HYDROCHLORIDE 500 MG: 500 TABLET ORAL at 08:37

## 2019-05-20 RX ADMIN — OXYCODONE HYDROCHLORIDE 2.5 MG: 5 TABLET ORAL at 17:36

## 2019-05-20 RX ADMIN — ESCITALOPRAM OXALATE 20 MG: 20 TABLET ORAL at 21:42

## 2019-05-20 RX ADMIN — MOMETASONE FUROATE AND FORMOTEROL FUMARATE DIHYDRATE 2 PUFF: 100; 5 AEROSOL RESPIRATORY (INHALATION) at 20:32

## 2019-05-20 RX ADMIN — Medication 10 ML: at 21:42

## 2019-05-20 RX ADMIN — DOCUSATE SODIUM 100 MG: 100 CAPSULE, LIQUID FILLED ORAL at 21:42

## 2019-05-20 ASSESSMENT — PAIN DESCRIPTION - ORIENTATION
ORIENTATION: LEFT
ORIENTATION: LEFT

## 2019-05-20 ASSESSMENT — PAIN DESCRIPTION - DESCRIPTORS
DESCRIPTORS: SORE
DESCRIPTORS: SORE

## 2019-05-20 ASSESSMENT — PAIN SCALES - GENERAL
PAINLEVEL_OUTOF10: 10
PAINLEVEL_OUTOF10: 9
PAINLEVEL_OUTOF10: 9
PAINLEVEL_OUTOF10: 5
PAINLEVEL_OUTOF10: 10
PAINLEVEL_OUTOF10: 10

## 2019-05-20 ASSESSMENT — PAIN DESCRIPTION - FREQUENCY
FREQUENCY: INTERMITTENT
FREQUENCY: INTERMITTENT

## 2019-05-20 ASSESSMENT — PAIN DESCRIPTION - LOCATION
LOCATION: SHOULDER
LOCATION: SHOULDER

## 2019-05-20 ASSESSMENT — PAIN DESCRIPTION - PAIN TYPE
TYPE: SURGICAL PAIN
TYPE: SURGICAL PAIN

## 2019-05-20 ASSESSMENT — PAIN DESCRIPTION - ONSET: ONSET: GRADUAL

## 2019-05-20 NOTE — FLOWSHEET NOTE
2007: Vital signs obtained, assessment complete and charted. Patient resting in bed. Avas[employer] monitor in use. 2157: Patient complaining of pain in right shoulder. States that it is just hurts and is \"sore\" Medicated with prn oxycodone per MAR . Repositioned patient and applied a fresh ice bag to left shoulder. 0130: Avasys taken from room. Patients behavior is calm and cooperative at this time. Bed alarm, and falls risk implements are still in place. 0330: Patient incontinent of a large amount of urine. Patient changed and repositioned in bed.   0430: Patients bed alarm going off, patient was attempting to get out of bed. Patient stated that she had to use the restroom. Patient assisted to the bedside commode. 0533: Patient resting in bed. Denies any complaints of pain at this time. Vital signs obtained and recorded in chart. 0630: Patient incontinent of urine. Depends changed, barrier cream applied. Thigh high teds placed on patient. Patient refused ice for shoulder.

## 2019-05-20 NOTE — PROGRESS NOTES
Physical Therapy Med Surg Daily Treatment Note  Facility/Department: [facility]  Room: N229/N229-       NAME: Charo Lazcano  : 1960 (64 y.o.)  MRN: [id number]  CODE STATUS: Full Code    Date of Service: 2019    Patient Diagnosis(es): Status post reverse total shoulder replacement, left [Z96.612]   No chief complaint on file.     Patient Active Problem List    Diagnosis Date Noted    Status post reverse total shoulder replacement, left 2019    Left shoulder pain 2019    Weakness 2018    Decubitus ulcer of left ischial area 2018    Decubitus ulcer of sacral area 2018    Status post total shoulder replacement, right 06/15/2018    Status post total shoulder replacement, left 06/15/2018    DJD of left shoulder 2018    Marijuana use 2017    Chronic midline thoracic back pain 2017    Closed wedge compression fracture of first lumbar vertebra with delayed healing 2017    Vertebral compression fracture (HCC)     High risk medication use - 17 OARRS PM&R, 17 Tox Screen: positive marijuana PM&R 2017    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    Allergic rhinitis 10/29/2017    Aortic valve disorder 10/29/2017    Bipolar disorder ([address] 10/29/2017    Compression of brain ([address] 10/29/2017    Diabetes mellitus, type II ([address] 10/29/2017    Hypoxic brain injury ([address] 10/29/2017    Peripheral vascular disease ([address] 10/29/2017    Anaclitic depression 2017    Pulmonary embolism with infarction ([address] 2017    Fracture of lumbar vertebra ([address] 2017    Adhesive capsulitis of left shoulder 2017    Primary osteoarthritis of left shoulder 2017    Migraine without status migrainosus, not intractable 2017    Seasonal allergic rhinitis due to pollen 2017    Edema 2017    Muscle spasm 2017    H/O: CVA (cerebrovascular accident) 2017    Trochanteric bursitis of left hip 12/22/2016    Chronic maxillary sinusitis 11/11/2016    Cellulitis of left lower extremity 11/01/2016    Cervical dystonia 06/03/2016    Hypothyroidism due to Hashimoto's thyroiditis 04/19/2016    Essential hypertension 03/07/2016    Late effects of CVA (cerebrovascular accident) 04/07/2015    Hearing difficulty 11/04/2013    History of HPV infection 08/20/2013    Hemiparesis affecting left side as late effect of cerebrovascular accident ([address] 02/19/2013    Dysphonia 02/05/2013    COPD (chronic obstructive pulmonary disease) ([address]     Smoker     Cerebral infarction (HCC)     Arnold-Chiari deformity (HCC)     Headache     Hyperlipidemia with target LDL less than 100     Pulmonary embolism and infarction (HCC)     Laryngitis, chronic     Rhinitis, chronic     Depression     Acid reflux     H/O encephalopathy     Hepatitis B surface antigen positive     S/P colonoscopy with polypectomy     Recurrent UTI 08/15/2012    Chronic retention of urine 06/08/2012    Hypothyroidism 10/07/2011    Anxiety 10/07/2011    Nonallopathic lesion of sacral region 09/14/2011    Nonallopathic lesion of cervical region, not elsewhere classified 08/12/2011    Drug-seeking behavior 01/10/2011    Bone necrosis ([address] 10/29/2010    Chronic periodontitis, generalized 09/24/2010    Alveolitis of jaw 07/09/2010    Dental caries extending into pulp 06/25/2010    Pulmonary embolism ([address] 06/21/2010    Retention of urine 01/22/2010    Neurogenic bladder 01/05/2010    Vitamin D deficiency 11/17/2009    Airway hyperreactivity 09/16/2009    Cervicalgia 09/11/2009    Post-laminectomy syndrome 08/24/2000        Past Medical History:   Diagnosis Date    Acid reflux     Allergic rhinitis     Anxiety     Arnold-Chiari deformity ([address] 2000    surgery    Asthma     Cerebral artery occlusion with cerebral infarction ([address] 2001    1 yr after brain OR    Cerebrovascular disease     Chronic back pain greater than 3 months duration     COPD (chronic obstructive pulmonary disease) (HCC)     Dr Diaz Ethel at [address] CVA (cerebral infarction)     left hemiparesis, due to ? estrogen + smoking    Depression     Dr Joseph Luz H/O encephalopathy 2001    Headache(784.0)     Dr Phipps  Hepatitis B surface antigen positive     Hx of blood clots     PE / trx with coumadin x 6 months    Hyperlipidemia LDL goal < 100     meds > 10 yrs    Hypertension     meds > 2 yrs    Hypothyroidism     meds > 18 yrs    Laryngitis, chronic     scoped by Dr Miguel A Cruz, fungal    Marijuana smoker in remission [facility])     Obesity (BMI 30-39. 9)     Osteoarthritis     Pulmonary embolism and infarction (HCC)     Restless legs syndrome     Rhinitis, chronic     Rotator cuff tear     Left    S/P colonoscopy with polypectomy     Dr Cassandra Maldonado    Seizures [facility])    [address] Spinal headache     past partum     Spondylosis without myelopathy     Type 2 diabetes mellitus without complication (HCC)     hx > 6 yrs    Urinary incontinence     Vertebral compression fracture ([address]      Past Surgical History:   Procedure Laterality Date    BACK SURGERY      lumbar kyphoplasty x 2    BRAIN SURGERY      due to Arnold-Chiari deformity / [facility]     SECTION      COLONOSCOPY      CRANIOTOMY      Arnold-Chiary malformation    ENDOSCOPY, COLON, DIAGNOSTIC      FEMUR FRACTURE SURGERY Left     GA RECONSTR TOTAL SHOULDER IMPLANT Left 6/15/2018    LEFT TOTAL SHOULDER ARTHROPLASTY, SANDY BIOMET TSA, SCALENE NERVE BLOCK, (LATEX ALLERGY) performed by Crystal Lyon MD at [address] [facility]      lumbar kyphoplasty    [facility] ENDOSCOPY  8/5/15    w/bx          Restrictions  Restrictions/Precautions: Weight Bearing, Fall Risk, ROM Restrictions  Upper Extremity Weight Bearing Restrictions  Left Upper Extremity Weight Bearing: Non Weight Bearing  Position Activity Restriction  Other position/activity restrictions: OK AROM to elbow, hand, wrist; No pendulums; Subjective   General  Chart Reviewed: Yes  Family / Caregiver Present: No  Pain Screening  Patient Currently in Pain: Yes  Pain Reassessment: 5/10  Pain Assessment  Pain Assessment: 0-10  Pain Level: 5  Pain Type: Surgical pain  Pain Location: Shoulder  Pain Orientation: Left  Pain Descriptors: Sore  Pain Frequency: Intermittent  Objective   Bed mobility  Sit to Supine: Stand by assistance  Scooting: Minimal assistance    Transfers  Sit to Stand: Contact guard assistance  Stand to sit: Contact guard assistance  Comment: poor eccentric control with stand to sit, verbal cues to improve safety with transfers     Ambulation  Ambulation?: Yes  Ambulation 1  Surface: level tile  Device: Single point cane  Assistance: Minimal assistance  Quality of Gait: ataxic gait with small shuffling steps, unsteady, 1 LOB requiring min A to correct, significant decrease in speed  Distance: 20'   Comments: patient fatigued quickly during ambulation     Assessment   Body structures, Functions, Activity limitations: Decreased functional mobility ; Decreased strength;Decreased ADL status; Decreased ROM; Decreased balance; Increased Pain;Vestibular Impairment;Decreased high-level IADLs;Decreased safe awareness;Decreased cognition  Assessment: patient continues to demo decreased safety with transfers and gait. Fatigued quickly during session.    Prognosis: Fair  REQUIRES PT FOLLOW UP: Yes     Discharge Recommendations:  Continue to assess pending progress    Goals  Long term goals  Long term goal 1: bed mobility with mod I  Long term goal 2: functional transfers with mod I  Long term goal 3: standing 2 min with mod I and no LOB  Long term goal 4: adherance to shoulder immobilizer with [address] [place]  Times per week: 5-7 QD  Times per day: Daily  Current Treatment Recommendations: Strengthening, Functional Mobility Training, Wheelchair Mobility Training, Neuromuscular Re-education, Home Exercise Program, Balance Training, Manual Therapy - Soft Tissue Mobilization, Gait Training, Safety Education & Training, Modalities, Positioning, Patient/Caregiver Education & Training, Pain Management, Equipment Evaluation, Education, & procurement  Safety Devices  Type of devices: Telesitter in use, All fall risk precautions in place, Call light within reach, Left in chair, Nurse notified, Chair alarm in place     [facility] (6 CLICK) [id number] Mobility Raw Score : 11     Therapy Time   Individual   Time In 0942   Time Out 1000   Minutes 18     Timed Code Treatment Minutes: 18 Minutes     Tr 8  Gt 10  Cassy Whittington PTA, 05/20/19 at 10:14 AM

## 2019-05-20 NOTE — PROGRESS NOTES
Orthopedic Surgery Progress Note  Olga Lidia Wagner  5/20/2019    Subjective:     Post-Operative Day # 3 Status Post left TSA     Systemic or Specific Complaints:No Complaints    Objective:     BP (!) 144/67   Pulse 101   Temp 98.6 °F (37 °C) (Oral)   Resp 18   Ht 5' 2\" (1.575 m)   Wt 155 lb (70.3 kg)   LMP 06/13/1999   SpO2 95%   BMI 28.35 kg/m²     Intake/Output Summary (Last 24 hours) at 5/20/2019 1678  Last data filed at 5/19/2019 2157  Gross per 24 hour   Intake 1210 ml   Output --   Net 1210 ml     DRAIN/TUBE OUTPUT:         General: alert, appears stated age and cooperative   Wound: Wound clean and dry no evidence of infection. Extremity: Sling on extremity. Distal NVI   DVT Exam: No evidence of DVT seen on physical exam.     Data Review    Recent Labs     05/18/19  0540   WBC 13.1*   RBC 4.26   HGB 10.4*   HCT 33.1*   MCV 77.7*   MCH 24.4*   MCHC 31.5*   RDW 18.1*        Assessment:     Status Post left TSA. Pt does have very extensive medical hx and she does require a lot of help at this time- since she is not able to use her arm. There is no PT for the shoulder joint- however- pt need placement for safety and to prevent falls. Pt does have hx of stroke, and she did have an episode of delirium- even without narcotic pain medication. We did decrease pt's available doses of pain meds just in case. Awaitining pre cert for placement.  Continue current care otherwise    Plan:      1:  Discharge today, Return to Clinic:    2:  Continue Pain Control  3:  Physical therapy as per TSA protocol  4:  Narcotic prescription in chart  5:  Anticipate discharge today if pain well controlled    Lynne Cordon

## 2019-05-20 NOTE — FLOWSHEET NOTE
6585- assessment complete, pt alert and oriented x 1, pt refuses to answer place or time, states Edelmira Zimmerman is not answering any of my dumb questions\", pain 9/10, pt medicated, denies n/v, denies sob & cp, call light in reach, bed in lowest position, bed alarm engaged, will monitor.

## 2019-05-20 NOTE — PROGRESS NOTES
Hospitalist Progress Note      PCP: León Abraham MD    Date of Admission: 5/17/2019    Chief Complaint:    No chief complaint on file. Subjective:  Patient denies fevers, chills, sweats, CP, SOB. 12 point ROS negative other than mentioned above     Medications:  Reviewed    Infusion Medications    sodium chloride 50 mL/hr at 05/17/19 1504    dextrose       Scheduled Medications    levothyroxine  100 mcg Oral Daily    sodium chloride flush  10 mL Intravenous 2 times per day    docusate sodium  100 mg Oral BID    atorvastatin  40 mg Oral Daily    ARIPiprazole  10 mg Oral Daily    amLODIPine  5 mg Oral Daily    escitalopram  20 mg Oral Nightly    pregabalin  150 mg Oral Daily    melatonin  5 mg Oral Nightly    metFORMIN  500 mg Oral BID WC    pantoprazole  40 mg Oral QAM AC    mometasone-formoterol  2 puff Inhalation BID    insulin lispro  0-12 Units Subcutaneous TID WC    insulin lispro  0-6 Units Subcutaneous Nightly     PRN Meds: oxyCODONE, sodium chloride flush, ondansetron, hydrALAZINE, glucose, dextrose, glucagon (rDNA), dextrose, ipratropium-albuterol      Intake/Output Summary (Last 24 hours) at 5/20/2019 1123  Last data filed at 5/19/2019 2157  Gross per 24 hour   Intake 730 ml   Output --   Net 730 ml       Exam:    BP (!) 147/62   Pulse 102   Temp 98.4 °F (36.9 °C) (Oral)   Resp 18   Ht 5' 2\" (1.575 m)   Wt 155 lb (70.3 kg)   LMP 06/13/1999   SpO2 96%   BMI 28.35 kg/m²     General appearance: No apparent distress, appears stated age and cooperative. HEENT:  Conjunctivae/corneas clear. Neck:  Trachea midline. Respiratory:  Normal respiratory effort. Clear to auscultation  Cardiovascular: Tachycardic  Abdomen: Soft, non-tender, non-distended with normal bowel sounds. Musculoskeletal: No clubbing, cyanosis or edema bilaterally.  Left arm in sling  Neuro: Non Focal.  Capillary Refill: Brisk,< 3 seconds   Peripheral Pulses: +2 palpable, equal bilaterally     Labs:   Recent Labs 05/18/19  0540   WBC 13.1*   HGB 10.4*   HCT 33.1*        Recent Labs     05/18/19  0540      K 3.9      CO2 25   BUN 15   CREATININE 0.66   CALCIUM 8.3*     No results for input(s): AST, ALT, BILIDIR, BILITOT, ALKPHOS in the last 72 hours. No results for input(s): INR in the last 72 hours. No results for input(s): Johnny Gal in the last 72 hours. Urinalysis:      Lab Results   Component Value Date    NITRU Negative 05/07/2019    WBCUA 3-5 05/07/2019    WBCUA 20-50 11/01/2018    BACTERIA Negative 05/07/2019    RBCUA 0-2 05/07/2019    RBCUA 0-2 11/01/2018    BLOODU Negative 05/07/2019    SPECGRAV 1.010 05/07/2019    GLUCOSEU Negative 05/07/2019    GLUCOSEU NEG 12/12/2011     Radiology:  XR Shoulder Left 1 VW   Final Result      UNREMARKABLE RECENT TOTAL LEFT SHOULDER ARTHROPLASTY PLACEMENT. Assessment/Plan:    1. Left frozen shoulder status post reverse total arthroplasty: Immediate post op management per primary team  2. Hypertension: Continue Norvasc and hydralazine; resume diuretics on discharge  3. Hypothyroid:  Consulted endocrine; synthroid reduced to 100  4. COPD: Continue Dulera continue when necessary breathing treatments  5. GERD: Continue Protonix. 6. Diabetes: Continue Metformin, insulin sliding scale  7. Bipolar disorder: Continue Abilify, Lexapro, Lyrica. 8. DVT prophylaxis as per primary    Active Hospital Problems    Diagnosis Date Noted    Status post reverse total shoulder replacement, left [Z96.612] 05/17/2019        Additional work up or/and treatment plan may be added today or then after based on clinical progression. I am managing a portion of pt care. Some medical issues are handled by other specialists. Additional work up and treatment should be done in out pt setting by pt PCP and other out pt providers. In addition to examining and evaluating pt, I spent additional time explaining care, normal and abnormal findings, and treatment plan. All of pt questions were answered. Counseling, diet and education were  provided. Case will be discussed with nursing staff when appropriate. Family will be updated if and when appropriate. Diet: DIET DYSPHAGIA I PUREED;  Carb Control: 4 carb choices (60 gms)/meal    Code Status: Full Code    Ok to discharge from my standpoint on prior to admission meds with the exception of the change to her synthroid per endocrine    Electronically signed by Denilson Cardona MD on 5/20/2019 at 11:23 AM

## 2019-05-21 VITALS
TEMPERATURE: 98.2 F | WEIGHT: 155 LBS | OXYGEN SATURATION: 100 % | RESPIRATION RATE: 18 BRPM | SYSTOLIC BLOOD PRESSURE: 137 MMHG | DIASTOLIC BLOOD PRESSURE: 72 MMHG | BODY MASS INDEX: 28.52 KG/M2 | HEIGHT: 62 IN | HEART RATE: 103 BPM

## 2019-05-21 LAB
GLUCOSE BLD-MCNC: 123 MG/DL (ref 60–115)
GLUCOSE BLD-MCNC: 144 MG/DL (ref 60–115)
PERFORMED ON: ABNORMAL
PERFORMED ON: ABNORMAL

## 2019-05-21 PROCEDURE — 6370000000 HC RX 637 (ALT 250 FOR IP): Performed by: ORTHOPAEDIC SURGERY

## 2019-05-21 PROCEDURE — 6370000000 HC RX 637 (ALT 250 FOR IP): Performed by: INTERNAL MEDICINE

## 2019-05-21 PROCEDURE — 97535 SELF CARE MNGMENT TRAINING: CPT

## 2019-05-21 PROCEDURE — 2580000003 HC RX 258: Performed by: ORTHOPAEDIC SURGERY

## 2019-05-21 PROCEDURE — 6370000000 HC RX 637 (ALT 250 FOR IP): Performed by: NURSE PRACTITIONER

## 2019-05-21 PROCEDURE — 94640 AIRWAY INHALATION TREATMENT: CPT

## 2019-05-21 PROCEDURE — 97116 GAIT TRAINING THERAPY: CPT

## 2019-05-21 RX ORDER — IBUPROFEN 800 MG/1
800 TABLET ORAL EVERY 8 HOURS PRN
Status: DISCONTINUED | OUTPATIENT
Start: 2019-05-21 | End: 2019-05-21 | Stop reason: HOSPADM

## 2019-05-21 RX ADMIN — OXYCODONE HYDROCHLORIDE 2.5 MG: 5 TABLET ORAL at 13:58

## 2019-05-21 RX ADMIN — ARIPIPRAZOLE 10 MG: 10 TABLET ORAL at 08:50

## 2019-05-21 RX ADMIN — MOMETASONE FUROATE AND FORMOTEROL FUMARATE DIHYDRATE 2 PUFF: 100; 5 AEROSOL RESPIRATORY (INHALATION) at 08:42

## 2019-05-21 RX ADMIN — OXYCODONE HYDROCHLORIDE 2.5 MG: 5 TABLET ORAL at 05:56

## 2019-05-21 RX ADMIN — PREGABALIN 150 MG: 150 CAPSULE ORAL at 08:50

## 2019-05-21 RX ADMIN — LEVOTHYROXINE SODIUM 100 MCG: 100 TABLET ORAL at 05:56

## 2019-05-21 RX ADMIN — IBUPROFEN 800 MG: 800 TABLET, FILM COATED ORAL at 05:56

## 2019-05-21 RX ADMIN — METFORMIN HYDROCHLORIDE 500 MG: 500 TABLET ORAL at 08:51

## 2019-05-21 RX ADMIN — OXYCODONE HYDROCHLORIDE 2.5 MG: 5 TABLET ORAL at 09:55

## 2019-05-21 RX ADMIN — OXYCODONE HYDROCHLORIDE 2.5 MG: 5 TABLET ORAL at 01:41

## 2019-05-21 RX ADMIN — AMLODIPINE BESYLATE 5 MG: 5 TABLET ORAL at 08:50

## 2019-05-21 RX ADMIN — PANTOPRAZOLE SODIUM 40 MG: 40 TABLET, DELAYED RELEASE ORAL at 05:56

## 2019-05-21 RX ADMIN — Medication 10 ML: at 08:50

## 2019-05-21 RX ADMIN — DOCUSATE SODIUM 100 MG: 100 CAPSULE, LIQUID FILLED ORAL at 08:50

## 2019-05-21 ASSESSMENT — PAIN DESCRIPTION - DESCRIPTORS: DESCRIPTORS: ACHING;SORE

## 2019-05-21 ASSESSMENT — PAIN SCALES - GENERAL
PAINLEVEL_OUTOF10: 7
PAINLEVEL_OUTOF10: 8
PAINLEVEL_OUTOF10: 6
PAINLEVEL_OUTOF10: 10
PAINLEVEL_OUTOF10: 9

## 2019-05-21 ASSESSMENT — PAIN DESCRIPTION - LOCATION: LOCATION: SHOULDER

## 2019-05-21 ASSESSMENT — PAIN DESCRIPTION - PAIN TYPE: TYPE: SURGICAL PAIN

## 2019-05-21 ASSESSMENT — PAIN DESCRIPTION - ORIENTATION: ORIENTATION: LEFT

## 2019-05-21 NOTE — FLOWSHEET NOTE
Obtained Telemetry monitor from 1W after reviewing pts orders. Pt. Refused to wear the monitory stating that she had seen a cardiologist at [facility] and had several tests done. I educated her about the reason behind this monitoring and she still denied. She also refused her flowtrons.

## 2019-05-21 NOTE — FLOWSHEET NOTE
[phone]- pt discharged to [address] [place] [address]- report called to Penny Rice at [facility] for discharge, pt discharged in this time to [facility].

## 2019-05-21 NOTE — PROGRESS NOTES
results for input(s): WBC, HGB, HCT, PLT in the last 72 hours. No results for input(s): NA, K, CL, CO2, BUN, CREATININE, CALCIUM, PHOS in the last 72 hours. Invalid input(s): MAGNES  No results for input(s): AST, ALT, BILIDIR, BILITOT, ALKPHOS in the last 72 hours. No results for input(s): INR in the last 72 hours. No results for input(s): Richar Milch in the last 72 hours. Urinalysis:      Lab Results   Component Value Date    NITRU Negative 05/07/2019    WBCUA 3-5 05/07/2019    WBCUA 20-50 11/01/2018    BACTERIA Negative 05/07/2019    RBCUA 0-2 05/07/2019    RBCUA 0-2 11/01/2018    BLOODU Negative 05/07/2019    SPECGRAV 1.010 05/07/2019    GLUCOSEU Negative 05/07/2019    GLUCOSEU NEG 12/12/2011     Radiology:  XR Shoulder Left 1 VW   Final Result      UNREMARKABLE RECENT TOTAL LEFT SHOULDER ARTHROPLASTY PLACEMENT. Assessment/Plan:    1. Left frozen shoulder status post reverse total arthroplasty: Immediate post op management per primary team  2. Hypertension: Continue Norvasc and hydralazine; resume diuretics on discharge  3. Hypothyroid:  Consulted endocrine; synthroid reduced to 100; discussed with patient today  4. COPD: Continue Dulera continue when necessary breathing treatments  5. GERD: Continue Protonix. 6. Diabetes: Continue Metformin, insulin sliding scale  7. Bipolar disorder: Continue Abilify, Lexapro, Lyrica. 8. DVT prophylaxis as per primary    Active Hospital Problems    Diagnosis Date Noted    Status post reverse total shoulder replacement, left [Z96.612] 05/17/2019        Additional work up or/and treatment plan may be added today or then after based on clinical progression. I am managing a portion of pt care. Some medical issues are handled by other specialists. Additional work up and treatment should be done in out pt setting by pt PCP and other out pt providers.      In addition to examining and evaluating pt, I spent additional time explaining care, normal and abnormal findings, and treatment plan. All of pt questions were answered. Counseling, diet and education were  provided. Case will be discussed with nursing staff when appropriate. Family will be updated if and when appropriate. Diet: DIET DYSPHAGIA I PUREED;  Carb Control: 4 carb choices (60 gms)/meal    Code Status: Full Code    Ok to discharge from my standpoint on prior to admission meds with the exception of the change to her synthroid per endocrine    Electronically signed by Horace Harley MD on 5/21/2019 at 10:56 AM

## 2019-05-21 NOTE — PROGRESS NOTES
Physical Therapy Med Surg Daily Treatment Note  Facility/Department: [facility]  Room: N229/N229-01       NAME: Jose Servin  : 1960 (64 y.o.)  MRN: [id number]  CODE STATUS: Full Code    Date of Service: 2019    Patient Diagnosis(es): Status post reverse total shoulder replacement, left [Z96.612]   No chief complaint on file.     Patient Active Problem List    Diagnosis Date Noted    Status post reverse total shoulder replacement, left 2019    Left shoulder pain 2019    Weakness 2018    Decubitus ulcer of left ischial area 2018    Decubitus ulcer of sacral area 2018    Status post total shoulder replacement, right 06/15/2018    Status post total shoulder replacement, left 06/15/2018    DJD of left shoulder 2018    Marijuana use 2017    Chronic midline thoracic back pain 2017    Closed wedge compression fracture of first lumbar vertebra with delayed healing 2017    Vertebral compression fracture (HCC)     High risk medication use - 17 OARRS PM&R, 17 Tox Screen: positive marijuana PM&R 2017    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    Allergic rhinitis 10/29/2017    Aortic valve disorder 10/29/2017    Bipolar disorder ([address] 10/29/2017    Compression of brain ([address] 10/29/2017    Diabetes mellitus, type II ([address] 10/29/2017    Hypoxic brain injury ([address] 10/29/2017    Peripheral vascular disease ([address] 10/29/2017    Anaclitic depression 2017    Pulmonary embolism with infarction ([address] 2017    Fracture of lumbar vertebra ([address] 2017    Adhesive capsulitis of left shoulder 2017    Primary osteoarthritis of left shoulder 2017    Migraine without status migrainosus, not intractable 2017    Seasonal allergic rhinitis due to pollen 2017    Edema 2017    Muscle spasm 2017    H/O: CVA (cerebrovascular accident) 2017    Trochanteric bursitis of left hip 12/22/2016    Chronic maxillary sinusitis 11/11/2016    Cellulitis of left lower extremity 11/01/2016    Cervical dystonia 06/03/2016    Hypothyroidism due to Hashimoto's thyroiditis 04/19/2016    Essential hypertension 03/07/2016    Late effects of CVA (cerebrovascular accident) 04/07/2015    Hearing difficulty 11/04/2013    History of HPV infection 08/20/2013    Hemiparesis affecting left side as late effect of cerebrovascular accident ([address] 02/19/2013    Dysphonia 02/05/2013    COPD (chronic obstructive pulmonary disease) ([address]     Smoker     Cerebral infarction (HCC)     Arnold-Chiari deformity (HCC)     Headache     Hyperlipidemia with target LDL less than 100     Pulmonary embolism and infarction (HCC)     Laryngitis, chronic     Rhinitis, chronic     Depression     Acid reflux     H/O encephalopathy     Hepatitis B surface antigen positive     S/P colonoscopy with polypectomy     Recurrent UTI 08/15/2012    Chronic retention of urine 06/08/2012    Hypothyroidism 10/07/2011    Anxiety 10/07/2011    Nonallopathic lesion of sacral region 09/14/2011    Nonallopathic lesion of cervical region, not elsewhere classified 08/12/2011    Drug-seeking behavior 01/10/2011    Bone necrosis ([address] 10/29/2010    Chronic periodontitis, generalized 09/24/2010    Alveolitis of jaw 07/09/2010    Dental caries extending into pulp 06/25/2010    Pulmonary embolism ([address] 06/21/2010    Retention of urine 01/22/2010    Neurogenic bladder 01/05/2010    Vitamin D deficiency 11/17/2009    Airway hyperreactivity 09/16/2009    Cervicalgia 09/11/2009    Post-laminectomy syndrome 08/24/2000        Past Medical History:   Diagnosis Date    Acid reflux     Allergic rhinitis     Anxiety     Arnold-Chiari deformity ([address] 2000    surgery    Asthma     Cerebral artery occlusion with cerebral infarction ([address] 2001    1 yr after brain OR    Cerebrovascular disease     Chronic back pain awareness;Decreased cognition    Assessment: Pt declined use of SC. Gait had shuffling pattern but no LOB. decreased safety awareness. Activity Tolerance  Activity Tolerance: Patient Tolerated treatment well       Discharge Recommendations:  Continue to assess pending progress    Goals:  Long term goals  Long term goal 1: bed mobility with mod I  Long term goal 2: functional transfers with mod I  Long term goal 3: standing 2 min with mod I and no LOB  Long term goal 4: adherance to shoulder immobilizer with Min VC    PLAN:   Plan  Times per week: 5-7 QD  Times per day: Daily  Current Treatment Recommendations: Strengthening, Functional Mobility Training, Wheelchair Mobility Training, Neuromuscular Re-education, Home Exercise Program, Balance Training, Manual Therapy - Soft Tissue Mobilization, Gait Training, Safety Education & Training, Modalities, Positioning, Patient/Caregiver Education & Training, Pain Management, Equipment Evaluation, Education, & procurement  Plan Comment: Cont. POC  Safety Devices  Type of devices:  All fall risk precautions in place, Call light within reach, Chair alarm in place, Left in chair     AMPAC (6 CLICK) Aleja Duran 28 Inpatient Mobility Raw Score : 17      Therapy Time   Individual   Time In 1020   Time Out 1043   Minutes 23      bm/Trsf - 13 mins  Gait - 10 mins       Ivan PopPATRICK jones, 05/21/19 at 10:54 AM

## 2019-05-21 NOTE — DISCHARGE INSTR - DIET

## 2019-05-22 DIAGNOSIS — M54.5 CHRONIC LOW BACK PAIN, UNSPECIFIED BACK PAIN LATERALITY, WITH SCIATICA PRESENCE UNSPECIFIED: ICD-10-CM

## 2019-05-22 DIAGNOSIS — G89.29 CHRONIC LOW BACK PAIN, UNSPECIFIED BACK PAIN LATERALITY, WITH SCIATICA PRESENCE UNSPECIFIED: ICD-10-CM

## 2019-05-22 RX ORDER — PREGABALIN 150 MG/1
150 CAPSULE ORAL 2 TIMES DAILY
Qty: 60 CAPSULE | Refills: 0 | Status: SHIPPED | OUTPATIENT
Start: 2019-05-22 | End: 2019-09-04 | Stop reason: ALTCHOICE

## 2019-05-23 ENCOUNTER — OFFICE VISIT (OUTPATIENT)
Dept: GERIATRIC MEDICINE | Age: 59
End: 2019-05-23
Payer: MEDICARE

## 2019-05-23 DIAGNOSIS — J42 CHRONIC BRONCHITIS, UNSPECIFIED CHRONIC BRONCHITIS TYPE (HCC): ICD-10-CM

## 2019-05-23 DIAGNOSIS — M25.512 ACUTE PAIN OF LEFT SHOULDER: Primary | ICD-10-CM

## 2019-05-23 DIAGNOSIS — M15.9 OSTEOARTHRITIS OF MULTIPLE JOINTS, UNSPECIFIED OSTEOARTHRITIS TYPE: ICD-10-CM

## 2019-05-23 DIAGNOSIS — E11.9 DIABETES MELLITUS WITHOUT COMPLICATION (HCC): ICD-10-CM

## 2019-05-23 DIAGNOSIS — E03.9 HYPOTHYROIDISM, UNSPECIFIED TYPE: ICD-10-CM

## 2019-05-23 PROCEDURE — 3044F HG A1C LEVEL LT 7.0%: CPT | Performed by: INTERNAL MEDICINE

## 2019-05-23 PROCEDURE — 99305 1ST NF CARE MODERATE MDM 35: CPT | Performed by: INTERNAL MEDICINE

## 2019-05-24 PROCEDURE — 93010 ELECTROCARDIOGRAM REPORT: CPT | Performed by: INTERNAL MEDICINE

## 2019-05-29 ENCOUNTER — HOSPITAL ENCOUNTER (OUTPATIENT)
Dept: GENERAL RADIOLOGY | Age: 59
Discharge: HOME | End: 2019-05-31
Payer: MEDICARE

## 2019-05-29 DIAGNOSIS — M75.102 ROTATOR CUFF SYNDROME OF LEFT SHOULDER: ICD-10-CM

## 2019-05-29 PROCEDURE — 73030 X-RAY EXAM OF SHOULDER: CPT

## 2019-05-30 ENCOUNTER — OFFICE VISIT (OUTPATIENT)
Dept: GERIATRIC MEDICINE | Age: 59
End: 2019-05-30
Payer: MEDICARE

## 2019-05-30 DIAGNOSIS — Z76.5 DRUG-SEEKING BEHAVIOR: ICD-10-CM

## 2019-05-30 DIAGNOSIS — R60.0 EDEMA EXTREMITIES: ICD-10-CM

## 2019-05-30 DIAGNOSIS — M25.519 SHOULDER PAIN, UNSPECIFIED CHRONICITY, UNSPECIFIED LATERALITY: Primary | ICD-10-CM

## 2019-05-30 DIAGNOSIS — F31.9 BIPOLAR I DISORDER WITH ANXIOUS DISTRESS (HCC): ICD-10-CM

## 2019-05-30 PROCEDURE — 99309 SBSQ NF CARE MODERATE MDM 30: CPT | Performed by: NURSE PRACTITIONER

## 2019-06-05 VITALS
OXYGEN SATURATION: 96 % | SYSTOLIC BLOOD PRESSURE: 136 MMHG | HEART RATE: 65 BPM | DIASTOLIC BLOOD PRESSURE: 74 MMHG | TEMPERATURE: 98.1 F | RESPIRATION RATE: 18 BRPM

## 2019-06-05 NOTE — PROGRESS NOTES
PATIENTMichelle  : 1960 DOS: 2019     MIRIAM GREER    The patient is being seen for followup of her admission here for PT, OT, rehabilitation. She recently had a left reverse total shoulder done. She is on Percocet q. 8 hours, ibuprofen and she is also on Ativan. She says her [facility] doctor put her on Ativan routinely, not p.r.n. because of her anxiety. She has bipolar, anxiety. She is drug seeking. She has been drug seeking in the past and she has been here. She also uses marijuana in electronic cigarette. She is using the oils. She is not allowed to take that here and she is not allowed to mix that with her narcotics and anxiety medication. She was informed. She said she is not drug seeking. She is not addicted. She can stop taking the anxiety medicine if she needs to, but she has been very anxious and she has been here. She said she is going little crazy. She says she is tolerating the therapy well and she is doing well. She does not want to back slide and wants to continue to do well, so she can be discharged to go back home. Nursing states she has been rather cooperative. She is doing her therapy, eating and drinking. No problems with bowels or urine output. Pain is under control. I did tell her we were going to wean her down from her Percocet and possibly even from the Ativan.     Past Medical History:   Diagnosis Date    Acid reflux     Allergic rhinitis     Anxiety     Arnold-Chiari deformity ([facility])     surgery    Asthma     Cerebral artery occlusion with cerebral infarction ([facility]     1 yr after brain OR    Cerebrovascular disease     Chronic back pain greater than 3 months duration     COPD (chronic obstructive pulmonary disease) ([facility])     Dr Childress Patient at [address] CVA (cerebral infarction)     left hemiparesis, due to ? estrogen + smoking    Depression     Dr Flavio Hinton H/O encephalopathy 2001    Headache(784.0)     Dr Serenity Jackson  Hepatitis B surface antigen positive     Hx of blood clots 2010    PE / trx with coumadin x 6 months    Hyperlipidemia LDL goal < 100     meds > 10 yrs    Hypertension     meds > 2 yrs    Hypothyroidism 2001    meds > 18 yrs    Laryngitis, chronic 2012    scoped by Dr Demetrios Mohs, fungal    Marijuana smoker in remission [facility]) 2011    Obesity (BMI 30-39. 9)     Osteoarthritis     Pulmonary embolism and infarction (HCC)     Restless legs syndrome     Rhinitis, chronic     Rotator cuff tear     Left    S/P colonoscopy with polypectomy 2010    Dr Sergio Edwards    Seizures [facility])     Smoker     Spinal headache     past partum 1994    Spondylosis without myelopathy     Type 2 diabetes mellitus without complication (HCC)     hx > 6 yrs    Urinary incontinence     Vertebral compression fracture (HCC)        MEDICATIONS AND ALLERGIES: Reviewed from the nursing facility chart. PHYSICAL EXAMINATION: She is sitting up in the chair. She is in no acute distress. She is awake, alert, calm, relaxed. No anxiety, no hypervigilance. No belligerence. She is very pleasant and cooperative. Buccal mucosa moist, pink. EOMs are intact. HEART: Regular rate and rhythm. S1, S2 is normal. No S3, S4. No murmur, click, or rub. Lungs are clear to auscultation. Even, unlabored respirations. Abdomen is obese. Positive bowel sounds. Lower extremities have 1+ pedal edema, ankle edema, which is slightly worse than her baseline. She does usually have a trace edema bilaterally. ASSESSMENT AND PLAN:   1. Shoulder pain. Status post left reverse total shoulder. 2.  Bipolar with anxiety. 3.  Past drug-seeking behaviors. 4.  Lower extremity edema. We will use Tubigrip per her request or RYAN hose. She does not want to be diuresed. We will order Ativan 0.5 b.i.d. routinely for now and then we will address this at our next visit. We will try and wean down the Percocet. Her last H&H was good at 10 and 31.  BUN and creatinine were normal at

## 2019-06-07 ENCOUNTER — OFFICE VISIT (OUTPATIENT)
Dept: GERIATRIC MEDICINE | Age: 59
End: 2019-06-07
Payer: MEDICARE

## 2019-06-07 DIAGNOSIS — Z91.14 H/O MEDICATION NONCOMPLIANCE: ICD-10-CM

## 2019-06-07 DIAGNOSIS — Z65.3 ALLEGED DRUG DIVERSION: ICD-10-CM

## 2019-06-07 DIAGNOSIS — M25.512 ACUTE PAIN OF LEFT SHOULDER: Primary | ICD-10-CM

## 2019-06-07 PROCEDURE — 99309 SBSQ NF CARE MODERATE MDM 30: CPT | Performed by: NURSE PRACTITIONER

## 2019-06-10 ENCOUNTER — TELEPHONE (OUTPATIENT)
Dept: FAMILY MEDICINE CLINIC | Age: 59
End: 2019-06-10

## 2019-06-10 NOTE — TELEPHONE ENCOUNTER
Attempted to contact patient on 6/10/2019. Result: left message on the patient's voicemail asking patient to return my call. Pre-Visit planning not completed.

## 2019-06-10 NOTE — TELEPHONE ENCOUNTER
[facility]  [address]  FK:[phone]  Fax: [phone]      Wanted to know if they could have a copy of last visit note.        And if  would do a follow up with home health care

## 2019-06-11 ENCOUNTER — TELEPHONE (OUTPATIENT)
Dept: FAMILY MEDICINE CLINIC | Age: 59
End: 2019-06-11

## 2019-06-11 DIAGNOSIS — E55.9 VITAMIN D DEFICIENCY: ICD-10-CM

## 2019-06-11 DIAGNOSIS — G89.29 CHRONIC LOW BACK PAIN, UNSPECIFIED BACK PAIN LATERALITY, WITH SCIATICA PRESENCE UNSPECIFIED: ICD-10-CM

## 2019-06-11 DIAGNOSIS — M54.5 CHRONIC LOW BACK PAIN, UNSPECIFIED BACK PAIN LATERALITY, WITH SCIATICA PRESENCE UNSPECIFIED: ICD-10-CM

## 2019-06-11 RX ORDER — ERGOCALCIFEROL 1.25 MG/1
CAPSULE ORAL
Qty: 8 CAPSULE | Refills: 5 | Status: SHIPPED | OUTPATIENT
Start: 2019-06-11 | End: 2019-06-13

## 2019-06-11 RX ORDER — AMLODIPINE BESYLATE 2.5 MG/1
2.5 TABLET ORAL DAILY
Qty: 90 TABLET | Refills: 0 | Status: SHIPPED | OUTPATIENT
Start: 2019-06-11 | End: 2019-07-05 | Stop reason: SDUPTHER

## 2019-06-11 RX ORDER — ASCORBIC ACID 500 MG
500 TABLET ORAL DAILY
Qty: 30 TABLET | Refills: 0 | Status: SHIPPED | OUTPATIENT
Start: 2019-06-11 | End: 2019-07-02 | Stop reason: SDUPTHER

## 2019-06-11 NOTE — TELEPHONE ENCOUNTER
Attempted to contact patient on 6/11/2019. Result: left message on the patient's voicemail asking patient to return my call. - 2nd attempt    Pre-Visit planning not completed.

## 2019-06-12 NOTE — TELEPHONE ENCOUNTER
Spoke to Edgar she is aware that he will follow. She did receive office notes from me that she needed.

## 2019-06-13 ENCOUNTER — TELEPHONE (OUTPATIENT)
Dept: FAMILY MEDICINE CLINIC | Age: 59
End: 2019-06-13

## 2019-06-13 RX ORDER — LEVOCETIRIZINE DIHYDROCHLORIDE 5 MG/1
TABLET, FILM COATED ORAL
Qty: 90 TABLET | Refills: 5 | Status: SHIPPED | OUTPATIENT
Start: 2019-06-13 | End: 2019-07-05 | Stop reason: SDUPTHER

## 2019-06-13 NOTE — TELEPHONE ENCOUNTER
Pre-Visit Planning Checklist - Chronic (DM,COPD,CHF)      Care Everywhere reviewed? Yes  What information has been abstracted? DM foot & eye exams   (Allergies,meds, History, HM, test results, immunizations)  Specialist added to CareTeam?  Yes    Patient Contacted?   -Remind patient of appt? Yes      Pre Visit Checklist  - Meds reviewed? Yes  -Past medical,surgical, family, social, smoking, allergies reviewed? Yes  - Current Pharmacy? [facility]  - Seeing any specialist? Add to CareTeam. Yes  - Sign up for MyChart? No  - HM topics due and orders pended?  Yes   - If DM - Last diabetic eye exam? 2/27/2019

## 2019-06-14 ENCOUNTER — TELEPHONE (OUTPATIENT)
Dept: FAMILY MEDICINE CLINIC | Age: 59
End: 2019-06-14

## 2019-06-14 NOTE — TELEPHONE ENCOUNTER
Sharifa Garcia, , from [facility]; Pt had shoulder surgery on 5/17 and was then in a nursing home. Pt is home, as of 6/10. FYI!

## 2019-06-18 DIAGNOSIS — R60.9 EDEMA, UNSPECIFIED TYPE: ICD-10-CM

## 2019-06-19 RX ORDER — FUROSEMIDE 40 MG/1
40 TABLET ORAL DAILY
Qty: 90 TABLET | Refills: 0 | Status: SHIPPED | OUTPATIENT
Start: 2019-06-19 | End: 2020-01-16

## 2019-06-19 RX ORDER — LEVOTHYROXINE SODIUM 300 UG/1
TABLET ORAL
Qty: 14 TABLET | Refills: 0 | Status: SHIPPED | OUTPATIENT
Start: 2019-06-19 | End: 2019-06-24

## 2019-06-21 PROBLEM — Z65.3 ALLEGED DRUG DIVERSION: Status: ACTIVE | Noted: 2019-06-21

## 2019-06-21 PROBLEM — Z91.148 H/O MEDICATION NONCOMPLIANCE: Status: ACTIVE | Noted: 2019-06-21

## 2019-06-21 PROBLEM — Z91.14 H/O MEDICATION NONCOMPLIANCE: Status: ACTIVE | Noted: 2019-06-21

## 2019-06-21 NOTE — PROGRESS NOTES
MIRIAM GREER    DOS  6/07/2019    The patient is being seen for a prescription drug found in her room. On 06/05 we found Soma in her room. It is a class 4 drug. Today they found a prescription of a bottle of Ativan. We are not aware how much she took as she is not very forthcoming. The first drug we found several days before these were Lasix and she is taking Lasix in her room even though we had increased her Lasix that she was taking. I indicated to her how dangerous at that time it would be because of the potassium supplementation that was needed. She understood fully that she must not take any of the medications confiscated, Lasix and the Soma. Now that we found the Ativan I did indicate to her because of her continued taking prescription drugs without our permission she can no longer have Soma, loperamide, Percocet, Zanaflex, and Ativan. They will all have to be discontinued. We did a drug test even though she is taking Percocet on 06/05 at 5 a.m., on 06/04 was 3 p.m. On 06/06 she had 2 doses and on 06/07 she had a dose of Percocet. Her drug screening returned negative for opiates, so I did indicate to the patient that now she has to have a witnessed urine specimen sent and we would also do blood work. We are doing a drug panel, it is 9 or 10 Drug panel depending on which labs coming out. I have ordered it and nursing has already put it in the computer. Ativan is 0.5 mg b.i.d., her Lyrica is 150 b.i.d. Her Percocet is 1 q. 8 hours p.r.n. so there is no indication of benzodiazepines in her urine even though she is on a low dose, which  could explain why there is no urine positive results. I had been weaning her from the Percocet and had decreased the dose and she was cooperative at the time. She is doing therapy well. She is progressing and we are not aware that she was having more pain or needed more medication etc. She is awake, alert and oriented.  End of last year around October or November Epic chart indicated she had 120 different OAS scripts for obtaining prescription drugs class 2 and at that time when she was here for therapy. We did indicate to her outside physicians that she was going for followup that they were not prescribe any narcotics for her because of this issue. I did tell her she is not going to go home with any class 2 meds. We are not aware whether she is taking the meds  herself or selling the drugs. She is not having any other issues. She is eating and drinking well, sleeping well. MEDICATIONS AND ALLERGIES: Reviewed in the nursing facility chart. Past Medical History:   Diagnosis Date    Acid reflux     Allergic rhinitis     Anxiety     Arnold-Chiari deformity (HCC) 2000    surgery    Asthma     Cerebral artery occlusion with cerebral infarction ([facility] [address] 2001    1 yr after brain OR    Cerebrovascular disease     Chronic back pain greater than 3 months duration     COPD (chronic obstructive pulmonary disease) ([facility])     Dr Gutierrez Mask at [address] CVA (cerebral infarction) 2001    left hemiparesis, due to ? estrogen + smoking    Depression 1993    Dr Niles Gardner H/O encephalopathy 2001    Headache(784.0)     Dr Rochelle Dickson Hepatitis B surface antigen positive     Hx of blood clots 2010    PE / trx with coumadin x 6 months    Hyperlipidemia LDL goal < 100     meds > 10 yrs    Hypertension     meds > 2 yrs    Hypothyroidism 2001    meds > 18 yrs    Laryngitis, chronic 2012    scoped by Dr Nicholas Jane, fungal    Marijuana smoker in remission [facility]) 2011    Obesity (BMI 30-39. 9)     Osteoarthritis     Pulmonary embolism and infarction (HCC)     Restless legs syndrome     Rhinitis, chronic     Rotator cuff tear     Left    S/P colonoscopy with polypectomy 2010    Dr Edy Sepulveda    Seizures [facility])     Smoker     Spinal headache     past partum 1994    Spondylosis without myelopathy     Type 2 diabetes mellitus without complication (HCC)     hx > 6 yrs    Urinary incontinence     Vertebral compression fracture (HCC)        PHYSICAL EXAMINATION: She is awake and alert. She is pleasant. Despite these issues she still stating that she is not taking anything that she is not supposed to be taking. She would not admit me to me selling any medication. She will not admit to anything illegal regarding other prescription medications. She is very cooperative with the urine. Nursing is going to go in with her now and obtain a urine specimen as soon as possible. The blood work should be done STAT. Heart is regular. Lungs are clear. Abdomen is nontender, obese. She does have some limited movement to the total shoulder. She has some pain to moderate palpation. She is guarding, but the pain is tolerable. She went out to the store. She went to [place] today without any issue and was able to function in her electric wheelchair. LOWER EXTREMITIES: Trace edema, slightly better than my last visit. ASSESSMENT AND PLAN: Left shoulder pain secondary to left reverse total shoulder. Possible drug diversion versus inappropriate use. We found at all different times Lasix, Soma, Ativan prescriptions in her room. They have been confiscated. We will discontinue her Soma, loperamide, Percocet, Zanaflex, and Ativan. We will continue to give her the Mirapex and the Lyrica. She will continue to have PT, OT, rehabilitation. She can have NSAIDs. We will order Motrin. She can have hot packs or ice to the shoulder, but will not be able to do any narcotics at this time. We will continue to follow.     MAYURI Ramirez-CNP      Tamika Elias DNP, MSN, RN, GNP-BC, NP-C  Adult/Farooq Nurse Practitioner  [address]  Department of Geriatrics  8:30am-4:30pm   [phone]  After hours Answering service [phone]

## 2019-06-24 ENCOUNTER — OFFICE VISIT (OUTPATIENT)
Dept: FAMILY MEDICINE CLINIC | Age: 59
End: 2019-06-24
Payer: MEDICARE

## 2019-06-24 VITALS
HEIGHT: 62 IN | DIASTOLIC BLOOD PRESSURE: 64 MMHG | OXYGEN SATURATION: 97 % | BODY MASS INDEX: 28.35 KG/M2 | SYSTOLIC BLOOD PRESSURE: 130 MMHG | HEART RATE: 97 BPM

## 2019-06-24 DIAGNOSIS — J44.9 CHRONIC OBSTRUCTIVE PULMONARY DISEASE, UNSPECIFIED COPD TYPE (HCC): ICD-10-CM

## 2019-06-24 DIAGNOSIS — E03.9 HYPOTHYROIDISM, UNSPECIFIED TYPE: ICD-10-CM

## 2019-06-24 DIAGNOSIS — Z65.3 ALLEGED DRUG DIVERSION: ICD-10-CM

## 2019-06-24 DIAGNOSIS — E53.8 B12 DEFICIENCY: Primary | ICD-10-CM

## 2019-06-24 DIAGNOSIS — Z12.31 BREAST CANCER SCREENING BY MAMMOGRAM: ICD-10-CM

## 2019-06-24 DIAGNOSIS — M19.212 OTHER SECONDARY OSTEOARTHRITIS OF LEFT SHOULDER: ICD-10-CM

## 2019-06-24 DIAGNOSIS — E78.5 HYPERLIPIDEMIA WITH TARGET LDL LESS THAN 100: ICD-10-CM

## 2019-06-24 DIAGNOSIS — I73.9 PERIPHERAL VASCULAR DISEASE (HCC): ICD-10-CM

## 2019-06-24 DIAGNOSIS — I63.89 CEREBRAL INFARCTION DUE TO OTHER MECHANISM (HCC): ICD-10-CM

## 2019-06-24 DIAGNOSIS — Z91.14 H/O MEDICATION NONCOMPLIANCE: ICD-10-CM

## 2019-06-24 PROCEDURE — 99213 OFFICE O/P EST LOW 20 MIN: CPT | Performed by: FAMILY MEDICINE

## 2019-06-24 PROCEDURE — 96372 THER/PROPH/DIAG INJ SC/IM: CPT | Performed by: FAMILY MEDICINE

## 2019-06-24 RX ORDER — LEVOTHYROXINE SODIUM 0.2 MG/1
200 TABLET ORAL DAILY
Qty: 30 TABLET | Refills: 1 | Status: SHIPPED | OUTPATIENT
Start: 2019-06-24 | End: 2019-07-05 | Stop reason: SDUPTHER

## 2019-06-24 RX ORDER — FLUCONAZOLE 150 MG/1
150 TABLET ORAL
Qty: 3 TABLET | Refills: 0 | Status: SHIPPED | OUTPATIENT
Start: 2019-06-24 | End: 2019-07-01

## 2019-06-24 RX ORDER — CYANOCOBALAMIN 1000 UG/ML
1000 INJECTION INTRAMUSCULAR; SUBCUTANEOUS ONCE
Status: COMPLETED | OUTPATIENT
Start: 2019-06-24 | End: 2019-06-24

## 2019-06-24 RX ADMIN — CYANOCOBALAMIN 1000 MCG: 1000 INJECTION INTRAMUSCULAR; SUBCUTANEOUS at 14:14

## 2019-06-24 NOTE — PROGRESS NOTES
Diagnosis Orders   1. B12 deficiency  cyanocobalamin injection 1,000 mcg   2. Breast cancer screening by mammogram  [facility] DIGITAL SCREEN W OR WO CAD BILATERAL   3. Hypothyroidism, unspecified type  levothyroxine (SYNTHROID) 200 MCG tablet   4. Chronic obstructive pulmonary disease, unspecified COPD type ([facility] [address]     5. Hyperlipidemia with target LDL less than 100     6. Cerebral infarction due to other mechanism ([facility] [address]     7. Peripheral vascular disease ([facility] [address]     8. H/O medication noncompliance     9. Alleged drug diversion     10. Other secondary osteoarthritis of left shoulder       Administrations This Visit     cyanocobalamin injection 1,000 mcg     Admin Date  06/24/2019 Action  Given Dose  1000 mcg Route  Intramuscular Administered [facility]  [facility]              Injection was given in right arm. Patient tolerated well.

## 2019-06-24 NOTE — PROGRESS NOTES
Chief Complaint   Patient presents with    Anxiety     check up, would yunier b-12 shot            Sharmaine Mejia is a 64 y.o. female    Home now from SNF following shoulder surgery    Was at Ortho this morning, told healing well    She is about 6 weeks post op    Had both arms fractured from falls    Right now not really taking anything for pain more than motrin      Notes from nursing home discussed how they found her utilizing home scripts for ativan/soma    Urine drug screen was negative despite bz and opiate pain med use    Very unusual    Lab Results   Component Value Date    LABA1C 5.8 05/18/2019     No results found for: EAG      Sugars have been running very good      Patient Active Problem List   Diagnosis    Hypothyroidism    Anxiety    COPD (chronic obstructive pulmonary disease) ([address]    Smoker    Cerebral infarction ([address]    Arnold-Chiari deformity ([address]    Headache    Hyperlipidemia with target LDL less than 100    Pulmonary embolism and infarction ([address]    Laryngitis, chronic    Rhinitis, chronic    Depression    Acid reflux    H/O encephalopathy    Hepatitis B surface antigen positive    S/P colonoscopy with polypectomy    Hemiparesis affecting left side as late effect of cerebrovascular accident ([address]    Migraine without status migrainosus, not intractable    Seasonal allergic rhinitis due to pollen    Edema    Muscle spasm    Adhesive capsulitis of left shoulder    Congenital anomaly of adrenal gland    Airway hyperreactivity    Allergic rhinitis    Alveolitis of jaw    Anaclitic depression    Aortic valve disorder    Bipolar disorder ([address]    Bone necrosis ([address]    Cellulitis of left lower extremity    Cervical dystonia    Cervicalgia    Chronic maxillary sinusitis    Chronic periodontitis, generalized    Chronic retention of urine    Compression of brain ([address]    Dental caries extending into pulp    Diabetes mellitus, type II ([address]    Drug-seeking behavior    Dysphonia    Essential hypertension    Fracture of lumbar vertebra ([address]    H/O: CVA (cerebrovascular accident)    Hearing difficulty    History of HPV infection    Hypothyroidism due to Hashimoto's thyroiditis    Hypoxic brain injury ([address]    Late effects of CVA (cerebrovascular accident)    Neurogenic bladder    Nonallopathic lesion of cervical region, not elsewhere classified    Nonallopathic lesion of sacral region    Peripheral vascular disease ([address]    Post-laminectomy syndrome    Primary osteoarthritis of left shoulder    Pulmonary embolism (HCC)    Pulmonary embolism with infarction ([address]    Recurrent UTI    Retention of urine    Trochanteric bursitis of left hip    Vitamin D deficiency    High risk medication use - 11/01/17 OARRS PM&R, 11/13/17 Tox Screen: positive marijuana PM&R    Marijuana use    Chronic midline thoracic back pain    Vertebral compression fracture (HCC)    Closed wedge compression fracture of first lumbar vertebra with delayed healing    DJD of left shoulder    Status post total shoulder replacement, right    Status post total shoulder replacement, left    Decubitus ulcer of left ischial area    Decubitus ulcer of sacral area    Weakness    Left shoulder pain    Status post reverse total shoulder replacement, left    Alleged drug diversion    H/O medication noncompliance       Allergies   Allergen Reactions    Latex Rash    Imitrex [Sumatriptan] Anaphylaxis    Zomig [Zolmitriptan] Anaphylaxis    Antivert [Meclizine Hcl] Other (See Comments)     confusion    Flagyl [Metronidazole] Nausea Only     Nausea with rash    Ketorolac Tromethamine Nausea Only    Provera [Medroxyprogesterone Acetate] Other (See Comments)     Caused massive stroke    Ultram [Tramadol Hcl] Nausea Only    Bacitracin Rash    Bactrim Nausea And Vomiting and Rash    Cefdinir Rash    Cefuroxime Axetil Rash    Codeine Rash    Cyclosporine Rash    Iodine Rash Rash with SOB    Iv Dye [Iodides] Rash     Rash with SOB    Neomycin Rash    Petroleum Jelly [Skin Protectants, Misc.] Rash    Quinolones Rash    Sulfa Antibiotics Rash    Toradol [Ketorolac Tromethamine] Rash    Trovan [Trovafloxacin] Rash         Medications marked \"taking\" at this time  Outpatient Medications Marked as Taking for the 6/24/19 encounter (Office Visit) with Gabi Benoit MD   Medication Sig Dispense Refill    levothyroxine (SYNTHROID) 200 MCG tablet Take 1 tablet by mouth daily 30 tablet 1    fluconazole (DIFLUCAN) 150 MG tablet Take 1 tablet by mouth every 3 days for 3 doses 3 tablet 0    furosemide (LASIX) 40 MG tablet TAKE 1 TABLET BY MOUTH DAILY 90 tablet 0    levocetirizine (XYZAL) 5 MG tablet TAKE 1 TABLET BY MOUTH DAILY 90 tablet 5    LYRICA 150 MG capsule TAKE 1 CAPSULE BY MOUTH TWICE DAILY 60 capsule 5    amLODIPine (NORVASC) 2.5 MG tablet TAKE 1 TABLET BY MOUTH DAILY 90 tablet 0    vitamin C (ASCORBIC ACID) 500 MG tablet TAKE 1 TABLET BY MOUTH DAILY 30 tablet 0    LORazepam (ATIVAN) 0.5 MG tablet TAKE 1 TABLET BY MOUTH TWICE DAILY 10 tablet 0    levothyroxine (SYNTHROID) 100 MCG tablet Take 1 tablet by mouth Daily 30 tablet 3    omeprazole (PRILOSEC) 20 MG delayed release capsule Take 40 mg by mouth 2 times daily      tiZANidine (ZANAFLEX) 4 MG tablet TAKE 2 TABLETS BY MOUTH EVERY 8 HOURS 540 tablet 2    furosemide (LASIX) 40 MG tablet Take 1 tablet by mouth daily 90 tablet 5    omega-3 acid ethyl esters (LOVAZA) 1 g capsule Take 2 capsules by mouth 2 times daily 60 capsule 3    vitamin D (ERGOCALCIFEROL) 52701 units CAPS capsule Take 1 capsule by mouth every 7 days 30 capsule 5    ondansetron (ZOFRAN-ODT) 4 MG disintegrating tablet Take 1 tablet by mouth every 8 hours as needed for Nausea or Vomiting 30 tablet 2    pramipexole (MIRAPEX) 0.5 MG tablet TAKE 1 TABLET BY MOUTH EVERY EVENING 90 tablet 0    guaiFENesin (MUCINEX) 600 MG extended release tablet Take 2 kg/m². Wt Readings from Last 3 Encounters:   05/17/19 155 lb (70.3 kg)   05/07/19 155 lb (70.3 kg)   05/01/19 153 lb 12.8 oz (69.8 kg)     BP Readings from Last 3 Encounters:   06/24/19 130/64   05/30/19 136/74   05/21/19 137/72         Physical Exam:  /64 (Site: Right Upper Arm)   Pulse 97   Ht 5' 2\" (1.575 m)   LMP 06/13/1999   SpO2 97%   Breastfeeding? No   BMI 28.35 kg/m²     Gen: she appears actually pretty comfortable today despite all of her issues  Mentally is appropriate, clear   HEENT: EOMI, eyes clear, MMM  Skin:no rash or jaundice  Neck: no significant lymphadenopathy or thyromegaly  Lungs: CTA B w/out Rales/Wheezes/Rhonchi, Good respiratory effort   Heart: RRR, S1S2, w/out M/R/G, non-displaced PMI   Neuro: chronic left side weakness    Ambulating without walker or cane    Assessment/Plan:  Case Da Silva was seen today for anxiety. Diagnoses and all orders for this visit:    B12 deficiency  -     cyanocobalamin injection 1,000 mcg    Breast cancer screening by mammogram  -     [facility] DIGITAL SCREEN W OR WO CAD BILATERAL; Future    Hypothyroidism, unspecified type  -     levothyroxine (SYNTHROID) 200 MCG tablet; Take 1 tablet by mouth daily    Chronic obstructive pulmonary disease, unspecified COPD type ([facility] [address]    Hyperlipidemia with target LDL less than 100    Cerebral infarction due to other mechanism [facility])    Peripheral vascular disease ([facility] [address]    H/O medication noncompliance    Alleged drug diversion    Other secondary osteoarthritis of left shoulder    Other orders  -     fluconazole (DIFLUCAN) 150 MG tablet;  Take 1 tablet by mouth every 3 days for 3 doses    ativan and zanaflex changed to scheduled dosing    Ok for surgery    norvasc to 5mg     Brandon Pagan MD

## 2019-06-26 DIAGNOSIS — M62.838 MUSCLE SPASM: ICD-10-CM

## 2019-06-26 RX ORDER — TIZANIDINE 4 MG/1
TABLET ORAL
Qty: 540 TABLET | Refills: 0 | Status: SHIPPED | OUTPATIENT
Start: 2019-06-26 | End: 2019-07-20 | Stop reason: ALTCHOICE

## 2019-06-27 ENCOUNTER — TELEPHONE (OUTPATIENT)
Dept: FAMILY MEDICINE CLINIC | Age: 59
End: 2019-06-27

## 2019-06-27 NOTE — TELEPHONE ENCOUNTER
[employer]. Recent script for 100 mcg on Thyroid med needs clarified. Neva has a script for 100 and 200 mcg with HX of 300 mcg. Per last lab Renato Curran states to discontinue 100 mcg and take 88 mcg and then recheck in 12 wks? Call in 88?

## 2019-07-02 RX ORDER — ESOMEPRAZOLE MAGNESIUM 40 MG/1
80 CAPSULE, DELAYED RELEASE ORAL DAILY
Qty: 180 CAPSULE | Refills: 0 | Status: SHIPPED | OUTPATIENT
Start: 2019-07-02 | End: 2019-07-05 | Stop reason: SDUPTHER

## 2019-07-03 RX ORDER — ASPIRIN 325 MG
TABLET ORAL
Qty: 30 TABLET | Refills: 0 | Status: SHIPPED | OUTPATIENT
Start: 2019-07-03 | End: 2021-03-10 | Stop reason: ALTCHOICE

## 2019-07-05 ENCOUNTER — TELEPHONE (OUTPATIENT)
Dept: FAMILY MEDICINE CLINIC | Age: 59
End: 2019-07-05

## 2019-07-05 DIAGNOSIS — M54.5 CHRONIC LOW BACK PAIN, UNSPECIFIED BACK PAIN LATERALITY, WITH SCIATICA PRESENCE UNSPECIFIED: ICD-10-CM

## 2019-07-05 DIAGNOSIS — R60.9 EDEMA, UNSPECIFIED TYPE: ICD-10-CM

## 2019-07-05 DIAGNOSIS — G89.29 CHRONIC LOW BACK PAIN, UNSPECIFIED BACK PAIN LATERALITY, WITH SCIATICA PRESENCE UNSPECIFIED: ICD-10-CM

## 2019-07-05 DIAGNOSIS — G43.809 OTHER MIGRAINE WITHOUT STATUS MIGRAINOSUS, NOT INTRACTABLE: ICD-10-CM

## 2019-07-05 DIAGNOSIS — R60.0 LEG EDEMA, LEFT: ICD-10-CM

## 2019-07-05 DIAGNOSIS — G25.81 RLS (RESTLESS LEGS SYNDROME): ICD-10-CM

## 2019-07-05 DIAGNOSIS — E87.6 HYPOKALEMIA: ICD-10-CM

## 2019-07-05 DIAGNOSIS — E03.9 HYPOTHYROIDISM, UNSPECIFIED TYPE: ICD-10-CM

## 2019-07-05 RX ORDER — TERAZOSIN 2 MG/1
2 CAPSULE ORAL NIGHTLY
Qty: 30 CAPSULE | Refills: 5 | Status: SHIPPED | OUTPATIENT
Start: 2019-07-05 | End: 2019-12-20

## 2019-07-05 RX ORDER — FUROSEMIDE 40 MG/1
40 TABLET ORAL DAILY
Qty: 30 TABLET | Refills: 5 | Status: SHIPPED | OUTPATIENT
Start: 2019-07-05 | End: 2019-07-31 | Stop reason: SDUPTHER

## 2019-07-05 RX ORDER — AMLODIPINE BESYLATE 2.5 MG/1
2.5 TABLET ORAL DAILY
Qty: 30 TABLET | Refills: 5 | Status: SHIPPED | OUTPATIENT
Start: 2019-07-05 | End: 2019-07-12 | Stop reason: SDUPTHER

## 2019-07-05 RX ORDER — MIRTAZAPINE 30 MG/1
30 TABLET, FILM COATED ORAL NIGHTLY
Qty: 30 TABLET | Refills: 5 | Status: SHIPPED | OUTPATIENT
Start: 2019-07-05 | End: 2020-01-23

## 2019-07-05 RX ORDER — SPIRONOLACTONE 25 MG/1
25 TABLET ORAL DAILY
Qty: 30 TABLET | Refills: 5 | Status: SHIPPED | OUTPATIENT
Start: 2019-07-05 | End: 2019-12-20

## 2019-07-05 RX ORDER — ARIPIPRAZOLE 10 MG/1
TABLET ORAL
Qty: 30 TABLET | Refills: 5 | Status: SHIPPED | OUTPATIENT
Start: 2019-07-05 | End: 2020-01-23

## 2019-07-05 RX ORDER — ATORVASTATIN CALCIUM 40 MG/1
40 TABLET, FILM COATED ORAL DAILY
Qty: 30 TABLET | Refills: 5 | Status: SHIPPED | OUTPATIENT
Start: 2019-07-05 | End: 2019-12-13

## 2019-07-05 RX ORDER — ESCITALOPRAM OXALATE 20 MG/1
TABLET ORAL
Qty: 30 TABLET | Refills: 5 | Status: SHIPPED | OUTPATIENT
Start: 2019-07-05 | End: 2020-01-23

## 2019-07-05 RX ORDER — PREGABALIN 150 MG/1
CAPSULE ORAL
Qty: 60 CAPSULE | Refills: 5 | Status: SHIPPED | OUTPATIENT
Start: 2019-07-05 | End: 2019-12-12 | Stop reason: SDUPTHER

## 2019-07-05 RX ORDER — LEVOTHYROXINE SODIUM 0.2 MG/1
200 TABLET ORAL DAILY
Qty: 30 TABLET | Refills: 5 | Status: SHIPPED | OUTPATIENT
Start: 2019-07-05 | End: 2019-07-31 | Stop reason: CLARIF

## 2019-07-05 RX ORDER — PRAMIPEXOLE DIHYDROCHLORIDE 0.5 MG/1
0.5 TABLET ORAL EVERY EVENING
Qty: 30 TABLET | Refills: 5 | Status: SHIPPED | OUTPATIENT
Start: 2019-07-05 | End: 2020-05-11

## 2019-07-05 RX ORDER — POTASSIUM CHLORIDE 20 MEQ/1
20 TABLET, EXTENDED RELEASE ORAL DAILY
Qty: 30 TABLET | Refills: 5 | Status: SHIPPED | OUTPATIENT
Start: 2019-07-05 | End: 2019-12-13 | Stop reason: SDUPTHER

## 2019-07-05 RX ORDER — FOLIC ACID 1 MG/1
1 TABLET ORAL DAILY
Qty: 30 TABLET | Refills: 5 | Status: SHIPPED | OUTPATIENT
Start: 2019-07-05 | End: 2019-12-20

## 2019-07-05 RX ORDER — LEVOCETIRIZINE DIHYDROCHLORIDE 5 MG/1
TABLET, FILM COATED ORAL
Qty: 30 TABLET | Refills: 5 | Status: SHIPPED | OUTPATIENT
Start: 2019-07-05 | End: 2020-03-17

## 2019-07-05 RX ORDER — ESOMEPRAZOLE MAGNESIUM 40 MG/1
80 CAPSULE, DELAYED RELEASE ORAL DAILY
Qty: 60 CAPSULE | Refills: 5 | Status: SHIPPED | OUTPATIENT
Start: 2019-07-05 | End: 2020-01-16

## 2019-07-12 ENCOUNTER — TELEPHONE (OUTPATIENT)
Dept: FAMILY MEDICINE CLINIC | Age: 59
End: 2019-07-12

## 2019-07-12 RX ORDER — AMLODIPINE BESYLATE 5 MG/1
5 TABLET ORAL DAILY
Qty: 30 TABLET | Refills: 5 | Status: SHIPPED | OUTPATIENT
Start: 2019-07-12 | End: 2019-07-12 | Stop reason: SDUPTHER

## 2019-07-12 RX ORDER — AZITHROMYCIN 250 MG/1
TABLET, FILM COATED ORAL
Qty: 1 PACKET | Refills: 0 | Status: SHIPPED | OUTPATIENT
Start: 2019-07-12 | End: 2019-07-22

## 2019-07-12 NOTE — TELEPHONE ENCOUNTER
Pt calling about her amlodipine Rx. She went to pick it up from the pharmacy and they still have it as 2.5 MG. Pt states that you increased to 5 MG. Can you please resend? Pt also coming down with a cough and chest congestion. Asking for a zpack. Jacques Green.

## 2019-07-13 RX ORDER — AMLODIPINE BESYLATE 5 MG/1
5 TABLET ORAL DAILY
Qty: 90 TABLET | Refills: 5 | Status: SHIPPED | OUTPATIENT
Start: 2019-07-13 | End: 2020-05-14

## 2019-07-15 ENCOUNTER — TELEPHONE (OUTPATIENT)
Dept: FAMILY MEDICINE CLINIC | Age: 59
End: 2019-07-15

## 2019-07-19 RX ORDER — FLUCONAZOLE 100 MG/1
100 TABLET ORAL DAILY
Qty: 7 TABLET | Refills: 0 | Status: SHIPPED | OUTPATIENT
Start: 2019-07-19 | End: 2019-07-26

## 2019-07-20 ENCOUNTER — HOSPITAL ENCOUNTER (EMERGENCY)
Age: 59
Discharge: HOME | End: 2019-07-20
Payer: MEDICARE

## 2019-07-20 VITALS
HEART RATE: 90 BPM | TEMPERATURE: 98.3 F | SYSTOLIC BLOOD PRESSURE: 97 MMHG | RESPIRATION RATE: 18 BRPM | DIASTOLIC BLOOD PRESSURE: 55 MMHG

## 2019-07-20 DIAGNOSIS — S39.012A STRAIN OF LUMBAR REGION, INITIAL ENCOUNTER: Primary | ICD-10-CM

## 2019-07-20 PROCEDURE — 6360000002 HC RX W HCPCS: Performed by: NURSE PRACTITIONER

## 2019-07-20 PROCEDURE — 6370000000 HC RX 637 (ALT 250 FOR IP): Performed by: NURSE PRACTITIONER

## 2019-07-20 PROCEDURE — 96372 THER/PROPH/DIAG INJ SC/IM: CPT

## 2019-07-20 PROCEDURE — 99282 EMERGENCY DEPT VISIT SF MDM: CPT

## 2019-07-20 RX ORDER — KETOROLAC TROMETHAMINE 30 MG/ML
30 INJECTION, SOLUTION INTRAMUSCULAR; INTRAVENOUS ONCE
Status: COMPLETED | OUTPATIENT
Start: 2019-07-20 | End: 2019-07-20

## 2019-07-20 RX ORDER — LIDOCAINE 4 G/G
1 PATCH TOPICAL DAILY
Status: DISCONTINUED | OUTPATIENT
Start: 2019-07-20 | End: 2019-07-20 | Stop reason: HOSPADM

## 2019-07-20 RX ORDER — CYCLOBENZAPRINE HCL 10 MG
5 TABLET ORAL ONCE
Status: COMPLETED | OUTPATIENT
Start: 2019-07-20 | End: 2019-07-20

## 2019-07-20 RX ORDER — METHOCARBAMOL 500 MG/1
500 TABLET, FILM COATED ORAL 3 TIMES DAILY
Qty: 15 TABLET | Refills: 0 | Status: SHIPPED | OUTPATIENT
Start: 2019-07-20 | End: 2019-07-26 | Stop reason: ALTCHOICE

## 2019-07-20 RX ADMIN — CYCLOBENZAPRINE HYDROCHLORIDE 5 MG: 10 TABLET, FILM COATED ORAL at 11:45

## 2019-07-20 RX ADMIN — KETOROLAC TROMETHAMINE 30 MG: 30 INJECTION, SOLUTION INTRAMUSCULAR at 11:45

## 2019-07-20 ASSESSMENT — ENCOUNTER SYMPTOMS
ABDOMINAL DISTENTION: 0
CONSTIPATION: 0
BACK PAIN: 1
SINUS PRESSURE: 0
NAUSEA: 0
CHEST TIGHTNESS: 0
EYE REDNESS: 0
ABDOMINAL PAIN: 0
SHORTNESS OF BREATH: 0
SORE THROAT: 0
FACIAL SWELLING: 0
COUGH: 0
WHEEZING: 0
DIARRHEA: 0
EYE DISCHARGE: 0

## 2019-07-20 ASSESSMENT — PAIN SCALES - GENERAL: PAINLEVEL_OUTOF10: 10

## 2019-07-20 NOTE — ED PROVIDER NOTES
for cold intolerance and heat intolerance. Genitourinary: Negative for difficulty urinating, dysuria, flank pain, pelvic pain and urgency. Musculoskeletal: Positive for back pain. Negative for arthralgias, gait problem and joint swelling. Skin: Negative. Negative for pallor and rash. Neurological: Negative for dizziness, seizures, syncope, weakness, numbness and headaches. Psychiatric/Behavioral: Negative for agitation, behavioral problems, confusion and dysphoric mood. Except as noted above the remainder of the review of systems was reviewed and negative. PAST MEDICAL HISTORY     Past Medical History:   Diagnosis Date    Acid reflux     Allergic rhinitis     Anxiety     Arnold-Chiari deformity ([facility] [address] 2000    surgery    Asthma     Cerebral artery occlusion with cerebral infarction ([facility] [address] 2001    1 yr after brain OR    Cerebrovascular disease     Chronic back pain greater than 3 months duration     COPD (chronic obstructive pulmonary disease) (HCC)     Dr Seferino Patiño at [address] CVA (cerebral infarction) 2001    left hemiparesis, due to ? estrogen + smoking    Depression 1993    Dr Sal Serra H/O encephalopathy 2001    Headache(784.0)     Dr Jazmin Rasmussen Hepatitis B surface antigen positive     Hx of blood clots 2010    PE / trx with coumadin x 6 months    Hyperlipidemia LDL goal < 100     meds > 10 yrs    Hypertension     meds > 2 yrs    Hypothyroidism 2001    meds > 18 yrs    Laryngitis, chronic 2012    scoped by Dr Ravinder Rapp, fungal    Marijuana smoker in remission [facility]) 2011    Obesity (BMI 30-39. 9)     Osteoarthritis     Pulmonary embolism and infarction (HCC)     Restless legs syndrome     Rhinitis, chronic     Rotator cuff tear     Left    S/P colonoscopy with polypectomy 2010    Dr Anmol Ochoa    Seizures [facility])     Smoker     Spinal headache     past partum 1994    Spondylosis without myelopathy     Type 2 diabetes mellitus without complication (HCC)     hx > Take 1 tablet by mouth every evening    PRAMIPEXOLE (MIRAPEX) 0.5 MG TABLET    TAKE 1 TABLET BY MOUTH EVERY EVENING    PREGABALIN (LYRICA) 150 MG CAPSULE    Take 1 capsule by mouth 2 times daily for 30 days. PREGABALIN (LYRICA) 150 MG CAPSULE    TAKE 1 CAPSULE BY MOUTH TWICE DAILY    SIMETHICONE 180 MG CAPS    TAKE 1 CAPSULE BY MOUTH TWICE DAILY    SPIRONOLACTONE (ALDACTONE) 25 MG TABLET    Take 1 tablet by mouth daily    SYMBICORT 160-4.5 MCG/ACT AERO    INL 2 PFS PO BID UTD    TERAZOSIN (HYTRIN) 2 MG CAPSULE    Take 1 capsule by mouth nightly    VITAMIN B-12 (CYANOCOBALAMIN) 1000 MCG TABLET    Take 1 tablet by mouth daily    VITAMIN D (ERGOCALCIFEROL) 53837 UNITS CAPS CAPSULE    Take 1 capsule by mouth every 7 days       ALLERGIES     Latex; Imitrex [sumatriptan]; Zomig [zolmitriptan]; Antivert [meclizine hcl]; Flagyl [metronidazole]; Ketorolac tromethamine; Provera [medroxyprogesterone acetate]; Ultram [tramadol hcl]; Bacitracin; Bactrim; Cefdinir; Cefuroxime axetil; Codeine; Cyclosporine; Iodine; Iv dye [iodides]; Neomycin; Petroleum jelly [skin protectants, misc.]; Quinolones; Sulfa antibiotics;  Toradol [ketorolac tromethamine]; and Trovan [trovafloxacin]    FAMILY HISTORY       Family History   Problem Relation Age of Onset    Osteoarthritis Mother     Asthma Mother     Diabetes Mother     Hypertension Mother     Cancer Mother         lung    Osteoarthritis Father     Hypertension Father     Other Father         PE    Cancer Father         stomach cancer    Asthma Brother     Heart Attack Brother     Other Brother         overdose    Asthma Sister     Breast Cancer Sister     Hypertension Sister     Other Sister         overdose / MVA          SOCIAL HISTORY       Social History     Socioeconomic History    Marital status:      Spouse name: None    Number of children: 1    Years of education: None    Highest education level: None   Occupational History    Occupation: [employer] There is no tenderness. There is no rebound and no guarding. Musculoskeletal: Normal range of motion. She exhibits no edema. Lumbar back: She exhibits tenderness. She exhibits no bony tenderness, no swelling, no edema, no deformity, no laceration and no pain. Arms:  Pt does have lumbar scar- from surgery. Well healed and she is not tender over vertebra. She is tender over right side. Pt can dorsiflex her toes. She is able to stand up from wheelchair   Lymphadenopathy:     She has no cervical adenopathy. Neurological: She is alert and oriented to person, place, and time. Coordination normal.   Skin: Skin is warm and dry. No rash noted. No erythema. Psychiatric: Her behavior is normal. Thought content normal.         DIAGNOSTIC RESULTS     RADIOLOGY:   Non-plain film images such as CT, Ultrasound and MRI are read by the radiologist. Plain radiographic images are visualized and preliminarilyinterpreted by MAYURI Frias CNP with the below findings:    n/a    Interpretation per the Radiologist below, if available at the time of this note:    No orders to display       LABS:  Labs Reviewed - No data to display    All other labs were within normal range or not returnedas of this dictation. EMERGENCYDEPARTMENT COURSE and DIFFERENTIAL DIAGNOSIS/MDM:   Vitals:    Vitals:    07/20/19 1125   BP: (!) 97/55   Pulse: 90   Resp: 18   Temp: 98.3 °F (36.8 °C)   TempSrc: Oral       REASSESSMENT          MDM    PROCEDURES:    Procedures      FINAL IMPRESSION      1.  Strain of lumbar region, initial encounter          DISPOSITION/PLAN   DISPOSITION        PATIENT REFERRED TO:  Shamar Padron MD  [address]  [phone]      As needed    [facility]) ED  [address] [address] [phone]    If symptoms worsen      DISCHARGE MEDICATIONS:  New Prescriptions    METHOCARBAMOL (ROBAXIN) 500 MG TABLET    Take 1 tablet by mouth 3 times daily for 10 days

## 2019-07-22 ENCOUNTER — TELEPHONE (OUTPATIENT)
Dept: FAMILY MEDICINE CLINIC | Age: 59
End: 2019-07-22

## 2019-07-24 ENCOUNTER — HOSPITAL ENCOUNTER (OUTPATIENT)
Dept: GENERAL RADIOLOGY | Age: 59
Discharge: HOME | End: 2019-07-26
Payer: MEDICARE

## 2019-07-24 DIAGNOSIS — M75.102 ROTATOR CUFF SYNDROME OF LEFT SHOULDER: ICD-10-CM

## 2019-07-24 PROCEDURE — 73030 X-RAY EXAM OF SHOULDER: CPT

## 2019-07-26 ENCOUNTER — HOSPITAL ENCOUNTER (EMERGENCY)
Age: 59
Discharge: HOME | End: 2019-07-26
Attending: EMERGENCY MEDICINE
Payer: MEDICARE

## 2019-07-26 VITALS
RESPIRATION RATE: 17 BRPM | DIASTOLIC BLOOD PRESSURE: 71 MMHG | HEIGHT: 62 IN | HEART RATE: 103 BPM | OXYGEN SATURATION: 95 % | SYSTOLIC BLOOD PRESSURE: 114 MMHG | TEMPERATURE: 98 F | BODY MASS INDEX: 27.6 KG/M2 | WEIGHT: 150 LBS

## 2019-07-26 DIAGNOSIS — M54.41 ACUTE BILATERAL LOW BACK PAIN WITH RIGHT-SIDED SCIATICA: Primary | ICD-10-CM

## 2019-07-26 PROCEDURE — 99282 EMERGENCY DEPT VISIT SF MDM: CPT

## 2019-07-26 PROCEDURE — 96372 THER/PROPH/DIAG INJ SC/IM: CPT

## 2019-07-26 PROCEDURE — 6360000002 HC RX W HCPCS: Performed by: NURSE PRACTITIONER

## 2019-07-26 RX ORDER — METHOCARBAMOL 750 MG/1
750 TABLET, FILM COATED ORAL 4 TIMES DAILY
Qty: 20 TABLET | Refills: 0 | Status: SHIPPED | OUTPATIENT
Start: 2019-07-26 | End: 2019-07-26 | Stop reason: SDUPTHER

## 2019-07-26 RX ORDER — METHOCARBAMOL 750 MG/1
750 TABLET, FILM COATED ORAL 4 TIMES DAILY
Qty: 20 TABLET | Refills: 0 | Status: SHIPPED | OUTPATIENT
Start: 2019-07-26 | End: 2019-07-31

## 2019-07-26 RX ORDER — ORPHENADRINE CITRATE 30 MG/ML
60 INJECTION INTRAMUSCULAR; INTRAVENOUS ONCE
Status: COMPLETED | OUTPATIENT
Start: 2019-07-26 | End: 2019-07-26

## 2019-07-26 RX ORDER — METHYLPREDNISOLONE ACETATE 80 MG/ML
80 INJECTION, SUSPENSION INTRA-ARTICULAR; INTRALESIONAL; INTRAMUSCULAR; SOFT TISSUE ONCE
Status: COMPLETED | OUTPATIENT
Start: 2019-07-26 | End: 2019-07-26

## 2019-07-26 RX ADMIN — ORPHENADRINE CITRATE 60 MG: 30 INJECTION INTRAMUSCULAR; INTRAVENOUS at 11:54

## 2019-07-26 RX ADMIN — METHYLPREDNISOLONE ACETATE 80 MG: 80 INJECTION, SUSPENSION INTRA-ARTICULAR; INTRALESIONAL; INTRAMUSCULAR; SOFT TISSUE at 11:54

## 2019-07-26 ASSESSMENT — ENCOUNTER SYMPTOMS
VOMITING: 0
SORE THROAT: 0
NAUSEA: 0
COUGH: 0
SHORTNESS OF BREATH: 0
ABDOMINAL PAIN: 0
TROUBLE SWALLOWING: 0
DIARRHEA: 0
BACK PAIN: 1

## 2019-07-26 ASSESSMENT — PAIN DESCRIPTION - LOCATION: LOCATION: LEG;BACK

## 2019-07-26 ASSESSMENT — PAIN SCALES - GENERAL: PAINLEVEL_OUTOF10: 9

## 2019-07-27 ENCOUNTER — APPOINTMENT (OUTPATIENT)
Dept: GENERAL RADIOLOGY | Age: 59
End: 2019-07-27
Payer: MEDICARE

## 2019-07-27 ENCOUNTER — HOSPITAL ENCOUNTER (EMERGENCY)
Age: 59
Discharge: HOME | End: 2019-07-27
Attending: EMERGENCY MEDICINE
Payer: MEDICARE

## 2019-07-27 VITALS
HEART RATE: 81 BPM | WEIGHT: 154 LBS | BODY MASS INDEX: 28.34 KG/M2 | OXYGEN SATURATION: 97 % | DIASTOLIC BLOOD PRESSURE: 61 MMHG | HEIGHT: 62 IN | SYSTOLIC BLOOD PRESSURE: 94 MMHG | TEMPERATURE: 98.3 F | RESPIRATION RATE: 16 BRPM

## 2019-07-27 DIAGNOSIS — G89.29 ACUTE EXACERBATION OF CHRONIC LOW BACK PAIN: ICD-10-CM

## 2019-07-27 DIAGNOSIS — M54.50 ACUTE EXACERBATION OF CHRONIC LOW BACK PAIN: ICD-10-CM

## 2019-07-27 DIAGNOSIS — E86.0 DEHYDRATION: Primary | ICD-10-CM

## 2019-07-27 LAB
ALBUMIN SERPL-MCNC: 4.2 G/DL (ref 3.5–4.6)
ALP BLD-CCNC: 108 U/L (ref 40–130)
ALT SERPL-CCNC: 28 U/L (ref 0–33)
ANION GAP SERPL CALCULATED.3IONS-SCNC: 13 MEQ/L (ref 9–15)
AST SERPL-CCNC: 30 U/L (ref 0–35)
BACTERIA: NEGATIVE /HPF
BASOPHILS ABSOLUTE: 0.1 K/UL (ref 0–0.2)
BASOPHILS RELATIVE PERCENT: 1.1 %
BILIRUB SERPL-MCNC: <0.2 MG/DL (ref 0.2–0.7)
BILIRUBIN URINE: NEGATIVE
BLOOD, URINE: NEGATIVE
BUN BLDV-MCNC: 14 MG/DL (ref 6–20)
CALCIUM SERPL-MCNC: 9.3 MG/DL (ref 8.5–9.9)
CHLORIDE BLD-SCNC: 108 MEQ/L (ref 95–107)
CLARITY: CLEAR
CO2: 23 MEQ/L (ref 20–31)
COLOR: YELLOW
CREAT SERPL-MCNC: 1.15 MG/DL (ref 0.5–0.9)
EOSINOPHILS ABSOLUTE: 0.2 K/UL (ref 0–0.7)
EOSINOPHILS RELATIVE PERCENT: 1.9 %
EPITHELIAL CELLS, UA: ABNORMAL /HPF (ref 0–5)
GFR AFRICAN AMERICAN: 58.8
GFR NON-AFRICAN AMERICAN: 48.6
GLOBULIN: 3.3 G/DL (ref 2.3–3.5)
GLUCOSE BLD-MCNC: 103 MG/DL (ref 70–99)
GLUCOSE URINE: NEGATIVE MG/DL
HCT VFR BLD CALC: 33.8 % (ref 37–47)
HEMOGLOBIN: 11.5 G/DL (ref 12–16)
HYALINE CASTS: ABNORMAL /HPF (ref 0–5)
KETONES, URINE: NEGATIVE MG/DL
LEUKOCYTE ESTERASE, URINE: ABNORMAL
LYMPHOCYTES ABSOLUTE: 1.7 K/UL (ref 1–4.8)
LYMPHOCYTES RELATIVE PERCENT: 16.7 %
MCH RBC QN AUTO: 25.8 PG (ref 27–31.3)
MCHC RBC AUTO-ENTMCNC: 33.9 % (ref 33–37)
MCV RBC AUTO: 76.1 FL (ref 82–100)
MONOCYTES ABSOLUTE: 0.8 K/UL (ref 0.2–0.8)
MONOCYTES RELATIVE PERCENT: 7.5 %
NEUTROPHILS ABSOLUTE: 7.5 K/UL (ref 1.4–6.5)
NEUTROPHILS RELATIVE PERCENT: 72.8 %
NITRITE, URINE: NEGATIVE
PDW BLD-RTO: 17.1 % (ref 11.5–14.5)
PH UA: 6.5 (ref 5–9)
PLATELET # BLD: 272 K/UL (ref 130–400)
POTASSIUM SERPL-SCNC: 4.9 MEQ/L (ref 3.4–4.9)
PROTEIN UA: NEGATIVE MG/DL
RBC # BLD: 4.45 M/UL (ref 4.2–5.4)
RBC UA: ABNORMAL /HPF (ref 0–5)
SODIUM BLD-SCNC: 144 MEQ/L (ref 135–144)
SPECIFIC GRAVITY UA: 1.01 (ref 1–1.03)
TOTAL PROTEIN: 7.5 G/DL (ref 6.3–8)
URINE REFLEX TO CULTURE: YES
UROBILINOGEN, URINE: 0.2 E.U./DL
WBC # BLD: 10.3 K/UL (ref 4.8–10.8)
WBC UA: ABNORMAL /HPF (ref 0–5)

## 2019-07-27 PROCEDURE — 99283 EMERGENCY DEPT VISIT LOW MDM: CPT

## 2019-07-27 PROCEDURE — 87086 URINE CULTURE/COLONY COUNT: CPT

## 2019-07-27 PROCEDURE — 96361 HYDRATE IV INFUSION ADD-ON: CPT

## 2019-07-27 PROCEDURE — 96360 HYDRATION IV INFUSION INIT: CPT

## 2019-07-27 PROCEDURE — 80053 COMPREHEN METABOLIC PANEL: CPT

## 2019-07-27 PROCEDURE — 72110 X-RAY EXAM L-2 SPINE 4/>VWS: CPT

## 2019-07-27 PROCEDURE — 87186 SC STD MICRODIL/AGAR DIL: CPT

## 2019-07-27 PROCEDURE — 36415 COLL VENOUS BLD VENIPUNCTURE: CPT

## 2019-07-27 PROCEDURE — 6370000000 HC RX 637 (ALT 250 FOR IP): Performed by: NURSE PRACTITIONER

## 2019-07-27 PROCEDURE — 2580000003 HC RX 258: Performed by: NURSE PRACTITIONER

## 2019-07-27 PROCEDURE — 87077 CULTURE AEROBIC IDENTIFY: CPT

## 2019-07-27 PROCEDURE — 85025 COMPLETE CBC W/AUTO DIFF WBC: CPT

## 2019-07-27 PROCEDURE — 81001 URINALYSIS AUTO W/SCOPE: CPT

## 2019-07-27 RX ORDER — OXYCODONE HYDROCHLORIDE AND ACETAMINOPHEN 5; 325 MG/1; MG/1
1 TABLET ORAL ONCE
Status: COMPLETED | OUTPATIENT
Start: 2019-07-27 | End: 2019-07-27

## 2019-07-27 RX ORDER — 0.9 % SODIUM CHLORIDE 0.9 %
1000 INTRAVENOUS SOLUTION INTRAVENOUS ONCE
Status: COMPLETED | OUTPATIENT
Start: 2019-07-27 | End: 2019-07-27

## 2019-07-27 RX ADMIN — SODIUM CHLORIDE 1000 ML: 9 INJECTION, SOLUTION INTRAVENOUS at 11:37

## 2019-07-27 RX ADMIN — OXYCODONE HYDROCHLORIDE AND ACETAMINOPHEN 1 TABLET: 5; 325 TABLET ORAL at 11:37

## 2019-07-27 ASSESSMENT — ENCOUNTER SYMPTOMS
SORE THROAT: 0
VOMITING: 0
NAUSEA: 0
DIARRHEA: 0
SHORTNESS OF BREATH: 0
BACK PAIN: 1
COUGH: 0
TROUBLE SWALLOWING: 0
ABDOMINAL PAIN: 0

## 2019-07-27 ASSESSMENT — PAIN DESCRIPTION - FREQUENCY
FREQUENCY: CONTINUOUS
FREQUENCY: CONTINUOUS

## 2019-07-27 ASSESSMENT — PAIN DESCRIPTION - LOCATION
LOCATION: BACK

## 2019-07-27 ASSESSMENT — PAIN DESCRIPTION - DIRECTION: RADIATING_TOWARDS: DOWN RT LEG

## 2019-07-27 ASSESSMENT — PAIN SCALES - GENERAL
PAINLEVEL_OUTOF10: 9
PAINLEVEL_OUTOF10: 7
PAINLEVEL_OUTOF10: 2
PAINLEVEL_OUTOF10: 9

## 2019-07-27 ASSESSMENT — PAIN DESCRIPTION - DESCRIPTORS: DESCRIPTORS: SHARP;STABBING

## 2019-07-27 ASSESSMENT — PAIN DESCRIPTION - PAIN TYPE
TYPE: ACUTE PAIN;CHRONIC PAIN
TYPE: CHRONIC PAIN
TYPE: ACUTE PAIN;CHRONIC PAIN

## 2019-07-27 NOTE — ED PROVIDER NOTES
understanding. PROCEDURES:  Unless otherwise noted below, none     Procedures      FINAL IMPRESSION      1. Dehydration    2.  Acute exacerbation of chronic low back pain          DISPOSITION/PLAN   DISPOSITION            MAYURI Ortiz CNP (electronically signed)  Attending Emergency Physician     MAYURI Ortiz CNP  07/27/19 1319

## 2019-07-28 ENCOUNTER — HOSPITAL ENCOUNTER (EMERGENCY)
Age: 59
Discharge: HOME | End: 2019-07-28
Payer: MEDICARE

## 2019-07-28 VITALS
WEIGHT: 140 LBS | HEART RATE: 96 BPM | TEMPERATURE: 98.8 F | DIASTOLIC BLOOD PRESSURE: 63 MMHG | HEIGHT: 62 IN | BODY MASS INDEX: 25.76 KG/M2 | SYSTOLIC BLOOD PRESSURE: 100 MMHG | RESPIRATION RATE: 18 BRPM | OXYGEN SATURATION: 94 %

## 2019-07-28 DIAGNOSIS — M54.30 SCIATICA, UNSPECIFIED LATERALITY: Primary | ICD-10-CM

## 2019-07-28 PROCEDURE — 99282 EMERGENCY DEPT VISIT SF MDM: CPT

## 2019-07-28 ASSESSMENT — ENCOUNTER SYMPTOMS
VOMITING: 0
RESPIRATORY NEGATIVE: 1
SHORTNESS OF BREATH: 0
NAUSEA: 0
ALLERGIC/IMMUNOLOGIC NEGATIVE: 1
EYES NEGATIVE: 1
CHEST TIGHTNESS: 0
GASTROINTESTINAL NEGATIVE: 1
BACK PAIN: 1

## 2019-07-28 ASSESSMENT — PAIN DESCRIPTION - LOCATION: LOCATION: BACK

## 2019-07-28 ASSESSMENT — PAIN DESCRIPTION - PAIN TYPE: TYPE: ACUTE PAIN;CHRONIC PAIN

## 2019-07-28 NOTE — ED PROVIDER NOTES
[trovafloxacin]    FAMILY HISTORY       Family History   Problem Relation Age of Onset    Osteoarthritis Mother     Asthma Mother     Diabetes Mother     Hypertension Mother     Cancer Mother         lung    Osteoarthritis Father     Hypertension Father     Other Father         PE    Cancer Father         stomach cancer    Asthma Brother     Heart Attack Brother     Other Brother         overdose    Asthma Sister     Breast Cancer Sister     Hypertension Sister     Other Sister         overdose / MVA          SOCIAL HISTORY       Social History     Socioeconomic History    Marital status:      Spouse name: None    Number of children: 1    Years of education: None    Highest education level: None   Occupational History    Occupation: [employer]SI   Social Needs    Financial resource strain: None    Food insecurity:     Worry: None     Inability: None    Transportation needs:     Medical: None     Non-medical: None   Tobacco Use    Smoking status: Current Every Day Smoker     Packs/day: 1.00     Years: 32.00     Pack years: 32.00     Types: Cigarettes    Smokeless tobacco: Never Used   Substance and Sexual Activity    Alcohol use: No     Alcohol/week: 0.0 standard drinks    Drug use: Yes     Types: Marijuana     Comment: daily for pain    Sexual activity: None   Lifestyle    Physical activity:     Days per week: None     Minutes per session: None    Stress: None   Relationships    Social connections:     Talks on phone: None     Gets together: None     Attends Tenriism service: None     Active member of club or organization: None     Attends meetings of clubs or organizations: None     Relationship status: None    Intimate partner violence:     Fear of current or ex partner: None     Emotionally abused: None     Physically abused: None     Forced sexual activity: None   Other Topics Concern    None   Social History Narrative    None       SCREENINGS

## 2019-07-29 LAB
ORGANISM: ABNORMAL
URINE CULTURE, ROUTINE: ABNORMAL
URINE CULTURE, ROUTINE: ABNORMAL

## 2019-07-31 ENCOUNTER — OFFICE VISIT (OUTPATIENT)
Dept: FAMILY MEDICINE CLINIC | Age: 59
End: 2019-07-31
Payer: MEDICARE

## 2019-07-31 VITALS
HEIGHT: 62 IN | BODY MASS INDEX: 27.42 KG/M2 | OXYGEN SATURATION: 98 % | DIASTOLIC BLOOD PRESSURE: 70 MMHG | WEIGHT: 149 LBS | HEART RATE: 96 BPM | SYSTOLIC BLOOD PRESSURE: 120 MMHG

## 2019-07-31 DIAGNOSIS — M54.41 CHRONIC LOW BACK PAIN WITH RIGHT-SIDED SCIATICA, UNSPECIFIED BACK PAIN LATERALITY: ICD-10-CM

## 2019-07-31 DIAGNOSIS — G25.81 RLS (RESTLESS LEGS SYNDROME): ICD-10-CM

## 2019-07-31 DIAGNOSIS — I63.89 CEREBRAL INFARCTION DUE TO OTHER MECHANISM (HCC): ICD-10-CM

## 2019-07-31 DIAGNOSIS — F41.9 ANXIETY: Primary | ICD-10-CM

## 2019-07-31 DIAGNOSIS — G89.29 CHRONIC LOW BACK PAIN WITH RIGHT-SIDED SCIATICA, UNSPECIFIED BACK PAIN LATERALITY: ICD-10-CM

## 2019-07-31 PROCEDURE — 99213 OFFICE O/P EST LOW 20 MIN: CPT | Performed by: FAMILY MEDICINE

## 2019-07-31 RX ORDER — LORAZEPAM 0.5 MG/1
0.5 TABLET ORAL EVERY 8 HOURS PRN
Qty: 60 TABLET | Refills: 1 | Status: SHIPPED | OUTPATIENT
Start: 2019-07-31 | End: 2019-09-14 | Stop reason: SDUPTHER

## 2019-07-31 RX ORDER — TERAZOSIN 2 MG/1
CAPSULE ORAL
Qty: 90 CAPSULE | Refills: 0 | Status: SHIPPED | OUTPATIENT
Start: 2019-07-31 | End: 2019-09-17

## 2019-07-31 RX ORDER — LEVOTHYROXINE SODIUM 300 UG/1
300 TABLET ORAL DAILY
COMMUNITY
End: 2019-09-04

## 2019-07-31 RX ORDER — METHYLPREDNISOLONE 4 MG/1
TABLET ORAL
Qty: 1 KIT | Refills: 0 | Status: SHIPPED | OUTPATIENT
Start: 2019-07-31 | End: 2019-09-17

## 2019-07-31 NOTE — PROGRESS NOTES
Chief Complaint   Patient presents with    Follow-Up from [facility]     ED, back pain.     Back Pain    Medication Refill            Mat Elías is a 64 y.o. female    ER a few times, 3 days in a row  Back pain/groin pain \"sciatica\"  Was given robaxin    She called her back surgeon, she states    She has gotten prescriptions from Dr. Shiv Blakely for her shoulder pain  Tramadol just filled    No longer has home therapy  She states Dr. Shiv Blakely doesn't want her to go to outpatient therapy    \"percocet given in ER really helped\"    She wants Ativan refilled          Lab Results   Component Value Date    LABA1C 5.8 05/18/2019     No results found for: EAG        Patient Active Problem List   Diagnosis    Hypothyroidism    Anxiety    COPD (chronic obstructive pulmonary disease) ([address]    Smoker    Cerebral infarction ([address]    Arnold-Chiari deformity ([address]    Headache    Hyperlipidemia with target LDL less than 100    Pulmonary embolism and infarction ([address]    Laryngitis, chronic    Rhinitis, chronic    Depression    Acid reflux    H/O encephalopathy    Hepatitis B surface antigen positive    S/P colonoscopy with polypectomy    Hemiparesis affecting left side as late effect of cerebrovascular accident ([address]    Migraine without status migrainosus, not intractable    Seasonal allergic rhinitis due to pollen    Edema    Muscle spasm    Adhesive capsulitis of left shoulder    Congenital anomaly of adrenal gland    Airway hyperreactivity    Allergic rhinitis    Alveolitis of jaw    Anaclitic depression    Aortic valve disorder    Bipolar disorder ([address]    Bone necrosis ([address]    Cellulitis of left lower extremity    Cervical dystonia    Cervicalgia    Chronic maxillary sinusitis    Chronic periodontitis, generalized    Chronic retention of urine    Compression of brain ([address]    Dental caries extending into pulp    Diabetes mellitus, type II ([address]    Drug-seeking behavior    Dysphonia  Essential hypertension    Fracture of lumbar vertebra ([address]    H/O: CVA (cerebrovascular accident)    Hearing difficulty    History of HPV infection    Hypothyroidism due to Hashimoto's thyroiditis    Hypoxic brain injury ([address]    Late effects of CVA (cerebrovascular accident)    Neurogenic bladder    Nonallopathic lesion of cervical region, not elsewhere classified    Nonallopathic lesion of sacral region    Peripheral vascular disease ([address]    Post-laminectomy syndrome    Primary osteoarthritis of left shoulder    Pulmonary embolism (HCC)    Pulmonary embolism with infarction ([address]    Recurrent UTI    Retention of urine    Trochanteric bursitis of left hip    Vitamin D deficiency    High risk medication use - 11/01/17 OARRS PM&R, 11/13/17 Tox Screen: positive marijuana PM&R    Marijuana use    Chronic midline thoracic back pain    Vertebral compression fracture (HCC)    Closed wedge compression fracture of first lumbar vertebra with delayed healing    DJD of left shoulder    Status post total shoulder replacement, right    Status post total shoulder replacement, left    Decubitus ulcer of left ischial area    Decubitus ulcer of sacral area    Weakness    Left shoulder pain    Status post reverse total shoulder replacement, left    Alleged drug diversion    H/O medication noncompliance       Allergies   Allergen Reactions    Latex Rash    Imitrex [Sumatriptan] Anaphylaxis    Zomig [Zolmitriptan] Anaphylaxis    Antivert [Meclizine Hcl] Other (See Comments)     confusion    Flagyl [Metronidazole] Nausea Only     Nausea with rash    Ketorolac Tromethamine Nausea Only    Provera [Medroxyprogesterone Acetate] Other (See Comments)     Caused massive stroke    Ultram [Tramadol Hcl] Nausea Only    Bacitracin Rash    Bactrim Nausea And Vomiting and Rash    Cefdinir Rash    Cefuroxime Axetil Rash    Codeine Rash    Cyclosporine Rash    Iodine Rash     Rash with SOB    Iv (HYTRIN) 2 MG capsule Take 1 capsule by mouth nightly 30 capsule 5    ARIPiprazole (ABILIFY) 10 MG tablet take 1 tab q night 30 tablet 5    escitalopram (LEXAPRO) 20 MG tablet one tab at night 30 tablet 5    mirtazapine (REMERON) 30 MG tablet Take 1 tablet by mouth nightly 30 tablet 5    Ascorbic Acid (VITAMIN C/KATIA HIPS) 500 MG TABS TAKE 1 TABLET BY MOUTH DAILY 30 tablet 0    ibuprofen (ADVIL;MOTRIN) 800 MG tablet TAKE 1 TABLET BY MOUTH EVERY 8 HOURS AS NEEDED FOR PAIN 90 tablet 0    furosemide (LASIX) 40 MG tablet TAKE 1 TABLET BY MOUTH DAILY 90 tablet 0    omeprazole (PRILOSEC) 20 MG delayed release capsule Take 40 mg by mouth 2 times daily      omega-3 acid ethyl esters (LOVAZA) 1 g capsule Take 2 capsules by mouth 2 times daily 60 capsule 3    vitamin D (ERGOCALCIFEROL) 61027 units CAPS capsule Take 1 capsule by mouth every 7 days 30 capsule 5    ondansetron (ZOFRAN-ODT) 4 MG disintegrating tablet Take 1 tablet by mouth every 8 hours as needed for Nausea or Vomiting 30 tablet 2    ONE TOUCH ULTRA TEST strip Test blood glucose TID.   Dx: DM2 300 each 3    mupirocin (BACTROBAN) 2 % ointment CINDY EXT AA TID      NYSTATIN 473432 UNIT/GM powder CINDY EXT AA ONCE D  0    PAZEO 0.7 % SOLN   11    SYMBICORT 160-4.5 MCG/ACT AERO INL 2 PFS PO BID UTD  2    Melatonin 10 MG TABS TAKE 1 TABLET BY MOUTH EVERY NIGHT 30 tablet 5    vitamin B-12 (CYANOCOBALAMIN) 1000 MCG tablet Take 1 tablet by mouth daily 90 tablet 3    magnesium oxide (MAG-OX) 400 (240 Mg) MG tablet Take 1 tablet by mouth daily 30 tablet 5    Simethicone 180 MG CAPS TAKE 1 CAPSULE BY MOUTH TWICE DAILY 60 capsule 5    loperamide (IMODIUM A-D) 2 MG tablet Take 2 mg by mouth as needed for Diarrhea       carisoprodol (SOMA) 350 MG tablet Take 1 tablet by mouth daily as needed (migraine) 12 tablet 5             Vitals:    07/31/19 1150   BP: 120/70   Pulse: 96   SpO2: 98%   Weight: 149 lb (67.6 kg)   Height: 5' 2\" (1.575 m)     Body mass index is

## 2019-08-01 ENCOUNTER — TELEPHONE (OUTPATIENT)
Dept: FAMILY MEDICINE CLINIC | Age: 59
End: 2019-08-01

## 2019-08-01 NOTE — TELEPHONE ENCOUNTER
I was well aware that she was hinting at pain meds. Not starting that again.     She has tramadol from ortho and has motrin or tylenol  She is going to her back doctor as well  No idea about the levothyroxine

## 2019-08-07 ENCOUNTER — TELEPHONE (OUTPATIENT)
Dept: FAMILY MEDICINE CLINIC | Age: 59
End: 2019-08-07

## 2019-08-12 ENCOUNTER — TELEPHONE (OUTPATIENT)
Dept: FAMILY MEDICINE CLINIC | Age: 59
End: 2019-08-12

## 2019-08-12 NOTE — TELEPHONE ENCOUNTER
Pt is requesting a new script for the high protein boost, pt will be due for a refill in about three to four weeks. Please send to [email or web address] [address]. Please advise.

## 2019-08-24 DIAGNOSIS — E03.9 HYPOTHYROIDISM, UNSPECIFIED TYPE: ICD-10-CM

## 2019-08-24 DIAGNOSIS — M62.838 MUSCLE SPASM: ICD-10-CM

## 2019-08-27 RX ORDER — TIZANIDINE 4 MG/1
TABLET ORAL
Qty: 540 TABLET | Refills: 0 | Status: SHIPPED | OUTPATIENT
Start: 2019-08-27 | End: 2020-01-16

## 2019-08-27 RX ORDER — LEVOTHYROXINE SODIUM 0.2 MG/1
TABLET ORAL
Qty: 135 TABLET | Refills: 0 | Status: SHIPPED | OUTPATIENT
Start: 2019-08-27 | End: 2019-11-20 | Stop reason: SDUPTHER

## 2019-08-30 RX ORDER — NAPROXEN 500 MG/1
TABLET ORAL
Qty: 180 TABLET | Refills: 0 | Status: SHIPPED | OUTPATIENT
Start: 2019-08-30 | End: 2019-12-13

## 2019-09-04 ENCOUNTER — OFFICE VISIT (OUTPATIENT)
Dept: FAMILY MEDICINE CLINIC | Age: 59
End: 2019-09-04
Payer: MEDICAID

## 2019-09-04 VITALS
HEART RATE: 102 BPM | WEIGHT: 153 LBS | TEMPERATURE: 99 F | SYSTOLIC BLOOD PRESSURE: 124 MMHG | DIASTOLIC BLOOD PRESSURE: 82 MMHG | OXYGEN SATURATION: 95 % | HEIGHT: 62 IN | BODY MASS INDEX: 28.16 KG/M2

## 2019-09-04 DIAGNOSIS — E03.9 HYPOTHYROIDISM, UNSPECIFIED TYPE: Primary | ICD-10-CM

## 2019-09-04 DIAGNOSIS — E03.9 HYPOTHYROIDISM, UNSPECIFIED TYPE: ICD-10-CM

## 2019-09-04 DIAGNOSIS — Z23 NEEDS FLU SHOT: ICD-10-CM

## 2019-09-04 DIAGNOSIS — E53.8 B12 DEFICIENCY: ICD-10-CM

## 2019-09-04 DIAGNOSIS — F41.9 ANXIETY: ICD-10-CM

## 2019-09-04 DIAGNOSIS — G89.4 CHRONIC PAIN SYNDROME: ICD-10-CM

## 2019-09-04 LAB — TSH SERPL DL<=0.05 MIU/L-ACNC: 0.04 UIU/ML (ref 0.44–3.86)

## 2019-09-04 PROCEDURE — 3017F COLORECTAL CA SCREEN DOC REV: CPT | Performed by: FAMILY MEDICINE

## 2019-09-04 PROCEDURE — 90471 IMMUNIZATION ADMIN: CPT | Performed by: FAMILY MEDICINE

## 2019-09-04 PROCEDURE — G8599 NO ASA/ANTIPLAT THER USE RNG: HCPCS | Performed by: FAMILY MEDICINE

## 2019-09-04 PROCEDURE — 90688 IIV4 VACCINE SPLT 0.5 ML IM: CPT | Performed by: FAMILY MEDICINE

## 2019-09-04 PROCEDURE — 3014F SCREEN MAMMO DOC REV: CPT | Performed by: FAMILY MEDICINE

## 2019-09-04 PROCEDURE — G8427 DOCREV CUR MEDS BY ELIG CLIN: HCPCS | Performed by: FAMILY MEDICINE

## 2019-09-04 PROCEDURE — G8417 CALC BMI ABV UP PARAM F/U: HCPCS | Performed by: FAMILY MEDICINE

## 2019-09-04 PROCEDURE — 96372 THER/PROPH/DIAG INJ SC/IM: CPT | Performed by: FAMILY MEDICINE

## 2019-09-04 PROCEDURE — 4004F PT TOBACCO SCREEN RCVD TLK: CPT | Performed by: FAMILY MEDICINE

## 2019-09-04 PROCEDURE — 99213 OFFICE O/P EST LOW 20 MIN: CPT | Performed by: FAMILY MEDICINE

## 2019-09-04 RX ORDER — CYANOCOBALAMIN 1000 UG/ML
1000 INJECTION INTRAMUSCULAR; SUBCUTANEOUS ONCE
Status: COMPLETED | OUTPATIENT
Start: 2019-09-04 | End: 2019-09-04

## 2019-09-04 RX ADMIN — CYANOCOBALAMIN 1000 MCG: 1000 INJECTION INTRAMUSCULAR; SUBCUTANEOUS at 10:33

## 2019-09-04 NOTE — PROGRESS NOTES
After obtaining consent, and per orders of Dr. Tuan Tom, injection of flu given in Right deltoid by [employer]. Patient instructed to remain in clinic for 20 minutes afterwards, and to report any adverse reaction to me immediately.

## 2019-09-04 NOTE — PROGRESS NOTES
Chief Complaint   Patient presents with   [employer]     pt wants b12 shot            Grecia Jacobs is a 64 y.o. female    \"checkup\"  Wants b12 shot    Multiple specialists at [facility]    Having ongoing back pain  Will see specialist on 9/24    Due to recheck thyroid  Supposed to be on lower dose  Has still been taking 300    Lab Results   Component Value Date    LABA1C 5.8 05/18/2019     No results found for: EAG        Patient Active Problem List   Diagnosis    Hypothyroidism    Anxiety    COPD (chronic obstructive pulmonary disease) ([address]    Smoker    Cerebral infarction ([address]    Arnold-Chiari deformity ([address]    Headache    Hyperlipidemia with target LDL less than 100    Pulmonary embolism and infarction ([address]    Laryngitis, chronic    Rhinitis, chronic    Depression    Acid reflux    H/O encephalopathy    Hepatitis B surface antigen positive    S/P colonoscopy with polypectomy    Hemiparesis affecting left side as late effect of cerebrovascular accident ([address]    Migraine without status migrainosus, not intractable    Seasonal allergic rhinitis due to pollen    Edema    Muscle spasm    Adhesive capsulitis of left shoulder    Congenital anomaly of adrenal gland    Airway hyperreactivity    Allergic rhinitis    Alveolitis of jaw    Anaclitic depression    Aortic valve disorder    Bipolar disorder ([address]    Bone necrosis ([address]    Cellulitis of left lower extremity    Cervical dystonia    Cervicalgia    Chronic maxillary sinusitis    Chronic periodontitis, generalized    Chronic retention of urine    Compression of brain ([address]    Dental caries extending into pulp    Diabetes mellitus, type II ([address]    Drug-seeking behavior    Dysphonia    Essential hypertension    Fracture of lumbar vertebra ([address]    H/O: CVA (cerebrovascular accident)    Hearing difficulty    History of HPV infection    Hypothyroidism due to Hashimoto's thyroiditis    Hypoxic brain injury ([address]    Late

## 2019-09-05 DIAGNOSIS — E03.9 HYPOTHYROIDISM, UNSPECIFIED TYPE: Primary | ICD-10-CM

## 2019-09-05 RX ORDER — LEVOTHYROXINE SODIUM 0.05 MG/1
50 TABLET ORAL DAILY
Qty: 30 TABLET | Refills: 5 | Status: SHIPPED | OUTPATIENT
Start: 2019-09-05 | End: 2019-09-06 | Stop reason: SDUPTHER

## 2019-09-06 DIAGNOSIS — E03.9 HYPOTHYROIDISM, UNSPECIFIED TYPE: ICD-10-CM

## 2019-09-09 RX ORDER — LEVOTHYROXINE SODIUM 0.05 MG/1
50 TABLET ORAL DAILY
Qty: 90 TABLET | Refills: 5 | Status: SHIPPED | OUTPATIENT
Start: 2019-09-09 | End: 2019-10-24

## 2019-09-11 ENCOUNTER — HOSPITAL ENCOUNTER (OUTPATIENT)
Dept: ORTHOPEDIC SURGERY | Age: 59
Discharge: HOME | End: 2019-09-13
Payer: MEDICAID

## 2019-09-11 DIAGNOSIS — M25.519 ARTHRALGIA OF SHOULDER, UNSPECIFIED LATERALITY: ICD-10-CM

## 2019-09-11 PROCEDURE — 73030 X-RAY EXAM OF SHOULDER: CPT

## 2019-09-14 ENCOUNTER — HOSPITAL ENCOUNTER (EMERGENCY)
Age: 59
Discharge: HOME | End: 2019-09-14
Payer: MEDICAID

## 2019-09-14 VITALS
OXYGEN SATURATION: 97 % | SYSTOLIC BLOOD PRESSURE: 149 MMHG | TEMPERATURE: 98.6 F | RESPIRATION RATE: 20 BRPM | BODY MASS INDEX: 27.42 KG/M2 | HEIGHT: 62 IN | WEIGHT: 149 LBS | DIASTOLIC BLOOD PRESSURE: 81 MMHG | HEART RATE: 94 BPM

## 2019-09-14 DIAGNOSIS — N39.0 URINARY TRACT INFECTION WITHOUT HEMATURIA, SITE UNSPECIFIED: Primary | ICD-10-CM

## 2019-09-14 DIAGNOSIS — F41.9 ANXIETY: ICD-10-CM

## 2019-09-14 LAB
BACTERIA: NEGATIVE /HPF
BILIRUBIN URINE: NEGATIVE
BLOOD, URINE: NEGATIVE
CLARITY: CLEAR
COLOR: YELLOW
EPITHELIAL CELLS, UA: NORMAL /HPF (ref 0–5)
GLUCOSE URINE: NEGATIVE MG/DL
HYALINE CASTS: NORMAL /HPF (ref 0–5)
KETONES, URINE: NEGATIVE MG/DL
LEUKOCYTE ESTERASE, URINE: ABNORMAL
NITRITE, URINE: NEGATIVE
PH UA: 5 (ref 5–9)
PROTEIN UA: NEGATIVE MG/DL
RBC UA: NORMAL /HPF (ref 0–5)
SPECIFIC GRAVITY UA: 1.01 (ref 1–1.03)
URINE REFLEX TO CULTURE: YES
UROBILINOGEN, URINE: 0.2 E.U./DL
WBC UA: NORMAL /HPF (ref 0–5)

## 2019-09-14 PROCEDURE — 87086 URINE CULTURE/COLONY COUNT: CPT

## 2019-09-14 PROCEDURE — 99283 EMERGENCY DEPT VISIT LOW MDM: CPT

## 2019-09-14 PROCEDURE — 81001 URINALYSIS AUTO W/SCOPE: CPT

## 2019-09-14 RX ORDER — NITROFURANTOIN 25; 75 MG/1; MG/1
100 CAPSULE ORAL 2 TIMES DAILY
Qty: 10 CAPSULE | Refills: 0 | Status: SHIPPED | OUTPATIENT
Start: 2019-09-14 | End: 2019-09-17

## 2019-09-14 ASSESSMENT — PAIN DESCRIPTION - ONSET: ONSET: ON-GOING

## 2019-09-14 ASSESSMENT — ENCOUNTER SYMPTOMS
SINUS PRESSURE: 0
ABDOMINAL DISTENTION: 0
NAUSEA: 0
SORE THROAT: 0
EYE PAIN: 0
BACK PAIN: 1
VOMITING: 0
SHORTNESS OF BREATH: 0
CHEST TIGHTNESS: 0
WHEEZING: 0
CONSTIPATION: 0
FACIAL SWELLING: 0
EYE DISCHARGE: 0
PHOTOPHOBIA: 0
EYE REDNESS: 0
COUGH: 0
ABDOMINAL PAIN: 0
DIARRHEA: 0

## 2019-09-14 ASSESSMENT — PAIN DESCRIPTION - ORIENTATION: ORIENTATION: RIGHT;LEFT;MID;LOWER;UPPER

## 2019-09-14 ASSESSMENT — PAIN DESCRIPTION - PROGRESSION: CLINICAL_PROGRESSION: GRADUALLY WORSENING

## 2019-09-14 ASSESSMENT — PAIN DESCRIPTION - LOCATION: LOCATION: BACK

## 2019-09-14 ASSESSMENT — PAIN SCALES - GENERAL: PAINLEVEL_OUTOF10: 9

## 2019-09-14 ASSESSMENT — PAIN DESCRIPTION - FREQUENCY: FREQUENCY: CONTINUOUS

## 2019-09-14 ASSESSMENT — PAIN DESCRIPTION - DESCRIPTORS: DESCRIPTORS: ACHING;SHARP

## 2019-09-14 ASSESSMENT — PAIN DESCRIPTION - PAIN TYPE: TYPE: ACUTE PAIN

## 2019-09-14 NOTE — ED PROVIDER NOTES
Acid reflux     Allergic rhinitis     Anxiety     Arnold-Chiari deformity (HCC) 2000    surgery    Asthma     Cerebral artery occlusion with cerebral infarction ([address] 2001    1 yr after brain OR    Cerebrovascular disease     Chronic back pain greater than 3 months duration     COPD (chronic obstructive pulmonary disease) (HCC)     Dr Seferino Patiño at [address] CVA (cerebral infarction)     left hemiparesis, due to ? estrogen + smoking    Depression     Dr Sal Serra H/O encephalopathy 2001    Headache(784.0)     Dr Jazmin Rasmussen Hepatitis B surface antigen positive     Hx of blood clots 2010    PE / trx with coumadin x 6 months    Hyperlipidemia LDL goal < 100     meds > 10 yrs    Hypertension     meds > 2 yrs    Hypothyroidism 2001    meds > 18 yrs    Laryngitis, chronic 2012    scoped by Dr Ravinder Rapp, fungal    Marijuana smoker in remission [facility])     Obesity (BMI 30-39. 9)     Osteoarthritis     Pulmonary embolism and infarction (HCC)     Restless legs syndrome     Rhinitis, chronic     Rotator cuff tear     Left    S/P colonoscopy with polypectomy     Dr Anmol Ochoa    Seizures [facility])    [address] Spinal headache     past partum     Spondylosis without myelopathy     Type 2 diabetes mellitus without complication ([address]     hx > 6 yrs    Urinary incontinence     Vertebral compression fracture ([address]          SURGICAL HISTORY       Past Surgical History:   Procedure Laterality Date    BACK SURGERY      lumbar kyphoplasty x 2    BRAIN SURGERY      due to Arnold-Chiari deformity / [facility]     SECTION      COLONOSCOPY      CRANIOTOMY  2000    Arnold-Chiary malformation    ENDOSCOPY, COLON, DIAGNOSTIC      FEMUR FRACTURE SURGERY Left     SD RECONSTR TOTAL SHOULDER IMPLANT Left 6/15/2018    LEFT TOTAL SHOULDER ARTHROPLASTY, ASNDY BIOMET TSA, SCALENE NERVE BLOCK, (LATEX ALLERGY) performed by Curtis Morris MD at [place] [address] organization: None     Attends meetings of clubs or organizations: None     Relationship status: None    Intimate partner violence:     Fear of current or ex partner: None     Emotionally abused: None     Physically abused: None     Forced sexual activity: None   Other Topics Concern    None   Social History Narrative    None       SCREENINGS      @FLOW([id number])@      PHYSICAL EXAM    (up to 7 for level 4, 8 or more for level 5)     ED Triage Vitals [09/14/19 1201]   BP Temp Temp Source Pulse Resp SpO2 Height Weight   (!) 149/81 98.6 °F (37 °C) Oral 94 20 97 % 5' 2\" (1.575 m) 149 lb (67.6 kg)       Physical Exam   Constitutional: She is oriented to person, place, and time. She appears well-developed. HENT:   Head: Normocephalic and atraumatic. Mouth/Throat: Oropharynx is clear and moist.   Eyes: Pupils are equal, round, and reactive to light. Neck: No tracheal deviation present. No thyromegaly present. Cardiovascular: Normal rate, regular rhythm and normal heart sounds. Exam reveals no gallop. No murmur heard. Pulmonary/Chest: Effort normal and breath sounds normal. She has no wheezes. She has no rales. She exhibits no tenderness. Abdominal: Soft. Bowel sounds are normal. She exhibits no distension and no mass. There is no tenderness. There is no rebound and no guarding. Musculoskeletal: Normal range of motion. She exhibits no edema or tenderness. Lymphadenopathy:     She has no cervical adenopathy. Neurological: She is alert and oriented to person, place, and time. Coordination normal.   Skin: Skin is warm and dry. No rash noted. No erythema. Psychiatric: Her behavior is normal. Thought content normal.         All other labs were within normal range or not returned as of this dictation.     EMERGENCY DEPARTMENT COURSE and DIFFERENTIALDIAGNOSIS/MDM:   Vitals:    Vitals:    09/14/19 1201   BP: (!) 149/81   Pulse: 94   Resp: 20   Temp: 98.6 °F (37 °C)   TempSrc: Oral   SpO2: 97%   Weight: 149

## 2019-09-16 ENCOUNTER — TELEPHONE (OUTPATIENT)
Dept: FAMILY MEDICINE CLINIC | Age: 59
End: 2019-09-16

## 2019-09-16 LAB — URINE CULTURE, ROUTINE: NORMAL

## 2019-09-16 RX ORDER — LORAZEPAM 0.5 MG/1
TABLET ORAL
Qty: 60 TABLET | Refills: 0 | Status: SHIPPED | OUTPATIENT
Start: 2019-09-16 | End: 2019-10-08 | Stop reason: SDUPTHER

## 2019-09-17 ENCOUNTER — TELEPHONE (OUTPATIENT)
Dept: FAMILY MEDICINE CLINIC | Age: 59
End: 2019-09-17

## 2019-09-17 ENCOUNTER — OFFICE VISIT (OUTPATIENT)
Dept: FAMILY MEDICINE CLINIC | Age: 59
End: 2019-09-17
Payer: MEDICAID

## 2019-09-17 VITALS
OXYGEN SATURATION: 96 % | HEIGHT: 62 IN | SYSTOLIC BLOOD PRESSURE: 150 MMHG | DIASTOLIC BLOOD PRESSURE: 80 MMHG | BODY MASS INDEX: 28.45 KG/M2 | HEART RATE: 91 BPM | WEIGHT: 154.6 LBS

## 2019-09-17 DIAGNOSIS — M54.41 CHRONIC LOW BACK PAIN WITH RIGHT-SIDED SCIATICA, UNSPECIFIED BACK PAIN LATERALITY: Primary | ICD-10-CM

## 2019-09-17 DIAGNOSIS — N39.0 URINARY TRACT INFECTION WITHOUT HEMATURIA, SITE UNSPECIFIED: ICD-10-CM

## 2019-09-17 DIAGNOSIS — G89.29 CHRONIC LOW BACK PAIN WITH RIGHT-SIDED SCIATICA, UNSPECIFIED BACK PAIN LATERALITY: Primary | ICD-10-CM

## 2019-09-17 PROCEDURE — 3017F COLORECTAL CA SCREEN DOC REV: CPT | Performed by: FAMILY MEDICINE

## 2019-09-17 PROCEDURE — 4004F PT TOBACCO SCREEN RCVD TLK: CPT | Performed by: FAMILY MEDICINE

## 2019-09-17 PROCEDURE — G8427 DOCREV CUR MEDS BY ELIG CLIN: HCPCS | Performed by: FAMILY MEDICINE

## 2019-09-17 PROCEDURE — 99213 OFFICE O/P EST LOW 20 MIN: CPT | Performed by: FAMILY MEDICINE

## 2019-09-17 PROCEDURE — G8599 NO ASA/ANTIPLAT THER USE RNG: HCPCS | Performed by: FAMILY MEDICINE

## 2019-09-17 PROCEDURE — 3014F SCREEN MAMMO DOC REV: CPT | Performed by: FAMILY MEDICINE

## 2019-09-17 PROCEDURE — G8417 CALC BMI ABV UP PARAM F/U: HCPCS | Performed by: FAMILY MEDICINE

## 2019-09-17 RX ORDER — PHENAZOPYRIDINE HYDROCHLORIDE 200 MG/1
200 TABLET, FILM COATED ORAL 3 TIMES DAILY PRN
Qty: 9 TABLET | Refills: 0 | Status: SHIPPED | OUTPATIENT
Start: 2019-09-17 | End: 2019-09-20

## 2019-09-17 RX ORDER — CEPHALEXIN 500 MG/1
500 CAPSULE ORAL 3 TIMES DAILY
Qty: 30 CAPSULE | Refills: 0 | Status: SHIPPED | OUTPATIENT
Start: 2019-09-17 | End: 2020-07-23 | Stop reason: SDUPTHER

## 2019-09-17 RX ORDER — TRAMADOL HYDROCHLORIDE 50 MG/1
50 TABLET ORAL EVERY 4 HOURS PRN
Qty: 18 TABLET | Refills: 0 | Status: SHIPPED | OUTPATIENT
Start: 2019-09-17 | End: 2019-09-20

## 2019-09-17 RX ORDER — FLUCONAZOLE 150 MG/1
150 TABLET ORAL
Qty: 3 TABLET | Refills: 0 | Status: SHIPPED | OUTPATIENT
Start: 2019-09-17 | End: 2019-11-25 | Stop reason: SDUPTHER

## 2019-09-17 NOTE — PROGRESS NOTES
Chief Complaint   Patient presents with    Follow-Up from Hospital     UTI, went to [address], was put on Macrobid, not working , want to be put on new antibitoic     Back Pain     [facility] 64 [facility]    Follow up from possible UTI  She was in ER 3 days ago  Culture is negative    She still has urinary frequency and back pain and feels like she has kidney pain    Denies fever/chills    Multiple specialists at [facility]    Having ongoing back pain  Will see specialist on 9/24    Due to recheck thyroid  Supposed to be on lower dose  Has still been taking 300    Lab Results   Component Value Date    LABA1C 5.8 05/18/2019     No results found for: EAG        Patient Active Problem List   Diagnosis    Hypothyroidism    Anxiety    COPD (chronic obstructive pulmonary disease) ([address]    Smoker    Cerebral infarction ([address]    Arnold-Chiari deformity ([address]    Headache    Hyperlipidemia with target LDL less than 100    Pulmonary embolism and infarction ([address]    Laryngitis, chronic    Rhinitis, chronic    Depression    Acid reflux    H/O encephalopathy    Hepatitis B surface antigen positive    S/P colonoscopy with polypectomy    Hemiparesis affecting left side as late effect of cerebrovascular accident ([address]    Migraine without status migrainosus, not intractable    Seasonal allergic rhinitis due to pollen    Edema    Muscle spasm    Adhesive capsulitis of left shoulder    Congenital anomaly of adrenal gland    Airway hyperreactivity    Allergic rhinitis    Alveolitis of jaw    Anaclitic depression    Aortic valve disorder    Bipolar disorder ([address]    Bone necrosis ([address]    Cellulitis of left lower extremity    Cervical dystonia    Cervicalgia    Chronic maxillary sinusitis    Chronic periodontitis, generalized    Chronic retention of urine    Compression of brain ([address]    Dental caries extending into pulp    Codeine Rash    Cyclosporine Rash    Iodine Rash     Rash with SOB    Iv Dye [Iodides] Rash     Rash with SOB    Neomycin Rash    Petroleum Jelly [Skin Protectants, Misc.] Rash    Quinolones Rash    Sulfa Antibiotics Rash    Toradol [Ketorolac Tromethamine] Rash    Trovan [Trovafloxacin] Rash         Medications marked \"taking\" at this time  Outpatient Medications Marked as Taking for the 9/17/19 encounter (Office Visit) with Bernard Conti MD   Medication Sig Dispense Refill    cephALEXin (KEFLEX) 500 MG capsule Take 1 capsule by mouth 3 times daily for 10 days 30 capsule 0    fluconazole (DIFLUCAN) 150 MG tablet Take 1 tablet by mouth every 3 days for 3 doses 3 tablet 0    phenazopyridine (PYRIDIUM) 200 MG tablet Take 1 tablet by mouth 3 times daily as needed for Pain 9 tablet 0    traMADol (ULTRAM) 50 MG tablet Take 1 tablet by mouth every 4 hours as needed for Pain for up to 3 days. Intended supply: 3 days.  Take lowest dose possible to manage pain 18 tablet 0    LORazepam (ATIVAN) 0.5 MG tablet TAKE 1 TABLET BY MOUTH EVERY 8 HOURS AS NEEDED FOR ANXIETY 60 tablet 0    levothyroxine (SYNTHROID) 50 MCG tablet TAKE 1 TABLET BY MOUTH DAILY 90 tablet 5    naproxen (NAPROSYN) 500 MG tablet TAKE 1 TABLET BY MOUTH TWICE DAILY WITH MEALS 180 tablet 0    levothyroxine (SYNTHROID) 200 MCG tablet TAKE 1 AND 1/2 TABLETS BY MOUTH DAILY 135 tablet 0    tiZANidine (ZANAFLEX) 4 MG tablet TAKE 2 TABLETS BY MOUTH EVERY 8 HOURS AS NEEDED FOR MUSCLE SPASM 540 tablet 0    amLODIPine (NORVASC) 5 MG tablet TAKE 1 TABLET BY MOUTH DAILY 90 tablet 5    Nutritional Supplements (BOOST HIGH PROTEIN) LIQD DRINK 1 CAN BY MOUTH TWICE DAILY 94588 mL 5    meloxicam (MOBIC) 15 MG tablet TAKE 1 TABLET BY MOUTH DAILY 90 tablet 5    atorvastatin (LIPITOR) 40 MG tablet Take 1 tablet by mouth daily 30 tablet 5    folic acid (FOLVITE) 1 MG tablet Take 1 tablet by mouth daily 30 tablet 5    esomeprazole (NEXIUM) 40 MG delayed release capsule Take 2 capsules by mouth daily 60 capsule 5    levocetirizine (XYZAL) 5 MG tablet TAKE 1 TABLET BY MOUTH DAILY 30 tablet 5    pregabalin (LYRICA) 150 MG capsule TAKE 1 CAPSULE BY MOUTH TWICE DAILY 60 capsule 5    metFORMIN (GLUCOPHAGE) 500 MG tablet Take 1 tablet by mouth 2 times daily (with meals) 60 tablet 5    potassium chloride (KLOR-CON M) 20 MEQ extended release tablet Take 1 tablet by mouth daily 30 tablet 5    pramipexole (MIRAPEX) 0.5 MG tablet Take 1 tablet by mouth every evening 30 tablet 5    spironolactone (ALDACTONE) 25 MG tablet Take 1 tablet by mouth daily 30 tablet 5    terazosin (HYTRIN) 2 MG capsule Take 1 capsule by mouth nightly 30 capsule 5    ARIPiprazole (ABILIFY) 10 MG tablet take 1 tab q night 30 tablet 5    escitalopram (LEXAPRO) 20 MG tablet one tab at night 30 tablet 5    mirtazapine (REMERON) 30 MG tablet Take 1 tablet by mouth nightly 30 tablet 5    Ascorbic Acid (VITAMIN C/KATIA HIPS) 500 MG TABS TAKE 1 TABLET BY MOUTH DAILY 30 tablet 0    ibuprofen (ADVIL;MOTRIN) 800 MG tablet TAKE 1 TABLET BY MOUTH EVERY 8 HOURS AS NEEDED FOR PAIN 90 tablet 0    furosemide (LASIX) 40 MG tablet TAKE 1 TABLET BY MOUTH DAILY 90 tablet 0    omeprazole (PRILOSEC) 20 MG delayed release capsule Take 40 mg by mouth 2 times daily      omega-3 acid ethyl esters (LOVAZA) 1 g capsule Take 2 capsules by mouth 2 times daily 60 capsule 3    vitamin D (ERGOCALCIFEROL) 92553 units CAPS capsule Take 1 capsule by mouth every 7 days 30 capsule 5    ondansetron (ZOFRAN-ODT) 4 MG disintegrating tablet Take 1 tablet by mouth every 8 hours as needed for Nausea or Vomiting 30 tablet 2    ONE TOUCH ULTRA TEST strip Test blood glucose TID.   Dx: DM2 300 each 3    mupirocin (BACTROBAN) 2 % ointment CINDY EXT AA TID      NYSTATIN 586474 UNIT/GM powder CINDY EXT AA ONCE D  0    PAZEO 0.7 % SOLN   11    SYMBICORT 160-4.5 MCG/ACT AERO INL 2 PFS PO BID UTD  2    chlorzoxazone (PARAFON FORTE) 250 MG tablet

## 2019-09-19 ENCOUNTER — NURSE TRIAGE (OUTPATIENT)
Dept: OTHER | Facility: CLINIC | Age: 59
End: 2019-09-19

## 2019-09-19 NOTE — TELEPHONE ENCOUNTER
Reason for Disposition   Pain or burning with passing urine (urination)    Protocols used: FLANK PAIN-ADULT-OH    Received call from Mary Ann Fairchild in [facility]. Patient was seen 9/17/19 and diagnosed with UTI, started on abx. She is still has urinary pain and now has new onset flank pain, rates the pain as 9/10 and describes it as \"kidney pain\". Denies fever or blood in urine. Call soft transferred to Dereck Banda in [facility] to schedule appointment.

## 2019-09-23 ENCOUNTER — TELEPHONE (OUTPATIENT)
Dept: FAMILY MEDICINE CLINIC | Age: 59
End: 2019-09-23

## 2019-09-26 ENCOUNTER — TELEPHONE (OUTPATIENT)
Dept: FAMILY MEDICINE CLINIC | Age: 59
End: 2019-09-26

## 2019-09-26 RX ORDER — GUAIFENESIN 600 MG/1
1200 TABLET, EXTENDED RELEASE ORAL 2 TIMES DAILY
Qty: 40 TABLET | Refills: 0 | Status: SHIPPED | OUTPATIENT
Start: 2019-09-26 | End: 2019-10-06

## 2019-09-30 ENCOUNTER — TELEPHONE (OUTPATIENT)
Dept: FAMILY MEDICINE CLINIC | Age: 59
End: 2019-09-30

## 2019-09-30 DIAGNOSIS — E87.6 HYPOKALEMIA: ICD-10-CM

## 2019-10-01 ENCOUNTER — TELEPHONE (OUTPATIENT)
Dept: FAMILY MEDICINE CLINIC | Age: 59
End: 2019-10-01

## 2019-10-01 RX ORDER — POTASSIUM CHLORIDE 20 MEQ/1
20 TABLET, EXTENDED RELEASE ORAL DAILY
Qty: 180 TABLET | Refills: 0 | Status: SHIPPED | OUTPATIENT
Start: 2019-10-01 | End: 2019-10-24 | Stop reason: SDUPTHER

## 2019-10-04 RX ORDER — ESOMEPRAZOLE MAGNESIUM 40 MG/1
80 CAPSULE, DELAYED RELEASE ORAL DAILY
Qty: 180 CAPSULE | Refills: 0 | Status: SHIPPED | OUTPATIENT
Start: 2019-10-04 | End: 2019-11-20 | Stop reason: SDUPTHER

## 2019-10-07 ENCOUNTER — HOSPITAL ENCOUNTER (OUTPATIENT)
Dept: PHYSICAL THERAPY | Age: 59
Setting detail: THERAPIES SERIES
Discharge: HOME | End: 2019-10-07
Payer: MEDICARE

## 2019-10-07 DIAGNOSIS — F41.9 ANXIETY: ICD-10-CM

## 2019-10-07 PROCEDURE — 97163 PT EVAL HIGH COMPLEX 45 MIN: CPT

## 2019-10-07 RX ORDER — LORAZEPAM 0.5 MG/1
TABLET ORAL
Qty: 60 TABLET | Refills: 0 | Status: CANCELLED | OUTPATIENT
Start: 2019-10-07 | End: 2019-11-06

## 2019-10-07 ASSESSMENT — PAIN DESCRIPTION - LOCATION: LOCATION: SHOULDER

## 2019-10-07 ASSESSMENT — PAIN DESCRIPTION - PAIN TYPE: TYPE: CHRONIC PAIN

## 2019-10-07 ASSESSMENT — PAIN DESCRIPTION - PROGRESSION: CLINICAL_PROGRESSION: NOT CHANGED

## 2019-10-07 ASSESSMENT — PAIN DESCRIPTION - FREQUENCY: FREQUENCY: INTERMITTENT

## 2019-10-07 ASSESSMENT — PAIN - FUNCTIONAL ASSESSMENT: PAIN_FUNCTIONAL_ASSESSMENT: PREVENTS OR INTERFERES WITH ALL ACTIVE AND SOME PASSIVE ACTIVITIES

## 2019-10-07 ASSESSMENT — PAIN DESCRIPTION - DESCRIPTORS: DESCRIPTORS: ACHING;STABBING;THROBBING

## 2019-10-07 ASSESSMENT — PAIN DESCRIPTION - ONSET: ONSET: ON-GOING

## 2019-10-07 ASSESSMENT — PAIN DESCRIPTION - ORIENTATION: ORIENTATION: RIGHT;LEFT

## 2019-10-08 RX ORDER — LOPERAMIDE HYDROCHLORIDE 2 MG/1
2 CAPSULE ORAL 4 TIMES DAILY PRN
Qty: 40 CAPSULE | Refills: 0 | Status: SHIPPED | OUTPATIENT
Start: 2019-10-08 | End: 2019-10-18

## 2019-10-08 RX ORDER — LORAZEPAM 0.5 MG/1
TABLET ORAL
Qty: 60 TABLET | Refills: 0 | Status: SHIPPED | OUTPATIENT
Start: 2019-10-08 | End: 2019-10-24 | Stop reason: SDUPTHER

## 2019-10-11 ENCOUNTER — HOSPITAL ENCOUNTER (OUTPATIENT)
Dept: PHYSICAL THERAPY | Age: 59
Setting detail: THERAPIES SERIES
Discharge: HOME | End: 2019-10-11
Payer: MEDICARE

## 2019-10-14 RX ORDER — IBUPROFEN 800 MG/1
TABLET ORAL
Qty: 120 TABLET | Refills: 0 | Status: SHIPPED | OUTPATIENT
Start: 2019-10-14 | End: 2019-12-24

## 2019-10-18 ENCOUNTER — HOSPITAL ENCOUNTER (OUTPATIENT)
Dept: PHYSICAL THERAPY | Age: 59
Setting detail: THERAPIES SERIES
Discharge: HOME | End: 2019-10-18
Payer: MEDICARE

## 2019-10-22 ENCOUNTER — HOSPITAL ENCOUNTER (OUTPATIENT)
Dept: PHYSICAL THERAPY | Age: 59
Setting detail: THERAPIES SERIES
Discharge: HOME | End: 2019-10-22
Payer: MEDICARE

## 2019-10-22 DIAGNOSIS — G47.00 INSOMNIA, UNSPECIFIED TYPE: ICD-10-CM

## 2019-10-22 PROCEDURE — 97140 MANUAL THERAPY 1/> REGIONS: CPT

## 2019-10-22 PROCEDURE — 97110 THERAPEUTIC EXERCISES: CPT

## 2019-10-22 RX ORDER — PHENOL 1.4 %
AEROSOL, SPRAY (ML) MUCOUS MEMBRANE
Qty: 30 TABLET | Refills: 0 | Status: SHIPPED | OUTPATIENT
Start: 2019-10-22 | End: 2019-10-24 | Stop reason: SDUPTHER

## 2019-10-22 ASSESSMENT — PAIN DESCRIPTION - LOCATION: LOCATION: SHOULDER

## 2019-10-22 ASSESSMENT — PAIN SCALES - GENERAL: PAINLEVEL_OUTOF10: 2

## 2019-10-22 ASSESSMENT — PAIN DESCRIPTION - ORIENTATION: ORIENTATION: LEFT;RIGHT

## 2019-10-23 ENCOUNTER — CARE COORDINATION (OUTPATIENT)
Dept: CARE COORDINATION | Age: 59
End: 2019-10-23

## 2019-10-23 DIAGNOSIS — Z72.0 TOBACCO USE: ICD-10-CM

## 2019-10-23 RX ORDER — VARENICLINE TARTRATE
KIT
Qty: 53 TABLET | Refills: 0 | Status: SHIPPED | OUTPATIENT
Start: 2019-10-23 | End: 2019-10-24

## 2019-10-24 ENCOUNTER — OFFICE VISIT (OUTPATIENT)
Dept: FAMILY MEDICINE CLINIC | Age: 59
End: 2019-10-24
Payer: MEDICARE

## 2019-10-24 VITALS
SYSTOLIC BLOOD PRESSURE: 130 MMHG | RESPIRATION RATE: 12 BRPM | HEIGHT: 62 IN | DIASTOLIC BLOOD PRESSURE: 82 MMHG | BODY MASS INDEX: 28.28 KG/M2 | HEART RATE: 104 BPM

## 2019-10-24 DIAGNOSIS — E03.9 HYPOTHYROIDISM, UNSPECIFIED TYPE: Primary | ICD-10-CM

## 2019-10-24 DIAGNOSIS — E11.51 TYPE 2 DIABETES MELLITUS WITH DIABETIC PERIPHERAL ANGIOPATHY WITHOUT GANGRENE, WITHOUT LONG-TERM CURRENT USE OF INSULIN (HCC): ICD-10-CM

## 2019-10-24 DIAGNOSIS — F41.9 ANXIETY: ICD-10-CM

## 2019-10-24 DIAGNOSIS — R53.83 FATIGUE, UNSPECIFIED TYPE: ICD-10-CM

## 2019-10-24 DIAGNOSIS — J44.9 CHRONIC OBSTRUCTIVE PULMONARY DISEASE, UNSPECIFIED COPD TYPE (HCC): ICD-10-CM

## 2019-10-24 DIAGNOSIS — E03.9 HYPOTHYROIDISM, UNSPECIFIED TYPE: ICD-10-CM

## 2019-10-24 LAB
CHOLESTEROL, TOTAL: 144 MG/DL (ref 0–199)
HBA1C MFR BLD: 5.9 % (ref 4.8–5.9)
HDLC SERPL-MCNC: 48 MG/DL (ref 40–59)
LDL CHOLESTEROL CALCULATED: 73 MG/DL (ref 0–129)
TRIGL SERPL-MCNC: 114 MG/DL (ref 0–150)
TSH SERPL DL<=0.05 MIU/L-ACNC: 0.06 UIU/ML (ref 0.44–3.86)

## 2019-10-24 PROCEDURE — 96372 THER/PROPH/DIAG INJ SC/IM: CPT | Performed by: FAMILY MEDICINE

## 2019-10-24 PROCEDURE — 2022F DILAT RTA XM EVC RTNOPTHY: CPT | Performed by: FAMILY MEDICINE

## 2019-10-24 PROCEDURE — G9899 SCRN MAM PERF RSLTS DOC: HCPCS | Performed by: FAMILY MEDICINE

## 2019-10-24 PROCEDURE — 3023F SPIROM DOC REV: CPT | Performed by: FAMILY MEDICINE

## 2019-10-24 PROCEDURE — G8417 CALC BMI ABV UP PARAM F/U: HCPCS | Performed by: FAMILY MEDICINE

## 2019-10-24 PROCEDURE — G8926 SPIRO NO PERF OR DOC: HCPCS | Performed by: FAMILY MEDICINE

## 2019-10-24 PROCEDURE — G8599 NO ASA/ANTIPLAT THER USE RNG: HCPCS | Performed by: FAMILY MEDICINE

## 2019-10-24 PROCEDURE — 99213 OFFICE O/P EST LOW 20 MIN: CPT | Performed by: FAMILY MEDICINE

## 2019-10-24 PROCEDURE — 4004F PT TOBACCO SCREEN RCVD TLK: CPT | Performed by: FAMILY MEDICINE

## 2019-10-24 PROCEDURE — G8427 DOCREV CUR MEDS BY ELIG CLIN: HCPCS | Performed by: FAMILY MEDICINE

## 2019-10-24 PROCEDURE — 3044F HG A1C LEVEL LT 7.0%: CPT | Performed by: FAMILY MEDICINE

## 2019-10-24 PROCEDURE — G8482 FLU IMMUNIZE ORDER/ADMIN: HCPCS | Performed by: FAMILY MEDICINE

## 2019-10-24 PROCEDURE — 3017F COLORECTAL CA SCREEN DOC REV: CPT | Performed by: FAMILY MEDICINE

## 2019-10-24 RX ORDER — TOPIRAMATE 50 MG/1
TABLET, FILM COATED ORAL
Qty: 270 TABLET | Refills: 5 | Status: SHIPPED | OUTPATIENT
Start: 2019-10-24 | End: 2019-12-13

## 2019-10-24 RX ORDER — CYANOCOBALAMIN 1000 UG/ML
1000 INJECTION INTRAMUSCULAR; SUBCUTANEOUS ONCE
Status: COMPLETED | OUTPATIENT
Start: 2019-10-24 | End: 2019-10-24

## 2019-10-24 RX ORDER — LORAZEPAM 0.5 MG/1
TABLET ORAL
Qty: 60 TABLET | Refills: 2 | Status: SHIPPED | OUTPATIENT
Start: 2019-10-24 | End: 2019-11-24

## 2019-10-24 RX ADMIN — CYANOCOBALAMIN 1000 MCG: 1000 INJECTION INTRAMUSCULAR; SUBCUTANEOUS at 09:09

## 2019-10-25 ENCOUNTER — HOSPITAL ENCOUNTER (OUTPATIENT)
Dept: PHYSICAL THERAPY | Age: 59
Setting detail: THERAPIES SERIES
Discharge: HOME | End: 2019-10-25
Payer: MEDICARE

## 2019-10-26 LAB
CREATININE URINE: 56.1 MG/DL
MICROALBUMIN UR-MCNC: 1.3 MG/DL
MICROALBUMIN/CREAT UR-RTO: 23.2 MG/G (ref 0–30)

## 2019-10-28 ENCOUNTER — HOSPITAL ENCOUNTER (OUTPATIENT)
Dept: PHYSICAL THERAPY | Age: 59
Setting detail: THERAPIES SERIES
Discharge: HOME | End: 2019-10-28
Payer: MEDICARE

## 2019-10-28 ENCOUNTER — CARE COORDINATION (OUTPATIENT)
Dept: CARE COORDINATION | Age: 59
End: 2019-10-28

## 2019-10-28 PROCEDURE — 97140 MANUAL THERAPY 1/> REGIONS: CPT

## 2019-10-28 PROCEDURE — 97110 THERAPEUTIC EXERCISES: CPT

## 2019-10-28 ASSESSMENT — PAIN SCALES - GENERAL: PAINLEVEL_OUTOF10: 7

## 2019-10-28 ASSESSMENT — PAIN DESCRIPTION - LOCATION: LOCATION: SHOULDER

## 2019-10-28 ASSESSMENT — PAIN DESCRIPTION - PAIN TYPE: TYPE: CHRONIC PAIN

## 2019-10-28 ASSESSMENT — PAIN DESCRIPTION - ORIENTATION: ORIENTATION: RIGHT;LEFT

## 2019-10-28 ASSESSMENT — PAIN DESCRIPTION - DESCRIPTORS: DESCRIPTORS: ACHING;THROBBING;STABBING

## 2019-10-29 ENCOUNTER — TELEPHONE (OUTPATIENT)
Dept: FAMILY MEDICINE CLINIC | Age: 59
End: 2019-10-29

## 2019-10-30 ENCOUNTER — CARE COORDINATION (OUTPATIENT)
Dept: CARE COORDINATION | Age: 59
End: 2019-10-30

## 2019-10-30 ENCOUNTER — TELEPHONE (OUTPATIENT)
Dept: PHARMACY | Facility: CLINIC | Age: 59
End: 2019-10-30

## 2019-11-05 ENCOUNTER — TELEPHONE (OUTPATIENT)
Dept: FAMILY MEDICINE CLINIC | Age: 59
End: 2019-11-05

## 2019-11-05 RX ORDER — TERAZOSIN 2 MG/1
CAPSULE ORAL
Qty: 90 CAPSULE | Refills: 0 | Status: SHIPPED | OUTPATIENT
Start: 2019-11-05 | End: 2019-12-13 | Stop reason: SDUPTHER

## 2019-11-05 RX ORDER — BLOOD-GLUCOSE METER
EACH MISCELLANEOUS
Qty: 1 KIT | Refills: 0 | Status: ON HOLD | OUTPATIENT
Start: 2019-11-05 | End: 2020-07-09 | Stop reason: HOSPADM

## 2019-11-06 ENCOUNTER — APPOINTMENT (OUTPATIENT)
Dept: GENERAL RADIOLOGY | Age: 59
End: 2019-11-06
Payer: MEDICARE

## 2019-11-06 ENCOUNTER — HOSPITAL ENCOUNTER (EMERGENCY)
Age: 59
Discharge: HOME | End: 2019-11-06
Payer: MEDICARE

## 2019-11-06 VITALS
WEIGHT: 150 LBS | HEIGHT: 62 IN | SYSTOLIC BLOOD PRESSURE: 134 MMHG | HEART RATE: 102 BPM | RESPIRATION RATE: 19 BRPM | OXYGEN SATURATION: 96 % | BODY MASS INDEX: 27.6 KG/M2 | TEMPERATURE: 98 F | DIASTOLIC BLOOD PRESSURE: 78 MMHG

## 2019-11-06 DIAGNOSIS — S20.211A CONTUSION OF RIGHT CHEST WALL, INITIAL ENCOUNTER: Primary | ICD-10-CM

## 2019-11-06 PROCEDURE — 71101 X-RAY EXAM UNILAT RIBS/CHEST: CPT

## 2019-11-06 PROCEDURE — 99283 EMERGENCY DEPT VISIT LOW MDM: CPT

## 2019-11-06 RX ORDER — ACETAMINOPHEN 500 MG
1000 TABLET ORAL ONCE
Status: DISCONTINUED | OUTPATIENT
Start: 2019-11-06 | End: 2019-11-06 | Stop reason: HOSPADM

## 2019-11-06 ASSESSMENT — ENCOUNTER SYMPTOMS
ABDOMINAL DISTENTION: 0
CONSTIPATION: 0
VOMITING: 0
EYE DISCHARGE: 0
ABDOMINAL PAIN: 0
SHORTNESS OF BREATH: 0
RHINORRHEA: 0
SORE THROAT: 0
COLOR CHANGE: 0
NAUSEA: 0
DIARRHEA: 0

## 2019-11-06 ASSESSMENT — PAIN DESCRIPTION - PAIN TYPE: TYPE: ACUTE PAIN

## 2019-11-06 ASSESSMENT — PAIN DESCRIPTION - ORIENTATION: ORIENTATION: RIGHT

## 2019-11-06 ASSESSMENT — PAIN DESCRIPTION - LOCATION: LOCATION: RIB CAGE

## 2019-11-06 ASSESSMENT — PAIN SCALES - GENERAL: PAINLEVEL_OUTOF10: 10

## 2019-11-07 ENCOUNTER — TELEPHONE (OUTPATIENT)
Dept: FAMILY MEDICINE CLINIC | Age: 59
End: 2019-11-07

## 2019-11-07 DIAGNOSIS — F41.9 ANXIETY: ICD-10-CM

## 2019-11-07 DIAGNOSIS — M19.012 OSTEOARTHRITIS OF LEFT SHOULDER, UNSPECIFIED OSTEOARTHRITIS TYPE: ICD-10-CM

## 2019-11-08 RX ORDER — IBUPROFEN 800 MG/1
800 TABLET ORAL EVERY 8 HOURS PRN
Qty: 90 TABLET | Refills: 0 | Status: SHIPPED | OUTPATIENT
Start: 2019-11-08 | End: 2019-12-13 | Stop reason: SDUPTHER

## 2019-11-08 RX ORDER — LORAZEPAM 0.5 MG/1
TABLET ORAL
Qty: 60 TABLET | Refills: 0 | Status: SHIPPED | OUTPATIENT
Start: 2019-11-08 | End: 2019-12-13 | Stop reason: SDUPTHER

## 2019-11-09 ENCOUNTER — APPOINTMENT (OUTPATIENT)
Dept: CT IMAGING | Age: 59
End: 2019-11-09
Payer: MEDICARE

## 2019-11-09 ENCOUNTER — APPOINTMENT (OUTPATIENT)
Dept: GENERAL RADIOLOGY | Age: 59
End: 2019-11-09
Payer: MEDICARE

## 2019-11-09 ENCOUNTER — HOSPITAL ENCOUNTER (EMERGENCY)
Age: 59
Discharge: HOME | End: 2019-11-09
Attending: STUDENT IN AN ORGANIZED HEALTH CARE EDUCATION/TRAINING PROGRAM
Payer: MEDICARE

## 2019-11-09 VITALS
OXYGEN SATURATION: 94 % | DIASTOLIC BLOOD PRESSURE: 58 MMHG | RESPIRATION RATE: 20 BRPM | BODY MASS INDEX: 27.42 KG/M2 | HEART RATE: 91 BPM | SYSTOLIC BLOOD PRESSURE: 127 MMHG | TEMPERATURE: 97.2 F | HEIGHT: 62 IN | WEIGHT: 149 LBS

## 2019-11-09 DIAGNOSIS — Z87.898 HISTORY OF CHRONIC PAIN: ICD-10-CM

## 2019-11-09 DIAGNOSIS — S30.1XXA CONTUSION OF ABDOMINAL WALL, INITIAL ENCOUNTER: ICD-10-CM

## 2019-11-09 DIAGNOSIS — R07.81 RIB PAIN ON RIGHT SIDE: Primary | ICD-10-CM

## 2019-11-09 LAB
ALBUMIN SERPL-MCNC: 4.3 G/DL (ref 3.5–4.6)
ALP BLD-CCNC: 110 U/L (ref 40–130)
ALT SERPL-CCNC: 13 U/L (ref 0–33)
ANION GAP SERPL CALCULATED.3IONS-SCNC: 11 MEQ/L (ref 9–15)
ANISOCYTOSIS: ABNORMAL
APTT: 29.5 SEC (ref 24.4–36.8)
AST SERPL-CCNC: 14 U/L (ref 0–35)
BASOPHILS ABSOLUTE: 0.1 K/UL (ref 0–0.2)
BASOPHILS RELATIVE PERCENT: 1.3 %
BILIRUB SERPL-MCNC: <0.2 MG/DL (ref 0.2–0.7)
BUN BLDV-MCNC: 24 MG/DL (ref 6–20)
CALCIUM SERPL-MCNC: 9.2 MG/DL (ref 8.5–9.9)
CHLORIDE BLD-SCNC: 101 MEQ/L (ref 95–107)
CO2: 27 MEQ/L (ref 20–31)
CREAT SERPL-MCNC: 1.27 MG/DL (ref 0.5–0.9)
EOSINOPHILS ABSOLUTE: 0.2 K/UL (ref 0–0.7)
EOSINOPHILS RELATIVE PERCENT: 2 %
GFR AFRICAN AMERICAN: 52.3
GFR NON-AFRICAN AMERICAN: 43.3
GLOBULIN: 3.3 G/DL (ref 2.3–3.5)
GLUCOSE BLD-MCNC: 89 MG/DL (ref 70–99)
HCT VFR BLD CALC: 34.8 % (ref 37–47)
HEMOGLOBIN: 11.1 G/DL (ref 12–16)
HYPOCHROMIA: ABNORMAL
INR BLD: 0.9
LYMPHOCYTES ABSOLUTE: 2.1 K/UL (ref 1–4.8)
LYMPHOCYTES RELATIVE PERCENT: 25.8 %
MCH RBC QN AUTO: 23.7 PG (ref 27–31.3)
MCHC RBC AUTO-ENTMCNC: 32 % (ref 33–37)
MCV RBC AUTO: 74 FL (ref 82–100)
MICROCYTES: ABNORMAL
MONOCYTES ABSOLUTE: 0.7 K/UL (ref 0.2–0.8)
MONOCYTES RELATIVE PERCENT: 8.2 %
NEUTROPHILS ABSOLUTE: 5.1 K/UL (ref 1.4–6.5)
NEUTROPHILS RELATIVE PERCENT: 62.7 %
OVALOCYTES: ABNORMAL
PDW BLD-RTO: 20.5 % (ref 11.5–14.5)
PLATELET # BLD: 260 K/UL (ref 130–400)
POIKILOCYTES: ABNORMAL
POTASSIUM SERPL-SCNC: 4.2 MEQ/L (ref 3.4–4.9)
PROTHROMBIN TIME: 12.7 SEC (ref 12.3–14.9)
RBC # BLD: 4.7 M/UL (ref 4.2–5.4)
SODIUM BLD-SCNC: 139 MEQ/L (ref 135–144)
SPHEROCYTES: ABNORMAL
TOTAL CK: 131 U/L (ref 0–170)
TOTAL PROTEIN: 7.6 G/DL (ref 6.3–8)
WBC # BLD: 8.2 K/UL (ref 4.8–10.8)

## 2019-11-09 PROCEDURE — 2500000003 HC RX 250 WO HCPCS: Performed by: STUDENT IN AN ORGANIZED HEALTH CARE EDUCATION/TRAINING PROGRAM

## 2019-11-09 PROCEDURE — 74176 CT ABD & PELVIS W/O CONTRAST: CPT

## 2019-11-09 PROCEDURE — 99284 EMERGENCY DEPT VISIT MOD MDM: CPT

## 2019-11-09 PROCEDURE — 85610 PROTHROMBIN TIME: CPT

## 2019-11-09 PROCEDURE — 85025 COMPLETE CBC W/AUTO DIFF WBC: CPT

## 2019-11-09 PROCEDURE — 82550 ASSAY OF CK (CPK): CPT

## 2019-11-09 PROCEDURE — 71101 X-RAY EXAM UNILAT RIBS/CHEST: CPT

## 2019-11-09 PROCEDURE — 96374 THER/PROPH/DIAG INJ IV PUSH: CPT

## 2019-11-09 PROCEDURE — 85730 THROMBOPLASTIN TIME PARTIAL: CPT

## 2019-11-09 PROCEDURE — 80053 COMPREHEN METABOLIC PANEL: CPT

## 2019-11-09 PROCEDURE — 36415 COLL VENOUS BLD VENIPUNCTURE: CPT

## 2019-11-09 RX ORDER — KETAMINE HYDROCHLORIDE 50 MG/ML
10 INJECTION, SOLUTION, CONCENTRATE INTRAMUSCULAR; INTRAVENOUS ONCE
Status: COMPLETED | OUTPATIENT
Start: 2019-11-09 | End: 2019-11-09

## 2019-11-09 RX ADMIN — KETAMINE HYDROCHLORIDE 10 MG: 50 INJECTION, SOLUTION INTRAMUSCULAR; INTRAVENOUS at 14:25

## 2019-11-09 ASSESSMENT — PAIN SCALES - GENERAL
PAINLEVEL_OUTOF10: 5
PAINLEVEL_OUTOF10: 9
PAINLEVEL_OUTOF10: 9

## 2019-11-09 ASSESSMENT — ENCOUNTER SYMPTOMS
SHORTNESS OF BREATH: 0
NAUSEA: 0
COUGH: 0
TROUBLE SWALLOWING: 0
DIARRHEA: 0
ABDOMINAL PAIN: 1
VOMITING: 0
SINUS PRESSURE: 0
CHEST TIGHTNESS: 0
BACK PAIN: 0

## 2019-11-09 ASSESSMENT — PAIN DESCRIPTION - ORIENTATION: ORIENTATION: RIGHT

## 2019-11-09 ASSESSMENT — PAIN - FUNCTIONAL ASSESSMENT: PAIN_FUNCTIONAL_ASSESSMENT: ACTIVITIES ARE NOT PREVENTED

## 2019-11-09 ASSESSMENT — PAIN DESCRIPTION - PAIN TYPE
TYPE: ACUTE PAIN
TYPE: ACUTE PAIN

## 2019-11-09 ASSESSMENT — PAIN DESCRIPTION - ONSET: ONSET: ON-GOING

## 2019-11-09 ASSESSMENT — PAIN DESCRIPTION - DESCRIPTORS: DESCRIPTORS: ACHING

## 2019-11-09 ASSESSMENT — PAIN DESCRIPTION - PROGRESSION: CLINICAL_PROGRESSION: GRADUALLY IMPROVING

## 2019-11-09 ASSESSMENT — PAIN DESCRIPTION - LOCATION: LOCATION: RIB CAGE

## 2019-11-09 ASSESSMENT — PAIN SCALES - WONG BAKER: WONGBAKER_NUMERICALRESPONSE: 2

## 2019-11-09 ASSESSMENT — PAIN DESCRIPTION - FREQUENCY: FREQUENCY: INTERMITTENT

## 2019-11-20 RX ORDER — ESOMEPRAZOLE MAGNESIUM 40 MG/1
80 CAPSULE, DELAYED RELEASE ORAL DAILY
Qty: 180 CAPSULE | Refills: 0 | Status: SHIPPED | OUTPATIENT
Start: 2019-11-20 | End: 2019-12-13 | Stop reason: SDUPTHER

## 2019-11-22 DIAGNOSIS — G47.00 INSOMNIA, UNSPECIFIED TYPE: ICD-10-CM

## 2019-11-22 RX ORDER — PHENOL 1.4 %
AEROSOL, SPRAY (ML) MUCOUS MEMBRANE
Qty: 30 TABLET | Refills: 0 | Status: SHIPPED | OUTPATIENT
Start: 2019-11-22 | End: 2019-12-12 | Stop reason: SDUPTHER

## 2019-11-22 RX ORDER — TOPIRAMATE 50 MG/1
TABLET, FILM COATED ORAL
Qty: 270 TABLET | Refills: 5 | Status: SHIPPED | OUTPATIENT
Start: 2019-11-22 | End: 2019-12-13

## 2019-11-25 DIAGNOSIS — N39.0 URINARY TRACT INFECTION WITHOUT HEMATURIA, SITE UNSPECIFIED: ICD-10-CM

## 2019-11-25 RX ORDER — FLUCONAZOLE 150 MG/1
150 TABLET ORAL
Qty: 3 TABLET | Refills: 0 | Status: SHIPPED | OUTPATIENT
Start: 2019-11-25 | End: 2019-12-14 | Stop reason: SDUPTHER

## 2019-11-26 ENCOUNTER — TELEPHONE (OUTPATIENT)
Dept: FAMILY MEDICINE CLINIC | Age: 59
End: 2019-11-26

## 2019-11-29 ENCOUNTER — TELEPHONE (OUTPATIENT)
Dept: FAMILY MEDICINE CLINIC | Age: 59
End: 2019-11-29

## 2019-11-29 DIAGNOSIS — I73.9 PERIPHERAL VASCULAR DISEASE (HCC): ICD-10-CM

## 2019-11-29 DIAGNOSIS — Z91.14 H/O MEDICATION NONCOMPLIANCE: ICD-10-CM

## 2019-11-29 DIAGNOSIS — M54.41 CHRONIC LOW BACK PAIN WITH RIGHT-SIDED SCIATICA, UNSPECIFIED BACK PAIN LATERALITY: Primary | ICD-10-CM

## 2019-11-29 DIAGNOSIS — G89.29 CHRONIC LOW BACK PAIN WITH RIGHT-SIDED SCIATICA, UNSPECIFIED BACK PAIN LATERALITY: Primary | ICD-10-CM

## 2019-11-29 DIAGNOSIS — G89.4 CHRONIC PAIN SYNDROME: ICD-10-CM

## 2019-11-29 DIAGNOSIS — I63.89 CEREBRAL INFARCTION DUE TO OTHER MECHANISM (HCC): ICD-10-CM

## 2019-12-03 ENCOUNTER — TELEPHONE (OUTPATIENT)
Dept: FAMILY MEDICINE CLINIC | Age: 59
End: 2019-12-03

## 2019-12-04 ENCOUNTER — TELEPHONE (OUTPATIENT)
Dept: FAMILY MEDICINE CLINIC | Age: 59
End: 2019-12-04

## 2019-12-06 ENCOUNTER — TELEPHONE (OUTPATIENT)
Dept: FAMILY MEDICINE CLINIC | Age: 59
End: 2019-12-06

## 2019-12-09 DIAGNOSIS — R60.0 LEG EDEMA, LEFT: ICD-10-CM

## 2019-12-10 ENCOUNTER — TELEPHONE (OUTPATIENT)
Dept: FAMILY MEDICINE CLINIC | Age: 59
End: 2019-12-10

## 2019-12-10 RX ORDER — SPIRONOLACTONE 25 MG/1
25 TABLET ORAL DAILY
Qty: 90 TABLET | Refills: 0 | Status: SHIPPED | OUTPATIENT
Start: 2019-12-10 | End: 2019-12-13 | Stop reason: SDUPTHER

## 2019-12-11 DIAGNOSIS — M54.50 CHRONIC LOW BACK PAIN: ICD-10-CM

## 2019-12-11 DIAGNOSIS — G47.00 INSOMNIA, UNSPECIFIED TYPE: ICD-10-CM

## 2019-12-11 DIAGNOSIS — G89.29 CHRONIC LOW BACK PAIN: ICD-10-CM

## 2019-12-12 RX ORDER — PHENOL 1.4 %
AEROSOL, SPRAY (ML) MUCOUS MEMBRANE
Qty: 30 TABLET | Refills: 0 | Status: ON HOLD | OUTPATIENT
Start: 2019-12-12 | End: 2020-07-09 | Stop reason: HOSPADM

## 2019-12-12 RX ORDER — PREGABALIN 150 MG/1
CAPSULE ORAL
Qty: 60 CAPSULE | Refills: 5 | Status: SHIPPED | OUTPATIENT
Start: 2019-12-12 | End: 2020-04-13

## 2019-12-12 RX ORDER — ROSUVASTATIN CALCIUM 20 MG/1
TABLET, COATED ORAL
Qty: 90 TABLET | Refills: 2 | Status: ON HOLD | OUTPATIENT
Start: 2019-12-12 | End: 2020-07-09

## 2019-12-13 ENCOUNTER — OFFICE VISIT (OUTPATIENT)
Dept: FAMILY MEDICINE CLINIC | Age: 59
End: 2019-12-13
Payer: MEDICARE

## 2019-12-13 VITALS
HEIGHT: 62 IN | OXYGEN SATURATION: 95 % | HEART RATE: 115 BPM | SYSTOLIC BLOOD PRESSURE: 110 MMHG | BODY MASS INDEX: 27.25 KG/M2 | DIASTOLIC BLOOD PRESSURE: 74 MMHG

## 2019-12-13 DIAGNOSIS — R53.83 FATIGUE, UNSPECIFIED TYPE: ICD-10-CM

## 2019-12-13 DIAGNOSIS — I73.9 PERIPHERAL VASCULAR DISEASE (HCC): Primary | ICD-10-CM

## 2019-12-13 DIAGNOSIS — F41.9 ANXIETY: ICD-10-CM

## 2019-12-13 DIAGNOSIS — E87.6 HYPOKALEMIA: ICD-10-CM

## 2019-12-13 DIAGNOSIS — M54.41 CHRONIC LOW BACK PAIN WITH RIGHT-SIDED SCIATICA, UNSPECIFIED BACK PAIN LATERALITY: ICD-10-CM

## 2019-12-13 DIAGNOSIS — M19.012 OSTEOARTHRITIS OF LEFT SHOULDER, UNSPECIFIED OSTEOARTHRITIS TYPE: ICD-10-CM

## 2019-12-13 DIAGNOSIS — I63.89 CEREBRAL INFARCTION DUE TO OTHER MECHANISM (HCC): ICD-10-CM

## 2019-12-13 DIAGNOSIS — G89.29 CHRONIC LOW BACK PAIN WITH RIGHT-SIDED SCIATICA, UNSPECIFIED BACK PAIN LATERALITY: ICD-10-CM

## 2019-12-13 LAB
ANION GAP SERPL CALCULATED.3IONS-SCNC: 19 MEQ/L (ref 9–15)
BUN BLDV-MCNC: 21 MG/DL (ref 6–20)
CALCIUM SERPL-MCNC: 9.6 MG/DL (ref 8.5–9.9)
CHLORIDE BLD-SCNC: 100 MEQ/L (ref 95–107)
CO2: 23 MEQ/L (ref 20–31)
CREAT SERPL-MCNC: 1.31 MG/DL (ref 0.5–0.9)
GFR AFRICAN AMERICAN: 50.5
GFR NON-AFRICAN AMERICAN: 41.7
GLUCOSE BLD-MCNC: 139 MG/DL (ref 70–99)
MAGNESIUM: 1.9 MG/DL (ref 1.7–2.4)
POTASSIUM SERPL-SCNC: 4.1 MEQ/L (ref 3.4–4.9)
SODIUM BLD-SCNC: 142 MEQ/L (ref 135–144)

## 2019-12-13 PROCEDURE — 4004F PT TOBACCO SCREEN RCVD TLK: CPT | Performed by: FAMILY MEDICINE

## 2019-12-13 PROCEDURE — 96372 THER/PROPH/DIAG INJ SC/IM: CPT | Performed by: FAMILY MEDICINE

## 2019-12-13 PROCEDURE — G8599 NO ASA/ANTIPLAT THER USE RNG: HCPCS | Performed by: FAMILY MEDICINE

## 2019-12-13 PROCEDURE — G9899 SCRN MAM PERF RSLTS DOC: HCPCS | Performed by: FAMILY MEDICINE

## 2019-12-13 PROCEDURE — 99214 OFFICE O/P EST MOD 30 MIN: CPT | Performed by: FAMILY MEDICINE

## 2019-12-13 PROCEDURE — G8427 DOCREV CUR MEDS BY ELIG CLIN: HCPCS | Performed by: FAMILY MEDICINE

## 2019-12-13 PROCEDURE — G8417 CALC BMI ABV UP PARAM F/U: HCPCS | Performed by: FAMILY MEDICINE

## 2019-12-13 PROCEDURE — 3017F COLORECTAL CA SCREEN DOC REV: CPT | Performed by: FAMILY MEDICINE

## 2019-12-13 PROCEDURE — G8482 FLU IMMUNIZE ORDER/ADMIN: HCPCS | Performed by: FAMILY MEDICINE

## 2019-12-13 RX ORDER — POTASSIUM CHLORIDE 20 MEQ/1
20 TABLET, EXTENDED RELEASE ORAL DAILY
Qty: 30 TABLET | Refills: 5 | Status: SHIPPED | OUTPATIENT
Start: 2019-12-13 | End: 2020-04-16

## 2019-12-13 RX ORDER — LORAZEPAM 0.5 MG/1
TABLET ORAL
Qty: 60 TABLET | Refills: 0 | Status: SHIPPED | OUTPATIENT
Start: 2019-12-13 | End: 2019-12-27

## 2019-12-13 RX ORDER — CYANOCOBALAMIN 1000 UG/ML
1000 INJECTION INTRAMUSCULAR; SUBCUTANEOUS ONCE
Status: COMPLETED | OUTPATIENT
Start: 2019-12-13 | End: 2019-12-13

## 2019-12-13 RX ADMIN — CYANOCOBALAMIN 1000 MCG: 1000 INJECTION INTRAMUSCULAR; SUBCUTANEOUS at 16:25

## 2019-12-14 DIAGNOSIS — N39.0 URINARY TRACT INFECTION WITHOUT HEMATURIA, SITE UNSPECIFIED: ICD-10-CM

## 2019-12-16 RX ORDER — FLUCONAZOLE 150 MG/1
150 TABLET ORAL
Qty: 3 TABLET | Refills: 0 | Status: SHIPPED | OUTPATIENT
Start: 2019-12-16 | End: 2019-12-23

## 2019-12-17 DIAGNOSIS — G47.00 INSOMNIA, UNSPECIFIED TYPE: ICD-10-CM

## 2019-12-18 RX ORDER — PHENOL 1.4 %
AEROSOL, SPRAY (ML) MUCOUS MEMBRANE
Qty: 30 TABLET | Refills: 0 | Status: SHIPPED | OUTPATIENT
Start: 2019-12-18 | End: 2020-02-21 | Stop reason: SDUPTHER

## 2019-12-19 DIAGNOSIS — R60.0 LEG EDEMA, LEFT: ICD-10-CM

## 2019-12-20 RX ORDER — SPIRONOLACTONE 25 MG/1
TABLET ORAL
Qty: 30 TABLET | Refills: 14 | Status: ON HOLD | OUTPATIENT
Start: 2019-12-20 | End: 2020-07-09 | Stop reason: HOSPADM

## 2019-12-20 RX ORDER — FOLIC ACID 1 MG/1
TABLET ORAL
Qty: 30 TABLET | Refills: 14 | Status: SHIPPED | OUTPATIENT
Start: 2019-12-20 | End: 2020-02-24 | Stop reason: SDUPTHER

## 2019-12-20 RX ORDER — MAGNESIUM OXIDE 400 MG/1
TABLET ORAL
Qty: 30 TABLET | Refills: 14 | Status: SHIPPED | OUTPATIENT
Start: 2019-12-20 | End: 2021-03-10

## 2019-12-20 RX ORDER — TERAZOSIN 2 MG/1
CAPSULE ORAL
Qty: 30 CAPSULE | Refills: 14 | Status: ON HOLD | OUTPATIENT
Start: 2019-12-20 | End: 2020-07-09 | Stop reason: HOSPADM

## 2019-12-21 RX ORDER — BUDESONIDE AND FORMOTEROL FUMARATE DIHYDRATE 160; 4.5 UG/1; UG/1
AEROSOL RESPIRATORY (INHALATION)
Qty: 1 INHALER | Refills: 2 | Status: SHIPPED | OUTPATIENT
Start: 2019-12-21 | End: 2020-03-27 | Stop reason: SDUPTHER

## 2019-12-24 RX ORDER — IBUPROFEN 800 MG/1
TABLET ORAL
Qty: 30 TABLET | Refills: 11 | Status: SHIPPED | OUTPATIENT
Start: 2019-12-24 | End: 2020-02-17 | Stop reason: SDUPTHER

## 2019-12-27 DIAGNOSIS — M19.012 OSTEOARTHRITIS OF LEFT SHOULDER, UNSPECIFIED OSTEOARTHRITIS TYPE: ICD-10-CM

## 2019-12-27 DIAGNOSIS — F41.9 ANXIETY: ICD-10-CM

## 2019-12-27 RX ORDER — LORAZEPAM 0.5 MG/1
TABLET ORAL
Qty: 60 TABLET | Refills: 2 | Status: SHIPPED | OUTPATIENT
Start: 2019-12-27 | End: 2020-02-20 | Stop reason: SDUPTHER

## 2020-01-10 RX ORDER — LEVOTHYROXINE SODIUM 0.2 MG/1
TABLET ORAL
Qty: 30 TABLET | Refills: 14 | Status: ON HOLD | OUTPATIENT
Start: 2020-01-10 | End: 2020-06-30 | Stop reason: HOSPADM

## 2020-01-10 RX ORDER — ONDANSETRON 4 MG/1
TABLET, ORALLY DISINTEGRATING ORAL
Qty: 30 TABLET | Refills: 14 | Status: SHIPPED | OUTPATIENT
Start: 2020-01-10 | End: 2020-11-11

## 2020-01-13 NOTE — TELEPHONE ENCOUNTER
Pharmacy  requesting medication refill.  Please approve or deny this request.    Rx requested:  Requested Prescriptions     Pending Prescriptions Disp Refills    ONE TOUCH ULTRA TEST strip [Pharmacy Med Name: KENISHA Pulliam ONE TOUCH ULTRA BLUE] 300 strip      Sig: TEST BLOOD GLUCOSE 3 TIMES  DAILY         Last Office Visit:   12/13/2019      Next Visit Date:  Future Appointments   Date Time Provider Tessy Jean   1[facility]20/2020 10:45 [facility]   3/13/2020  9:15 AM Abdi Rogers MD [facility]

## 2020-01-16 RX ORDER — ESOMEPRAZOLE MAGNESIUM 40 MG/1
CAPSULE, DELAYED RELEASE ORAL
Qty: 60 CAPSULE | Refills: 14 | Status: SHIPPED | OUTPATIENT
Start: 2020-01-16 | End: 2020-02-20 | Stop reason: SDUPTHER

## 2020-01-16 RX ORDER — TIZANIDINE 4 MG/1
TABLET ORAL
Qty: 180 TABLET | Refills: 14 | Status: SHIPPED | OUTPATIENT
Start: 2020-01-16 | End: 2020-02-20 | Stop reason: SDUPTHER

## 2020-01-16 RX ORDER — FUROSEMIDE 40 MG/1
TABLET ORAL
Qty: 30 TABLET | Refills: 14 | Status: ON HOLD | OUTPATIENT
Start: 2020-01-16 | End: 2020-07-09 | Stop reason: HOSPADM

## 2020-01-19 ENCOUNTER — HOSPITAL ENCOUNTER (EMERGENCY)
Age: 60
Discharge: HOME | End: 2020-01-19
Attending: FAMILY MEDICINE
Payer: MEDICARE

## 2020-01-19 VITALS
RESPIRATION RATE: 18 BRPM | SYSTOLIC BLOOD PRESSURE: 158 MMHG | HEIGHT: 62 IN | DIASTOLIC BLOOD PRESSURE: 94 MMHG | OXYGEN SATURATION: 96 % | TEMPERATURE: 98.3 F | HEART RATE: 99 BPM | WEIGHT: 165 LBS | BODY MASS INDEX: 30.36 KG/M2

## 2020-01-19 LAB
ALBUMIN SERPL-MCNC: 4.5 G/DL (ref 3.5–4.6)
ALP BLD-CCNC: 90 U/L (ref 40–130)
ALT SERPL-CCNC: 22 U/L (ref 0–33)
ANION GAP SERPL CALCULATED.3IONS-SCNC: 15 MEQ/L (ref 9–15)
AST SERPL-CCNC: 25 U/L (ref 0–35)
BASOPHILS ABSOLUTE: 0.1 K/UL (ref 0–0.2)
BASOPHILS RELATIVE PERCENT: 0.9 %
BILIRUB SERPL-MCNC: 0.3 MG/DL (ref 0.2–0.7)
BUN BLDV-MCNC: 43 MG/DL (ref 6–20)
CALCIUM SERPL-MCNC: 9.4 MG/DL (ref 8.5–9.9)
CHLORIDE BLD-SCNC: 98 MEQ/L (ref 95–107)
CO2: 27 MEQ/L (ref 20–31)
CREAT SERPL-MCNC: 1.09 MG/DL (ref 0.5–0.9)
EOSINOPHILS ABSOLUTE: 0.1 K/UL (ref 0–0.7)
EOSINOPHILS RELATIVE PERCENT: 1.2 %
GFR AFRICAN AMERICAN: >60
GFR NON-AFRICAN AMERICAN: 51.6
GLOBULIN: 3.5 G/DL (ref 2.3–3.5)
GLUCOSE BLD-MCNC: 106 MG/DL (ref 70–99)
HCT VFR BLD CALC: 40.3 % (ref 37–47)
HEMOGLOBIN: 13.3 G/DL (ref 12–16)
INFLUENZA A BY PCR: NEGATIVE
INFLUENZA B BY PCR: NEGATIVE
LYMPHOCYTES ABSOLUTE: 2.2 K/UL (ref 1–4.8)
LYMPHOCYTES RELATIVE PERCENT: 20.6 %
MCH RBC QN AUTO: 24.9 PG (ref 27–31.3)
MCHC RBC AUTO-ENTMCNC: 33 % (ref 33–37)
MCV RBC AUTO: 75.5 FL (ref 82–100)
MONOCYTES ABSOLUTE: 0.9 K/UL (ref 0.2–0.8)
MONOCYTES RELATIVE PERCENT: 8.4 %
NEUTROPHILS ABSOLUTE: 7.4 K/UL (ref 1.4–6.5)
NEUTROPHILS RELATIVE PERCENT: 68.9 %
PDW BLD-RTO: 17.9 % (ref 11.5–14.5)
PLATELET # BLD: 344 K/UL (ref 130–400)
POTASSIUM SERPL-SCNC: 3.7 MEQ/L (ref 3.4–4.9)
RBC # BLD: 5.35 M/UL (ref 4.2–5.4)
SODIUM BLD-SCNC: 140 MEQ/L (ref 135–144)
TOTAL PROTEIN: 8 G/DL (ref 6.3–8)
WBC # BLD: 10.8 K/UL (ref 4.8–10.8)

## 2020-01-19 PROCEDURE — 85025 COMPLETE CBC W/AUTO DIFF WBC: CPT

## 2020-01-19 PROCEDURE — 99284 EMERGENCY DEPT VISIT MOD MDM: CPT

## 2020-01-19 PROCEDURE — 2580000003 HC RX 258: Performed by: FAMILY MEDICINE

## 2020-01-19 PROCEDURE — 80053 COMPREHEN METABOLIC PANEL: CPT

## 2020-01-19 PROCEDURE — 87502 INFLUENZA DNA AMP PROBE: CPT

## 2020-01-19 PROCEDURE — 36415 COLL VENOUS BLD VENIPUNCTURE: CPT

## 2020-01-19 RX ORDER — 0.9 % SODIUM CHLORIDE 0.9 %
1000 INTRAVENOUS SOLUTION INTRAVENOUS ONCE
Status: COMPLETED | OUTPATIENT
Start: 2020-01-19 | End: 2020-01-19

## 2020-01-19 RX ADMIN — SODIUM CHLORIDE 1000 ML: 9 INJECTION, SOLUTION INTRAVENOUS at 10:43

## 2020-01-19 ASSESSMENT — ENCOUNTER SYMPTOMS
RESPIRATORY NEGATIVE: 1
RECENT COUGH: 0
ABDOMINAL PAIN: 0
VOMITING: 0
DIARRHEA: 1

## 2020-01-19 NOTE — ED PROVIDER NOTES
brain OR    Cerebrovascular disease     Chronic back pain greater than 3 months duration     COPD (chronic obstructive pulmonary disease) (HCC)     Dr Bella Wolfe at [address] CVA (cerebral infarction)     left hemiparesis, due to ? estrogen + smoking    Depression     Dr Isabel Face H/O encephalopathy 2001    Headache(784.0)     Dr Karla Marroquin Hepatitis B surface antigen positive     Hx of blood clots     PE / trx with coumadin x 6 months    Hyperlipidemia LDL goal < 100     meds > 10 yrs    Hypertension     meds > 2 yrs    Hypothyroidism 2001    meds > 18 yrs    Laryngitis, chronic     scoped by Dr Farzaneh Hinds, fungal    Marijuana smoker in remission [facility])     Obesity (BMI 30-39. 9)     Osteoarthritis     Pulmonary embolism and infarction (HCC)     Restless legs syndrome     Rhinitis, chronic     Rotator cuff tear     Left    S/P colonoscopy with polypectomy     Dr Dionne Pak    Seizures [facility])    [address] Spinal headache     past partum     Spondylosis without myelopathy     Type 2 diabetes mellitus without complication (HCC)     hx > 6 yrs    Urinary incontinence     Vertebral compression fracture (HCC)          SURGICALHISTORY       Past Surgical History:   Procedure Laterality Date    BACK SURGERY      lumbar kyphoplasty x 2    BRAIN SURGERY      due to Arnold-Chiari deformity / CCF     SECTION      COLONOSCOPY      CRANIOTOMY      Arnold-Chiary malformation    ENDOSCOPY, COLON, DIAGNOSTIC      FEMUR FRACTURE SURGERY Left     NE RECONSTR TOTAL SHOULDER IMPLANT Left 6/15/2018    LEFT TOTAL SHOULDER ARTHROPLASTY, SANDY BIOMET TSA, SCALENE NERVE BLOCK, (LATEX ALLERGY) performed by Steven Mccurdy MD at [address] Left 2019    LEFT REMOVAL GLENOID AND CONVERSION TO REVERSE TOTAL SHOULDER ARTHROPLASTY, SANDY REVERSE TSA, [place] [employer] FLEXIBLE OSTEOTOMES, SCALENE NERVE BLOCK, LATEX Social History     Socioeconomic History    Marital status:      Spouse name: None    Number of children: 1    Years of education: None    Highest education level: None   Occupational History    Occupation: SSI   Social Needs    Financial resource strain: None    Food insecurity:     Worry: None     Inability: None    Transportation needs:     Medical: None     Non-medical: None   Tobacco Use    Smoking status: Current Every Day Smoker     Packs/day: 1.00     Years: 32.00     Pack years: 32.00     Types: Cigarettes    Smokeless tobacco: Never Used   Substance and Sexual Activity    Alcohol use: No     Alcohol/week: 0.0 standard drinks    Drug use: Yes     Types: Marijuana     Comment: daily for pain    Sexual activity: None   Lifestyle    Physical activity:     Days per week: None     Minutes per session: None    Stress: None   Relationships    Social connections:     Talks on phone: None     Gets together: None     Attends Rastafarian service: None     Active member of club or organization: None     Attends meetings of clubs or organizations: None     Relationship status: None    Intimate partner violence:     Fear of current or ex partner: None     Emotionally abused: None     Physically abused: None     Forced sexual activity: None   Other Topics Concern    None   Social History Narrative    None       SCREENINGS      @FLOW([id number])@      PHYSICAL EXAM    (up to 7 for level 4, 8 or more for level 5)     ED Triage Vitals [01/19/20 1011]   BP Temp Temp Source Pulse Resp SpO2 Height Weight   (!) 164/104 98.3 °F (36.8 °C) Oral 106 20 95 % 5' 2\" (1.575 m) 165 lb (74.8 kg)       Physical Exam  Vitals signs and nursing note reviewed. Constitutional:       Appearance: She is well-developed. HENT:      Head: Normocephalic and atraumatic.       Right Ear: External ear normal.      Left Ear: External ear normal.      Nose: Nose normal.   Eyes:      Pupils: Pupils are equal, round, and reactive to light. Neck:      Musculoskeletal: Normal range of motion and neck supple. Cardiovascular:      Rate and Rhythm: Normal rate and regular rhythm. Heart sounds: Normal heart sounds. Pulmonary:      Effort: Pulmonary effort is normal. No respiratory distress. Breath sounds: Normal breath sounds. No wheezing or rales. Chest:      Chest wall: No tenderness. Abdominal:      General: Bowel sounds are normal.      Palpations: Abdomen is soft. Musculoskeletal: Normal range of motion. Skin:     General: Skin is warm and dry. Neurological:      General: No focal deficit present. Mental Status: She is alert and oriented to person, place, and time. Cranial Nerves: No cranial nerve deficit. Sensory: No sensory deficit. Motor: No abnormal muscle tone.       Coordination: Coordination normal.      Deep Tendon Reflexes: Reflexes normal.   Psychiatric:         Mood and Affect: Mood normal.         Behavior: Behavior normal.         DIAGNOSTIC RESULTS     EKG: All EKG's are interpreted by the Emergency Department Physician who either signs or Co-signsthis chart in the absence of a cardiologist.         RADIOLOGY:   Teola Romberg such as CT, Ultrasound and MRI are read by the radiologist. Plain radiographic images are visualized and preliminarily interpreted by the emergency physician with the below findings:       Interpretation per the Radiologist below, if available at the time ofthis note:    No orders to display         ED BEDSIDE ULTRASOUND:   Performed by ED Physician - none    LABS:  Labs Reviewed   COMPREHENSIVE METABOLIC PANEL - Abnormal; Notable for the following components:       Result Value    Glucose 106 (*)     BUN 43 (*)     CREATININE 1.09 (*)     GFR Non- 51.6 (*)     All other components within normal limits   CBC WITH AUTO DIFFERENTIAL - Abnormal; Notable for the following components:    MCV 75.5 (*)     MCH 24.9 (*)     RDW 17.9 (*) Neutrophils Absolute 7.4 (*)     Monocytes Absolute 0.9 (*)     All other components within normal limits   RAPID INFLUENZA A/B ANTIGENS       All other labs were within normal range or not returned as of this dictation. EMERGENCY DEPARTMENT COURSE and DIFFERENTIAL DIAGNOSIS/MDM:   Vitals:    Vitals:    01/19/20 1011 01/19/20 1100 01/19/20 1200   BP: (!) 164/104 (!) 144/89 (!) 158/94   Pulse: 106 98 99   Resp: 20 19 18   Temp: 98.3 °F (36.8 °C)     TempSrc: Oral     SpO2: 95% 96% 96%   Weight: 165 lb (74.8 kg)     Height: 5' 2\" (1.575 m)       MDM  Number of Diagnoses or Management Options  Diarrhea, unspecified type:   Diagnosis management comments: Erica Joseph years old multiple ER visit presented to the ER today because she states she ate bad food last night and had some diarrhea a in the ER awake alert oriented no apparent distress states able to tolerate p.o. fluid no problem labs done and negative was given some IV fluid and sent home in stable condition      CONSULTS:  None    PROCEDURES:  Unless otherwise noted below, none     Procedures    FINAL IMPRESSION      1. Diarrhea, unspecified type          DISPOSITION/PLAN   DISPOSITION        PATIENT REFERRED TO:  No follow-up provider specified.     DISCHARGE MEDICATIONS:  New Prescriptions    No medications on file          (Please note thatportions of this note were completed with a voice recognition program.  Efforts were made to edit the dictations but occasionally words are mis-transcribed.)    Rika Salvador MD (electronically signed)  Attending Emergency Physician         Charanjit Canales MD  01/19/20 [address] [facility] MD [place]  01/19/20 3546

## 2020-01-19 NOTE — ED NOTES
Bed: 21  Expected date: 1/19/20  Expected time:   Means of arrival:   Comments:  64year old female  Fever, diarrhea, decrease in appetit. Thinks she may have flu. 156/94, [address] st, 26 resp.  97% ra one touch 124     April BARBARA Dominguez RN  01/19/20 1010

## 2020-01-19 NOTE — ED TRIAGE NOTES
Patient reports that she had one episode of diarrhea after eating a \"bad meal.\" Denies any complaints of pain or discomfort.

## 2020-01-19 NOTE — ED NOTES
Pt watching TV  No signs of distress  Pt denies pain or any needs at this time  Will continue to monitor     [address] V, RN  01/19/20 8685

## 2020-01-21 NOTE — TELEPHONE ENCOUNTER
Pt needs this sent to [facility] due to pt being out of her test strips. Pt only wants one box of 100 to get her thru till her mailaway arrives.

## 2020-01-23 RX ORDER — ESCITALOPRAM OXALATE 20 MG/1
TABLET ORAL
Qty: 30 TABLET | Refills: 14 | Status: SHIPPED | OUTPATIENT
Start: 2020-01-23 | End: 2020-02-20 | Stop reason: SDUPTHER

## 2020-01-23 RX ORDER — ARIPIPRAZOLE 10 MG/1
TABLET ORAL
Qty: 30 TABLET | Refills: 14 | Status: SHIPPED | OUTPATIENT
Start: 2020-01-23 | End: 2021-01-04 | Stop reason: SDUPTHER

## 2020-01-23 RX ORDER — SIMETHICONE 180 MG
CAPSULE ORAL
Qty: 60 CAPSULE | Refills: 14 | Status: SHIPPED | OUTPATIENT
Start: 2020-01-23 | End: 2020-11-24

## 2020-01-23 RX ORDER — MIRTAZAPINE 30 MG/1
TABLET, FILM COATED ORAL
Qty: 30 TABLET | Refills: 14 | Status: ON HOLD | OUTPATIENT
Start: 2020-01-23 | End: 2020-07-09 | Stop reason: HOSPADM

## 2020-01-25 ENCOUNTER — OFFICE VISIT (OUTPATIENT)
Dept: FAMILY MEDICINE CLINIC | Age: 60
End: 2020-01-25
Payer: COMMERCIAL

## 2020-01-25 VITALS
BODY MASS INDEX: 30.18 KG/M2 | OXYGEN SATURATION: 93 % | HEIGHT: 62 IN | HEART RATE: 109 BPM | SYSTOLIC BLOOD PRESSURE: 110 MMHG | DIASTOLIC BLOOD PRESSURE: 68 MMHG

## 2020-01-25 PROCEDURE — 2022F DILAT RTA XM EVC RTNOPTHY: CPT | Performed by: FAMILY MEDICINE

## 2020-01-25 PROCEDURE — 3017F COLORECTAL CA SCREEN DOC REV: CPT | Performed by: FAMILY MEDICINE

## 2020-01-25 PROCEDURE — G8417 CALC BMI ABV UP PARAM F/U: HCPCS | Performed by: FAMILY MEDICINE

## 2020-01-25 PROCEDURE — 99213 OFFICE O/P EST LOW 20 MIN: CPT | Performed by: FAMILY MEDICINE

## 2020-01-25 PROCEDURE — G8427 DOCREV CUR MEDS BY ELIG CLIN: HCPCS | Performed by: FAMILY MEDICINE

## 2020-01-25 PROCEDURE — 96372 THER/PROPH/DIAG INJ SC/IM: CPT | Performed by: FAMILY MEDICINE

## 2020-01-25 PROCEDURE — 3046F HEMOGLOBIN A1C LEVEL >9.0%: CPT | Performed by: FAMILY MEDICINE

## 2020-01-25 PROCEDURE — G8482 FLU IMMUNIZE ORDER/ADMIN: HCPCS | Performed by: FAMILY MEDICINE

## 2020-01-25 PROCEDURE — 4004F PT TOBACCO SCREEN RCVD TLK: CPT | Performed by: FAMILY MEDICINE

## 2020-01-25 RX ORDER — CYANOCOBALAMIN 1000 UG/ML
1000 INJECTION INTRAMUSCULAR; SUBCUTANEOUS ONCE
Status: COMPLETED | OUTPATIENT
Start: 2020-01-25 | End: 2020-01-25

## 2020-01-25 RX ADMIN — CYANOCOBALAMIN 1000 MCG: 1000 INJECTION INTRAMUSCULAR; SUBCUTANEOUS at 11:11

## 2020-01-25 NOTE — Clinical Note
Need to contact [employer] number on her [employer] card in [place] how we can help facilitate gym membership and nutrisystem

## 2020-01-27 RX ORDER — CARISOPRODOL 350 MG/1
350 TABLET ORAL DAILY PRN
Qty: 12 TABLET | Refills: 5 | Status: ON HOLD | OUTPATIENT
Start: 2020-01-27 | End: 2020-07-09 | Stop reason: HOSPADM

## 2020-01-28 ENCOUNTER — TELEPHONE (OUTPATIENT)
Dept: FAMILY MEDICINE CLINIC | Age: 60
End: 2020-01-28

## 2020-01-28 NOTE — TELEPHONE ENCOUNTER
Called [employer], she is signed up, will take up to 14 days to get card. Her card number is [id number]. She will need to give this number to the gym she is going to. For nutrisystem there is a lot of information. I spoke to Lawrence Scheuermann who would like to talk to pt directly. To call him she will need to call [phone] #[phone] and extension [phone].      Tired to call pt but vm full

## 2020-01-29 NOTE — TELEPHONE ENCOUNTER
Gave pt id number, states that she talked to her insurance yesterday and they are going to start sending her healthy meals

## 2020-01-31 ENCOUNTER — HOSPITAL ENCOUNTER (EMERGENCY)
Age: 60
Discharge: HOME | End: 2020-01-31
Attending: EMERGENCY MEDICINE
Payer: MEDICARE

## 2020-01-31 ENCOUNTER — APPOINTMENT (OUTPATIENT)
Dept: GENERAL RADIOLOGY | Age: 60
End: 2020-01-31
Payer: MEDICARE

## 2020-01-31 VITALS
RESPIRATION RATE: 17 BRPM | HEART RATE: 98 BPM | DIASTOLIC BLOOD PRESSURE: 78 MMHG | TEMPERATURE: 98.1 F | WEIGHT: 149 LBS | OXYGEN SATURATION: 96 % | BODY MASS INDEX: 27.42 KG/M2 | SYSTOLIC BLOOD PRESSURE: 111 MMHG | HEIGHT: 62 IN

## 2020-01-31 PROCEDURE — 73502 X-RAY EXAM HIP UNI 2-3 VIEWS: CPT

## 2020-01-31 PROCEDURE — 99284 EMERGENCY DEPT VISIT MOD MDM: CPT

## 2020-01-31 PROCEDURE — 96372 THER/PROPH/DIAG INJ SC/IM: CPT

## 2020-01-31 PROCEDURE — 6360000002 HC RX W HCPCS: Performed by: PHYSICIAN ASSISTANT

## 2020-01-31 PROCEDURE — 72110 X-RAY EXAM L-2 SPINE 4/>VWS: CPT

## 2020-01-31 RX ORDER — ORPHENADRINE CITRATE 30 MG/ML
60 INJECTION INTRAMUSCULAR; INTRAVENOUS ONCE
Status: COMPLETED | OUTPATIENT
Start: 2020-01-31 | End: 2020-01-31

## 2020-01-31 RX ADMIN — ORPHENADRINE CITRATE 60 MG: 30 INJECTION INTRAMUSCULAR; INTRAVENOUS at 20:23

## 2020-01-31 ASSESSMENT — PAIN DESCRIPTION - ORIENTATION: ORIENTATION: LEFT

## 2020-01-31 ASSESSMENT — ENCOUNTER SYMPTOMS
COLOR CHANGE: 0
SHORTNESS OF BREATH: 0
BACK PAIN: 1
TROUBLE SWALLOWING: 0
ALLERGIC/IMMUNOLOGIC NEGATIVE: 1
ABDOMINAL PAIN: 0
EYE PAIN: 0
APNEA: 0

## 2020-01-31 ASSESSMENT — PAIN DESCRIPTION - PAIN TYPE: TYPE: ACUTE PAIN

## 2020-01-31 ASSESSMENT — PAIN SCALES - GENERAL: PAINLEVEL_OUTOF10: 6

## 2020-01-31 ASSESSMENT — PAIN DESCRIPTION - LOCATION: LOCATION: HIP

## 2020-02-01 NOTE — ED NOTES
Bed: 25  Expected date: 1/31/20  Expected time: 7:25 PM  Means of arrival:   Comments:  65 y/o F left hip pain fall from standing ([facility])     Ling Saxena RN  01/31/20 1016

## 2020-02-01 NOTE — ED PROVIDER NOTES
[address] [facility] ED  eMERGENCYdEPARTMENT eNCOUnter      Pt Name: Graciela Randhawa  MRN: [id number]  [place] 1960  Date of evaluation: 1/31/2020  Provider:Ky Zuniga PA-C    CHIEF COMPLAINT       Chief Complaint   Patient presents with    Fall         HISTORY OF PRESENT ILLNESS  (Location/Symptom, Timing/Onset, Context/Setting, Quality, Duration, Modifying Factors, Severity.)   Graciela Randhawa is a 64 y.o. female who presents to the emergency department with complaints of left hip pain following a mechanical fall at home prior to arrival.  Patient states that she was cooking dinner and lost her balance, falling directly onto her left hip. Patient denies hitting her head or any loss of consciousness. Patient states that she was able to stand \"just barely\" after the injury occurred. Patient denies any neck pain but does complain of lower back pain which she states is new. Patient denies any saddle paresthesias or loss of bowel or bladder control. Patient does complain of a headache that she states is from the \"stress of the situation\". Patient denies any chest pain or abdominal pain. No numbness or tingling distal to the injury    HPI    Nursing Notes were reviewed and I agree. REVIEW OF SYSTEMS    (2-9 systems for level 4, 10 or more for level 5)     Review of Systems   Constitutional: Negative for diaphoresis and fever. HENT: Negative for hearing loss and trouble swallowing. Eyes: Negative for pain. Respiratory: Negative for apnea and shortness of breath. Cardiovascular: Negative for chest pain. Gastrointestinal: Negative for abdominal pain. Endocrine: Negative. Genitourinary: Negative for hematuria. Musculoskeletal: Positive for back pain and gait problem. Negative for neck pain and neck stiffness. Skin: Negative for color change. Allergic/Immunologic: Negative. Neurological: Negative for dizziness and numbness. Hematological: Negative. Psychiatric/Behavioral: Negative. All other systems reviewed and are negative. Except as noted above the remainder of the review of systems was reviewed and negative. PAST MEDICAL HISTORY     Past Medical History:   Diagnosis Date    Acid reflux     Allergic rhinitis     Anxiety     Arnold-Chiari deformity ([address] 2000    surgery    Asthma     Cerebral artery occlusion with cerebral infarction ([address] 2001    1 yr after brain OR    Cerebrovascular disease     Chronic back pain greater than 3 months duration     COPD (chronic obstructive pulmonary disease) (HCC)     Dr Carlee Rollins at [address] CVA (cerebral infarction)     left hemiparesis, due to ? estrogen + smoking    Depression     Dr Megan Dubose H/O encephalopathy 2001    Headache(784.0)     Dr Kelsea García Hepatitis B surface antigen positive     Hx of blood clots 2010    PE / trx with coumadin x 6 months    Hyperlipidemia LDL goal < 100     meds > 10 yrs    Hypertension     meds > 2 yrs    Hypothyroidism 2001    meds > 18 yrs    Laryngitis, chronic 2012    scoped by Dr Shane Candelario, fungal    Marijuana smoker in remission [facility])     Obesity (BMI 30-39. 9)     Osteoarthritis     Pulmonary embolism and infarction (HCC)     Restless legs syndrome     Rhinitis, chronic     Rotator cuff tear     Left    S/P colonoscopy with polypectomy 2010    Dr Phil Barry    Seizures [facility])    [address] Spinal headache     past partum     Spondylosis without myelopathy     Type 2 diabetes mellitus without complication ([address]     hx > 6 yrs    Urinary incontinence     Vertebral compression fracture ([address]          SURGICAL HISTORY       Past Surgical History:   Procedure Laterality Date    BACK SURGERY  2001    lumbar kyphoplasty x 2    BRAIN SURGERY  2000    due to Arnold-Chiari deformity / C[facility]     SECTION      COLONOSCOPY      CRANIOTOMY  2000    Arnold-Chiary malformation    ENDOSCOPY, COLON, DIAGNOSTIC tablet by mouth daily    MAGNESIUM OXIDE (MAG-OX) 400 MG TABLET    Take one tablet by mouth every day    MELATONIN 10 MG TABS    TAKE 1 TABLET BY MOUTH EVERY NIGHT    MELATONIN 10 MG TABS    TAKE 1 TABLET BY MOUTH EVERY NIGHT    METFORMIN (GLUCOPHAGE) 500 MG TABLET    Take 1 tablet by mouth 2 times daily (with meals)    MIRTAZAPINE (REMERON) 30 MG TABLET    TAKE ONE TABLET BY MOUTH AT BEDTIME    MUPIROCIN (BACTROBAN) 2 % OINTMENT    CINDY EXT AA TID    NUTRITIONAL SUPPLEMENTS (BOOST HIGH PROTEIN) LIQD    DRINK 1 CAN BY MOUTH TWICE DAILY    NYSTATIN 958514 UNIT/GM POWDER    CINDY EXT AA ONCE D    ONDANSETRON (ZOFRAN-ODT) 4 MG DISINTEGRATING TABLET    Dissolve 1 under tongue every 8hrs as need for n/v    ONE TOUCH LANCETS MISC    1 each by Does not apply route daily    ONE TOUCH ULTRA TEST STRIP    TEST BLOOD GLUCOSE 3 TIMES  DAILY    POTASSIUM CHLORIDE (KLOR-CON M) 20 MEQ EXTENDED RELEASE TABLET    Take 1 tablet by mouth daily    PRAMIPEXOLE (MIRAPEX) 0.5 MG TABLET    Take 1 tablet by mouth every evening    PREGABALIN (LYRICA) 150 MG CAPSULE    TAKE 1 CAPSULE BY MOUTH TWICE DAILY    ROSUVASTATIN (CRESTOR) 20 MG TABLET    TAKE 1 TABLET BY MOUTH EVERY DAY    SIMETHICONE 180 MG CAPS    TAKE ONE SOFTGEL BY MOUTH TWICE DAILY    SPIRONOLACTONE (ALDACTONE) 25 MG TABLET    Take one tablet by mouth every day    SYMBICORT 160-4.5 MCG/ACT AERO    INL 2 PFS PO BID UTD    TERAZOSIN (HYTRIN) 2 MG CAPSULE    Take one capsule by mouth at bedtime    TIZANIDINE (ZANAFLEX) 4 MG TABLET    Take 2 tabs every 8 hrs as need for muscle spasms    VITAMIN B-12 (CYANOCOBALAMIN) 1000 MCG TABLET    Take 1 tablet by mouth daily       ALLERGIES     Latex; Imitrex [sumatriptan]; Zomig [zolmitriptan]; Antivert [meclizine hcl]; Flagyl [metronidazole]; Ketorolac tromethamine; Provera [medroxyprogesterone acetate]; Ultram [tramadol hcl]; Bacitracin; Bactrim; Cefdinir; Cefuroxime axetil; Codeine; Cyclosporine; Iodine; Iv dye [iodides];  Neomycin; Petroleum jelly [skin protectants, misc.]; Quinolones; Sulfa antibiotics;  Toradol [ketorolac tromethamine]; and Trovan [trovafloxacin]    FAMILY HISTORY       Family History   Problem Relation Age of Onset    Osteoarthritis Mother     Asthma Mother     Diabetes Mother     Hypertension Mother     Cancer Mother         lung    Osteoarthritis Father     Hypertension Father     Other Father         PE    Cancer Father         stomach cancer    Asthma Brother     Heart Attack Brother     Other Brother         overdose    Asthma Sister     Breast Cancer Sister     Hypertension Sister     Other Sister         overdose / MVA          SOCIAL HISTORY       Social History     Socioeconomic History    Marital status:      Spouse name: None    Number of children: 1    Years of education: None    Highest education level: None   Occupational History    Occupation: SSI   Social Needs    Financial resource strain: None    Food insecurity:     Worry: None     Inability: None    Transportation needs:     Medical: None     Non-medical: None   Tobacco Use    Smoking status: Current Every Day Smoker     Packs/day: 1.00     Years: 32.00     Pack years: 32.00     Types: Cigarettes    Smokeless tobacco: Never Used   Substance and Sexual Activity    Alcohol use: No     Alcohol/week: 0.0 standard drinks    Drug use: Yes     Types: Marijuana     Comment: daily for pain    Sexual activity: None   Lifestyle    Physical activity:     Days per week: None     Minutes per session: None    Stress: None   Relationships    Social connections:     Talks on phone: None     Gets together: None     Attends Latter day service: None     Active member of club or organization: None     Attends meetings of clubs or organizations: None     Relationship status: None    Intimate partner violence:     Fear of current or ex partner: None     Emotionally abused: None     Physically abused: None     Forced sexual activity: None   Other

## 2020-02-05 ENCOUNTER — OFFICE VISIT (OUTPATIENT)
Dept: FAMILY MEDICINE CLINIC | Age: 60
End: 2020-02-05
Payer: COMMERCIAL

## 2020-02-05 VITALS
DIASTOLIC BLOOD PRESSURE: 78 MMHG | BODY MASS INDEX: 27.2 KG/M2 | HEART RATE: 118 BPM | WEIGHT: 147.8 LBS | HEIGHT: 62 IN | SYSTOLIC BLOOD PRESSURE: 136 MMHG | OXYGEN SATURATION: 98 %

## 2020-02-05 PROCEDURE — G8427 DOCREV CUR MEDS BY ELIG CLIN: HCPCS | Performed by: FAMILY MEDICINE

## 2020-02-05 PROCEDURE — 99213 OFFICE O/P EST LOW 20 MIN: CPT | Performed by: FAMILY MEDICINE

## 2020-02-05 PROCEDURE — 4004F PT TOBACCO SCREEN RCVD TLK: CPT | Performed by: FAMILY MEDICINE

## 2020-02-05 PROCEDURE — G8482 FLU IMMUNIZE ORDER/ADMIN: HCPCS | Performed by: FAMILY MEDICINE

## 2020-02-05 PROCEDURE — 3017F COLORECTAL CA SCREEN DOC REV: CPT | Performed by: FAMILY MEDICINE

## 2020-02-05 PROCEDURE — G8417 CALC BMI ABV UP PARAM F/U: HCPCS | Performed by: FAMILY MEDICINE

## 2020-02-05 NOTE — PROGRESS NOTES
lumbar vertebra ([address]    H/O: CVA (cerebrovascular accident)    Hearing difficulty    History of HPV infection    Hypothyroidism due to Hashimoto's thyroiditis    Hypoxic brain injury ([address]    Late effects of CVA (cerebrovascular accident)    Neurogenic bladder    Nonallopathic lesion of cervical region, not elsewhere classified    Nonallopathic lesion of sacral region    Peripheral vascular disease ([address]    Post-laminectomy syndrome    Primary osteoarthritis of left shoulder    Pulmonary embolism (HCC)    Pulmonary embolism with infarction ([address]    Recurrent UTI    Retention of urine    Trochanteric bursitis of left hip    Vitamin D deficiency    High risk medication use - 11/01/17 OARRS PM&R, 11/13/17 Tox Screen: positive marijuana PM&R    Marijuana use    Chronic midline thoracic back pain    Vertebral compression fracture (HCC)    Closed wedge compression fracture of first lumbar vertebra with delayed healing    DJD of left shoulder    Status post total shoulder replacement, right    Status post total shoulder replacement, left    Decubitus ulcer of left ischial area    Decubitus ulcer of sacral area    Weakness    Left shoulder pain    Status post reverse total shoulder replacement, left    Alleged drug diversion    H/O medication noncompliance       Allergies   Allergen Reactions    Latex Rash    Imitrex [Sumatriptan] Anaphylaxis    Zomig [Zolmitriptan] Anaphylaxis    Antivert [Meclizine Hcl] Other (See Comments)     confusion    Flagyl [Metronidazole] Nausea Only     Nausea with rash    Ketorolac Tromethamine Nausea Only    Provera [Medroxyprogesterone Acetate] Other (See Comments)     Caused massive stroke    Ultram [Tramadol Hcl] Nausea Only    Bacitracin Rash    Bactrim Nausea And Vomiting and Rash    Cefdinir Rash    Cefuroxime Axetil Rash    Codeine Rash    Cyclosporine Rash    Iodine Rash     Rash with SOB    Iv Dye [Iodides] Rash     Rash with SOB    Neomycin Rash    Petroleum Jelly [Skin Protectants, Misc.] Rash    Quinolones Rash    Sulfa Antibiotics Rash    Toradol [Ketorolac Tromethamine] Rash    Trovan [Trovafloxacin] Rash         Medications marked \"taking\" at this time  Outpatient Medications Marked as Taking for the 2/5/20 encounter (Office Visit) with Carlos Fulton MD   Medication Sig Dispense Refill    ONE TOUCH LANCETS MISC 1 each by Does not apply route daily 300 each 3    carisoprodol (SOMA) 350 MG tablet Take 1 tablet by mouth daily as needed (migraine) for up to 180 days.  12 tablet 5    escitalopram (LEXAPRO) 20 MG tablet TAKE ONE TABLET BY MOUTH DAILY 30 tablet 14    mirtazapine (REMERON) 30 MG tablet TAKE ONE TABLET BY MOUTH AT BEDTIME 30 tablet 14    ARIPiprazole (ABILIFY) 10 MG tablet TAKE ONE TABLET BY MOUTH EACH NIGHT AT BEDTIME 30 tablet 14    Simethicone 180 MG CAPS TAKE ONE SOFTGEL BY MOUTH TWICE DAILY 60 capsule 14    ONE TOUCH ULTRA TEST strip TEST BLOOD GLUCOSE 3 TIMES  DAILY 100 strip 0    furosemide (LASIX) 40 MG tablet Take 1 tablet by mouth every day 30 tablet 14    esomeprazole (NEXIUM) 40 MG delayed release capsule Take 2 capsules by mouth every day 60 capsule 14    tiZANidine (ZANAFLEX) 4 MG tablet Take 2 tabs every 8 hrs as need for muscle spasms 180 tablet 14    ondansetron (ZOFRAN-ODT) 4 MG disintegrating tablet Dissolve 1 under tongue every 8hrs as need for n/v 30 tablet 14    levothyroxine (SYNTHROID) 200 MCG tablet Take 1 tablet by mouth 30 minutes before breakfast 30 tablet 14    LORazepam (ATIVAN) 0.5 MG tablet Take 1 tablet every 8 hours as needed for anxiety 60 tablet 2    ibuprofen (ADVIL;MOTRIN) 800 MG tablet Take 1 tab by mouth every 8 hours as need for pain 30 tablet 11    SYMBICORT 160-4.5 MCG/ACT AERO INL 2 PFS PO BID UTD 1 Inhaler 2    terazosin (HYTRIN) 2 MG capsule Take one capsule by mouth at bedtime 30 capsule 14    spironolactone (ALDACTONE) 25 MG tablet Take one tablet by mouth every

## 2020-02-20 RX ORDER — ESOMEPRAZOLE MAGNESIUM 40 MG/1
CAPSULE, DELAYED RELEASE ORAL
Qty: 60 CAPSULE | Refills: 14 | Status: SHIPPED | OUTPATIENT
Start: 2020-02-20 | End: 2020-02-24 | Stop reason: SDUPTHER

## 2020-02-20 RX ORDER — TIZANIDINE 4 MG/1
TABLET ORAL
Qty: 180 TABLET | Refills: 14 | Status: ON HOLD | OUTPATIENT
Start: 2020-02-20 | End: 2020-07-09 | Stop reason: HOSPADM

## 2020-02-20 RX ORDER — ESCITALOPRAM OXALATE 20 MG/1
TABLET ORAL
Qty: 30 TABLET | Refills: 14 | Status: SHIPPED | OUTPATIENT
Start: 2020-02-20 | End: 2021-01-04 | Stop reason: SDUPTHER

## 2020-02-20 RX ORDER — LORAZEPAM 0.5 MG/1
TABLET ORAL
Qty: 60 TABLET | Refills: 2 | Status: SHIPPED | OUTPATIENT
Start: 2020-02-20 | End: 2020-02-21 | Stop reason: SDUPTHER

## 2020-02-20 RX ORDER — IBUPROFEN 800 MG/1
TABLET ORAL
Qty: 30 TABLET | Refills: 11 | Status: ON HOLD | OUTPATIENT
Start: 2020-02-20 | End: 2020-07-09 | Stop reason: HOSPADM

## 2020-02-20 RX ORDER — LEVOTHYROXINE SODIUM 0.05 MG/1
TABLET ORAL
Qty: 30 TABLET | Refills: 5 | Status: SHIPPED | OUTPATIENT
Start: 2020-02-20 | End: 2020-02-21 | Stop reason: ALTCHOICE

## 2020-02-21 ENCOUNTER — OFFICE VISIT (OUTPATIENT)
Dept: FAMILY MEDICINE CLINIC | Age: 60
End: 2020-02-21
Payer: MEDICARE

## 2020-02-21 VITALS
DIASTOLIC BLOOD PRESSURE: 70 MMHG | HEART RATE: 89 BPM | WEIGHT: 155 LBS | BODY MASS INDEX: 28.52 KG/M2 | SYSTOLIC BLOOD PRESSURE: 112 MMHG | HEIGHT: 62 IN | OXYGEN SATURATION: 99 % | TEMPERATURE: 97.6 F

## 2020-02-21 PROCEDURE — G8417 CALC BMI ABV UP PARAM F/U: HCPCS | Performed by: NURSE PRACTITIONER

## 2020-02-21 PROCEDURE — G8427 DOCREV CUR MEDS BY ELIG CLIN: HCPCS | Performed by: NURSE PRACTITIONER

## 2020-02-21 PROCEDURE — G8482 FLU IMMUNIZE ORDER/ADMIN: HCPCS | Performed by: NURSE PRACTITIONER

## 2020-02-21 PROCEDURE — 99214 OFFICE O/P EST MOD 30 MIN: CPT | Performed by: NURSE PRACTITIONER

## 2020-02-21 PROCEDURE — 3017F COLORECTAL CA SCREEN DOC REV: CPT | Performed by: NURSE PRACTITIONER

## 2020-02-21 PROCEDURE — 4004F PT TOBACCO SCREEN RCVD TLK: CPT | Performed by: NURSE PRACTITIONER

## 2020-02-21 RX ORDER — LORAZEPAM 0.5 MG/1
TABLET ORAL
Qty: 60 TABLET | Refills: 2 | Status: SHIPPED | OUTPATIENT
Start: 2020-02-21 | End: 2020-05-22 | Stop reason: SDUPTHER

## 2020-02-21 ASSESSMENT — ENCOUNTER SYMPTOMS
SORE THROAT: 0
COUGH: 1
BACK PAIN: 1
RHINORRHEA: 0
SHORTNESS OF BREATH: 0
TROUBLE SWALLOWING: 0

## 2020-02-21 NOTE — TELEPHONE ENCOUNTER
I don't know. She's due for refill    [facility] was sent to [facility] and 90 days is nearly up    Where does she want it to go?

## 2020-02-21 NOTE — PROGRESS NOTES
estrogen + smoking    Depression 1993    Dr Abner Bassett H/O encephalopathy 2001    Headache(784.0)      Mukund Neighbor Hepatitis B surface antigen positive     Hx of blood clots 2010    PE / trx with coumadin x 6 months    Hyperlipidemia LDL goal < 100     meds > 10 yrs    Hypertension     meds > 2 yrs    Hypothyroidism 2001    meds > 18 yrs    Laryngitis, chronic 2012    scoped by Dr Raven Person, fungal    Marijuana smoker in remission [facility]) 2011    Obesity (BMI 30-39. 9)     Osteoarthritis     Pulmonary embolism and infarction (HCC)     Restless legs syndrome     Rhinitis, chronic     Rotator cuff tear     Left    S/P colonoscopy with polypectomy 2010    Dr Casper Daughters    Seizures [facility])     Smoker     Spinal headache     past partum 1994    Spondylosis without myelopathy     Type 2 diabetes mellitus without complication ([address]     hx > 6 yrs    Urinary incontinence     Vertebral compression fracture [facility])      Patient Active Problem List    Diagnosis Date Noted    Alleged drug diversion 06/21/2019    H/O medication noncompliance 06/21/2019    Status post reverse total shoulder replacement, left 05/17/2019    Left shoulder pain 05/07/2019    Weakness 11/29/2018    Decubitus ulcer of left ischial area 09/12/2018    Decubitus ulcer of sacral area 09/12/2018    Status post total shoulder replacement, right 06/15/2018    Status post total shoulder replacement, left 06/15/2018    DJD of left shoulder 06/13/2018    Marijuana use 11/02/2017    Chronic midline thoracic back pain 11/02/2017    Closed wedge compression fracture of first lumbar vertebra with delayed healing 11/02/2017    Vertebral compression fracture ([address]     High risk medication use - 11/01/17 OARRS PM&R, 11/13/17 Tox Screen: positive marijuana PM&R 11/01/2017    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    Allergic rhinitis 10/29/2017    Aortic valve disorder 10/29/2017    Bipolar disorder ([address] 10/29/2017 Chronic periodontitis, generalized 2010    Alveolitis of jaw 2010    Dental caries extending into pulp 2010    Pulmonary embolism ([address] 2010    Retention of urine 2010    Neurogenic bladder 2010    Vitamin D deficiency 2009    Airway hyperreactivity 2009    Cervicalgia 2009    Post-laminectomy syndrome 2000     Past Surgical History:   Procedure Laterality Date    BACK SURGERY      lumbar kyphoplasty x 2    BRAIN SURGERY      due to Arnold-Chiari deformity / [facility]     SECTION      COLONOSCOPY      CRANIOTOMY      Arnold-Chiary malformation    ENDOSCOPY, COLON, DIAGNOSTIC      FEMUR FRACTURE SURGERY Left     WI RECONSTR TOTAL SHOULDER IMPLANT Left 6/15/2018    LEFT TOTAL SHOULDER ARTHROPLASTY, SANDY BIOMET TSA, SCALENE NERVE BLOCK, (LATEX ALLERGY) performed by Aileen Amin MD at [address] [place] Left 2019    LEFT REMOVAL GLENOID AND CONVERSION TO REVERSE TOTAL SHOULDER ARTHROPLASTY, SANDY REVERSE TSA, JOSE DAVID EQUIPMENT FLEXIBLE OSTEOTOMES, SCALENE NERVE BLOCK, LATEX ALLERGY performed by Aileen Amin MD at [address] [place]      lumbar kyphoplasty    TONSILLECTOMY      UPPER GASTROINTESTINAL ENDOSCOPY  8/5/15    w/bx      Family History   Problem Relation Age of Onset    Osteoarthritis Mother     Asthma Mother     Diabetes Mother     Hypertension Mother     Cancer Mother         lung    Osteoarthritis Father     Hypertension Father     Other Father         PE    Cancer Father         stomach cancer    Asthma Brother     Heart Attack Brother     Other Brother         overdose    Asthma Sister     Breast Cancer Sister     Hypertension Sister     Other Sister         overdose / MVA     Social History     Socioeconomic History    Marital status:      Spouse name: None    Number of children: 1    Years of education: None    Highest education level: None   Occupational History    Occupation: [employer]   Social Needs    Financial resource strain: None    Food insecurity:     Worry: None     Inability: None    Transportation needs:     Medical: None     Non-medical: None   Tobacco Use    Smoking status: Current Every Day Smoker     Packs/day: 1.00     Years: 32.00     Pack years: 32.00     Types: Cigarettes    Smokeless tobacco: Never Used   Substance and Sexual Activity    Alcohol use: No     Alcohol/week: 0.0 standard drinks    Drug use: Yes     Types: Marijuana     Comment: daily for pain    Sexual activity: None   Lifestyle    Physical activity:     Days per week: None     Minutes per session: None    Stress: None   Relationships    Social connections:     Talks on phone: None     Gets together: None     Attends Oriental orthodox service: None     Active member of club or organization: None     Attends meetings of clubs or organizations: None     Relationship status: None    Intimate partner violence:     Fear of current or ex partner: None     Emotionally abused: None     Physically abused: None     Forced sexual activity: None   Other Topics Concern    None   Social History Narrative    None     Current Outpatient Medications on File Prior to Visit   Medication Sig Dispense Refill    ibuprofen (ADVIL;MOTRIN) 800 MG tablet Take 1 tab by mouth every 8 hours as need for pain 30 tablet 11    escitalopram (LEXAPRO) 20 MG tablet TAKE ONE TABLET BY MOUTH DAILY 30 tablet 14    tiZANidine (ZANAFLEX) 4 MG tablet Take 2 tabs every 8 hrs as need for muscle spasms 180 tablet 14    esomeprazole (NEXIUM) 40 MG delayed release capsule Take 2 capsules by mouth every day 60 capsule 14    ONE TOUCH LANCETS MISC 1 each by Does not apply route daily 300 each 3    carisoprodol (SOMA) 350 MG tablet Take 1 tablet by mouth daily as needed (migraine) for up to 180 days.  12 tablet 5    mirtazapine (REMERON) 30 MG tablet TAKE ONE TABLET BY MOUTH AT BEDTIME 30 tablet 14    ARIPiprazole (ABILIFY) 10 MG tablet TAKE ONE TABLET BY MOUTH EACH NIGHT AT BEDTIME 30 tablet 14    Simethicone 180 MG CAPS TAKE ONE SOFTGEL BY MOUTH TWICE DAILY 60 capsule 14    ONE TOUCH ULTRA TEST strip TEST BLOOD GLUCOSE 3 TIMES  DAILY 100 strip 0    furosemide (LASIX) 40 MG tablet Take 1 tablet by mouth every day 30 tablet 14    ondansetron (ZOFRAN-ODT) 4 MG disintegrating tablet Dissolve 1 under tongue every 8hrs as need for n/v 30 tablet 14    levothyroxine (SYNTHROID) 200 MCG tablet Take 1 tablet by mouth 30 minutes before breakfast 30 tablet 14    SYMBICORT 160-4.5 MCG/ACT AERO INL 2 PFS PO BID UTD 1 Inhaler 2    terazosin (HYTRIN) 2 MG capsule Take one capsule by mouth at bedtime 30 capsule 14    spironolactone (ALDACTONE) 25 MG tablet Take one tablet by mouth every day 30 tablet 14    magnesium oxide (MAG-OX) 400 MG tablet Take one tablet by mouth every day 30 tablet 14    folic acid (FOLVITE) 1 MG tablet Take one tablet by mouth every day 30 tablet 14    potassium chloride (KLOR-CON M) 20 MEQ extended release tablet Take 1 tablet by mouth daily 30 tablet 5    Melatonin 10 MG TABS TAKE 1 TABLET BY MOUTH EVERY NIGHT 30 tablet 0    pregabalin (LYRICA) 150 MG capsule TAKE 1 CAPSULE BY MOUTH TWICE DAILY 60 capsule 5    rosuvastatin (CRESTOR) 20 MG tablet TAKE 1 TABLET BY MOUTH EVERY DAY 90 tablet 2    Blood Glucose Monitoring Suppl (ONE TOUCH ULTRA 2) w/Device KIT USE AS DIRECTED TO TEST THREE TIMES DAILY 1 kit 0    amLODIPine (NORVASC) 5 MG tablet TAKE 1 TABLET BY MOUTH DAILY 90 tablet 5    levocetirizine (XYZAL) 5 MG tablet TAKE 1 TABLET BY MOUTH DAILY 30 tablet 5    metFORMIN (GLUCOPHAGE) 500 MG tablet Take 1 tablet by mouth 2 times daily (with meals) 60 tablet 5    pramipexole (MIRAPEX) 0.5 MG tablet Take 1 tablet by mouth every evening 30 tablet 5    Ascorbic Acid (VITAMIN C/KATIA HIPS) 500 MG TABS TAKE 1 TABLET BY MOUTH DAILY 30 tablet 0    NYSTATIN 517600 UNIT/GM powder CINDY EXT AA ONCE D  0    vitamin B-12 (CYANOCOBALAMIN) 1000 MCG tablet Take 1 tablet by mouth daily 90 tablet 3    loperamide (IMODIUM A-D) 2 MG tablet Take 2 mg by mouth as needed for Diarrhea       LORazepam (ATIVAN) 0.5 MG tablet Take 1 tablet every 8 hours as needed for anxiety 60 tablet 2    Nutritional Supplements (BOOST HIGH PROTEIN) LIQD DRINK 1 CAN BY MOUTH TWICE DAILY (Patient not taking: Reported on 2/21/2020) 69033 mL 5    mupirocin (BACTROBAN) 2 % ointment CINDY EXT AA TID       No current facility-administered medications on file prior to visit. Allergies   Allergen Reactions    Latex Rash    Imitrex [Sumatriptan] Anaphylaxis    Zomig [Zolmitriptan] Anaphylaxis    Antivert [Meclizine Hcl] Other (See Comments)     confusion    Flagyl [Metronidazole] Nausea Only     Nausea with rash    Ketorolac Tromethamine Nausea Only    Provera [Medroxyprogesterone Acetate] Other (See Comments)     Caused massive stroke    Ultram [Tramadol Hcl] Nausea Only    Bacitracin Rash    Bactrim Nausea And Vomiting and Rash    Cefdinir Rash    Cefuroxime Axetil Rash    Codeine Rash    Cyclosporine Rash    Iodine Rash     Rash with SOB    Iv Dye [Iodides] Rash     Rash with SOB    Neomycin Rash    Petroleum Jelly [Skin Protectants, Misc.] Rash    Quinolones Rash    Sulfa Antibiotics Rash    Toradol [Ketorolac Tromethamine] Rash    Trovan [Trovafloxacin] Rash       Review of Systems   Constitutional: Negative for chills, diaphoresis, fatigue and fever. HENT: Positive for congestion and ear pain. Negative for ear discharge, hearing loss, rhinorrhea, sore throat and trouble swallowing. Eyes: Negative for visual disturbance. Respiratory: Positive for cough. Negative for shortness of breath. Cardiovascular: Negative for chest pain, palpitations and leg swelling. Genitourinary:        Vaginal itching   Musculoskeletal: Positive for arthralgias and back pain.    Neurological: Negative for dizziness and headaches. Psychiatric/Behavioral: Negative for hallucinations. The patient is not nervous/anxious. Objective  Vitals:    02/21/20 1013   BP: 112/70   Site: Left Upper Arm   Position: Sitting   Cuff Size: Medium Adult   Pulse: 89   Temp: 97.6 °F (36.4 °C)   TempSrc: Tympanic   SpO2: 99%   Weight: 155 lb (70.3 kg)   Height: 5' 2\" (1.575 m)     Physical Exam  Constitutional:       General: She is not in acute distress. Appearance: Normal appearance. She is normal weight. She is not ill-appearing, toxic-appearing or diaphoretic. HENT:      Head: Normocephalic. Right Ear: Tympanic membrane, ear canal and external ear normal. There is no impacted cerumen. Left Ear: Tympanic membrane, ear canal and external ear normal. There is no impacted cerumen. Nose: Nose normal. No congestion or rhinorrhea. Mouth/Throat:      Mouth: Mucous membranes are moist.      Pharynx: Oropharynx is clear. No oropharyngeal exudate or posterior oropharyngeal erythema. Eyes:      Extraocular Movements: Extraocular movements intact. Conjunctiva/sclera: Conjunctivae normal.      Pupils: Pupils are equal, round, and reactive to light. Neck:      Musculoskeletal: Normal range of motion. No muscular tenderness. Cardiovascular:      Rate and Rhythm: Normal rate and regular rhythm. Pulses: Normal pulses. Heart sounds: Normal heart sounds. No murmur. Pulmonary:      Effort: Pulmonary effort is normal. No respiratory distress. Breath sounds: Normal breath sounds. No stridor. No wheezing, rhonchi or rales. Chest:      Chest wall: No tenderness. Musculoskeletal: Normal range of motion. Back:       Right lower leg: No edema. Left lower leg: No edema. Lymphadenopathy:      Cervical: No cervical adenopathy. Skin:     General: Skin is warm and dry. Capillary Refill: Capillary refill takes less than 2 seconds. Coloration: Skin is not jaundiced or pale. Findings: No bruising, erythema, lesion or rash. Neurological:      General: No focal deficit present. Mental Status: She is alert and oriented to person, place, and time. Mental status is at baseline. Psychiatric:         Mood and Affect: Mood normal.         Behavior: Behavior normal.         Thought Content: Thought content normal.         Judgment: Judgment normal.         Assessment& Plan    1. Vaginal itching  Recommended pt f/u with [facility] regarding ongoing vaginal itching. 2. Colon cancer screening  - Cologuard; Future    3. Coccyx pain  No evidence of any pressure areas at this time. Encouraged frequent position changes, off loading weight, sitting on pillows, f/u for any changes in skin. 4. Left ear pain  No signs of infection. Continue to monitor. F/u for any worsening symptoms. Side effects, adverse effects of the medication prescribed today, as well as treatment plan/ rationale and result expectations have been discussed with the patient who expresses understanding and desires to proceed. Close follow up to evaluate treatment results and for coordination of care. I have reviewed the patient's medical history in detail and updated the computerized patient record. As always, patient is advised that if symptoms worsen in any way they will proceed to the nearest emergency room. Orders Placed This Encounter   Procedures    [facility]     This test is performed by an external laboratory and is used for result attachment only. It is required that this order requisition be faxed to: [facility] @ [phone]. See [email or web address] for further information. Standing Status:   Future     Standing Expiration Date:   2/21/2021       No orders of the defined types were placed in this encounter. Return if symptoms worsen or fail to improve.     Bertha Mercedes, MAYURI - CNP

## 2020-02-24 RX ORDER — ESOMEPRAZOLE MAGNESIUM 40 MG/1
CAPSULE, DELAYED RELEASE ORAL
Qty: 60 CAPSULE | Refills: 14 | Status: SHIPPED | OUTPATIENT
Start: 2020-02-24 | End: 2020-11-17

## 2020-02-24 RX ORDER — FOLIC ACID 1 MG/1
TABLET ORAL
Qty: 30 TABLET | Refills: 14 | Status: SHIPPED | OUTPATIENT
Start: 2020-02-24 | End: 2020-03-05 | Stop reason: SDUPTHER

## 2020-02-28 ENCOUNTER — TELEPHONE (OUTPATIENT)
Dept: FAMILY MEDICINE CLINIC | Age: 60
End: 2020-02-28

## 2020-02-28 RX ORDER — FLUCONAZOLE 150 MG/1
150 TABLET ORAL ONCE
Qty: 1 TABLET | Refills: 0 | Status: SHIPPED | OUTPATIENT
Start: 2020-02-28 | End: 2020-03-09

## 2020-02-28 NOTE — TELEPHONE ENCOUNTER
Pt janice like diflucan sent into [facility], pt has a yeast infection. Pt has not been on any antibiotics. Please advise. Pt phone number is [phone].

## 2020-02-28 NOTE — TELEPHONE ENCOUNTER
Orders Placed This Encounter   Medications    fluconazole (DIFLUCAN) 150 MG tablet     Sig: Take 1 tablet by mouth once for 1 dose     Dispense:  1 tablet     Refill:  0       The above med(s) were e-scripted to the patient's pharmacy.    Please advise patient  Basia Saha MD

## 2020-03-05 ENCOUNTER — OFFICE VISIT (OUTPATIENT)
Dept: FAMILY MEDICINE CLINIC | Age: 60
End: 2020-03-05
Payer: MEDICARE

## 2020-03-05 VITALS
SYSTOLIC BLOOD PRESSURE: 134 MMHG | OXYGEN SATURATION: 95 % | HEART RATE: 103 BPM | DIASTOLIC BLOOD PRESSURE: 70 MMHG | HEIGHT: 62 IN | BODY MASS INDEX: 28.35 KG/M2

## 2020-03-05 PROCEDURE — 2022F DILAT RTA XM EVC RTNOPTHY: CPT | Performed by: FAMILY MEDICINE

## 2020-03-05 PROCEDURE — 3046F HEMOGLOBIN A1C LEVEL >9.0%: CPT | Performed by: FAMILY MEDICINE

## 2020-03-05 PROCEDURE — 99213 OFFICE O/P EST LOW 20 MIN: CPT | Performed by: FAMILY MEDICINE

## 2020-03-05 PROCEDURE — 96372 THER/PROPH/DIAG INJ SC/IM: CPT | Performed by: FAMILY MEDICINE

## 2020-03-05 PROCEDURE — 4004F PT TOBACCO SCREEN RCVD TLK: CPT | Performed by: FAMILY MEDICINE

## 2020-03-05 PROCEDURE — G8427 DOCREV CUR MEDS BY ELIG CLIN: HCPCS | Performed by: FAMILY MEDICINE

## 2020-03-05 PROCEDURE — 3017F COLORECTAL CA SCREEN DOC REV: CPT | Performed by: FAMILY MEDICINE

## 2020-03-05 PROCEDURE — G8417 CALC BMI ABV UP PARAM F/U: HCPCS | Performed by: FAMILY MEDICINE

## 2020-03-05 PROCEDURE — G8482 FLU IMMUNIZE ORDER/ADMIN: HCPCS | Performed by: FAMILY MEDICINE

## 2020-03-05 RX ORDER — HYDROCODONE BITARTRATE AND ACETAMINOPHEN 5; 325 MG/1; MG/1
1 TABLET ORAL EVERY 6 HOURS PRN
Qty: 12 TABLET | Refills: 0 | Status: SHIPPED | OUTPATIENT
Start: 2020-03-05 | End: 2020-03-11 | Stop reason: SDUPTHER

## 2020-03-05 RX ORDER — CYANOCOBALAMIN 1000 UG/ML
1000 INJECTION INTRAMUSCULAR; SUBCUTANEOUS ONCE
Status: COMPLETED | OUTPATIENT
Start: 2020-03-05 | End: 2020-03-05

## 2020-03-05 RX ORDER — MULTIVIT WITH MINERALS/LUTEIN
1000 TABLET ORAL DAILY
Qty: 30 TABLET | Refills: 12 | Status: ON HOLD | OUTPATIENT
Start: 2020-03-05 | End: 2020-06-30 | Stop reason: HOSPADM

## 2020-03-05 RX ORDER — ERGOCALCIFEROL 1.25 MG/1
50000 CAPSULE ORAL WEEKLY
Qty: 4 CAPSULE | Refills: 12 | Status: SHIPPED | OUTPATIENT
Start: 2020-03-05 | End: 2021-01-05 | Stop reason: SDUPTHER

## 2020-03-05 RX ORDER — OSTOMY SUPPLY 1"
EACH MISCELLANEOUS
Qty: 20 EACH | Refills: 2 | Status: SHIPPED | OUTPATIENT
Start: 2020-03-05 | End: 2021-03-10

## 2020-03-05 RX ORDER — FOLIC ACID 1 MG/1
TABLET ORAL
Qty: 30 TABLET | Refills: 14 | Status: SHIPPED | OUTPATIENT
Start: 2020-03-05 | End: 2021-01-11 | Stop reason: SDUPTHER

## 2020-03-05 RX ORDER — METHYLPREDNISOLONE 4 MG/1
TABLET ORAL
Qty: 1 KIT | Refills: 0 | Status: SHIPPED | OUTPATIENT
Start: 2020-03-05 | End: 2020-05-22 | Stop reason: ALTCHOICE

## 2020-03-05 RX ADMIN — CYANOCOBALAMIN 1000 MCG: 1000 INJECTION INTRAMUSCULAR; SUBCUTANEOUS at 15:09

## 2020-03-05 NOTE — PROGRESS NOTES
 Dysphonia    Essential hypertension    Fracture of lumbar vertebra (HCC)    H/O: CVA (cerebrovascular accident)    Hearing difficulty    History of HPV infection    Hypothyroidism due to Hashimoto's thyroiditis    Hypoxic brain injury ([address]    Late effects of CVA (cerebrovascular accident)    Neurogenic bladder    Nonallopathic lesion of cervical region, not elsewhere classified    Nonallopathic lesion of sacral region    Peripheral vascular disease ([address]    Post-laminectomy syndrome    Primary osteoarthritis of left shoulder    Pulmonary embolism (HCC)    Pulmonary embolism with infarction ([address]    Recurrent UTI    Retention of urine    Trochanteric bursitis of left hip    Vitamin D deficiency    High risk medication use - 11/01/17 OARRS PM&R, 11/13/17 Tox Screen: positive marijuana PM&R    Marijuana use    Chronic midline thoracic back pain    Vertebral compression fracture (HCC)    Closed wedge compression fracture of first lumbar vertebra with delayed healing    DJD of left shoulder    Status post total shoulder replacement, right    Status post total shoulder replacement, left    Decubitus ulcer of left ischial area    Decubitus ulcer of sacral area    Weakness    Left shoulder pain    Status post reverse total shoulder replacement, left    Alleged drug diversion    H/O medication noncompliance       Allergies   Allergen Reactions    Latex Rash    Imitrex [Sumatriptan] Anaphylaxis    Zomig [Zolmitriptan] Anaphylaxis    Antivert [Meclizine Hcl] Other (See Comments)     confusion    Flagyl [Metronidazole] Nausea Only     Nausea with rash    Ketorolac Tromethamine Nausea Only    Provera [Medroxyprogesterone Acetate] Other (See Comments)     Caused massive stroke    Ultram [Tramadol Hcl] Nausea Only    Bacitracin Rash    Bactrim Nausea And Vomiting and Rash    Cefdinir Rash    Cefuroxime Axetil Rash    Codeine Rash    Cyclosporine Rash    Iodine Rash     Rash with SOB    Iv Dye [Iodides] Rash     Rash with SOB    Neomycin Rash    Petroleum Jelly [Skin Protectants, Misc.] Rash    Quinolones Rash    Sulfa Antibiotics Rash    Toradol [Ketorolac Tromethamine] Rash    Trovan [Trovafloxacin] Rash         Medications marked \"taking\" at this time  Outpatient Medications Marked as Taking for the 3/5/20 encounter (Office Visit) with Nicky Angelucci, MD   Medication Sig Dispense Refill    methylPREDNISolone (MEDROL DOSEPACK) 4 MG tablet Take by mouth. 1 kit 0    HYDROcodone-acetaminophen (NORCO) 5-325 MG per tablet Take 1 tablet by mouth every 6 hours as needed for Pain for up to 3 days. Intended supply: 3 days. Take lowest dose possible to manage pain 12 tablet 0    Control Gel Formula Dressing (DUODERM CGF EXTRA THIN) MISC Apply to sacral region every other day 20 each 2    folic acid (FOLVITE) 1 MG tablet Take one tablet by mouth every day 30 tablet 14    Ascorbic Acid (VITAMIN C) 1000 MG tablet Take 1 tablet by mouth daily 30 tablet 12    vitamin D (ERGOCALCIFEROL) 1.25 MG (35372 UT) CAPS capsule Take 1 capsule by mouth once a week 4 capsule 12    esomeprazole (NEXIUM) 40 MG delayed release capsule Take 2 capsules by mouth every day 60 capsule 14    LORazepam (ATIVAN) 0.5 MG tablet Take 1 tablet every 8 hours as needed for anxiety 60 tablet 2    ibuprofen (ADVIL;MOTRIN) 800 MG tablet Take 1 tab by mouth every 8 hours as need for pain 30 tablet 11    escitalopram (LEXAPRO) 20 MG tablet TAKE ONE TABLET BY MOUTH DAILY 30 tablet 14    tiZANidine (ZANAFLEX) 4 MG tablet Take 2 tabs every 8 hrs as need for muscle spasms 180 tablet 14    ONE TOUCH LANCETS MISC 1 each by Does not apply route daily 300 each 3    carisoprodol (SOMA) 350 MG tablet Take 1 tablet by mouth daily as needed (migraine) for up to 180 days.  12 tablet 5    mirtazapine (REMERON) 30 MG tablet TAKE ONE TABLET BY MOUTH AT BEDTIME 30 tablet 14    ARIPiprazole (ABILIFY) 10 MG tablet TAKE ONE TABLET BY MOUTH EACH NIGHT AT BEDTIME 30 tablet 14    Simethicone 180 MG CAPS TAKE ONE SOFTGEL BY MOUTH TWICE DAILY 60 capsule 14    ONE TOUCH ULTRA TEST strip TEST BLOOD GLUCOSE 3 TIMES  DAILY 100 strip 0    furosemide (LASIX) 40 MG tablet Take 1 tablet by mouth every day 30 tablet 14    ondansetron (ZOFRAN-ODT) 4 MG disintegrating tablet Dissolve 1 under tongue every 8hrs as need for n/v 30 tablet 14    levothyroxine (SYNTHROID) 200 MCG tablet Take 1 tablet by mouth 30 minutes before breakfast 30 tablet 14    SYMBICORT 160-4.5 MCG/ACT AERO INL 2 PFS PO BID UTD 1 Inhaler 2    terazosin (HYTRIN) 2 MG capsule Take one capsule by mouth at bedtime 30 capsule 14    spironolactone (ALDACTONE) 25 MG tablet Take one tablet by mouth every day 30 tablet 14    magnesium oxide (MAG-OX) 400 MG tablet Take one tablet by mouth every day 30 tablet 14    potassium chloride (KLOR-CON M) 20 MEQ extended release tablet Take 1 tablet by mouth daily 30 tablet 5    Melatonin 10 MG TABS TAKE 1 TABLET BY MOUTH EVERY NIGHT 30 tablet 0    pregabalin (LYRICA) 150 MG capsule TAKE 1 CAPSULE BY MOUTH TWICE DAILY 60 capsule 5    rosuvastatin (CRESTOR) 20 MG tablet TAKE 1 TABLET BY MOUTH EVERY DAY 90 tablet 2    Blood Glucose Monitoring Suppl (ONE TOUCH ULTRA 2) w/Device KIT USE AS DIRECTED TO TEST THREE TIMES DAILY 1 kit 0    amLODIPine (NORVASC) 5 MG tablet TAKE 1 TABLET BY MOUTH DAILY 90 tablet 5    Nutritional Supplements (BOOST HIGH PROTEIN) LIQD DRINK 1 CAN BY MOUTH TWICE DAILY 05356 mL 5    levocetirizine (XYZAL) 5 MG tablet TAKE 1 TABLET BY MOUTH DAILY 30 tablet 5    metFORMIN (GLUCOPHAGE) 500 MG tablet Take 1 tablet by mouth 2 times daily (with meals) 60 tablet 5    pramipexole (MIRAPEX) 0.5 MG tablet Take 1 tablet by mouth every evening 30 tablet 5    Ascorbic Acid (VITAMIN C/KATIA HIPS) 500 MG TABS TAKE 1 TABLET BY MOUTH DAILY 30 tablet 0    mupirocin (BACTROBAN) 2 % ointment CINDY EXT AA TID      NYSTATIN 956941 UNIT/GM powder CINDY EXT AA ONCE D  0    vitamin B-12 (CYANOCOBALAMIN) 1000 MCG tablet Take 1 tablet by mouth daily 90 tablet 3    loperamide (IMODIUM A-D) 2 MG tablet Take 2 mg by mouth as needed for Diarrhea                Vitals:    03/05/20 1435   BP: 134/70   Site: Right Upper Arm   Pulse: 103   SpO2: 95%   Height: 5' 2\" (1.575 m)     Body mass index is 28.35 kg/m². Wt Readings from Last 3 Encounters:   02/21/20 155 lb (70.3 kg)   02/05/20 147 lb 12.8 oz (67 kg)   01/31/20 149 lb (67.6 kg)     BP Readings from Last 3 Encounters:   03/05/20 134/70   02/21/20 112/70   02/05/20 136/78         Physical Exam:  /70 (Site: Right Upper Arm)   Pulse 103   Ht 5' 2\" (1.575 m)   LMP 06/13/1999   SpO2 95%   Breastfeeding No   BMI 28.35 kg/m²     Gen: alert and oriented x3, she is able to see normally with glasses  Mentally is appropriate, clear   Ambulation: using wheeled walker today     She has had bilateral shoulder surgery and strength with a cane or walker is not optimal and places her at fall risk    HEENT: EOMI, eyes clear, MMM  Skin:no rash or jaundice  Neck: no significant lymphadenopathy or thyromegaly  Lungs: CTA B w/out Rales/Wheezes/Rhonchi, Good respiratory effort   Heart: RRR, S1S2, w/out M/R/G, non-displaced PMI   Neuro: chronic left side weakness, left hand  is 4/5, left lower ext strength is 4/5  Right side strength is normal            No results found for this visit on 03/05/20. Assessment/Plan:  Kaylie Strong was seen today for personal problem. Diagnoses and all orders for this visit:    Low back strain, initial encounter  -     methylPREDNISolone (MEDROL DOSEPACK) 4 MG tablet; Take by mouth.  -     HYDROcodone-acetaminophen (NORCO) 5-325 MG per tablet; Take 1 tablet by mouth every 6 hours as needed for Pain for up to 3 days. Intended supply: 3 days.  Take lowest dose possible to manage pain    Coccyx pain    Osteoarthritis of left shoulder, unspecified osteoarthritis type    Pain of left hip

## 2020-03-09 RX ORDER — FLUCONAZOLE 150 MG/1
TABLET ORAL
Qty: 1 TABLET | Refills: 0 | Status: SHIPPED | OUTPATIENT
Start: 2020-03-09 | End: 2020-03-19 | Stop reason: SDUPTHER

## 2020-03-11 RX ORDER — HYDROCODONE BITARTRATE AND ACETAMINOPHEN 5; 325 MG/1; MG/1
1 TABLET ORAL EVERY 6 HOURS PRN
Qty: 12 TABLET | Refills: 0 | Status: SHIPPED | OUTPATIENT
Start: 2020-03-11 | End: 2020-05-18 | Stop reason: SDUPTHER

## 2020-03-11 NOTE — TELEPHONE ENCOUNTER
Pt called asking why this hasn't been called in. I told her that providers have 72 hours to respond to refill requests, and that her request hasn't been pending for 24 hours yet.

## 2020-03-13 NOTE — TELEPHONE ENCOUNTER
Pt calling states that  [facility] is supposed to fax something here to be signed by Dr Ruth Chappell for her wheel chair. Also pt asking for a order for a Home Health care . Needs a nurse for dressing changes . The  Duoderm patches aren't paid for by ins without a nurse.

## 2020-03-16 RX ORDER — LEVOTHYROXINE SODIUM 0.05 MG/1
TABLET ORAL
Qty: 30 TABLET | Refills: 5 | Status: SHIPPED | OUTPATIENT
Start: 2020-03-16 | End: 2020-07-31 | Stop reason: SDUPTHER

## 2020-03-17 ENCOUNTER — TELEPHONE (OUTPATIENT)
Dept: FAMILY MEDICINE CLINIC | Age: 60
End: 2020-03-17

## 2020-03-17 RX ORDER — LEVOCETIRIZINE DIHYDROCHLORIDE 5 MG/1
TABLET, FILM COATED ORAL
Qty: 30 TABLET | Refills: 11 | Status: ON HOLD | OUTPATIENT
Start: 2020-03-17 | End: 2020-06-30 | Stop reason: HOSPADM

## 2020-03-17 NOTE — TELEPHONE ENCOUNTER
Pt states her ins needs a prior auth for her to change her diet. I also questioned this at time with pt, and she insisted same thing again. I do not believe she would need a prior auth for this. Pt states otherwise.

## 2020-03-17 NOTE — TELEPHONE ENCOUNTER
Pt states she received a letter stating she needs to change her diet due to weight. After she spoke with ins, they told her a Pior auth needs to be completed. Please advise.

## 2020-03-19 ENCOUNTER — VIRTUAL VISIT (OUTPATIENT)
Dept: FAMILY MEDICINE CLINIC | Age: 60
End: 2020-03-19
Payer: MEDICARE

## 2020-03-19 PROCEDURE — G2012 BRIEF CHECK IN BY MD/QHP: HCPCS | Performed by: FAMILY MEDICINE

## 2020-03-19 RX ORDER — AZITHROMYCIN 250 MG/1
TABLET, FILM COATED ORAL
Qty: 1 PACKET | Refills: 0 | Status: SHIPPED | OUTPATIENT
Start: 2020-03-19 | End: 2020-03-29

## 2020-03-19 RX ORDER — FLUCONAZOLE 150 MG/1
TABLET ORAL
Qty: 1 TABLET | Refills: 0 | Status: ON HOLD | OUTPATIENT
Start: 2020-03-19 | End: 2020-06-30 | Stop reason: HOSPADM

## 2020-03-19 NOTE — TELEPHONE ENCOUNTER
Pt had phone appt today with Dr. Dane Garcia. Was this issues addressed, or does encounter need to remain open.

## 2020-03-24 ENCOUNTER — TELEPHONE (OUTPATIENT)
Dept: FAMILY MEDICINE CLINIC | Age: 60
End: 2020-03-24

## 2020-03-24 NOTE — TELEPHONE ENCOUNTER
Diagnosis Orders   1. Coccyx pain  External Referral to Home Health   2.  Pressure injury of skin of sacral region, unspecified injury stage  External Referral to [address] [place] [address] Carlitos Guallpa

## 2020-03-25 ENCOUNTER — TELEPHONE (OUTPATIENT)
Dept: FAMILY MEDICINE CLINIC | Age: 60
End: 2020-03-25

## 2020-03-25 NOTE — TELEPHONE ENCOUNTER
[facility] calling pt was previously seen by this company she is on the  Do Not take back list for non compliance              FYI

## 2020-03-27 RX ORDER — BUDESONIDE AND FORMOTEROL FUMARATE DIHYDRATE 160; 4.5 UG/1; UG/1
AEROSOL RESPIRATORY (INHALATION)
Qty: 1 INHALER | Refills: 2 | Status: SHIPPED | OUTPATIENT
Start: 2020-03-27 | End: 2021-01-05 | Stop reason: SDUPTHER

## 2020-04-06 ENCOUNTER — TELEPHONE (OUTPATIENT)
Dept: FAMILY MEDICINE CLINIC | Age: 60
End: 2020-04-06

## 2020-04-06 NOTE — TELEPHONE ENCOUNTER
NEW TOPIC    Pt calling again regarding he ins and change of diet. Pt states ins needs to know bmi and a1c  Pt has Deepti Tatum  Ph. [phone]    Please advise.

## 2020-04-08 RX ORDER — BLOOD-GLUCOSE METER
EACH MISCELLANEOUS
Qty: 100 EACH | Refills: 3 | Status: SHIPPED | OUTPATIENT
Start: 2020-04-08 | End: 2020-10-06

## 2020-04-08 RX ORDER — BLOOD SUGAR DIAGNOSTIC
STRIP MISCELLANEOUS
Qty: 100 EACH | Refills: 3 | Status: SHIPPED | OUTPATIENT
Start: 2020-04-08 | End: 2020-10-06

## 2020-04-08 RX ORDER — BLOOD SUGAR DIAGNOSTIC
STRIP MISCELLANEOUS
Qty: 100 STRIP | Refills: 0 | Status: SHIPPED | OUTPATIENT
Start: 2020-04-08 | End: 2020-06-01

## 2020-04-08 RX ORDER — BLOOD SUGAR DIAGNOSTIC
STRIP MISCELLANEOUS
Qty: 1 EACH | Refills: 0 | Status: SHIPPED | OUTPATIENT
Start: 2020-04-08 | End: 2021-02-10

## 2020-04-08 RX ORDER — BLOOD-GLUCOSE METER
EACH MISCELLANEOUS
Qty: 100 EACH | Refills: 3 | Status: SHIPPED | OUTPATIENT
Start: 2020-04-08 | End: 2021-03-10

## 2020-04-08 RX ORDER — BLOOD SUGAR DIAGNOSTIC
STRIP MISCELLANEOUS
Qty: 1 DEVICE | Refills: 0 | Status: SHIPPED | OUTPATIENT
Start: 2020-04-08 | End: 2021-02-10

## 2020-04-10 ENCOUNTER — TELEPHONE (OUTPATIENT)
Dept: FAMILY MEDICINE CLINIC | Age: 60
End: 2020-04-10

## 2020-04-10 ENCOUNTER — NURSE TRIAGE (OUTPATIENT)
Dept: OTHER | Facility: CLINIC | Age: 60
End: 2020-04-10

## 2020-04-10 NOTE — TELEPHONE ENCOUNTER
Pt concerned about a sinus infection. Advised a virtual visit. Pt agreed. Advised if worsen to go to the ER. Pt agreed. Reason for Disposition   Sinus pain (not just congestion) and fever    Answer Assessment - Initial Assessment Questions  1. LOCATION: \"Where does it hurt? \"       Sinus area  2. ONSET: \"When did the sinus pain start? \"  (e.g., hours, days)       Off and on 2 months  3. SEVERITY: \"How bad is the pain? \"   (Scale 1-10; mild, moderate or severe)    - MILD (1-3): doesn't interfere with normal activities     - MODERATE (4-7): interferes with normal activities (e.g., work or school) or awakens from sleep    - SEVERE (8-10): excruciating pain and patient unable to do any normal activities         moderate  4. RECURRENT SYMPTOM: \"Have you ever had sinus problems before? \" If so, ask: \"When was the last time? \" and \"What happened that time? \"       Gets yearly  5. NASAL CONGESTION: \"Is the nose blocked? \" If so, ask, \"Can you open it or must you breathe through the mouth? \"      no  6. NASAL DISCHARGE: \"Do you have discharge from your nose? \" If so ask, \"What color? \"     yellow  7. FEVER: \"Do you have a fever? \" If so, ask: \"What is it, how was it measured, and when did it start? \"       99 oral  8. OTHER SYMPTOMS: \"Do you have any other symptoms? \" (e.g., sore throat, cough, earache, difficulty breathing)      Cough yellow  9. PREGNANCY: \"Is there any chance you are pregnant? \" \"When was your last menstrual period? \"      age    Protocols used: SINUS PAIN AND CONGESTION-ADULT-[place]

## 2020-04-10 NOTE — TELEPHONE ENCOUNTER
Pt calling back about cancelled appt. Stating she has sinus problems  States [facility] doctor placed her on Augmentin, pt stating she has continued terrible coughs. Pt made aware ent doctor who started  Medication would be doctor she needs to follow up with. Pt agreed to contact doctor. Pt also asked about getting her medical records for a marijuana card.  Pt made aware when requesting records she needs to fill out a records request form and then medical records will process request.

## 2020-04-13 RX ORDER — PREGABALIN 150 MG/1
CAPSULE ORAL
Qty: 60 CAPSULE | Refills: 5 | Status: SHIPPED | OUTPATIENT
Start: 2020-04-13 | End: 2020-08-27 | Stop reason: SDUPTHER

## 2020-04-15 ENCOUNTER — TELEPHONE (OUTPATIENT)
Dept: FAMILY MEDICINE CLINIC | Age: 60
End: 2020-04-15

## 2020-04-15 NOTE — TELEPHONE ENCOUNTER
Pt calling states that she needs a home health nurse to help with sores on tailbone       Order was sent to [facility] they won't see pt for non compliance

## 2020-04-16 RX ORDER — POTASSIUM CHLORIDE 20 MEQ/1
TABLET, EXTENDED RELEASE ORAL
Qty: 30 TABLET | Refills: 11 | Status: SHIPPED | OUTPATIENT
Start: 2020-04-16 | End: 2021-01-04 | Stop reason: SDUPTHER

## 2020-04-16 RX ORDER — LORAZEPAM 0.5 MG/1
TABLET ORAL
Qty: 60 TABLET | Refills: 11 | OUTPATIENT
Start: 2020-04-16

## 2020-04-20 ENCOUNTER — TELEPHONE (OUTPATIENT)
Dept: ADMINISTRATIVE | Age: 60
End: 2020-04-20

## 2020-04-23 ENCOUNTER — TELEPHONE (OUTPATIENT)
Dept: FAMILY MEDICINE CLINIC | Age: 60
End: 2020-04-23

## 2020-04-23 NOTE — TELEPHONE ENCOUNTER
Pt questioned from beginning why she got so many pills of Carisoprodol. Pt states she got 29, and was only getting 12 previously from another doctor?

## 2020-04-23 NOTE — TELEPHONE ENCOUNTER
Right.  I think in trying to use the delivery service she switched all of her meds to, we received a request amidst multiple other medications and this was inadvertent.      I honestly don't feel she should be using this medication ongoing with all of the other meds she is on, and will not prescribe ongoing

## 2020-04-27 ENCOUNTER — TELEPHONE (OUTPATIENT)
Dept: FAMILY MEDICINE CLINIC | Age: 60
End: 2020-04-27

## 2020-04-27 NOTE — TELEPHONE ENCOUNTER
Pt calling [facility] will do a nurse for home care to help pt with wound.  Asking if a referral could be faxed to [phone] opt [phone] phone

## 2020-04-27 NOTE — TELEPHONE ENCOUNTER
Diagnosis Orders   1. Pressure injury of skin of sacral region, unspecified injury stage  External Referral to [address] [place] [address] Carlitos Guallpa   2.  Hemiparesis affecting left side as late effect of cerebrovascular accident [facility])  External Referral to [address] [place] [address] Carlitos Guallpa

## 2020-04-29 ENCOUNTER — TELEPHONE (OUTPATIENT)
Dept: FAMILY MEDICINE CLINIC | Age: 60
End: 2020-04-29

## 2020-04-29 NOTE — TELEPHONE ENCOUNTER
Korey Suarez calling back asking about [facility], called them and they state insurance will not cover so unable to fill DuoDerm. Did PA through [facility] [place], waiting on response. Will call Korey Suarez back when get the response.   Pt's  at [facility] is Vicki Toro [phone]

## 2020-04-29 NOTE — TELEPHONE ENCOUNTER
Raymon Henriquez from [place] [facility] called, states [facility] never got script for DuoDerm and they do no have that. Would [facility]    She is asking for a script for 10 4x4 and 10 6x6. Would like enough for 6 months.      Please order    If questions call Raymon Henriquez at [phone]

## 2020-04-30 ENCOUNTER — TELEPHONE (OUTPATIENT)
Dept: FAMILY MEDICINE CLINIC | Age: 60
End: 2020-04-30

## 2020-04-30 NOTE — TELEPHONE ENCOUNTER
Pt called to see if Dr. Armando Landeros will to peer to peer for DuoDerm. Per Dr. Armando Landeros since she is not home bound she can go to wound clinic. REGINE barrowt be approved since you can get it otc.  Pt aware and agrees to go to wound clinic, referral ordered

## 2020-05-04 ENCOUNTER — NURSE TRIAGE (OUTPATIENT)
Dept: OTHER | Facility: CLINIC | Age: 60
End: 2020-05-04

## 2020-05-07 ENCOUNTER — HOSPITAL ENCOUNTER (OUTPATIENT)
Dept: WOUND CARE | Age: 60
Discharge: HOME | End: 2020-05-07
Payer: MEDICARE

## 2020-05-07 VITALS
SYSTOLIC BLOOD PRESSURE: 132 MMHG | HEART RATE: 105 BPM | RESPIRATION RATE: 20 BRPM | WEIGHT: 160 LBS | BODY MASS INDEX: 29.44 KG/M2 | DIASTOLIC BLOOD PRESSURE: 72 MMHG | HEIGHT: 62 IN | TEMPERATURE: 98.9 F

## 2020-05-07 PROBLEM — L98.422 SKIN ULCER OF SACRUM WITH FAT LAYER EXPOSED (HCC): Status: ACTIVE | Noted: 2020-05-07

## 2020-05-07 PROBLEM — L98.421 SKIN ULCER OF SACRUM, LIMITED TO BREAKDOWN OF SKIN (HCC): Status: ACTIVE | Noted: 2020-05-07

## 2020-05-07 PROCEDURE — 11042 DBRDMT SUBQ TIS 1ST 20SQCM/<: CPT

## 2020-05-07 PROCEDURE — 97597 DBRDMT OPN WND 1ST 20 CM/<: CPT | Performed by: NURSE PRACTITIONER

## 2020-05-07 PROCEDURE — 99213 OFFICE O/P EST LOW 20 MIN: CPT

## 2020-05-07 PROCEDURE — 97597 DBRDMT OPN WND 1ST 20 CM/<: CPT

## 2020-05-07 ASSESSMENT — PAIN DESCRIPTION - DESCRIPTORS: DESCRIPTORS: BURNING

## 2020-05-07 ASSESSMENT — PAIN SCALES - GENERAL: PAINLEVEL_OUTOF10: 9

## 2020-05-07 ASSESSMENT — PAIN DESCRIPTION - LOCATION: LOCATION: SACRUM

## 2020-05-07 ASSESSMENT — PAIN DESCRIPTION - PAIN TYPE: TYPE: ACUTE PAIN

## 2020-05-07 NOTE — DISCHARGE INSTR - COC
GASTROINTESTINAL ENDOSCOPY  8/5/15    w/bx        Immunization History:   Immunization History   Administered Date(s) Administered    Influenza Nasal 01/01/2006    Influenza Vaccine, unspecified formulation 10/15/2010, 10/29/2013, 12/03/2014, 09/30/2015, 01/25/2017    Influenza, Chermartinann Justice, IM, (6 mo and older Fluzone, Flulaval, Fluarix and 3 yrs and older Afluria) 09/19/2017, 09/20/2018, 09/04/2019    Pneumococcal Polysaccharide ([id number]) 08/01/2008, 11/18/2015    Tdap (Boostrix, Adacel) 08/28/2013       Active Problems:  Patient Active Problem List   Diagnosis Code    Hypothyroidism E03.9    Anxiety F41.9    COPD (chronic obstructive pulmonary disease) ([facility] J44.9    Smoker F17.200    Cerebral infarction ([facility] I63.9    Arnold-Chiari deformity (HCC) Q07.00    Headache R51    Hyperlipidemia with target LDL less than 100 E78.5    Pulmonary embolism and infarction ([facility]) I26.99    Laryngitis, chronic J37.0    Rhinitis, chronic J31.0    Depression F32.9    Acid reflux K21.9    H/O encephalopathy Z86.69    Hepatitis B surface antigen positive R76.8    S/P colonoscopy with polypectomy Z98.890    Hemiparesis affecting left side as late effect of cerebrovascular accident ([facility] X69.729    Migraine without status migrainosus, not intractable G43.909    Seasonal allergic rhinitis due to pollen J30.1    Edema R60.9    Muscle spasm M62.838    Adhesive capsulitis of left shoulder M75.02    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 116    Cervical dystonia G24.3    Cervicalgia M54.2    Chronic maxillary sinusitis J32.0    Chronic periodontitis, generalized K05.329    Chronic retention of urine R33.9    Compression of brain ([facility]) Concerns:089590398}    Impairments/Disabilities:      [address] GARCIA Impairments/Disabilities:436456391}    Nutrition Therapy:  Current Nutrition Therapy:   [address] Diet List:416122970}    Routes of Feeding: {CHP DME Other Feedings:030954631}  Liquids: {Slp liquid thickness:05322}  Daily Fluid Restriction: {CHP DME Yes amt example:694078825}  Last Modified Barium Swallow with Video (Video Swallowing Test): {Done Not Done WDBP:605965376}    Treatments at the Time of Hospital Discharge:   Respiratory Treatments: ***  Oxygen Therapy:  {Therapy; copd oxygen:47909}  Ventilator:    {[facility] Vent UJOR:623604808}    Rehab Therapies: {THERAPEUTIC INTERVENTION:0811087937}  Weight Bearing Status/Restrictions: {[facility] Weight Bearin}  Other Medical Equipment (for information only, NOT a DME order):  {EQUIPMENT:997704868}  Other Treatments: ***    Patient's personal belongings (please select all that are sent with patient):  {[facility] DME Belongings:930825071}    RN SIGNATURE:  {Esignature:398372576}    CASE MANAGEMENT/SOCIAL WORK SECTION    Inpatient Status Date: ***    Readmission Risk Assessment Score:  Readmission Risk              Risk of Unplanned Readmission:        0           Discharging to Facility/ Agency   · Name:   · Address:  · Phone:  · Fax:    Dialysis Facility (if applicable)   · Name:  · Address:  · Dialysis Schedule:  · Phone:  · Fax:    / signature: {Esignature:100895095}    PHYSICIAN SECTION    Prognosis: {Prognosis:5278042399}    Condition at Discharge: [address] Patient Condition:073580431}    Rehab Potential (if transferring to Rehab): {Prognosis:6798534999}    Recommended Labs or Other Treatments After Discharge: ***    Physician Certification: I certify the above information and transfer of Zeyad Brown  is necessary for the continuing treatment of the diagnosis listed and that she requires {Admit to Appropriate Level of Care:45081} for {GREATER/LESS:962217361} 30 days.      Update Admission H&P: {[facility] DME Changes in DUTWU:382588439}    PHYSICIAN SIGNATURE:  {Esignature:299070394}

## 2020-05-07 NOTE — PROGRESS NOTES
Packs/day: 1.00     Years: 32.00     Pack years: 32.00     Types: Cigarettes    Smokeless tobacco: Never Used   Substance Use Topics    Alcohol use: No     Alcohol/week: 0.0 standard drinks    Drug use: Yes     Types: Marijuana     Comment: daily for pain       ALLERGIES    Allergies   Allergen Reactions    Latex Rash    Imitrex [Sumatriptan] Anaphylaxis    Zomig [Zolmitriptan] Anaphylaxis    Antivert [Meclizine Hcl] Other (See Comments)     confusion    Flagyl [Metronidazole] Nausea Only     Nausea with rash    Ketorolac Tromethamine Nausea Only    Provera [Medroxyprogesterone Acetate] Other (See Comments)     Caused massive stroke    Ultram [Tramadol Hcl] Nausea Only    Bacitracin Rash    Bactrim Nausea And Vomiting and Rash    Cefdinir Rash    Cefuroxime Axetil Rash    Codeine Rash    Cyclosporine Rash    Iodine Rash     Rash with SOB    Iv Dye [Iodides] Rash     Rash with SOB    Neomycin Rash    Petroleum Jelly [Skin Protectants, Misc.] Rash    Quinolones Rash    Sulfa Antibiotics Rash    Toradol [Ketorolac Tromethamine] Rash    Trovan [Trovafloxacin] Rash       MEDICATIONS    Current Outpatient Medications on File Prior to Encounter   Medication Sig Dispense Refill    Erenumab-aooe 70 MG/ML SOAJ Inject 70 mg into the skin      diclofenac 1.5 % SOLN APPLY 40 DROPS TOPICALLY TO AFFECTED AREA 4 TIMES A DAY.  (20 DROPS IS 1 ML) 600 mL 0    potassium chloride (KLOR-CON M) 20 MEQ extended release tablet Take 1 tablet by mouth every day 30 tablet 11    pregabalin (LYRICA) 150 MG capsule Take 1 capsule by mouth twice daily 60 capsule 5    Alcohol Swabs (ALCOHOL PADS) 70 % PADS USE ONCE DAILY 100 each 3    Lancet Devices (EASY MINI EJECT LANCING DEVICE) MISC USE AS DIRECTED. 100 each 3    Easy Comfort Lancets MISC TEST ONCE DAILY 100 each 3    Blood Glucose Calibration (ADVOCATE REDI-CODE+ CONTROL) High SOLN USE AS DIRECTED. 1 each 0    Blood Glucose Monitoring Suppl (ADVOCATE REDI-CODE+) COLE USE AS DIRECTED. 1 Device 0    SYMBICORT 160-4.5 MCG/ACT AERO INL 2 PFS PO BID UTD 1 Inhaler 2    fluconazole (DIFLUCAN) 150 MG tablet take 1 tablet by mouth AS A ONE TIME DOSE 1 tablet 0    levocetirizine (XYZAL) 5 MG tablet Take 1 tablet by mouth every day 30 tablet 11    levothyroxine (SYNTHROID) 50 MCG tablet take 1 tablet by mouth once daily 30 tablet 5    Control Gel Formula Dressing (DUODERM CGF EXTRA THIN) MISC Apply to sacral region every other day 20 each 2    folic acid (FOLVITE) 1 MG tablet Take one tablet by mouth every day 30 tablet 14    Ascorbic Acid (VITAMIN C) 1000 MG tablet Take 1 tablet by mouth daily 30 tablet 12    vitamin D (ERGOCALCIFEROL) 1.25 MG (26240 UT) CAPS capsule Take 1 capsule by mouth once a week 4 capsule 12    esomeprazole (NEXIUM) 40 MG delayed release capsule Take 2 capsules by mouth every day 60 capsule 14    LORazepam (ATIVAN) 0.5 MG tablet Take 1 tablet every 8 hours as needed for anxiety 60 tablet 2    ibuprofen (ADVIL;MOTRIN) 800 MG tablet Take 1 tab by mouth every 8 hours as need for pain 30 tablet 11    escitalopram (LEXAPRO) 20 MG tablet TAKE ONE TABLET BY MOUTH DAILY 30 tablet 14    tiZANidine (ZANAFLEX) 4 MG tablet Take 2 tabs every 8 hrs as need for muscle spasms 180 tablet 14    carisoprodol (SOMA) 350 MG tablet Take 1 tablet by mouth daily as needed (migraine) for up to 180 days.  12 tablet 5    mirtazapine (REMERON) 30 MG tablet TAKE ONE TABLET BY MOUTH AT BEDTIME 30 tablet 14    ARIPiprazole (ABILIFY) 10 MG tablet TAKE ONE TABLET BY MOUTH EACH NIGHT AT BEDTIME 30 tablet 14    Simethicone 180 MG CAPS TAKE ONE SOFTGEL BY MOUTH TWICE DAILY 60 capsule 14    furosemide (LASIX) 40 MG tablet Take 1 tablet by mouth every day 30 tablet 14    ondansetron (ZOFRAN-ODT) 4 MG disintegrating tablet Dissolve 1 under tongue every 8hrs as need for n/v 30 tablet 14    levothyroxine (SYNTHROID) 200 MCG tablet Take 1 tablet by mouth 30 minutes EXAM    Constitutional:   Well nourished and well developed. Appears neat and clean. Patient is alert, oriented x3, and in no apparent distress. Respiratory:  Respiratory effort is easy and symmetric bilaterally. Rate is normal at rest and on room air. Vascular:   Extremities negative for  pitting edema. Neurological:   Sensation is intact to lower extremities. Dermatological:  Wound description noted in wound assessment. The wound has hyperkeratotic margin of white fibrous tissue, with small area of wound base, clean and pink. Appears to be area of previously healed sacral decubitus with scar tissue. Psychiatric:  Judgement and insight intact. Short and long term memory intact. No evidence of depression, anxiety, or agitation. Patient is calm, cooperative, and communicative. Appropriate interactions and affect. Assessment:      Problem List Items Addressed This Visit     None           Procedure Note  Indications:  Based on my examination of this patient's wound(s)/ulcer(s) today, debridement is required to promote healing and evaluate the wound base. Performed by: MAYURI Mendoza NP    Consent obtained:  Yes    Time out taken:  Yes    Pain Control:   hurricaine      Debridement:Excisional Debridement    Using scissors and forceps the wound(s)/ulcer(s) was/were sharply debrided down through and including the removal of epidermis and dermis.         Devitalized Tissue Debrided:  fibrin, biofilm and callus    Pre Debridement Measurements:  Are located in the [facility]  Documentation Flow Sheet    Wound/Ulcer #: 1    Post Debridement Measurements:  Wound/Ulcer Descriptions are Pre Debridement except measurements:    Incision 06/15/18 Shoulder Left (Active)   Number of days: 691       Wound 05/07/20 Sacrum #1  (Active)   Wound Image   5/7/2020 10:42 AM   Wound Pressure Stage  2 5/7/2020 10:42 AM   Dressing/Treatment Hydrofera blue;Dry dressing 5/7/2020 11:12 AM   Wound Cleansed Rinsed/Irrigated with saline 5/7/2020 10:42 AM   Wound Length (cm) 0.6 cm 5/7/2020 10:42 AM   Wound Width (cm) 0.3 cm 5/7/2020 10:42 AM   Wound Depth (cm) 0.1 cm 5/7/2020 10:42 AM   Wound Surface Area (cm^2) 0.18 cm^2 5/7/2020 10:42 AM   Wound Volume (cm^3) 0.02 cm^3 5/7/2020 10:42 AM   Drainage Amount None 5/7/2020 10:42 AM   Odor None 5/7/2020 10:42 AM   Margins Undefined edges 5/7/2020 10:42 AM   Krys-wound Assessment White;Dry 5/7/2020 10:42 AM   Non-staged Wound Description Full thickness 5/7/2020 10:42 AM   [place]%Wound Bed 100 5/7/2020 10:42 AM   Number of days: 0     Incision 05/17/19 Shoulder Left (Active)   Number of days: 356         Percent of Wound/Ulcer Debrided:  100%    Total Surface Area Debrided:  0.18 sq cm     Diabetic/Pressure/Non Pressure Ulcers:  Ulcer: N/A      Bleeding:  None    Hemostasis Achieved:  not needed    Procedural Pain:  1  / 10     Post Procedural Pain:  0 / 10     Response to treatment:  Well tolerated by patient. Plan:     Keep sacral area clean after each bowel movement using cleansing wipes. Shower daily using antibacterial soap such as dial, and dry well, applying hydrofera blue dressing after. Changed hydrofera each M-W-F. If area becomes more involved call for earlier appointment, otherwise will recheck in two weeks to determine whether needs further debridement. Treatment Note please see attached Discharge Instructions    Written patient dismissal instructions given to patient and signed by patient or POA. Discharge [address] and [facility]   Physician Orders and Discharge Instructions  [address]  Telephone: [phone]      [phone]      NAME:  Judy Wagner  YOB: 1960  MEDICAL RECORD NUMBER:  [id number]    Home Care/Facility: NONE    Wound Location: Sacrum    Dressing orders: 1. Cleanse wound with soap and

## 2020-05-08 ENCOUNTER — TELEPHONE (OUTPATIENT)
Dept: FAMILY MEDICINE CLINIC | Age: 60
End: 2020-05-08

## 2020-05-08 ENCOUNTER — TELEPHONE (OUTPATIENT)
Dept: WOUND CARE | Age: 60
End: 2020-05-08

## 2020-05-11 ENCOUNTER — TELEPHONE (OUTPATIENT)
Dept: ADMINISTRATIVE | Age: 60
End: 2020-05-11

## 2020-05-11 NOTE — TELEPHONE ENCOUNTER
The note from the one visit to the wound clinic shows a very small area of concern and documents that she had 1/10 pain during procedure and 0/10 afterward    How would that justify prescription pain meds?     Stick to otc pain meds

## 2020-05-14 ENCOUNTER — TELEPHONE (OUTPATIENT)
Dept: WOUND CARE | Age: 60
End: 2020-05-14

## 2020-05-14 RX ORDER — AMLODIPINE BESYLATE 5 MG/1
TABLET ORAL
Qty: 30 TABLET | Refills: 11 | Status: ON HOLD | OUTPATIENT
Start: 2020-05-14 | End: 2020-07-09 | Stop reason: HOSPADM

## 2020-05-14 NOTE — TELEPHONE ENCOUNTER
Spoke with patient Monday 5/11/2020. She was complaining of pain in her wound area. Discussed off loading, Tylenol, Going to PCP for pain medication. She was in agreement.   Yoseph Lazcano RN

## 2020-05-18 ENCOUNTER — TELEPHONE (OUTPATIENT)
Dept: WOUND CARE | Age: 60
End: 2020-05-18

## 2020-05-18 RX ORDER — HYDROCODONE BITARTRATE AND ACETAMINOPHEN 5; 325 MG/1; MG/1
1 TABLET ORAL EVERY 6 HOURS PRN
Qty: 12 TABLET | Refills: 0 | Status: SHIPPED | OUTPATIENT
Start: 2020-05-18 | End: 2020-05-21

## 2020-05-21 ENCOUNTER — HOSPITAL ENCOUNTER (OUTPATIENT)
Dept: WOUND CARE | Age: 60
Discharge: HOME | End: 2020-05-21
Payer: MEDICARE

## 2020-05-21 VITALS
DIASTOLIC BLOOD PRESSURE: 65 MMHG | HEART RATE: 111 BPM | TEMPERATURE: 97.8 F | SYSTOLIC BLOOD PRESSURE: 134 MMHG | RESPIRATION RATE: 20 BRPM

## 2020-05-21 PROCEDURE — 99213 OFFICE O/P EST LOW 20 MIN: CPT

## 2020-05-21 PROCEDURE — 99213 OFFICE O/P EST LOW 20 MIN: CPT | Performed by: NURSE PRACTITIONER

## 2020-05-21 RX ORDER — MAGNESIUM HYDROXIDE 1200 MG/15ML
LIQUID ORAL
Qty: 1000 ML | Refills: 0 | Status: SHIPPED | OUTPATIENT
Start: 2020-05-21 | End: 2020-05-28

## 2020-05-21 ASSESSMENT — PAIN DESCRIPTION - LOCATION: LOCATION: SACRUM

## 2020-05-21 ASSESSMENT — PAIN SCALES - GENERAL: PAINLEVEL_OUTOF10: 9

## 2020-05-21 ASSESSMENT — PAIN DESCRIPTION - PAIN TYPE: TYPE: CHRONIC PAIN

## 2020-05-21 ASSESSMENT — PAIN DESCRIPTION - DESCRIPTORS: DESCRIPTORS: BURNING

## 2020-05-21 NOTE — PLAN OF CARE
Problem: Pressure Ulcer:  Goal: Will show no infection signs and symptoms  Description: Will show no infection signs and symptoms  Outcome: Ongoing     Problem: Pressure Ulcer:  Goal: Absence of new pressure ulcer  Description: Absence of new pressure ulcer  Outcome: Ongoing     Problem: Pressure Ulcer:  Goal: Signs of wound healing will improve  Description: Signs of wound healing will improve  Outcome: Ongoing

## 2020-05-21 NOTE — DISCHARGE INSTR - COC
[place]%Wound Bed 100 2020  9:12 AM   Number of days: 13        Elimination:  Continence:   · Bowel: {YES / RY:27173}  · Bladder: {YES / ZK:79399}  Urinary Catheter: {Urinary Catheter:715339999}   Colostomy/Ileostomy/Ileal Conduit: {YES / H}       Date of Last BM: ***  No intake or output data in the 24 hours ending 20 0935  No intake/output data recorded.     Safety Concerns:     [address] [facility] Safety Concerns:636072566}    Impairments/Disabilities:      [facility] Impairments/Disabilities:118933276}    Nutrition Therapy:  Current Nutrition Therapy:   [facility] Diet List:794518616}    Routes of Feeding: {CHP DME Other Feedings:013007438}  Liquids: {Slp liquid thickness:39982}  Daily Fluid Restriction: {CHP DME Yes amt example:277214613}  Last Modified Barium Swallow with Video (Video Swallowing Test): {Done Not Done JORO:896324613}    Treatments at the Time of Hospital Discharge:   Respiratory Treatments: ***  Oxygen Therapy:  {Therapy; copd oxygen:78459}  Ventilator:    { [facility] Vent BATA:094496170}    Rehab Therapies: {THERAPEUTIC INTERVENTION:4461464147}  Weight Bearing Status/Restrictions: [address]  Weight Bearin}  Other Medical Equipment (for information only, NOT a DME order):  {EQUIPMENT:435379088}  Other Treatments: ***    Patient's personal belongings (please select all that are sent with patient):  {[facility] DME Belongings:361780952}    RN SIGNATURE:  {Esignature:160240975}    CASE MANAGEMENT/SOCIAL WORK SECTION    Inpatient Status Date: ***    Readmission Risk Assessment Score:  Readmission Risk              Risk of Unplanned Readmission:        0           Discharging to Facility/ Agency   · Name:   · Address:  · Phone:  · Fax:    Dialysis Facility (if applicable)   · Name:  · Address:  · Dialysis Schedule:  · Phone:  · Fax:    / signature: {Esignature:219517603}    PHYSICIAN SECTION    Prognosis: {Prognosis:4491695768}    Condition at Discharge: [address] Patient Condition:269031110}    Rehab Potential (if transferring to Rehab): {Prognosis:7643724654}    Recommended Labs or Other Treatments After Discharge: ***    Physician Certification: I certify the above information and transfer of Van Malik  is necessary for the continuing treatment of the diagnosis listed and that she requires {Admit to Appropriate Level of Care:99364} for {GREATER/LESS:940497565} 30 days.      Update Admission H&P: [facility]}    PHYSICIAN SIGNATURE:  {Esignature:166024548}

## 2020-05-21 NOTE — PROGRESS NOTES
Topics    Alcohol use: No     Alcohol/week: 0.0 standard drinks    Drug use: Yes     Types: Marijuana     Comment: daily for pain       ALLERGIES    Allergies   Allergen Reactions    Latex Rash    Imitrex [Sumatriptan] Anaphylaxis    Zomig [Zolmitriptan] Anaphylaxis    Antivert [Meclizine Hcl] Other (See Comments)     confusion    Flagyl [Metronidazole] Nausea Only     Nausea with rash    Ketorolac Tromethamine Nausea Only    Provera [Medroxyprogesterone Acetate] Other (See Comments)     Caused massive stroke    Ultram [Tramadol Hcl] Nausea Only    Bacitracin Rash    Bactrim Nausea And Vomiting and Rash    Cefdinir Rash    Cefuroxime Axetil Rash    Codeine Rash    Cyclosporine Rash    Iodine Rash     Rash with SOB    Iv Dye [Iodides] Rash     Rash with SOB    Neomycin Rash    Petroleum Jelly [Skin Protectants, Misc.] Rash    Quinolones Rash    Sulfa Antibiotics Rash    Toradol [Ketorolac Tromethamine] Rash    Trovan [Trovafloxacin] Rash       MEDICATIONS    Current Outpatient Medications on File Prior to Encounter   Medication Sig Dispense Refill    HYDROcodone-acetaminophen (NORCO) 5-325 MG per tablet Take 1 tablet by mouth every 6 hours as needed for Pain for up to 3 days. Intended supply: 3 days. Take lowest dose possible to manage pain 12 tablet 0    pramipexole (MIRAPEX) 0.5 MG tablet Take 1 tablet by mouth every evening 30 tablet 11    Erenumab-aooe 70 MG/ML SOAJ Inject 70 mg into the skin      diclofenac 1.5 % SOLN APPLY 40 DROPS TOPICALLY TO AFFECTED AREA 4 TIMES A DAY.  (20 DROPS IS 1 ML) 600 mL 0    potassium chloride (KLOR-CON M) 20 MEQ extended release tablet Take 1 tablet by mouth every day 30 tablet 11    pregabalin (LYRICA) 150 MG capsule Take 1 capsule by mouth twice daily 60 capsule 5    Alcohol Swabs (ALCOHOL PADS) 70 % PADS USE ONCE DAILY 100 each 3    Lancet Devices (EASY MINI EJECT LANCING DEVICE) MISC USE AS DIRECTED. 100 each 3    Easy Comfort Lancets MISC TEST PHYSICAL EXAM    Constitutional:   Well nourished and well developed. Appears neat and clean. Patient is alert, oriented x3, and in no apparent distress. Respiratory:  Respiratory effort is easy and symmetric bilaterally. Rate is normal at rest and on room air. Dermatological:  Wound description noted in wound assessment. Wound is clean and free of signs of infection with good wound contracture. No surrounding erythema, edema or excess warmth, no drainage present. Psychiatric:  Judgement and insight intact. Short and long term memory intact. No evidence of depression, anxiety, or agitation. Patient is calm, cooperative, and communicative. Appropriate interactions and affect. Assessment:      Problem List Items Addressed This Visit     None           Procedure Note  Indications:  Based on my examination of this patient's wound(s)/ulcer(s) today, debridement is not required to promote healing and evaluate the wound base.     Incision 06/15/18 Shoulder Left (Active)   Number of days: 705       Wound 05/07/20 Sacrum #1  (Active)   Wound Image   5/21/2020  9:12 AM   Wound Pressure Stage  2 5/21/2020  9:12 AM   Dressing/Treatment Hydrofera blue;Dry dressing 5/7/2020 11:12 AM   Wound Cleansed Rinsed/Irrigated with saline 5/21/2020  9:12 AM   Wound Length (cm) 0.3 cm 5/21/2020  9:12 AM   Wound Width (cm) 0.2 cm 5/21/2020  9:12 AM   Wound Depth (cm) 0.1 cm 5/21/2020  9:12 AM   Wound Surface Area (cm^2) 0.06 cm^2 5/21/2020  9:12 AM   Change in Wound Size % (l*w) 66.67 5/21/2020  9:12 AM   Wound Volume (cm^3) 0.01 cm^3 5/21/2020  9:12 AM   Wound Healing % 50 5/21/2020  9:12 AM   Post-Procedure Length (cm) 0.6 cm 5/7/2020 10:45 AM   Post-Procedure Width (cm) 0.3 cm 5/7/2020 10:45 AM   Post-Procedure Depth (cm) 0.1 cm 5/7/2020 10:45 AM   Post-Procedure Surface Area (cm^2) 0.18 cm^2 5/7/2020 10:45 AM   Post-Procedure Volume (cm^3) 0.02 cm^3 5/7/2020 10:45 AM   Drainage Amount Scant 5/21/2020  9:12 AM

## 2020-05-22 ENCOUNTER — NURSE TRIAGE (OUTPATIENT)
Dept: OTHER | Facility: CLINIC | Age: 60
End: 2020-05-22

## 2020-05-26 ENCOUNTER — TELEPHONE (OUTPATIENT)
Dept: FAMILY MEDICINE CLINIC | Age: 60
End: 2020-05-26

## 2020-06-01 RX ORDER — BLOOD SUGAR DIAGNOSTIC
STRIP MISCELLANEOUS
Qty: 100 STRIP | Refills: 0 | Status: SHIPPED | OUTPATIENT
Start: 2020-06-01 | End: 2020-09-09

## 2020-06-05 ENCOUNTER — TELEPHONE (OUTPATIENT)
Dept: FAMILY MEDICINE CLINIC | Age: 60
End: 2020-06-05

## 2020-06-05 NOTE — TELEPHONE ENCOUNTER
Pt's pharmacy calling. States they sent out pt's scripts and she got everything but her Ativan. States that they need you to ok a refill being sent out to her.  Last script was 5/22/20  Please advise  Call [phone]

## 2020-06-11 ENCOUNTER — APPOINTMENT (OUTPATIENT)
Dept: GENERAL RADIOLOGY | Age: 60
DRG: 492 | End: 2020-06-11
Payer: MEDICARE

## 2020-06-11 ENCOUNTER — APPOINTMENT (OUTPATIENT)
Dept: CT IMAGING | Age: 60
DRG: 492 | End: 2020-06-11
Payer: MEDICARE

## 2020-06-11 ENCOUNTER — HOSPITAL ENCOUNTER (INPATIENT)
Age: 60
LOS: 7 days | Discharge: NURSING, REHAB OR LONG-TERM CARE | DRG: 492 | End: 2020-06-18
Attending: EMERGENCY MEDICINE | Admitting: SURGERY
Payer: MEDICARE

## 2020-06-11 PROBLEM — M96.622: Status: ACTIVE | Noted: 2020-06-11

## 2020-06-11 LAB
ALBUMIN SERPL-MCNC: 3.8 G/DL (ref 3.5–4.6)
ALP BLD-CCNC: 92 U/L (ref 40–130)
ALT SERPL-CCNC: 16 U/L (ref 0–33)
ANION GAP SERPL CALCULATED.3IONS-SCNC: 10 MEQ/L (ref 9–15)
AST SERPL-CCNC: 15 U/L (ref 0–35)
BASOPHILS ABSOLUTE: 0.1 K/UL (ref 0–0.2)
BASOPHILS RELATIVE PERCENT: 1.2 %
BILIRUB SERPL-MCNC: <0.2 MG/DL (ref 0.2–0.7)
BUN BLDV-MCNC: 18 MG/DL (ref 6–20)
CALCIUM SERPL-MCNC: 9 MG/DL (ref 8.5–9.9)
CHLORIDE BLD-SCNC: 102 MEQ/L (ref 95–107)
CO2: 27 MEQ/L (ref 20–31)
CREAT SERPL-MCNC: 1.09 MG/DL (ref 0.5–0.9)
EOSINOPHILS ABSOLUTE: 0.2 K/UL (ref 0–0.7)
EOSINOPHILS RELATIVE PERCENT: 2.2 %
GFR AFRICAN AMERICAN: >60
GFR NON-AFRICAN AMERICAN: 51.5
GLOBULIN: 2.8 G/DL (ref 2.3–3.5)
GLUCOSE BLD-MCNC: 101 MG/DL (ref 70–99)
GLUCOSE BLD-MCNC: 126 MG/DL (ref 60–115)
HCT VFR BLD CALC: 30.6 % (ref 37–47)
HEMOGLOBIN: 10 G/DL (ref 12–16)
INR BLD: 0.9
LIPASE: 24 U/L (ref 12–95)
LYMPHOCYTES ABSOLUTE: 2.9 K/UL (ref 1–4.8)
LYMPHOCYTES RELATIVE PERCENT: 31.8 %
MAGNESIUM: 2.2 MG/DL (ref 1.7–2.4)
MCH RBC QN AUTO: 23.2 PG (ref 27–31.3)
MCHC RBC AUTO-ENTMCNC: 32.5 % (ref 33–37)
MCV RBC AUTO: 71.4 FL (ref 82–100)
MICROCYTES: ABNORMAL
MONOCYTES ABSOLUTE: 0.8 K/UL (ref 0.2–0.8)
MONOCYTES RELATIVE PERCENT: 8.4 %
NEUTROPHILS ABSOLUTE: 5.1 K/UL (ref 1.4–6.5)
NEUTROPHILS RELATIVE PERCENT: 56.4 %
PDW BLD-RTO: 18.5 % (ref 11.5–14.5)
PERFORMED ON: ABNORMAL
PLATELET # BLD: 266 K/UL (ref 130–400)
POTASSIUM SERPL-SCNC: 3.8 MEQ/L (ref 3.4–4.9)
PROTHROMBIN TIME: 12.6 SEC (ref 12.3–14.9)
RBC # BLD: 4.29 M/UL (ref 4.2–5.4)
SARS-COV-2, NAAT: NOT DETECTED
SODIUM BLD-SCNC: 139 MEQ/L (ref 135–144)
TOTAL PROTEIN: 6.6 G/DL (ref 6.3–8)
TROPONIN: <0.01 NG/ML (ref 0–0.01)
WBC # BLD: 9 K/UL (ref 4.8–10.8)

## 2020-06-11 PROCEDURE — 83735 ASSAY OF MAGNESIUM: CPT

## 2020-06-11 PROCEDURE — U0002 COVID-19 LAB TEST NON-CDC: HCPCS

## 2020-06-11 PROCEDURE — 72125 CT NECK SPINE W/O DYE: CPT

## 2020-06-11 PROCEDURE — 72128 CT CHEST SPINE W/O DYE: CPT

## 2020-06-11 PROCEDURE — 73200 CT UPPER EXTREMITY W/O DYE: CPT

## 2020-06-11 PROCEDURE — 74176 CT ABD & PELVIS W/O CONTRAST: CPT

## 2020-06-11 PROCEDURE — 84484 ASSAY OF TROPONIN QUANT: CPT

## 2020-06-11 PROCEDURE — 96374 THER/PROPH/DIAG INJ IV PUSH: CPT

## 2020-06-11 PROCEDURE — 70450 CT HEAD/BRAIN W/O DYE: CPT

## 2020-06-11 PROCEDURE — 96375 TX/PRO/DX INJ NEW DRUG ADDON: CPT

## 2020-06-11 PROCEDURE — 73030 X-RAY EXAM OF SHOULDER: CPT

## 2020-06-11 PROCEDURE — 6370000000 HC RX 637 (ALT 250 FOR IP): Performed by: EMERGENCY MEDICINE

## 2020-06-11 PROCEDURE — 71250 CT THORAX DX C-: CPT

## 2020-06-11 PROCEDURE — 85025 COMPLETE CBC W/AUTO DIFF WBC: CPT

## 2020-06-11 PROCEDURE — 2580000003 HC RX 258: Performed by: SURGERY

## 2020-06-11 PROCEDURE — 99285 EMERGENCY DEPT VISIT HI MDM: CPT

## 2020-06-11 PROCEDURE — 83690 ASSAY OF LIPASE: CPT

## 2020-06-11 PROCEDURE — 1210000000 HC MED SURG R&B

## 2020-06-11 PROCEDURE — 80053 COMPREHEN METABOLIC PANEL: CPT

## 2020-06-11 PROCEDURE — 36415 COLL VENOUS BLD VENIPUNCTURE: CPT

## 2020-06-11 PROCEDURE — 6360000002 HC RX W HCPCS: Performed by: EMERGENCY MEDICINE

## 2020-06-11 PROCEDURE — 96376 TX/PRO/DX INJ SAME DRUG ADON: CPT

## 2020-06-11 PROCEDURE — 6830039000 HC L3 TRAUMA ALERT

## 2020-06-11 PROCEDURE — 76376 3D RENDER W/INTRP POSTPROCES: CPT

## 2020-06-11 PROCEDURE — 72131 CT LUMBAR SPINE W/O DYE: CPT

## 2020-06-11 PROCEDURE — 85610 PROTHROMBIN TIME: CPT

## 2020-06-11 RX ORDER — ROSUVASTATIN CALCIUM 20 MG/1
20 TABLET, COATED ORAL DAILY
Status: DISCONTINUED | OUTPATIENT
Start: 2020-06-11 | End: 2020-06-18 | Stop reason: HOSPADM

## 2020-06-11 RX ORDER — POTASSIUM CHLORIDE 7.45 MG/ML
10 INJECTION INTRAVENOUS PRN
Status: DISCONTINUED | OUTPATIENT
Start: 2020-06-11 | End: 2020-06-13

## 2020-06-11 RX ORDER — SPIRONOLACTONE 25 MG/1
25 TABLET ORAL DAILY
Status: DISCONTINUED | OUTPATIENT
Start: 2020-06-11 | End: 2020-06-18 | Stop reason: HOSPADM

## 2020-06-11 RX ORDER — METHOCARBAMOL 500 MG/1
500 TABLET, FILM COATED ORAL 4 TIMES DAILY
Status: DISCONTINUED | OUTPATIENT
Start: 2020-06-11 | End: 2020-06-13

## 2020-06-11 RX ORDER — PRAMIPEXOLE DIHYDROCHLORIDE 0.5 MG/1
0.5 TABLET ORAL EVERY EVENING
Status: DISCONTINUED | OUTPATIENT
Start: 2020-06-11 | End: 2020-06-18 | Stop reason: HOSPADM

## 2020-06-11 RX ORDER — ONDANSETRON 2 MG/ML
4 INJECTION INTRAMUSCULAR; INTRAVENOUS EVERY 6 HOURS PRN
Status: DISCONTINUED | OUTPATIENT
Start: 2020-06-11 | End: 2020-06-18 | Stop reason: HOSPADM

## 2020-06-11 RX ORDER — LEVOTHYROXINE SODIUM 0.1 MG/1
200 TABLET ORAL DAILY
Status: DISCONTINUED | OUTPATIENT
Start: 2020-06-12 | End: 2020-06-18

## 2020-06-11 RX ORDER — ARIPIPRAZOLE 10 MG/1
10 TABLET ORAL DAILY
Status: DISCONTINUED | OUTPATIENT
Start: 2020-06-11 | End: 2020-06-18 | Stop reason: HOSPADM

## 2020-06-11 RX ORDER — MORPHINE SULFATE 2 MG/ML
4 INJECTION, SOLUTION INTRAMUSCULAR; INTRAVENOUS
Status: DISCONTINUED | OUTPATIENT
Start: 2020-06-11 | End: 2020-06-13

## 2020-06-11 RX ORDER — ONDANSETRON 2 MG/ML
4 INJECTION INTRAMUSCULAR; INTRAVENOUS ONCE
Status: COMPLETED | OUTPATIENT
Start: 2020-06-11 | End: 2020-06-11

## 2020-06-11 RX ORDER — FUROSEMIDE 40 MG/1
40 TABLET ORAL DAILY
Status: DISCONTINUED | OUTPATIENT
Start: 2020-06-11 | End: 2020-06-18 | Stop reason: HOSPADM

## 2020-06-11 RX ORDER — ACETAMINOPHEN 325 MG/1
650 TABLET ORAL EVERY 6 HOURS PRN
Status: DISCONTINUED | OUTPATIENT
Start: 2020-06-11 | End: 2020-06-12

## 2020-06-11 RX ORDER — SODIUM CHLORIDE 0.9 % (FLUSH) 0.9 %
10 SYRINGE (ML) INJECTION PRN
Status: DISCONTINUED | OUTPATIENT
Start: 2020-06-11 | End: 2020-06-18 | Stop reason: HOSPADM

## 2020-06-11 RX ORDER — LIDOCAINE 4 G/G
2 PATCH TOPICAL DAILY
Status: DISCONTINUED | OUTPATIENT
Start: 2020-06-11 | End: 2020-06-18 | Stop reason: HOSPADM

## 2020-06-11 RX ORDER — MAGNESIUM SULFATE IN WATER 40 MG/ML
2 INJECTION, SOLUTION INTRAVENOUS PRN
Status: DISCONTINUED | OUTPATIENT
Start: 2020-06-11 | End: 2020-06-13

## 2020-06-11 RX ORDER — AMOXICILLIN 250 MG
1 CAPSULE ORAL 2 TIMES DAILY
Status: DISCONTINUED | OUTPATIENT
Start: 2020-06-11 | End: 2020-06-18 | Stop reason: HOSPADM

## 2020-06-11 RX ORDER — PROMETHAZINE HYDROCHLORIDE 12.5 MG/1
12.5 TABLET ORAL EVERY 6 HOURS PRN
Status: DISCONTINUED | OUTPATIENT
Start: 2020-06-11 | End: 2020-06-18 | Stop reason: HOSPADM

## 2020-06-11 RX ORDER — OXYCODONE HYDROCHLORIDE 5 MG/1
10 TABLET ORAL EVERY 4 HOURS PRN
Status: DISCONTINUED | OUTPATIENT
Start: 2020-06-11 | End: 2020-06-12 | Stop reason: SDUPTHER

## 2020-06-11 RX ORDER — PHENOL 1.4 %
10 AEROSOL, SPRAY (ML) MUCOUS MEMBRANE NIGHTLY PRN
Status: DISCONTINUED | OUTPATIENT
Start: 2020-06-11 | End: 2020-06-11

## 2020-06-11 RX ORDER — FENTANYL CITRATE 50 UG/ML
50 INJECTION, SOLUTION INTRAMUSCULAR; INTRAVENOUS ONCE
Status: COMPLETED | OUTPATIENT
Start: 2020-06-11 | End: 2020-06-11

## 2020-06-11 RX ORDER — MIRTAZAPINE 30 MG/1
30 TABLET, FILM COATED ORAL NIGHTLY
Status: DISCONTINUED | OUTPATIENT
Start: 2020-06-11 | End: 2020-06-18 | Stop reason: HOSPADM

## 2020-06-11 RX ORDER — BUDESONIDE AND FORMOTEROL FUMARATE DIHYDRATE 160; 4.5 UG/1; UG/1
1 AEROSOL RESPIRATORY (INHALATION) DAILY
Status: DISCONTINUED | OUTPATIENT
Start: 2020-06-12 | End: 2020-06-14

## 2020-06-11 RX ORDER — AMLODIPINE BESYLATE 5 MG/1
5 TABLET ORAL DAILY
Status: DISCONTINUED | OUTPATIENT
Start: 2020-06-11 | End: 2020-06-18 | Stop reason: HOSPADM

## 2020-06-11 RX ORDER — IBUPROFEN 800 MG/1
800 TABLET ORAL ONCE
Status: COMPLETED | OUTPATIENT
Start: 2020-06-11 | End: 2020-06-11

## 2020-06-11 RX ORDER — ESCITALOPRAM OXALATE 20 MG/1
20 TABLET ORAL DAILY
Status: DISCONTINUED | OUTPATIENT
Start: 2020-06-11 | End: 2020-06-18 | Stop reason: HOSPADM

## 2020-06-11 RX ORDER — OXYCODONE HYDROCHLORIDE 5 MG/1
5 TABLET ORAL EVERY 4 HOURS PRN
Status: DISCONTINUED | OUTPATIENT
Start: 2020-06-11 | End: 2020-06-12 | Stop reason: SDUPTHER

## 2020-06-11 RX ORDER — LANOLIN ALCOHOL/MO/W.PET/CERES
9 CREAM (GRAM) TOPICAL NIGHTLY PRN
Status: DISCONTINUED | OUTPATIENT
Start: 2020-06-11 | End: 2020-06-18 | Stop reason: HOSPADM

## 2020-06-11 RX ORDER — PANTOPRAZOLE SODIUM 40 MG/1
40 TABLET, DELAYED RELEASE ORAL
Status: DISCONTINUED | OUTPATIENT
Start: 2020-06-12 | End: 2020-06-18 | Stop reason: HOSPADM

## 2020-06-11 RX ORDER — LEVOTHYROXINE SODIUM 0.05 MG/1
50 TABLET ORAL DAILY
Status: DISCONTINUED | OUTPATIENT
Start: 2020-06-12 | End: 2020-06-18 | Stop reason: HOSPADM

## 2020-06-11 RX ORDER — SODIUM CHLORIDE 0.9 % (FLUSH) 0.9 %
10 SYRINGE (ML) INJECTION EVERY 12 HOURS SCHEDULED
Status: DISCONTINUED | OUTPATIENT
Start: 2020-06-11 | End: 2020-06-18 | Stop reason: HOSPADM

## 2020-06-11 RX ORDER — PREGABALIN 150 MG/1
150 CAPSULE ORAL DAILY
Status: DISCONTINUED | OUTPATIENT
Start: 2020-06-11 | End: 2020-06-18 | Stop reason: HOSPADM

## 2020-06-11 RX ADMIN — ONDANSETRON 4 MG: 2 INJECTION INTRAMUSCULAR; INTRAVENOUS at 17:29

## 2020-06-11 RX ADMIN — ONDANSETRON 4 MG: 2 INJECTION INTRAMUSCULAR; INTRAVENOUS at 15:21

## 2020-06-11 RX ADMIN — MORPHINE SULFATE 4 MG: 2 INJECTION, SOLUTION INTRAMUSCULAR; INTRAVENOUS at 15:21

## 2020-06-11 RX ADMIN — FENTANYL CITRATE 50 MCG: 50 INJECTION, SOLUTION INTRAMUSCULAR; INTRAVENOUS at 15:32

## 2020-06-11 RX ADMIN — Medication 10 ML: at 23:24

## 2020-06-11 RX ADMIN — HYDROMORPHONE HYDROCHLORIDE 1 MG: 1 INJECTION, SOLUTION INTRAMUSCULAR; INTRAVENOUS; SUBCUTANEOUS at 17:29

## 2020-06-11 RX ADMIN — IBUPROFEN 800 MG: 800 TABLET, FILM COATED ORAL at 17:29

## 2020-06-11 ASSESSMENT — ENCOUNTER SYMPTOMS
VOMITING: 0
SHORTNESS OF BREATH: 0
COUGH: 0
SORE THROAT: 0
ABDOMINAL PAIN: 0
DIARRHEA: 0
NAUSEA: 0
BACK PAIN: 0

## 2020-06-11 ASSESSMENT — PAIN DESCRIPTION - ORIENTATION: ORIENTATION: LEFT

## 2020-06-11 ASSESSMENT — PAIN DESCRIPTION - DESCRIPTORS: DESCRIPTORS: ACHING;THROBBING

## 2020-06-11 ASSESSMENT — PAIN SCALES - GENERAL
PAINLEVEL_OUTOF10: 10
PAINLEVEL_OUTOF10: 5
PAINLEVEL_OUTOF10: 8

## 2020-06-11 ASSESSMENT — PAIN DESCRIPTION - LOCATION: LOCATION: ARM

## 2020-06-11 ASSESSMENT — PAIN DESCRIPTION - PAIN TYPE: TYPE: ACUTE PAIN

## 2020-06-11 NOTE — H&P
contusions or abrasions; and Lung exam: breath sounds clear, no wheezes, no rales  Cardiovascular:    Pulses: Bilateral radial, femoral, DP and PT pulses are normal;  Abdomen: Appearance: Non-distended, No scars, lacerations, contusions; and Palpation: no tenderness   Rectal: Not performed  Pelvis/Perineum: Pelvis is stable to palpation;  Musculoskeletal:    Back/Spine: Thoracolumbar spinal column non-tender; no step off or deformity; Extremities: No gross upper or lower extremity signs of trauma;    PAST MEDICAL HISTORY:  Past Medical History:   Diagnosis Date    Acid reflux     Allergic rhinitis     Anxiety     Arnold-Chiari deformity ([facility] [address] 2000    surgery    Asthma     Cerebral artery occlusion with cerebral infarction ([facility] [address] 2001    1 yr after brain OR    Cerebrovascular disease     Chronic back pain greater than 3 months duration     COPD (chronic obstructive pulmonary disease) (HCC)     Dr Lewis العلي at [address] CVA (cerebral infarction) 2001    left hemiparesis, due to ? estrogen + smoking    Depression 1993    Dr Teresa Lynne H/O encephalopathy 2001    Headache(784.0)     Dr Connor Tracey Hepatitis B surface antigen positive     Hx of blood clots 2010    PE / trx with coumadin x 6 months    Hyperlipidemia LDL goal < 100     meds > 10 yrs    Hypertension     meds > 2 yrs    Hypothyroidism 2001    meds > 18 yrs    Laryngitis, chronic 2012    scoped by Dr Estephania Guardado, fungal    Marijuana smoker in remission [facility]) 2011    Obesity (BMI 30-39. 9)     Osteoarthritis     Pulmonary embolism and infarction (HCC)     Restless legs syndrome     Rhinitis, chronic     Rotator cuff tear     Left    S/P colonoscopy with polypectomy 2010    Dr Rosana Dodd    Seizures [facility])     Smoker     Spinal headache     past partum 1994    Spondylosis without myelopathy     Type 2 diabetes mellitus without complication (HCC)     hx > 6 yrs    Urinary incontinence     Vertebral compression fracture (HCC) Devices (EASY MINI EJECT LANCING DEVICE) MISC USE AS DIRECTED. 4/8/20   Mary Roberts MD   Easy Comfort Lancets MISC TEST ONCE DAILY 4/8/20   Mary Roberts MD   Blood Glucose Calibration (ADVOCATE REDI-CODE+ CONTROL) High SOLN USE AS DIRECTED. 4/8/20   Mary Roberts MD   Blood Glucose Monitoring Suppl (ADVOCATE REDI-CODE+) COLE USE AS DIRECTED. 4/8/20   Mary Roberts MD   SYMBICORT 160-4.5 MCG/ACT AERO INL 2 PFS PO BID UTD 3/27/20   Mary Roberts MD   fluconazole (DIFLUCAN) 150 MG tablet take 1 tablet by mouth AS A ONE TIME DOSE 3/19/20   Mary Roberts MD   levocetirizine Kiran Punches) 5 MG tablet Take 1 tablet by mouth every day 3/17/20   Mary Roberts MD   levothyroxine (SYNTHROID) 50 MCG tablet take 1 tablet by mouth once daily 3/16/20   Praveena Stacy MD   Control Gel Formula Dressing (DUODERM CGF EXTRA THIN) [facility] Apply to sacral region every other day  Patient not taking: Reported on 5/22/2020 3/5/20   Mary Roberts MD   folic acid (FOLVITE) 1 MG tablet Take one tablet by mouth every day 3/5/20   Mary Roberts MD   Ascorbic Acid (VITAMIN C) 1000 MG tablet Take 1 tablet by mouth daily 3/5/20   Mary Roberts MD   vitamin D (ERGOCALCIFEROL) 1.25 MG (54136 UT) CAPS capsule Take 1 capsule by mouth once a week 3/5/20   Mary Roberts MD   esomeprazole (NEXIUM) 40 MG delayed release capsule Take 2 capsules by mouth every day 2/24/20   Mary Roberts MD   ibuprofen (ADVIL;MOTRIN) 800 MG tablet Take 1 tab by mouth every 8 hours as need for pain 2/20/20   Mary Roberts MD   escitalopram (LEXAPRO) 20 MG tablet TAKE ONE TABLET BY MOUTH DAILY 2/20/20   Mary Roberts MD   tiZANidine (ZANAFLEX) 4 MG tablet Take 2 tabs every 8 hrs as need for muscle spasms 2/20/20   Mary Roberts MD   carisoprodol (SOMA) 350 MG tablet Take 1 tablet by mouth daily as needed (migraine) for up to 180 days.  1/27/20 7/25/20  Mary Roberts MD   mirtazapine (REMERON) 30 MG tablet TAKE ONE TABLET BY MOUTH AT BEDTIME 1/23/20   Mary Roberts MD   ARIPiprazole (ABILIFY) 10 MG lung    Osteoarthritis Father     Hypertension Father     Other Father         PE    Cancer Father         stomach cancer    Asthma Brother     Heart Attack Brother     Other Brother         overdose    Asthma Sister     Breast Cancer Sister     Hypertension Sister     Other Sister         overdose / MVA           REVIEW OF SYSTEMS:  Constitutional: Negative for  weight loss  HENT: Negative for congestion, facial swelling and bloody nose  Eyes: Negative for  vision changes  Respiratory: Negative for shortness of breath, difficulty breathing  Cardiovascular: Negative for chest wall pain. Gastrointestinal: Negative for abdominal distention, abdominal pain and vomiting. Genitourinary: Negative for  hematuria  Musculoskeletal: Negative for gait difficulties   Skin: Negative for bruising, abrasions  Neurological: Negative for dizziness, weakness and light-headedness. Hematological: Negative for easy bruising/bleeding  Psychiatric/Behavioral: Negative for behavioral problems. Except as noted above the remainder of the review of systems was reviewed and negative.      BASIC LABS:  CBC with Differential:    Lab Results   Component Value Date    WBC 9.0 06/11/2020    RBC 4.29 06/11/2020    RBC 4.07 11/03/2018    HGB 10.0 06/11/2020    HCT 30.6 06/11/2020     06/11/2020    MCV 71.4 06/11/2020    MCH 23.2 06/11/2020    MCHC 32.5 06/11/2020    RDW 18.5 06/11/2020    NRBC 0.0 11/03/2018    LYMPHOPCT 31.8 06/11/2020    MONOPCT 8.4 06/11/2020    EOSPCT 2.3 05/28/2019    BASOPCT 1.2 06/11/2020    MONOSABS 0.8 06/11/2020    LYMPHSABS 2.9 06/11/2020    EOSABS 0.2 06/11/2020    BASOSABS 0.1 06/11/2020     CMP:   Lab Results   Component Value Date     06/11/2020    K 3.8 06/11/2020     06/11/2020    CO2 27 06/11/2020    BUN 18 06/11/2020    CREATININE 1.09 (H) 06/11/2020    GLUCOSE 101 (H) 06/11/2020    CALCIUM 9.0 06/11/2020    PROT 6.6 06/11/2020    LABALBU 3.8 06/11/2020    BILITOT <0.2
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
## 2020-06-11 NOTE — ED NOTES
Pt resting quietly with eyes closed. Easily aroused when spoken to. No acute distress noted. Resp easy and even. Skin p/w/d.       Ela Kang RN  06/11/20 2416

## 2020-06-11 NOTE — ED NOTES
Pt SpO2 87% on RA. Placed 2L O2 via NC now 93-94%. Dr Bravo Shrestha at bedside for POC. Pt states pain much better, states, \"that dilaudid really works\".       Kathie Tovar RN  06/11/20 8114

## 2020-06-12 ENCOUNTER — ANESTHESIA (OUTPATIENT)
Dept: OPERATING ROOM | Age: 60
DRG: 492 | End: 2020-06-12
Payer: MEDICARE

## 2020-06-12 ENCOUNTER — ANESTHESIA EVENT (OUTPATIENT)
Dept: OPERATING ROOM | Age: 60
DRG: 492 | End: 2020-06-12
Payer: MEDICARE

## 2020-06-12 ENCOUNTER — APPOINTMENT (OUTPATIENT)
Dept: GENERAL RADIOLOGY | Age: 60
DRG: 492 | End: 2020-06-12
Payer: MEDICARE

## 2020-06-12 VITALS — SYSTOLIC BLOOD PRESSURE: 191 MMHG | OXYGEN SATURATION: 98 % | DIASTOLIC BLOOD PRESSURE: 109 MMHG | TEMPERATURE: 82.6 F

## 2020-06-12 LAB
ABO/RH: NORMAL
ANION GAP SERPL CALCULATED.3IONS-SCNC: 9 MEQ/L (ref 9–15)
ANTIBODY SCREEN: NORMAL
BACTERIA: NEGATIVE /HPF
BILIRUBIN URINE: NEGATIVE
BLOOD, URINE: NEGATIVE
BUN BLDV-MCNC: 19 MG/DL (ref 6–20)
CALCIUM SERPL-MCNC: 9.1 MG/DL (ref 8.5–9.9)
CHLORIDE BLD-SCNC: 109 MEQ/L (ref 95–107)
CLARITY: CLEAR
CO2: 26 MEQ/L (ref 20–31)
COLOR: YELLOW
CREAT SERPL-MCNC: 0.98 MG/DL (ref 0.5–0.9)
EKG ATRIAL RATE: 124 BPM
EKG P AXIS: 64 DEGREES
EKG P-R INTERVAL: 146 MS
EKG Q-T INTERVAL: 310 MS
EKG QRS DURATION: 92 MS
EKG QTC CALCULATION (BAZETT): 445 MS
EKG R AXIS: 46 DEGREES
EKG T AXIS: 31 DEGREES
EKG VENTRICULAR RATE: 124 BPM
EPITHELIAL CELLS, UA: ABNORMAL /HPF (ref 0–5)
FINE CASTS, UA: ABNORMAL /LPF (ref 0–5)
GFR AFRICAN AMERICAN: >60
GFR NON-AFRICAN AMERICAN: 58.2
GLUCOSE BLD-MCNC: 121 MG/DL (ref 60–115)
GLUCOSE BLD-MCNC: 127 MG/DL (ref 60–115)
GLUCOSE BLD-MCNC: 158 MG/DL (ref 60–115)
GLUCOSE BLD-MCNC: 190 MG/DL (ref 60–115)
GLUCOSE BLD-MCNC: 98 MG/DL (ref 70–99)
GLUCOSE URINE: NEGATIVE MG/DL
HCT VFR BLD CALC: 34.7 % (ref 37–47)
HEMOGLOBIN: 10.5 G/DL (ref 12–16)
KETONES, URINE: NEGATIVE MG/DL
LEUKOCYTE ESTERASE, URINE: ABNORMAL
MCH RBC QN AUTO: 22.9 PG (ref 27–31.3)
MCHC RBC AUTO-ENTMCNC: 30.3 % (ref 33–37)
MCV RBC AUTO: 75.4 FL (ref 82–100)
NITRITE, URINE: NEGATIVE
PDW BLD-RTO: 18.7 % (ref 11.5–14.5)
PERFORMED ON: ABNORMAL
PH UA: 5 (ref 5–9)
PLATELET # BLD: 248 K/UL (ref 130–400)
POTASSIUM REFLEX MAGNESIUM: 4.4 MEQ/L (ref 3.4–4.9)
PROTEIN UA: 30 MG/DL
RBC # BLD: 4.6 M/UL (ref 4.2–5.4)
RBC UA: ABNORMAL /HPF (ref 0–5)
SODIUM BLD-SCNC: 144 MEQ/L (ref 135–144)
SPECIFIC GRAVITY UA: 1.02 (ref 1–1.03)
URINE REFLEX TO CULTURE: YES
UROBILINOGEN, URINE: 0.2 E.U./DL
WBC # BLD: 11.6 K/UL (ref 4.8–10.8)
WBC UA: ABNORMAL /HPF (ref 0–5)

## 2020-06-12 PROCEDURE — 99497 ADVNCD CARE PLAN 30 MIN: CPT | Performed by: FAMILY MEDICINE

## 2020-06-12 PROCEDURE — 2580000003 HC RX 258: Performed by: ORTHOPAEDIC SURGERY

## 2020-06-12 PROCEDURE — 6360000002 HC RX W HCPCS: Performed by: NURSE PRACTITIONER

## 2020-06-12 PROCEDURE — 51798 US URINE CAPACITY MEASURE: CPT

## 2020-06-12 PROCEDURE — 87086 URINE CULTURE/COLONY COUNT: CPT

## 2020-06-12 PROCEDURE — 1210000000 HC MED SURG R&B

## 2020-06-12 PROCEDURE — 2500000003 HC RX 250 WO HCPCS: Performed by: NURSE ANESTHETIST, CERTIFIED REGISTERED

## 2020-06-12 PROCEDURE — 94640 AIRWAY INHALATION TREATMENT: CPT

## 2020-06-12 PROCEDURE — 6370000000 HC RX 637 (ALT 250 FOR IP): Performed by: ORTHOPAEDIC SURGERY

## 2020-06-12 PROCEDURE — 51702 INSERT TEMP BLADDER CATH: CPT

## 2020-06-12 PROCEDURE — 86850 RBC ANTIBODY SCREEN: CPT

## 2020-06-12 PROCEDURE — C9359 IMPLNT,BON VOID FILLER-PUTTY: HCPCS | Performed by: ORTHOPAEDIC SURGERY

## 2020-06-12 PROCEDURE — 6370000000 HC RX 637 (ALT 250 FOR IP): Performed by: SURGERY

## 2020-06-12 PROCEDURE — 81001 URINALYSIS AUTO W/SCOPE: CPT

## 2020-06-12 PROCEDURE — 2700000000 HC OXYGEN THERAPY PER DAY

## 2020-06-12 PROCEDURE — 3700000000 HC ANESTHESIA ATTENDED CARE: Performed by: ORTHOPAEDIC SURGERY

## 2020-06-12 PROCEDURE — 86900 BLOOD TYPING SEROLOGIC ABO: CPT

## 2020-06-12 PROCEDURE — 2580000003 HC RX 258: Performed by: NURSE PRACTITIONER

## 2020-06-12 PROCEDURE — 7100000000 HC PACU RECOVERY - FIRST 15 MIN: Performed by: ORTHOPAEDIC SURGERY

## 2020-06-12 PROCEDURE — L3650 SO 8 ABD RESTRAINT PRE OTS: HCPCS | Performed by: ORTHOPAEDIC SURGERY

## 2020-06-12 PROCEDURE — 2580000003 HC RX 258: Performed by: SURGERY

## 2020-06-12 PROCEDURE — 2709999900 HC NON-CHARGEABLE SUPPLY: Performed by: ORTHOPAEDIC SURGERY

## 2020-06-12 PROCEDURE — 3209999900 FLUORO FOR SURGICAL PROCEDURES

## 2020-06-12 PROCEDURE — 6360000002 HC RX W HCPCS: Performed by: SURGERY

## 2020-06-12 PROCEDURE — 6360000002 HC RX W HCPCS: Performed by: ANESTHESIOLOGY

## 2020-06-12 PROCEDURE — 85027 COMPLETE CBC AUTOMATED: CPT

## 2020-06-12 PROCEDURE — 86901 BLOOD TYPING SEROLOGIC RH(D): CPT

## 2020-06-12 PROCEDURE — C1776 JOINT DEVICE (IMPLANTABLE): HCPCS | Performed by: ORTHOPAEDIC SURGERY

## 2020-06-12 PROCEDURE — 3700000001 HC ADD 15 MINUTES (ANESTHESIA): Performed by: ORTHOPAEDIC SURGERY

## 2020-06-12 PROCEDURE — C1713 ANCHOR/SCREW BN/BN,TIS/BN: HCPCS | Performed by: ORTHOPAEDIC SURGERY

## 2020-06-12 PROCEDURE — 3600000014 HC SURGERY LEVEL 4 ADDTL 15MIN: Performed by: ORTHOPAEDIC SURGERY

## 2020-06-12 PROCEDURE — L3660 SO 8 AB RSTR CAN/WEB PRE OTS: HCPCS | Performed by: ORTHOPAEDIC SURGERY

## 2020-06-12 PROCEDURE — 2580000003 HC RX 258: Performed by: NURSE ANESTHETIST, CERTIFIED REGISTERED

## 2020-06-12 PROCEDURE — 36415 COLL VENOUS BLD VENIPUNCTURE: CPT

## 2020-06-12 PROCEDURE — 94761 N-INVAS EAR/PLS OXIMETRY MLT: CPT

## 2020-06-12 PROCEDURE — 7100000001 HC PACU RECOVERY - ADDTL 15 MIN: Performed by: ORTHOPAEDIC SURGERY

## 2020-06-12 PROCEDURE — 3600000004 HC SURGERY LEVEL 4 BASE: Performed by: ORTHOPAEDIC SURGERY

## 2020-06-12 PROCEDURE — 93005 ELECTROCARDIOGRAM TRACING: CPT | Performed by: ANESTHESIOLOGY

## 2020-06-12 PROCEDURE — 80048 BASIC METABOLIC PNL TOTAL CA: CPT

## 2020-06-12 PROCEDURE — 6370000000 HC RX 637 (ALT 250 FOR IP): Performed by: PHYSICIAN ASSISTANT

## 2020-06-12 PROCEDURE — 99233 SBSQ HOSP IP/OBS HIGH 50: CPT | Performed by: SURGERY

## 2020-06-12 PROCEDURE — 64415 NJX AA&/STRD BRCH PLXS IMG: CPT | Performed by: NURSE ANESTHETIST, CERTIFIED REGISTERED

## 2020-06-12 PROCEDURE — 6360000002 HC RX W HCPCS: Performed by: NURSE ANESTHETIST, CERTIFIED REGISTERED

## 2020-06-12 PROCEDURE — 0PSG04Z REPOSITION LEFT HUMERAL SHAFT WITH INTERNAL FIXATION DEVICE, OPEN APPROACH: ICD-10-PCS | Performed by: ORTHOPAEDIC SURGERY

## 2020-06-12 DEVICE — SCREW BNE L24MM DIA3.5MM CORT S STL ST LOK FULL THRD: Type: IMPLANTABLE DEVICE | Site: HUMERUS | Status: FUNCTIONAL

## 2020-06-12 DEVICE — PLATE BNE L163MM THK3.4MM 12 H BILAT S STL STR LOK COMPR: Type: IMPLANTABLE DEVICE | Site: HUMERUS | Status: FUNCTIONAL

## 2020-06-12 DEVICE — CABLE SURG L750MM DIA1MM S STL SMOOTH W/ CRMP: Type: IMPLANTABLE DEVICE | Site: HUMERUS | Status: FUNCTIONAL

## 2020-06-12 DEVICE — DBX PUTTY, 5CC
Type: IMPLANTABLE DEVICE | Site: HUMERUS | Status: FUNCTIONAL
Brand: DBX®

## 2020-06-12 DEVICE — SCREW BNE L10MM DIA3.5MM CORT S STL ST LOK FULL THRD: Type: IMPLANTABLE DEVICE | Site: HUMERUS | Status: FUNCTIONAL

## 2020-06-12 DEVICE — SCREW BNE L28MM DIA3.5MM CORT S STL ST LOK FULL THRD: Type: IMPLANTABLE DEVICE | Site: HUMERUS | Status: FUNCTIONAL

## 2020-06-12 DEVICE — SCREW BNE L22MM DIA3.5MM CORT S STL ST LOK FULL THRD: Type: IMPLANTABLE DEVICE | Site: HUMERUS | Status: FUNCTIONAL

## 2020-06-12 DEVICE — PIN POS L3.5MM S STL CERCLAGE THRD FOR LOK COMPR PLT: Type: IMPLANTABLE DEVICE | Site: HUMERUS | Status: FUNCTIONAL

## 2020-06-12 RX ORDER — SODIUM CHLORIDE, SODIUM LACTATE, POTASSIUM CHLORIDE, CALCIUM CHLORIDE 600; 310; 30; 20 MG/100ML; MG/100ML; MG/100ML; MG/100ML
INJECTION, SOLUTION INTRAVENOUS CONTINUOUS
Status: DISCONTINUED | OUTPATIENT
Start: 2020-06-12 | End: 2020-06-13

## 2020-06-12 RX ORDER — DEXAMETHASONE SODIUM PHOSPHATE 4 MG/ML
INJECTION, SOLUTION INTRA-ARTICULAR; INTRALESIONAL; INTRAMUSCULAR; INTRAVENOUS; SOFT TISSUE PRN
Status: DISCONTINUED | OUTPATIENT
Start: 2020-06-12 | End: 2020-06-12 | Stop reason: SDUPTHER

## 2020-06-12 RX ORDER — ONDANSETRON 2 MG/ML
4 INJECTION INTRAMUSCULAR; INTRAVENOUS EVERY 6 HOURS PRN
Status: DISCONTINUED | OUTPATIENT
Start: 2020-06-12 | End: 2020-06-13

## 2020-06-12 RX ORDER — SODIUM CHLORIDE, SODIUM LACTATE, POTASSIUM CHLORIDE, AND CALCIUM CHLORIDE .6; .31; .03; .02 G/100ML; G/100ML; G/100ML; G/100ML
500 INJECTION, SOLUTION INTRAVENOUS ONCE
Status: COMPLETED | OUTPATIENT
Start: 2020-06-12 | End: 2020-06-12

## 2020-06-12 RX ORDER — PROPOFOL 10 MG/ML
INJECTION, EMULSION INTRAVENOUS PRN
Status: DISCONTINUED | OUTPATIENT
Start: 2020-06-12 | End: 2020-06-12 | Stop reason: SDUPTHER

## 2020-06-12 RX ORDER — MAGNESIUM HYDROXIDE 1200 MG/15ML
LIQUID ORAL CONTINUOUS PRN
Status: COMPLETED | OUTPATIENT
Start: 2020-06-12 | End: 2020-06-12

## 2020-06-12 RX ORDER — DEXAMETHASONE SODIUM PHOSPHATE 10 MG/ML
INJECTION INTRAMUSCULAR; INTRAVENOUS PRN
Status: DISCONTINUED | OUTPATIENT
Start: 2020-06-12 | End: 2020-06-12 | Stop reason: SDUPTHER

## 2020-06-12 RX ORDER — ONDANSETRON 2 MG/ML
4 INJECTION INTRAMUSCULAR; INTRAVENOUS
Status: DISCONTINUED | OUTPATIENT
Start: 2020-06-12 | End: 2020-06-12 | Stop reason: HOSPADM

## 2020-06-12 RX ORDER — ROCURONIUM BROMIDE 10 MG/ML
INJECTION, SOLUTION INTRAVENOUS PRN
Status: DISCONTINUED | OUTPATIENT
Start: 2020-06-12 | End: 2020-06-12 | Stop reason: SDUPTHER

## 2020-06-12 RX ORDER — ONDANSETRON 4 MG/1
4 TABLET, ORALLY DISINTEGRATING ORAL EVERY 8 HOURS PRN
Status: DISCONTINUED | OUTPATIENT
Start: 2020-06-12 | End: 2020-06-13

## 2020-06-12 RX ORDER — AMOXICILLIN 250 MG
1 CAPSULE ORAL 2 TIMES DAILY
Status: DISCONTINUED | OUTPATIENT
Start: 2020-06-12 | End: 2020-06-13

## 2020-06-12 RX ORDER — SODIUM CHLORIDE, SODIUM LACTATE, POTASSIUM CHLORIDE, CALCIUM CHLORIDE 600; 310; 30; 20 MG/100ML; MG/100ML; MG/100ML; MG/100ML
INJECTION, SOLUTION INTRAVENOUS CONTINUOUS PRN
Status: DISCONTINUED | OUTPATIENT
Start: 2020-06-12 | End: 2020-06-12 | Stop reason: SDUPTHER

## 2020-06-12 RX ORDER — ONDANSETRON 2 MG/ML
INJECTION INTRAMUSCULAR; INTRAVENOUS PRN
Status: DISCONTINUED | OUTPATIENT
Start: 2020-06-12 | End: 2020-06-12 | Stop reason: SDUPTHER

## 2020-06-12 RX ORDER — SODIUM CHLORIDE 0.9 % (FLUSH) 0.9 %
10 SYRINGE (ML) INJECTION EVERY 12 HOURS SCHEDULED
Status: DISCONTINUED | OUTPATIENT
Start: 2020-06-12 | End: 2020-06-14

## 2020-06-12 RX ORDER — ACETAMINOPHEN 325 MG/1
650 TABLET ORAL EVERY 6 HOURS SCHEDULED
Status: DISCONTINUED | OUTPATIENT
Start: 2020-06-12 | End: 2020-06-14

## 2020-06-12 RX ORDER — ACETAMINOPHEN 325 MG/1
650 TABLET ORAL EVERY 6 HOURS
Status: DISCONTINUED | OUTPATIENT
Start: 2020-06-12 | End: 2020-06-13

## 2020-06-12 RX ORDER — ROPIVACAINE HYDROCHLORIDE 5 MG/ML
INJECTION, SOLUTION EPIDURAL; INFILTRATION; PERINEURAL PRN
Status: DISCONTINUED | OUTPATIENT
Start: 2020-06-12 | End: 2020-06-12 | Stop reason: SDUPTHER

## 2020-06-12 RX ORDER — LIDOCAINE HYDROCHLORIDE 20 MG/ML
INJECTION, SOLUTION INFILTRATION; PERINEURAL PRN
Status: DISCONTINUED | OUTPATIENT
Start: 2020-06-12 | End: 2020-06-12 | Stop reason: SDUPTHER

## 2020-06-12 RX ORDER — MEPERIDINE HYDROCHLORIDE 25 MG/ML
12.5 INJECTION INTRAMUSCULAR; INTRAVENOUS; SUBCUTANEOUS EVERY 5 MIN PRN
Status: DISCONTINUED | OUTPATIENT
Start: 2020-06-12 | End: 2020-06-12 | Stop reason: HOSPADM

## 2020-06-12 RX ORDER — OXYCODONE HYDROCHLORIDE 5 MG/1
5 TABLET ORAL EVERY 4 HOURS PRN
Status: DISCONTINUED | OUTPATIENT
Start: 2020-06-12 | End: 2020-06-13

## 2020-06-12 RX ORDER — METOCLOPRAMIDE HYDROCHLORIDE 5 MG/ML
10 INJECTION INTRAMUSCULAR; INTRAVENOUS
Status: DISCONTINUED | OUTPATIENT
Start: 2020-06-12 | End: 2020-06-12 | Stop reason: HOSPADM

## 2020-06-12 RX ORDER — FENTANYL CITRATE 50 UG/ML
50 INJECTION, SOLUTION INTRAMUSCULAR; INTRAVENOUS EVERY 10 MIN PRN
Status: DISCONTINUED | OUTPATIENT
Start: 2020-06-12 | End: 2020-06-12 | Stop reason: HOSPADM

## 2020-06-12 RX ORDER — SODIUM CHLORIDE 0.9 % (FLUSH) 0.9 %
10 SYRINGE (ML) INJECTION PRN
Status: DISCONTINUED | OUTPATIENT
Start: 2020-06-12 | End: 2020-06-13

## 2020-06-12 RX ORDER — MORPHINE SULFATE 2 MG/ML
2 INJECTION, SOLUTION INTRAMUSCULAR; INTRAVENOUS
Status: DISCONTINUED | OUTPATIENT
Start: 2020-06-12 | End: 2020-06-13

## 2020-06-12 RX ORDER — DIPHENHYDRAMINE HYDROCHLORIDE 50 MG/ML
12.5 INJECTION INTRAMUSCULAR; INTRAVENOUS
Status: DISCONTINUED | OUTPATIENT
Start: 2020-06-12 | End: 2020-06-12 | Stop reason: HOSPADM

## 2020-06-12 RX ADMIN — ROPIVACAINE HYDROCHLORIDE 25 ML: 5 INJECTION, SOLUTION EPIDURAL; INFILTRATION; PERINEURAL at 14:07

## 2020-06-12 RX ADMIN — OXYCODONE 5 MG: 5 TABLET ORAL at 18:14

## 2020-06-12 RX ADMIN — ACETAMINOPHEN 650 MG: 325 TABLET, FILM COATED ORAL at 18:13

## 2020-06-12 RX ADMIN — METHOCARBAMOL 500 MG: 500 TABLET, FILM COATED ORAL at 22:14

## 2020-06-12 RX ADMIN — DEXAMETHASONE SODIUM PHOSPHATE 4 MG: 4 INJECTION, SOLUTION INTRA-ARTICULAR; INTRALESIONAL; INTRAMUSCULAR; INTRAVENOUS; SOFT TISSUE at 14:07

## 2020-06-12 RX ADMIN — ROCURONIUM BROMIDE 50 MG: 10 INJECTION INTRAVENOUS at 15:07

## 2020-06-12 RX ADMIN — LIDOCAINE HYDROCHLORIDE 60 MG: 20 INJECTION, SOLUTION INFILTRATION; PERINEURAL at 15:07

## 2020-06-12 RX ADMIN — HYDROMORPHONE HYDROCHLORIDE 0.5 MG: 1 INJECTION, SOLUTION INTRAMUSCULAR; INTRAVENOUS; SUBCUTANEOUS at 02:58

## 2020-06-12 RX ADMIN — SODIUM CHLORIDE, POTASSIUM CHLORIDE, SODIUM LACTATE AND CALCIUM CHLORIDE: 600; 310; 30; 20 INJECTION, SOLUTION INTRAVENOUS at 14:33

## 2020-06-12 RX ADMIN — ONDANSETRON 4 MG: 2 INJECTION INTRAMUSCULAR; INTRAVENOUS at 15:12

## 2020-06-12 RX ADMIN — Medication 0.3 MG: at 15:09

## 2020-06-12 RX ADMIN — Medication 0.2 MG: at 15:11

## 2020-06-12 RX ADMIN — MIRTAZAPINE 30 MG: 30 TABLET, FILM COATED ORAL at 22:15

## 2020-06-12 RX ADMIN — SODIUM CHLORIDE, POTASSIUM CHLORIDE, SODIUM LACTATE AND CALCIUM CHLORIDE 1000 ML: 600; 310; 30; 20 INJECTION, SOLUTION INTRAVENOUS at 15:07

## 2020-06-12 RX ADMIN — PROPOFOL 125 MG: 10 INJECTION, EMULSION INTRAVENOUS at 15:07

## 2020-06-12 RX ADMIN — DOCUSATE SODIUM 50MG AND SENNOSIDES 8.6MG 1 TABLET: 8.6; 5 TABLET, FILM COATED ORAL at 22:15

## 2020-06-12 RX ADMIN — SODIUM CHLORIDE, POTASSIUM CHLORIDE, SODIUM LACTATE AND CALCIUM CHLORIDE 500 ML: 600; 310; 30; 20 INJECTION, SOLUTION INTRAVENOUS at 10:39

## 2020-06-12 RX ADMIN — INSULIN LISPRO 1 UNITS: 100 INJECTION, SOLUTION INTRAVENOUS; SUBCUTANEOUS at 18:13

## 2020-06-12 RX ADMIN — DEXAMETHASONE SODIUM PHOSPHATE 10 MG: 10 INJECTION INTRAMUSCULAR; INTRAVENOUS at 15:09

## 2020-06-12 RX ADMIN — SODIUM CHLORIDE, POTASSIUM CHLORIDE, SODIUM LACTATE AND CALCIUM CHLORIDE: 600; 310; 30; 20 INJECTION, SOLUTION INTRAVENOUS at 16:16

## 2020-06-12 RX ADMIN — VANCOMYCIN HYDROCHLORIDE 1000 MG: 1 INJECTION, POWDER, LYOPHILIZED, FOR SOLUTION INTRAVENOUS at 13:37

## 2020-06-12 RX ADMIN — HYDROMORPHONE HYDROCHLORIDE 0.5 MG: 1 INJECTION, SOLUTION INTRAMUSCULAR; INTRAVENOUS; SUBCUTANEOUS at 08:10

## 2020-06-12 RX ADMIN — ROCURONIUM BROMIDE 30 MG: 10 INJECTION INTRAVENOUS at 16:10

## 2020-06-12 RX ADMIN — BUDESONIDE AND FORMOTEROL FUMARATE DIHYDRATE 1 PUFF: 160; 4.5 AEROSOL RESPIRATORY (INHALATION) at 08:45

## 2020-06-12 RX ADMIN — PRAMIPEXOLE DIHYDROCHLORIDE 0.5 MG: 0.5 TABLET ORAL at 18:14

## 2020-06-12 RX ADMIN — Medication 10 ML: at 08:11

## 2020-06-12 ASSESSMENT — PULMONARY FUNCTION TESTS
PIF_VALUE: 0
PIF_VALUE: 25
PIF_VALUE: 1
PIF_VALUE: 1
PIF_VALUE: 25
PIF_VALUE: 25
PIF_VALUE: 23
PIF_VALUE: 25
PIF_VALUE: 24
PIF_VALUE: 36
PIF_VALUE: 24
PIF_VALUE: 9
PIF_VALUE: 25
PIF_VALUE: 23
PIF_VALUE: 24
PIF_VALUE: 25
PIF_VALUE: 10
PIF_VALUE: 25
PIF_VALUE: 23
PIF_VALUE: 25
PIF_VALUE: 19
PIF_VALUE: 24
PIF_VALUE: 25
PIF_VALUE: 24
PIF_VALUE: 23
PIF_VALUE: 24
PIF_VALUE: 22
PIF_VALUE: 23
PIF_VALUE: 25
PIF_VALUE: 25
PIF_VALUE: 1
PIF_VALUE: 24
PIF_VALUE: 23
PIF_VALUE: 25
PIF_VALUE: 25
PIF_VALUE: 1
PIF_VALUE: 24
PIF_VALUE: 24
PIF_VALUE: 25
PIF_VALUE: 24
PIF_VALUE: 25
PIF_VALUE: 19
PIF_VALUE: 19
PIF_VALUE: 24
PIF_VALUE: 25
PIF_VALUE: 1
PIF_VALUE: 24
PIF_VALUE: 24
PIF_VALUE: 25
PIF_VALUE: 22
PIF_VALUE: 24
PIF_VALUE: 24
PIF_VALUE: 44
PIF_VALUE: 24
PIF_VALUE: 24
PIF_VALUE: 4
PIF_VALUE: 25
PIF_VALUE: 24
PIF_VALUE: 23
PIF_VALUE: 10
PIF_VALUE: 23
PIF_VALUE: 25
PIF_VALUE: 23
PIF_VALUE: 25
PIF_VALUE: 1
PIF_VALUE: 10
PIF_VALUE: 24
PIF_VALUE: 20
PIF_VALUE: 25
PIF_VALUE: 25
PIF_VALUE: 24
PIF_VALUE: 1
PIF_VALUE: 3
PIF_VALUE: 25
PIF_VALUE: 7
PIF_VALUE: 15
PIF_VALUE: 23
PIF_VALUE: 24
PIF_VALUE: 0
PIF_VALUE: 1
PIF_VALUE: 19
PIF_VALUE: 24
PIF_VALUE: 29
PIF_VALUE: 7
PIF_VALUE: 24
PIF_VALUE: 19
PIF_VALUE: 23
PIF_VALUE: 1
PIF_VALUE: 24
PIF_VALUE: 25
PIF_VALUE: 2
PIF_VALUE: 25
PIF_VALUE: 20
PIF_VALUE: 24
PIF_VALUE: 29
PIF_VALUE: 10
PIF_VALUE: 25
PIF_VALUE: 26
PIF_VALUE: 24
PIF_VALUE: 23
PIF_VALUE: 25
PIF_VALUE: 24
PIF_VALUE: 25
PIF_VALUE: 24
PIF_VALUE: 20
PIF_VALUE: 24

## 2020-06-12 ASSESSMENT — PAIN SCALES - GENERAL
PAINLEVEL_OUTOF10: 8
PAINLEVEL_OUTOF10: 9
PAINLEVEL_OUTOF10: 2
PAINLEVEL_OUTOF10: 0
PAINLEVEL_OUTOF10: 0
PAINLEVEL_OUTOF10: 7

## 2020-06-12 ASSESSMENT — COPD QUESTIONNAIRES: CAT_SEVERITY: MODERATE

## 2020-06-12 ASSESSMENT — LIFESTYLE VARIABLES: SMOKING_STATUS: 1

## 2020-06-12 NOTE — CARE COORDINATION
LSW spoke briefly with the pt but she had been medicated and was very sleepy so she could not answer all of this LSW questions. Pt says she is from home alone and she has waiver services so she has a HHA daily. She could not answer the name of the company that provides these services. She said that her meds come in the mail but was unable to tell LSW from where. Pt wants to return home with her current services when DC. LSW to follow with pt when she is more alert.
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
## 2020-06-12 NOTE — ANESTHESIA POSTPROCEDURE EVALUATION
Department of Anesthesiology  Postprocedure Note    Patient: Fang Danielson  MRN: [id number]  YOB: 1960  Date of evaluation: 6/12/2020  Time:  4:56 PM     Procedure Summary     Date:  06/12/20 Room / Location:  [address]    Anesthesia Start:  9524 Anesthesia Stop:  2993    Procedure:  ORIF PERIPROSTHETIC FX LEFT HUMERAL SHAFT, SYNTHES NOTIFIED, ROOM 36, LATEX ALLERGY (Left ) Diagnosis:  (INPATIENT)    Surgeon:  Bhavna Ag MD Responsible Provider:  MAYURI Romo CRNA    Anesthesia Type:  general, regional ASA Status:  3          Anesthesia Type: general, regional    Jules Phase I:      Jules Phase II:      Last vitals: Reviewed and per EMR flowsheets.        Anesthesia Post Evaluation    Patient location during evaluation: bedside  Nausea & Vomiting: no nausea and no vomiting  Complications: no  Cardiovascular status: blood pressure returned to baseline  Respiratory status: acceptable  Hydration status: euvolemic
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
## 2020-06-12 NOTE — FLOWSHEET NOTE
Patient returned from surgery. Drowsy. Still experiencing decreased sensation in LUE from nerve block but has some feeling returning in fingertips. States she is in significant pain. Medicated per eMar. Lungs diminished. Now experiencing infrequent moist, nonproductive cough. Encouraged forcing a cough and deep breath intermittently. Denies chest pain, shortness of breath, and nausea. Dinner tray set up at bedside. Vitals obtained. Gracia catheter remains in place. Will continue with plan of care.  Electronically signed by Ericka Ag RN on 6/12/20 at 6:31 PM EDT

## 2020-06-12 NOTE — BRIEF OP NOTE
Brief Postoperative Note      Patient: Renato [place]  YOB: 1960  MRN: [id number]    Date of Procedure: 6/12/2020    Pre-Op Diagnosis: INPATIENT    Post-Op Diagnosis: Same       Procedure(s):  ORIF PERIPROSTHETIC FX LEFT HUMERAL SHAFT, SYNTHES NOTIFIED, ROOM 283, LATEX ALLERGY    Surgeon(s):  Claudeen Rack, MD    Assistant:  Physician Assistant: Misty Montes PA-C    Anesthesia: General    Estimated Blood Loss (mL): less than 811     Complications: None    Specimens:   * No specimens in log *    Implants:  Implant Name Type Inv.  Item Serial No.  Lot No. LRB No. Used Action   PIN POSITIONING CERCLG THRD 3.5MM Screw/Plate/Nail/Moises PIN POSITIONING CERCLG THRD 3.5MM  SYNTHES [id number] Left 1 Implanted   CABLE ORTHO SS 1MM 750MM HIP Hip CABLE ORTHO SS 1MM 750MM HIP  SYNTHES [id number] Left 4 Implanted   CABLE ORTHO SS 1MM 750MM HIP Hip CABLE ORTHO SS 1MM 750MM HIP  HESIODO [id number] Left 1 Implanted          1 Implanted         Drains:   Urethral Catheter Non-latex 16 fr (Active)   $ Urethral catheter insertion $ Not inserted for procedure 6/12/2020  8:19 AM   Catheter Indications Acute urinary retention/obstruction 6/12/2020  8:19 AM   Urine Color Straw 6/12/2020  8:19 AM   Urine Appearance Clear 6/12/2020  8:19 AM   Output (mL) 650 mL 6/12/2020  8:19 AM       [REMOVED] Ureteral Catheter (Removed)       Findings: periprosthetic prox humerus fx left    Electronically signed by Claudeen Rack, MD on 6/12/2020 at 4:28 PM

## 2020-06-12 NOTE — ANESTHESIA PRE PROCEDURE
(ADVOCATE REDI-CODE+ CONTROL) High SOLN USE AS DIRECTED. 4/8/20   Juan Antonio Arroyo MD   Blood Glucose Monitoring Suppl (ADVOCATE REDI-CODE+) COLE USE AS DIRECTED. 4/8/20   Juan Antonio Arroyo MD   fluconazole (DIFLUCAN) 150 MG tablet take 1 tablet by mouth AS A ONE TIME DOSE 3/19/20   Juan Antonio Arroyo MD   Control Gel Formula Dressing (DUODERM CGF EXTRA THIN) MISC Apply to sacral region every other day  Patient not taking: Reported on 5/22/2020 3/5/20   Juan Antonio Arroyo MD   esomeprazole (NEXIUM) 40 MG delayed release capsule Take 2 capsules by mouth every day 2/24/20   Juan Antonio Arroyo MD   tiZANidine (ZANAFLEX) 4 MG tablet Take 2 tabs every 8 hrs as need for muscle spasms 2/20/20   Juan Antonio Arroyo MD   carisoprodol (SOMA) 350 MG tablet Take 1 tablet by mouth daily as needed (migraine) for up to 180 days.  1/27/20 7/25/20  Juan Antonio Arroyo MD   magnesium oxide (MAG-OX) 400 MG tablet Take one tablet by mouth every day  Patient not taking: Reported on 5/22/2020 12/20/19   Juan Antonio Arroyo MD   Blood Glucose Monitoring Suppl (ONE TOUCH ULTRA 2) w/Device KIT USE AS DIRECTED TO TEST THREE TIMES DAILY 11/5/19   Juan Antonio Arroyo MD   mupirocin [facility]) 2 % ointment CINDY EXT AA TID 9/28/18   Historical Provider, MD   NYSTATIN 930612 UNIT/GM powder CINDY EXT AA ONCE D 1/11/19   Historical Provider, MD   loperamide (IMODIUM A-D) 2 MG tablet Take 2 mg by mouth as needed for Diarrhea  1/18/16   Historical Provider, MD       Current medications:    Current Facility-Administered Medications   Medication Dose Route Frequency Provider Last Rate Last Dose    vancomycin 1000 mg IVPB in 250 mL D5W addavial  1,000 mg Intravenous On Call to [address], APRN -  mL/hr at 06/12/20 1337 1,000 mg at 06/12/20 1337    lactated ringers bolus  500 mL Intravenous Once Justina Plunkett  mL/hr at 06/12/20 1039 500 mL at 06/12/20 1039    morphine (PF) injection 4 mg  4 mg Intravenous Q15 Min PRN Nicolas Hamman, MD   4 mg at 06/11/20 1521    sodium chloride flush INR 0.9 06/11/2020    APTT 29.5 11/09/2019       HCG (If Applicable):   Lab Results   Component Value Date    PREGTESTUR negative 04/15/2017        ABGs:   Lab Results   Component Value Date    PHART 7.458 12/22/2015    PO2ART 73 12/22/2015    TNU4CWO 44 12/22/2015    NHH9PEO 31.4 12/22/2015    BEART 8 12/22/2015    W0WAGTKU 95 12/22/2015        Type & Screen (If Applicable):  No results found for: LABABO, LABRH    Drug/Infectious Status (If Applicable):  No results found for: HIV, HEPCAB    COVID-19 Screening (If Applicable):   Lab Results   Component Value Date    COVID19 Not Detected 06/11/2020         Anesthesia Evaluation   history of anesthetic complications:   Airway: Mallampati: II  TM distance: >3 FB   Neck ROM: full  Mouth opening: > = 3 FB Dental: normal exam         Pulmonary:   (+) COPD: moderate,  decreased breath sounds,  wheezes,  asthma: current smoker          Patient did not smoke on day of surgery. Cardiovascular:    (+) hypertension:,       ECG reviewed  Rhythm: regular                      Neuro/Psych:   (+) CVA: residual symptoms, neuromuscular disease:, headaches: migraine headaches, psychiatric history:             ROS comment: Marijuana abuse GI/Hepatic/Renal:   (+) GERD:,           Endo/Other:    (+) DiabetesType II DM, well controlled, using insulin, hypothyroidism::., .                 Abdominal:           Vascular: negative vascular ROS. Anesthesia Plan      general and regional     ASA 3     (US guided Supraclavicular nerve block)  Induction: intravenous. MIPS: Prophylactic antiemetics administered. Anesthetic plan and risks discussed with patient. Use of blood products discussed with patient whom consented to blood products. Plan discussed with CRNA.     Attending anesthesiologist reviewed and agrees with Pre Eval content              Kathy Mendoza MD   6/12/2020
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
## 2020-06-12 NOTE — ACP (ADVANCE CARE PLANNING)
Advance Care Planning     Advance Care Planning (ACP) Physician/NP/PA (Provider) Conversation      Date of ACP Conversation: 6/11/2020    Conversation Conducted with:   Patient with Decision Making Karen Hewitt Maker:    Current Designated Health Care Decision Maker:      Note: Assess and validate information in current ACP documents, as indicated. If no Authorized Decision Maker has previously been identified, then patient chooses [place]:  \"Who would you like to name as your primary health care decision-maker? \"             Name: Geovany Downing        Relationship: sister         Phone number: [phone]  Aida Gil this person be reached easily? \" Yes  \"Who would you like to name as your back-up decision maker? \"   Name: Lucy Crow Relationship: Sister        Phone number: [phone]  Aida Gil this person be reached easily? \" Yes    Note: If the relationship of these Decision-Makers to the patient does NOT follow your state's Next of Kin hierarchy, recommend that patient complete ACP document that meets state-specific requirements to allow them to act on the patient's behalf when appropriate. Care Preferences:    Hospitalization: \"If your health worsens and it becomes clear that your chance of recovery is unlikely, what would your preference be regarding hospitalization? \"  If the patient would want hospitalization, answer \"yes\". If the patient would prefer comfort-focused treatment without hospitalization, answer \"no\". YES/NO/WILD CARDS: yes      Ventilation: \"If you were in your present state of health and suddenly became very ill and were unable to breathe on your own, what would your preference be about the use of a ventilator (breathing machine) if it was available to you? \"    If patient would desire the use of a ventilator (breathing machine), answer \"yes\", if not answer \"no\":yes    \"If your health worsens and it becomes clear that your chance of recovery is unlikely, what would your preference be about the use of a ventilator (breathing machine) if it was available to you? \"   yes    Resuscitation:  \"CPR works best to restart the heart when there is a sudden event, like a heart attack, in someone who is otherwise healthy. Unfortunately, CPR does not typically restart the heart for people who have serious health conditions or who are very sick. \"    \"In the event your heart stopped as a result of an underlying serious health condition, would you want attempts to be made to restart your heart (answer \"yes\" for attempt to resuscitate) or would you prefer a natural death (answer \"no\" for do not attempt to resuscitate)? \"   yes     NOTE: If the patient has a valid advance directive AND provides care preference(s) that are inconsistent with that prior directive, advise the patient to consider either: creating a new advance directive that complies with state-specific requirements; or, if that is not possible, orally revoking that prior directive in accordance with state-specific requirements, which must be documented in the EHR.     Conversation Outcomes / Follow-Up Plan:   clarified wishes completely      Length of Voluntary ACP Conversation in minutes:  20 minutes    Dorian Huddleston

## 2020-06-13 ENCOUNTER — APPOINTMENT (OUTPATIENT)
Dept: CT IMAGING | Age: 60
DRG: 492 | End: 2020-06-13
Payer: MEDICARE

## 2020-06-13 ENCOUNTER — APPOINTMENT (OUTPATIENT)
Dept: GENERAL RADIOLOGY | Age: 60
DRG: 492 | End: 2020-06-13
Payer: MEDICARE

## 2020-06-13 PROBLEM — G89.18 POST-OP PAIN: Status: ACTIVE | Noted: 2020-06-12

## 2020-06-13 PROBLEM — G89.11 ACUTE PAIN DUE TO TRAUMA: Status: ACTIVE | Noted: 2020-06-12

## 2020-06-13 LAB
ANION GAP SERPL CALCULATED.3IONS-SCNC: 13 MEQ/L (ref 9–15)
BASOPHILS ABSOLUTE: 0.1 K/UL (ref 0–0.2)
BASOPHILS RELATIVE PERCENT: 0.4 %
BUN BLDV-MCNC: 17 MG/DL (ref 6–20)
CALCIUM SERPL-MCNC: 9 MG/DL (ref 8.5–9.9)
CHLORIDE BLD-SCNC: 105 MEQ/L (ref 95–107)
CO2: 21 MEQ/L (ref 20–31)
CREAT SERPL-MCNC: 0.73 MG/DL (ref 0.5–0.9)
EOSINOPHILS ABSOLUTE: 0 K/UL (ref 0–0.7)
EOSINOPHILS RELATIVE PERCENT: 0 %
GFR AFRICAN AMERICAN: >60
GFR NON-AFRICAN AMERICAN: >60
GLUCOSE BLD-MCNC: 108 MG/DL (ref 70–99)
GLUCOSE BLD-MCNC: 109 MG/DL (ref 60–115)
GLUCOSE BLD-MCNC: 125 MG/DL (ref 60–115)
GLUCOSE BLD-MCNC: 141 MG/DL (ref 60–115)
GLUCOSE BLD-MCNC: 143 MG/DL (ref 60–115)
GLUCOSE BLD-MCNC: 158 MG/DL (ref 60–115)
HCT VFR BLD CALC: 30.3 % (ref 37–47)
HEMOGLOBIN: 9.4 G/DL (ref 12–16)
LYMPHOCYTES ABSOLUTE: 1.3 K/UL (ref 1–4.8)
LYMPHOCYTES RELATIVE PERCENT: 8.4 %
MCH RBC QN AUTO: 22.4 PG (ref 27–31.3)
MCHC RBC AUTO-ENTMCNC: 30.9 % (ref 33–37)
MCV RBC AUTO: 72.6 FL (ref 82–100)
MONOCYTES ABSOLUTE: 1.5 K/UL (ref 0.2–0.8)
MONOCYTES RELATIVE PERCENT: 9.8 %
NEUTROPHILS ABSOLUTE: 12.3 K/UL (ref 1.4–6.5)
NEUTROPHILS RELATIVE PERCENT: 81.4 %
PDW BLD-RTO: 18.2 % (ref 11.5–14.5)
PERFORMED ON: ABNORMAL
PERFORMED ON: NORMAL
PLATELET # BLD: 253 K/UL (ref 130–400)
POTASSIUM REFLEX MAGNESIUM: 3.8 MEQ/L (ref 3.4–4.9)
RBC # BLD: 4.17 M/UL (ref 4.2–5.4)
REASON FOR REJECTION: NORMAL
REJECTED TEST: NORMAL
SODIUM BLD-SCNC: 139 MEQ/L (ref 135–144)
WBC # BLD: 15.1 K/UL (ref 4.8–10.8)

## 2020-06-13 PROCEDURE — 6360000002 HC RX W HCPCS: Performed by: PHYSICIAN ASSISTANT

## 2020-06-13 PROCEDURE — 6370000000 HC RX 637 (ALT 250 FOR IP): Performed by: ORTHOPAEDIC SURGERY

## 2020-06-13 PROCEDURE — 99233 SBSQ HOSP IP/OBS HIGH 50: CPT | Performed by: SURGERY

## 2020-06-13 PROCEDURE — 1210000000 HC MED SURG R&B

## 2020-06-13 PROCEDURE — 87040 BLOOD CULTURE FOR BACTERIA: CPT

## 2020-06-13 PROCEDURE — 6370000000 HC RX 637 (ALT 250 FOR IP): Performed by: PHYSICIAN ASSISTANT

## 2020-06-13 PROCEDURE — 85025 COMPLETE CBC W/AUTO DIFF WBC: CPT

## 2020-06-13 PROCEDURE — 94640 AIRWAY INHALATION TREATMENT: CPT

## 2020-06-13 PROCEDURE — 2580000003 HC RX 258: Performed by: PHYSICIAN ASSISTANT

## 2020-06-13 PROCEDURE — 6360000002 HC RX W HCPCS: Performed by: ORTHOPAEDIC SURGERY

## 2020-06-13 PROCEDURE — 2700000000 HC OXYGEN THERAPY PER DAY

## 2020-06-13 PROCEDURE — 2580000003 HC RX 258: Performed by: ORTHOPAEDIC SURGERY

## 2020-06-13 PROCEDURE — 36415 COLL VENOUS BLD VENIPUNCTURE: CPT

## 2020-06-13 PROCEDURE — 80048 BASIC METABOLIC PNL TOTAL CA: CPT

## 2020-06-13 PROCEDURE — 51702 INSERT TEMP BLADDER CATH: CPT

## 2020-06-13 PROCEDURE — 6370000000 HC RX 637 (ALT 250 FOR IP): Performed by: SURGERY

## 2020-06-13 PROCEDURE — 97163 PT EVAL HIGH COMPLEX 45 MIN: CPT

## 2020-06-13 PROCEDURE — 71045 X-RAY EXAM CHEST 1 VIEW: CPT

## 2020-06-13 PROCEDURE — 70450 CT HEAD/BRAIN W/O DYE: CPT

## 2020-06-13 RX ORDER — POTASSIUM CHLORIDE 20 MEQ/1
20 TABLET, EXTENDED RELEASE ORAL
Status: DISCONTINUED | OUTPATIENT
Start: 2020-06-13 | End: 2020-06-18 | Stop reason: HOSPADM

## 2020-06-13 RX ORDER — OXYCODONE HYDROCHLORIDE 5 MG/1
10 TABLET ORAL EVERY 4 HOURS PRN
Status: DISCONTINUED | OUTPATIENT
Start: 2020-06-13 | End: 2020-06-14

## 2020-06-13 RX ORDER — DOXAZOSIN 2 MG/1
2 TABLET ORAL NIGHTLY
Status: DISCONTINUED | OUTPATIENT
Start: 2020-06-13 | End: 2020-06-18 | Stop reason: HOSPADM

## 2020-06-13 RX ORDER — SODIUM CHLORIDE, SODIUM LACTATE, POTASSIUM CHLORIDE, CALCIUM CHLORIDE 600; 310; 30; 20 MG/100ML; MG/100ML; MG/100ML; MG/100ML
INJECTION, SOLUTION INTRAVENOUS CONTINUOUS
Status: DISCONTINUED | OUTPATIENT
Start: 2020-06-13 | End: 2020-06-14

## 2020-06-13 RX ORDER — TIZANIDINE 4 MG/1
8 TABLET ORAL EVERY 8 HOURS PRN
Status: DISCONTINUED | OUTPATIENT
Start: 2020-06-13 | End: 2020-06-15

## 2020-06-13 RX ORDER — OXYCODONE HYDROCHLORIDE 5 MG/1
5 TABLET ORAL EVERY 4 HOURS PRN
Status: DISCONTINUED | OUTPATIENT
Start: 2020-06-13 | End: 2020-06-14

## 2020-06-13 RX ADMIN — FUROSEMIDE 40 MG: 40 TABLET ORAL at 10:19

## 2020-06-13 RX ADMIN — ESCITALOPRAM OXALATE 20 MG: 20 TABLET ORAL at 10:18

## 2020-06-13 RX ADMIN — ACETAMINOPHEN 650 MG: 325 TABLET, FILM COATED ORAL at 00:25

## 2020-06-13 RX ADMIN — OXYCODONE 5 MG: 5 TABLET ORAL at 18:33

## 2020-06-13 RX ADMIN — SODIUM CHLORIDE, POTASSIUM CHLORIDE, SODIUM LACTATE AND CALCIUM CHLORIDE: 600; 310; 30; 20 INJECTION, SOLUTION INTRAVENOUS at 05:58

## 2020-06-13 RX ADMIN — SODIUM CHLORIDE, POTASSIUM CHLORIDE, SODIUM LACTATE AND CALCIUM CHLORIDE: 600; 310; 30; 20 INJECTION, SOLUTION INTRAVENOUS at 14:05

## 2020-06-13 RX ADMIN — PRAMIPEXOLE DIHYDROCHLORIDE 0.5 MG: 0.5 TABLET ORAL at 18:33

## 2020-06-13 RX ADMIN — LEVOTHYROXINE SODIUM 50 MCG: 0.05 TABLET ORAL at 06:04

## 2020-06-13 RX ADMIN — PREGABALIN 150 MG: 150 CAPSULE ORAL at 10:17

## 2020-06-13 RX ADMIN — VANCOMYCIN HYDROCHLORIDE 1000 MG: 1 INJECTION, POWDER, LYOPHILIZED, FOR SOLUTION INTRAVENOUS at 02:00

## 2020-06-13 RX ADMIN — PANTOPRAZOLE SODIUM 40 MG: 40 TABLET, DELAYED RELEASE ORAL at 06:01

## 2020-06-13 RX ADMIN — MIRTAZAPINE 30 MG: 30 TABLET, FILM COATED ORAL at 20:54

## 2020-06-13 RX ADMIN — ARIPIPRAZOLE 10 MG: 10 TABLET ORAL at 10:28

## 2020-06-13 RX ADMIN — DOCUSATE SODIUM 50MG AND SENNOSIDES 8.6MG 1 TABLET: 8.6; 5 TABLET, FILM COATED ORAL at 10:18

## 2020-06-13 RX ADMIN — BUDESONIDE AND FORMOTEROL FUMARATE DIHYDRATE 1 PUFF: 160; 4.5 AEROSOL RESPIRATORY (INHALATION) at 07:38

## 2020-06-13 RX ADMIN — AMLODIPINE BESYLATE 5 MG: 5 TABLET ORAL at 10:18

## 2020-06-13 RX ADMIN — ENOXAPARIN SODIUM 30 MG: 30 INJECTION SUBCUTANEOUS at 20:54

## 2020-06-13 RX ADMIN — ACETAMINOPHEN 650 MG: 325 TABLET, FILM COATED ORAL at 18:33

## 2020-06-13 RX ADMIN — DOXAZOSIN 2 MG: 2 TABLET ORAL at 20:54

## 2020-06-13 RX ADMIN — ROSUVASTATIN CALCIUM 20 MG: 20 TABLET, FILM COATED ORAL at 10:28

## 2020-06-13 RX ADMIN — POTASSIUM CHLORIDE 20 MEQ: 20 TABLET, EXTENDED RELEASE ORAL at 12:38

## 2020-06-13 RX ADMIN — DOCUSATE SODIUM 50MG AND SENNOSIDES 8.6MG 1 TABLET: 8.6; 5 TABLET, FILM COATED ORAL at 20:54

## 2020-06-13 RX ADMIN — ACETAMINOPHEN 650 MG: 325 TABLET, FILM COATED ORAL at 06:01

## 2020-06-13 RX ADMIN — Medication 10 ML: at 10:19

## 2020-06-13 RX ADMIN — ACETAMINOPHEN 650 MG: 325 TABLET, FILM COATED ORAL at 12:37

## 2020-06-13 RX ADMIN — OXYCODONE 5 MG: 5 TABLET ORAL at 10:28

## 2020-06-13 RX ADMIN — LEVOTHYROXINE SODIUM 200 MCG: 0.1 TABLET ORAL at 05:59

## 2020-06-13 RX ADMIN — SPIRONOLACTONE 25 MG: 25 TABLET ORAL at 10:18

## 2020-06-13 RX ADMIN — METHOCARBAMOL 500 MG: 500 TABLET, FILM COATED ORAL at 10:17

## 2020-06-13 ASSESSMENT — PAIN SCALES - GENERAL
PAINLEVEL_OUTOF10: 0

## 2020-06-13 ASSESSMENT — PAIN DESCRIPTION - ORIENTATION
ORIENTATION: LEFT
ORIENTATION: LEFT

## 2020-06-13 ASSESSMENT — PAIN DESCRIPTION - LOCATION
LOCATION: SHOULDER
LOCATION: SHOULDER

## 2020-06-13 ASSESSMENT — PAIN SCALES - WONG BAKER
WONGBAKER_NUMERICALRESPONSE: 4
WONGBAKER_NUMERICALRESPONSE: 2

## 2020-06-13 ASSESSMENT — PAIN DESCRIPTION - PAIN TYPE: TYPE: SURGICAL PAIN

## 2020-06-13 NOTE — OP NOTE
fracture plate was placed on the lateral aspect of the humerus. We had bicortical screws distally, locking type unicortical screws  proximally, and cerclage wires proximally to control fracture position  and rotation. We bone-grafted some of the bony defects, where the  prosthesis had perforated the canal.  Once all screws were placed and  filled, C-arm was brought in, we confirmed stability of the prosthesis,  stability of the fracture with live rotation, the prosthesis articulated  nicely with again the chronically deformed, superiorly migrated  glenosphere. There does not appear to be any loosening or any fracture  of the scapula at this time. The patient had lavage irrigation, closed  the deep fascia with interrupted 2-0 skin staples, bulky compressive  dressing sling and the patient was taken to the recovery room.         Shahram Chester MD    D: 06/12/2020 16:38:29       T: 06/12/2020 22:22:45     [email or web address]  Job#: [id number]     Doc#: [id number]    CC:

## 2020-06-13 NOTE — PROGRESS NOTES
Orthopedic Surgery Progress Note  Olga Lidia Wagner  6/13/2020    Subjective:     Post-Operative Day # 1 Status Post left TSA- revision-  /ORIF proximal humerus fracture- periprosthetic    Systemic or Specific Complaints:No Complaints- but pt is more confused today    Objective:     BP (!) 150/63   Pulse 123   Temp 98.7 °F (37.1 °C) (Axillary) Comment (Src): kept taking thermometer out of mouth  Resp 20   Ht 5' 2\" (1.575 m)   Wt 150 lb (68 kg)   LMP  (LMP Unknown)   SpO2 90%   BMI 27.44 kg/m²     Intake/Output Summary (Last 24 hours) at 6/13/2020 1021  Last data filed at 6/13/2020 1488  Gross per 24 hour   Intake 1650 ml   Output 750 ml   Net 900 ml     DRAIN/TUBE OUTPUT:         General: alert, appears stated age and cooperative, but slow in responces and has hard time concentrating   Wound: Wound clean and dry no evidence of infection. Extremity: Sling on extremity.   Distal NVI   DVT Exam: No evidence of DVT seen on physical exam.     Data Review    Recent Labs     06/11/20  1515 06/12/20  0556 06/13/20  0735   WBC 9.0 11.6* 15.1*   RBC 4.29 4.60 4.17*   HGB 10.0* 10.5* 9.4*   HCT 30.6* 34.7* 30.3*   MCV 71.4* 75.4* 72.6*   MCH 23.2* 22.9* 22.4*   MCHC 32.5* 30.3* 30.9*   RDW 18.5* 18.7* 18.2*    248 253     Assessment:     Status Post left TSA- revison /ORIF proximal humerus fracture for preprosthetic fracture, doing well     Plan:      1:  Continues current post-op course   2:  Continue Pain Control  3:  Physical therapy as per TSA protocol    Gurpreet Iverson
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
## 2020-06-14 ENCOUNTER — APPOINTMENT (OUTPATIENT)
Dept: MRI IMAGING | Age: 60
DRG: 492 | End: 2020-06-14
Payer: MEDICARE

## 2020-06-14 PROBLEM — G93.40 ENCEPHALOPATHY ACUTE: Status: ACTIVE | Noted: 2017-10-29

## 2020-06-14 LAB
ANION GAP SERPL CALCULATED.3IONS-SCNC: 10 MEQ/L (ref 9–15)
ANISOCYTOSIS: ABNORMAL
BASOPHILS ABSOLUTE: 0.1 K/UL (ref 0–0.2)
BASOPHILS RELATIVE PERCENT: 0.9 %
BUN BLDV-MCNC: 14 MG/DL (ref 6–20)
CALCIUM SERPL-MCNC: 8.6 MG/DL (ref 8.5–9.9)
CHLORIDE BLD-SCNC: 103 MEQ/L (ref 95–107)
CO2: 28 MEQ/L (ref 20–31)
CREAT SERPL-MCNC: 0.7 MG/DL (ref 0.5–0.9)
EOSINOPHILS ABSOLUTE: 0.1 K/UL (ref 0–0.7)
EOSINOPHILS RELATIVE PERCENT: 0.9 %
GFR AFRICAN AMERICAN: >60
GFR NON-AFRICAN AMERICAN: >60
GLUCOSE BLD-MCNC: 103 MG/DL (ref 70–99)
GLUCOSE BLD-MCNC: 123 MG/DL (ref 60–115)
GLUCOSE BLD-MCNC: 123 MG/DL (ref 60–115)
HCT VFR BLD CALC: 29 % (ref 37–47)
HEMOGLOBIN: 8.8 G/DL (ref 12–16)
LYMPHOCYTES ABSOLUTE: 1.5 K/UL (ref 1–4.8)
LYMPHOCYTES RELATIVE PERCENT: 14.7 %
MAGNESIUM: 1.9 MG/DL (ref 1.7–2.4)
MCH RBC QN AUTO: 22.7 PG (ref 27–31.3)
MCHC RBC AUTO-ENTMCNC: 30.4 % (ref 33–37)
MCV RBC AUTO: 74.8 FL (ref 82–100)
MICROCYTES: ABNORMAL
MONOCYTES ABSOLUTE: 1.1 K/UL (ref 0.2–0.8)
MONOCYTES RELATIVE PERCENT: 10.6 %
NEUTROPHILS ABSOLUTE: 7.3 K/UL (ref 1.4–6.5)
NEUTROPHILS RELATIVE PERCENT: 72.9 %
PDW BLD-RTO: 18.5 % (ref 11.5–14.5)
PERFORMED ON: ABNORMAL
PERFORMED ON: ABNORMAL
PLATELET # BLD: 223 K/UL (ref 130–400)
POTASSIUM REFLEX MAGNESIUM: 3.5 MEQ/L (ref 3.4–4.9)
RBC # BLD: 3.88 M/UL (ref 4.2–5.4)
SLIDE REVIEW: ABNORMAL
SODIUM BLD-SCNC: 141 MEQ/L (ref 135–144)
URINE CULTURE, ROUTINE: NORMAL
WBC # BLD: 10 K/UL (ref 4.8–10.8)

## 2020-06-14 PROCEDURE — 36415 COLL VENOUS BLD VENIPUNCTURE: CPT

## 2020-06-14 PROCEDURE — 6370000000 HC RX 637 (ALT 250 FOR IP): Performed by: PHYSICIAN ASSISTANT

## 2020-06-14 PROCEDURE — 94664 DEMO&/EVAL PT USE INHALER: CPT

## 2020-06-14 PROCEDURE — 6360000002 HC RX W HCPCS: Performed by: PHYSICIAN ASSISTANT

## 2020-06-14 PROCEDURE — 97112 NEUROMUSCULAR REEDUCATION: CPT

## 2020-06-14 PROCEDURE — 92610 EVALUATE SWALLOWING FUNCTION: CPT

## 2020-06-14 PROCEDURE — 99222 1ST HOSP IP/OBS MODERATE 55: CPT | Performed by: PSYCHIATRY & NEUROLOGY

## 2020-06-14 PROCEDURE — 70551 MRI BRAIN STEM W/O DYE: CPT

## 2020-06-14 PROCEDURE — 94640 AIRWAY INHALATION TREATMENT: CPT

## 2020-06-14 PROCEDURE — 2580000003 HC RX 258: Performed by: PHYSICIAN ASSISTANT

## 2020-06-14 PROCEDURE — 2700000000 HC OXYGEN THERAPY PER DAY

## 2020-06-14 PROCEDURE — 99233 SBSQ HOSP IP/OBS HIGH 50: CPT | Performed by: SURGERY

## 2020-06-14 PROCEDURE — 6370000000 HC RX 637 (ALT 250 FOR IP): Performed by: SURGERY

## 2020-06-14 PROCEDURE — 80048 BASIC METABOLIC PNL TOTAL CA: CPT

## 2020-06-14 PROCEDURE — 85025 COMPLETE CBC W/AUTO DIFF WBC: CPT

## 2020-06-14 PROCEDURE — 94761 N-INVAS EAR/PLS OXIMETRY MLT: CPT

## 2020-06-14 PROCEDURE — C9113 INJ PANTOPRAZOLE SODIUM, VIA: HCPCS | Performed by: PHYSICIAN ASSISTANT

## 2020-06-14 PROCEDURE — 83735 ASSAY OF MAGNESIUM: CPT

## 2020-06-14 PROCEDURE — 97167 OT EVAL HIGH COMPLEX 60 MIN: CPT

## 2020-06-14 PROCEDURE — 1210000000 HC MED SURG R&B

## 2020-06-14 RX ORDER — LORAZEPAM 2 MG/ML
1 INJECTION INTRAMUSCULAR
Status: ACTIVE | OUTPATIENT
Start: 2020-06-14 | End: 2020-06-14

## 2020-06-14 RX ORDER — LABETALOL 20 MG/4 ML (5 MG/ML) INTRAVENOUS SYRINGE
20 EVERY 4 HOURS PRN
Status: DISCONTINUED | OUTPATIENT
Start: 2020-06-14 | End: 2020-06-15

## 2020-06-14 RX ORDER — BISACODYL 10 MG
10 SUPPOSITORY, RECTAL RECTAL DAILY PRN
Status: DISCONTINUED | OUTPATIENT
Start: 2020-06-14 | End: 2020-06-18 | Stop reason: HOSPADM

## 2020-06-14 RX ORDER — SODIUM CHLORIDE 9 MG/ML
10 INJECTION INTRAVENOUS DAILY
Status: DISCONTINUED | OUTPATIENT
Start: 2020-06-14 | End: 2020-06-15

## 2020-06-14 RX ORDER — IPRATROPIUM BROMIDE AND ALBUTEROL SULFATE 2.5; .5 MG/3ML; MG/3ML
1 SOLUTION RESPIRATORY (INHALATION) 3 TIMES DAILY
Status: DISCONTINUED | OUTPATIENT
Start: 2020-06-14 | End: 2020-06-18 | Stop reason: HOSPADM

## 2020-06-14 RX ORDER — IPRATROPIUM BROMIDE AND ALBUTEROL SULFATE 2.5; .5 MG/3ML; MG/3ML
1 SOLUTION RESPIRATORY (INHALATION)
Status: DISCONTINUED | OUTPATIENT
Start: 2020-06-14 | End: 2020-06-14

## 2020-06-14 RX ORDER — PANTOPRAZOLE SODIUM 40 MG/10ML
40 INJECTION, POWDER, LYOPHILIZED, FOR SOLUTION INTRAVENOUS DAILY
Status: DISCONTINUED | OUTPATIENT
Start: 2020-06-14 | End: 2020-06-15

## 2020-06-14 RX ORDER — MAGNESIUM SULFATE IN WATER 40 MG/ML
2 INJECTION, SOLUTION INTRAVENOUS ONCE
Status: COMPLETED | OUTPATIENT
Start: 2020-06-14 | End: 2020-06-14

## 2020-06-14 RX ORDER — IPRATROPIUM BROMIDE AND ALBUTEROL SULFATE 2.5; .5 MG/3ML; MG/3ML
1 SOLUTION RESPIRATORY (INHALATION) EVERY 4 HOURS PRN
Status: DISCONTINUED | OUTPATIENT
Start: 2020-06-14 | End: 2020-06-14

## 2020-06-14 RX ORDER — SODIUM CHLORIDE, SODIUM LACTATE, POTASSIUM CHLORIDE, CALCIUM CHLORIDE 600; 310; 30; 20 MG/100ML; MG/100ML; MG/100ML; MG/100ML
INJECTION, SOLUTION INTRAVENOUS CONTINUOUS
Status: DISCONTINUED | OUTPATIENT
Start: 2020-06-14 | End: 2020-06-15

## 2020-06-14 RX ADMIN — OXYCODONE 5 MG: 5 TABLET ORAL at 00:13

## 2020-06-14 RX ADMIN — ACETAMINOPHEN 650 MG: 325 TABLET, FILM COATED ORAL at 00:12

## 2020-06-14 RX ADMIN — IPRATROPIUM BROMIDE AND ALBUTEROL SULFATE 1 AMPULE: .5; 3 SOLUTION RESPIRATORY (INHALATION) at 12:52

## 2020-06-14 RX ADMIN — SODIUM CHLORIDE, POTASSIUM CHLORIDE, SODIUM LACTATE AND CALCIUM CHLORIDE: 600; 310; 30; 20 INJECTION, SOLUTION INTRAVENOUS at 01:08

## 2020-06-14 RX ADMIN — MAGNESIUM SULFATE HEPTAHYDRATE 2 G: 40 INJECTION, SOLUTION INTRAVENOUS at 14:06

## 2020-06-14 RX ADMIN — Medication 10 ML: at 14:06

## 2020-06-14 RX ADMIN — BUDESONIDE AND FORMOTEROL FUMARATE DIHYDRATE 1 PUFF: 160; 4.5 AEROSOL RESPIRATORY (INHALATION) at 07:43

## 2020-06-14 RX ADMIN — LEVOTHYROXINE SODIUM 50 MCG: 0.05 TABLET ORAL at 05:34

## 2020-06-14 RX ADMIN — PANTOPRAZOLE SODIUM 40 MG: 40 INJECTION, POWDER, FOR SOLUTION INTRAVENOUS at 14:06

## 2020-06-14 RX ADMIN — ENOXAPARIN SODIUM 30 MG: 30 INJECTION SUBCUTANEOUS at 22:25

## 2020-06-14 RX ADMIN — LEVOTHYROXINE SODIUM 200 MCG: 0.1 TABLET ORAL at 05:33

## 2020-06-14 RX ADMIN — ACETAMINOPHEN 650 MG: 325 TABLET, FILM COATED ORAL at 05:33

## 2020-06-14 RX ADMIN — PANTOPRAZOLE SODIUM 40 MG: 40 TABLET, DELAYED RELEASE ORAL at 05:33

## 2020-06-14 RX ADMIN — OXYCODONE 5 MG: 5 TABLET ORAL at 04:36

## 2020-06-14 RX ADMIN — ENOXAPARIN SODIUM 30 MG: 30 INJECTION SUBCUTANEOUS at 08:56

## 2020-06-14 ASSESSMENT — PAIN SCALES - GENERAL
PAINLEVEL_OUTOF10: 0
PAINLEVEL_OUTOF10: 7
PAINLEVEL_OUTOF10: 0
PAINLEVEL_OUTOF10: 4
PAINLEVEL_OUTOF10: 6
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
## 2020-06-14 NOTE — PROGRESS NOTES
Orthopedic Surgery Progress Note  Olga Lidia Wagner  6/14/2020    Subjective:     Post-Operative Day # 2 Status Post left ORIF proximal humerus fracture    Systemic or Specific Complaints:pt is very confused and with expressive aphagia    Objective:     BP (!) 155/80   Pulse 112   Temp 98.2 °F (36.8 °C) (Oral)   Resp 18   Ht 5' 2\" (1.575 m)   Wt 150 lb (68 kg)   LMP  (LMP Unknown)   SpO2 95%   BMI 27.44 kg/m²     Intake/Output Summary (Last 24 hours) at 6/14/2020 1426  Last data filed at 6/14/2020 3909  Gross per 24 hour   Intake 1751.66 ml   Output --   Net 1751.66 ml     DRAIN/TUBE OUTPUT:         General: confused/ unable to follow commands   Wound: Wound clean and dry no evidence of infection. Extremity: Sling on extremity. Pt is not moving left side much at this time   DVT Exam: No evidence of DVT seen on physical exam.     Data Review    Recent Labs     06/12/20  0556 06/13/20  0735 06/14/20  1030   WBC 11.6* 15.1* 10.0   RBC 4.60 4.17* 3.88*   HGB 10.5* 9.4* 8.8*   HCT 34.7* 30.3* 29.0*   MCV 75.4* 72.6* 74.8*   MCH 22.9* 22.4* 22.7*   MCHC 30.3* 30.9* 30.4*   RDW 18.7* 18.2* 18.5*    253 223     Assessment:     Status Post left ORIF proximal humerus fracture, pt has changes in mental status, she is not able to follow commands- she is not moving her left side- she has some expressive aphagia and swallowing difficulties- neuro on consult    Plan:      1:  Continues current post-op course- no new orders or interventions form ortho   2:  Continue Pain Control- minimize while pt is confused - avoid IV narcotic IV pain meds while neuro workup in progress.        Maximiliano Rubin CNP

## 2020-06-14 NOTE — PROGRESS NOTES
SURGERY Left 2003    OK RECONSTR TOTAL SHOULDER IMPLANT Left 6/15/2018    LEFT TOTAL SHOULDER ARTHROPLASTY, SANDY BIOMET TSA, SCALENE NERVE BLOCK, (LATEX ALLERGY) performed by Claudeen Rack, MD at [address] [place] Left 5/17/2019    LEFT REMOVAL GLENOID AND CONVERSION TO REVERSE TOTAL SHOULDER ARTHROPLASTY, SANDY REVERSE TSA, JO[employer]MENT FLEXIBLE OSTEOTOMES, SCALENE NERVE BLOCK, LATEX ALLERGY performed by Claudeen Rack, MD at [address] [place]      lumbar kyphoplasty    TONSILLECTOMY      UPPER GASTROINTESTINAL ENDOSCOPY  8/5/15    w/bx         Restrictions:FAll, NWB left UE, ROM elbow hand and wrist on left UE        Safety Devices: Safety Devices  Safety Devices in place: Yes  Type of devices: All fall risk precautions in place    Subjective:Pt cooperative during session. Pt able to be redirected. Pain Reassessment: 0/10 pain reported during session. Pain patches in place on left upper torso posteriorly          Prior Level of Function:  Social/Functional History  Lives With: Spouse  Ambulation Assistance: (uses w/c in community)  Active : No  Additional Comments: Unable to obtain PLOF or home set up via interview; some info obtain during chart review    OBJECTIVE:     Orientation Status:  Orientation  Overall Orientation Status: (Pt able to state name when asked. Pt is unable to provide or answer other questions appropriately)    Observation:  Observation/Palpation  Posture: Fair  Observation: Pt aphasic, erseverating when attempting to respond and communicate with incorrect answers. Left UE sling present    Cognition Status:  Cognition  Cognition Comment: Pt inconsistently following one step commands. Pt perseverating when speaking and exhibiting decreased problem solving.     Perception Status:  Perception  Overall Perceptual Status: (unable to formally assess)    Sensation Status:  Sensation  Overall Sensation Status: (Pt responds to stimuli in all four extremities)    Vision and Hearing Status:  Vision  Vision: Within Functional Limits  Vision Exceptions: (unable to formally assess; pt attends to B visual fields with verbal stim)  Hearing  Hearing: Within functional limits     ROM:   LUE AROM (degrees)  LUE General AROM: N/T  Left Hand PROM (degrees)  Left Hand PROM: WFL  RUE AROM (degrees)  RUE AROM : WFL  Right Hand AROM (degrees)  Right Hand AROM: WFL    Strength:  LUE Strength  L Hand General: NT  RUE Strength  Gross RUE Strength: WFL  R Hand General: 3+/5    Coordination, Tone, Quality of Movement: Tone RUE  RUE Tone: Normotonic  Tone LUE  LUE Tone: Not tested  Coordination  Movements Are Fluid And Coordinated: No  Coordination and Movement description: Right UE, Decreased speed    Hand Dominance:  Hand Dominance  Hand Dominance: (Pt unable to state)    ADL Status:  ADL  Feeding: Unable to assess(comment)(Per nursing patient was downgraded to NPO when unable to swallow at breakfast.)  Grooming: Minimal assistance  UE Bathing: Maximum assistance  LE Bathing: Dependent/Total  UE Dressing: Maximum assistance  LE Dressing: Dependent/Total  Toileting: Unable to assess(comment)          Therapy key for assistance levels -   Independent = Pt. is able to perform task with no assistance but may require a device   Stand by assistance = Pt. does not perform task at an independent level but does not need physical assistance, requires verbal cues  Minimal, Moderate, Maximal Assistance = Pt. requires physical assistance (25%, 50%, 75% assist from helper) for task but is able to actively participate in task   Dependent = Pt. requires total assistance with task and is not able to actively participate with task completion     Functional Mobility:     Transfers  Sit to stand:  Moderate assistance  Stand to sit: Moderate assistance    Bed Mobility  Bed mobility  Supine to Sit: Maximum assistance  Sit to Supine: Dependent/Total    Seated and Standing Balance:  Balance  Sitting Balance: Supervision  Standing Balance:  Moderate assistance    Functional Endurance:  Activity Tolerance  Activity Tolerance: Treatment limited secondary to decreased cognition    D/C Recommendations:  OT D/C RECOMMENDATIONS  REQUIRES OT FOLLOW UP: Yes    Equipment Recommendations:       OT Education:        OT Follow Up:  OT D/C RECOMMENDATIONS  REQUIRES OT FOLLOW UP: Yes       Assessment/Discharge Disposition:     Performance deficits / Impairments: Decreased functional mobility , Decreased safe awareness, Decreased balance, Decreased ADL status, Decreased cognition, Decreased endurance, Decreased high-level IADLs, Decreased strength, Decreased sensation, Decreased posture  Prognosis: Fair  Discharge Recommendations: Continue to assess pending progress  Decision Making: High Complexity  History: multiple  Exam: multiple  Assistance / Modification: max A/D    Six Click Score    How much help for putting on and taking off regular lower body clothing?: Total  How much help for Bathing?: A Lot  How much help for Toileting?: A Lot  How much help for putting on and taking off regular upper body clothing?: A Lot  How much help for taking care of personal grooming?: A Little  How much help for eating meals?: A Little  AM-[facility] Inpatient Daily Activity Raw Score: 13  AM-[facility] Inpatient ADL T-Scale Score : 32.03  ADL Inpatient CMS 0-100% Score: 63.03    Plan:  Plan  Times per week: 1-4x/wk  Current Treatment Recommendations: Strengthening, Endurance Training, Neuromuscular Re-education, Self-Care / ADL, Equipment Evaluation, Education, & procurement, Balance Training, Cognitive Reorientation, Cognitive/Perceptual Training, Functional Mobility Training, Safety Education & Training    Goals:   Patient will:    - Improve functional endurance to tolerate/complete 10-15 mins of ADL's  - Be Mod A in UB ADLs   - Be Mod A in LB ADLs  - Be Min A in ADL transfers without LOB  - Be Min A in toileting tasks  -

## 2020-06-14 NOTE — PROGRESS NOTES
10/29/2017    Diabetes mellitus, type II ([address] 10/29/2017    Hypoxic brain injury ([address] 10/29/2017    Peripheral vascular disease ([address] 10/29/2017    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    Pulmonary embolism with infarction ([address] 08/22/2017    Fracture of lumbar vertebra ([address] 07/27/2017    Adhesive capsulitis of left shoulder 07/07/2017    Primary osteoarthritis of left shoulder 07/07/2017    Migraine without status migrainosus, not intractable 05/30/2017    Seasonal allergic rhinitis due to pollen 05/30/2017    Edema 05/30/2017    Muscle spasm 05/30/2017    H/O: CVA (cerebrovascular accident) 03/13/2017    Trochanteric bursitis of left hip 12/22/2016    Chronic maxillary sinusitis 11/11/2016    Cellulitis of left lower extremity 11/01/2016    Cervical dystonia 06/03/2016    Hypothyroidism due to Hashimoto's thyroiditis 04/19/2016    Essential hypertension 03/07/2016    Late effects of CVA (cerebrovascular accident) 04/07/2015    Hearing difficulty 11/04/2013    History of HPV infection 08/20/2013    Hemiparesis affecting left side as late effect of cerebrovascular accident ([address] 02/19/2013    Dysphonia 02/05/2013    COPD (chronic obstructive pulmonary disease) ([address]     Smoker     Cerebral infarction ([address]     Arnold-Chiari malformation, type I ([address]     Headache     Hyperlipidemia with target LDL less than 100     Pulmonary embolism and infarction (HCC)     Laryngitis, chronic     Rhinitis, chronic     Depression     Acid reflux     H/O encephalopathy     Hepatitis B surface antigen positive     S/P colonoscopy with polypectomy     Recurrent UTI 08/15/2012    Chronic retention of urine 06/08/2012    Hypothyroidism 10/07/2011    Anxiety 10/07/2011    Nonallopathic lesion of sacral region 09/14/2011    Nonallopathic lesion of cervical region, not elsewhere classified 08/12/2011    Drug-seeking behavior 01/10/2011    Bone necrosis ([address] 10/29/2010    Chronic periodontitis,

## 2020-06-14 NOTE — PROGRESS NOTES
or chronic)  []   Severe or Chronic w/ Exacerbation  []     Surgical Status No []   Surgeries     General [x]   Surgery Lower []   Abdominal Thoracic or []   Upper Abdominal Thoracic with  PulmonaryDisorder  []     Chest X-ray Clear/Not  Ordered     []  Chronic Changes  Results Pending  []  Infiltrates, atelectasis, pleural effusion, or edema  [x]  Infiltrates in more than one lobe []  Infiltrate + Atelectasis, &/or pleural effusion  []    Respiratory Pattern Regular,  RR = 12-20 [x]  Increased,  RR = 21-25 []  AIKEN, irregular,  or RR = 26-30 []  Decreased FEV1  or RR = 31-35 []  Severe SOB, use  of accessory muscles, or RR ? 35  []    Mental Status Alert, oriented,  Cooperative [x]  Confused but Follows commands []  Lethargic or unable to follow commands []  Obtunded  []  Comatose  []    Breath Sounds Clear to  auscultation  []  Decreased unilaterally or  in bases only []  Decreased  bilaterally  [x]  Crackles or intermittent wheezes []  Wheezes []    Cough Strong, Spontan., & nonproductive [x]  Strong,  spontaneous, &  productive []  Weak,  Nonproductive []  Weak, productive or  with wheezes []  No spontaneous  cough or may require suctioning []    Level of Activity Ambulatory []  Ambulatory w/ Assist  []  Non-ambulatory [x]  Paraplegic []  Quadriplegic []    Total    Score:___9____     Triage Score:____4____      Tri       Triage:     1. (>20) Freq: Q3    2. (16-20) Freq: Q4   3. (11-15) Freq: QID & Albuterol Q2 PRN    4. (6-10) Freq: TID & Albuterol Q2 PRN    5. (0-5) Freq Q4prn -stable  -continue to monitor labs and hep gtt (PTT)  -f/u MR venogram  -follow up card and med consult  -OOB, IS  -DVT prophylaxis  -Diet: DASH/TLC  -Pain control
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
## 2020-06-15 ENCOUNTER — APPOINTMENT (OUTPATIENT)
Dept: GENERAL RADIOLOGY | Age: 60
DRG: 492 | End: 2020-06-15
Payer: MEDICARE

## 2020-06-15 LAB
ANION GAP SERPL CALCULATED.3IONS-SCNC: 12 MEQ/L (ref 9–15)
BASOPHILS ABSOLUTE: 0.1 K/UL (ref 0–0.2)
BASOPHILS RELATIVE PERCENT: 0.8 %
BUN BLDV-MCNC: 15 MG/DL (ref 6–20)
CALCIUM SERPL-MCNC: 8.2 MG/DL (ref 8.5–9.9)
CHLORIDE BLD-SCNC: 103 MEQ/L (ref 95–107)
CO2: 24 MEQ/L (ref 20–31)
CREAT SERPL-MCNC: 0.77 MG/DL (ref 0.5–0.9)
EOSINOPHILS ABSOLUTE: 0.2 K/UL (ref 0–0.7)
EOSINOPHILS RELATIVE PERCENT: 2 %
GFR AFRICAN AMERICAN: >60
GFR NON-AFRICAN AMERICAN: >60
GLUCOSE BLD-MCNC: 100 MG/DL (ref 60–115)
GLUCOSE BLD-MCNC: 108 MG/DL (ref 60–115)
GLUCOSE BLD-MCNC: 92 MG/DL (ref 70–99)
GLUCOSE BLD-MCNC: 96 MG/DL (ref 60–115)
GLUCOSE BLD-MCNC: 97 MG/DL (ref 60–115)
HCT VFR BLD CALC: 26.8 % (ref 37–47)
HEMOGLOBIN: 8.7 G/DL (ref 12–16)
LYMPHOCYTES ABSOLUTE: 1.2 K/UL (ref 1–4.8)
LYMPHOCYTES RELATIVE PERCENT: 13.7 %
MAGNESIUM: 2.2 MG/DL (ref 1.7–2.4)
MCH RBC QN AUTO: 23.3 PG (ref 27–31.3)
MCHC RBC AUTO-ENTMCNC: 32.5 % (ref 33–37)
MCV RBC AUTO: 71.6 FL (ref 82–100)
MONOCYTES ABSOLUTE: 1 K/UL (ref 0.2–0.8)
MONOCYTES RELATIVE PERCENT: 10.7 %
NEUTROPHILS ABSOLUTE: 6.6 K/UL (ref 1.4–6.5)
NEUTROPHILS RELATIVE PERCENT: 72.8 %
PDW BLD-RTO: 18.1 % (ref 11.5–14.5)
PERFORMED ON: NORMAL
PLATELET # BLD: 222 K/UL (ref 130–400)
POTASSIUM SERPL-SCNC: 3.4 MEQ/L (ref 3.4–4.9)
RBC # BLD: 3.74 M/UL (ref 4.2–5.4)
SODIUM BLD-SCNC: 139 MEQ/L (ref 135–144)
WBC # BLD: 9 K/UL (ref 4.8–10.8)

## 2020-06-15 PROCEDURE — 2580000003 HC RX 258: Performed by: PHYSICIAN ASSISTANT

## 2020-06-15 PROCEDURE — 6370000000 HC RX 637 (ALT 250 FOR IP): Performed by: SURGERY

## 2020-06-15 PROCEDURE — 1210000000 HC MED SURG R&B

## 2020-06-15 PROCEDURE — 74230 X-RAY XM SWLNG FUNCJ C+: CPT

## 2020-06-15 PROCEDURE — 6360000002 HC RX W HCPCS: Performed by: PHYSICIAN ASSISTANT

## 2020-06-15 PROCEDURE — 71045 X-RAY EXAM CHEST 1 VIEW: CPT

## 2020-06-15 PROCEDURE — 2580000003 HC RX 258: Performed by: SURGERY

## 2020-06-15 PROCEDURE — 83735 ASSAY OF MAGNESIUM: CPT

## 2020-06-15 PROCEDURE — 93010 ELECTROCARDIOGRAM REPORT: CPT | Performed by: INTERNAL MEDICINE

## 2020-06-15 PROCEDURE — 92611 MOTION FLUOROSCOPY/SWALLOW: CPT

## 2020-06-15 PROCEDURE — 94640 AIRWAY INHALATION TREATMENT: CPT

## 2020-06-15 PROCEDURE — 2700000000 HC OXYGEN THERAPY PER DAY

## 2020-06-15 PROCEDURE — 92523 SPEECH SOUND LANG COMPREHEN: CPT

## 2020-06-15 PROCEDURE — 94760 N-INVAS EAR/PLS OXIMETRY 1: CPT

## 2020-06-15 PROCEDURE — 85025 COMPLETE CBC W/AUTO DIFF WBC: CPT

## 2020-06-15 PROCEDURE — 6360000002 HC RX W HCPCS: Performed by: SURGERY

## 2020-06-15 PROCEDURE — 99233 SBSQ HOSP IP/OBS HIGH 50: CPT | Performed by: PHYSICIAN ASSISTANT

## 2020-06-15 PROCEDURE — 92526 ORAL FUNCTION THERAPY: CPT

## 2020-06-15 PROCEDURE — C9113 INJ PANTOPRAZOLE SODIUM, VIA: HCPCS | Performed by: PHYSICIAN ASSISTANT

## 2020-06-15 PROCEDURE — 6370000000 HC RX 637 (ALT 250 FOR IP): Performed by: PHYSICIAN ASSISTANT

## 2020-06-15 PROCEDURE — 2500000003 HC RX 250 WO HCPCS: Performed by: PHYSICIAN ASSISTANT

## 2020-06-15 PROCEDURE — 80048 BASIC METABOLIC PNL TOTAL CA: CPT

## 2020-06-15 PROCEDURE — 99233 SBSQ HOSP IP/OBS HIGH 50: CPT | Performed by: PSYCHIATRY & NEUROLOGY

## 2020-06-15 PROCEDURE — 36415 COLL VENOUS BLD VENIPUNCTURE: CPT

## 2020-06-15 RX ORDER — NICOTINE POLACRILEX 4 MG
15 LOZENGE BUCCAL PRN
Status: DISCONTINUED | OUTPATIENT
Start: 2020-06-15 | End: 2020-06-18 | Stop reason: HOSPADM

## 2020-06-15 RX ORDER — DEXTROSE MONOHYDRATE 50 MG/ML
100 INJECTION, SOLUTION INTRAVENOUS PRN
Status: DISCONTINUED | OUTPATIENT
Start: 2020-06-15 | End: 2020-06-18 | Stop reason: HOSPADM

## 2020-06-15 RX ORDER — DEXTROSE MONOHYDRATE 25 G/50ML
12.5 INJECTION, SOLUTION INTRAVENOUS PRN
Status: DISCONTINUED | OUTPATIENT
Start: 2020-06-15 | End: 2020-06-18 | Stop reason: HOSPADM

## 2020-06-15 RX ORDER — POLYETHYLENE GLYCOL 3350 17 G/17G
17 POWDER, FOR SOLUTION ORAL DAILY
Status: DISCONTINUED | OUTPATIENT
Start: 2020-06-15 | End: 2020-06-18 | Stop reason: HOSPADM

## 2020-06-15 RX ORDER — ACETAMINOPHEN 325 MG/1
650 TABLET ORAL EVERY 4 HOURS PRN
Status: DISCONTINUED | OUTPATIENT
Start: 2020-06-15 | End: 2020-06-18 | Stop reason: HOSPADM

## 2020-06-15 RX ORDER — KETOROLAC TROMETHAMINE 15 MG/ML
15 INJECTION, SOLUTION INTRAMUSCULAR; INTRAVENOUS EVERY 6 HOURS
Status: DISCONTINUED | OUTPATIENT
Start: 2020-06-15 | End: 2020-06-15

## 2020-06-15 RX ORDER — KETOROLAC TROMETHAMINE 15 MG/ML
15 INJECTION, SOLUTION INTRAMUSCULAR; INTRAVENOUS EVERY 8 HOURS
Status: DISCONTINUED | OUTPATIENT
Start: 2020-06-15 | End: 2020-06-18

## 2020-06-15 RX ADMIN — ONDANSETRON 4 MG: 2 INJECTION INTRAMUSCULAR; INTRAVENOUS at 14:01

## 2020-06-15 RX ADMIN — BARIUM SULFATE 50 G: 0.81 POWDER, FOR SUSPENSION ORAL at 13:27

## 2020-06-15 RX ADMIN — KETOROLAC TROMETHAMINE 15 MG: 15 INJECTION, SOLUTION INTRAMUSCULAR; INTRAVENOUS at 09:35

## 2020-06-15 RX ADMIN — Medication 10 ML: at 09:39

## 2020-06-15 RX ADMIN — ENOXAPARIN SODIUM 30 MG: 30 INJECTION SUBCUTANEOUS at 09:36

## 2020-06-15 RX ADMIN — BARIUM SULFATE 80 ML: 400 SUSPENSION ORAL at 13:27

## 2020-06-15 RX ADMIN — PRAMIPEXOLE DIHYDROCHLORIDE 0.5 MG: 0.5 TABLET ORAL at 17:46

## 2020-06-15 RX ADMIN — DOCUSATE SODIUM 50MG AND SENNOSIDES 8.6MG 1 TABLET: 8.6; 5 TABLET, FILM COATED ORAL at 21:39

## 2020-06-15 RX ADMIN — PANTOPRAZOLE SODIUM 40 MG: 40 INJECTION, POWDER, FOR SOLUTION INTRAVENOUS at 09:35

## 2020-06-15 RX ADMIN — ENOXAPARIN SODIUM 30 MG: 30 INJECTION SUBCUTANEOUS at 21:39

## 2020-06-15 RX ADMIN — SODIUM CHLORIDE, POTASSIUM CHLORIDE, SODIUM LACTATE AND CALCIUM CHLORIDE: 600; 310; 30; 20 INJECTION, SOLUTION INTRAVENOUS at 06:05

## 2020-06-15 RX ADMIN — MIRTAZAPINE 30 MG: 30 TABLET, FILM COATED ORAL at 21:39

## 2020-06-15 RX ADMIN — DOXAZOSIN 2 MG: 2 TABLET ORAL at 21:39

## 2020-06-15 RX ADMIN — IPRATROPIUM BROMIDE AND ALBUTEROL SULFATE 1 AMPULE: .5; 3 SOLUTION RESPIRATORY (INHALATION) at 07:34

## 2020-06-15 RX ADMIN — KETOROLAC TROMETHAMINE 15 MG: 15 INJECTION, SOLUTION INTRAMUSCULAR; INTRAVENOUS at 17:46

## 2020-06-15 RX ADMIN — Medication 10 ML: at 09:36

## 2020-06-15 ASSESSMENT — PAIN SCALES - GENERAL
PAINLEVEL_OUTOF10: 0
PAINLEVEL_OUTOF10: 3
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
## 2020-06-15 NOTE — PROGRESS NOTES
Chronic periodontitis, generalized 09/24/2010    Alveolitis of jaw 07/09/2010    Dental caries extending into pulp 06/25/2010    Pulmonary embolism ([facility] 06/21/2010    Retention of urine 01/22/2010    Neurogenic bladder 01/05/2010    Vitamin D deficiency 11/17/2009    Airway hyperreactivity 09/16/2009    Cervicalgia 09/11/2009    Post-laminectomy syndrome 08/24/2000     Past Medical History:   Diagnosis Date    Acid reflux     Allergic rhinitis     Anxiety     Arnold-Chiari deformity (HCC) 2000    surgery    Asthma     Cerebral artery occlusion with cerebral infarction ([facility] 2001    1 yr after brain OR    Cerebrovascular disease     Chronic back pain greater than 3 months duration     COPD (chronic obstructive pulmonary disease) (HCC)     Dr Kelley Pearce at [address] CVA (cerebral infarction) 2001    left hemiparesis, due to ? estrogen + smoking    Depression 1993    Dr Taj Gerber H/O encephalopathy 2001    Headache(784.0)     Dr Johnana Lora Hepatitis B surface antigen positive     Hx of blood clots 2010    PE / trx with coumadin x 6 months    Hyperlipidemia LDL goal < 100     meds > 10 yrs    Hypertension     meds > 2 yrs    Hypothyroidism 2001    meds > 18 yrs    Laryngitis, chronic 2012    scoped by Dr Reny Arias, fungal    Marijuana smoker in remission ([facility] 2011    Obesity (BMI 30-39. 9)     Osteoarthritis     Pulmonary embolism and infarction (HCC)     Restless legs syndrome     Rhinitis, chronic     Rotator cuff tear     Left    S/P colonoscopy with polypectomy 2010    Dr Donnie Mac    Seizures [facility])     Smoker     Spinal headache     past partum 1994    Spondylosis without myelopathy     Type 2 diabetes mellitus without complication (HCC)     hx > 6 yrs    Urinary incontinence     Vertebral compression fracture [facility])      Past Surgical History:   Procedure Laterality Date    BACK SURGERY  2001    lumbar kyphoplasty x 2    BRAIN SURGERY  2000    due to Arnold-Chiari deformity / [facility]     SECTION      COLONOSCOPY      CRANIOTOMY      Arnold-Chiary malformation    ENDOSCOPY, COLON, DIAGNOSTIC      FEMUR FRACTURE SURGERY Left     HUMERUS FRACTURE SURGERY Left 2020    ORIF PERIPROSTHETIC FX LEFT HUMERAL SHAFT, SYNTHES NOTIFIED, ROOM 283, LATEX ALLERGY performed by Rosa Isela Conner MD at [facility] Left 6/15/2018    LEFT TOTAL SHOULDER ARTHROPLASTY, SANDY BIOMET TSA, SCALENE NERVE BLOCK, (LATEX ALLERGY) [facility]    LEFT REMOVAL GLENOID AND CONVERSION TO REVERSE TOTAL SHOULDER ARTHROPLASTY, SANDY REVERSE TSA, JOSE DAVID EQUIPMENT FLEXIBLE OSTEOTOMES, SCALENE NERVE BLOCK, LATEX ALLERGY performed by Rosa Isela Conner MD at [address] [place]      lumbar kyphoplasty    TONSILLECTOMY      UPPER GASTROINTESTINAL ENDOSCOPY  8/5/15    w/bx          DATE ONSET: 2020    Date of Evaluation: 6/15/2020   Evaluating Therapist: Marco Gregory, SLP      Assessment:      Diagnosis: Patient presents with a severe receptive and expressive aphasia characterized by impaired ability to follow 1-step directions, answer yes/no questions, answer basic questions, identify common objects, name objects, perform automatics, and answer orientation questions. Patient demonstrating perseverations of pointing, answering \"yes\" and sticking out tongue throughout evaluation. Recommendations:  Requires SLP Intervention: Yes  Duration/Frequency of Treatment: 2-4x's for LOS or until goals met        Goals:  Short-term Goals  Timeframe for Short-term Goals: 1-2 weeks  Goal 1: Pt will complete confrontational naming tasks with 80% accuracy with mild verbal cues to help the patient express her basic wants and needs.    Goal 2: Pt will be educated on compensatory strategies for word finding deficits in 5/5 given opportunities to help the pt express her \"yes\" to all presented questions.)  Basic Questions: Severe(Patient unable to state name, location of pain, severity of pain, location, or answer general knowledge questions.)  One Step Basic Commands: Moderate(0% accuracy independently increasing to 20% accuracy with visual model)  Pictures: Severe(1/5 (I)- Patient repeatedly pointing to same object throughout)  Effective Techniques: Visual/Gestural cues    Expression  Primary Mode of Expression: Verbal    Verbal Expression  Verbal Expression: Exceptions to functional limits  Repetition: (Patient unable to follow instructions to complete this portion of assessment.)  Automatic Speech: (Patient unable to follow instructions to complete this portion of assessment.)  Interfering Components: Perseverations    Motor Speech  Motor Speech: Within Functional Limits    Pragmatics/Social Functioning  Pragmatics: Exceptions to [facility]  Affect: Mild(Patient presents with flat affect)    Cognition:      Orientation  Overall Orientation Status: Impaired  Orientation Level: Disoriented to place; Disoriented to time;Disoriented to situation(Patient oriented to person. )  Attention  Attention: Within Functional Limits  Memory  Memory: Unable to assess  Problem Solving  Problem Solving: Unable to assess  Abstract Reasoning  Abstract Reasoning: Unable to assess  Safety/Judgement  Safety/Judgement: Unable to assess    Prognosis:  Speech Therapy Prognosis  Prognosis: Good  Prognosis Considerations: Severity of Impairments  Individuals consulted  Consulted and agree with results and recommendations: Patient;RN(RN [facility] aware)    Education:  Patient Education Response: No evidence of learning  Safety Devices in place: Yes  Type of devices:  All fall risk precautions in place    Pain:  Pain Assessment  Patient Currently in Pain: Yes(Patient stating that she is in pain but unable to state location or severity secondary to aphasia. )  Pain Assessment: Faces  Espino-Baker Pain Rating: Hurts little more  Pain Level: 0  Pain Type: Surgical pain  Pain Location: Shoulder  Pain Orientation: Left  Pain Descriptors: Aching, Throbbing  Patient's Stated Pain Goal: No pain  Response to Pain Intervention: Asleep with RR greater than 10, Patient Satisfied      Therapy Time  SLP Individual Minutes  Time In: 0945  Time Out: [facility]  Minutes: 20                 Signature: Electronically signed by YOANDY Marcos on 6/15/2020 at 10:22 AM
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
## 2020-06-16 ENCOUNTER — APPOINTMENT (OUTPATIENT)
Dept: GENERAL RADIOLOGY | Age: 60
DRG: 492 | End: 2020-06-16
Payer: MEDICARE

## 2020-06-16 LAB
AMMONIA: 26 UMOL/L (ref 11–51)
BASOPHILS ABSOLUTE: 0.1 K/UL (ref 0–0.2)
BASOPHILS RELATIVE PERCENT: 0.8 %
EOSINOPHILS ABSOLUTE: 0.3 K/UL (ref 0–0.7)
EOSINOPHILS RELATIVE PERCENT: 2.9 %
FOLATE: >20 NG/ML (ref 7.3–26.1)
GLUCOSE BLD-MCNC: 117 MG/DL (ref 60–115)
GLUCOSE BLD-MCNC: 142 MG/DL (ref 60–115)
GLUCOSE BLD-MCNC: 171 MG/DL (ref 60–115)
HCT VFR BLD CALC: 26.6 % (ref 37–47)
HEMOGLOBIN: 8.5 G/DL (ref 12–16)
LYMPHOCYTES ABSOLUTE: 1.2 K/UL (ref 1–4.8)
LYMPHOCYTES RELATIVE PERCENT: 13.4 %
MCH RBC QN AUTO: 22.9 PG (ref 27–31.3)
MCHC RBC AUTO-ENTMCNC: 31.9 % (ref 33–37)
MCV RBC AUTO: 72 FL (ref 82–100)
MONOCYTES ABSOLUTE: 0.8 K/UL (ref 0.2–0.8)
MONOCYTES RELATIVE PERCENT: 9 %
NEUTROPHILS ABSOLUTE: 6.5 K/UL (ref 1.4–6.5)
NEUTROPHILS RELATIVE PERCENT: 73.9 %
PDW BLD-RTO: 18.5 % (ref 11.5–14.5)
PERFORMED ON: ABNORMAL
PLATELET # BLD: 237 K/UL (ref 130–400)
RBC # BLD: 3.7 M/UL (ref 4.2–5.4)
T4 FREE: 1.6 NG/DL (ref 0.84–1.68)
TSH REFLEX: 0.06 UIU/ML (ref 0.44–3.86)
VITAMIN B-12: >2000 PG/ML (ref 232–1245)
WBC # BLD: 8.8 K/UL (ref 4.8–10.8)

## 2020-06-16 PROCEDURE — 1210000000 HC MED SURG R&B

## 2020-06-16 PROCEDURE — 82607 VITAMIN B-12: CPT

## 2020-06-16 PROCEDURE — 97535 SELF CARE MNGMENT TRAINING: CPT

## 2020-06-16 PROCEDURE — 85025 COMPLETE CBC W/AUTO DIFF WBC: CPT

## 2020-06-16 PROCEDURE — 82140 ASSAY OF AMMONIA: CPT

## 2020-06-16 PROCEDURE — 99232 SBSQ HOSP IP/OBS MODERATE 35: CPT | Performed by: PSYCHIATRY & NEUROLOGY

## 2020-06-16 PROCEDURE — 94640 AIRWAY INHALATION TREATMENT: CPT

## 2020-06-16 PROCEDURE — 2700000000 HC OXYGEN THERAPY PER DAY

## 2020-06-16 PROCEDURE — 6370000000 HC RX 637 (ALT 250 FOR IP): Performed by: SURGERY

## 2020-06-16 PROCEDURE — APPSS30 APP SPLIT SHARED TIME 16-30 MINUTES: Performed by: NURSE PRACTITIONER

## 2020-06-16 PROCEDURE — 84443 ASSAY THYROID STIM HORMONE: CPT

## 2020-06-16 PROCEDURE — 6360000002 HC RX W HCPCS: Performed by: PHYSICIAN ASSISTANT

## 2020-06-16 PROCEDURE — 6370000000 HC RX 637 (ALT 250 FOR IP): Performed by: PHYSICIAN ASSISTANT

## 2020-06-16 PROCEDURE — 84439 ASSAY OF FREE THYROXINE: CPT

## 2020-06-16 PROCEDURE — 94667 MNPJ CHEST WALL 1ST: CPT

## 2020-06-16 PROCEDURE — 2580000003 HC RX 258: Performed by: SURGERY

## 2020-06-16 PROCEDURE — 92523 SPEECH SOUND LANG COMPREHEN: CPT

## 2020-06-16 PROCEDURE — 71045 X-RAY EXAM CHEST 1 VIEW: CPT

## 2020-06-16 PROCEDURE — 82746 ASSAY OF FOLIC ACID SERUM: CPT

## 2020-06-16 PROCEDURE — 92610 EVALUATE SWALLOWING FUNCTION: CPT

## 2020-06-16 PROCEDURE — 99233 SBSQ HOSP IP/OBS HIGH 50: CPT | Performed by: PHYSICIAN ASSISTANT

## 2020-06-16 PROCEDURE — 36415 COLL VENOUS BLD VENIPUNCTURE: CPT

## 2020-06-16 PROCEDURE — 94761 N-INVAS EAR/PLS OXIMETRY MLT: CPT

## 2020-06-16 RX ORDER — ACETYLCYSTEINE 200 MG/ML
600 SOLUTION ORAL; RESPIRATORY (INHALATION) 2 TIMES DAILY
Status: DISCONTINUED | OUTPATIENT
Start: 2020-06-16 | End: 2020-06-18 | Stop reason: HOSPADM

## 2020-06-16 RX ORDER — CARISOPRODOL 350 MG/1
350 TABLET ORAL DAILY PRN
Qty: 12 TABLET | Refills: 5 | OUTPATIENT
Start: 2020-06-16 | End: 2020-12-13

## 2020-06-16 RX ADMIN — DOCUSATE SODIUM 50MG AND SENNOSIDES 8.6MG 1 TABLET: 8.6; 5 TABLET, FILM COATED ORAL at 20:14

## 2020-06-16 RX ADMIN — INSULIN LISPRO 1 UNITS: 100 INJECTION, SOLUTION INTRAVENOUS; SUBCUTANEOUS at 09:30

## 2020-06-16 RX ADMIN — POLYETHYLENE GLYCOL 3350 17 G: 17 POWDER, FOR SOLUTION ORAL at 08:25

## 2020-06-16 RX ADMIN — SPIRONOLACTONE 25 MG: 25 TABLET ORAL at 08:24

## 2020-06-16 RX ADMIN — ARIPIPRAZOLE 10 MG: 10 TABLET ORAL at 08:24

## 2020-06-16 RX ADMIN — AMLODIPINE BESYLATE 5 MG: 5 TABLET ORAL at 08:24

## 2020-06-16 RX ADMIN — IPRATROPIUM BROMIDE AND ALBUTEROL SULFATE 1 AMPULE: .5; 3 SOLUTION RESPIRATORY (INHALATION) at 18:03

## 2020-06-16 RX ADMIN — FUROSEMIDE 40 MG: 40 TABLET ORAL at 08:25

## 2020-06-16 RX ADMIN — ENOXAPARIN SODIUM 30 MG: 30 INJECTION SUBCUTANEOUS at 08:23

## 2020-06-16 RX ADMIN — ACETYLCYSTEINE 600 MG: 200 SOLUTION ORAL; RESPIRATORY (INHALATION) at 12:11

## 2020-06-16 RX ADMIN — PRAMIPEXOLE DIHYDROCHLORIDE 0.5 MG: 0.5 TABLET ORAL at 18:11

## 2020-06-16 RX ADMIN — PREGABALIN 150 MG: 150 CAPSULE ORAL at 08:25

## 2020-06-16 RX ADMIN — LEVOTHYROXINE SODIUM 200 MCG: 0.1 TABLET ORAL at 06:44

## 2020-06-16 RX ADMIN — ROSUVASTATIN CALCIUM 20 MG: 20 TABLET, FILM COATED ORAL at 08:24

## 2020-06-16 RX ADMIN — POTASSIUM CHLORIDE 20 MEQ: 20 TABLET, EXTENDED RELEASE ORAL at 08:24

## 2020-06-16 RX ADMIN — ESCITALOPRAM OXALATE 20 MG: 20 TABLET ORAL at 08:25

## 2020-06-16 RX ADMIN — PANTOPRAZOLE SODIUM 40 MG: 40 TABLET, DELAYED RELEASE ORAL at 06:44

## 2020-06-16 RX ADMIN — ENOXAPARIN SODIUM 30 MG: 30 INJECTION SUBCUTANEOUS at 20:13

## 2020-06-16 RX ADMIN — IPRATROPIUM BROMIDE AND ALBUTEROL SULFATE 1 AMPULE: .5; 3 SOLUTION RESPIRATORY (INHALATION) at 12:10

## 2020-06-16 RX ADMIN — Medication 10 ML: at 20:14

## 2020-06-16 RX ADMIN — KETOROLAC TROMETHAMINE 15 MG: 15 INJECTION, SOLUTION INTRAMUSCULAR; INTRAVENOUS at 09:29

## 2020-06-16 RX ADMIN — MIRTAZAPINE 30 MG: 30 TABLET, FILM COATED ORAL at 20:14

## 2020-06-16 RX ADMIN — Medication 10 ML: at 08:26

## 2020-06-16 RX ADMIN — DOXAZOSIN 2 MG: 2 TABLET ORAL at 20:14

## 2020-06-16 RX ADMIN — DOCUSATE SODIUM 50MG AND SENNOSIDES 8.6MG 1 TABLET: 8.6; 5 TABLET, FILM COATED ORAL at 08:25

## 2020-06-16 RX ADMIN — KETOROLAC TROMETHAMINE 15 MG: 15 INJECTION, SOLUTION INTRAMUSCULAR; INTRAVENOUS at 01:49

## 2020-06-16 RX ADMIN — LEVOTHYROXINE SODIUM 50 MCG: 0.05 TABLET ORAL at 06:45

## 2020-06-16 RX ADMIN — ACETAMINOPHEN 650 MG: 325 TABLET, FILM COATED ORAL at 20:14

## 2020-06-16 RX ADMIN — KETOROLAC TROMETHAMINE 15 MG: 15 INJECTION, SOLUTION INTRAMUSCULAR; INTRAVENOUS at 18:12

## 2020-06-16 RX ADMIN — IPRATROPIUM BROMIDE AND ALBUTEROL SULFATE 1 AMPULE: .5; 3 SOLUTION RESPIRATORY (INHALATION) at 07:01

## 2020-06-16 ASSESSMENT — ENCOUNTER SYMPTOMS
CHEST TIGHTNESS: 0
WHEEZING: 0
NAUSEA: 0
COUGH: 0
TROUBLE SWALLOWING: 1
VOMITING: 0
COLOR CHANGE: 0
SHORTNESS OF BREATH: 0

## 2020-06-16 ASSESSMENT — PAIN SCALES - GENERAL
PAINLEVEL_OUTOF10: 0
PAINLEVEL_OUTOF10: 9
PAINLEVEL_OUTOF10: 5
PAINLEVEL_OUTOF10: 9
PAINLEVEL_OUTOF10: 9
PAINLEVEL_OUTOF10: 8

## 2020-06-16 ASSESSMENT — PAIN DESCRIPTION - LOCATION: LOCATION: SHOULDER

## 2020-06-16 ASSESSMENT — PAIN DESCRIPTION - ORIENTATION: ORIENTATION: LEFT

## 2020-06-16 ASSESSMENT — PAIN DESCRIPTION - PAIN TYPE: TYPE: SURGICAL PAIN

## 2020-06-16 ASSESSMENT — PAIN DESCRIPTION - DESCRIPTORS: DESCRIPTORS: ACHING
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
## 2020-06-16 NOTE — FLOWSHEET NOTE
Shift assessment completed. Patient is alert to self, with delayed responses. VSS. Lungs sounds are diminished and patient has an occasional moist cough. Bowel sounds are present x4. LUE is painful for patient, medicated per mar. LUE has full sensation and patient is able to wiggle fingers. BLE are weak. Patient is incontinet and her coccyx is red. Patient repositioned. Bed alarm engaged, call light in reach. Will continue to monitor.

## 2020-06-16 NOTE — CARE COORDINATION
[facility] is working on [facility] for the pt to transfer to them. No precert approval as of the time of this note.

## 2020-06-17 ENCOUNTER — APPOINTMENT (OUTPATIENT)
Dept: GENERAL RADIOLOGY | Age: 60
DRG: 492 | End: 2020-06-17
Payer: MEDICARE

## 2020-06-17 LAB
GLUCOSE BLD-MCNC: 172 MG/DL (ref 60–115)
PERFORMED ON: ABNORMAL

## 2020-06-17 PROCEDURE — 99233 SBSQ HOSP IP/OBS HIGH 50: CPT | Performed by: PSYCHIATRY & NEUROLOGY

## 2020-06-17 PROCEDURE — 99233 SBSQ HOSP IP/OBS HIGH 50: CPT | Performed by: PHYSICIAN ASSISTANT

## 2020-06-17 PROCEDURE — 6370000000 HC RX 637 (ALT 250 FOR IP): Performed by: INTERNAL MEDICINE

## 2020-06-17 PROCEDURE — 6370000000 HC RX 637 (ALT 250 FOR IP): Performed by: PHYSICIAN ASSISTANT

## 2020-06-17 PROCEDURE — 94664 DEMO&/EVAL PT USE INHALER: CPT

## 2020-06-17 PROCEDURE — 71045 X-RAY EXAM CHEST 1 VIEW: CPT

## 2020-06-17 PROCEDURE — 97116 GAIT TRAINING THERAPY: CPT

## 2020-06-17 PROCEDURE — 6370000000 HC RX 637 (ALT 250 FOR IP): Performed by: SURGERY

## 2020-06-17 PROCEDURE — 2580000003 HC RX 258: Performed by: SURGERY

## 2020-06-17 PROCEDURE — 94640 AIRWAY INHALATION TREATMENT: CPT

## 2020-06-17 PROCEDURE — 6360000002 HC RX W HCPCS: Performed by: PHYSICIAN ASSISTANT

## 2020-06-17 PROCEDURE — 94761 N-INVAS EAR/PLS OXIMETRY MLT: CPT

## 2020-06-17 PROCEDURE — 97535 SELF CARE MNGMENT TRAINING: CPT

## 2020-06-17 PROCEDURE — 1210000000 HC MED SURG R&B

## 2020-06-17 PROCEDURE — APPSS15 APP SPLIT SHARED TIME 0-15 MINUTES: Performed by: NURSE PRACTITIONER

## 2020-06-17 RX ORDER — METHOCARBAMOL 500 MG/1
500 TABLET, FILM COATED ORAL 4 TIMES DAILY
Status: DISCONTINUED | OUTPATIENT
Start: 2020-06-17 | End: 2020-06-18 | Stop reason: HOSPADM

## 2020-06-17 RX ORDER — FUROSEMIDE 10 MG/ML
40 INJECTION INTRAMUSCULAR; INTRAVENOUS ONCE
Status: COMPLETED | OUTPATIENT
Start: 2020-06-17 | End: 2020-06-17

## 2020-06-17 RX ORDER — GUAIFENESIN 600 MG/1
600 TABLET, EXTENDED RELEASE ORAL 2 TIMES DAILY
Status: ON HOLD | COMMUNITY
End: 2020-07-09 | Stop reason: HOSPADM

## 2020-06-17 RX ORDER — GUAIFENESIN 600 MG/1
600 TABLET, EXTENDED RELEASE ORAL 2 TIMES DAILY
Status: DISCONTINUED | OUTPATIENT
Start: 2020-06-17 | End: 2020-06-18 | Stop reason: HOSPADM

## 2020-06-17 RX ADMIN — AMLODIPINE BESYLATE 5 MG: 5 TABLET ORAL at 10:29

## 2020-06-17 RX ADMIN — POLYETHYLENE GLYCOL 3350 17 G: 17 POWDER, FOR SOLUTION ORAL at 10:30

## 2020-06-17 RX ADMIN — DOCUSATE SODIUM 50MG AND SENNOSIDES 8.6MG 1 TABLET: 8.6; 5 TABLET, FILM COATED ORAL at 10:29

## 2020-06-17 RX ADMIN — ACETAMINOPHEN 650 MG: 325 TABLET, FILM COATED ORAL at 23:27

## 2020-06-17 RX ADMIN — ARIPIPRAZOLE 10 MG: 10 TABLET ORAL at 10:41

## 2020-06-17 RX ADMIN — GUAIFENESIN 600 MG: 600 TABLET, EXTENDED RELEASE ORAL at 05:28

## 2020-06-17 RX ADMIN — ENOXAPARIN SODIUM 30 MG: 30 INJECTION SUBCUTANEOUS at 23:27

## 2020-06-17 RX ADMIN — PANTOPRAZOLE SODIUM 40 MG: 40 TABLET, DELAYED RELEASE ORAL at 05:28

## 2020-06-17 RX ADMIN — Medication 10 ML: at 10:31

## 2020-06-17 RX ADMIN — GUAIFENESIN 600 MG: 600 TABLET, EXTENDED RELEASE ORAL at 23:27

## 2020-06-17 RX ADMIN — FUROSEMIDE 40 MG: 40 TABLET ORAL at 10:29

## 2020-06-17 RX ADMIN — PREGABALIN 150 MG: 150 CAPSULE ORAL at 10:29

## 2020-06-17 RX ADMIN — ENOXAPARIN SODIUM 30 MG: 30 INJECTION SUBCUTANEOUS at 10:27

## 2020-06-17 RX ADMIN — ACETAMINOPHEN 650 MG: 325 TABLET, FILM COATED ORAL at 01:46

## 2020-06-17 RX ADMIN — DOCUSATE SODIUM 50MG AND SENNOSIDES 8.6MG 1 TABLET: 8.6; 5 TABLET, FILM COATED ORAL at 23:27

## 2020-06-17 RX ADMIN — ESCITALOPRAM OXALATE 20 MG: 20 TABLET ORAL at 10:29

## 2020-06-17 RX ADMIN — METHOCARBAMOL 500 MG: 500 TABLET, FILM COATED ORAL at 13:00

## 2020-06-17 RX ADMIN — Medication 10 ML: at 23:28

## 2020-06-17 RX ADMIN — ACETAMINOPHEN 650 MG: 325 TABLET, FILM COATED ORAL at 06:43

## 2020-06-17 RX ADMIN — ACETAMINOPHEN 650 MG: 325 TABLET, FILM COATED ORAL at 10:28

## 2020-06-17 RX ADMIN — LEVOTHYROXINE SODIUM 50 MCG: 0.05 TABLET ORAL at 05:29

## 2020-06-17 RX ADMIN — METHOCARBAMOL 500 MG: 500 TABLET, FILM COATED ORAL at 23:27

## 2020-06-17 RX ADMIN — POTASSIUM CHLORIDE 20 MEQ: 20 TABLET, EXTENDED RELEASE ORAL at 10:27

## 2020-06-17 RX ADMIN — IPRATROPIUM BROMIDE AND ALBUTEROL SULFATE 1 AMPULE: .5; 3 SOLUTION RESPIRATORY (INHALATION) at 12:01

## 2020-06-17 RX ADMIN — IPRATROPIUM BROMIDE AND ALBUTEROL SULFATE 1 AMPULE: .5; 3 SOLUTION RESPIRATORY (INHALATION) at 08:18

## 2020-06-17 RX ADMIN — SPIRONOLACTONE 25 MG: 25 TABLET ORAL at 10:28

## 2020-06-17 RX ADMIN — ROSUVASTATIN CALCIUM 20 MG: 20 TABLET, FILM COATED ORAL at 10:41

## 2020-06-17 RX ADMIN — METHOCARBAMOL 500 MG: 500 TABLET, FILM COATED ORAL at 17:46

## 2020-06-17 RX ADMIN — LEVOTHYROXINE SODIUM 200 MCG: 0.1 TABLET ORAL at 05:28

## 2020-06-17 RX ADMIN — IPRATROPIUM BROMIDE AND ALBUTEROL SULFATE 1 AMPULE: .5; 3 SOLUTION RESPIRATORY (INHALATION) at 18:54

## 2020-06-17 RX ADMIN — DOXAZOSIN 2 MG: 2 TABLET ORAL at 23:28

## 2020-06-17 RX ADMIN — MIRTAZAPINE 30 MG: 30 TABLET, FILM COATED ORAL at 23:27

## 2020-06-17 RX ADMIN — PRAMIPEXOLE DIHYDROCHLORIDE 0.5 MG: 0.5 TABLET ORAL at 17:46

## 2020-06-17 RX ADMIN — METHOCARBAMOL 500 MG: 500 TABLET, FILM COATED ORAL at 11:22

## 2020-06-17 RX ADMIN — MELATONIN 9 MG: at 23:27

## 2020-06-17 RX ADMIN — FUROSEMIDE 40 MG: 10 INJECTION, SOLUTION INTRAMUSCULAR; INTRAVENOUS at 11:22

## 2020-06-17 ASSESSMENT — PAIN DESCRIPTION - PAIN TYPE: TYPE: SURGICAL PAIN

## 2020-06-17 ASSESSMENT — PAIN DESCRIPTION - LOCATION: LOCATION: SHOULDER

## 2020-06-17 ASSESSMENT — PAIN SCALES - GENERAL
PAINLEVEL_OUTOF10: 0
PAINLEVEL_OUTOF10: 9
PAINLEVEL_OUTOF10: 8
PAINLEVEL_OUTOF10: 9
PAINLEVEL_OUTOF10: 0
PAINLEVEL_OUTOF10: 9

## 2020-06-17 ASSESSMENT — ENCOUNTER SYMPTOMS
CHEST TIGHTNESS: 0
CONSTIPATION: 0
SHORTNESS OF BREATH: 0
VOMITING: 0
WHEEZING: 0
ABDOMINAL DISTENTION: 0
NAUSEA: 0
ABDOMINAL PAIN: 0
COLOR CHANGE: 0
DIARRHEA: 0
TROUBLE SWALLOWING: 1
COUGH: 0

## 2020-06-17 ASSESSMENT — PAIN DESCRIPTION - ORIENTATION: ORIENTATION: LEFT

## 2020-06-17 NOTE — PLAN OF CARE
Nutrition Problem: Inadequate oral intake  Intervention: Food and/or Nutrient Delivery: Modify current diet, Continue current ONS(Carb Control 3)  Nutritional Goals: Intake >75% of meals/supplement. Stable weight.
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
## 2020-06-17 NOTE — PROGRESS NOTES
Disorder  (acute or chronic)  []   Severe or Chronic w/ Exacerbation  []     Surgical Status No []   Surgeries     General [x]   Surgery Lower []   Abdominal Thoracic or []   Upper Abdominal Thoracic with  PulmonaryDisorder  []     Chest X-ray Clear/Not  Ordered     []  Chronic Changes  Results Pending  []  Infiltrates, atelectasis, pleural effusion, or edema  [x]  Infiltrates in more than one lobe []  Infiltrate + Atelectasis, &/or pleural effusion  []    Respiratory Pattern Regular,  RR = 12-20 [x]  Increased,  RR = 21-25 []  AIKEN, irregular,  or RR = 26-30 []  Decreased FEV1  or RR = 31-35 []  Severe SOB, use  of accessory muscles, or RR ? 35  []    Mental Status Alert, oriented,  Cooperative [x]  Confused but Follows commands []  Lethargic or unable to follow commands []  Obtunded  []  Comatose  []    Breath Sounds Clear to  auscultation  []  Decreased unilaterally or  in bases only []  Decreased  bilaterally  [x]  Crackles or intermittent wheezes []  Wheezes []    Cough Strong, Spontan., & nonproductive [x]  Strong,  spontaneous, &  productive []  Weak,  Nonproductive []  Weak, productive or  with wheezes []  No spontaneous  cough or may require suctioning []    Level of Activity Ambulatory []  Ambulatory w/ Assist  []  Non-ambulatory [x]  Paraplegic []  Quadriplegic []    Total    Score:____9___     Triage Score:___4_____      Tri       Triage:     1. (>20) Freq: Q3    2. (16-20) Freq: Q4   3. (11-15) Freq: QID & Albuterol Q2 PRN    4. (6-10) Freq: TID & Albuterol Q2 PRN    5. (0-5) Freq Q4prn

## 2020-06-17 NOTE — CARE COORDINATION
[facility] cannot take the pt. LSW called [employer] and they would not read the report for LSW but gave a website where the report can be found. [email or web address]. The reference number for the report is [id number]. MIRACLEW left this information with Eileen with Brenna Bernard. Garret Calvo attempted to access the report and it is not uploaded into the system yet. LSW called [facility] again and asked if they could read the report. They say that it is not accessible to them either. MIRACLEW asked to speak with the officer working on this but was told that I could not because there is a process with reports that must be followed. LSW spoke with the pt and asked if she woul be willing to go to [facility]. She was reluctant due to the distance but agreed to [facility] if accepted. Referral called.
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
## 2020-06-18 ENCOUNTER — APPOINTMENT (OUTPATIENT)
Dept: GENERAL RADIOLOGY | Age: 60
DRG: 492 | End: 2020-06-18
Payer: MEDICARE

## 2020-06-18 ENCOUNTER — HOSPITAL ENCOUNTER (INPATIENT)
Age: 60
LOS: 12 days | Discharge: HOME HEALTH | DRG: 559 | End: 2020-06-30
Attending: INTERNAL MEDICINE | Admitting: INTERNAL MEDICINE
Payer: MEDICARE

## 2020-06-18 VITALS
HEART RATE: 116 BPM | RESPIRATION RATE: 18 BRPM | HEIGHT: 62 IN | SYSTOLIC BLOOD PRESSURE: 144 MMHG | WEIGHT: 150 LBS | DIASTOLIC BLOOD PRESSURE: 77 MMHG | BODY MASS INDEX: 27.6 KG/M2 | TEMPERATURE: 99.9 F | OXYGEN SATURATION: 96 %

## 2020-06-18 PROBLEM — S30.1XXA CONTUSION OF ABDOMINAL WALL: Status: ACTIVE | Noted: 2020-06-18

## 2020-06-18 PROBLEM — R07.81 RIB PAIN: Status: ACTIVE | Noted: 2020-06-18

## 2020-06-18 PROBLEM — M19.012 OSTEOARTHRITIS OF LEFT SHOULDER: Status: ACTIVE | Noted: 2017-07-07

## 2020-06-18 LAB
ANION GAP SERPL CALCULATED.3IONS-SCNC: 15 MEQ/L (ref 9–15)
BLOOD CULTURE, ROUTINE: NORMAL
BUN BLDV-MCNC: 9 MG/DL (ref 6–20)
CALCIUM SERPL-MCNC: 9.3 MG/DL (ref 8.5–9.9)
CHLORIDE BLD-SCNC: 99 MEQ/L (ref 95–107)
CO2: 26 MEQ/L (ref 20–31)
CREAT SERPL-MCNC: 0.88 MG/DL (ref 0.5–0.9)
GFR AFRICAN AMERICAN: >60
GFR NON-AFRICAN AMERICAN: >60
GLUCOSE BLD-MCNC: 125 MG/DL (ref 70–99)
GLUCOSE BLD-MCNC: 133 MG/DL (ref 60–115)
HCT VFR BLD CALC: 29.4 % (ref 37–47)
HEMOGLOBIN: 9.3 G/DL (ref 12–16)
MCH RBC QN AUTO: 23.1 PG (ref 27–31.3)
MCHC RBC AUTO-ENTMCNC: 31.7 % (ref 33–37)
MCV RBC AUTO: 72.9 FL (ref 82–100)
PDW BLD-RTO: 19 % (ref 11.5–14.5)
PERFORMED ON: ABNORMAL
PLATELET # BLD: 320 K/UL (ref 130–400)
POTASSIUM SERPL-SCNC: 3 MEQ/L (ref 3.4–4.9)
RBC # BLD: 4.03 M/UL (ref 4.2–5.4)
SODIUM BLD-SCNC: 140 MEQ/L (ref 135–144)
WBC # BLD: 8.6 K/UL (ref 4.8–10.8)

## 2020-06-18 PROCEDURE — 6370000000 HC RX 637 (ALT 250 FOR IP): Performed by: SURGERY

## 2020-06-18 PROCEDURE — 36415 COLL VENOUS BLD VENIPUNCTURE: CPT

## 2020-06-18 PROCEDURE — 94640 AIRWAY INHALATION TREATMENT: CPT

## 2020-06-18 PROCEDURE — 6360000002 HC RX W HCPCS: Performed by: PHYSICIAN ASSISTANT

## 2020-06-18 PROCEDURE — 6370000000 HC RX 637 (ALT 250 FOR IP): Performed by: PHYSICIAN ASSISTANT

## 2020-06-18 PROCEDURE — 6370000000 HC RX 637 (ALT 250 FOR IP): Performed by: INTERNAL MEDICINE

## 2020-06-18 PROCEDURE — 99233 SBSQ HOSP IP/OBS HIGH 50: CPT | Performed by: PSYCHIATRY & NEUROLOGY

## 2020-06-18 PROCEDURE — 80048 BASIC METABOLIC PNL TOTAL CA: CPT

## 2020-06-18 PROCEDURE — APPSS15 APP SPLIT SHARED TIME 0-15 MINUTES: Performed by: NURSE PRACTITIONER

## 2020-06-18 PROCEDURE — 97116 GAIT TRAINING THERAPY: CPT

## 2020-06-18 PROCEDURE — 1200000002 HC SEMI PRIVATE SWING BED

## 2020-06-18 PROCEDURE — 2580000003 HC RX 258: Performed by: SURGERY

## 2020-06-18 PROCEDURE — 94760 N-INVAS EAR/PLS OXIMETRY 1: CPT

## 2020-06-18 PROCEDURE — 85027 COMPLETE CBC AUTOMATED: CPT

## 2020-06-18 PROCEDURE — 92526 ORAL FUNCTION THERAPY: CPT

## 2020-06-18 PROCEDURE — 2700000000 HC OXYGEN THERAPY PER DAY

## 2020-06-18 PROCEDURE — 71045 X-RAY EXAM CHEST 1 VIEW: CPT

## 2020-06-18 RX ORDER — POLYETHYLENE GLYCOL 3350 17 G/17G
17 POWDER, FOR SOLUTION ORAL DAILY
Status: DISCONTINUED | OUTPATIENT
Start: 2020-06-19 | End: 2020-06-30 | Stop reason: HOSPADM

## 2020-06-18 RX ORDER — SODIUM CHLORIDE 0.9 % (FLUSH) 0.9 %
10 SYRINGE (ML) INJECTION EVERY 12 HOURS SCHEDULED
Status: CANCELLED | OUTPATIENT
Start: 2020-06-18

## 2020-06-18 RX ORDER — DEXTROSE MONOHYDRATE 25 G/50ML
12.5 INJECTION, SOLUTION INTRAVENOUS PRN
Status: DISCONTINUED | OUTPATIENT
Start: 2020-06-18 | End: 2020-06-30 | Stop reason: HOSPADM

## 2020-06-18 RX ORDER — OLOPATADINE HYDROCHLORIDE 7 MG/ML
SOLUTION OPHTHALMIC
COMMUNITY
Start: 2020-03-09 | End: 2020-09-28 | Stop reason: SDUPTHER

## 2020-06-18 RX ORDER — LANOLIN ALCOHOL/MO/W.PET/CERES
9 CREAM (GRAM) TOPICAL NIGHTLY PRN
Status: CANCELLED | OUTPATIENT
Start: 2020-06-18

## 2020-06-18 RX ORDER — SODIUM CHLORIDE 0.9 % (FLUSH) 0.9 %
10 SYRINGE (ML) INJECTION EVERY 12 HOURS SCHEDULED
Status: DISCONTINUED | OUTPATIENT
Start: 2020-06-18 | End: 2020-06-18 | Stop reason: CLARIF

## 2020-06-18 RX ORDER — IBUPROFEN 400 MG/1
400 TABLET ORAL EVERY 6 HOURS
Status: CANCELLED | OUTPATIENT
Start: 2020-06-18

## 2020-06-18 RX ORDER — ROSUVASTATIN CALCIUM 20 MG/1
20 TABLET, COATED ORAL DAILY
Status: DISCONTINUED | OUTPATIENT
Start: 2020-06-19 | End: 2020-06-30 | Stop reason: HOSPADM

## 2020-06-18 RX ORDER — PANTOPRAZOLE SODIUM 40 MG/1
40 TABLET, DELAYED RELEASE ORAL
Status: CANCELLED | OUTPATIENT
Start: 2020-06-19

## 2020-06-18 RX ORDER — CYCLOBENZAPRINE HYDROCHLORIDE 7.5 MG/1
TABLET, FILM COATED ORAL
Status: ON HOLD | COMMUNITY
Start: 2020-03-17 | End: 2020-06-30 | Stop reason: HOSPADM

## 2020-06-18 RX ORDER — DOXAZOSIN 2 MG/1
2 TABLET ORAL NIGHTLY
Status: CANCELLED | OUTPATIENT
Start: 2020-06-18

## 2020-06-18 RX ORDER — ONDANSETRON 2 MG/ML
4 INJECTION INTRAMUSCULAR; INTRAVENOUS EVERY 6 HOURS PRN
Status: CANCELLED | OUTPATIENT
Start: 2020-06-18

## 2020-06-18 RX ORDER — FUROSEMIDE 40 MG/1
40 TABLET ORAL DAILY
Status: CANCELLED | OUTPATIENT
Start: 2020-06-19

## 2020-06-18 RX ORDER — NICOTINE POLACRILEX 4 MG
15 LOZENGE BUCCAL PRN
Status: DISCONTINUED | OUTPATIENT
Start: 2020-06-18 | End: 2020-06-30 | Stop reason: HOSPADM

## 2020-06-18 RX ORDER — GUAIFENESIN 600 MG/1
600 TABLET, EXTENDED RELEASE ORAL 2 TIMES DAILY
Status: CANCELLED | OUTPATIENT
Start: 2020-06-18

## 2020-06-18 RX ORDER — MIRTAZAPINE 15 MG/1
30 TABLET, FILM COATED ORAL NIGHTLY
Status: DISCONTINUED | OUTPATIENT
Start: 2020-06-18 | End: 2020-06-30 | Stop reason: HOSPADM

## 2020-06-18 RX ORDER — PROMETHAZINE HYDROCHLORIDE 12.5 MG/1
12.5 TABLET ORAL EVERY 6 HOURS PRN
Status: DISCONTINUED | OUTPATIENT
Start: 2020-06-18 | End: 2020-06-30 | Stop reason: HOSPADM

## 2020-06-18 RX ORDER — SODIUM CHLORIDE 0.9 % (FLUSH) 0.9 %
10 SYRINGE (ML) INJECTION PRN
Status: DISCONTINUED | OUTPATIENT
Start: 2020-06-18 | End: 2020-06-18 | Stop reason: CLARIF

## 2020-06-18 RX ORDER — SENNA AND DOCUSATE SODIUM 50; 8.6 MG/1; MG/1
1 TABLET, FILM COATED ORAL 2 TIMES DAILY
Status: DISCONTINUED | OUTPATIENT
Start: 2020-06-18 | End: 2020-06-30 | Stop reason: HOSPADM

## 2020-06-18 RX ORDER — ARIPIPRAZOLE 5 MG/1
10 TABLET ORAL DAILY
Status: DISCONTINUED | OUTPATIENT
Start: 2020-06-19 | End: 2020-06-30 | Stop reason: HOSPADM

## 2020-06-18 RX ORDER — IPRATROPIUM BROMIDE AND ALBUTEROL SULFATE 2.5; .5 MG/3ML; MG/3ML
1 SOLUTION RESPIRATORY (INHALATION) 3 TIMES DAILY
Status: DISCONTINUED | OUTPATIENT
Start: 2020-06-18 | End: 2020-06-22

## 2020-06-18 RX ORDER — ACETYLCYSTEINE 200 MG/ML
600 SOLUTION ORAL; RESPIRATORY (INHALATION) 2 TIMES DAILY
Status: CANCELLED | OUTPATIENT
Start: 2020-06-18

## 2020-06-18 RX ORDER — ESCITALOPRAM OXALATE 20 MG/1
20 TABLET ORAL DAILY
Status: CANCELLED | OUTPATIENT
Start: 2020-06-19

## 2020-06-18 RX ORDER — ESTRADIOL 10 UG/1
INSERT VAGINAL
COMMUNITY
Start: 2020-03-11

## 2020-06-18 RX ORDER — GUAIFENESIN 600 MG/1
600 TABLET, EXTENDED RELEASE ORAL 2 TIMES DAILY
Status: DISCONTINUED | OUTPATIENT
Start: 2020-06-18 | End: 2020-06-30 | Stop reason: HOSPADM

## 2020-06-18 RX ORDER — SPIRONOLACTONE 25 MG/1
25 TABLET ORAL DAILY
Status: DISCONTINUED | OUTPATIENT
Start: 2020-06-19 | End: 2020-06-30 | Stop reason: HOSPADM

## 2020-06-18 RX ORDER — IBUPROFEN 400 MG/1
400 TABLET ORAL EVERY 6 HOURS
Status: DISCONTINUED | OUTPATIENT
Start: 2020-06-19 | End: 2020-06-19

## 2020-06-18 RX ORDER — PROMETHAZINE HYDROCHLORIDE 12.5 MG/1
12.5 TABLET ORAL EVERY 6 HOURS PRN
Status: CANCELLED | OUTPATIENT
Start: 2020-06-18

## 2020-06-18 RX ORDER — AMOXICILLIN 250 MG
1 CAPSULE ORAL 2 TIMES DAILY
Status: CANCELLED | OUTPATIENT
Start: 2020-06-18

## 2020-06-18 RX ORDER — PRAMIPEXOLE DIHYDROCHLORIDE 0.25 MG/1
0.5 TABLET ORAL EVERY EVENING
Status: DISCONTINUED | OUTPATIENT
Start: 2020-06-18 | End: 2020-06-30 | Stop reason: HOSPADM

## 2020-06-18 RX ORDER — ACETYLCYSTEINE 200 MG/ML
600 SOLUTION ORAL; RESPIRATORY (INHALATION) 2 TIMES DAILY
Status: DISCONTINUED | OUTPATIENT
Start: 2020-06-18 | End: 2020-06-22

## 2020-06-18 RX ORDER — PROMETHAZINE HYDROCHLORIDE 12.5 MG/1
12.5 TABLET ORAL EVERY 6 HOURS PRN
Status: DISCONTINUED | OUTPATIENT
Start: 2020-06-18 | End: 2020-06-18 | Stop reason: SDUPTHER

## 2020-06-18 RX ORDER — ERENUMAB-AOOE 70 MG/ML
INJECTION SUBCUTANEOUS
COMMUNITY
Start: 2020-05-29 | End: 2021-04-27 | Stop reason: SDUPTHER

## 2020-06-18 RX ORDER — IPRATROPIUM BROMIDE AND ALBUTEROL SULFATE 2.5; .5 MG/3ML; MG/3ML
1 SOLUTION RESPIRATORY (INHALATION) 3 TIMES DAILY
Status: CANCELLED | OUTPATIENT
Start: 2020-06-18

## 2020-06-18 RX ORDER — ACETAMINOPHEN 650 MG/1
650 SUPPOSITORY RECTAL EVERY 6 HOURS PRN
Status: DISCONTINUED | OUTPATIENT
Start: 2020-06-18 | End: 2020-06-19

## 2020-06-18 RX ORDER — ESTRADIOL 10 UG/1
10 INSERT VAGINAL
Status: ON HOLD | COMMUNITY
Start: 2020-03-06 | End: 2020-06-30 | Stop reason: HOSPADM

## 2020-06-18 RX ORDER — POTASSIUM CHLORIDE 20 MEQ/1
20 TABLET, EXTENDED RELEASE ORAL
Status: CANCELLED | OUTPATIENT
Start: 2020-06-19

## 2020-06-18 RX ORDER — POLYETHYLENE GLYCOL 3350 17 G/17G
17 POWDER, FOR SOLUTION ORAL DAILY PRN
Status: DISCONTINUED | OUTPATIENT
Start: 2020-06-18 | End: 2020-06-30 | Stop reason: HOSPADM

## 2020-06-18 RX ORDER — AMLODIPINE BESYLATE 5 MG/1
5 TABLET ORAL DAILY
Status: DISCONTINUED | OUTPATIENT
Start: 2020-06-19 | End: 2020-06-30 | Stop reason: HOSPADM

## 2020-06-18 RX ORDER — PANTOPRAZOLE SODIUM 40 MG/1
40 TABLET, DELAYED RELEASE ORAL
Status: DISCONTINUED | OUTPATIENT
Start: 2020-06-19 | End: 2020-06-30 | Stop reason: HOSPADM

## 2020-06-18 RX ORDER — METHOCARBAMOL 500 MG/1
500 TABLET, FILM COATED ORAL 4 TIMES DAILY
Status: DISCONTINUED | OUTPATIENT
Start: 2020-06-18 | End: 2020-06-21

## 2020-06-18 RX ORDER — LIDOCAINE 4 G/G
2 PATCH TOPICAL DAILY
Status: CANCELLED | OUTPATIENT
Start: 2020-06-19

## 2020-06-18 RX ORDER — OMEGA-3-ACID ETHYL ESTERS 1 G/1
CAPSULE, LIQUID FILLED ORAL
COMMUNITY
Start: 2020-05-14 | End: 2021-03-10 | Stop reason: ALTCHOICE

## 2020-06-18 RX ORDER — BISACODYL 10 MG
10 SUPPOSITORY, RECTAL RECTAL DAILY PRN
Status: DISCONTINUED | OUTPATIENT
Start: 2020-06-18 | End: 2020-06-30 | Stop reason: HOSPADM

## 2020-06-18 RX ORDER — IBUPROFEN 400 MG/1
400 TABLET ORAL EVERY 6 HOURS
Status: DISCONTINUED | OUTPATIENT
Start: 2020-06-18 | End: 2020-06-18 | Stop reason: HOSPADM

## 2020-06-18 RX ORDER — AMLODIPINE BESYLATE 5 MG/1
5 TABLET ORAL DAILY
Status: CANCELLED | OUTPATIENT
Start: 2020-06-19

## 2020-06-18 RX ORDER — BISACODYL 10 MG
10 SUPPOSITORY, RECTAL RECTAL DAILY PRN
Status: CANCELLED | OUTPATIENT
Start: 2020-06-18

## 2020-06-18 RX ORDER — FUROSEMIDE 40 MG/1
40 TABLET ORAL DAILY
Status: DISCONTINUED | OUTPATIENT
Start: 2020-06-19 | End: 2020-06-30 | Stop reason: HOSPADM

## 2020-06-18 RX ORDER — ALBUTEROL SULFATE 90 UG/1
POWDER, METERED RESPIRATORY (INHALATION)
COMMUNITY
Start: 2020-05-26 | End: 2021-03-10 | Stop reason: ALTCHOICE

## 2020-06-18 RX ORDER — ROSUVASTATIN CALCIUM 20 MG/1
20 TABLET, COATED ORAL DAILY
Status: CANCELLED | OUTPATIENT
Start: 2020-06-19

## 2020-06-18 RX ORDER — MIRTAZAPINE 30 MG/1
30 TABLET, FILM COATED ORAL NIGHTLY
Status: CANCELLED | OUTPATIENT
Start: 2020-06-18

## 2020-06-18 RX ORDER — LEVOTHYROXINE SODIUM 0.05 MG/1
50 TABLET ORAL DAILY
Status: CANCELLED | OUTPATIENT
Start: 2020-06-19

## 2020-06-18 RX ORDER — DIFLORASONE DIACETATE 0.5 MG/G
OINTMENT TOPICAL
Status: ON HOLD | COMMUNITY
Start: 2020-03-13 | End: 2020-07-09 | Stop reason: HOSPADM

## 2020-06-18 RX ORDER — ACETAMINOPHEN 325 MG/1
650 TABLET ORAL EVERY 4 HOURS PRN
Status: DISCONTINUED | OUTPATIENT
Start: 2020-06-18 | End: 2020-06-18 | Stop reason: SDUPTHER

## 2020-06-18 RX ORDER — ACETAMINOPHEN 325 MG/1
650 TABLET ORAL EVERY 4 HOURS PRN
Status: CANCELLED | OUTPATIENT
Start: 2020-06-18

## 2020-06-18 RX ORDER — SODIUM CHLORIDE 0.9 % (FLUSH) 0.9 %
10 SYRINGE (ML) INJECTION PRN
Status: CANCELLED | OUTPATIENT
Start: 2020-06-18

## 2020-06-18 RX ORDER — DEXTROSE MONOHYDRATE 50 MG/ML
100 INJECTION, SOLUTION INTRAVENOUS PRN
Status: CANCELLED | OUTPATIENT
Start: 2020-06-18

## 2020-06-18 RX ORDER — ESCITALOPRAM OXALATE 10 MG/1
20 TABLET ORAL DAILY
Status: DISCONTINUED | OUTPATIENT
Start: 2020-06-19 | End: 2020-06-30 | Stop reason: HOSPADM

## 2020-06-18 RX ORDER — PREGABALIN 150 MG/1
150 CAPSULE ORAL DAILY
Status: CANCELLED | OUTPATIENT
Start: 2020-06-19

## 2020-06-18 RX ORDER — POTASSIUM CHLORIDE 750 MG/1
20 TABLET, EXTENDED RELEASE ORAL
Status: DISCONTINUED | OUTPATIENT
Start: 2020-06-19 | End: 2020-06-30 | Stop reason: HOSPADM

## 2020-06-18 RX ORDER — POLYETHYLENE GLYCOL 3350 17 G/17G
17 POWDER, FOR SOLUTION ORAL DAILY
Status: CANCELLED | OUTPATIENT
Start: 2020-06-19

## 2020-06-18 RX ORDER — DEXTROSE MONOHYDRATE 25 G/50ML
12.5 INJECTION, SOLUTION INTRAVENOUS PRN
Status: CANCELLED | OUTPATIENT
Start: 2020-06-18

## 2020-06-18 RX ORDER — PRAMIPEXOLE DIHYDROCHLORIDE 0.5 MG/1
0.5 TABLET ORAL EVERY EVENING
Status: CANCELLED | OUTPATIENT
Start: 2020-06-18

## 2020-06-18 RX ORDER — OLOPATADINE HYDROCHLORIDE 7 MG/ML
SOLUTION OPHTHALMIC
Status: ON HOLD | COMMUNITY
Start: 2020-06-05 | End: 2020-07-09 | Stop reason: HOSPADM

## 2020-06-18 RX ORDER — ONDANSETRON 2 MG/ML
4 INJECTION INTRAMUSCULAR; INTRAVENOUS EVERY 6 HOURS PRN
Status: DISCONTINUED | OUTPATIENT
Start: 2020-06-18 | End: 2020-06-18 | Stop reason: SDUPTHER

## 2020-06-18 RX ORDER — PREGABALIN 75 MG/1
150 CAPSULE ORAL DAILY
Status: DISCONTINUED | OUTPATIENT
Start: 2020-06-19 | End: 2020-06-30 | Stop reason: HOSPADM

## 2020-06-18 RX ORDER — CALCIPOTRIENE 50 UG/G
CREAM TOPICAL
COMMUNITY
Start: 2020-05-14 | End: 2021-03-10 | Stop reason: ALTCHOICE

## 2020-06-18 RX ORDER — LANOLIN ALCOHOL/MO/W.PET/CERES
400 CREAM (GRAM) TOPICAL DAILY
COMMUNITY
Start: 2018-05-29 | End: 2020-07-14

## 2020-06-18 RX ORDER — DOXAZOSIN 2 MG/1
2 TABLET ORAL NIGHTLY
Status: DISCONTINUED | OUTPATIENT
Start: 2020-06-18 | End: 2020-06-30 | Stop reason: HOSPADM

## 2020-06-18 RX ORDER — CLOBETASOL PROPIONATE 0.5 MG/G
OINTMENT TOPICAL
COMMUNITY
Start: 2020-06-06 | End: 2021-03-10

## 2020-06-18 RX ORDER — UREA 10 %
9 LOTION (ML) TOPICAL NIGHTLY PRN
Status: DISCONTINUED | OUTPATIENT
Start: 2020-06-18 | End: 2020-06-30 | Stop reason: HOSPADM

## 2020-06-18 RX ORDER — LIDOCAINE 4 G/G
2 PATCH TOPICAL DAILY
Status: DISCONTINUED | OUTPATIENT
Start: 2020-06-19 | End: 2020-06-19

## 2020-06-18 RX ORDER — ARIPIPRAZOLE 10 MG/1
10 TABLET ORAL DAILY
Status: CANCELLED | OUTPATIENT
Start: 2020-06-19

## 2020-06-18 RX ORDER — LEVOTHYROXINE SODIUM 0.05 MG/1
50 TABLET ORAL DAILY
Status: DISCONTINUED | OUTPATIENT
Start: 2020-06-19 | End: 2020-06-30 | Stop reason: HOSPADM

## 2020-06-18 RX ORDER — DEXTROSE MONOHYDRATE 50 MG/ML
100 INJECTION, SOLUTION INTRAVENOUS PRN
Status: DISCONTINUED | OUTPATIENT
Start: 2020-06-18 | End: 2020-06-30 | Stop reason: HOSPADM

## 2020-06-18 RX ORDER — ONDANSETRON 2 MG/ML
4 INJECTION INTRAMUSCULAR; INTRAVENOUS EVERY 6 HOURS PRN
Status: DISCONTINUED | OUTPATIENT
Start: 2020-06-18 | End: 2020-06-19

## 2020-06-18 RX ORDER — METHOCARBAMOL 500 MG/1
500 TABLET, FILM COATED ORAL 4 TIMES DAILY
Status: CANCELLED | OUTPATIENT
Start: 2020-06-18

## 2020-06-18 RX ORDER — SPIRONOLACTONE 25 MG/1
25 TABLET ORAL DAILY
Status: CANCELLED | OUTPATIENT
Start: 2020-06-19

## 2020-06-18 RX ORDER — NICOTINE POLACRILEX 4 MG
15 LOZENGE BUCCAL PRN
Status: CANCELLED | OUTPATIENT
Start: 2020-06-18

## 2020-06-18 RX ORDER — ACETAMINOPHEN 325 MG/1
650 TABLET ORAL EVERY 6 HOURS PRN
Status: DISCONTINUED | OUTPATIENT
Start: 2020-06-18 | End: 2020-06-19

## 2020-06-18 RX ADMIN — GUAIFENESIN 600 MG: 600 TABLET, EXTENDED RELEASE ORAL at 21:38

## 2020-06-18 RX ADMIN — ENOXAPARIN SODIUM 30 MG: 30 INJECTION SUBCUTANEOUS at 21:38

## 2020-06-18 RX ADMIN — METHOCARBAMOL 500 MG: 500 TABLET, FILM COATED ORAL at 09:35

## 2020-06-18 RX ADMIN — KETOROLAC TROMETHAMINE 15 MG: 15 INJECTION, SOLUTION INTRAMUSCULAR; INTRAVENOUS at 09:00

## 2020-06-18 RX ADMIN — METHOCARBAMOL 500 MG: 500 TABLET, FILM COATED ORAL at 17:29

## 2020-06-18 RX ADMIN — Medication 9 MG: at 22:33

## 2020-06-18 RX ADMIN — SPIRONOLACTONE 25 MG: 25 TABLET ORAL at 09:01

## 2020-06-18 RX ADMIN — ARIPIPRAZOLE 10 MG: 10 TABLET ORAL at 09:01

## 2020-06-18 RX ADMIN — POTASSIUM BICARBONATE 40 MEQ: 782 TABLET, EFFERVESCENT ORAL at 13:25

## 2020-06-18 RX ADMIN — METHOCARBAMOL TABLETS 500 MG: 500 TABLET, COATED ORAL at 21:38

## 2020-06-18 RX ADMIN — DOCUSATE SODIUM 50MG AND SENNOSIDES 8.6MG 1 TABLET: 8.6; 5 TABLET, FILM COATED ORAL at 09:01

## 2020-06-18 RX ADMIN — ACETAMINOPHEN 650 MG: 325 TABLET, FILM COATED ORAL at 05:24

## 2020-06-18 RX ADMIN — IPRATROPIUM BROMIDE AND ALBUTEROL SULFATE 1 AMPULE: .5; 3 SOLUTION RESPIRATORY (INHALATION) at 07:31

## 2020-06-18 RX ADMIN — Medication 10 ML: at 09:02

## 2020-06-18 RX ADMIN — GUAIFENESIN 600 MG: 600 TABLET, EXTENDED RELEASE ORAL at 09:35

## 2020-06-18 RX ADMIN — IBUPROFEN 400 MG: 400 TABLET, FILM COATED ORAL at 23:54

## 2020-06-18 RX ADMIN — POTASSIUM BICARBONATE 40 MEQ: 782 TABLET, EFFERVESCENT ORAL at 07:28

## 2020-06-18 RX ADMIN — IBUPROFEN 400 MG: 400 TABLET, FILM COATED ORAL at 17:29

## 2020-06-18 RX ADMIN — FUROSEMIDE 40 MG: 40 TABLET ORAL at 09:01

## 2020-06-18 RX ADMIN — LEVOTHYROXINE SODIUM 50 MCG: 0.05 TABLET ORAL at 05:25

## 2020-06-18 RX ADMIN — POTASSIUM CHLORIDE 20 MEQ: 20 TABLET, EXTENDED RELEASE ORAL at 09:01

## 2020-06-18 RX ADMIN — METHOCARBAMOL 500 MG: 500 TABLET, FILM COATED ORAL at 13:18

## 2020-06-18 RX ADMIN — PANTOPRAZOLE SODIUM 40 MG: 40 TABLET, DELAYED RELEASE ORAL at 05:25

## 2020-06-18 RX ADMIN — ENOXAPARIN SODIUM 30 MG: 30 INJECTION SUBCUTANEOUS at 09:00

## 2020-06-18 RX ADMIN — IBUPROFEN 400 MG: 400 TABLET, FILM COATED ORAL at 13:24

## 2020-06-18 RX ADMIN — PRAMIPEXOLE DIHYDROCHLORIDE 0.5 MG: 0.25 TABLET ORAL at 21:38

## 2020-06-18 RX ADMIN — LEVOTHYROXINE SODIUM 200 MCG: 0.1 TABLET ORAL at 05:24

## 2020-06-18 RX ADMIN — DOXAZOSIN 2 MG: 2 TABLET ORAL at 21:38

## 2020-06-18 RX ADMIN — ROSUVASTATIN CALCIUM 20 MG: 20 TABLET, FILM COATED ORAL at 09:00

## 2020-06-18 RX ADMIN — ACETAMINOPHEN 650 MG: 325 TABLET ORAL at 22:34

## 2020-06-18 RX ADMIN — AMLODIPINE BESYLATE 5 MG: 5 TABLET ORAL at 09:01

## 2020-06-18 RX ADMIN — IPRATROPIUM BROMIDE AND ALBUTEROL SULFATE 1 AMPULE: .5; 3 SOLUTION RESPIRATORY (INHALATION) at 13:47

## 2020-06-18 RX ADMIN — MIRTAZAPINE 30 MG: 15 TABLET, FILM COATED ORAL at 21:41

## 2020-06-18 RX ADMIN — IPRATROPIUM BROMIDE AND ALBUTEROL SULFATE 1 AMPULE: .5; 3 SOLUTION RESPIRATORY (INHALATION) at 21:52

## 2020-06-18 RX ADMIN — SENNOSIDES AND DOCUSATE SODIUM 1 TABLET: 8.6; 5 TABLET ORAL at 21:38

## 2020-06-18 ASSESSMENT — ENCOUNTER SYMPTOMS
ABDOMINAL PAIN: 0
CHEST TIGHTNESS: 0
DIARRHEA: 0
CONSTIPATION: 0
SHORTNESS OF BREATH: 0
COLOR CHANGE: 0
ABDOMINAL DISTENTION: 0
COUGH: 0
WHEEZING: 0
VOMITING: 0
TROUBLE SWALLOWING: 0
NAUSEA: 0

## 2020-06-18 ASSESSMENT — PAIN SCALES - GENERAL
PAINLEVEL_OUTOF10: 9
PAINLEVEL_OUTOF10: 0
PAINLEVEL_OUTOF10: 0
PAINLEVEL_OUTOF10: 9

## 2020-06-18 ASSESSMENT — PAIN DESCRIPTION - ORIENTATION
ORIENTATION: RIGHT;LEFT
ORIENTATION: LEFT

## 2020-06-18 ASSESSMENT — PAIN DESCRIPTION - FREQUENCY
FREQUENCY: CONTINUOUS
FREQUENCY: CONTINUOUS

## 2020-06-18 ASSESSMENT — PAIN DESCRIPTION - ONSET: ONSET: ON-GOING

## 2020-06-18 ASSESSMENT — PAIN - FUNCTIONAL ASSESSMENT: PAIN_FUNCTIONAL_ASSESSMENT: PREVENTS OR INTERFERES SOME ACTIVE ACTIVITIES AND ADLS

## 2020-06-18 ASSESSMENT — PAIN DESCRIPTION - PROGRESSION: CLINICAL_PROGRESSION: NOT CHANGED

## 2020-06-18 ASSESSMENT — PAIN DESCRIPTION - DESCRIPTORS
DESCRIPTORS: ACHING
DESCRIPTORS: ACHING;BURNING

## 2020-06-18 ASSESSMENT — PAIN DESCRIPTION - LOCATION
LOCATION: SHOULDER
LOCATION: RIB CAGE;SHOULDER

## 2020-06-18 ASSESSMENT — PAIN DESCRIPTION - PAIN TYPE
TYPE: SURGICAL PAIN
TYPE: ACUTE PAIN

## 2020-06-18 NOTE — PROGRESS NOTES
generalized 09/24/2010    Alveolitis of jaw 07/09/2010    Dental caries extending into pulp 06/25/2010    Pulmonary embolism ([facility] [address] 06/21/2010    Retention of urine 01/22/2010    Neurogenic bladder 01/05/2010    Vitamin D deficiency 11/17/2009    Airway hyperreactivity 09/16/2009    Cervicalgia 09/11/2009    Post-laminectomy syndrome 08/24/2000        Past Medical History:   Diagnosis Date    Acid reflux     Allergic rhinitis     Anxiety     Arnold-Chiari deformity (HCC) 2000    surgery    Asthma     Cerebral artery occlusion with cerebral infarction ([facility] [address] 2001    1 yr after brain OR    Cerebrovascular disease     Chronic back pain greater than 3 months duration     COPD (chronic obstructive pulmonary disease) (HCC)     Dr Melchor Felix at [address] CVA (cerebral infarction) 2001    left hemiparesis, due to ? estrogen + smoking    Depression 1993    Dr Zarina Bravo H/O encephalopathy 2001    Headache(784.0)     Dr Toshia Avalos Hepatitis B surface antigen positive     Hx of blood clots 2010    PE / trx with coumadin x 6 months    Hyperlipidemia LDL goal < 100     meds > 10 yrs    Hypertension     meds > 2 yrs    Hypothyroidism 2001    meds > 18 yrs    Laryngitis, chronic 2012    scoped by Dr Boni Churchill, fungal    Marijuana smoker in remission ([facility] [address] 2011    Obesity (BMI 30-39. 9)     Osteoarthritis     Pulmonary embolism and infarction (HCC)     Restless legs syndrome     Rhinitis, chronic     Rotator cuff tear     Left    S/P colonoscopy with polypectomy 2010    Dr Alverto Veliz    Seizures [facility])     Smoker     Spinal headache     past partum 1994    Spondylosis without myelopathy     Type 2 diabetes mellitus without complication (HCC)     hx > 6 yrs    Urinary incontinence     Vertebral compression fracture [facility])      Past Surgical History:   Procedure Laterality Date    BACK SURGERY  2001    lumbar kyphoplasty x 2    BRAIN SURGERY  2000    due to Arnold-Chiari deformity / [facility]    )    Transfers  Sit to Stand: Contact guard assistance  Stand to sit: Contact guard assistance  Bed to Chair: Contact guard assistance    Ambulation  Ambulation?: Yes  Ambulation 1  Device: No Device  Assistance: Minimal assistance;Contact guard assistance  Quality of Gait: left hemiplegia, small shuffling steps, flexed posture   Distance: 15'   ASSESSMENT  patient continues to improve transfer and gait abilities. Required max assist for supine to sit due to sling/ decreased core strength. Discharge Recommendations:  Continue to assess pending progress, Patient would benefit from continued therapy after discharge    Goals  Short term goals  Short term goal 1: seated functional reach >8 in OOBOS with return to midline with SBA  Short term goal 2: stand with appropriate AD >1 min with CGA  Long term goals  Long term goal 1: bed mobility with Min A   Long term goal 2: functional transfers with Min A  Long term goal 3: amb 10ft with LRAD and Mod A  Patient Goals   Patient goals : unable to state    PLAN    Times per week: 3-6  Safety Devices  Type of devices: All fall risk precautions in place, Call light within reach, Chair alarm in place, Left in chair     AMPA (6 CLICK) Aleja Duran 28 Inpatient Mobility Raw Score : 13     Therapy Time   Individual   Time In 1145   Time Out 1200   Minutes 15     Timed Code Treatment Minutes: 15 Minutes   gt 10  Tr 5     Serge Chiang PTA, 06/18/20 at 1:01 PM         Definitions for assistance levels  Independent = pt does not require any physical supervision or assistance from another person for activity completion. Device may be needed.   Stand by assistance = pt requires verbal cues or instructions from another person, close to but not touching, to perform the activity  Minimal assistance= pt performs 75% or more of the activity; assistance is required to complete the activity  Moderate assistance= pt performs 50% of the activity; assistance is required to
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
## 2020-06-18 NOTE — CARE COORDINATION
RULA spoke with Angel Lundy at [facility] and she confirmed that the precert has been started. Awaiting precert approval for transfer. We have precert approval for the pt to transfer to [facility] today.
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
## 2020-06-18 NOTE — PROGRESS NOTES
[facility]   Facility/Department: [facility] Jan[facility] Leslie  Speech Language Pathology  Treatment Note  Mina Altman  1960  [id number]    Medical Dx: Fracture of humerus following insertion of orthopedic implant, joint prosthesis, or bone plate, left arm [facility]) [L26.866]  Speech Dx: Dysphagia, Aphasia and Dysarthria        6/18/2020      Subjective:  Alert, Cooperative and Pleasant        Interventions used this date:  Dysphagia Treatment    Objective/Assessment:  Pt seen at bedside, alert, cooperative, motivated for diet to be upgraded. Pt's speech and language significantly improved since initial evaluation. Pt able to understand conversation and communicate in conversation. Voice is slightly nasal and strangled. Occasional perseveration noted with topics. Adequate ROM/strength during oral motor exam.  Pt tolerated kayla cracker and milk, with occasional request for milk to assist with bolus manipulation and propulsion. Pt cleared oral residue independently. No overt s/s of aspiration. Rec: diet upgrade to regular diet with thin liquids. Informed Ebony RN. Treatment/Activity Tolerance:  Patient tolerated treatment well    Plan:  Pt being transferred to [facility]. Pain:  Patient c/o: 9 /10   Pt reports acceptable level for treatment. Pt reports nursing is aware. Patient/Caregiver Education:  Patient educated on session and progression towards goals. Patient stated verbal understanding of directions. Safety Devices:  Bed alarm in place and Call light within reach    Therapy Time  SLP Individual Minutes  Time In: 1866  Time Out: 1500  Minutes: 12            Signature: Electronically signed by Keyla Dhaliwal.  YOANDY Newman on 6/18/2020 at 3:11 PM
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
## 2020-06-18 NOTE — TELEPHONE ENCOUNTER
Not sure why we are getting this.  Comes from another doc, Ling Campbell has told isma multiple times

## 2020-06-18 NOTE — TELEPHONE ENCOUNTER
Sunita Deutsch called for an update and I relayed the message. They will remove Dr. Emmanuel Lopes from this Rx.

## 2020-06-19 LAB
ANION GAP SERPL CALCULATED.3IONS-SCNC: 12 MEQ/L (ref 9–15)
BACTERIA: ABNORMAL /HPF
BASOPHILS ABSOLUTE: 0.1 K/UL (ref 0–0.2)
BASOPHILS RELATIVE PERCENT: 0.8 %
BILIRUBIN URINE: NEGATIVE
BLOOD, URINE: NEGATIVE
BUN BLDV-MCNC: 16 MG/DL (ref 6–20)
CALCIUM SERPL-MCNC: 9.1 MG/DL (ref 8.5–9.9)
CHLORIDE BLD-SCNC: 99 MEQ/L (ref 95–107)
CLARITY: ABNORMAL
CO2: 29 MEQ/L (ref 20–31)
COLOR: YELLOW
CREAT SERPL-MCNC: 1.15 MG/DL (ref 0.5–0.9)
CULTURE, BLOOD 2: NORMAL
EOSINOPHILS ABSOLUTE: 0.3 K/UL (ref 0–0.7)
EOSINOPHILS RELATIVE PERCENT: 3.2 %
EPITHELIAL CELLS, UA: ABNORMAL /HPF
GFR AFRICAN AMERICAN: 58.6
GFR NON-AFRICAN AMERICAN: 48.4
GLUCOSE BLD-MCNC: 125 MG/DL (ref 70–99)
GLUCOSE BLD-MCNC: 135 MG/DL (ref 60–115)
GLUCOSE BLD-MCNC: 141 MG/DL (ref 60–115)
GLUCOSE URINE: NEGATIVE MG/DL
HCT VFR BLD CALC: 27.6 % (ref 37–47)
HEMOGLOBIN: 8.9 G/DL (ref 12–16)
HYPOCHROMIA: PRESENT
KETONES, URINE: NEGATIVE MG/DL
LEUKOCYTE ESTERASE, URINE: ABNORMAL
LYMPHOCYTES ABSOLUTE: 1.8 K/UL (ref 1–4.8)
LYMPHOCYTES RELATIVE PERCENT: 19.8 %
MCH RBC QN AUTO: 23.3 PG (ref 27–31.3)
MCHC RBC AUTO-ENTMCNC: 32.1 % (ref 33–37)
MCV RBC AUTO: 72.5 FL (ref 82–100)
MONOCYTES ABSOLUTE: 0.7 K/UL (ref 0.2–0.8)
MONOCYTES RELATIVE PERCENT: 7.6 %
NEUTROPHILS ABSOLUTE: 6.1 K/UL (ref 1.4–6.5)
NEUTROPHILS RELATIVE PERCENT: 68.6 %
NITRITE, URINE: POSITIVE
PDW BLD-RTO: 18.8 % (ref 11.5–14.5)
PERFORMED ON: ABNORMAL
PERFORMED ON: ABNORMAL
PH UA: 6.5 (ref 5–9)
PLATELET # BLD: 352 K/UL (ref 130–400)
POTASSIUM SERPL-SCNC: 4 MEQ/L (ref 3.4–4.9)
PROTEIN UA: NEGATIVE MG/DL
RBC # BLD: 3.81 M/UL (ref 4.2–5.4)
RENAL EPITHELIAL, UA: ABNORMAL /HPF
SODIUM BLD-SCNC: 140 MEQ/L (ref 135–144)
SPECIFIC GRAVITY UA: 1.01 (ref 1–1.03)
URINE REFLEX TO CULTURE: YES
UROBILINOGEN, URINE: 0.2 E.U./DL
WBC # BLD: 8.8 K/UL (ref 4.8–10.8)
WBC UA: ABNORMAL /HPF (ref 0–5)

## 2020-06-19 PROCEDURE — 97535 SELF CARE MNGMENT TRAINING: CPT

## 2020-06-19 PROCEDURE — 6370000000 HC RX 637 (ALT 250 FOR IP): Performed by: PHYSICIAN ASSISTANT

## 2020-06-19 PROCEDURE — 6370000000 HC RX 637 (ALT 250 FOR IP): Performed by: INTERNAL MEDICINE

## 2020-06-19 PROCEDURE — 87186 SC STD MICRODIL/AGAR DIL: CPT

## 2020-06-19 PROCEDURE — 97116 GAIT TRAINING THERAPY: CPT

## 2020-06-19 PROCEDURE — 81001 URINALYSIS AUTO W/SCOPE: CPT

## 2020-06-19 PROCEDURE — 94760 N-INVAS EAR/PLS OXIMETRY 1: CPT

## 2020-06-19 PROCEDURE — 6370000000 HC RX 637 (ALT 250 FOR IP): Performed by: ANESTHESIOLOGY

## 2020-06-19 PROCEDURE — 80048 BASIC METABOLIC PNL TOTAL CA: CPT

## 2020-06-19 PROCEDURE — 87077 CULTURE AEROBIC IDENTIFY: CPT

## 2020-06-19 PROCEDURE — 94640 AIRWAY INHALATION TREATMENT: CPT

## 2020-06-19 PROCEDURE — 85025 COMPLETE CBC W/AUTO DIFF WBC: CPT

## 2020-06-19 PROCEDURE — 36415 COLL VENOUS BLD VENIPUNCTURE: CPT

## 2020-06-19 PROCEDURE — 87086 URINE CULTURE/COLONY COUNT: CPT

## 2020-06-19 PROCEDURE — 97167 OT EVAL HIGH COMPLEX 60 MIN: CPT

## 2020-06-19 PROCEDURE — 97162 PT EVAL MOD COMPLEX 30 MIN: CPT

## 2020-06-19 PROCEDURE — 51702 INSERT TEMP BLADDER CATH: CPT

## 2020-06-19 PROCEDURE — 6360000002 HC RX W HCPCS: Performed by: PHYSICIAN ASSISTANT

## 2020-06-19 PROCEDURE — 1200000002 HC SEMI PRIVATE SWING BED

## 2020-06-19 RX ORDER — BACLOFEN 10 MG/1
5 TABLET ORAL 2 TIMES DAILY
Status: DISCONTINUED | OUTPATIENT
Start: 2020-06-19 | End: 2020-06-21

## 2020-06-19 RX ORDER — ANALGESIC BALM 1.74; 4.06 G/29G; G/29G
OINTMENT TOPICAL EVERY 4 HOURS PRN
Status: DISCONTINUED | OUTPATIENT
Start: 2020-06-19 | End: 2020-06-30 | Stop reason: HOSPADM

## 2020-06-19 RX ORDER — ACETAMINOPHEN 500 MG
500 TABLET ORAL
Status: DISCONTINUED | OUTPATIENT
Start: 2020-06-19 | End: 2020-06-30 | Stop reason: HOSPADM

## 2020-06-19 RX ORDER — HYDROCODONE BITARTRATE AND ACETAMINOPHEN 5; 325 MG/1; MG/1
0.5 TABLET ORAL EVERY 6 HOURS PRN
Status: DISCONTINUED | OUTPATIENT
Start: 2020-06-19 | End: 2020-06-23

## 2020-06-19 RX ORDER — ASPIRIN 81 MG
1 TABLET,CHEWABLE ORAL 3 TIMES DAILY PRN
Qty: 42.5 G | Refills: 0 | Status: SHIPPED | OUTPATIENT
Start: 2020-06-19 | End: 2021-03-10 | Stop reason: ALTCHOICE

## 2020-06-19 RX ORDER — ONDANSETRON 4 MG/1
4 TABLET, ORALLY DISINTEGRATING ORAL EVERY 8 HOURS PRN
Status: DISCONTINUED | OUTPATIENT
Start: 2020-06-19 | End: 2020-06-30 | Stop reason: HOSPADM

## 2020-06-19 RX ORDER — LORAZEPAM 0.5 MG/1
0.5 TABLET ORAL 2 TIMES DAILY PRN
Status: DISCONTINUED | OUTPATIENT
Start: 2020-06-19 | End: 2020-06-21

## 2020-06-19 RX ORDER — TRAMADOL HYDROCHLORIDE 50 MG/1
50 TABLET ORAL
Status: DISCONTINUED | OUTPATIENT
Start: 2020-06-19 | End: 2020-06-23

## 2020-06-19 RX ORDER — LIDOCAINE 4 G/G
3 PATCH TOPICAL DAILY
Status: DISCONTINUED | OUTPATIENT
Start: 2020-06-20 | End: 2020-06-20

## 2020-06-19 RX ORDER — CLOTRIMAZOLE AND BETAMETHASONE DIPROPIONATE 10; .64 MG/G; MG/G
CREAM TOPICAL
Qty: 45 G | Refills: 0 | Status: ON HOLD | OUTPATIENT
Start: 2020-06-19 | End: 2020-07-09 | Stop reason: HOSPADM

## 2020-06-19 RX ADMIN — IBUPROFEN 400 MG: 400 TABLET, FILM COATED ORAL at 06:22

## 2020-06-19 RX ADMIN — AMLODIPINE BESYLATE 5 MG: 5 TABLET ORAL at 08:18

## 2020-06-19 RX ADMIN — IPRATROPIUM BROMIDE AND ALBUTEROL SULFATE 1 AMPULE: .5; 3 SOLUTION RESPIRATORY (INHALATION) at 18:02

## 2020-06-19 RX ADMIN — METHOCARBAMOL TABLETS 500 MG: 500 TABLET, COATED ORAL at 08:18

## 2020-06-19 RX ADMIN — METHOCARBAMOL TABLETS 500 MG: 500 TABLET, COATED ORAL at 14:16

## 2020-06-19 RX ADMIN — GUAIFENESIN 600 MG: 600 TABLET, EXTENDED RELEASE ORAL at 20:12

## 2020-06-19 RX ADMIN — ENOXAPARIN SODIUM 30 MG: 30 INJECTION SUBCUTANEOUS at 08:17

## 2020-06-19 RX ADMIN — POTASSIUM CHLORIDE 20 MEQ: 10 TABLET, EXTENDED RELEASE ORAL at 08:18

## 2020-06-19 RX ADMIN — IBUPROFEN 400 MG: 400 TABLET, FILM COATED ORAL at 17:30

## 2020-06-19 RX ADMIN — HYDROCODONE BITARTRATE AND ACETAMINOPHEN 0.5 TABLET: 5; 325 TABLET ORAL at 20:11

## 2020-06-19 RX ADMIN — PRAMIPEXOLE DIHYDROCHLORIDE 0.5 MG: 0.25 TABLET ORAL at 17:30

## 2020-06-19 RX ADMIN — IPRATROPIUM BROMIDE AND ALBUTEROL SULFATE 1 AMPULE: .5; 3 SOLUTION RESPIRATORY (INHALATION) at 11:28

## 2020-06-19 RX ADMIN — BACLOFEN 5 MG: 10 TABLET ORAL at 20:11

## 2020-06-19 RX ADMIN — FUROSEMIDE 40 MG: 40 TABLET ORAL at 08:18

## 2020-06-19 RX ADMIN — ROSUVASTATIN CALCIUM 20 MG: 20 TABLET, FILM COATED ORAL at 08:18

## 2020-06-19 RX ADMIN — MIRTAZAPINE 30 MG: 15 TABLET, FILM COATED ORAL at 20:18

## 2020-06-19 RX ADMIN — ARIPIPRAZOLE 10 MG: 5 TABLET ORAL at 08:18

## 2020-06-19 RX ADMIN — IBUPROFEN 400 MG: 400 TABLET, FILM COATED ORAL at 14:16

## 2020-06-19 RX ADMIN — METHOCARBAMOL TABLETS 500 MG: 500 TABLET, COATED ORAL at 20:10

## 2020-06-19 RX ADMIN — PREGABALIN 150 MG: 75 CAPSULE ORAL at 08:18

## 2020-06-19 RX ADMIN — ENOXAPARIN SODIUM 30 MG: 30 INJECTION SUBCUTANEOUS at 20:10

## 2020-06-19 RX ADMIN — PANTOPRAZOLE SODIUM 40 MG: 40 TABLET, DELAYED RELEASE ORAL at 06:22

## 2020-06-19 RX ADMIN — SPIRONOLACTONE 25 MG: 25 TABLET, FILM COATED ORAL at 08:18

## 2020-06-19 RX ADMIN — SENNOSIDES AND DOCUSATE SODIUM 1 TABLET: 8.6; 5 TABLET ORAL at 08:18

## 2020-06-19 RX ADMIN — DOXAZOSIN 2 MG: 2 TABLET ORAL at 20:11

## 2020-06-19 RX ADMIN — LORAZEPAM 0.5 MG: 0.5 TABLET ORAL at 20:12

## 2020-06-19 RX ADMIN — IPRATROPIUM BROMIDE AND ALBUTEROL SULFATE 1 AMPULE: .5; 3 SOLUTION RESPIRATORY (INHALATION) at 06:05

## 2020-06-19 RX ADMIN — ACETAMINOPHEN 500 MG: 500 TABLET, FILM COATED ORAL at 20:12

## 2020-06-19 RX ADMIN — TRAMADOL HYDROCHLORIDE 50 MG: 50 TABLET, FILM COATED ORAL at 20:18

## 2020-06-19 RX ADMIN — LEVOTHYROXINE SODIUM 50 MCG: 50 TABLET ORAL at 06:22

## 2020-06-19 RX ADMIN — ESCITALOPRAM OXALATE 20 MG: 10 TABLET ORAL at 08:18

## 2020-06-19 RX ADMIN — GUAIFENESIN 600 MG: 600 TABLET, EXTENDED RELEASE ORAL at 08:18

## 2020-06-19 RX ADMIN — SENNOSIDES AND DOCUSATE SODIUM 1 TABLET: 8.6; 5 TABLET ORAL at 20:11

## 2020-06-19 RX ADMIN — METHOCARBAMOL TABLETS 500 MG: 500 TABLET, COATED ORAL at 17:31

## 2020-06-19 ASSESSMENT — ENCOUNTER SYMPTOMS
BACK PAIN: 1
RESPIRATORY NEGATIVE: 1
EYES NEGATIVE: 1
ALLERGIC/IMMUNOLOGIC NEGATIVE: 1
COLOR CHANGE: 0
GASTROINTESTINAL NEGATIVE: 1

## 2020-06-19 ASSESSMENT — PAIN DESCRIPTION - LOCATION
LOCATION: SHOULDER;ARM;RIB CAGE
LOCATION: SHOULDER;ARM;RIB CAGE

## 2020-06-19 ASSESSMENT — PAIN DESCRIPTION - FREQUENCY
FREQUENCY: CONTINUOUS
FREQUENCY: CONTINUOUS

## 2020-06-19 ASSESSMENT — PAIN SCALES - GENERAL
PAINLEVEL_OUTOF10: 9
PAINLEVEL_OUTOF10: 3
PAINLEVEL_OUTOF10: 9

## 2020-06-19 ASSESSMENT — PAIN DESCRIPTION - DESCRIPTORS
DESCRIPTORS: ACHING;THROBBING
DESCRIPTORS: THROBBING

## 2020-06-19 ASSESSMENT — PAIN DESCRIPTION - PAIN TYPE
TYPE: ACUTE PAIN;SURGICAL PAIN
TYPE: ACUTE PAIN;SURGICAL PAIN

## 2020-06-19 ASSESSMENT — PAIN DESCRIPTION - ORIENTATION
ORIENTATION: LEFT
ORIENTATION: RIGHT;LEFT

## 2020-06-19 ASSESSMENT — PAIN DESCRIPTION - PROGRESSION: CLINICAL_PROGRESSION: NOT CHANGED

## 2020-06-19 ASSESSMENT — PAIN SCALES - WONG BAKER: WONGBAKER_NUMERICALRESPONSE: 4

## 2020-06-19 NOTE — PLAN OF CARE
Problem: Falls - Risk of:  Goal: Will remain free from falls  Description: Will remain free from falls  Outcome: Met This Shift  Goal: Absence of physical injury  Description: Absence of physical injury  Outcome: Met This Shift     Problem: SAFETY  Goal: Free from accidental physical injury  Outcome: Met This Shift  Goal: Free from intentional harm  Outcome: Met This Shift     Problem: DAILY CARE  Goal: Daily care needs are met  Outcome: Met This Shift     Problem: SKIN INTEGRITY  Goal: Skin integrity is maintained or improved  Outcome: Met This Shift     Problem: KNOWLEDGE DEFICIT  Goal: Patient/S.O. demonstrates understanding of disease process, treatment plan, medications, and discharge instructions. Outcome: Met This Shift     Problem: DISCHARGE BARRIERS  Goal: Patient's continuum of care needs are met  Outcome: Met This Shift     Problem: Discharge Planning:  Goal: Discharged to appropriate level of care  Outcome: Met This Shift     Problem: Mobility - Impaired:  Goal: Mobility will improve  Outcome: Met This Shift     Problem: Infection - Surgical Site:  Goal: Will show no infection signs and symptoms  Outcome: Met This Shift     Problem: Nutrition  Goal: Optimal nutrition therapy  6/19/2020 1539 by Maged Simental RN  Outcome: Met This Shift  6/19/2020 1338 by Yoel Loving RD, LD  Outcome: Ongoing     Problem: Pain:  Description: Pain management should include both nonpharmacologic and pharmacologic interventions.   Goal: Pain level will decrease  Description: Pain level will decrease  Outcome: Ongoing  Goal: Control of acute pain  Description: Control of acute pain  Outcome: Ongoing  Goal: Control of chronic pain  Description: Control of chronic pain  Outcome: Ongoing     Problem: Pain - Acute:  Goal: Pain level will decrease  Description: Pain level will decrease  Outcome: Ongoing

## 2020-06-19 NOTE — PLAN OF CARE
Nutrition Problem: No nutrition diagnosis at this time  Intervention: Food and/or Nutrient Delivery: Modify current diet  Nutritional Goals: Intake > 75% of meals. Glu <180. Weight +/-3# of admit weight.
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
## 2020-06-19 NOTE — PROGRESS NOTES
therapy recommended upon DC. SS to follow and monitor patient's progress for DC planning.     Electronically signed by RULA Vargas on 6/19/2020 at 2:36 PM

## 2020-06-20 LAB
GLUCOSE BLD-MCNC: 148 MG/DL (ref 60–115)
PERFORMED ON: ABNORMAL

## 2020-06-20 PROCEDURE — 97116 GAIT TRAINING THERAPY: CPT

## 2020-06-20 PROCEDURE — 1200000002 HC SEMI PRIVATE SWING BED

## 2020-06-20 PROCEDURE — 6370000000 HC RX 637 (ALT 250 FOR IP): Performed by: PHYSICIAN ASSISTANT

## 2020-06-20 PROCEDURE — 97530 THERAPEUTIC ACTIVITIES: CPT

## 2020-06-20 PROCEDURE — 94640 AIRWAY INHALATION TREATMENT: CPT

## 2020-06-20 PROCEDURE — 6360000002 HC RX W HCPCS: Performed by: PHYSICIAN ASSISTANT

## 2020-06-20 PROCEDURE — 6370000000 HC RX 637 (ALT 250 FOR IP): Performed by: ANESTHESIOLOGY

## 2020-06-20 PROCEDURE — 97110 THERAPEUTIC EXERCISES: CPT

## 2020-06-20 PROCEDURE — 6370000000 HC RX 637 (ALT 250 FOR IP): Performed by: INTERNAL MEDICINE

## 2020-06-20 RX ORDER — NITROFURANTOIN 25; 75 MG/1; MG/1
100 CAPSULE ORAL EVERY 12 HOURS SCHEDULED
Status: ACTIVE | OUTPATIENT
Start: 2020-06-20 | End: 2020-06-22

## 2020-06-20 RX ADMIN — ENOXAPARIN SODIUM 30 MG: 30 INJECTION SUBCUTANEOUS at 09:06

## 2020-06-20 RX ADMIN — TRAMADOL HYDROCHLORIDE 50 MG: 50 TABLET, FILM COATED ORAL at 09:08

## 2020-06-20 RX ADMIN — ACETAMINOPHEN 500 MG: 500 TABLET, FILM COATED ORAL at 18:07

## 2020-06-20 RX ADMIN — LEVOTHYROXINE SODIUM 50 MCG: 50 TABLET ORAL at 05:25

## 2020-06-20 RX ADMIN — ARIPIPRAZOLE 10 MG: 5 TABLET ORAL at 09:06

## 2020-06-20 RX ADMIN — METHOCARBAMOL TABLETS 500 MG: 500 TABLET, COATED ORAL at 12:26

## 2020-06-20 RX ADMIN — SENNOSIDES AND DOCUSATE SODIUM 1 TABLET: 8.6; 5 TABLET ORAL at 22:09

## 2020-06-20 RX ADMIN — FUROSEMIDE 40 MG: 40 TABLET ORAL at 09:06

## 2020-06-20 RX ADMIN — HYDROCODONE BITARTRATE AND ACETAMINOPHEN 0.5 TABLET: 5; 325 TABLET ORAL at 22:04

## 2020-06-20 RX ADMIN — PREGABALIN 150 MG: 75 CAPSULE ORAL at 09:07

## 2020-06-20 RX ADMIN — AMLODIPINE BESYLATE 5 MG: 5 TABLET ORAL at 09:07

## 2020-06-20 RX ADMIN — IPRATROPIUM BROMIDE AND ALBUTEROL SULFATE 1 AMPULE: .5; 3 SOLUTION RESPIRATORY (INHALATION) at 06:03

## 2020-06-20 RX ADMIN — HYDROCODONE BITARTRATE AND ACETAMINOPHEN 0.5 TABLET: 5; 325 TABLET ORAL at 16:04

## 2020-06-20 RX ADMIN — METHOCARBAMOL TABLETS 500 MG: 500 TABLET, COATED ORAL at 09:08

## 2020-06-20 RX ADMIN — NITROFURANTOIN (MONOHYDRATE/MACROCRYSTALS) 100 MG: 75; 25 CAPSULE ORAL at 12:26

## 2020-06-20 RX ADMIN — BACLOFEN 5 MG: 10 TABLET ORAL at 09:07

## 2020-06-20 RX ADMIN — GUAIFENESIN 600 MG: 600 TABLET, EXTENDED RELEASE ORAL at 22:06

## 2020-06-20 RX ADMIN — METHOCARBAMOL TABLETS 500 MG: 500 TABLET, COATED ORAL at 18:07

## 2020-06-20 RX ADMIN — ROSUVASTATIN CALCIUM 20 MG: 20 TABLET, FILM COATED ORAL at 09:06

## 2020-06-20 RX ADMIN — ACETAMINOPHEN 500 MG: 500 TABLET, FILM COATED ORAL at 22:05

## 2020-06-20 RX ADMIN — ESCITALOPRAM OXALATE 20 MG: 10 TABLET ORAL at 09:07

## 2020-06-20 RX ADMIN — IPRATROPIUM BROMIDE AND ALBUTEROL SULFATE 1 AMPULE: .5; 3 SOLUTION RESPIRATORY (INHALATION) at 18:17

## 2020-06-20 RX ADMIN — IPRATROPIUM BROMIDE AND ALBUTEROL SULFATE 1 AMPULE: .5; 3 SOLUTION RESPIRATORY (INHALATION) at 11:19

## 2020-06-20 RX ADMIN — POTASSIUM CHLORIDE 20 MEQ: 10 TABLET, EXTENDED RELEASE ORAL at 09:08

## 2020-06-20 RX ADMIN — ACETAMINOPHEN 500 MG: 500 TABLET, FILM COATED ORAL at 12:26

## 2020-06-20 RX ADMIN — PRAMIPEXOLE DIHYDROCHLORIDE 0.5 MG: 0.25 TABLET ORAL at 18:06

## 2020-06-20 RX ADMIN — DOXAZOSIN 2 MG: 2 TABLET ORAL at 22:05

## 2020-06-20 RX ADMIN — ACETAMINOPHEN 500 MG: 500 TABLET, FILM COATED ORAL at 09:07

## 2020-06-20 RX ADMIN — SENNOSIDES AND DOCUSATE SODIUM 1 TABLET: 8.6; 5 TABLET ORAL at 09:08

## 2020-06-20 RX ADMIN — METHOCARBAMOL TABLETS 500 MG: 500 TABLET, COATED ORAL at 22:05

## 2020-06-20 RX ADMIN — ENOXAPARIN SODIUM 30 MG: 30 INJECTION SUBCUTANEOUS at 22:05

## 2020-06-20 RX ADMIN — HYDROCODONE BITARTRATE AND ACETAMINOPHEN 0.5 TABLET: 5; 325 TABLET ORAL at 05:25

## 2020-06-20 RX ADMIN — BACLOFEN 5 MG: 10 TABLET ORAL at 22:05

## 2020-06-20 RX ADMIN — SPIRONOLACTONE 25 MG: 25 TABLET, FILM COATED ORAL at 09:07

## 2020-06-20 RX ADMIN — MIRTAZAPINE 30 MG: 15 TABLET, FILM COATED ORAL at 22:05

## 2020-06-20 RX ADMIN — NITROFURANTOIN (MONOHYDRATE/MACROCRYSTALS) 100 MG: 75; 25 CAPSULE ORAL at 22:09

## 2020-06-20 RX ADMIN — GUAIFENESIN 600 MG: 600 TABLET, EXTENDED RELEASE ORAL at 09:07

## 2020-06-20 RX ADMIN — PANTOPRAZOLE SODIUM 40 MG: 40 TABLET, DELAYED RELEASE ORAL at 05:25

## 2020-06-20 RX ADMIN — TRAMADOL HYDROCHLORIDE 50 MG: 50 TABLET, FILM COATED ORAL at 12:26

## 2020-06-20 RX ADMIN — TRAMADOL HYDROCHLORIDE 50 MG: 50 TABLET, FILM COATED ORAL at 18:07

## 2020-06-20 ASSESSMENT — PAIN SCALES - GENERAL
PAINLEVEL_OUTOF10: 10
PAINLEVEL_OUTOF10: 8
PAINLEVEL_OUTOF10: 8
PAINLEVEL_OUTOF10: 6
PAINLEVEL_OUTOF10: 8
PAINLEVEL_OUTOF10: 2
PAINLEVEL_OUTOF10: 7
PAINLEVEL_OUTOF10: 8
PAINLEVEL_OUTOF10: 0
PAINLEVEL_OUTOF10: 8

## 2020-06-20 ASSESSMENT — PAIN DESCRIPTION - DESCRIPTORS
DESCRIPTORS: ACHING;SORE
DESCRIPTORS: ACHING;SORE
DESCRIPTORS: ACHING

## 2020-06-20 ASSESSMENT — PAIN DESCRIPTION - LOCATION
LOCATION: ARM;SHOULDER;RIB CAGE
LOCATION: SHOULDER
LOCATION: SHOULDER

## 2020-06-20 ASSESSMENT — PAIN DESCRIPTION - FREQUENCY
FREQUENCY: CONTINUOUS
FREQUENCY: CONTINUOUS

## 2020-06-20 ASSESSMENT — PAIN DESCRIPTION - PAIN TYPE
TYPE: ACUTE PAIN

## 2020-06-20 ASSESSMENT — PAIN DESCRIPTION - ORIENTATION
ORIENTATION: LEFT;RIGHT
ORIENTATION: LEFT
ORIENTATION: LEFT

## 2020-06-20 ASSESSMENT — PAIN DESCRIPTION - PROGRESSION
CLINICAL_PROGRESSION: GRADUALLY IMPROVING
CLINICAL_PROGRESSION: NOT CHANGED

## 2020-06-20 ASSESSMENT — PAIN DESCRIPTION - ONSET
ONSET: ON-GOING
ONSET: ON-GOING

## 2020-06-20 ASSESSMENT — PAIN - FUNCTIONAL ASSESSMENT
PAIN_FUNCTIONAL_ASSESSMENT: PREVENTS OR INTERFERES SOME ACTIVE ACTIVITIES AND ADLS
PAIN_FUNCTIONAL_ASSESSMENT: PREVENTS OR INTERFERES SOME ACTIVE ACTIVITIES AND ADLS

## 2020-06-20 NOTE — PROGRESS NOTES
Physician Progress Note    2020   10:13 AM    Name:  José Miguel Ferrari  MRN:    [id number]     IP Day: 2     Admit Date: 2020  9:17 PM  PCP: Ingrid Zamudio MD    Code Status:  Full Code      Assessment and Plan: Active Problems/ diagnosis:   Left humerus fracture status post ORIF  Acute metabolic encephalopathy-improved  12th rib and sixth rib fractures  Anxiety  Hypothyroidism  UTI present admission  Diabetes    2020  -Start Macrobid for UTI. Follow-up urine culture  -Resume PT OT  -Pain management consulted, appreciate Dr. Nathen Collet help, she does not want the Lidoderm patch. Discontinued the patch.  -Resume home medications otherwise.  -Resume rehab orders    DVT PPx     Subjective:      no new events. No new symptoms. Physical Examination:      Vitals:  /86   Pulse 96   Temp 97.9 °F (36.6 °C) (Oral)   Resp 18   Ht 5' 2\" (1.575 m)   Wt 157 lb 8 oz (71.4 kg)   LMP  (LMP Unknown)   SpO2 95%   BMI 28.81 kg/m²   Temp (24hrs), Av.1 °F (36.7 °C), Min:97.9 °F (36.6 °C), Max:98.2 °F (36.8 °C)      General appearance: alert, cooperative and no distress  Mental Status: oriented to person, place and time and normal affect  Lungs: clear to auscultation bilaterally, normal effort  Heart: regular rate and rhythm, no murmur  Abdomen: soft, nontender, nondistended, bowel sounds present, no masses  Extremities: no edema, redness, tenderness in the calves  Skin: no gross lesions, rashes    Data:     Labs:  Recent Labs     20  0522 20  0629   WBC 8.6 8.8   HGB 9.3* 8.9*    352     Recent Labs     20  0522 20  0629    140   K 3.0* 4.0   CL 99 99   CO2 26 29   BUN 9 16   CREATININE 0.88 1. 15*   GLUCOSE 125* 125*     No results for input(s): AST, ALT, ALB, BILITOT, ALKPHOS in the last 72 hours.     Current Facility-Administered Medications   Medication Dose Route Frequency Provider Last Rate Last Dose    nitrofurantoin (macrocrystal-monohydrate) (MACROBID) BID Sunita Barrett PA-C   30 mg at 06/20/20 2541    escitalopram (LEXAPRO) tablet 20 mg  20 mg Oral Daily Hardeeplie Danes MIKE Moran   20 mg at 06/20/20 2470    furosemide (LASIX) tablet 40 mg  40 mg Oral Daily Hardeeplie Fears MIKE Moran   40 mg at 06/20/20 9757    guaiFENesin (MUCINEX) extended release tablet 600 mg  600 mg Oral BID Hardeeplie Fears MIKE Moran   600 mg at 06/20/20 1870    insulin lispro (HUMALOG) injection vial 0-6 Units  0-6 Units Subcutaneous Nightly Suntia Barrett PA-C        ipratropium-albuterol (DUONEB) nebulizer solution 1 ampule  1 ampule Inhalation TID Sunita Barrett PA-C   1 ampule at 06/20/20 2306    levothyroxine (SYNTHROID) tablet 50 mcg  50 mcg Oral Daily Hardeeplie Fears MIKE Moran   50 mcg at 06/20/20 9687    melatonin tablet 9 mg  9 mg Oral Nightly PRN Hardeeplie Fears MIKE Moran   9 mg at 06/18/20 2233    methocarbamol (ROBAXIN) tablet 500 mg  500 mg Oral 4x Daily Sunita Barrett PA-C   500 mg at 06/20/20 8032    mirtazapine (REMERON) tablet 30 mg  30 mg Oral Nightly Hardeeplie Fears MIKE Moran   30 mg at 06/19/20 2018    pantoprazole (PROTONIX) tablet 40 mg  40 mg Oral QAM AC Dellie Fears MIKE Moran   40 mg at 06/20/20 1299    polyethylene glycol (GLYCOLAX) packet 17 g  17 g Oral Daily Dellie Fears MIKE Moran        potassium chloride (KLOR-CON M) extended release tablet 20 mEq  20 mEq Oral Daily with breakfast Yoli Moran PA-C   20 mEq at 06/20/20 0908    pramipexole (MIRAPEX) tablet 0.5 mg  0.5 mg Oral QPM Dellie Fears MIKE Moran   0.5 mg at 06/19/20 1730    pregabalin (LYRICA) capsule 150 mg  150 mg Oral Daily Dellie Fears Luisa, PA-C   150 mg at 06/20/20 6121    psyllium (METAMUCIL) 58.12 % packet 1 packet  1 packet Oral Daily Yoli Moran PA-C        rosuvastatin (CRESTOR) tablet 20 mg  20 mg Oral Daily Yoli Moran PA-C   20 mg at 06/20/20 4552    spironolactone (ALDACTONE) tablet 25 mg  25 mg Oral Daily Yoli Moran PA-C   25 mg at 06/20/20 0907    polyethylene glycol (GLYCOLAX) packet 17 g  17 g Oral Daily PRN Anel Coffman MD        promethazine (PHENERGAN) tablet 12.5 mg  12.5 mg Oral Q6H PRN Bev Jacinto MD           Additional work up or/and treatment plan may be added today or then after based on clinical progression. I am managing a portion of pt care. Some medical issues are handled by other specialists. Additional work up and treatment should be done in out pt setting by pt PCP and other out pt providers. In addition to examining and evaluating pt, I spent additional time explaining care, normaland abnormal findings, and treatment plan. All of pt questions were answered. Counseling, diet and education were provided. Case will be discussed with nursing staff when appropriate. Family will be updated if and when appropriate.        Electronically signed by Rosemary Hussein DO on 6/20/2020 at 10:13 AM
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
## 2020-06-20 NOTE — PLAN OF CARE
treatment plan, medications, and discharge instructions.   6/20/2020 0207 by Della Corrales RN  Outcome: Ongoing  6/19/2020 1539 by Lizy Porter RN  Outcome: Met This Shift     Problem: DISCHARGE BARRIERS  Goal: Patient's continuum of care needs are met  6/20/2020 0207 by Della Corrales RN  Outcome: Met This Shift  6/19/2020 1539 by Lizy Porter RN  Outcome: Met This Shift     Problem: Discharge Planning:  Goal: Discharged to appropriate level of care  6/20/2020 0207 by Della Corrales RN  Outcome: Ongoing  6/19/2020 1539 by Lizy Porter RN  Outcome: Met This Shift     Problem: Mobility - Impaired:  Goal: Mobility will improve  6/20/2020 0207 by Della Corrales RN  Outcome: Met This Shift  6/19/2020 1539 by Lizy Porter RN  Outcome: Met This Shift     Problem: Infection - Surgical Site:  Goal: Will show no infection signs and symptoms  6/20/2020 0207 by Della Corrales RN  Outcome: Met This Shift  6/19/2020 1539 by Lizy Porter RN  Outcome: Met This Shift     Problem: Pain - Acute:  Goal: Pain level will decrease  Description: Pain level will decrease  6/20/2020 0207 by Della Corrales RN  Outcome: Met This Shift  6/19/2020 1539 by Lizy Porter RN  Outcome: Ongoing     Problem: Nutrition  Goal: Optimal nutrition therapy  6/20/2020 0207 by Della Corrales RN  Outcome: Met This Shift  6/19/2020 1539 by Liyz Porter RN  Outcome: Met This Shift  6/19/2020 1338 by Gomez Jackson RD, LD  Outcome: Ongoing

## 2020-06-21 LAB
GLUCOSE BLD-MCNC: 120 MG/DL (ref 60–115)
PERFORMED ON: ABNORMAL

## 2020-06-21 PROCEDURE — 6360000002 HC RX W HCPCS: Performed by: PHYSICIAN ASSISTANT

## 2020-06-21 PROCEDURE — 6370000000 HC RX 637 (ALT 250 FOR IP): Performed by: PHYSICIAN ASSISTANT

## 2020-06-21 PROCEDURE — 1200000002 HC SEMI PRIVATE SWING BED

## 2020-06-21 PROCEDURE — 6370000000 HC RX 637 (ALT 250 FOR IP): Performed by: INTERNAL MEDICINE

## 2020-06-21 PROCEDURE — 6370000000 HC RX 637 (ALT 250 FOR IP): Performed by: ANESTHESIOLOGY

## 2020-06-21 PROCEDURE — 97530 THERAPEUTIC ACTIVITIES: CPT

## 2020-06-21 PROCEDURE — 97110 THERAPEUTIC EXERCISES: CPT

## 2020-06-21 RX ORDER — LORAZEPAM 0.5 MG/1
0.5 TABLET ORAL 3 TIMES DAILY PRN
Status: DISCONTINUED | OUTPATIENT
Start: 2020-06-21 | End: 2020-06-30 | Stop reason: HOSPADM

## 2020-06-21 RX ORDER — BACLOFEN 10 MG/1
5 TABLET ORAL 3 TIMES DAILY
Status: DISCONTINUED | OUTPATIENT
Start: 2020-06-21 | End: 2020-06-28

## 2020-06-21 RX ADMIN — HYDROCODONE BITARTRATE AND ACETAMINOPHEN 0.5 TABLET: 5; 325 TABLET ORAL at 22:40

## 2020-06-21 RX ADMIN — ENOXAPARIN SODIUM 30 MG: 30 INJECTION SUBCUTANEOUS at 20:00

## 2020-06-21 RX ADMIN — LORAZEPAM 0.5 MG: 0.5 TABLET ORAL at 20:00

## 2020-06-21 RX ADMIN — LORAZEPAM 0.5 MG: 0.5 TABLET ORAL at 12:03

## 2020-06-21 RX ADMIN — AMLODIPINE BESYLATE 5 MG: 5 TABLET ORAL at 09:09

## 2020-06-21 RX ADMIN — LEVOTHYROXINE SODIUM 50 MCG: 50 TABLET ORAL at 05:37

## 2020-06-21 RX ADMIN — SPIRONOLACTONE 25 MG: 25 TABLET, FILM COATED ORAL at 09:09

## 2020-06-21 RX ADMIN — ACETAMINOPHEN 500 MG: 500 TABLET, FILM COATED ORAL at 09:09

## 2020-06-21 RX ADMIN — LORAZEPAM 0.5 MG: 0.5 TABLET ORAL at 09:09

## 2020-06-21 RX ADMIN — ACETAMINOPHEN 500 MG: 500 TABLET, FILM COATED ORAL at 12:03

## 2020-06-21 RX ADMIN — PRAMIPEXOLE DIHYDROCHLORIDE 0.5 MG: 0.25 TABLET ORAL at 17:13

## 2020-06-21 RX ADMIN — DOXAZOSIN 2 MG: 2 TABLET ORAL at 20:00

## 2020-06-21 RX ADMIN — TRAMADOL HYDROCHLORIDE 50 MG: 50 TABLET, FILM COATED ORAL at 17:13

## 2020-06-21 RX ADMIN — ROSUVASTATIN CALCIUM 20 MG: 20 TABLET, FILM COATED ORAL at 09:09

## 2020-06-21 RX ADMIN — TRAMADOL HYDROCHLORIDE 50 MG: 50 TABLET, FILM COATED ORAL at 12:02

## 2020-06-21 RX ADMIN — ACETAMINOPHEN 500 MG: 500 TABLET, FILM COATED ORAL at 20:00

## 2020-06-21 RX ADMIN — ENOXAPARIN SODIUM 30 MG: 30 INJECTION SUBCUTANEOUS at 09:08

## 2020-06-21 RX ADMIN — GUAIFENESIN 600 MG: 600 TABLET, EXTENDED RELEASE ORAL at 09:09

## 2020-06-21 RX ADMIN — Medication 9 MG: at 20:00

## 2020-06-21 RX ADMIN — PANTOPRAZOLE SODIUM 40 MG: 40 TABLET, DELAYED RELEASE ORAL at 05:37

## 2020-06-21 RX ADMIN — NITROFURANTOIN (MONOHYDRATE/MACROCRYSTALS) 100 MG: 75; 25 CAPSULE ORAL at 09:09

## 2020-06-21 RX ADMIN — BACLOFEN 5 MG: 10 TABLET ORAL at 14:53

## 2020-06-21 RX ADMIN — ACETAMINOPHEN 500 MG: 500 TABLET, FILM COATED ORAL at 17:13

## 2020-06-21 RX ADMIN — GUAIFENESIN 600 MG: 600 TABLET, EXTENDED RELEASE ORAL at 20:00

## 2020-06-21 RX ADMIN — MIRTAZAPINE 30 MG: 15 TABLET, FILM COATED ORAL at 20:00

## 2020-06-21 RX ADMIN — NITROFURANTOIN (MONOHYDRATE/MACROCRYSTALS) 100 MG: 75; 25 CAPSULE ORAL at 20:00

## 2020-06-21 RX ADMIN — SENNOSIDES AND DOCUSATE SODIUM 1 TABLET: 8.6; 5 TABLET ORAL at 20:00

## 2020-06-21 RX ADMIN — TRAMADOL HYDROCHLORIDE 50 MG: 50 TABLET, FILM COATED ORAL at 09:09

## 2020-06-21 RX ADMIN — HYDROCODONE BITARTRATE AND ACETAMINOPHEN 0.5 TABLET: 5; 325 TABLET ORAL at 15:08

## 2020-06-21 RX ADMIN — BACLOFEN 5 MG: 10 TABLET ORAL at 09:08

## 2020-06-21 RX ADMIN — PREGABALIN 150 MG: 75 CAPSULE ORAL at 09:09

## 2020-06-21 RX ADMIN — BACLOFEN 5 MG: 10 TABLET ORAL at 20:00

## 2020-06-21 RX ADMIN — ESCITALOPRAM OXALATE 20 MG: 10 TABLET ORAL at 09:09

## 2020-06-21 RX ADMIN — HYDROCODONE BITARTRATE AND ACETAMINOPHEN 0.5 TABLET: 5; 325 TABLET ORAL at 05:37

## 2020-06-21 RX ADMIN — ARIPIPRAZOLE 10 MG: 5 TABLET ORAL at 09:10

## 2020-06-21 RX ADMIN — POTASSIUM CHLORIDE 20 MEQ: 10 TABLET, EXTENDED RELEASE ORAL at 09:08

## 2020-06-21 RX ADMIN — FUROSEMIDE 40 MG: 40 TABLET ORAL at 09:10

## 2020-06-21 ASSESSMENT — PAIN - FUNCTIONAL ASSESSMENT
PAIN_FUNCTIONAL_ASSESSMENT: PREVENTS OR INTERFERES SOME ACTIVE ACTIVITIES AND ADLS
PAIN_FUNCTIONAL_ASSESSMENT: ACTIVITIES ARE NOT PREVENTED
PAIN_FUNCTIONAL_ASSESSMENT: PREVENTS OR INTERFERES SOME ACTIVE ACTIVITIES AND ADLS
PAIN_FUNCTIONAL_ASSESSMENT: PREVENTS OR INTERFERES SOME ACTIVE ACTIVITIES AND ADLS

## 2020-06-21 ASSESSMENT — PAIN DESCRIPTION - FREQUENCY
FREQUENCY: CONTINUOUS

## 2020-06-21 ASSESSMENT — PAIN DESCRIPTION - ORIENTATION
ORIENTATION: LEFT

## 2020-06-21 ASSESSMENT — PAIN SCALES - GENERAL
PAINLEVEL_OUTOF10: 10
PAINLEVEL_OUTOF10: 4
PAINLEVEL_OUTOF10: 0
PAINLEVEL_OUTOF10: 9
PAINLEVEL_OUTOF10: 10
PAINLEVEL_OUTOF10: 9
PAINLEVEL_OUTOF10: 7
PAINLEVEL_OUTOF10: 10
PAINLEVEL_OUTOF10: 2
PAINLEVEL_OUTOF10: 2
PAINLEVEL_OUTOF10: 9
PAINLEVEL_OUTOF10: 7

## 2020-06-21 ASSESSMENT — PAIN DESCRIPTION - PAIN TYPE
TYPE: ACUTE PAIN

## 2020-06-21 ASSESSMENT — PAIN DESCRIPTION - PROGRESSION
CLINICAL_PROGRESSION: NOT CHANGED
CLINICAL_PROGRESSION: NOT CHANGED
CLINICAL_PROGRESSION: GRADUALLY IMPROVING
CLINICAL_PROGRESSION: GRADUALLY WORSENING
CLINICAL_PROGRESSION: NOT CHANGED
CLINICAL_PROGRESSION: GRADUALLY IMPROVING
CLINICAL_PROGRESSION: NOT CHANGED

## 2020-06-21 ASSESSMENT — PAIN DESCRIPTION - LOCATION
LOCATION: SHOULDER
LOCATION: CHEST
LOCATION: SHOULDER

## 2020-06-21 ASSESSMENT — PAIN DESCRIPTION - DESCRIPTORS
DESCRIPTORS: ACHING;SORE

## 2020-06-21 ASSESSMENT — PAIN DESCRIPTION - ONSET
ONSET: ON-GOING

## 2020-06-21 NOTE — PROGRESS NOTES
Physician Progress Note    2020   9:51 AM    Name:  Sujey Taylor  MRN:    [id number]     IP Day: 3     Admit Date: 2020  9:17 PM  PCP: Lakeshia Gordillo MD    Code Status:  Full Code      Assessment and Plan: Active Problems/ diagnosis:   Left humerus fracture status post ORIF  Acute metabolic encephalopathy-improved  12th rib and sixth rib fractures  Anxiety  Hypothyroidism  UTI present admission  Diabetes    2020  -Resume Macrobid for UTI.  cannot find a urine culture although the UA supposed to reflex to culture, she has been getting antibiotics ready so urine culture will not be helpful at this point.  -Resume PT OT  -Pain management consulted, appreciate Dr. Srinath Brady help, she does not want the Lidoderm patch. Discontinued the patch. Cussed with Dr. Srinath Brady this morning .  -Resume home medications otherwise.  -Resume rehab orders    DVT PPx     Subjective:      no new events. No new symptoms. Physical Examination:      Vitals:  BP (!) 144/70   Pulse 94   Temp 98.2 °F (36.8 °C)   Resp 20   Ht 5' 2\" (1.575 m)   Wt 157 lb 8 oz (71.4 kg)   LMP  (LMP Unknown)   SpO2 93%   BMI 28.81 kg/m²   Temp (24hrs), Av.2 °F (36.8 °C), Min:98.2 °F (36.8 °C), Max:98.2 °F (36.8 °C)      General appearance: alert, cooperative and no distress  Mental Status: oriented to person, place and time and normal affect  Lungs: clear to auscultation bilaterally, normal effort  Heart: regular rate and rhythm, no murmur  Abdomen: soft, nontender, nondistended, bowel sounds present, no masses  Extremities: no edema, redness, tenderness in the calves  Skin: no gross lesions, rashes    Data:     Labs:  Recent Labs     20  0629   WBC 8.8   HGB 8.9*        Recent Labs     20  0629      K 4.0   CL 99   CO2 29   BUN 16   CREATININE 1.15*   GLUCOSE 125*     No results for input(s): AST, ALT, ALB, BILITOT, ALKPHOS in the last 72 hours.     Current Facility-Administered Medications   Medication Dose Route Frequency Provider Last Rate Last Dose    baclofen (LIORESAL) tablet 5 mg  5 mg Oral TID Sana Friday, MD   5 mg at 06/21/20 0908    LORazepam (ATIVAN) tablet 0.5 mg  0.5 mg Oral TID PRN [facility] Sensing, DO        nitrofurantoin (macrocrystal-monohydrate) (MACROBID) capsule 100 mg  100 mg Oral 2 times per day [facility] Sensing, DO   100 mg at 06/21/20 0909    traMADol (ULTRAM) tablet 50 mg  50 mg Oral TID  Sana Friday, MD   50 mg at 06/21/20 0909    acetaminophen (TYLENOL) tablet 500 mg  500 mg Oral 4x Daily  Sanays Friday, MD   500 mg at 06/21/20 0909    HYDROcodone-acetaminophen (NORCO) 5-325 MG per tablet 0.5 tablet  0.5 tablet Oral Q6H PRN Sana Friday, MD   0.5 tablet at 06/21/20 0537    analgesic ointment ointment   Topical Q4H PRN Sana Friday, MD        ondansetron (ZOFRAN-ODT) disintegrating tablet 4 mg  4 mg Oral Q8H PRN Sana Friday, MD        magnesium hydroxide (MILK OF MAGNESIA) 400 MG/5ML suspension 30 mL  30 mL Oral Daily PRN Brenna Guerra PA-C        sennosides-docusate sodium (SENOKOT-S) 8.6-50 MG tablet 1 tablet  1 tablet Oral BID Brenna Guerra PA-C   1 tablet at 06/20/20 2209    dextrose 5 % solution  100 mL/hr Intravenous PRN Andrea Moran PA-C        dextrose 50 % IV solution  12.5 g Intravenous PRN Andrea Moran PA-C        glucagon (rDNA) injection 1 mg  1 mg Intramuscular PRN Andrea Moran PA-C        glucose (GLUTOSE) 40 % oral gel 15 g  15 g Oral PRN Andrea Moran PA-C        acetylcysteine (MUCOMYST) 20 % solution 600 mg  600 mg Inhalation BID Andrea Moran PA-C        amLODIPine (NORVASC) tablet 5 mg  5 mg Oral Daily Andrea Moran PA-C   5 mg at 06/21/20 1286    ARIPiprazole (ABILIFY) tablet 10 mg  10 mg Oral Daily Andrea Moran PA-C   10 mg at 06/21/20 2806    bisacodyl (DULCOLAX) suppository 10 mg  10 mg Rectal Daily PRN Brenna Guerra PA-C        doxazosin (CARDURA) tablet 2 mg  2 mg Oral Nightly Jeff Kim PA-C   2 mg at 06/20/20 2205    enoxaparin (LOVENOX) injection 30 mg  30 mg Subcutaneous BID Annamarie Moran PA-C   30 mg at 06/21/20 0908    escitalopram (LEXAPRO) tablet 20 mg  20 mg Oral Daily Annamarie Moran PA-C   20 mg at 06/21/20 6074    furosemide (LASIX) tablet 40 mg  40 mg Oral Daily Annamarie Moran PA-C   40 mg at 06/21/20 0710    guaiFENesin (MUCINEX) extended release tablet 600 mg  600 mg Oral BID Annamarie Moran PA-C   600 mg at 06/21/20 9772    insulin lispro (HUMALOG) injection vial 0-6 Units  0-6 Units Subcutaneous Nightly Jeff Kim PA-C   1 Units at 06/20/20 2210    ipratropium-albuterol (DUONEB) nebulizer solution 1 ampule  1 ampule Inhalation TID Jeff Kim PA-C   1 ampule at 06/20/20 1817    levothyroxine (SYNTHROID) tablet 50 mcg  50 mcg Oral Daily Jeff Kim PA-C   50 mcg at 06/21/20 3846    melatonin tablet 9 mg  9 mg Oral Nightly PRN Annamarie Moran PA-C   9 mg at 06/18/20 2233    mirtazapine (REMERON) tablet 30 mg  30 mg Oral Nightly Annamarie Moran PA-C   30 mg at 06/20/20 2205    pantoprazole (PROTONIX) tablet 40 mg  40 mg Oral QAM AC Annamarie Moran PA-C   40 mg at 06/21/20 8264    polyethylene glycol (GLYCOLAX) packet 17 g  17 g Oral Daily Annamarie Moran PA-C        potassium chloride (KLOR-CON M) extended release tablet 20 mEq  20 mEq Oral Daily with breakfast Annamarie Moran PA-C   20 mEq at 06/21/20 0908    pramipexole (MIRAPEX) tablet 0.5 mg  0.5 mg Oral QPM Annamarie Moran PA-C   0.5 mg at 06/20/20 1806    pregabalin (LYRICA) capsule 150 mg  150 mg Oral Daily Annamarie Moran PA-C   150 mg at 06/21/20 5058    psyllium (METAMUCIL) 58.12 % packet 1 packet  1 packet Oral Daily Annamarie Moran PA-C        rosuvastatin (CRESTOR) tablet 20 mg  20 mg Oral Daily Jeff Kim PA-C   20 mg at 06/21/20 8533    spironolactone (ALDACTONE) tablet 25 mg  25 mg Oral Daily Jeff Kim PA-C 25 mg at 06/21/20 0775    polyethylene glycol (GLYCOLAX) packet 17 g  17 g Oral Daily PRN Anel Coffman MD        promethazine (PHENERGAN) tablet 12.5 mg  12.5 mg Oral Q6H PRN Valencia Arzola MD           Additional work up or/and treatment plan may be added today or then after based on clinical progression. I am managing a portion of pt care. Some medical issues are handled by other specialists. Additional work up and treatment should be done in out pt setting by pt PCP and other out pt providers. In addition to examining and evaluating pt, I spent additional time explaining care, normaland abnormal findings, and treatment plan. All of pt questions were answered. Counseling, diet and education were provided. Case will be discussed with nursing staff when appropriate. Family will be updated if and when appropriate.        Electronically signed by Rosemary Hussein DO on 6/21/2020 at 9:51 AM

## 2020-06-21 NOTE — PROGRESS NOTES
Bed alarm going off, pt trying to get out of bed. Pt explains she needs to go to the bathroom. Pt reminded she needs to call us. Pt states \" I know, but I have to go to the bathroom. \" Pt ambulated with unsteady gate to bathroom. Call light given to pt in bathroom as I left to go get underwear upon her request because she was hot and wanted the pants off. Pt was back to bed when I got back to room. Pt stated \"I was done going to the bathroom. I know I was suppose to call. But I was done going to the bathroom and wanted to go back to bed. \" Pt educated on risks of not waiting for us. Avasys explained to pt. Pt repeated back that we would be placing an avasys in the room and it is a camera with a person watching her to remind her to call us and wait before getting out of bed. Bed alarm placed. Avasys #2 placed in room.

## 2020-06-21 NOTE — PROGRESS NOTES
Physical Therapy  Facility/Department: [facility] MED SURG UNIT  Daily Treatment Note  NAME: Rashad Fang  : 1960  MRN: [id number]    Date of Service: 2020    Discharge Recommendations:  Home with assist PRN, Home with Home health PT        Assessment   Body structures, Functions, Activity limitations: Decreased functional mobility ; Decreased strength;Decreased endurance;Decreased balance; Increased pain  Assessment: Patient demonstrated slight improvements in bed mobility but due to high pain this visit walking was held for safety concerns. Treatment Diagnosis: Weakaness and decreased fucnitnal independence post accident with L humerus fracture  and rib fractures. Prognosis: Good  REQUIRES PT FOLLOW UP: Yes  Activity Tolerance  Activity Tolerance: Patient Tolerated treatment well;Patient limited by pain     Patient Diagnosis(es): There were no encounter diagnoses. has a past medical history of Acid reflux, Allergic rhinitis, Anxiety, Arnold-Chiari deformity (HCC), Asthma, Cerebral artery occlusion with cerebral infarction [facility]), Cerebrovascular disease, Chronic back pain greater than 3 months duration, COPD (chronic obstructive pulmonary disease) ([address] CVA (cerebral infarction), Depression, Fibromyalgia, H/O encephalopathy, Headache(784.0), Hepatitis B surface antigen positive, Hx of blood clots, Hyperlipidemia LDL goal < 100, Hypertension, Hypothyroidism, Laryngitis, chronic, Marijuana smoker in remission ([address] Obesity (BMI 30-39.9), Osteoarthritis, Pulmonary embolism and infarction ([address] Restless legs syndrome, Rhinitis, chronic, Rotator cuff tear, S/P colonoscopy with polypectomy, Seizures ([address] Smoker, Spinal headache, Spondylosis without myelopathy, Type 2 diabetes mellitus without complication ([address] Urinary incontinence, and Vertebral compression fracture ([address] has a past surgical history that includes craniotomy (); Femur fracture surgery (Left, );   section (); Score  Intervention List: Patient able to continue with treatment    Orientation     Cognition      Objective   Bed mobility  Scooting: Moderate assistance  Transfers  Sit to Stand: Stand by assistance  Stand to sit: Stand by assistance  Ambulation  Ambulation?: No  Ambulation 1  Comments: Held off as pain increased upon standing     Balance  Sitting - Static: Good  Sitting - Dynamic: Fair  Standing - Static: Fair  Standing - Dynamic: Fair;-  Exercises  Quad Sets: x 10  Hip Flexion: (R) only as (L) painful x 10  Knee Short Arc Quad: x 10   Ankle Pumps: x 10 x 2      Strength Other  Other: old CVA with L hemiparesis                 G-Code     OutComes Score                                                     AM-PAC Score             Goals  Short term goals  Time Frame for Short term goals: 1 week  Short term goal 1: transfer with CGA consistently with no LOB  Short term goal 2: amb >= 75ft with cane or least AD, CGA before fatigued  Short term goal 3: tolerate 2x 10 LE AARON to gin strenght and endurance  Short term goal 4: assess bed mobilty with Min A of 1 and min vc for safety  Long term goals  Time Frame for Long term goals : 2 weeks  Long term goal 1: transfers and bed mobility modified independent  Long term goal 2: ambulate >= 125 ft with least AD modified independent with improved upright gait pattern  Long term goal 3: LE strength increased any amount to achieve functional goal  Long term goal 4: HEP in place for discharge  Patient Goals   Patient goals : \"I need to be strong enough to go back home. \"    Plan    Plan  Times per week: 5-7x week  Times per day: (1-2x per day)  Plan weeks: 2  Current Treatment Recommendations: Strengthening, ROM, Functional Mobility Training, Transfer Training, Endurance Training, Gait Training, Pain Management, Home Exercise Program, Safety Education & Training, Patient/Caregiver Education & Training, Equipment Evaluation, Education, & procurement  Plan Comment: Cont.  POC  Safety Devices  Type of devices:  All fall risk precautions in place, Call light within reach, Gait belt, Bed alarm in place     Therapy Time   Individual Concurrent Group Co-treatment   Time In  830         Time Out  900         Minutes  [facility] and [address] Number: [phone]

## 2020-06-22 LAB
ANION GAP SERPL CALCULATED.3IONS-SCNC: 11 MEQ/L (ref 9–15)
BUN BLDV-MCNC: 25 MG/DL (ref 6–20)
CALCIUM SERPL-MCNC: 9.3 MG/DL (ref 8.5–9.9)
CHLORIDE BLD-SCNC: 100 MEQ/L (ref 95–107)
CO2: 27 MEQ/L (ref 20–31)
CREAT SERPL-MCNC: 1 MG/DL (ref 0.5–0.9)
EKG ATRIAL RATE: 107 BPM
EKG P AXIS: 73 DEGREES
EKG P-R INTERVAL: 156 MS
EKG Q-T INTERVAL: 360 MS
EKG QRS DURATION: 98 MS
EKG QTC CALCULATION (BAZETT): 480 MS
EKG R AXIS: 57 DEGREES
EKG T AXIS: 44 DEGREES
EKG VENTRICULAR RATE: 107 BPM
GFR AFRICAN AMERICAN: >60
GFR NON-AFRICAN AMERICAN: 56.9
GLUCOSE BLD-MCNC: 101 MG/DL (ref 60–115)
GLUCOSE BLD-MCNC: 105 MG/DL (ref 60–115)
GLUCOSE BLD-MCNC: 105 MG/DL (ref 70–99)
GLUCOSE BLD-MCNC: 113 MG/DL (ref 60–115)
HCT VFR BLD CALC: 28.7 % (ref 37–47)
HEMOGLOBIN: 9.3 G/DL (ref 12–16)
MCH RBC QN AUTO: 23.8 PG (ref 27–31.3)
MCHC RBC AUTO-ENTMCNC: 32.4 % (ref 33–37)
MCV RBC AUTO: 73.3 FL (ref 82–100)
ORGANISM: ABNORMAL
PDW BLD-RTO: 19.9 % (ref 11.5–14.5)
PERFORMED ON: NORMAL
PLATELET # BLD: 403 K/UL (ref 130–400)
POTASSIUM REFLEX MAGNESIUM: 3.6 MEQ/L (ref 3.4–4.9)
RBC # BLD: 3.92 M/UL (ref 4.2–5.4)
SODIUM BLD-SCNC: 138 MEQ/L (ref 135–144)
TROPONIN: <0.01 NG/ML (ref 0–0.01)
URINE CULTURE, ROUTINE: ABNORMAL
WBC # BLD: 7 K/UL (ref 4.8–10.8)

## 2020-06-22 PROCEDURE — 6370000000 HC RX 637 (ALT 250 FOR IP): Performed by: INTERNAL MEDICINE

## 2020-06-22 PROCEDURE — 6360000002 HC RX W HCPCS: Performed by: PHYSICIAN ASSISTANT

## 2020-06-22 PROCEDURE — 97530 THERAPEUTIC ACTIVITIES: CPT

## 2020-06-22 PROCEDURE — 85027 COMPLETE CBC AUTOMATED: CPT

## 2020-06-22 PROCEDURE — 36415 COLL VENOUS BLD VENIPUNCTURE: CPT

## 2020-06-22 PROCEDURE — 80048 BASIC METABOLIC PNL TOTAL CA: CPT

## 2020-06-22 PROCEDURE — 97535 SELF CARE MNGMENT TRAINING: CPT

## 2020-06-22 PROCEDURE — 1200000002 HC SEMI PRIVATE SWING BED

## 2020-06-22 PROCEDURE — 93005 ELECTROCARDIOGRAM TRACING: CPT

## 2020-06-22 PROCEDURE — 6370000000 HC RX 637 (ALT 250 FOR IP): Performed by: ANESTHESIOLOGY

## 2020-06-22 PROCEDURE — 6370000000 HC RX 637 (ALT 250 FOR IP): Performed by: PHYSICIAN ASSISTANT

## 2020-06-22 PROCEDURE — 97116 GAIT TRAINING THERAPY: CPT

## 2020-06-22 PROCEDURE — 84484 ASSAY OF TROPONIN QUANT: CPT

## 2020-06-22 RX ORDER — IPRATROPIUM BROMIDE AND ALBUTEROL SULFATE 2.5; .5 MG/3ML; MG/3ML
1 SOLUTION RESPIRATORY (INHALATION) EVERY 4 HOURS PRN
Status: DISCONTINUED | OUTPATIENT
Start: 2020-06-22 | End: 2020-06-30 | Stop reason: HOSPADM

## 2020-06-22 RX ADMIN — SPIRONOLACTONE 25 MG: 25 TABLET, FILM COATED ORAL at 15:41

## 2020-06-22 RX ADMIN — AMLODIPINE BESYLATE 5 MG: 5 TABLET ORAL at 15:38

## 2020-06-22 RX ADMIN — PREGABALIN 150 MG: 75 CAPSULE ORAL at 15:40

## 2020-06-22 RX ADMIN — ACETAMINOPHEN 500 MG: 500 TABLET, FILM COATED ORAL at 20:13

## 2020-06-22 RX ADMIN — SENNOSIDES AND DOCUSATE SODIUM 1 TABLET: 8.6; 5 TABLET ORAL at 20:13

## 2020-06-22 RX ADMIN — ACETAMINOPHEN 500 MG: 500 TABLET, FILM COATED ORAL at 12:00

## 2020-06-22 RX ADMIN — DOXAZOSIN 2 MG: 2 TABLET ORAL at 20:12

## 2020-06-22 RX ADMIN — ACETAMINOPHEN 500 MG: 500 TABLET, FILM COATED ORAL at 15:41

## 2020-06-22 RX ADMIN — BACLOFEN 5 MG: 10 TABLET ORAL at 15:01

## 2020-06-22 RX ADMIN — TRAMADOL HYDROCHLORIDE 50 MG: 50 TABLET, FILM COATED ORAL at 12:00

## 2020-06-22 RX ADMIN — ENOXAPARIN SODIUM 30 MG: 30 INJECTION SUBCUTANEOUS at 20:12

## 2020-06-22 RX ADMIN — POTASSIUM CHLORIDE 20 MEQ: 10 TABLET, EXTENDED RELEASE ORAL at 15:40

## 2020-06-22 RX ADMIN — ESCITALOPRAM OXALATE 20 MG: 10 TABLET ORAL at 15:39

## 2020-06-22 RX ADMIN — BACLOFEN 5 MG: 10 TABLET ORAL at 20:12

## 2020-06-22 RX ADMIN — PRAMIPEXOLE DIHYDROCHLORIDE 0.5 MG: 0.25 TABLET ORAL at 18:10

## 2020-06-22 RX ADMIN — HYDROCODONE BITARTRATE AND ACETAMINOPHEN 0.5 TABLET: 5; 325 TABLET ORAL at 05:52

## 2020-06-22 RX ADMIN — ARIPIPRAZOLE 10 MG: 5 TABLET ORAL at 15:38

## 2020-06-22 RX ADMIN — FUROSEMIDE 40 MG: 40 TABLET ORAL at 15:40

## 2020-06-22 RX ADMIN — GUAIFENESIN 600 MG: 600 TABLET, EXTENDED RELEASE ORAL at 20:13

## 2020-06-22 RX ADMIN — TRAMADOL HYDROCHLORIDE 50 MG: 50 TABLET, FILM COATED ORAL at 15:41

## 2020-06-22 RX ADMIN — LEVOTHYROXINE SODIUM 50 MCG: 50 TABLET ORAL at 05:52

## 2020-06-22 RX ADMIN — ROSUVASTATIN CALCIUM 20 MG: 20 TABLET, FILM COATED ORAL at 15:41

## 2020-06-22 RX ADMIN — MIRTAZAPINE 30 MG: 15 TABLET, FILM COATED ORAL at 20:13

## 2020-06-22 RX ADMIN — PANTOPRAZOLE SODIUM 40 MG: 40 TABLET, DELAYED RELEASE ORAL at 05:52

## 2020-06-22 ASSESSMENT — PAIN SCALES - GENERAL
PAINLEVEL_OUTOF10: 8
PAINLEVEL_OUTOF10: 10
PAINLEVEL_OUTOF10: 8
PAINLEVEL_OUTOF10: 10
PAINLEVEL_OUTOF10: 10
PAINLEVEL_OUTOF10: 7

## 2020-06-22 ASSESSMENT — PAIN DESCRIPTION - PROGRESSION
CLINICAL_PROGRESSION: GRADUALLY IMPROVING

## 2020-06-22 ASSESSMENT — PAIN DESCRIPTION - PAIN TYPE
TYPE: ACUTE PAIN
TYPE: ACUTE PAIN

## 2020-06-22 ASSESSMENT — PAIN DESCRIPTION - DESCRIPTORS: DESCRIPTORS: ACHING;SORE

## 2020-06-22 ASSESSMENT — PAIN DESCRIPTION - LOCATION
LOCATION: BACK
LOCATION: BACK

## 2020-06-22 ASSESSMENT — PAIN DESCRIPTION - ORIENTATION
ORIENTATION: LOWER
ORIENTATION: LOWER

## 2020-06-22 ASSESSMENT — PAIN DESCRIPTION - ONSET: ONSET: ON-GOING

## 2020-06-22 ASSESSMENT — PAIN DESCRIPTION - FREQUENCY: FREQUENCY: CONTINUOUS

## 2020-06-22 ASSESSMENT — PAIN - FUNCTIONAL ASSESSMENT: PAIN_FUNCTIONAL_ASSESSMENT: PREVENTS OR INTERFERES SOME ACTIVE ACTIVITIES AND ADLS

## 2020-06-22 NOTE — PROGRESS NOTES
0900- Morning medications refused. Pt educated about the purpose and need for every pill. Complaining of 10/10 back pain but continuing to refuse pills. States she is \"afraid of pain but is done taking pills\". 56- This nurse back in to educate patient about medication regimen. Continuing to be adamant about refusal. Dr Dave Pean aware. Therapy currently at bedside. No further needs at this time. Will continue to monitor and educate patient.

## 2020-06-22 NOTE — PLAN OF CARE
Problem: Falls - Risk of:  Goal: Will remain free from falls  Description: Will remain free from falls  6/22/2020 1206 by Madonna Olivas RN  Outcome: Met This Shift  6/21/2020 2334 by Everett Ch RN  Outcome: Met This Shift  Goal: Absence of physical injury  Description: Absence of physical injury  6/22/2020 1206 by Madonna Olivas RN  Outcome: Met This Shift  6/21/2020 2334 by Evertet Ch RN  Outcome: Met This Shift     Problem: SAFETY  Goal: Free from accidental physical injury  6/22/2020 1206 by Madonna Olivas RN  Outcome: Met This Shift  6/21/2020 2334 by Everett Ch RN  Outcome: Met This Shift  Goal: Free from intentional harm  6/22/2020 1206 by Madonna Olivas RN  Outcome: Met This Shift  6/21/2020 2334 by Everett Ch RN  Outcome: Met This Shift     Problem: DAILY CARE  Goal: Daily care needs are met  6/22/2020 1206 by Madonna Olivas RN  Outcome: Met This Shift  6/21/2020 2334 by Everett Ch RN  Outcome: Met This Shift     Problem: KNOWLEDGE DEFICIT  Goal: Patient/S.O. demonstrates understanding of disease process, treatment plan, medications, and discharge instructions.   6/22/2020 1206 by Madonna Olivas RN  Outcome: Met This Shift  6/21/2020 2334 by Everett Ch RN  Outcome: Met This Shift     Problem: DISCHARGE BARRIERS  Goal: Patient's continuum of care needs are met  6/22/2020 1206 by Madonna Olivas RN  Outcome: Met This Shift  6/21/2020 2334 by Everett Ch RN  Outcome: Met This Shift     Problem: Discharge Planning:  Goal: Discharged to appropriate level of care  6/22/2020 1206 by Madonna Olivas RN  Outcome: Met This Shift  6/21/2020 2334 by Everett Ch RN  Outcome: Met This Shift     Problem: Infection - Surgical Site:  Goal: Will show no infection signs and symptoms  6/22/2020 1206 by Madonna Olivas RN  Outcome: Met This Shift  6/21/2020 2334 by Everett Ch RN  Outcome: Met This Shift     Problem: Nutrition  Goal: Optimal nutrition therapy  6/22/2020 1206 by Sabi Bernabe RN  Outcome: Met This Shift  6/21/2020 2334 by Marcy Jimenez RN  Outcome: Met This Shift     Problem: Pain:  Description: Pain management should include both nonpharmacologic and pharmacologic interventions.   Goal: Pain level will decrease  Description: Pain level will decrease  6/22/2020 1206 by Sabi Bernabe RN  Outcome: Ongoing  6/21/2020 2334 by Marcy Jimenez RN  Outcome: Met This Shift  Goal: Control of acute pain  Description: Control of acute pain  6/22/2020 1206 by Sabi Bernabe RN  Outcome: Ongoing  6/21/2020 2334 by Marcy Jimenez RN  Outcome: Met This Shift  Goal: Control of chronic pain  Description: Control of chronic pain  6/22/2020 1206 by Sabi Bernabe RN  Outcome: Ongoing  6/21/2020 2334 by Marcy Jimenez RN  Outcome: Met This Shift     Problem: SKIN INTEGRITY  Goal: Skin integrity is maintained or improved  6/22/2020 1206 by Sabi Bernabe RN  Outcome: Ongoing  6/21/2020 2334 by Marcy Jimenez RN  Outcome: Met This Shift     Problem: Mobility - Impaired:  Goal: Mobility will improve  6/22/2020 1206 by Sabi Bernabe RN  Outcome: Ongoing  6/21/2020 2334 by Marcy Jimenez RN  Outcome: Met This Shift     Problem: Pain - Acute:  Goal: Pain level will decrease  Description: Pain level will decrease  6/22/2020 1206 by Sabi Bernabe RN  Outcome: Ongoing  6/21/2020 2334 by Marcy Jimenez RN  Outcome: Met This Shift

## 2020-06-22 NOTE — PROGRESS NOTES
Spiritual Care Services     Summary of Visit:   attempted visit, however, the patient was busy eating and preoccupied with her food and provided only very short answers. Spiritual Assessment/Intervention/Outcomes:    Encounter Summary  Services provided to[de-identified] Patient  Referral/Consult From[de-identified] [facility]  Support System: Family members  Continue Visiting: Yes  Complexity of Encounter: Low  Length of Encounter: 15 minutes  Routine  Type: Initial  Assessment: Passive, Coping(Distracted; uninterested)  Intervention: Explored feelings, thoughts, concerns, Nurtured hope, Discussed meaning/purpose  Outcome: Receptive, Coping              Advance Directives (For Healthcare)  Pre-existing DNR Comfort Care/DNR Arrest/DNI Order: No  Healthcare Directive: Yes, patient has an advance directive for healthcare treatment  Type of Healthcare Directive: Durable power of  for health care  Healthcare Agent Appointed: Healthcare power of   Healthcare Agent's Name: [address] [place] [address] Agent's Phone Number: [phone]  If you are unable to speak for yourself, does your Healthcare Agent or Legal Spokesperson know your healthcare wishes?: Yes           Values / Beliefs  Do you have any ethnic, cultural, sacramental, or spiritual Roman Catholic needs you would like us to be aware of while you are in the hospital?: No    Care Plan:    Spiritual care should continue to follow up with the patient to provide support she might need. Spiritual Care Services   Electronically signed by Yonatan Smith on 6/22/20 at 12:35 PM EDT     To reach a  for emotional and spiritual support, place an [facility] consult request.   If a  is needed immediately, dial 0 and ask to page the on-call .

## 2020-06-22 NOTE — PROGRESS NOTES
Physical Therapy  Facility/Department: [facility] MED SURG UNIT  Daily Treatment Note  NAME: Yohana Polo  : 1960  MRN: [id number]    Date of Service: 2020    Discharge Recommendations:  Home with assist PRN, Home with Home health PT        Assessment   Body structures, Functions, Activity limitations: Decreased functional mobility ; Decreased strength;Decreased endurance;Decreased balance; Increased pain  Assessment: (P) Pt. demo's limited tolerance due to pain. Education needed to tsf and ambulate with assistance only to reduce risk of falls. Assisted pt. with ambulation to various surfaces for care. Pt. declined any other activity due to pain. Treatment Diagnosis: Weakaness and decreased fucnitnal independence post accident with L humerus fracture  and rib fractures. Prognosis: Good  REQUIRES PT FOLLOW UP: Yes  Activity Tolerance  Activity Tolerance: Patient Tolerated treatment well;Patient limited by pain     Patient Diagnosis(es): There were no encounter diagnoses. has a past medical history of Acid reflux, Allergic rhinitis, Anxiety, Arnold-Chiari deformity (HCC), Asthma, Cerebral artery occlusion with cerebral infarction [facility]), Cerebrovascular disease, Chronic back pain greater than 3 months duration, COPD (chronic obstructive pulmonary disease) ([address] CVA (cerebral infarction), Depression, Fibromyalgia, H/O encephalopathy, Headache(784.0), Hepatitis B surface antigen positive, Hx of blood clots, Hyperlipidemia LDL goal < 100, Hypertension, Hypothyroidism, Laryngitis, chronic, Marijuana smoker in remission ([address] Obesity (BMI 30-39.9), Osteoarthritis, Pulmonary embolism and infarction ([address] Restless legs syndrome, Rhinitis, chronic, Rotator cuff tear, S/P colonoscopy with polypectomy, Seizures ([address] Smoker, Spinal headache, Spondylosis without myelopathy, Type 2 diabetes mellitus without complication ([address] Urinary incontinence, and Vertebral compression fracture ([address]    has a past surgical Comments:  Ambulation  Ambulation?: Yes  Ambulation 1  Surface: level tile  Device: Hand-Held Assist  Assistance: (P) Minimal assistance  Quality of Gait: (P) Pt. demo's WBOS with small shuffle gait pattern. Distance: (P) 10'x1,10'x1, 25'x1  Comments: (P) Amb. from bed to toilet, toilet to shower chair, shower chair to bed. Pt. keeps reporting she is scared. Pt. performs tsf's without assistance. Education to perform tsf's and amb. with assistance only for safety. Balance  Sitting - Static: Good  Sitting - Dynamic: Fair  Standing - Static: Fair  Standing - Dynamic: Fair;-  Comments: Pt. C/o increase pain without trunk support. Pt demo's 0 LOB, however, low tolerance without support demonstrating lean towards support. \     Strength Other  Other: old CVA with L hemiparesis                 G-Code     OutComes Score                                                     AM-PAC Score             Goals  Short term goals  Time Frame for Short term goals: 1 week  Short term goal 1: transfer with CGA consistently with no LOB  Short term goal 2: amb >= 75ft with cane or least AD, CGA before fatigued  Short term goal 3: tolerate 2x 10 LE AARON to gin strenght and endurance  Short term goal 4: assess bed mobilty with Min A of 1 and min vc for safety  Long term goals  Time Frame for Long term goals : 2 weeks  Long term goal 1: transfers and bed mobility modified independent  Long term goal 2: ambulate >= 125 ft with least AD modified independent with improved upright gait pattern  Long term goal 3: LE strength increased any amount to achieve functional goal  Long term goal 4: HEP in place for discharge  Patient Goals   Patient goals : \"I need to be strong enough to go back home. \"    Plan    Plan  Times per week: 5-7x week  Times per day: (1-2x per day)  Plan weeks: 2  Current Treatment Recommendations: Strengthening, ROM, Functional Mobility Training, Transfer Training, Endurance Training, Gait Training, Pain Management, Home Exercise Program, Safety Education & Training, Patient/Caregiver Education & Training, Equipment Evaluation, Education, & procurement  Plan Comment: Cont. POC  Safety Devices  Type of devices:  All fall risk precautions in place, Call light within reach, Gait belt, Bed alarm in place     Therapy Time   Individual Concurrent Group Co-treatment   Time In 1010         Time Out  [facility] 8305, PTA  License and [address] Number: [phone]

## 2020-06-22 NOTE — PROGRESS NOTES
Pt having increased confusion and anxiety. Now onset of chast pain. Bp at time- 156/69, p 113. Mirza Centeno aware. New orders for EKG and troponin. EKG reads sinus tachycardia. Will continue to monitor.

## 2020-06-22 NOTE — PROGRESS NOTES
Occupational Therapy  Facility/Department: [facility] MED SURG UNIT  Daily Treatment Note  NAME: Lisa Newton  : 1960  MRN: [id number]    Date of Service: 2020    Discharge Recommendations:  Home with assist PRN, Home with Home health OT, Continue to assess pending progress       Assessment   Performance deficits / Impairments: Decreased functional mobility ; Decreased balance;Decreased ADL status; Decreased endurance;Decreased high-level IADLs;Decreased strength;Decreased safe awareness;Decreased fine motor control;Decreased coordination  Assessment: Pt c/o significant pain \"oh it's hurting, ow, ow, ow\" in reference to her LB. Pt's nurse informed pt refused all her meds including pain meds this morning. Therapist encouraged pt and educated on taking them to help her pain and ability to participate in therapy. Pt then agreed to take her meds but when nurse came, pt again refused stating \"No, I'm not taking them, I'm scared. \" Pt needed more assist this date with ADL than on eval, seemed more confused. Pt required vcs for safety/sequencing and thoroughness throughout shower. Pt frequently stating \"I'm scared\" during tx. Pt much behaviorally different this session than on eval.  Prognosis: Good  REQUIRES OT FOLLOW UP: Yes         Patient Diagnosis(es): There were no encounter diagnoses.       has a past medical history of Acid reflux, Allergic rhinitis, Anxiety, Arnold-Chiari deformity (HCC), Asthma, Cerebral artery occlusion with cerebral infarction [facility]), Cerebrovascular disease, Chronic back pain greater than 3 months duration, COPD (chronic obstructive pulmonary disease) ([facility] [address] CVA (cerebral infarction), Depression, Fibromyalgia, H/O encephalopathy, Headache(784.0), Hepatitis B surface antigen positive, Hx of blood clots, Hyperlipidemia LDL goal < 100, Hypertension, Hypothyroidism, Laryngitis, chronic, Marijuana smoker in remission ([facility] [address] Obesity (BMI 30-39.9), Osteoarthritis, Pulmonary embolism and infarction Orientation     Objective    ADL  Grooming: Maximum assistance(wash hair, comb hair)  UE Bathing: Moderate assistance;Maximum assistance;Verbal cueing  LE Bathing: Maximum assistance;Verbal cueing  UE Dressing: Maximum assistance(gown and LUE ultrasling)  LE Dressing: Maximum assistance  Toileting: Moderate assistance        Functional Mobility  Functional - Mobility Device: Other(HHA)  Activity: To/from bathroom  Assist Level: Minimal assistance(for steadying)  Toilet Transfers  Toilet - Technique: Ambulating  Equipment Used: Grab bars  Toilet Transfer: Contact guard assistance  Shower Transfers  Shower - Transfer From: (HHA)  Shower - Transfer Type: To and From  Shower - Transfer To: Shower seat with back  Shower - Technique: Sit pivot  Shower Transfers: Minimal assistance  Shower Transfers Comments: max cues for safety/sequencing  Bed mobility  Scooting: Moderate assistance  Transfers  Sit to stand: Contact guard assistance;Minimal assistance  Stand to sit: Contact guard assistance;Minimal assistance                          Plan   Plan  Times per week: 3-6x/wk  Times per day: Daily  Plan weeks: 2 wks  Current Treatment Recommendations: Strengthening, ROM, Balance Training, Functional Mobility Training, Endurance Training, Wheelchair Mobility Training, Pain Management, Safety Education & Training, Patient/Caregiver Education & Training, Self-Care / ADL, Home Management Training          Goals  Short term goals  Time Frame for Short term goals: 1 wk  Short term goal 1: Increase to CGA/SBA toileting  Short term goal 2: Increase to CGA LB dressing using AE and compensatory techniques  Short term goal 3:  Increase to CGA fxl ADL xfers  Short term goal 4: Tolerate A/P/AAROM to L elbow, wrist, and fingers (No shoulder) to increase ROM and decrease stiffness  Long term goals  Time Frame for Long term goals : 2 wks  Long term goal 1: MI toileting  Long term goal 2: MI LB dressing using AE and compensatory techniques  Long term goal 3: MI fxl ADL xfers   Long term goal 4: MI BUE HEP  Long term goal 5: SBA UB dressing including mgmt of ultrasling  Patient Goals   Patient goals :  To get better       Therapy Time   Individual Concurrent Group Co-treatment   Time In  11:00         Time Out  12:00         Minutes  [address], [place]

## 2020-06-23 PROBLEM — S42.292D: Status: ACTIVE | Noted: 2020-06-23

## 2020-06-23 LAB
GLUCOSE BLD-MCNC: 114 MG/DL (ref 60–115)
PERFORMED ON: NORMAL

## 2020-06-23 PROCEDURE — 1200000002 HC SEMI PRIVATE SWING BED

## 2020-06-23 PROCEDURE — 6370000000 HC RX 637 (ALT 250 FOR IP): Performed by: INTERNAL MEDICINE

## 2020-06-23 PROCEDURE — 97530 THERAPEUTIC ACTIVITIES: CPT

## 2020-06-23 PROCEDURE — 6360000002 HC RX W HCPCS: Performed by: PHYSICIAN ASSISTANT

## 2020-06-23 PROCEDURE — 97110 THERAPEUTIC EXERCISES: CPT

## 2020-06-23 PROCEDURE — 6370000000 HC RX 637 (ALT 250 FOR IP): Performed by: ANESTHESIOLOGY

## 2020-06-23 PROCEDURE — 97116 GAIT TRAINING THERAPY: CPT

## 2020-06-23 PROCEDURE — 93010 ELECTROCARDIOGRAM REPORT: CPT | Performed by: INTERNAL MEDICINE

## 2020-06-23 PROCEDURE — 6370000000 HC RX 637 (ALT 250 FOR IP): Performed by: PHYSICIAN ASSISTANT

## 2020-06-23 RX ORDER — TRAMADOL HYDROCHLORIDE 50 MG/1
50 TABLET ORAL
Status: DISCONTINUED | OUTPATIENT
Start: 2020-06-23 | End: 2020-06-30 | Stop reason: HOSPADM

## 2020-06-23 RX ORDER — LIDOCAINE 4 G/G
3 PATCH TOPICAL DAILY
Status: DISCONTINUED | OUTPATIENT
Start: 2020-06-23 | End: 2020-06-30 | Stop reason: HOSPADM

## 2020-06-23 RX ORDER — HYDROCODONE BITARTRATE AND ACETAMINOPHEN 5; 325 MG/1; MG/1
1 TABLET ORAL EVERY 6 HOURS PRN
Status: DISCONTINUED | OUTPATIENT
Start: 2020-06-23 | End: 2020-06-28

## 2020-06-23 RX ADMIN — GUAIFENESIN 600 MG: 600 TABLET, EXTENDED RELEASE ORAL at 20:28

## 2020-06-23 RX ADMIN — METOPROLOL TARTRATE 25 MG: 25 TABLET, FILM COATED ORAL at 12:24

## 2020-06-23 RX ADMIN — PANTOPRAZOLE SODIUM 40 MG: 40 TABLET, DELAYED RELEASE ORAL at 06:10

## 2020-06-23 RX ADMIN — ACETAMINOPHEN 500 MG: 500 TABLET, FILM COATED ORAL at 20:29

## 2020-06-23 RX ADMIN — LEVOTHYROXINE SODIUM 50 MCG: 50 TABLET ORAL at 06:10

## 2020-06-23 RX ADMIN — ROSUVASTATIN CALCIUM 20 MG: 20 TABLET, FILM COATED ORAL at 08:31

## 2020-06-23 RX ADMIN — BACLOFEN 5 MG: 10 TABLET ORAL at 08:30

## 2020-06-23 RX ADMIN — LORAZEPAM 0.5 MG: 0.5 TABLET ORAL at 15:12

## 2020-06-23 RX ADMIN — GUAIFENESIN 600 MG: 600 TABLET, EXTENDED RELEASE ORAL at 08:31

## 2020-06-23 RX ADMIN — SENNOSIDES AND DOCUSATE SODIUM 1 TABLET: 8.6; 5 TABLET ORAL at 20:29

## 2020-06-23 RX ADMIN — PRAMIPEXOLE DIHYDROCHLORIDE 0.5 MG: 0.25 TABLET ORAL at 17:35

## 2020-06-23 RX ADMIN — SPIRONOLACTONE 25 MG: 25 TABLET, FILM COATED ORAL at 08:30

## 2020-06-23 RX ADMIN — TRAMADOL HYDROCHLORIDE 50 MG: 50 TABLET, FILM COATED ORAL at 08:31

## 2020-06-23 RX ADMIN — HYDROCODONE BITARTRATE AND ACETAMINOPHEN 1 TABLET: 5; 325 TABLET ORAL at 14:25

## 2020-06-23 RX ADMIN — SENNOSIDES AND DOCUSATE SODIUM 1 TABLET: 8.6; 5 TABLET ORAL at 08:31

## 2020-06-23 RX ADMIN — ESCITALOPRAM OXALATE 20 MG: 10 TABLET ORAL at 08:30

## 2020-06-23 RX ADMIN — ENOXAPARIN SODIUM 30 MG: 30 INJECTION SUBCUTANEOUS at 08:33

## 2020-06-23 RX ADMIN — HYDROCODONE BITARTRATE AND ACETAMINOPHEN 1 TABLET: 5; 325 TABLET ORAL at 20:30

## 2020-06-23 RX ADMIN — ARIPIPRAZOLE 10 MG: 5 TABLET ORAL at 08:31

## 2020-06-23 RX ADMIN — AMLODIPINE BESYLATE 5 MG: 5 TABLET ORAL at 08:31

## 2020-06-23 RX ADMIN — HYDROCODONE BITARTRATE AND ACETAMINOPHEN 0.5 TABLET: 5; 325 TABLET ORAL at 06:16

## 2020-06-23 RX ADMIN — TRAMADOL HYDROCHLORIDE 50 MG: 50 TABLET, FILM COATED ORAL at 12:24

## 2020-06-23 RX ADMIN — ACETAMINOPHEN 500 MG: 500 TABLET, FILM COATED ORAL at 08:31

## 2020-06-23 RX ADMIN — MIRTAZAPINE 30 MG: 15 TABLET, FILM COATED ORAL at 20:29

## 2020-06-23 RX ADMIN — LORAZEPAM 0.5 MG: 0.5 TABLET ORAL at 20:31

## 2020-06-23 RX ADMIN — ACETAMINOPHEN 500 MG: 500 TABLET, FILM COATED ORAL at 17:35

## 2020-06-23 RX ADMIN — PREGABALIN 150 MG: 75 CAPSULE ORAL at 08:30

## 2020-06-23 RX ADMIN — FUROSEMIDE 40 MG: 40 TABLET ORAL at 08:31

## 2020-06-23 RX ADMIN — ENOXAPARIN SODIUM 30 MG: 30 INJECTION SUBCUTANEOUS at 20:28

## 2020-06-23 RX ADMIN — DOXAZOSIN 2 MG: 2 TABLET ORAL at 20:31

## 2020-06-23 RX ADMIN — BACLOFEN 5 MG: 10 TABLET ORAL at 20:30

## 2020-06-23 RX ADMIN — BACLOFEN 5 MG: 10 TABLET ORAL at 14:20

## 2020-06-23 RX ADMIN — TRAMADOL HYDROCHLORIDE 50 MG: 50 TABLET, FILM COATED ORAL at 17:35

## 2020-06-23 RX ADMIN — POTASSIUM CHLORIDE 20 MEQ: 10 TABLET, EXTENDED RELEASE ORAL at 08:30

## 2020-06-23 RX ADMIN — TRAMADOL HYDROCHLORIDE 50 MG: 50 TABLET, FILM COATED ORAL at 20:30

## 2020-06-23 RX ADMIN — ACETAMINOPHEN 500 MG: 500 TABLET, FILM COATED ORAL at 12:24

## 2020-06-23 ASSESSMENT — PAIN DESCRIPTION - ORIENTATION
ORIENTATION: LEFT;MID
ORIENTATION: RIGHT;LEFT
ORIENTATION: LEFT
ORIENTATION: RIGHT;LEFT

## 2020-06-23 ASSESSMENT — PAIN DESCRIPTION - LOCATION
LOCATION: RIB CAGE
LOCATION: SHOULDER;RIB CAGE
LOCATION: RIB CAGE
LOCATION: SHOULDER

## 2020-06-23 ASSESSMENT — PAIN DESCRIPTION - PAIN TYPE
TYPE: ACUTE PAIN

## 2020-06-23 ASSESSMENT — PAIN SCALES - GENERAL
PAINLEVEL_OUTOF10: 9
PAINLEVEL_OUTOF10: 8
PAINLEVEL_OUTOF10: 9
PAINLEVEL_OUTOF10: 9
PAINLEVEL_OUTOF10: 8
PAINLEVEL_OUTOF10: 10
PAINLEVEL_OUTOF10: 9
PAINLEVEL_OUTOF10: 8

## 2020-06-23 ASSESSMENT — PAIN DESCRIPTION - PROGRESSION
CLINICAL_PROGRESSION: GRADUALLY IMPROVING

## 2020-06-23 ASSESSMENT — PAIN DESCRIPTION - FREQUENCY
FREQUENCY: CONTINUOUS
FREQUENCY: CONTINUOUS

## 2020-06-23 ASSESSMENT — PAIN DESCRIPTION - ONSET: ONSET: ON-GOING

## 2020-06-23 ASSESSMENT — PAIN DESCRIPTION - DESCRIPTORS
DESCRIPTORS: ACHING;SORE
DESCRIPTORS: ACHING;SORE

## 2020-06-23 ASSESSMENT — PAIN - FUNCTIONAL ASSESSMENT: PAIN_FUNCTIONAL_ASSESSMENT: PREVENTS OR INTERFERES WITH MANY ACTIVE NOT PASSIVE ACTIVITIES
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
## 2020-06-24 LAB
GLUCOSE BLD-MCNC: 114 MG/DL (ref 60–115)
GLUCOSE BLD-MCNC: 148 MG/DL (ref 60–115)
PERFORMED ON: ABNORMAL
PERFORMED ON: NORMAL

## 2020-06-24 PROCEDURE — 97535 SELF CARE MNGMENT TRAINING: CPT

## 2020-06-24 PROCEDURE — 97110 THERAPEUTIC EXERCISES: CPT

## 2020-06-24 PROCEDURE — 97530 THERAPEUTIC ACTIVITIES: CPT

## 2020-06-24 PROCEDURE — 6360000002 HC RX W HCPCS: Performed by: PHYSICIAN ASSISTANT

## 2020-06-24 PROCEDURE — 97116 GAIT TRAINING THERAPY: CPT

## 2020-06-24 PROCEDURE — 6370000000 HC RX 637 (ALT 250 FOR IP): Performed by: PHYSICIAN ASSISTANT

## 2020-06-24 PROCEDURE — 6370000000 HC RX 637 (ALT 250 FOR IP): Performed by: INTERNAL MEDICINE

## 2020-06-24 PROCEDURE — 1200000002 HC SEMI PRIVATE SWING BED

## 2020-06-24 PROCEDURE — 6370000000 HC RX 637 (ALT 250 FOR IP): Performed by: ANESTHESIOLOGY

## 2020-06-24 RX ADMIN — LEVOTHYROXINE SODIUM 50 MCG: 50 TABLET ORAL at 06:19

## 2020-06-24 RX ADMIN — METOPROLOL TARTRATE 25 MG: 25 TABLET, FILM COATED ORAL at 20:03

## 2020-06-24 RX ADMIN — LORAZEPAM 0.5 MG: 0.5 TABLET ORAL at 06:19

## 2020-06-24 RX ADMIN — ROSUVASTATIN CALCIUM 20 MG: 20 TABLET, FILM COATED ORAL at 08:52

## 2020-06-24 RX ADMIN — PANTOPRAZOLE SODIUM 40 MG: 40 TABLET, DELAYED RELEASE ORAL at 06:19

## 2020-06-24 RX ADMIN — PSYLLIUM HUSK 1 PACKET: 3.4 POWDER ORAL at 08:51

## 2020-06-24 RX ADMIN — AMLODIPINE BESYLATE 5 MG: 5 TABLET ORAL at 08:51

## 2020-06-24 RX ADMIN — ACETAMINOPHEN 500 MG: 500 TABLET, FILM COATED ORAL at 12:59

## 2020-06-24 RX ADMIN — BACLOFEN 5 MG: 10 TABLET ORAL at 08:51

## 2020-06-24 RX ADMIN — TRAMADOL HYDROCHLORIDE 50 MG: 50 TABLET, FILM COATED ORAL at 08:52

## 2020-06-24 RX ADMIN — TRAMADOL HYDROCHLORIDE 50 MG: 50 TABLET, FILM COATED ORAL at 17:13

## 2020-06-24 RX ADMIN — HYDROCODONE BITARTRATE AND ACETAMINOPHEN 1 TABLET: 5; 325 TABLET ORAL at 06:19

## 2020-06-24 RX ADMIN — PRAMIPEXOLE DIHYDROCHLORIDE 0.5 MG: 0.25 TABLET ORAL at 17:13

## 2020-06-24 RX ADMIN — MENTHOL AND METHYL SALICYLATE: 7.6; 29 OINTMENT TOPICAL at 20:04

## 2020-06-24 RX ADMIN — TRAMADOL HYDROCHLORIDE 50 MG: 50 TABLET, FILM COATED ORAL at 13:00

## 2020-06-24 RX ADMIN — Medication 9 MG: at 20:03

## 2020-06-24 RX ADMIN — LORAZEPAM 0.5 MG: 0.5 TABLET ORAL at 20:04

## 2020-06-24 RX ADMIN — ACETAMINOPHEN 500 MG: 500 TABLET, FILM COATED ORAL at 08:51

## 2020-06-24 RX ADMIN — SENNOSIDES AND DOCUSATE SODIUM 1 TABLET: 8.6; 5 TABLET ORAL at 20:04

## 2020-06-24 RX ADMIN — POTASSIUM CHLORIDE 20 MEQ: 10 TABLET, EXTENDED RELEASE ORAL at 08:52

## 2020-06-24 RX ADMIN — ARIPIPRAZOLE 10 MG: 5 TABLET ORAL at 08:51

## 2020-06-24 RX ADMIN — ENOXAPARIN SODIUM 30 MG: 30 INJECTION SUBCUTANEOUS at 08:50

## 2020-06-24 RX ADMIN — FUROSEMIDE 40 MG: 40 TABLET ORAL at 08:52

## 2020-06-24 RX ADMIN — HYDROCODONE BITARTRATE AND ACETAMINOPHEN 1 TABLET: 5; 325 TABLET ORAL at 20:03

## 2020-06-24 RX ADMIN — ACETAMINOPHEN 500 MG: 500 TABLET, FILM COATED ORAL at 17:13

## 2020-06-24 RX ADMIN — GUAIFENESIN 600 MG: 600 TABLET, EXTENDED RELEASE ORAL at 20:04

## 2020-06-24 RX ADMIN — TRAMADOL HYDROCHLORIDE 50 MG: 50 TABLET, FILM COATED ORAL at 20:04

## 2020-06-24 RX ADMIN — PREGABALIN 150 MG: 75 CAPSULE ORAL at 08:51

## 2020-06-24 RX ADMIN — SPIRONOLACTONE 25 MG: 25 TABLET, FILM COATED ORAL at 08:52

## 2020-06-24 RX ADMIN — GUAIFENESIN 600 MG: 600 TABLET, EXTENDED RELEASE ORAL at 08:52

## 2020-06-24 RX ADMIN — METOPROLOL TARTRATE 25 MG: 25 TABLET, FILM COATED ORAL at 08:52

## 2020-06-24 RX ADMIN — MIRTAZAPINE 30 MG: 15 TABLET, FILM COATED ORAL at 20:03

## 2020-06-24 RX ADMIN — ESCITALOPRAM OXALATE 20 MG: 10 TABLET ORAL at 08:51

## 2020-06-24 RX ADMIN — DOXAZOSIN 2 MG: 2 TABLET ORAL at 20:03

## 2020-06-24 RX ADMIN — HYDROCODONE BITARTRATE AND ACETAMINOPHEN 1 TABLET: 5; 325 TABLET ORAL at 12:59

## 2020-06-24 RX ADMIN — SENNOSIDES AND DOCUSATE SODIUM 1 TABLET: 8.6; 5 TABLET ORAL at 08:52

## 2020-06-24 RX ADMIN — ACETAMINOPHEN 500 MG: 500 TABLET, FILM COATED ORAL at 20:04

## 2020-06-24 RX ADMIN — BACLOFEN 5 MG: 10 TABLET ORAL at 20:04

## 2020-06-24 RX ADMIN — BACLOFEN 5 MG: 10 TABLET ORAL at 13:00

## 2020-06-24 RX ADMIN — ENOXAPARIN SODIUM 30 MG: 30 INJECTION SUBCUTANEOUS at 20:03

## 2020-06-24 ASSESSMENT — PAIN SCALES - GENERAL
PAINLEVEL_OUTOF10: 9
PAINLEVEL_OUTOF10: 7
PAINLEVEL_OUTOF10: 1
PAINLEVEL_OUTOF10: 10
PAINLEVEL_OUTOF10: 10
PAINLEVEL_OUTOF10: 8
PAINLEVEL_OUTOF10: 7
PAINLEVEL_OUTOF10: 7

## 2020-06-24 ASSESSMENT — PAIN DESCRIPTION - LOCATION
LOCATION: ARM
LOCATION: ARM;RIB CAGE
LOCATION: ARM
LOCATION: BACK

## 2020-06-24 ASSESSMENT — PAIN DESCRIPTION - ORIENTATION
ORIENTATION: LEFT
ORIENTATION: LEFT;MID
ORIENTATION: LEFT

## 2020-06-24 ASSESSMENT — PAIN DESCRIPTION - PAIN TYPE
TYPE: ACUTE PAIN

## 2020-06-24 ASSESSMENT — PAIN DESCRIPTION - ONSET
ONSET: ON-GOING
ONSET: ON-GOING

## 2020-06-24 ASSESSMENT — PAIN DESCRIPTION - PROGRESSION
CLINICAL_PROGRESSION: GRADUALLY IMPROVING
CLINICAL_PROGRESSION: GRADUALLY WORSENING
CLINICAL_PROGRESSION: GRADUALLY IMPROVING

## 2020-06-24 ASSESSMENT — PAIN DESCRIPTION - FREQUENCY
FREQUENCY: CONTINUOUS
FREQUENCY: CONTINUOUS

## 2020-06-24 ASSESSMENT — PAIN DESCRIPTION - DESCRIPTORS
DESCRIPTORS: ACHING;SORE
DESCRIPTORS: ACHING;SORE

## 2020-06-24 NOTE — PROGRESS NOTES
Physical Therapy  Pt declined PT this afternoon due to working on her court case  And she wanted for focus on that right now.
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
## 2020-06-24 NOTE — PROGRESS NOTES
Patient assessment and pm medications given. Patient states no further needs at this time. Call light and table within patient reach.

## 2020-06-24 NOTE — PLAN OF CARE
Problem: Falls - Risk of:  Goal: Will remain free from falls  Description: Will remain free from falls  Outcome: Met This Shift  Goal: Absence of physical injury  Description: Absence of physical injury  Outcome: Met This Shift     Problem: SAFETY  Goal: Free from accidental physical injury  Outcome: Met This Shift  Goal: Free from intentional harm  Outcome: Met This Shift     Problem: DAILY CARE  Goal: Daily care needs are met  Outcome: Met This Shift     Problem: Pain:  Goal: Pain level will decrease  Description: Pain level will decrease  Outcome: Ongoing     Problem: SKIN INTEGRITY  Goal: Skin integrity is maintained or improved  Outcome: Ongoing     Problem: Pain - Acute:  Goal: Pain level will decrease  Description: Pain level will decrease  Outcome: Ongoing
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
## 2020-06-25 LAB
ANION GAP SERPL CALCULATED.3IONS-SCNC: 10 MEQ/L (ref 9–15)
BUN BLDV-MCNC: 32 MG/DL (ref 6–20)
CALCIUM SERPL-MCNC: 8.3 MG/DL (ref 8.5–9.9)
CHLORIDE BLD-SCNC: 104 MEQ/L (ref 95–107)
CO2: 23 MEQ/L (ref 20–31)
CREAT SERPL-MCNC: 1.26 MG/DL (ref 0.5–0.9)
GFR AFRICAN AMERICAN: 52.7
GFR NON-AFRICAN AMERICAN: 43.6
GLUCOSE BLD-MCNC: 106 MG/DL (ref 60–115)
GLUCOSE BLD-MCNC: 114 MG/DL (ref 70–99)
GLUCOSE BLD-MCNC: 125 MG/DL (ref 60–115)
GLUCOSE BLD-MCNC: 97 MG/DL (ref 60–115)
HCT VFR BLD CALC: 26 % (ref 37–47)
HEMOGLOBIN: 8.3 G/DL (ref 12–16)
MCH RBC QN AUTO: 23.9 PG (ref 27–31.3)
MCHC RBC AUTO-ENTMCNC: 31.8 % (ref 33–37)
MCV RBC AUTO: 75.1 FL (ref 82–100)
PDW BLD-RTO: 19.5 % (ref 11.5–14.5)
PERFORMED ON: ABNORMAL
PERFORMED ON: NORMAL
PERFORMED ON: NORMAL
PLATELET # BLD: 450 K/UL (ref 130–400)
POTASSIUM REFLEX MAGNESIUM: 3.7 MEQ/L (ref 3.4–4.9)
RBC # BLD: 3.46 M/UL (ref 4.2–5.4)
SODIUM BLD-SCNC: 137 MEQ/L (ref 135–144)
WBC # BLD: 8.5 K/UL (ref 4.8–10.8)

## 2020-06-25 PROCEDURE — 6360000002 HC RX W HCPCS: Performed by: PHYSICIAN ASSISTANT

## 2020-06-25 PROCEDURE — 80048 BASIC METABOLIC PNL TOTAL CA: CPT

## 2020-06-25 PROCEDURE — 6370000000 HC RX 637 (ALT 250 FOR IP): Performed by: INTERNAL MEDICINE

## 2020-06-25 PROCEDURE — 97530 THERAPEUTIC ACTIVITIES: CPT

## 2020-06-25 PROCEDURE — 85027 COMPLETE CBC AUTOMATED: CPT

## 2020-06-25 PROCEDURE — 36415 COLL VENOUS BLD VENIPUNCTURE: CPT

## 2020-06-25 PROCEDURE — 6370000000 HC RX 637 (ALT 250 FOR IP): Performed by: ANESTHESIOLOGY

## 2020-06-25 PROCEDURE — 97140 MANUAL THERAPY 1/> REGIONS: CPT

## 2020-06-25 PROCEDURE — 6370000000 HC RX 637 (ALT 250 FOR IP): Performed by: PHYSICIAN ASSISTANT

## 2020-06-25 PROCEDURE — 97110 THERAPEUTIC EXERCISES: CPT

## 2020-06-25 PROCEDURE — 97116 GAIT TRAINING THERAPY: CPT

## 2020-06-25 PROCEDURE — 97535 SELF CARE MNGMENT TRAINING: CPT

## 2020-06-25 PROCEDURE — 1200000002 HC SEMI PRIVATE SWING BED

## 2020-06-25 RX ADMIN — DOXAZOSIN 2 MG: 2 TABLET ORAL at 21:56

## 2020-06-25 RX ADMIN — ACETAMINOPHEN 500 MG: 500 TABLET, FILM COATED ORAL at 13:41

## 2020-06-25 RX ADMIN — TRAMADOL HYDROCHLORIDE 50 MG: 50 TABLET, FILM COATED ORAL at 09:46

## 2020-06-25 RX ADMIN — GLYCERIN 2 G: 2 SUPPOSITORY RECTAL at 09:45

## 2020-06-25 RX ADMIN — TRAMADOL HYDROCHLORIDE 50 MG: 50 TABLET, FILM COATED ORAL at 17:39

## 2020-06-25 RX ADMIN — LORAZEPAM 0.5 MG: 0.5 TABLET ORAL at 21:55

## 2020-06-25 RX ADMIN — POLYETHYLENE GLYCOL 3350 17 G: 17 POWDER, FOR SOLUTION ORAL at 09:44

## 2020-06-25 RX ADMIN — ACETAMINOPHEN 500 MG: 500 TABLET, FILM COATED ORAL at 09:45

## 2020-06-25 RX ADMIN — LEVOTHYROXINE SODIUM 50 MCG: 50 TABLET ORAL at 06:53

## 2020-06-25 RX ADMIN — ENOXAPARIN SODIUM 30 MG: 30 INJECTION SUBCUTANEOUS at 21:54

## 2020-06-25 RX ADMIN — ENOXAPARIN SODIUM 30 MG: 30 INJECTION SUBCUTANEOUS at 09:45

## 2020-06-25 RX ADMIN — PSYLLIUM HUSK 1 PACKET: 3.4 POWDER ORAL at 09:45

## 2020-06-25 RX ADMIN — ACETAMINOPHEN 500 MG: 500 TABLET, FILM COATED ORAL at 17:39

## 2020-06-25 RX ADMIN — ROSUVASTATIN CALCIUM 20 MG: 20 TABLET, FILM COATED ORAL at 09:47

## 2020-06-25 RX ADMIN — ARIPIPRAZOLE 10 MG: 5 TABLET ORAL at 09:47

## 2020-06-25 RX ADMIN — TRAMADOL HYDROCHLORIDE 50 MG: 50 TABLET, FILM COATED ORAL at 13:40

## 2020-06-25 RX ADMIN — MAGNESIUM HYDROXIDE 30 ML: 400 SUSPENSION ORAL at 09:45

## 2020-06-25 RX ADMIN — PRAMIPEXOLE DIHYDROCHLORIDE 0.5 MG: 0.25 TABLET ORAL at 17:39

## 2020-06-25 RX ADMIN — AMLODIPINE BESYLATE 5 MG: 5 TABLET ORAL at 09:47

## 2020-06-25 RX ADMIN — METOPROLOL TARTRATE 25 MG: 25 TABLET, FILM COATED ORAL at 21:56

## 2020-06-25 RX ADMIN — GUAIFENESIN 600 MG: 600 TABLET, EXTENDED RELEASE ORAL at 21:56

## 2020-06-25 RX ADMIN — ACETAMINOPHEN 500 MG: 500 TABLET, FILM COATED ORAL at 21:55

## 2020-06-25 RX ADMIN — FUROSEMIDE 40 MG: 40 TABLET ORAL at 09:46

## 2020-06-25 RX ADMIN — METOPROLOL TARTRATE 25 MG: 25 TABLET, FILM COATED ORAL at 09:46

## 2020-06-25 RX ADMIN — BACLOFEN 5 MG: 10 TABLET ORAL at 13:40

## 2020-06-25 RX ADMIN — BACLOFEN 5 MG: 10 TABLET ORAL at 09:47

## 2020-06-25 RX ADMIN — SENNOSIDES AND DOCUSATE SODIUM 1 TABLET: 8.6; 5 TABLET ORAL at 09:47

## 2020-06-25 RX ADMIN — TRAMADOL HYDROCHLORIDE 50 MG: 50 TABLET, FILM COATED ORAL at 21:56

## 2020-06-25 RX ADMIN — MIRTAZAPINE 30 MG: 15 TABLET, FILM COATED ORAL at 21:55

## 2020-06-25 RX ADMIN — SPIRONOLACTONE 25 MG: 25 TABLET, FILM COATED ORAL at 09:49

## 2020-06-25 RX ADMIN — BACLOFEN 5 MG: 10 TABLET ORAL at 21:56

## 2020-06-25 RX ADMIN — PANTOPRAZOLE SODIUM 40 MG: 40 TABLET, DELAYED RELEASE ORAL at 06:54

## 2020-06-25 RX ADMIN — POTASSIUM CHLORIDE 20 MEQ: 10 TABLET, EXTENDED RELEASE ORAL at 09:47

## 2020-06-25 RX ADMIN — GUAIFENESIN 600 MG: 600 TABLET, EXTENDED RELEASE ORAL at 09:46

## 2020-06-25 RX ADMIN — HYDROCODONE BITARTRATE AND ACETAMINOPHEN 1 TABLET: 5; 325 TABLET ORAL at 15:34

## 2020-06-25 RX ADMIN — SENNOSIDES AND DOCUSATE SODIUM 1 TABLET: 8.6; 5 TABLET ORAL at 21:55

## 2020-06-25 RX ADMIN — PREGABALIN 150 MG: 75 CAPSULE ORAL at 09:45

## 2020-06-25 RX ADMIN — ESCITALOPRAM OXALATE 20 MG: 10 TABLET ORAL at 09:46

## 2020-06-25 RX ADMIN — HYDROCODONE BITARTRATE AND ACETAMINOPHEN 1 TABLET: 5; 325 TABLET ORAL at 09:31

## 2020-06-25 RX ADMIN — HYDROCODONE BITARTRATE AND ACETAMINOPHEN 1 TABLET: 5; 325 TABLET ORAL at 21:56

## 2020-06-25 ASSESSMENT — PAIN SCALES - GENERAL
PAINLEVEL_OUTOF10: 10
PAINLEVEL_OUTOF10: 7
PAINLEVEL_OUTOF10: 8
PAINLEVEL_OUTOF10: 6
PAINLEVEL_OUTOF10: 8
PAINLEVEL_OUTOF10: 9
PAINLEVEL_OUTOF10: 10
PAINLEVEL_OUTOF10: 10
PAINLEVEL_OUTOF10: 7

## 2020-06-25 ASSESSMENT — PAIN DESCRIPTION - DESCRIPTORS: DESCRIPTORS: ACHING;SORE

## 2020-06-25 ASSESSMENT — PAIN DESCRIPTION - PROGRESSION
CLINICAL_PROGRESSION: GRADUALLY IMPROVING

## 2020-06-25 ASSESSMENT — PAIN DESCRIPTION - PAIN TYPE
TYPE: CHRONIC PAIN;SURGICAL PAIN;ACUTE PAIN
TYPE: ACUTE PAIN

## 2020-06-25 ASSESSMENT — PAIN DESCRIPTION - LOCATION: LOCATION: RIB CAGE

## 2020-06-25 ASSESSMENT — PAIN DESCRIPTION - FREQUENCY: FREQUENCY: CONTINUOUS

## 2020-06-25 ASSESSMENT — PAIN DESCRIPTION - ORIENTATION: ORIENTATION: LEFT

## 2020-06-25 NOTE — PROGRESS NOTES
Nutrition Assessment    Type and Reason for Visit: Reassess    Nutrition Recommendations: Continue current diet    Nutrition Assessment: Remains stable from nutrition stand point. Pt reports she is enjoying meals and doesn't use plate guard. Appetite remains good and she has no nutrition concerns at this time. Will follow for LOS. Nutrition Risk Level: Low    Nutrient Needs:  · Estimated Daily Total Kcal: 7171-7994 @ 19-21 kcal/kg  · Estimated Daily Protein (g): 60-70 @ 1.2-1.4 gr/kg  · Estimated Daily Total Fluid (ml/day): 1500ml/d    Nutrition Diagnosis:   · Problem: No nutrition diagnosis at this time    Objective Information:  · Nutrition-Focused Physical Findings: +1 LUE edema. Last BM 6/24. Glu 106-148. · Wound Type: Surgical Wound  · Current Nutrition Therapies:  · Oral Diet Orders: Carb Control 4 Carbs/Meal   · Oral Diet intake: %  · Oral Nutrition Supplement (ONS) Orders: None  · Anthropometric Measures:  · Ht: 5' 2\" (157.5 cm)   · Current Body Wt: (n/a)  · Admission Body Wt: 157 lb 6.5 oz (71.4 kg)  · Usual Body Wt: 155 lb (70.3 kg)(4/20 [facility] chart. Chart review shows wt range of 149-155)  · Ideal Body Wt: 110 lb (49.9 kg), % Ideal Body >100%  · BMI Classification: BMI 25.0 - 29.9 Overweight    Nutrition Interventions:   Continue current diet  Continued Inpatient Monitoring    Nutrition Evaluation:   · Evaluation: Goal achieved   · Goals: Intake > 75% of meals. Glu <180. Weight +/-3# of admit weight.     · Monitoring: Meal Intake, Weight, Pertinent Labs, Monitor Bowel Function      Electronically signed by Evelio Wilde RD, LD on 6/25/20 at 12:26 PM EDT
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
## 2020-06-25 NOTE — PLAN OF CARE
Nutrition Problem: No nutrition diagnosis at this time  Intervention: Food and/or Nutrient Delivery: Continue current diet  Nutritional Goals: Intake > 75% of meals. Glu <180. Weight +/-3# of admit weight.
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
## 2020-06-26 LAB
GLUCOSE BLD-MCNC: 108 MG/DL (ref 60–115)
GLUCOSE BLD-MCNC: 113 MG/DL (ref 60–115)
GLUCOSE BLD-MCNC: 113 MG/DL (ref 60–115)
GLUCOSE BLD-MCNC: 130 MG/DL (ref 60–115)
PERFORMED ON: ABNORMAL
PERFORMED ON: NORMAL

## 2020-06-26 PROCEDURE — 6370000000 HC RX 637 (ALT 250 FOR IP): Performed by: INTERNAL MEDICINE

## 2020-06-26 PROCEDURE — 94640 AIRWAY INHALATION TREATMENT: CPT

## 2020-06-26 PROCEDURE — 97530 THERAPEUTIC ACTIVITIES: CPT

## 2020-06-26 PROCEDURE — 6370000000 HC RX 637 (ALT 250 FOR IP): Performed by: PHYSICIAN ASSISTANT

## 2020-06-26 PROCEDURE — 6360000002 HC RX W HCPCS: Performed by: PHYSICIAN ASSISTANT

## 2020-06-26 PROCEDURE — 6370000000 HC RX 637 (ALT 250 FOR IP): Performed by: ANESTHESIOLOGY

## 2020-06-26 PROCEDURE — 1200000002 HC SEMI PRIVATE SWING BED

## 2020-06-26 PROCEDURE — 97110 THERAPEUTIC EXERCISES: CPT

## 2020-06-26 PROCEDURE — 97535 SELF CARE MNGMENT TRAINING: CPT

## 2020-06-26 RX ADMIN — TRAMADOL HYDROCHLORIDE 50 MG: 50 TABLET, FILM COATED ORAL at 17:28

## 2020-06-26 RX ADMIN — ACETAMINOPHEN 500 MG: 500 TABLET, FILM COATED ORAL at 20:49

## 2020-06-26 RX ADMIN — TRAMADOL HYDROCHLORIDE 50 MG: 50 TABLET, FILM COATED ORAL at 20:52

## 2020-06-26 RX ADMIN — HYDROCODONE BITARTRATE AND ACETAMINOPHEN 1 TABLET: 5; 325 TABLET ORAL at 16:31

## 2020-06-26 RX ADMIN — POTASSIUM CHLORIDE 20 MEQ: 10 TABLET, EXTENDED RELEASE ORAL at 09:13

## 2020-06-26 RX ADMIN — ARIPIPRAZOLE 10 MG: 5 TABLET ORAL at 09:14

## 2020-06-26 RX ADMIN — LORAZEPAM 0.5 MG: 0.5 TABLET ORAL at 11:13

## 2020-06-26 RX ADMIN — METOPROLOL TARTRATE 25 MG: 25 TABLET, FILM COATED ORAL at 20:50

## 2020-06-26 RX ADMIN — ENOXAPARIN SODIUM 30 MG: 30 INJECTION SUBCUTANEOUS at 20:48

## 2020-06-26 RX ADMIN — DOXAZOSIN 2 MG: 2 TABLET ORAL at 20:51

## 2020-06-26 RX ADMIN — GUAIFENESIN 600 MG: 600 TABLET, EXTENDED RELEASE ORAL at 09:13

## 2020-06-26 RX ADMIN — BACLOFEN 5 MG: 10 TABLET ORAL at 20:49

## 2020-06-26 RX ADMIN — ACETAMINOPHEN 500 MG: 500 TABLET, FILM COATED ORAL at 09:13

## 2020-06-26 RX ADMIN — TRAMADOL HYDROCHLORIDE 50 MG: 50 TABLET, FILM COATED ORAL at 11:13

## 2020-06-26 RX ADMIN — BACLOFEN 5 MG: 10 TABLET ORAL at 09:14

## 2020-06-26 RX ADMIN — ACETAMINOPHEN 500 MG: 500 TABLET, FILM COATED ORAL at 17:28

## 2020-06-26 RX ADMIN — ESCITALOPRAM OXALATE 20 MG: 10 TABLET ORAL at 09:13

## 2020-06-26 RX ADMIN — PANTOPRAZOLE SODIUM 40 MG: 40 TABLET, DELAYED RELEASE ORAL at 06:02

## 2020-06-26 RX ADMIN — TRAMADOL HYDROCHLORIDE 50 MG: 50 TABLET, FILM COATED ORAL at 09:13

## 2020-06-26 RX ADMIN — HYDROCODONE BITARTRATE AND ACETAMINOPHEN 1 TABLET: 5; 325 TABLET ORAL at 06:02

## 2020-06-26 RX ADMIN — LORAZEPAM 0.5 MG: 0.5 TABLET ORAL at 20:50

## 2020-06-26 RX ADMIN — SENNOSIDES AND DOCUSATE SODIUM 1 TABLET: 8.6; 5 TABLET ORAL at 20:51

## 2020-06-26 RX ADMIN — IPRATROPIUM BROMIDE AND ALBUTEROL SULFATE 1 AMPULE: .5; 3 SOLUTION RESPIRATORY (INHALATION) at 20:32

## 2020-06-26 RX ADMIN — PREGABALIN 150 MG: 75 CAPSULE ORAL at 09:14

## 2020-06-26 RX ADMIN — SENNOSIDES AND DOCUSATE SODIUM 1 TABLET: 8.6; 5 TABLET ORAL at 09:14

## 2020-06-26 RX ADMIN — ROSUVASTATIN CALCIUM 20 MG: 20 TABLET, FILM COATED ORAL at 09:13

## 2020-06-26 RX ADMIN — FUROSEMIDE 40 MG: 40 TABLET ORAL at 09:13

## 2020-06-26 RX ADMIN — BACLOFEN 5 MG: 10 TABLET ORAL at 15:05

## 2020-06-26 RX ADMIN — LEVOTHYROXINE SODIUM 50 MCG: 50 TABLET ORAL at 06:02

## 2020-06-26 RX ADMIN — MIRTAZAPINE 30 MG: 15 TABLET, FILM COATED ORAL at 20:50

## 2020-06-26 RX ADMIN — ACETAMINOPHEN 500 MG: 500 TABLET, FILM COATED ORAL at 11:13

## 2020-06-26 RX ADMIN — GUAIFENESIN 600 MG: 600 TABLET, EXTENDED RELEASE ORAL at 20:49

## 2020-06-26 RX ADMIN — PRAMIPEXOLE DIHYDROCHLORIDE 0.5 MG: 0.25 TABLET ORAL at 17:28

## 2020-06-26 RX ADMIN — SPIRONOLACTONE 25 MG: 25 TABLET, FILM COATED ORAL at 09:14

## 2020-06-26 RX ADMIN — ENOXAPARIN SODIUM 30 MG: 30 INJECTION SUBCUTANEOUS at 09:12

## 2020-06-26 ASSESSMENT — PAIN DESCRIPTION - FREQUENCY
FREQUENCY: CONTINUOUS
FREQUENCY: CONTINUOUS

## 2020-06-26 ASSESSMENT — PAIN SCALES - GENERAL
PAINLEVEL_OUTOF10: 9
PAINLEVEL_OUTOF10: 9
PAINLEVEL_OUTOF10: 8
PAINLEVEL_OUTOF10: 9
PAINLEVEL_OUTOF10: 8
PAINLEVEL_OUTOF10: 9

## 2020-06-26 ASSESSMENT — PAIN DESCRIPTION - LOCATION
LOCATION: ARM;RIB CAGE

## 2020-06-26 ASSESSMENT — PAIN DESCRIPTION - ORIENTATION
ORIENTATION: LEFT
ORIENTATION: LEFT

## 2020-06-26 ASSESSMENT — PAIN DESCRIPTION - DESCRIPTORS
DESCRIPTORS: SORE
DESCRIPTORS: SORE

## 2020-06-26 ASSESSMENT — PAIN DESCRIPTION - PAIN TYPE
TYPE: ACUTE PAIN;CHRONIC PAIN
TYPE: ACUTE PAIN;CHRONIC PAIN

## 2020-06-26 NOTE — PLAN OF CARE
Problem: Pain:  Description: Pain management should include both nonpharmacologic and pharmacologic interventions. Goal: Pain level will decrease  Description: Pain level will decrease  Outcome: Met This Shift  Goal: Control of acute pain  Description: Control of acute pain  Outcome: Met This Shift  Goal: Control of chronic pain  Description: Control of chronic pain  Outcome: Met This Shift     Problem: Falls - Risk of:  Goal: Will remain free from falls  Description: Will remain free from falls  Outcome: Met This Shift  Goal: Absence of physical injury  Description: Absence of physical injury  Outcome: Met This Shift     Problem: SAFETY  Goal: Free from accidental physical injury  Outcome: Met This Shift  Goal: Free from intentional harm  Outcome: Met This Shift     Problem: DAILY CARE  Goal: Daily care needs are met  Outcome: Met This Shift     Problem: SKIN INTEGRITY  Goal: Skin integrity is maintained or improved  Outcome: Met This Shift     Problem: KNOWLEDGE DEFICIT  Goal: Patient/S.O. demonstrates understanding of disease process, treatment plan, medications, and discharge instructions.   Outcome: Ongoing     Problem: DISCHARGE BARRIERS  Goal: Patient's continuum of care needs are met  Outcome: Ongoing     Problem: Discharge Planning:  Goal: Discharged to appropriate level of care  Outcome: Met This Shift     Problem: Mobility - Impaired:  Goal: Mobility will improve  Outcome: Met This Shift     Problem: Infection - Surgical Site:  Goal: Will show no infection signs and symptoms  Outcome: Met This Shift     Problem: Pain - Acute:  Goal: Pain level will decrease  Description: Pain level will decrease  Outcome: Met This Shift     Problem: Nutrition  Goal: Optimal nutrition therapy  Outcome: Met This Shift

## 2020-06-26 NOTE — PROGRESS NOTES
Occupational Therapy  Facility/Department: [facility] MED SURG UNIT  Daily Treatment Note  NAME: Josefa Contreras  : 1960  MRN: [id number]    Date of Service: 2020    Discharge Recommendations:  Home with assist PRN, Home with Home health OT(HH aide )       Assessment   Performance deficits / Impairments: Decreased functional mobility ; Decreased balance;Decreased ADL status; Decreased endurance;Decreased high-level IADLs;Decreased strength;Decreased safe awareness;Decreased fine motor control;Decreased coordination  Assessment: Pt agreeable to shower this date. Pt cont's CGA/Tess using HHA for fxl mob. Pt min/modA UB bathing. Pt Tess lower body bathing. Pt needed cues for thoroughness with bathing. Pt maxA to manage LUE ultrasling. Pt will need assist with ADL upon return home. Prognosis: Good  REQUIRES OT FOLLOW UP: Yes         Patient Diagnosis(es): There were no encounter diagnoses. has a past medical history of Acid reflux, Allergic rhinitis, Anxiety, Arnold-Chiari deformity (HCC), Asthma, Cerebral artery occlusion with cerebral infarction [facility]), Cerebrovascular disease, Chronic back pain greater than 3 months duration, COPD (chronic obstructive pulmonary disease) ([address] CVA (cerebral infarction), Depression, Fibromyalgia, H/O encephalopathy, Headache(784.0), Hepatitis B surface antigen positive, Hx of blood clots, Hyperlipidemia LDL goal < 100, Hypertension, Hypothyroidism, Laryngitis, chronic, Marijuana smoker in remission ([address] Obesity (BMI 30-39.9), Osteoarthritis, Pulmonary embolism and infarction ([address] Restless legs syndrome, Rhinitis, chronic, Rotator cuff tear, S/P colonoscopy with polypectomy, Seizures ([address] Smoker, Spinal headache, Spondylosis without myelopathy, Type 2 diabetes mellitus without complication ([address] Urinary incontinence, and Vertebral compression fracture ([address] has a past surgical history that includes craniotomy (); Femur fracture surgery (Left, );
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
## 2020-06-27 LAB
GLUCOSE BLD-MCNC: 125 MG/DL (ref 60–115)
PERFORMED ON: ABNORMAL

## 2020-06-27 PROCEDURE — 6370000000 HC RX 637 (ALT 250 FOR IP): Performed by: PHYSICIAN ASSISTANT

## 2020-06-27 PROCEDURE — 6370000000 HC RX 637 (ALT 250 FOR IP): Performed by: ANESTHESIOLOGY

## 2020-06-27 PROCEDURE — 6370000000 HC RX 637 (ALT 250 FOR IP): Performed by: INTERNAL MEDICINE

## 2020-06-27 PROCEDURE — 97530 THERAPEUTIC ACTIVITIES: CPT

## 2020-06-27 PROCEDURE — 97116 GAIT TRAINING THERAPY: CPT

## 2020-06-27 PROCEDURE — 1200000002 HC SEMI PRIVATE SWING BED

## 2020-06-27 PROCEDURE — 6360000002 HC RX W HCPCS: Performed by: PHYSICIAN ASSISTANT

## 2020-06-27 RX ADMIN — ENOXAPARIN SODIUM 30 MG: 30 INJECTION SUBCUTANEOUS at 09:15

## 2020-06-27 RX ADMIN — METOPROLOL TARTRATE 25 MG: 25 TABLET, FILM COATED ORAL at 09:16

## 2020-06-27 RX ADMIN — BACLOFEN 5 MG: 10 TABLET ORAL at 09:16

## 2020-06-27 RX ADMIN — SENNOSIDES AND DOCUSATE SODIUM 1 TABLET: 8.6; 5 TABLET ORAL at 20:44

## 2020-06-27 RX ADMIN — BACLOFEN 5 MG: 10 TABLET ORAL at 13:52

## 2020-06-27 RX ADMIN — MIRTAZAPINE 30 MG: 15 TABLET, FILM COATED ORAL at 20:43

## 2020-06-27 RX ADMIN — FUROSEMIDE 40 MG: 40 TABLET ORAL at 09:16

## 2020-06-27 RX ADMIN — ROSUVASTATIN CALCIUM 20 MG: 20 TABLET, FILM COATED ORAL at 09:15

## 2020-06-27 RX ADMIN — AMLODIPINE BESYLATE 5 MG: 5 TABLET ORAL at 09:16

## 2020-06-27 RX ADMIN — GUAIFENESIN 600 MG: 600 TABLET, EXTENDED RELEASE ORAL at 20:43

## 2020-06-27 RX ADMIN — LEVOTHYROXINE SODIUM 50 MCG: 50 TABLET ORAL at 06:38

## 2020-06-27 RX ADMIN — ACETAMINOPHEN 500 MG: 500 TABLET, FILM COATED ORAL at 20:44

## 2020-06-27 RX ADMIN — HYDROCODONE BITARTRATE AND ACETAMINOPHEN 1 TABLET: 5; 325 TABLET ORAL at 11:49

## 2020-06-27 RX ADMIN — LORAZEPAM 0.5 MG: 0.5 TABLET ORAL at 09:17

## 2020-06-27 RX ADMIN — ESCITALOPRAM OXALATE 20 MG: 10 TABLET ORAL at 09:15

## 2020-06-27 RX ADMIN — ENOXAPARIN SODIUM 30 MG: 30 INJECTION SUBCUTANEOUS at 20:41

## 2020-06-27 RX ADMIN — PANTOPRAZOLE SODIUM 40 MG: 40 TABLET, DELAYED RELEASE ORAL at 06:38

## 2020-06-27 RX ADMIN — TRAMADOL HYDROCHLORIDE 50 MG: 50 TABLET, FILM COATED ORAL at 20:44

## 2020-06-27 RX ADMIN — LORAZEPAM 0.5 MG: 0.5 TABLET ORAL at 20:43

## 2020-06-27 RX ADMIN — ACETAMINOPHEN 500 MG: 500 TABLET, FILM COATED ORAL at 09:16

## 2020-06-27 RX ADMIN — POTASSIUM CHLORIDE 20 MEQ: 10 TABLET, EXTENDED RELEASE ORAL at 09:16

## 2020-06-27 RX ADMIN — GUAIFENESIN 600 MG: 600 TABLET, EXTENDED RELEASE ORAL at 09:15

## 2020-06-27 RX ADMIN — HYDROCODONE BITARTRATE AND ACETAMINOPHEN 1 TABLET: 5; 325 TABLET ORAL at 06:38

## 2020-06-27 RX ADMIN — SENNOSIDES AND DOCUSATE SODIUM 1 TABLET: 8.6; 5 TABLET ORAL at 09:16

## 2020-06-27 RX ADMIN — TRAMADOL HYDROCHLORIDE 50 MG: 50 TABLET, FILM COATED ORAL at 08:42

## 2020-06-27 RX ADMIN — PREGABALIN 150 MG: 75 CAPSULE ORAL at 09:16

## 2020-06-27 RX ADMIN — BACLOFEN 5 MG: 10 TABLET ORAL at 20:42

## 2020-06-27 RX ADMIN — POLYETHYLENE GLYCOL 3350 17 G: 17 POWDER, FOR SOLUTION ORAL at 09:15

## 2020-06-27 RX ADMIN — TRAMADOL HYDROCHLORIDE 50 MG: 50 TABLET, FILM COATED ORAL at 11:49

## 2020-06-27 RX ADMIN — SPIRONOLACTONE 25 MG: 25 TABLET, FILM COATED ORAL at 09:16

## 2020-06-27 RX ADMIN — PSYLLIUM HUSK 1 PACKET: 3.4 POWDER ORAL at 09:15

## 2020-06-27 RX ADMIN — ACETAMINOPHEN 500 MG: 500 TABLET, FILM COATED ORAL at 17:39

## 2020-06-27 RX ADMIN — PRAMIPEXOLE DIHYDROCHLORIDE 0.5 MG: 0.25 TABLET ORAL at 17:40

## 2020-06-27 RX ADMIN — ARIPIPRAZOLE 10 MG: 5 TABLET ORAL at 09:16

## 2020-06-27 RX ADMIN — ACETAMINOPHEN 500 MG: 500 TABLET, FILM COATED ORAL at 11:49

## 2020-06-27 RX ADMIN — HYDROCODONE BITARTRATE AND ACETAMINOPHEN 1 TABLET: 5; 325 TABLET ORAL at 20:43

## 2020-06-27 RX ADMIN — TRAMADOL HYDROCHLORIDE 50 MG: 50 TABLET, FILM COATED ORAL at 17:39

## 2020-06-27 ASSESSMENT — PAIN SCALES - WONG BAKER: WONGBAKER_NUMERICALRESPONSE: 2

## 2020-06-27 ASSESSMENT — PAIN SCALES - GENERAL
PAINLEVEL_OUTOF10: 9
PAINLEVEL_OUTOF10: 8

## 2020-06-27 ASSESSMENT — PAIN DESCRIPTION - DESCRIPTORS: DESCRIPTORS: SORE

## 2020-06-27 ASSESSMENT — PAIN DESCRIPTION - ORIENTATION
ORIENTATION: LEFT
ORIENTATION: LEFT

## 2020-06-27 ASSESSMENT — PAIN DESCRIPTION - PAIN TYPE
TYPE: ACUTE PAIN;CHRONIC PAIN

## 2020-06-27 ASSESSMENT — PAIN DESCRIPTION - LOCATION
LOCATION: ARM;RIB CAGE

## 2020-06-27 NOTE — PLAN OF CARE
Problem: Falls - Risk of:  Goal: Will remain free from falls  Description: Will remain free from falls  Outcome: Met This Shift  Goal: Absence of physical injury  Description: Absence of physical injury  Outcome: Met This Shift     Problem: SAFETY  Goal: Free from accidental physical injury  Outcome: Met This Shift  Goal: Free from intentional harm  Outcome: Met This Shift     Problem: DAILY CARE  Goal: Daily care needs are met  Outcome: Met This Shift     Problem: Pain:  Goal: Pain level will decrease  Description: Pain level will decrease  Outcome: Ongoing  Goal: Control of acute pain  Description: Control of acute pain  Outcome: Ongoing  Goal: Control of chronic pain  Description: Control of chronic pain  Outcome: Ongoing     Problem: SKIN INTEGRITY  Goal: Skin integrity is maintained or improved  Outcome: Ongoing     Problem: Mobility - Impaired:  Goal: Mobility will improve  Outcome: Ongoing     Problem: Pain - Acute:  Goal: Pain level will decrease  Description: Pain level will decrease  Outcome: Ongoing

## 2020-06-27 NOTE — PROGRESS NOTES
At 1745 removed  A 10cc baloon of NS from gonzalez . No concerns noted and gonzalez came out 16 [place]. Revoved 600ml from bag clear yellow urine. Pt denies any concerns will continue to monitor. Call light within reach.

## 2020-06-27 NOTE — PROGRESS NOTES
Pt currently sitting in chair. Morning medications and assessment completed. Pt educated of proper use of call light and when to call nursing staff. No further needs at time.
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
## 2020-06-27 NOTE — PLAN OF CARE
Sabi Bernabe RN  Outcome: Ongoing  6/27/2020 0021 by Lupis Nieves RN  Outcome: Ongoing  Goal: Control of acute pain  Description: Control of acute pain  6/27/2020 1048 by Sabi Bernabe RN  Outcome: Ongoing  6/27/2020 0021 by Lupis Nieves RN  Outcome: Ongoing  Goal: Control of chronic pain  Description: Control of chronic pain  6/27/2020 1048 by Sabi Bernabe RN  Outcome: Ongoing  6/27/2020 0021 by Luips Nieves RN  Outcome: Ongoing     Problem: SKIN INTEGRITY  Goal: Skin integrity is maintained or improved  6/27/2020 1048 by Sabi Bernabe RN  Outcome: Ongoing  6/27/2020 0021 by Lupis Nieves RN  Outcome: Ongoing     Problem: Pain - Acute:  Goal: Pain level will decrease  Description: Pain level will decrease  6/27/2020 1048 by Sabi Bernabe RN  Outcome: Ongoing  6/27/2020 0021 by Lupis Nieves RN  Outcome: Ongoing

## 2020-06-28 LAB
GLUCOSE BLD-MCNC: 152 MG/DL (ref 60–115)
PERFORMED ON: ABNORMAL

## 2020-06-28 PROCEDURE — 6370000000 HC RX 637 (ALT 250 FOR IP): Performed by: INTERNAL MEDICINE

## 2020-06-28 PROCEDURE — 1200000002 HC SEMI PRIVATE SWING BED

## 2020-06-28 PROCEDURE — 6370000000 HC RX 637 (ALT 250 FOR IP): Performed by: PHYSICIAN ASSISTANT

## 2020-06-28 PROCEDURE — 97116 GAIT TRAINING THERAPY: CPT

## 2020-06-28 PROCEDURE — 97110 THERAPEUTIC EXERCISES: CPT

## 2020-06-28 PROCEDURE — 6370000000 HC RX 637 (ALT 250 FOR IP): Performed by: ANESTHESIOLOGY

## 2020-06-28 PROCEDURE — 6360000002 HC RX W HCPCS: Performed by: PHYSICIAN ASSISTANT

## 2020-06-28 RX ORDER — OXYCODONE HYDROCHLORIDE 5 MG/1
5 TABLET ORAL EVERY 4 HOURS PRN
Status: DISCONTINUED | OUTPATIENT
Start: 2020-06-28 | End: 2020-06-29

## 2020-06-28 RX ORDER — BACLOFEN 10 MG/1
10 TABLET ORAL 3 TIMES DAILY
Status: DISCONTINUED | OUTPATIENT
Start: 2020-06-28 | End: 2020-06-30 | Stop reason: HOSPADM

## 2020-06-28 RX ADMIN — ACETAMINOPHEN 500 MG: 500 TABLET, FILM COATED ORAL at 13:29

## 2020-06-28 RX ADMIN — ACETAMINOPHEN 500 MG: 500 TABLET, FILM COATED ORAL at 17:59

## 2020-06-28 RX ADMIN — AMLODIPINE BESYLATE 5 MG: 5 TABLET ORAL at 08:31

## 2020-06-28 RX ADMIN — LEVOTHYROXINE SODIUM 50 MCG: 50 TABLET ORAL at 06:16

## 2020-06-28 RX ADMIN — SENNOSIDES AND DOCUSATE SODIUM 1 TABLET: 8.6; 5 TABLET ORAL at 08:30

## 2020-06-28 RX ADMIN — ARIPIPRAZOLE 10 MG: 5 TABLET ORAL at 08:32

## 2020-06-28 RX ADMIN — PANTOPRAZOLE SODIUM 40 MG: 40 TABLET, DELAYED RELEASE ORAL at 06:16

## 2020-06-28 RX ADMIN — POTASSIUM CHLORIDE 20 MEQ: 10 TABLET, EXTENDED RELEASE ORAL at 08:31

## 2020-06-28 RX ADMIN — TRAMADOL HYDROCHLORIDE 50 MG: 50 TABLET, FILM COATED ORAL at 13:31

## 2020-06-28 RX ADMIN — OXYCODONE HYDROCHLORIDE 5 MG: 5 TABLET ORAL at 20:16

## 2020-06-28 RX ADMIN — DOXAZOSIN 2 MG: 2 TABLET ORAL at 20:16

## 2020-06-28 RX ADMIN — ACETAMINOPHEN 500 MG: 500 TABLET, FILM COATED ORAL at 08:30

## 2020-06-28 RX ADMIN — BACLOFEN 10 MG: 10 TABLET ORAL at 20:16

## 2020-06-28 RX ADMIN — TRAMADOL HYDROCHLORIDE 50 MG: 50 TABLET, FILM COATED ORAL at 08:29

## 2020-06-28 RX ADMIN — GUAIFENESIN 600 MG: 600 TABLET, EXTENDED RELEASE ORAL at 08:25

## 2020-06-28 RX ADMIN — GUAIFENESIN 600 MG: 600 TABLET, EXTENDED RELEASE ORAL at 20:15

## 2020-06-28 RX ADMIN — OXYCODONE HYDROCHLORIDE 5 MG: 5 TABLET ORAL at 10:58

## 2020-06-28 RX ADMIN — BACLOFEN 10 MG: 10 TABLET ORAL at 13:29

## 2020-06-28 RX ADMIN — PRAMIPEXOLE DIHYDROCHLORIDE 0.5 MG: 0.25 TABLET ORAL at 17:58

## 2020-06-28 RX ADMIN — FUROSEMIDE 40 MG: 40 TABLET ORAL at 08:29

## 2020-06-28 RX ADMIN — LORAZEPAM 0.5 MG: 0.5 TABLET ORAL at 20:15

## 2020-06-28 RX ADMIN — ACETAMINOPHEN 500 MG: 500 TABLET, FILM COATED ORAL at 20:15

## 2020-06-28 RX ADMIN — METOPROLOL TARTRATE 25 MG: 25 TABLET, FILM COATED ORAL at 08:34

## 2020-06-28 RX ADMIN — ENOXAPARIN SODIUM 30 MG: 30 INJECTION SUBCUTANEOUS at 08:35

## 2020-06-28 RX ADMIN — SPIRONOLACTONE 25 MG: 25 TABLET, FILM COATED ORAL at 08:25

## 2020-06-28 RX ADMIN — ROSUVASTATIN CALCIUM 20 MG: 20 TABLET, FILM COATED ORAL at 08:24

## 2020-06-28 RX ADMIN — TRAMADOL HYDROCHLORIDE 50 MG: 50 TABLET, FILM COATED ORAL at 20:15

## 2020-06-28 RX ADMIN — PREGABALIN 150 MG: 75 CAPSULE ORAL at 08:25

## 2020-06-28 RX ADMIN — MIRTAZAPINE 30 MG: 15 TABLET, FILM COATED ORAL at 20:16

## 2020-06-28 RX ADMIN — TRAMADOL HYDROCHLORIDE 50 MG: 50 TABLET, FILM COATED ORAL at 17:58

## 2020-06-28 RX ADMIN — SENNOSIDES AND DOCUSATE SODIUM 1 TABLET: 8.6; 5 TABLET ORAL at 20:17

## 2020-06-28 RX ADMIN — OXYCODONE HYDROCHLORIDE 5 MG: 5 TABLET ORAL at 15:22

## 2020-06-28 RX ADMIN — ESCITALOPRAM OXALATE 20 MG: 10 TABLET ORAL at 08:24

## 2020-06-28 RX ADMIN — BACLOFEN 5 MG: 10 TABLET ORAL at 08:30

## 2020-06-28 RX ADMIN — ENOXAPARIN SODIUM 30 MG: 30 INJECTION SUBCUTANEOUS at 20:15

## 2020-06-28 RX ADMIN — HYDROCODONE BITARTRATE AND ACETAMINOPHEN 1 TABLET: 5; 325 TABLET ORAL at 05:38

## 2020-06-28 ASSESSMENT — PAIN SCALES - GENERAL
PAINLEVEL_OUTOF10: 9
PAINLEVEL_OUTOF10: 10
PAINLEVEL_OUTOF10: 9
PAINLEVEL_OUTOF10: 8
PAINLEVEL_OUTOF10: 10
PAINLEVEL_OUTOF10: 9
PAINLEVEL_OUTOF10: 8
PAINLEVEL_OUTOF10: 9
PAINLEVEL_OUTOF10: 10
PAINLEVEL_OUTOF10: 9

## 2020-06-28 ASSESSMENT — ENCOUNTER SYMPTOMS
ALLERGIC/IMMUNOLOGIC NEGATIVE: 1
EYES NEGATIVE: 1
BACK PAIN: 1
COLOR CHANGE: 0
RESPIRATORY NEGATIVE: 1
GASTROINTESTINAL NEGATIVE: 1

## 2020-06-28 ASSESSMENT — PAIN DESCRIPTION - LOCATION
LOCATION: ARM;BACK
LOCATION: RIB CAGE

## 2020-06-28 ASSESSMENT — PAIN DESCRIPTION - ORIENTATION
ORIENTATION: LEFT
ORIENTATION: LEFT

## 2020-06-28 ASSESSMENT — PAIN DESCRIPTION - DESCRIPTORS: DESCRIPTORS: SORE

## 2020-06-28 ASSESSMENT — PAIN DESCRIPTION - FREQUENCY: FREQUENCY: CONTINUOUS

## 2020-06-28 ASSESSMENT — PAIN DESCRIPTION - PAIN TYPE
TYPE: ACUTE PAIN;CHRONIC PAIN
TYPE: ACUTE PAIN;CHRONIC PAIN

## 2020-06-28 ASSESSMENT — PAIN DESCRIPTION - ONSET: ONSET: ON-GOING
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
## 2020-06-28 NOTE — PLAN OF CARE
Problem: Falls - Risk of:  Goal: Will remain free from falls  Description: Will remain free from falls  Outcome: Met This Shift  Goal: Absence of physical injury  Description: Absence of physical injury  Outcome: Met This Shift     Problem: SAFETY  Goal: Free from accidental physical injury  Outcome: Met This Shift  Goal: Free from intentional harm  Outcome: Met This Shift     Problem: DAILY CARE  Goal: Daily care needs are met  Outcome: Met This Shift     Problem: Pain:  Goal: Pain level will decrease  Description: Pain level will decrease  Outcome: Ongoing  Goal: Control of acute pain  Description: Control of acute pain  Outcome: Ongoing  Goal: Control of chronic pain  Description: Control of chronic pain  Outcome: Ongoing     Problem: SKIN INTEGRITY  Goal: Skin integrity is maintained or improved  Outcome: Ongoing     Problem: Pain - Acute:  Goal: Pain level will decrease  Description: Pain level will decrease  Outcome: Ongoing
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
## 2020-06-29 ENCOUNTER — APPOINTMENT (OUTPATIENT)
Dept: GENERAL RADIOLOGY | Age: 60
DRG: 559 | End: 2020-06-29
Attending: INTERNAL MEDICINE
Payer: MEDICARE

## 2020-06-29 LAB
ANION GAP SERPL CALCULATED.3IONS-SCNC: 9 MEQ/L (ref 9–15)
BUN BLDV-MCNC: 23 MG/DL (ref 6–20)
CALCIUM SERPL-MCNC: 8.5 MG/DL (ref 8.5–9.9)
CHLORIDE BLD-SCNC: 105 MEQ/L (ref 95–107)
CO2: 26 MEQ/L (ref 20–31)
CREAT SERPL-MCNC: 1.22 MG/DL (ref 0.5–0.9)
GFR AFRICAN AMERICAN: 54.7
GFR NON-AFRICAN AMERICAN: 45.2
GLUCOSE BLD-MCNC: 104 MG/DL (ref 70–99)
GLUCOSE BLD-MCNC: 129 MG/DL (ref 60–115)
GLUCOSE BLD-MCNC: 143 MG/DL (ref 60–115)
HCT VFR BLD CALC: 25.4 % (ref 37–47)
HEMOGLOBIN: 8.1 G/DL (ref 12–16)
MCH RBC QN AUTO: 23.5 PG (ref 27–31.3)
MCHC RBC AUTO-ENTMCNC: 31.8 % (ref 33–37)
MCV RBC AUTO: 74.1 FL (ref 82–100)
PDW BLD-RTO: 19.6 % (ref 11.5–14.5)
PERFORMED ON: ABNORMAL
PERFORMED ON: ABNORMAL
PLATELET # BLD: 412 K/UL (ref 130–400)
POTASSIUM REFLEX MAGNESIUM: 4.1 MEQ/L (ref 3.4–4.9)
RBC # BLD: 3.43 M/UL (ref 4.2–5.4)
SODIUM BLD-SCNC: 140 MEQ/L (ref 135–144)
WBC # BLD: 10.4 K/UL (ref 4.8–10.8)

## 2020-06-29 PROCEDURE — 36415 COLL VENOUS BLD VENIPUNCTURE: CPT

## 2020-06-29 PROCEDURE — 80048 BASIC METABOLIC PNL TOTAL CA: CPT

## 2020-06-29 PROCEDURE — 1200000002 HC SEMI PRIVATE SWING BED

## 2020-06-29 PROCEDURE — 97530 THERAPEUTIC ACTIVITIES: CPT

## 2020-06-29 PROCEDURE — 6370000000 HC RX 637 (ALT 250 FOR IP): Performed by: PHYSICIAN ASSISTANT

## 2020-06-29 PROCEDURE — 97116 GAIT TRAINING THERAPY: CPT

## 2020-06-29 PROCEDURE — 6360000002 HC RX W HCPCS: Performed by: PHYSICIAN ASSISTANT

## 2020-06-29 PROCEDURE — 97110 THERAPEUTIC EXERCISES: CPT

## 2020-06-29 PROCEDURE — 85027 COMPLETE CBC AUTOMATED: CPT

## 2020-06-29 PROCEDURE — 97535 SELF CARE MNGMENT TRAINING: CPT

## 2020-06-29 PROCEDURE — 6370000000 HC RX 637 (ALT 250 FOR IP): Performed by: INTERNAL MEDICINE

## 2020-06-29 PROCEDURE — 6370000000 HC RX 637 (ALT 250 FOR IP): Performed by: ANESTHESIOLOGY

## 2020-06-29 PROCEDURE — 71100 X-RAY EXAM RIBS UNI 2 VIEWS: CPT

## 2020-06-29 RX ORDER — OXYCODONE HYDROCHLORIDE 5 MG/1
7.5 TABLET ORAL EVERY 4 HOURS PRN
Status: DISCONTINUED | OUTPATIENT
Start: 2020-06-29 | End: 2020-06-30 | Stop reason: HOSPADM

## 2020-06-29 RX ADMIN — PSYLLIUM HUSK 1 PACKET: 3.4 POWDER ORAL at 11:11

## 2020-06-29 RX ADMIN — PANTOPRAZOLE SODIUM 40 MG: 40 TABLET, DELAYED RELEASE ORAL at 06:32

## 2020-06-29 RX ADMIN — AMLODIPINE BESYLATE 5 MG: 5 TABLET ORAL at 11:14

## 2020-06-29 RX ADMIN — LORAZEPAM 0.5 MG: 0.5 TABLET ORAL at 21:28

## 2020-06-29 RX ADMIN — ENOXAPARIN SODIUM 30 MG: 30 INJECTION SUBCUTANEOUS at 21:28

## 2020-06-29 RX ADMIN — PRAMIPEXOLE DIHYDROCHLORIDE 0.5 MG: 0.25 TABLET ORAL at 16:44

## 2020-06-29 RX ADMIN — ACETAMINOPHEN 500 MG: 500 TABLET, FILM COATED ORAL at 11:13

## 2020-06-29 RX ADMIN — TRAMADOL HYDROCHLORIDE 50 MG: 50 TABLET, FILM COATED ORAL at 08:21

## 2020-06-29 RX ADMIN — OXYCODONE HYDROCHLORIDE 7.5 MG: 5 TABLET ORAL at 11:14

## 2020-06-29 RX ADMIN — TRAMADOL HYDROCHLORIDE 50 MG: 50 TABLET, FILM COATED ORAL at 16:44

## 2020-06-29 RX ADMIN — LEVOTHYROXINE SODIUM 50 MCG: 50 TABLET ORAL at 06:32

## 2020-06-29 RX ADMIN — ROSUVASTATIN CALCIUM 20 MG: 20 TABLET, FILM COATED ORAL at 11:14

## 2020-06-29 RX ADMIN — METOPROLOL TARTRATE 25 MG: 25 TABLET, FILM COATED ORAL at 21:28

## 2020-06-29 RX ADMIN — SPIRONOLACTONE 25 MG: 25 TABLET, FILM COATED ORAL at 11:13

## 2020-06-29 RX ADMIN — ACETAMINOPHEN 500 MG: 500 TABLET, FILM COATED ORAL at 16:44

## 2020-06-29 RX ADMIN — METOPROLOL TARTRATE 25 MG: 25 TABLET, FILM COATED ORAL at 11:16

## 2020-06-29 RX ADMIN — BACLOFEN 10 MG: 10 TABLET ORAL at 11:14

## 2020-06-29 RX ADMIN — SENNOSIDES AND DOCUSATE SODIUM 1 TABLET: 8.6; 5 TABLET ORAL at 11:13

## 2020-06-29 RX ADMIN — BACLOFEN 10 MG: 10 TABLET ORAL at 15:15

## 2020-06-29 RX ADMIN — MIRTAZAPINE 30 MG: 15 TABLET, FILM COATED ORAL at 21:31

## 2020-06-29 RX ADMIN — FUROSEMIDE 40 MG: 40 TABLET ORAL at 11:15

## 2020-06-29 RX ADMIN — DOXAZOSIN 2 MG: 2 TABLET ORAL at 21:29

## 2020-06-29 RX ADMIN — BACLOFEN 10 MG: 10 TABLET ORAL at 21:28

## 2020-06-29 RX ADMIN — ESCITALOPRAM OXALATE 20 MG: 10 TABLET ORAL at 11:12

## 2020-06-29 RX ADMIN — TRAMADOL HYDROCHLORIDE 50 MG: 50 TABLET, FILM COATED ORAL at 21:29

## 2020-06-29 RX ADMIN — POTASSIUM CHLORIDE 20 MEQ: 10 TABLET, EXTENDED RELEASE ORAL at 11:13

## 2020-06-29 RX ADMIN — OXYCODONE HYDROCHLORIDE 5 MG: 5 TABLET ORAL at 06:38

## 2020-06-29 RX ADMIN — GUAIFENESIN 600 MG: 600 TABLET, EXTENDED RELEASE ORAL at 21:28

## 2020-06-29 RX ADMIN — OXYCODONE HYDROCHLORIDE 7.5 MG: 5 TABLET ORAL at 21:28

## 2020-06-29 RX ADMIN — ACETAMINOPHEN 500 MG: 500 TABLET, FILM COATED ORAL at 21:28

## 2020-06-29 RX ADMIN — PREGABALIN 150 MG: 75 CAPSULE ORAL at 11:13

## 2020-06-29 RX ADMIN — MENTHOL AND METHYL SALICYLATE: 7.6; 29 OINTMENT TOPICAL at 21:29

## 2020-06-29 RX ADMIN — OXYCODONE HYDROCHLORIDE 7.5 MG: 5 TABLET ORAL at 16:51

## 2020-06-29 RX ADMIN — SENNOSIDES AND DOCUSATE SODIUM 1 TABLET: 8.6; 5 TABLET ORAL at 21:28

## 2020-06-29 RX ADMIN — ARIPIPRAZOLE 10 MG: 5 TABLET ORAL at 11:12

## 2020-06-29 RX ADMIN — TRAMADOL HYDROCHLORIDE 50 MG: 50 TABLET, FILM COATED ORAL at 11:14

## 2020-06-29 RX ADMIN — GUAIFENESIN 600 MG: 600 TABLET, EXTENDED RELEASE ORAL at 11:15

## 2020-06-29 ASSESSMENT — PAIN DESCRIPTION - ONSET
ONSET: ON-GOING

## 2020-06-29 ASSESSMENT — PAIN DESCRIPTION - LOCATION
LOCATION: RIB CAGE

## 2020-06-29 ASSESSMENT — PAIN SCALES - GENERAL
PAINLEVEL_OUTOF10: 7
PAINLEVEL_OUTOF10: 9
PAINLEVEL_OUTOF10: 7
PAINLEVEL_OUTOF10: 3
PAINLEVEL_OUTOF10: 9
PAINLEVEL_OUTOF10: 3
PAINLEVEL_OUTOF10: 10
PAINLEVEL_OUTOF10: 7
PAINLEVEL_OUTOF10: 0
PAINLEVEL_OUTOF10: 7

## 2020-06-29 ASSESSMENT — PAIN DESCRIPTION - FREQUENCY
FREQUENCY: CONTINUOUS

## 2020-06-29 ASSESSMENT — PAIN DESCRIPTION - PAIN TYPE
TYPE: CHRONIC PAIN

## 2020-06-29 ASSESSMENT — PAIN DESCRIPTION - DESCRIPTORS
DESCRIPTORS: ACHING;DISCOMFORT;SORE
DESCRIPTORS: ACHING;DISCOMFORT
DESCRIPTORS: ACHING;DISCOMFORT
DESCRIPTORS: ACHING;DISCOMFORT;SORE

## 2020-06-29 ASSESSMENT — PAIN DESCRIPTION - PROGRESSION
CLINICAL_PROGRESSION: GRADUALLY IMPROVING
CLINICAL_PROGRESSION: GRADUALLY WORSENING
CLINICAL_PROGRESSION: GRADUALLY WORSENING

## 2020-06-29 ASSESSMENT — PAIN - FUNCTIONAL ASSESSMENT
PAIN_FUNCTIONAL_ASSESSMENT: PREVENTS OR INTERFERES SOME ACTIVE ACTIVITIES AND ADLS

## 2020-06-29 ASSESSMENT — PAIN DESCRIPTION - ORIENTATION
ORIENTATION: LEFT
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
## 2020-06-29 NOTE — PROGRESS NOTES
Chart reviewed. Patient continues to receive skilled therapies at [facility]. Plan remains for patient to DC home with boyfriend on 6/30/20. HHC recommended upon DC. Patient agreeable with Roby Martinez and identifies [facility] as agency of choice due to [facility] nurse. This  contacted [facility] and was notified that they no longer have a [address] at this time. This  notified patient whom reports desire to have [facility]. This  notified patient that [facility] does not have [address] and patient voiced understanding and continued to desire [facility]. This  contacted Smitty Lefort. [facility] liaison with new referral.  Eden Roy reports that [facility] will be able to follow patient upon DC from [facility]. Patient identifies no further help at home needs. RADHA completed. SS to continue to follow as needed while patient is at [facility].
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
## 2020-06-29 NOTE — PROGRESS NOTES
Sent perfect serve to Dr Nathen Collet regarding uncontrolled pain. Left rib xray ordered and increase Roxicodone to 7.5mg q4hr prn.

## 2020-06-30 VITALS
HEIGHT: 62 IN | RESPIRATION RATE: 18 BRPM | DIASTOLIC BLOOD PRESSURE: 64 MMHG | HEART RATE: 83 BPM | BODY MASS INDEX: 28.98 KG/M2 | TEMPERATURE: 99 F | WEIGHT: 157.5 LBS | SYSTOLIC BLOOD PRESSURE: 136 MMHG | OXYGEN SATURATION: 96 %

## 2020-06-30 LAB
GLUCOSE BLD-MCNC: 99 MG/DL (ref 60–115)
PERFORMED ON: NORMAL

## 2020-06-30 PROCEDURE — 6370000000 HC RX 637 (ALT 250 FOR IP): Performed by: ANESTHESIOLOGY

## 2020-06-30 PROCEDURE — 6370000000 HC RX 637 (ALT 250 FOR IP): Performed by: PHYSICIAN ASSISTANT

## 2020-06-30 PROCEDURE — 97530 THERAPEUTIC ACTIVITIES: CPT

## 2020-06-30 PROCEDURE — 6370000000 HC RX 637 (ALT 250 FOR IP): Performed by: INTERNAL MEDICINE

## 2020-06-30 RX ORDER — LIDOCAINE 50 MG/G
3 PATCH TOPICAL EVERY 24 HOURS
Qty: 90 PATCH | Refills: 5 | Status: ON HOLD | OUTPATIENT
Start: 2020-06-30 | End: 2020-07-09 | Stop reason: HOSPADM

## 2020-06-30 RX ORDER — OXYCODONE HYDROCHLORIDE 15 MG/1
7.5 TABLET ORAL EVERY 6 HOURS PRN
Qty: 20 TABLET | Refills: 0 | Status: ON HOLD | OUTPATIENT
Start: 2020-06-30 | End: 2020-07-09 | Stop reason: HOSPADM

## 2020-06-30 RX ORDER — TRAMADOL HYDROCHLORIDE 50 MG/1
50 TABLET ORAL EVERY 6 HOURS PRN
Qty: 20 TABLET | Refills: 0 | Status: ON HOLD | OUTPATIENT
Start: 2020-06-30 | End: 2020-07-09 | Stop reason: HOSPADM

## 2020-06-30 RX ADMIN — GUAIFENESIN 600 MG: 600 TABLET, EXTENDED RELEASE ORAL at 09:37

## 2020-06-30 RX ADMIN — TRAMADOL HYDROCHLORIDE 50 MG: 50 TABLET, FILM COATED ORAL at 08:41

## 2020-06-30 RX ADMIN — SENNOSIDES AND DOCUSATE SODIUM 1 TABLET: 8.6; 5 TABLET ORAL at 09:39

## 2020-06-30 RX ADMIN — OXYCODONE HYDROCHLORIDE 7.5 MG: 5 TABLET ORAL at 08:40

## 2020-06-30 RX ADMIN — ARIPIPRAZOLE 10 MG: 5 TABLET ORAL at 09:37

## 2020-06-30 RX ADMIN — BACLOFEN 10 MG: 10 TABLET ORAL at 09:37

## 2020-06-30 RX ADMIN — OXYCODONE HYDROCHLORIDE 7.5 MG: 5 TABLET ORAL at 03:13

## 2020-06-30 RX ADMIN — ACETAMINOPHEN 500 MG: 500 TABLET, FILM COATED ORAL at 09:39

## 2020-06-30 RX ADMIN — PSYLLIUM HUSK 1 PACKET: 3.4 POWDER ORAL at 09:42

## 2020-06-30 RX ADMIN — ROSUVASTATIN CALCIUM 20 MG: 20 TABLET, FILM COATED ORAL at 09:37

## 2020-06-30 RX ADMIN — POTASSIUM CHLORIDE 20 MEQ: 10 TABLET, EXTENDED RELEASE ORAL at 09:42

## 2020-06-30 RX ADMIN — PANTOPRAZOLE SODIUM 40 MG: 40 TABLET, DELAYED RELEASE ORAL at 06:31

## 2020-06-30 RX ADMIN — LEVOTHYROXINE SODIUM 50 MCG: 50 TABLET ORAL at 06:31

## 2020-06-30 RX ADMIN — METOPROLOL TARTRATE 25 MG: 25 TABLET, FILM COATED ORAL at 09:37

## 2020-06-30 RX ADMIN — AMLODIPINE BESYLATE 5 MG: 5 TABLET ORAL at 09:39

## 2020-06-30 RX ADMIN — PREGABALIN 150 MG: 75 CAPSULE ORAL at 09:39

## 2020-06-30 RX ADMIN — ESCITALOPRAM OXALATE 20 MG: 10 TABLET ORAL at 09:38

## 2020-06-30 ASSESSMENT — PAIN DESCRIPTION - LOCATION
LOCATION: RIB CAGE
LOCATION: RIB CAGE

## 2020-06-30 ASSESSMENT — PAIN SCALES - GENERAL
PAINLEVEL_OUTOF10: 9
PAINLEVEL_OUTOF10: 2
PAINLEVEL_OUTOF10: 7
PAINLEVEL_OUTOF10: 7

## 2020-06-30 ASSESSMENT — PAIN DESCRIPTION - PROGRESSION
CLINICAL_PROGRESSION: GRADUALLY WORSENING
CLINICAL_PROGRESSION: GRADUALLY IMPROVING

## 2020-06-30 ASSESSMENT — ENCOUNTER SYMPTOMS
STRIDOR: 0
COLOR CHANGE: 0
APNEA: 0
SHORTNESS OF BREATH: 0
ALLERGIC/IMMUNOLOGIC NEGATIVE: 1
EYES NEGATIVE: 1
CHOKING: 0
GASTROINTESTINAL NEGATIVE: 1
CHEST TIGHTNESS: 1
WHEEZING: 0
BACK PAIN: 1
COUGH: 0

## 2020-06-30 ASSESSMENT — PAIN DESCRIPTION - PAIN TYPE
TYPE: ACUTE PAIN
TYPE: CHRONIC PAIN

## 2020-06-30 ASSESSMENT — PAIN DESCRIPTION - ORIENTATION
ORIENTATION: LEFT
ORIENTATION: LEFT

## 2020-06-30 ASSESSMENT — PAIN DESCRIPTION - DESCRIPTORS
DESCRIPTORS: ACHING
DESCRIPTORS: ACHING;SHARP

## 2020-06-30 ASSESSMENT — PAIN DESCRIPTION - FREQUENCY
FREQUENCY: CONTINUOUS
FREQUENCY: CONTINUOUS

## 2020-06-30 ASSESSMENT — PAIN DESCRIPTION - ONSET
ONSET: ON-GOING
ONSET: ON-GOING
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
## 2020-06-30 NOTE — PROGRESS NOTES
Occupational Therapy  Facility/Department: [facility] MED SURG UNIT  Daily Treatment Note  NAME: Luis Alberto Harding  : 1960  MRN: [id number]    Date of Service: 2020    Discharge Recommendations:  Home with assist PRN, Home with Home health OT(HH aide)       Assessment   Performance deficits / Impairments: Decreased functional mobility ; Decreased balance;Decreased ADL status; Decreased endurance;Decreased high-level IADLs;Decreased strength;Decreased safe awareness;Decreased fine motor control;Decreased coordination  Assessment: Pt d/c today. Assisted pt down to her transportation and with car xfer. Pt stated she was nervous for the car xfer. Pt Tess to get BLEs into the SUV. Pt educated when home to have HHA for fxl mob and xfers for safety. Prognosis: Good  REQUIRES OT FOLLOW UP: Yes         Patient Diagnosis(es): The primary encounter diagnosis was Muscle spasm. Diagnoses of Rib pain, Fracture of humeral head, left, closed, with routine healing, subsequent encounter, and Acute pain due to trauma were also pertinent to this visit.       has a past medical history of Acid reflux, Allergic rhinitis, Anxiety, Arnold-Chiari deformity (HCC), Asthma, Cerebral artery occlusion with cerebral infarction [facility]), Cerebrovascular disease, Chronic back pain greater than 3 months duration, COPD (chronic obstructive pulmonary disease) ([address] CVA (cerebral infarction), Depression, Fibromyalgia, H/O encephalopathy, Headache(784.0), Hepatitis B surface antigen positive, Hx of blood clots, Hyperlipidemia LDL goal < 100, Hypertension, Hypothyroidism, Laryngitis, chronic, Marijuana smoker in remission ([address] Obesity (BMI 30-39.9), Osteoarthritis, Pulmonary embolism and infarction ([address] Restless legs syndrome, Rhinitis, chronic, Rotator cuff tear, S/P colonoscopy with polypectomy, Seizures ([address] Smoker, Spinal headache, Spondylosis without myelopathy, Type 2 diabetes mellitus without complication ([address] Urinary incontinence, and

## 2020-06-30 NOTE — PROGRESS NOTES
Pt updated on pain medications. AVS explained and scripts given to pt. Answered all questions. Per nursing supervisor, Randolph Alejo, pt will f/u with Dr. Fang Soto herself to remove [place]. Roby Martinez on case. No IV access. All belongings gathered with supervision of pt and put into backpack or pt belonging bag and given to pt.
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
## 2020-07-01 ENCOUNTER — CARE COORDINATION (OUTPATIENT)
Dept: CASE MANAGEMENT | Age: 60
End: 2020-07-01

## 2020-07-01 PROBLEM — M19.012 OSTEOARTHRITIS OF LEFT SHOULDER: Status: RESOLVED | Noted: 2017-07-07 | Resolved: 2020-07-01

## 2020-07-01 PROBLEM — M25.512 LEFT SHOULDER PAIN: Status: RESOLVED | Noted: 2019-05-07 | Resolved: 2020-07-01

## 2020-07-01 PROCEDURE — 1111F DSCHRG MED/CURRENT MED MERGE: CPT | Performed by: FAMILY MEDICINE
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
## 2020-07-05 ENCOUNTER — APPOINTMENT (OUTPATIENT)
Dept: GENERAL RADIOLOGY | Age: 60
DRG: 682 | End: 2020-07-05
Payer: MEDICARE

## 2020-07-05 ENCOUNTER — HOSPITAL ENCOUNTER (INPATIENT)
Age: 60
LOS: 4 days | Discharge: HOME HEALTH | DRG: 682 | End: 2020-07-09
Attending: EMERGENCY MEDICINE | Admitting: INTERNAL MEDICINE
Payer: MEDICARE

## 2020-07-05 PROBLEM — A41.9 SEVERE SEPSIS (HCC): Status: ACTIVE | Noted: 2020-07-05

## 2020-07-05 PROBLEM — R65.20 SEVERE SEPSIS (HCC): Status: ACTIVE | Noted: 2020-07-05

## 2020-07-05 LAB
ALBUMIN SERPL-MCNC: 3.8 G/DL (ref 3.5–4.6)
ALP BLD-CCNC: 179 U/L (ref 40–130)
ALT SERPL-CCNC: 13 U/L (ref 0–33)
ANION GAP SERPL CALCULATED.3IONS-SCNC: 11 MEQ/L (ref 9–15)
ANISOCYTOSIS: ABNORMAL
AST SERPL-CCNC: 33 U/L (ref 0–35)
BACTERIA: ABNORMAL /HPF
BASE EXCESS ARTERIAL: 2 (ref -3–3)
BASOPHILS ABSOLUTE: 0.1 K/UL (ref 0–0.2)
BASOPHILS RELATIVE PERCENT: 0.9 %
BILIRUB SERPL-MCNC: <0.2 MG/DL (ref 0.2–0.7)
BILIRUBIN URINE: NEGATIVE
BLOOD, URINE: ABNORMAL
BUN BLDV-MCNC: 13 MG/DL (ref 6–20)
CALCIUM IONIZED: 1.06 MMOL/L (ref 1.12–1.32)
CALCIUM SERPL-MCNC: 8.5 MG/DL (ref 8.5–9.9)
CHLORIDE BLD-SCNC: 100 MEQ/L (ref 95–107)
CLARITY: ABNORMAL
CO2: 26 MEQ/L (ref 20–31)
COLOR: YELLOW
CREAT SERPL-MCNC: 1.71 MG/DL (ref 0.5–0.9)
EOSINOPHILS ABSOLUTE: 0.1 K/UL (ref 0–0.7)
EOSINOPHILS RELATIVE PERCENT: 0.9 %
EPITHELIAL CELLS, UA: ABNORMAL /HPF (ref 0–5)
GFR AFRICAN AMERICAN: 31
GFR AFRICAN AMERICAN: 37
GFR NON-AFRICAN AMERICAN: 26
GFR NON-AFRICAN AMERICAN: 30.6
GLOBULIN: 3.3 G/DL (ref 2.3–3.5)
GLUCOSE BLD-MCNC: 96 MG/DL (ref 70–99)
GLUCOSE BLD-MCNC: 98 MG/DL (ref 60–115)
GLUCOSE URINE: NEGATIVE MG/DL
HCO3 ARTERIAL: 27.7 MMOL/L (ref 21–29)
HCT VFR BLD CALC: 24.9 % (ref 37–47)
HEMOGLOBIN: 8 G/DL (ref 12–16)
HEMOGLOBIN: 9 GM/DL (ref 12–16)
HYALINE CASTS: ABNORMAL /HPF (ref 0–5)
HYPOCHROMIA: ABNORMAL
KETONES, URINE: NEGATIVE MG/DL
LACTATE: 0.73 MMOL/L (ref 0.4–2)
LACTIC ACID: 0.9 MMOL/L (ref 0.5–2.2)
LEUKOCYTE ESTERASE, URINE: ABNORMAL
LYMPHOCYTES ABSOLUTE: 1.7 K/UL (ref 1–4.8)
LYMPHOCYTES RELATIVE PERCENT: 16.6 %
MCH RBC QN AUTO: 23.3 PG (ref 27–31.3)
MCHC RBC AUTO-ENTMCNC: 31.9 % (ref 33–37)
MCV RBC AUTO: 72.9 FL (ref 82–100)
MICROCYTES: ABNORMAL
MONOCYTES ABSOLUTE: 1.1 K/UL (ref 0.2–0.8)
MONOCYTES RELATIVE PERCENT: 10.9 %
NEUTROPHILS ABSOLUTE: 7.1 K/UL (ref 1.4–6.5)
NEUTROPHILS RELATIVE PERCENT: 70.7 %
NITRITE, URINE: NEGATIVE
O2 SAT, ARTERIAL: 76 % (ref 93–100)
PCO2 ARTERIAL: 53 MM HG (ref 35–45)
PDW BLD-RTO: 19.6 % (ref 11.5–14.5)
PERFORMED ON: ABNORMAL
PH ARTERIAL: 7.32 (ref 7.35–7.45)
PH UA: 5 (ref 5–9)
PLATELET # BLD: 287 K/UL (ref 130–400)
PLATELET SLIDE REVIEW: ADEQUATE
PO2 ARTERIAL: 45 MM HG (ref 75–108)
POC CHLORIDE: 102 MEQ/L (ref 99–110)
POC CREATININE: 2 MG/DL (ref 0.6–1.1)
POC HEMATOCRIT: 26 % (ref 36–48)
POC POTASSIUM: 3.4 MEQ/L (ref 3.5–5.1)
POC SAMPLE TYPE: ABNORMAL
POC SODIUM: 136 MEQ/L (ref 136–145)
POLYCHROMASIA: ABNORMAL
POTASSIUM SERPL-SCNC: 3.5 MEQ/L (ref 3.4–4.9)
PROTEIN UA: 30 MG/DL
RBC # BLD: 3.42 M/UL (ref 4.2–5.4)
RBC UA: ABNORMAL /HPF (ref 0–5)
SARS-COV-2, NAAT: NOT DETECTED
SODIUM BLD-SCNC: 137 MEQ/L (ref 135–144)
SPECIFIC GRAVITY UA: 1.01 (ref 1–1.03)
TCO2 ARTERIAL: 29 (ref 22–29)
TOTAL PROTEIN: 7.1 G/DL (ref 6.3–8)
URINE REFLEX TO CULTURE: YES
UROBILINOGEN, URINE: 0.2 E.U./DL
WBC # BLD: 10 K/UL (ref 4.8–10.8)
WBC UA: ABNORMAL /HPF (ref 0–5)

## 2020-07-05 PROCEDURE — 94640 AIRWAY INHALATION TREATMENT: CPT

## 2020-07-05 PROCEDURE — 36600 WITHDRAWAL OF ARTERIAL BLOOD: CPT

## 2020-07-05 PROCEDURE — 6370000000 HC RX 637 (ALT 250 FOR IP): Performed by: PHYSICIAN ASSISTANT

## 2020-07-05 PROCEDURE — 83605 ASSAY OF LACTIC ACID: CPT

## 2020-07-05 PROCEDURE — 6360000002 HC RX W HCPCS: Performed by: EMERGENCY MEDICINE

## 2020-07-05 PROCEDURE — 82435 ASSAY OF BLOOD CHLORIDE: CPT

## 2020-07-05 PROCEDURE — 82803 BLOOD GASES ANY COMBINATION: CPT

## 2020-07-05 PROCEDURE — 1210000000 HC MED SURG R&B

## 2020-07-05 PROCEDURE — 71045 X-RAY EXAM CHEST 1 VIEW: CPT

## 2020-07-05 PROCEDURE — 6370000000 HC RX 637 (ALT 250 FOR IP): Performed by: EMERGENCY MEDICINE

## 2020-07-05 PROCEDURE — 87077 CULTURE AEROBIC IDENTIFY: CPT

## 2020-07-05 PROCEDURE — 2580000003 HC RX 258: Performed by: EMERGENCY MEDICINE

## 2020-07-05 PROCEDURE — 36415 COLL VENOUS BLD VENIPUNCTURE: CPT

## 2020-07-05 PROCEDURE — 84132 ASSAY OF SERUM POTASSIUM: CPT

## 2020-07-05 PROCEDURE — U0002 COVID-19 LAB TEST NON-CDC: HCPCS

## 2020-07-05 PROCEDURE — 94664 DEMO&/EVAL PT USE INHALER: CPT

## 2020-07-05 PROCEDURE — 6360000002 HC RX W HCPCS: Performed by: PHYSICIAN ASSISTANT

## 2020-07-05 PROCEDURE — 80053 COMPREHEN METABOLIC PANEL: CPT

## 2020-07-05 PROCEDURE — 81001 URINALYSIS AUTO W/SCOPE: CPT

## 2020-07-05 PROCEDURE — 87186 SC STD MICRODIL/AGAR DIL: CPT

## 2020-07-05 PROCEDURE — 82565 ASSAY OF CREATININE: CPT

## 2020-07-05 PROCEDURE — 87040 BLOOD CULTURE FOR BACTERIA: CPT

## 2020-07-05 PROCEDURE — 87086 URINE CULTURE/COLONY COUNT: CPT

## 2020-07-05 PROCEDURE — 94761 N-INVAS EAR/PLS OXIMETRY MLT: CPT

## 2020-07-05 PROCEDURE — 2580000003 HC RX 258: Performed by: PHYSICIAN ASSISTANT

## 2020-07-05 PROCEDURE — 85025 COMPLETE CBC W/AUTO DIFF WBC: CPT

## 2020-07-05 PROCEDURE — 82330 ASSAY OF CALCIUM: CPT

## 2020-07-05 PROCEDURE — 85014 HEMATOCRIT: CPT

## 2020-07-05 PROCEDURE — 84295 ASSAY OF SERUM SODIUM: CPT

## 2020-07-05 PROCEDURE — 99285 EMERGENCY DEPT VISIT HI MDM: CPT

## 2020-07-05 RX ORDER — SODIUM CHLORIDE 0.9 % (FLUSH) 0.9 %
10 SYRINGE (ML) INJECTION PRN
Status: DISCONTINUED | OUTPATIENT
Start: 2020-07-05 | End: 2020-07-09 | Stop reason: HOSPADM

## 2020-07-05 RX ORDER — PREGABALIN 75 MG/1
150 CAPSULE ORAL DAILY
Status: DISCONTINUED | OUTPATIENT
Start: 2020-07-05 | End: 2020-07-09 | Stop reason: HOSPADM

## 2020-07-05 RX ORDER — GUAIFENESIN 600 MG/1
600 TABLET, EXTENDED RELEASE ORAL 2 TIMES DAILY
Status: DISCONTINUED | OUTPATIENT
Start: 2020-07-05 | End: 2020-07-09 | Stop reason: HOSPADM

## 2020-07-05 RX ORDER — ERGOCALCIFEROL 1.25 MG/1
50000 CAPSULE ORAL WEEKLY
Status: DISCONTINUED | OUTPATIENT
Start: 2020-07-05 | End: 2020-07-09 | Stop reason: HOSPADM

## 2020-07-05 RX ORDER — LORAZEPAM 0.5 MG/1
0.5 TABLET ORAL EVERY 6 HOURS PRN
Status: DISCONTINUED | OUTPATIENT
Start: 2020-07-05 | End: 2020-07-09 | Stop reason: HOSPADM

## 2020-07-05 RX ORDER — PANTOPRAZOLE SODIUM 40 MG/1
40 TABLET, DELAYED RELEASE ORAL
Status: DISCONTINUED | OUTPATIENT
Start: 2020-07-06 | End: 2020-07-07

## 2020-07-05 RX ORDER — ARIPIPRAZOLE 10 MG/1
10 TABLET ORAL DAILY
Status: DISCONTINUED | OUTPATIENT
Start: 2020-07-05 | End: 2020-07-09 | Stop reason: HOSPADM

## 2020-07-05 RX ORDER — PRAMIPEXOLE DIHYDROCHLORIDE 0.25 MG/1
0.5 TABLET ORAL EVERY EVENING
Status: DISCONTINUED | OUTPATIENT
Start: 2020-07-05 | End: 2020-07-09 | Stop reason: HOSPADM

## 2020-07-05 RX ORDER — LANOLIN ALCOHOL/MO/W.PET/CERES
400 CREAM (GRAM) TOPICAL DAILY
Status: DISCONTINUED | OUTPATIENT
Start: 2020-07-05 | End: 2020-07-09 | Stop reason: HOSPADM

## 2020-07-05 RX ORDER — 0.9 % SODIUM CHLORIDE 0.9 %
30 INTRAVENOUS SOLUTION INTRAVENOUS ONCE
Status: COMPLETED | OUTPATIENT
Start: 2020-07-05 | End: 2020-07-05

## 2020-07-05 RX ORDER — ESCITALOPRAM OXALATE 20 MG/1
20 TABLET ORAL DAILY
Status: DISCONTINUED | OUTPATIENT
Start: 2020-07-05 | End: 2020-07-09 | Stop reason: HOSPADM

## 2020-07-05 RX ORDER — CAPSAICIN 0.025 %
1 CREAM (GRAM) TOPICAL 3 TIMES DAILY PRN
Status: DISCONTINUED | OUTPATIENT
Start: 2020-07-05 | End: 2020-07-09 | Stop reason: HOSPADM

## 2020-07-05 RX ORDER — ROSUVASTATIN CALCIUM 20 MG/1
20 TABLET, COATED ORAL DAILY
Status: DISCONTINUED | OUTPATIENT
Start: 2020-07-05 | End: 2020-07-09 | Stop reason: HOSPADM

## 2020-07-05 RX ORDER — ACETAMINOPHEN 325 MG/1
650 TABLET ORAL EVERY 6 HOURS PRN
Status: DISCONTINUED | OUTPATIENT
Start: 2020-07-05 | End: 2020-07-09 | Stop reason: HOSPADM

## 2020-07-05 RX ORDER — OXYCODONE HYDROCHLORIDE 5 MG/1
7.5 TABLET ORAL EVERY 6 HOURS PRN
Status: DISCONTINUED | OUTPATIENT
Start: 2020-07-05 | End: 2020-07-09 | Stop reason: HOSPADM

## 2020-07-05 RX ORDER — AMLODIPINE BESYLATE 5 MG/1
5 TABLET ORAL DAILY
Status: DISCONTINUED | OUTPATIENT
Start: 2020-07-05 | End: 2020-07-07

## 2020-07-05 RX ORDER — DOXAZOSIN MESYLATE 1 MG/1
1 TABLET ORAL DAILY
Status: DISCONTINUED | OUTPATIENT
Start: 2020-07-05 | End: 2020-07-09 | Stop reason: HOSPADM

## 2020-07-05 RX ORDER — SPIRONOLACTONE 25 MG/1
25 TABLET ORAL DAILY
Status: DISCONTINUED | OUTPATIENT
Start: 2020-07-05 | End: 2020-07-09 | Stop reason: HOSPADM

## 2020-07-05 RX ORDER — LEVOTHYROXINE SODIUM 0.05 MG/1
50 TABLET ORAL DAILY
Status: DISCONTINUED | OUTPATIENT
Start: 2020-07-06 | End: 2020-07-09 | Stop reason: HOSPADM

## 2020-07-05 RX ORDER — TIZANIDINE 4 MG/1
4 TABLET ORAL EVERY 8 HOURS PRN
Status: DISCONTINUED | OUTPATIENT
Start: 2020-07-05 | End: 2020-07-09 | Stop reason: HOSPADM

## 2020-07-05 RX ORDER — FOLIC ACID 1 MG/1
1 TABLET ORAL DAILY
Status: DISCONTINUED | OUTPATIENT
Start: 2020-07-05 | End: 2020-07-09 | Stop reason: HOSPADM

## 2020-07-05 RX ORDER — IPRATROPIUM BROMIDE AND ALBUTEROL SULFATE 2.5; .5 MG/3ML; MG/3ML
1 SOLUTION RESPIRATORY (INHALATION)
Status: DISCONTINUED | OUTPATIENT
Start: 2020-07-05 | End: 2020-07-05

## 2020-07-05 RX ORDER — CHOLECALCIFEROL (VITAMIN D3) 125 MCG
1000 CAPSULE ORAL DAILY
Status: DISCONTINUED | OUTPATIENT
Start: 2020-07-05 | End: 2020-07-09 | Stop reason: HOSPADM

## 2020-07-05 RX ORDER — FUROSEMIDE 40 MG/1
40 TABLET ORAL DAILY
Status: DISCONTINUED | OUTPATIENT
Start: 2020-07-05 | End: 2020-07-09 | Stop reason: HOSPADM

## 2020-07-05 RX ORDER — POLYETHYLENE GLYCOL 3350 17 G/17G
17 POWDER, FOR SOLUTION ORAL DAILY PRN
Status: DISCONTINUED | OUTPATIENT
Start: 2020-07-05 | End: 2020-07-09 | Stop reason: HOSPADM

## 2020-07-05 RX ORDER — SODIUM CHLORIDE 0.9 % (FLUSH) 0.9 %
10 SYRINGE (ML) INJECTION EVERY 12 HOURS SCHEDULED
Status: DISCONTINUED | OUTPATIENT
Start: 2020-07-05 | End: 2020-07-09 | Stop reason: HOSPADM

## 2020-07-05 RX ORDER — PROMETHAZINE HYDROCHLORIDE 12.5 MG/1
12.5 TABLET ORAL EVERY 6 HOURS PRN
Status: DISCONTINUED | OUTPATIENT
Start: 2020-07-05 | End: 2020-07-09 | Stop reason: HOSPADM

## 2020-07-05 RX ORDER — CHLORAL HYDRATE 500 MG
1000 CAPSULE ORAL DAILY
Status: DISCONTINUED | OUTPATIENT
Start: 2020-07-05 | End: 2020-07-09 | Stop reason: HOSPADM

## 2020-07-05 RX ORDER — ACETAMINOPHEN 500 MG
1000 TABLET ORAL ONCE
Status: COMPLETED | OUTPATIENT
Start: 2020-07-05 | End: 2020-07-05

## 2020-07-05 RX ORDER — KETOTIFEN FUMARATE 0.35 MG/ML
1 SOLUTION/ DROPS OPHTHALMIC 2 TIMES DAILY
Status: DISCONTINUED | OUTPATIENT
Start: 2020-07-05 | End: 2020-07-09 | Stop reason: HOSPADM

## 2020-07-05 RX ORDER — CLOTRIMAZOLE AND BETAMETHASONE DIPROPIONATE 10; .64 MG/G; MG/G
CREAM TOPICAL 2 TIMES DAILY
Status: DISCONTINUED | OUTPATIENT
Start: 2020-07-05 | End: 2020-07-09 | Stop reason: HOSPADM

## 2020-07-05 RX ORDER — BUDESONIDE AND FORMOTEROL FUMARATE DIHYDRATE 160; 4.5 UG/1; UG/1
2 AEROSOL RESPIRATORY (INHALATION) 2 TIMES DAILY
Status: DISCONTINUED | OUTPATIENT
Start: 2020-07-05 | End: 2020-07-09 | Stop reason: HOSPADM

## 2020-07-05 RX ORDER — ACETAMINOPHEN 650 MG/1
650 SUPPOSITORY RECTAL EVERY 6 HOURS PRN
Status: DISCONTINUED | OUTPATIENT
Start: 2020-07-05 | End: 2020-07-09 | Stop reason: HOSPADM

## 2020-07-05 RX ORDER — ONDANSETRON 2 MG/ML
4 INJECTION INTRAMUSCULAR; INTRAVENOUS EVERY 6 HOURS PRN
Status: DISCONTINUED | OUTPATIENT
Start: 2020-07-05 | End: 2020-07-09 | Stop reason: HOSPADM

## 2020-07-05 RX ADMIN — PIPERACILLIN AND TAZOBACTAM 3.38 G: 3; .375 INJECTION, POWDER, LYOPHILIZED, FOR SOLUTION INTRAVENOUS at 20:46

## 2020-07-05 RX ADMIN — BUDESONIDE AND FORMOTEROL FUMARATE DIHYDRATE 2 PUFF: 160; 4.5 AEROSOL RESPIRATORY (INHALATION) at 20:55

## 2020-07-05 RX ADMIN — ENOXAPARIN SODIUM 30 MG: 30 INJECTION SUBCUTANEOUS at 15:09

## 2020-07-05 RX ADMIN — PRAMIPEXOLE DIHYDROCHLORIDE 0.5 MG: 0.25 TABLET ORAL at 20:46

## 2020-07-05 RX ADMIN — SODIUM CHLORIDE 2000 ML: 9 INJECTION, SOLUTION INTRAVENOUS at 11:33

## 2020-07-05 RX ADMIN — PIPERACILLIN AND TAZOBACTAM 3.38 G: 3; .375 INJECTION, POWDER, LYOPHILIZED, FOR SOLUTION INTRAVENOUS at 13:03

## 2020-07-05 RX ADMIN — Medication 10 ML: at 20:53

## 2020-07-05 RX ADMIN — GUAIFENESIN 600 MG: 600 TABLET, EXTENDED RELEASE ORAL at 20:46

## 2020-07-05 RX ADMIN — LORAZEPAM 0.5 MG: 0.5 TABLET ORAL at 20:46

## 2020-07-05 RX ADMIN — VANCOMYCIN HYDROCHLORIDE 1000 MG: 1 INJECTION, POWDER, LYOPHILIZED, FOR SOLUTION INTRAVENOUS at 17:30

## 2020-07-05 RX ADMIN — ACETAMINOPHEN 1000 MG: 500 TABLET ORAL at 11:30

## 2020-07-05 ASSESSMENT — ENCOUNTER SYMPTOMS
NAUSEA: 0
SHORTNESS OF BREATH: 0
CHEST TIGHTNESS: 0
EYE PAIN: 0
SORE THROAT: 0
COUGH: 0
VOMITING: 0
ABDOMINAL PAIN: 0
WHEEZING: 0

## 2020-07-05 ASSESSMENT — PAIN SCALES - GENERAL
PAINLEVEL_OUTOF10: 8
PAINLEVEL_OUTOF10: 8
PAINLEVEL_OUTOF10: 0

## 2020-07-05 ASSESSMENT — PAIN DESCRIPTION - DESCRIPTORS: DESCRIPTORS: PATIENT UNABLE TO DESCRIBE

## 2020-07-05 ASSESSMENT — PAIN DESCRIPTION - LOCATION: LOCATION: SHOULDER

## 2020-07-05 ASSESSMENT — PAIN DESCRIPTION - ONSET: ONSET: ON-GOING

## 2020-07-05 ASSESSMENT — PAIN DESCRIPTION - ORIENTATION: ORIENTATION: LEFT

## 2020-07-05 ASSESSMENT — PAIN DESCRIPTION - FREQUENCY: FREQUENCY: CONTINUOUS

## 2020-07-05 ASSESSMENT — PAIN DESCRIPTION - PAIN TYPE: TYPE: ACUTE PAIN

## 2020-07-05 NOTE — ED PROVIDER NOTES
[address] [facility] ED  eMERGENCY dEPARTMENTeN[facility]      Pt Name: José Miguel Ferrari  MRN: [id number]  [place] 1960  Date ofevaluation: 7/5/2020  Provider: Denita Juarez DO    CHIEF COMPLAINT       Chief Complaint   Patient presents with    Altered Mental Status     per ems with pox of 79% at home on room air         HISTORY OF PRESENT ILLNESS   (Location/Symptom, Timing/Onset,Context/Setting, Quality, Duration, Modifying Factors, Severity)  Note limiting factors. José Miguel Ferrari is a 64 y.o. female who presents to the emergency department . Patient was brought in from home with altered mental status. When EMS arrived they found her pulse ox to only be 79%. She does not wear oxygen at home. History of COPD. Recent left shoulder surgery; still has staples in place. Denies cough. No abdominal pain nausea vomiting or diarrhea. HPI    NursingNotes were reviewed. REVIEW OF SYSTEMS    (2-9 systems for level 4, 10 or more for level 5)     Review of Systems   Constitutional: Positive for fever. Negative for activity change, appetite change and fatigue. HENT: Negative for congestion and sore throat. Eyes: Negative for pain and visual disturbance. Respiratory: Negative for cough, chest tightness, shortness of breath and wheezing. Cardiovascular: Negative for chest pain. Gastrointestinal: Negative for abdominal pain, nausea and vomiting. Endocrine: Negative for polydipsia. Genitourinary: Negative for flank pain and urgency. Musculoskeletal: Negative for gait problem and neck stiffness. Skin: Negative for rash. Neurological: Negative for weakness, light-headedness and headaches. Psychiatric/Behavioral: Positive for decreased concentration. Negative for confusion and sleep disturbance. Except as noted above the remainder of the review of systems was reviewed and negative.        PAST MEDICAL HISTORY     Past Medical History:   Diagnosis Date    Acid reflux     Allergic rhinitis     Anxiety     Arnold-Chiari deformity (HCC) 2000    surgery    Asthma     Cerebral artery occlusion with cerebral infarction ([facility] [address] 2001    1 yr after brain OR    Cerebrovascular disease     Chronic back pain greater than 3 months duration     COPD (chronic obstructive pulmonary disease) (HCC)     Dr Becky Barr at [address] CVA (cerebral infarction)     left hemiparesis, due to ? estrogen + smoking    Depression     Dr Cameron Hills H/O encephalopathy 2001    Headache(784.0)     Dr Ladi Mathur Hepatitis B surface antigen positive     Hx of blood clots 2010    PE / trx with coumadin x 6 months    Hyperlipidemia LDL goal < 100     meds > 10 yrs    Hypertension     meds > 2 yrs    Hypothyroidism 2001    meds > 18 yrs    Laryngitis, chronic 2012    scoped by Dr Lori Daigle, fungal    Marijuana smoker in remission [facility])     Obesity (BMI 30-39. 9)     Osteoarthritis     Pulmonary embolism and infarction (HCC)     Restless legs syndrome     Rhinitis, chronic     Rotator cuff tear     Left    S/P colonoscopy with polypectomy 2010    Dr Rick Yancey    Seizures [facility])    [address] Spinal headache     past partum     Spondylosis without myelopathy     Type 2 diabetes mellitus without complication (HCC)     hx > 6 yrs    Urinary incontinence     Vertebral compression fracture (HCC)          SURGICALHISTORY       Past Surgical History:   Procedure Laterality Date    BACK SURGERY      lumbar kyphoplasty x 2    BRAIN SURGERY      due to Arnold-Chiari deformity / [facility]     SECTION      COLONOSCOPY      CRANIOTOMY  2000    Arnold-Chiary malformation    ENDOSCOPY, COLON, DIAGNOSTIC      FEMUR FRACTURE SURGERY Left     HUMERUS FRACTURE SURGERY Left 2020    ORIF PERIPROSTHETIC FX LEFT HUMERAL SHAFT, SYNTHES NOTIFIED, ROOM 283, LATEX ALLERGY performed by Deirdre Arreola MD at [address] Left 6/15/2018    LEFT TOTAL SHOULDER ARTHROPLASTY, SANDY BIOMET TSA, SCALENE NERVE BLOCK, (LATEX ALLERGY) performed by Junior Ori MD at [address] [place] Left 5/17/2019    LEFT REMOVAL GLENOID AND CONVERSION TO REVERSE TOTAL SHOULDER ARTHROPLASTY, SANDY REVERSE TSA, JOSE DAVID EQUIPMENT FLEXIBLE OSTEOTOMES, SCALENE NERVE BLOCK, LATEX ALLERGY performed by Junior Ori MD at [address] [place]      lumbar kyphoplasty    TONSILLECTOMY      UPPER GASTROINTESTINAL ENDOSCOPY  8/5/15    w/bx          CURRENT MEDICATIONS       Previous Medications    AIMOVIG 70 MG/ML SOAJ    ADM 1 ML SC 1 TIME Q MONTH    ALCOHOL SWABS (ALCOHOL PADS) 70 % PADS    USE ONCE DAILY    AMLODIPINE (NORVASC) 5 MG TABLET    Take 1 tablet by mouth every day    ARIPIPRAZOLE (ABILIFY) 10 MG TABLET    TAKE ONE TABLET BY MOUTH EACH NIGHT AT BEDTIME    ASCORBIC ACID (VITAMIN C/KATIA HIPS) 500 MG TABS    TAKE 1 TABLET BY MOUTH DAILY    BLOOD GLUCOSE CALIBRATION (ADVOCATE REDI-CODE+ CONTROL) HIGH SOLN    USE AS DIRECTED.    BLOOD GLUCOSE MONITORING SUPPL (ADVOCATE REDI-CODE+) COLE    USE AS DIRECTED.    BLOOD GLUCOSE MONITORING SUPPL (ONE TOUCH ULTRA 2) W/DEVICE KIT    USE AS DIRECTED TO TEST THREE TIMES DAILY    CALCIPOTRIENE (DOVONEX) 0.005 % CREAM        CAPSAICIN 0.1 % CREA    Apply 1 applicator topically 3 times daily as needed (pain)    CARISOPRODOL (SOMA) 350 MG TABLET    Take 1 tablet by mouth daily as needed (migraine) for up to 180 days. CLOBETASOL (TEMOVATE) 0.05 % OINTMENT        CLOTRIMAZOLE-BETAMETHASONE (LOTRISONE) 1-0.05 % CREAM    Apply topically 2 times daily. CONTROL GEL FORMULA DRESSING (DUODERM CGF EXTRA THIN) MISC    Apply to sacral region every other day    DICLOFENAC 1.5 % SOLN    APPLY 40 DROPS TOPICALLY TO AFFECTED AREA 4 TIMES A DAY.  (20 DROPS IS 1 ML)    DICLOFENAC SODIUM (VOLTAREN) 1 % GEL    Apply 2 g topically 2 times daily    DIFLORASONE (PSORCON) 0.05 % OINTMENT        EASY COMFORT LANCETS MIS TEST ONCE DAILY    ESCITALOPRAM (LEXAPRO) 20 MG TABLET    TAKE ONE TABLET BY MOUTH DAILY    ESOMEPRAZOLE (NEXIUM) 40 MG DELAYED RELEASE CAPSULE    Take 2 capsules by mouth every day    ESTRADIOL (VAGIFEM) 10 MCG TABS VAGINAL TABLET    insert 1 tablet vaginally two times a week INTO LOWER 1/3 OF VAGINA    FOLIC ACID (FOLVITE) 1 MG TABLET    Take one tablet by mouth every day    FUROSEMIDE (LASIX) 40 MG TABLET    Take 1 tablet by mouth every day    GUAIFENESIN (MUCINEX) 600 MG EXTENDED RELEASE TABLET    Take 600 mg by mouth 2 times daily    IBUPROFEN (ADVIL;MOTRIN) 800 MG TABLET    Take 1 tab by mouth every 8 hours as need for pain    LANCET DEVICES (EASY MINI EJECT LANCING DEVICE) MISC    USE AS DIRECTED. LEVOTHYROXINE (SYNTHROID) 50 MCG TABLET    take 1 tablet by mouth once daily    LIDOCAINE (LIDODERM) 5 %    Place 3 patches onto the skin every 24 hours 12 hours on, 12 hours off.     LOPERAMIDE (IMODIUM A-D) 2 MG TABLET    Take 2 mg by mouth as needed for Diarrhea     LORAZEPAM (ATIVAN) 0.5 MG TABLET    Take 1 tablet every 8 hours as needed for anxiety    MAGNESIUM OXIDE (MAG-OX) 400 (240 MG) MG TABLET    Take 400 mg by mouth daily    MAGNESIUM OXIDE (MAG-OX) 400 MG TABLET    Take one tablet by mouth every day    MELATONIN 10 MG TABS    TAKE 1 TABLET BY MOUTH EVERY NIGHT    METFORMIN (GLUCOPHAGE) 500 MG TABLET    Take 1 tablet by mouth 2 times daily (with meals)    METOPROLOL TARTRATE (LOPRESSOR) 25 MG TABLET    Take 1 tablet by mouth 2 times daily    MIRTAZAPINE (REMERON) 30 MG TABLET    TAKE ONE TABLET BY MOUTH AT BEDTIME    NUTRITIONAL SUPPLEMENTS (BOOST HIGH PROTEIN) LIQD    DRINK 1 CAN BY MOUTH TWICE DAILY    OLOPATADINE HCL (PAZEO) 0.7 % SOLN    INSTILL 1 DROP IN BOTH EYES EVERY MORNING    OMEGA-3 ACID ETHYL ESTERS (LOVAZA) 1 G CAPSULE        ONDANSETRON (ZOFRAN-ODT) 4 MG DISINTEGRATING TABLET    Dissolve 1 under tongue every 8hrs as need for n/v    ONETOUCH ULTRA STRIP    use 1 TEST STRIP to TEST BLOOD SUGAR three times a day    OXYCODONE (OXY-IR) 15 MG IMMEDIATE RELEASE TABLET    Take 0.5 tablets by mouth every 6 hours as needed for Pain for up to 7 days. PAZEO 0.7 % SOLN        POTASSIUM CHLORIDE (KLOR-CON M) 20 MEQ EXTENDED RELEASE TABLET    Take 1 tablet by mouth every day    PRAMIPEXOLE (MIRAPEX) 0.5 MG TABLET    Take 1 tablet by mouth every evening    PREGABALIN (LYRICA) 150 MG CAPSULE    Take 1 capsule by mouth twice daily    PROAIR RESPICLICK 014 (90 BASE) MCG/ACT AEROSOL POWDER INHALATION        ROSUVASTATIN (CRESTOR) 20 MG TABLET    TAKE 1 TABLET BY MOUTH EVERY DAY    SIMETHICONE 180 MG CAPS    TAKE ONE SOFTGEL BY MOUTH TWICE DAILY    SPIRONOLACTONE (ALDACTONE) 25 MG TABLET    Take one tablet by mouth every day    SYMBICORT 160-4.5 MCG/ACT AERO    INL 2 PFS PO BID UTD    TERAZOSIN (HYTRIN) 2 MG CAPSULE    Take one capsule by mouth at bedtime    TIZANIDINE (ZANAFLEX) 4 MG TABLET    Take 2 tabs every 8 hrs as need for muscle spasms    TRAMADOL (ULTRAM) 50 MG TABLET    Take 1 tablet by mouth every 6 hours as needed for Pain for up to 7 days. VITAMIN B-12 (CYANOCOBALAMIN) 1000 MCG TABLET    Take 1 tablet by mouth daily    VITAMIN D (ERGOCALCIFEROL) 1.25 MG (07998 UT) CAPS CAPSULE    Take 1 capsule by mouth once a week       ALLERGIES     Latex; Imitrex [sumatriptan]; Zomig [zolmitriptan]; Antivert [meclizine hcl]; Flagyl [metronidazole]; Ketorolac tromethamine; Provera [medroxyprogesterone acetate]; Bacitracin; Bactrim; Cefdinir; Cefuroxime axetil; Codeine; Cyclosporine; Iodine; Iv dye [iodides]; Neomycin; Petroleum jelly [skin protectants, misc.]; Quinolones; Sulfa antibiotics;  Toradol [ketorolac tromethamine]; and Trovan [trovafloxacin]    FAMILY HISTORY       Family History   Problem Relation Age of Onset    Osteoarthritis Mother     Asthma Mother     Diabetes Mother     Hypertension Mother     Cancer Mother         lung    Osteoarthritis Father     Hypertension Father     Other Father PE    Cancer Father         stomach cancer    Asthma Brother     Heart Attack Brother     Other Brother         overdose    Asthma Sister     Breast Cancer Sister     Hypertension Sister     Other Sister         overdose / MVA          SOCIAL HISTORY       Social History     Socioeconomic History    Marital status:      Spouse name: None    Number of children: 1    Years of education: None    Highest education level: None   Occupational History    Occupation: SSI   Social Needs    Financial resource strain: None    Food insecurity     Worry: None     Inability: None    Transportation needs     Medical: None     Non-medical: None   Tobacco Use    Smoking status: Current Every Day Smoker     Packs/day: 1.00     Years: 32.00     Pack years: 32.00     Types: Cigarettes    Smokeless tobacco: Never Used   Substance and Sexual Activity    Alcohol use: No     Alcohol/week: 0.0 standard drinks    Drug use: Yes     Types: Marijuana     Comment: daily for pain    Sexual activity: None   Lifestyle    Physical activity     Days per week: None     Minutes per session: None    Stress: None   Relationships    Social connections     Talks on phone: None     Gets together: None     Attends Denominational service: None     Active member of club or organization: None     Attends meetings of clubs or organizations: None     Relationship status: None    Intimate partner violence     Fear of current or ex partner: None     Emotionally abused: None     Physically abused: None     Forced sexual activity: None   Other Topics Concern    None   Social History Narrative    None       SCREENINGS              PHYSICAL EXAM    (up to 7 for level 4, 8 or more for level 5)     ED Triage Vitals [07/05/20 1044]   BP Temp Temp Source Pulse Resp SpO2 Height Weight   (!) 121/58 102.7 °F (39.3 °C) Oral 116 18 (!) 84 % 5' (1.524 m) 150 lb (68 kg)       Physical Exam  Vitals signs and nursing note reviewed.    Constitutional: General: She is not in acute distress. Appearance: She is well-developed. She is ill-appearing. She is not diaphoretic. HENT:      Head: Normocephalic and atraumatic. Right Ear: External ear normal.      Left Ear: External ear normal.      Mouth/Throat:      Mouth: Mucous membranes are dry. Pharynx: No oropharyngeal exudate. Eyes:      Conjunctiva/sclera: Conjunctivae normal.      Pupils: Pupils are equal, round, and reactive to light. Neck:      Musculoskeletal: Normal range of motion and neck supple. Thyroid: No thyromegaly. Vascular: No JVD. Trachea: No tracheal deviation. Cardiovascular:      Rate and Rhythm: Regular rhythm. Tachycardia present. Heart sounds: Normal heart sounds. No murmur. Pulmonary:      Effort: Pulmonary effort is normal. No respiratory distress. Breath sounds: Normal breath sounds. No wheezing or rales. Abdominal:      General: Bowel sounds are normal.      Palpations: Abdomen is soft. Tenderness: There is no abdominal tenderness. There is no guarding. Musculoskeletal: Normal range of motion. Comments: Left shoulder incision healing well. Staples in place. No erythema no drainage. Skin:     General: Skin is warm and dry. Findings: No rash. Neurological:      Mental Status: She is alert and oriented to person, place, and time. Cranial Nerves: No cranial nerve deficit.       Comments: Lethargic   Psychiatric:         Behavior: Behavior normal.         DIAGNOSTIC RESULTS     EKG: All EKG's are interpreted by the Emergency Department Physician who either signs or Co-signsthis chart in the absence of a cardiologist.        RADIOLOGY:   Non-plain filmimages such as CT, Ultrasound and MRI are read by the radiologist. Plain radiographic images are visualized and preliminarily interpreted by the emergency physician with the below findings:        Interpretation per the Radiologist below, if available at the time ofthis note:    XR CHEST PORTABLE   Final Result   1. Enlarged cardiomediastinal silhouette, the possibility of underlying pericardial effusion or other cardiac abnormalities are not excluded on the basis of this exam, clinical correlation recommended. .   2. Suspected underlying mild central pulmonary vascular congestion. 3. Nonspecific bibasilar airspace disease, findings can be seen in infiltrate/pneumonia and/or atelectasis. Brisa Vigil 4. Vascular calcifications thoracic aorta            ED BEDSIDE ULTRASOUND:   Performed by ED Physician - none    LABS:  Labs Reviewed   CBC WITH AUTO DIFFERENTIAL - Abnormal; Notable for the following components:       Result Value    RBC 3.42 (*)     Hemoglobin 8.0 (*)     Hematocrit 24.9 (*)     MCV 72.9 (*)     MCH 23.3 (*)     MCHC 31.9 (*)     RDW 19.6 (*)     Neutrophils Absolute 7.1 (*)     Monocytes Absolute 1.1 (*)     All other components within normal limits   COMPREHENSIVE METABOLIC PANEL - Abnormal; Notable for the following components:    CREATININE 1.71 (*)     GFR Non- 30.6 (*)     GFR  37.0 (*)     Alkaline Phosphatase 179 (*)     All other components within normal limits   POCT ARTERIAL - Abnormal; Notable for the following components:    POC Potassium 3.4 (*)     POC Creatinine 2.0 (*)     GFR Non- 26 (*)     GFR  31 (*)     Calcium, Ion 1.06 (*)     pH, Arterial 7.324 (*)     pCO2, Arterial 53 (*)     pO2, Arterial 45 (*)     O2 Sat, Arterial 76 (*)     POC Hematocrit 26 (*)     Hemoglobin 9.0 (*)     All other components within normal limits   CULTURE, BLOOD 2   CULTURE, BLOOD 1   LACTIC ACID, PLASMA   URINE RT REFLEX TO CULTURE   COVID-19   POCT EPOC BLOOD GAS, LACTIC ACID, ICA       All other labs were within normal range or not returned as of this dictation.     EMERGENCY DEPARTMENT COURSE and DIFFERENTIAL DIAGNOSIS/MDM:   Vitals:    Vitals:    07/05/20 1044 07/05/20 1203   BP: (!) 121/58 108/76   Pulse: 116 105   Resp: 18 18   Temp: 102.7 °F (39.3 °C)    TempSrc: Oral    SpO2: (!) 84% 94%   Weight: 150 lb (68 kg)    Height: 5' (1.524 m)        Patient comes in with altered mental status and hypoxia. Patient does not wear oxygen at home and had a PO2 of 45. Chest x-ray shows infiltrate on the right. Patient was started on IV fluids, IV Zosyn and oxygen. Patient will need to be admitted for further treatment. [facility]      REASSESSMENT          CRITICAL CARE TIME   Total Critical Care time was 30 minutes, excluding separatelyreportable procedures. There was a high probability ofclinically significant/life threatening deterioration in the patient's condition which required my urgent intervention. CONSULTS:  None    PROCEDURES:  Unless otherwise noted below, none     Procedures    FINAL IMPRESSION      1. Altered mental status, unspecified altered mental status type    2. Pneumonia due to organism    3. Hypoxia          DISPOSITION/PLAN   DISPOSITION Decision To Admit 07/05/2020 12:16:14 PM      PATIENT REFERREDTO:  No follow-up provider specified. DISCHARGEMEDICATIONS:  New Prescriptions    No medications on file     Controlled Substances Monitoring:     RX Monitoring 3/20/2019   Attestation The Prescription Monitoring Report for this patient was reviewed today.    Periodic Controlled Substance Monitoring No signs of potential drug abuse or diversion identified: otherwise, see note documentation       (Please note that portions of this note were completed with a voice recognition program.  Efforts were made to edit the dictations but occasionally words are mis-transcribed.)    Merry Horvath DO (electronically signed)  Attending Emergency Physician          Merry Horvath DO  07/05/20 5218

## 2020-07-05 NOTE — H&P
myelopathy     Type 2 diabetes mellitus without complication (HCC)     hx > 6 yrs    Urinary incontinence     Vertebral compression fracture ([address]        Past Surgical History:          Procedure Laterality Date    BACK SURGERY      lumbar kyphoplasty x 2    BRAIN SURGERY      due to Arnold-Chiari deformity / [facility]     SECTION  1994    COLONOSCOPY      CRANIOTOMY      Arnold-Chiary malformation    ENDOSCOPY, COLON, DIAGNOSTIC      FEMUR FRACTURE SURGERY Left     HUMERUS FRACTURE SURGERY Left 2020    ORIF PERIPROSTHETIC FX LEFT HUMERAL SHAFT, SYNTHES NOTIFIED, ROOM 283, LATEX ALLERGY performed by Baljinder Galvez MD at [facility] Left 6/15/2018    LEFT TOTAL SHOULDER ARTHROPLASTY, SANDY BIOMET TSA, SCALENE NERVE BLOCK, (LATEX ALLERGY) performed by Baljinder Galvez MD at [address] [facility] Left 2019    LEFT REMOVAL GLENOID AND CONVERSION TO REVERSE TOTAL SHOULDER ARTHROPLASTY, SANDY REVERSE TSA, JOSE DAVID EQUIPMENT FLEXIBLE OSTEOTOMES, SCALENE NERVE BLOCK, LATEX ALLERGY performed by Baljinder Galvez MD at [facility]      lumbar kyphoplasty    TONSILLECTOMY      UPPER GASTROINTESTINAL ENDOSCOPY  8/5/15    w/bx        Medications Prior to Admission:      Prior to Admission medications    Medication Sig Start Date End Date Taking? Authorizing Provider   traMADol (ULTRAM) 50 MG tablet Take 1 tablet by mouth every 6 hours as needed for Pain for up to 7 days. 20  Attila Navas MD   oxyCODONE (OXY-IR) 15 MG immediate release tablet Take 0.5 tablets by mouth every 6 hours as needed for Pain for up to 7 days. 20  Attila Navas MD   metoprolol tartrate (LOPRESSOR) 25 MG tablet Take 1 tablet by mouth 2 times daily 20   Attila Navas MD   lidocaine (LIDODERM) 5 % Place 3 patches onto the skin every 24 hours 12 hours on, 12 hours off.   Patient not taking: Reported on 2020 chloride (KLOR-CON M) 20 MEQ extended release tablet Take 1 tablet by mouth every day 4/16/20   Adrian Julien MD   pregabalin (LYRICA) 150 MG capsule Take 1 capsule by mouth twice daily 4/13/20 10/13/20  Adrian Julien MD   Alcohol Swabs (ALCOHOL PADS) 70 % PADS USE ONCE DAILY 4/8/20   Adrian Julien MD   Lancet Devices (EASY MINI EJECT LANCING DEVICE) MISC USE AS DIRECTED. 4/8/20   Adrian Julien MD   Easy Comfort Lancets MISC TEST ONCE DAILY 4/8/20   Adrian Julien MD   Blood Glucose Calibration (ADVOCATE REDI-CODE+ CONTROL) High SOLN USE AS DIRECTED. 4/8/20   Adrian Julien MD   Blood Glucose Monitoring Suppl (ADVOCATE REDI-CODE+) COLE USE AS DIRECTED. 4/8/20   Adrian Julien MD   SYMBICORT 160-4.5 MCG/ACT AERO INL 2 PFS PO BID UTD 3/27/20   Adrian Julien MD   levothyroxine (SYNTHROID) 50 MCG tablet take 1 tablet by mouth once daily 3/16/20   Es Vazquez MD   Control Gel Formula Dressing (DUODERM CGF EXTRA THIN) MISC Apply to sacral region every other day 3/5/20   Adrian Julien MD   folic acid (FOLVITE) 1 MG tablet Take one tablet by mouth every day 3/5/20   Adrian Julien MD   vitamin D (ERGOCALCIFEROL) 1.25 MG (74627 UT) CAPS capsule Take 1 capsule by mouth once a week 3/5/20   Adrian Julien MD   esomeprazole (NEXIUM) 40 MG delayed release capsule Take 2 capsules by mouth every day 2/24/20   Adrian Julien MD   ibuprofen (ADVIL;MOTRIN) 800 MG tablet Take 1 tab by mouth every 8 hours as need for pain 2/20/20   Adrian Julien MD   escitalopram (LEXAPRO) 20 MG tablet TAKE ONE TABLET BY MOUTH DAILY 2/20/20   Adrian Julien MD   tiZANidine (ZANAFLEX) 4 MG tablet Take 2 tabs every 8 hrs as need for muscle spasms 2/20/20   Adrian Julien MD   carisoprodol (SOMA) 350 MG tablet Take 1 tablet by mouth daily as needed (migraine) for up to 180 days.   Patient not taking: Reported on 7/1/2020 1/27/20 7/25/20  Adrian Julien MD   mirtazapine (REMERON) 30 MG tablet TAKE ONE TABLET BY MOUTH AT BEDTIME 1/23/20   Adrian Julien MD   ARIPiprazole (ABILIFY) 10 MG tablet TAKE ONE TABLET BY MOUTH EACH NIGHT AT BEDTIME 1/23/20   Marie Bower MD   Simethicone 180 MG CAPS TAKE ONE SOFTGEL BY MOUTH TWICE DAILY 1/23/20   Marie Bower MD   furosemide (LASIX) 40 MG tablet Take 1 tablet by mouth every day 1/16/20   Marie Bower MD   ondansetron (ZOFRAN-ODT) 4 MG disintegrating tablet Dissolve 1 under tongue every 8hrs as need for n/v  Patient not taking: Reported on 7/1/2020 1/10/20   Marie Bower MD   terazosin (HYTRIN) 2 MG capsule Take one capsule by mouth at bedtime 12/20/19   Marie Bower MD   spironolactone (ALDACTONE) 25 MG tablet Take one tablet by mouth every day 12/20/19   Marie Bower MD   magnesium oxide (MAG-OX) 400 MG tablet Take one tablet by mouth every day 12/20/19   Marie Bower MD   Melatonin 10 MG TABS TAKE 1 TABLET BY MOUTH EVERY NIGHT 12/12/19   Marie Bower MD   rosuvastatin (CRESTOR) 20 MG tablet TAKE 1 TABLET BY MOUTH EVERY DAY 12/12/19   Marie Bower MD   Blood Glucose Monitoring Suppl (ONE TOUCH ULTRA 2) w/Device KIT USE AS DIRECTED TO TEST THREE TIMES DAILY 11/5/19   Marie Bower MD   Nutritional Supplements (BOOST HIGH PROTEIN) LIQD DRINK 1 CAN BY MOUTH TWICE DAILY 7/9/19   Marie Bower MD   metFORMIN (GLUCOPHAGE) 500 MG tablet Take 1 tablet by mouth 2 times daily (with meals) 7/5/19   Marie Bower MD   Ascorbic Acid (VITAMIN C/KATIA HIPS) 500 MG TABS TAKE 1 TABLET BY MOUTH DAILY 7/3/19   Marie Bower MD   vitamin B-12 (CYANOCOBALAMIN) 1000 MCG tablet Take 1 tablet by mouth daily 6/8/18   Marie Bower MD   loperamide (IMODIUM A-D) 2 MG tablet Take 2 mg by mouth as needed for Diarrhea  1/18/16   Historical Provider, MD       Allergies:  Latex; Imitrex [sumatriptan]; Zomig [zolmitriptan]; Antivert [meclizine hcl]; Flagyl [metronidazole]; Ketorolac tromethamine; Provera [medroxyprogesterone acetate]; Bacitracin; Bactrim; Cefdinir; Cefuroxime axetil; Codeine; Cyclosporine; Iodine; Iv dye [iodides];  Neomycin; Petroleum jelly [skin protectants, misc.]; Quinolones; Sulfa antibiotics; Toradol [ketorolac tromethamine]; and Misael Yenny [trovafloxacin]    Social History:      The patient currently lives home    TOBACCO:   reports that she has been smoking cigarettes. She has a 32.00 pack-year smoking history. She has never used smokeless tobacco.  ETOH:   reports no history of alcohol use. Family History:       Positive as follows:        Problem Relation Age of Onset    Osteoarthritis Mother     Asthma Mother     Diabetes Mother     Hypertension Mother     Cancer Mother         lung    Osteoarthritis Father     Hypertension Father     Other Father         PE    Cancer Father         stomach cancer    Asthma Brother     Heart Attack Brother     Other Brother         overdose    Asthma Sister     Breast Cancer Sister     Hypertension Sister     Other Sister         overdose / MVA       REVIEW OF SYSTEMS:   Pertinent positives as noted in the HPI. All other systems reviewed and negative. PHYSICAL EXAM:    BP (!) 109/59   Pulse 105   Temp 99 °F (37.2 °C) (Oral)   Resp 16   Ht 5' (1.524 m)   Wt 150 lb (68 kg)   LMP  (LMP Unknown)   SpO2 93%   BMI 29.29 kg/m²     General appearance:  No apparent distress, appears stated age and cooperative. HEENT:  Normal cephalic, atraumatic without obvious deformity. Pupils equal, round, and reactive to light. Extra ocular muscles intact. Conjunctivae/corneas clear. Neck: Supple, with full range of motion. No jugular venous distention. Trachea midline. Respiratory:  Normal respiratory effort. diminished  Cardiovascular:  Rapid rate and rhythm with normal S1/S2 without murmurs, rubs or gallops. Abdomen: Soft, non-tender, non-distended with normal bowel sounds. Musculoskeletal:  No clubbing, cyanosis or edema bilaterally. Full range of motion without deformity. Skin: Skin color, texture, turgor normal.  No rashes or lesions.   Neurologic:  Neurovascularly intact without any focal sensory/motor deficits. Cranial nerves: II-XII intact, grossly non-focal.  Psychiatric:  Alert and oriented, thought content appropriate, normal insight  Capillary Refill: Brisk,< 3 seconds   Peripheral Pulses: +2 palpable, equal bilaterally       Labs:     Recent Labs     07/05/20  1100 07/05/20  1110   WBC 10.0  --    HGB 8.0* 9.0*   HCT 24.9*  --      --      Recent Labs     07/05/20  1100 07/05/20  1110     --    K 3.5  --      --    CO2 26  --    BUN 13  --    CREATININE 1.71* 2.0*   CALCIUM 8.5  --      Recent Labs     07/05/20  1100   AST 33   ALT 13   BILITOT <0.2   ALKPHOS 179*     No results for input(s): INR in the last 72 hours. No results for input(s): Vallarie Brunette in the last 72 hours. Urinalysis:      Lab Results   Component Value Date    NITRU Negative 07/05/2020    WBCUA  07/05/2020    WBCUA 20-50 11/01/2018    BACTERIA MODERATE 07/05/2020    RBCUA 3-5 07/05/2020    RBCUA 0-2 11/01/2018    BLOODU MODERATE 07/05/2020    SPECGRAV 1.013 07/05/2020    GLUCOSEU Negative 07/05/2020    GLUCOSEU NEG 12/12/2011       Radiology:     CXR: I have reviewed the CXR with the following interpretation: pending  EKG:  I have reviewed the EKG with the following interpretation: pending    XR CHEST PORTABLE   Final Result   1. Enlarged cardiomediastinal silhouette, the possibility of underlying pericardial effusion or other cardiac abnormalities are not excluded on the basis of this exam, clinical correlation recommended. .   2. Suspected underlying mild central pulmonary vascular congestion. 3. Nonspecific bibasilar airspace disease, findings can be seen in infiltrate/pneumonia and/or atelectasis. [place] 4. Vascular calcifications thoracic aorta          ASSESSMENT:    Active Hospital Problems    Diagnosis Date Noted    Severe sepsis ([facility] [A41.9, R65.20] 07/05/2020       PLAN:        DVT Prophylaxis: lovenox  Diet: No diet orders on file  Code Status: Prior    PT/OT Eval Status: eval and tx    1. Severe sepsis  2. Pneumonia-will admit and medicate with 1 dose of iv vancomycin and iv zosyn  3. UTI-iv abx  4. ANGELICA-will gently hydrate with ivf and monitor  5. Hypoxia-will consult pulmonology and medicate with oxygen therapy       REGINE Redd    Thank you Deni Castañeda MD for the opportunity to be involved in this patient's care. If you have any questions or concerns please feel free to contact me.
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
## 2020-07-05 NOTE — ED NOTES
Bed: 07  Expected date: 7/5/20  Expected time: 10:32 AM  Means of arrival: [facility]  Comments:  64 F - AMS 79% RA, now 98% 4L O2. 115/75,130.  20G LFA/labs     Tapan Michael RN  07/05/20 8902
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
## 2020-07-05 NOTE — ED TRIAGE NOTES
Pt sent to hospital by caregiver for change in mental status. When medics picked he up her pulse ox was 79% on room air. They placed on 2 liters NC and her pulse ox came up to 90's. Pt on arrival awake alert oriented x 3 color pink skin warm and dry. Eyelids reddened and edematous. Pt has slight wheezes bilateral lower lobes with poor  Audible air exchange. Pt has [place] from left shoulder to mid humerus from a surgery wound edges clean dry well approximated. Pt said she is on oxycontin IR 15 mg for left shoulder pain.

## 2020-07-05 NOTE — PROGRESS NOTES
[facility] EMERGENCY [facility] AT [place] Respiratory Therapy Evaluation   Current Order: duoneb q4 wa  Home Regimen:    Ordering Physician:serenity  Re-evaluation Date:  eval done Diagnosis: sepsis  Patient Status: Stable / Unstable + Physician notified    The following MDI Criteria must be met in order to convert aerosol to MDI with spacer. If unable to meet, MDI will be converted to aerosol:  []  Patient able to demonstrate the ability to use MDI effectively  []  Patient alert and cooperative  []  Patient able to take deep breath with 5-10 second hold  []  Medication(s) available in this delivery method   []  Peak flow greater than or equal to 200 ml/min            Current Order Substituted To  (same drug, same frequency)   Aerosol to MDI [] Albuterol Sulfate 0.083% unit dose by aerosol Albuterol Sulfate MDI 2 puffs by inhalation with spacer    [] Levalbuterol 1.25 mg unit dose by aerosol Levalbuterol MDI 2 puffs by inhalation with spacer    [] Levalbuterol 0.63 mg unit dose by aerosol Levalbuterol MDI 2 puffs by inhalation with spacer    [] Ipratropium Bromide 0.02% unit dose by aerosol Ipratropium Bromide MDI 2 puffs by inhalation with spacer    [x] Duoneb (Ipratropium + Albuterol) unit dose by aerosol Ipratropium MDI + Albuterol MDI 2 puffs by inhalation w/spacer   MDI to Aerosol [] Albuterol Sulfate MDI Albuterol Sulfate 0.083% unit dose by aerosol    [] Levalbuterol MDI 2 puffs by inhalation Levalbuterol 1.25 mg unit dose by aerosol    [] Ipratropium Bromide MDI by inhalation Ipratropium Bromide 0.02% unit dose by aerosol    [] Combivent (Ipratropium + Albuterol) MDI by inhalation Duoneb (Ipratropium + Albuterol) unit dose by aerosol   Treatment Assessment [Frequency/Schedule]:  Change frequency to: ____albuterol  mdi and atrovent mdi q4prn_____________________________________________per Protocol, P&T, MEC      Points 0 1 2 3 4   Pulmonary Status  Non-Smoker  []   Smoking history   < 20 pack years  []   Smoking history  ?  20 pack

## 2020-07-05 NOTE — ED NOTES
Pt awake and alert, sipping on water. Monitor sinus tach.    IV infusing well     Naa Albright RN  07/05/20 7958

## 2020-07-05 NOTE — ED NOTES
Report called to [facility]. Tele franco placed.   Pt remains awake alert oriented x 3  Color pink skin warm and dry     Ras Yang RN  07/05/20 3083

## 2020-07-05 NOTE — CARE COORDINATION
Advance Care Planning     Advance Care Planning Clinical Specialist  Conversation Note      Date of ACP Conversation: 7/5/2020    Conversation Conducted with: Patient with [address]: Named in Advance Directive or Healthcare Power of  (name) Frances Greenberg Clinical Specialist: Jasmine Arredondo    *When Decision Maker makes decisions on behalf of the incapacitated patient: Decision Maker is asked to consider and make decisions based on patient values, known preferences, or best interests. Health Care Decision Maker:     Current Designated Health Care Decision Maker:   (If there is a valid [place] named in the [address] Makers\" box in the ACP activity, but it is not visible above, be sure to open that field and then select the health care decision maker relationship (ie \"primary\") in the blank space to the right of the name.) Validate  this information as still accurate & up-to-date; edit [place] field as needed.)    Note: Assess and validate information in current ACP documents, as indicated. If no Decision Maker listed above or available through scanned documents, then:    If no Authorized Decision Maker has previously been identified, then patient chooses [place]:      \"Who would you like to name as your primary health care decision-maker? \"               Name: Amelia Villalta        Relationship: SISTER          Phone number: [phone]        \"Can this person be reached easily? \" Yes       \"Who would you like to name as your back-up decision maker? \"   Name:   Wilman Delgado LISTED AT THIS TIME                Note: If the relationship of these Decision-Makers to the patient does NOT follow your state's Next of Kin hierarchy, recommend that patient complete ACP document that meets state-specific requirements to allow them to act on the patient's behalf when appropriate.     Care Advance Directives and portable DNR orders.     Length of ACP Conversation in minutes:      Conversation Outcomes:  [x] ACP discussion completed  [] Existing advance directive reviewed with patient; no changes to patient's previously recorded wishes  [] New Advance Directive completed  [] Portable Do Not Rescitate prepared for Provider review and signature  [] POLST/POST/MOLST/MOST prepared for Provider review and signature      Follow-up plan:    [] Schedule follow-up conversation to continue planning  [] Referred individual to Provider for additional questions/concerns   [] Advised patient/agent/surrogate to review completed ACP document and update if needed with changes in condition, patient preferences or care setting    [] This note routed to one or more involved healthcare providers

## 2020-07-05 NOTE — ED NOTES
Oxygen increased to 4 liters, pox remains in 84 range, pt moved nasal cannula to top of head.    Cannula placed back in nose and pox up to [address], RN  07/05/20 1100

## 2020-07-06 ENCOUNTER — APPOINTMENT (OUTPATIENT)
Dept: CT IMAGING | Age: 60
DRG: 682 | End: 2020-07-06
Payer: MEDICARE

## 2020-07-06 LAB
LV EF: 65 %
LVEF MODALITY: NORMAL
PRO-BNP: 2193 PG/ML
PROCALCITONIN: 0.1 NG/ML (ref 0–0.15)
SARS-COV-2, NAAT: NOT DETECTED

## 2020-07-06 PROCEDURE — 36415 COLL VENOUS BLD VENIPUNCTURE: CPT

## 2020-07-06 PROCEDURE — 2500000003 HC RX 250 WO HCPCS: Performed by: INTERNAL MEDICINE

## 2020-07-06 PROCEDURE — 83880 ASSAY OF NATRIURETIC PEPTIDE: CPT

## 2020-07-06 PROCEDURE — 2700000000 HC OXYGEN THERAPY PER DAY

## 2020-07-06 PROCEDURE — 99223 1ST HOSP IP/OBS HIGH 75: CPT | Performed by: INTERNAL MEDICINE

## 2020-07-06 PROCEDURE — 6370000000 HC RX 637 (ALT 250 FOR IP): Performed by: INTERNAL MEDICINE

## 2020-07-06 PROCEDURE — 71250 CT THORAX DX C-: CPT

## 2020-07-06 PROCEDURE — 6360000002 HC RX W HCPCS: Performed by: PHYSICIAN ASSISTANT

## 2020-07-06 PROCEDURE — 6370000000 HC RX 637 (ALT 250 FOR IP): Performed by: PHYSICIAN ASSISTANT

## 2020-07-06 PROCEDURE — 1210000000 HC MED SURG R&B

## 2020-07-06 PROCEDURE — 2580000003 HC RX 258: Performed by: INTERNAL MEDICINE

## 2020-07-06 PROCEDURE — 84145 PROCALCITONIN (PCT): CPT

## 2020-07-06 PROCEDURE — 70450 CT HEAD/BRAIN W/O DYE: CPT

## 2020-07-06 PROCEDURE — 94761 N-INVAS EAR/PLS OXIMETRY MLT: CPT

## 2020-07-06 PROCEDURE — 94660 CPAP INITIATION&MGMT: CPT

## 2020-07-06 PROCEDURE — 93306 TTE W/DOPPLER COMPLETE: CPT

## 2020-07-06 PROCEDURE — 2580000003 HC RX 258: Performed by: PHYSICIAN ASSISTANT

## 2020-07-06 PROCEDURE — 94640 AIRWAY INHALATION TREATMENT: CPT

## 2020-07-06 PROCEDURE — U0002 COVID-19 LAB TEST NON-CDC: HCPCS

## 2020-07-06 RX ORDER — SODIUM CHLORIDE 9 MG/ML
INJECTION, SOLUTION INTRAVENOUS CONTINUOUS
Status: DISCONTINUED | OUTPATIENT
Start: 2020-07-06 | End: 2020-07-06

## 2020-07-06 RX ORDER — SODIUM CHLORIDE 9 MG/ML
INJECTION, SOLUTION INTRAVENOUS CONTINUOUS
Status: ACTIVE | OUTPATIENT
Start: 2020-07-06 | End: 2020-07-07

## 2020-07-06 RX ORDER — 0.9 % SODIUM CHLORIDE 0.9 %
500 INTRAVENOUS SOLUTION INTRAVENOUS ONCE
Status: COMPLETED | OUTPATIENT
Start: 2020-07-06 | End: 2020-07-07

## 2020-07-06 RX ADMIN — ACETAMINOPHEN 650 MG: 325 TABLET, FILM COATED ORAL at 01:33

## 2020-07-06 RX ADMIN — FOLIC ACID 1 MG: 1 TABLET ORAL at 09:33

## 2020-07-06 RX ADMIN — LORAZEPAM 0.5 MG: 0.5 TABLET ORAL at 09:35

## 2020-07-06 RX ADMIN — PREGABALIN 150 MG: 75 CAPSULE ORAL at 09:34

## 2020-07-06 RX ADMIN — LORAZEPAM 0.5 MG: 0.5 TABLET ORAL at 02:44

## 2020-07-06 RX ADMIN — ARIPIPRAZOLE 10 MG: 10 TABLET ORAL at 09:34

## 2020-07-06 RX ADMIN — PRAMIPEXOLE DIHYDROCHLORIDE 0.5 MG: 0.25 TABLET ORAL at 22:16

## 2020-07-06 RX ADMIN — GUAIFENESIN 600 MG: 600 TABLET, EXTENDED RELEASE ORAL at 22:09

## 2020-07-06 RX ADMIN — IPRATROPIUM BROMIDE AND ALBUTEROL 1 PUFF: 20; 100 SPRAY, METERED RESPIRATORY (INHALATION) at 03:20

## 2020-07-06 RX ADMIN — PIPERACILLIN AND TAZOBACTAM 3.38 G: 3; .375 INJECTION, POWDER, LYOPHILIZED, FOR SOLUTION INTRAVENOUS at 14:34

## 2020-07-06 RX ADMIN — AMLODIPINE BESYLATE 5 MG: 5 TABLET ORAL at 09:33

## 2020-07-06 RX ADMIN — LEVOTHYROXINE SODIUM 50 MCG: 0.05 TABLET ORAL at 05:25

## 2020-07-06 RX ADMIN — METFORMIN HYDROCHLORIDE 500 MG: 500 TABLET ORAL at 09:34

## 2020-07-06 RX ADMIN — CYANOCOBALAMIN TAB 500 MCG 1000 MCG: 500 TAB at 09:34

## 2020-07-06 RX ADMIN — SPIRONOLACTONE 25 MG: 25 TABLET ORAL at 09:35

## 2020-07-06 RX ADMIN — ROSUVASTATIN CALCIUM 20 MG: 20 TABLET, FILM COATED ORAL at 09:34

## 2020-07-06 RX ADMIN — Medication 400 MG: at 09:34

## 2020-07-06 RX ADMIN — BUDESONIDE AND FORMOTEROL FUMARATE DIHYDRATE 2 PUFF: 160; 4.5 AEROSOL RESPIRATORY (INHALATION) at 19:28

## 2020-07-06 RX ADMIN — DOXYCYCLINE 100 MG: 100 INJECTION, POWDER, LYOPHILIZED, FOR SOLUTION INTRAVENOUS at 14:30

## 2020-07-06 RX ADMIN — ENOXAPARIN SODIUM 30 MG: 30 INJECTION SUBCUTANEOUS at 09:35

## 2020-07-06 RX ADMIN — ESCITALOPRAM OXALATE 20 MG: 20 TABLET ORAL at 09:33

## 2020-07-06 RX ADMIN — METFORMIN HYDROCHLORIDE 500 MG: 500 TABLET ORAL at 18:07

## 2020-07-06 RX ADMIN — SODIUM CHLORIDE: 9 INJECTION, SOLUTION INTRAVENOUS at 14:30

## 2020-07-06 RX ADMIN — METOPROLOL TARTRATE 25 MG: 25 TABLET, FILM COATED ORAL at 09:34

## 2020-07-06 RX ADMIN — FUROSEMIDE 40 MG: 40 TABLET ORAL at 09:33

## 2020-07-06 RX ADMIN — BUDESONIDE AND FORMOTEROL FUMARATE DIHYDRATE 2 PUFF: 160; 4.5 AEROSOL RESPIRATORY (INHALATION) at 09:04

## 2020-07-06 RX ADMIN — PIPERACILLIN AND TAZOBACTAM 3.38 G: 3; .375 INJECTION, POWDER, LYOPHILIZED, FOR SOLUTION INTRAVENOUS at 05:25

## 2020-07-06 RX ADMIN — GUAIFENESIN 600 MG: 600 TABLET, EXTENDED RELEASE ORAL at 09:34

## 2020-07-06 RX ADMIN — DOXAZOSIN 1 MG: 1 TABLET ORAL at 22:09

## 2020-07-06 RX ADMIN — KETOTIFEN FUMARATE 1 DROP: 0.35 SOLUTION/ DROPS OPHTHALMIC at 22:10

## 2020-07-06 RX ADMIN — METOPROLOL TARTRATE 25 MG: 25 TABLET, FILM COATED ORAL at 22:09

## 2020-07-06 RX ADMIN — PANTOPRAZOLE SODIUM 40 MG: 40 TABLET, DELAYED RELEASE ORAL at 05:25

## 2020-07-06 RX ADMIN — Medication 1000 MG: at 09:35

## 2020-07-06 RX ADMIN — SODIUM CHLORIDE 500 ML: 9 INJECTION, SOLUTION INTRAVENOUS at 22:11

## 2020-07-06 ASSESSMENT — PAIN SCALES - GENERAL
PAINLEVEL_OUTOF10: 0
PAINLEVEL_OUTOF10: 9

## 2020-07-06 NOTE — CONSULTS
mellitus without complication (HCC)     hx > 6 yrs    Urinary incontinence     Vertebral compression fracture [facility])        Past Surgical History:        Procedure Laterality Date    BACK SURGERY      lumbar kyphoplasty x 2    BRAIN SURGERY      due to Arnold-Chiari deformity / [facility]     SECTION      COLONOSCOPY      CRANIOTOMY      Arnold-Chiary malformation    ENDOSCOPY, COLON, DIAGNOSTIC      FEMUR FRACTURE SURGERY Left     HUMERUS FRACTURE SURGERY Left 2020    ORIF PERIPROSTHETIC FX LEFT HUMERAL SHAFT, SYNTHES NOTIFIED, ROOM 283, LATEX ALLERGY performed by Natividad Olivares MD at [facility] Left 6/15/2018    LEFT TOTAL SHOULDER ARTHROPLASTY, SANDY BIOMET TSA, SCALENE NERVE BLOCK, (LATEX ALLERGY) performed by Natividad Olivares MD at [facility] Left 2019    LEFT REMOVAL GLENOID AND CONVERSION TO REVERSE TOTAL SHOULDER ARTHROPLASTY, SANDY REVERSE TSA, JOSE DAVID EQUIPMENT FLEXIBLE OSTEOTOMES, SCALENE NERVE BLOCK, LATEX ALLERGY performed by Natividad Olivares MD at [address] [place]      lumbar kyphoplasty    TONSILLECTOMY      UPPER GASTROINTESTINAL ENDOSCOPY  8/5/15    w/bx        Social History:     reports that she has been smoking cigarettes. She has a 32.00 pack-year smoking history. She has never used smokeless tobacco. She reports current drug use. Drug: Marijuana. She reports that she does not drink alcohol.     Family History:       Problem Relation Age of Onset    Osteoarthritis Mother     Asthma Mother     Diabetes Mother     Hypertension Mother     Cancer Mother         lung    Osteoarthritis Father     Hypertension Father     Other Father         PE    Cancer Father         stomach cancer    Asthma Brother     Heart Attack Brother     Other Brother         overdose    Asthma Sister     Breast Cancer Sister     Hypertension Sister     Other Sister overdose / MVA       Allergies:  Latex; Imitrex [sumatriptan]; Zomig [zolmitriptan]; Antivert [meclizine hcl]; Flagyl [metronidazole]; Ketorolac tromethamine; Provera [medroxyprogesterone acetate]; Bacitracin; Bactrim; Cefdinir; Cefuroxime axetil; Codeine; Cyclosporine; Iodine; Iv dye [iodides]; Neomycin; Petroleum jelly [skin protectants, misc.]; Quinolones; Sulfa antibiotics;  Toradol [ketorolac tromethamine]; and Trovan [trovafloxacin]        MEDICATIONS during current hospitalization:    Continuous Infusions:    Scheduled Meds:   sodium chloride flush  10 mL Intravenous 2 times per day    enoxaparin  30 mg Subcutaneous Daily    vancomycin  15 mg/kg Intravenous Q24H    piperacillin-tazobactam  3.375 g Intravenous Q8H    vancomycin (VANCOCIN) intermittent dosing (placeholder)   Other RX Placeholder    fish oil  1,000 mg Oral Daily    amLODIPine  5 mg Oral Daily    ARIPiprazole  10 mg Oral Daily    clotrimazole-betamethasone   Topical BID    diclofenac sodium  2 g Topical BID    escitalopram  20 mg Oral Daily    pantoprazole  40 mg Oral QAM AC    folic acid  1 mg Oral Daily    furosemide  40 mg Oral Daily    guaiFENesin  600 mg Oral BID    levothyroxine  50 mcg Oral Daily    magnesium oxide  400 mg Oral Daily    metFORMIN  500 mg Oral BID WC    metoprolol tartrate  25 mg Oral BID    ketotifen  1 drop Both Eyes BID    pramipexole  0.5 mg Oral QPM    pregabalin  150 mg Oral Daily    rosuvastatin  20 mg Oral Daily    spironolactone  25 mg Oral Daily    budesonide-formoterol  2 puff Inhalation BID    doxazosin  1 mg Oral Daily    vitamin B-12  1,000 mcg Oral Daily    vitamin D  50,000 Units Oral Weekly       PRN Meds:sodium chloride flush, acetaminophen **OR** acetaminophen, polyethylene glycol, promethazine **OR** ondansetron, albuterol-ipratropium, capsaicin, LORazepam, oxyCODONE, tiZANidine        REVIEW OF SYSTEMS:  ROS: 10 organs review of system is done including general, psychological, ENT, hematological, endocrine, respiratory, cardiovascular, gastrointestinal, musculoskeletal, neurological,  allergy and Immunology is done and is otherwise negative. PHYSICAL EXAM:    Vitals:  BP (!) 149/74   Pulse 102   Temp 98.4 °F (36.9 °C) (Oral)   Resp 20   Ht 5' (1.524 m)   Wt 162 lb 0.6 oz (73.5 kg)   LMP  (LMP Unknown)   SpO2 95%   BMI 31.65 kg/m²     General: alert, cooperative, no distress  Head: normocephalic, atraumatic  Eyes:No gross abnormalities. ENT:  MMM no lesions  Neck:  supple and no masses  Chest : clear to auscultation bilaterally- no wheezes, rales or rhonchi, normal air movement, no respiratory distress  Heart[de-identified] Heart sounds are normal.  Regular rate and rhythm without murmur, gallop or rub. ABD:  symmetric, soft, non-tender  Musculoskeletal : no cyanosis, no clubbing and no edema  Neuro:  Grossly normal  Skin: No rashes or nodules noted. Lymph node:  no cervical nodes  Urology: No Gracia   Psychiatric: appropriate  and calm        Data Review  Recent Labs     07/05/20  1100 07/05/20  1110   WBC 10.0  --    HGB 8.0* 9.0*   HCT 24.9*  --      --       Recent Labs     07/05/20  1100 07/05/20  1110     --    K 3.5  --      --    CO2 26  --    BUN 13  --    CREATININE 1.71* 2.0*   GLUCOSE 96  --        MV Settings:           ABGs:   Recent Labs     07/05/20  1110   PHART 7.324*   VAE2RCN 53*   PO2ART 45*   VNO5KQN 27.7   BEART 2   J0YMYLOU 76*   YZX9YST 29     O2 Device: Nasal cannula  O2 Flow Rate (L/min): 2 L/min  Lab Results   Component Value Date    LACTA 0.9 07/05/2020    LACTA 1.9 11/30/2018    LACTA 1.3 11/28/2018       Radiology  I personally reviewed imaging studies and chest x-ray no infiltrate        Assessment, plan:   Patient is at risk due to    · Altered mental status, secondary to UTI and ANGELICA  · Acute on chronic hypercapnic and hypoxic respiratory failure,  · Could be related to atelectasis, with probable underlying sleep-related breathing disorder and volume overload  · Possible underlying diastolic dysfunction  · Rule out sleep-related breathing disorder   Obesity   · Acute kidney injury     Recommendation  · Patient has CT chest ordered, will review and evaluate after that  · Continue current antibiotics  · Continue Zosyn  · Consider work-up for obstructive uropathy/ANGELICA  · Watch renal function and urine output  · , O2 to keep sat 88 to 90%  · BiPAP while asleep for now  · Will need sleep study as outpatient        Thank you for consultation    Electronically signed by Connie Christianson MD, FCCP,  on 7/6/2020 at 12:27 PM

## 2020-07-06 NOTE — PROGRESS NOTES
[facility]   Facility/Department: [facility] [place] [place] 5 OBSERVATION  Speech Language Pathology    NAME:Olga Lidia Wagner  : 1960  ROOM: [place] [address]    A swallow screen order was entered for this patient, however Speech Therapy cannot complete unless a Clinical Bedside Swallow Evaluation is ordered. Please enter order in Epic if needed. Thank you,    Anna Beard.  Brandt Edmond, Date: 2020, Time: 8:49 AM

## 2020-07-06 NOTE — PROGRESS NOTES
32 skin staples removed from left upper arm. Wound edges well approximated with no s/s of infection. Pt tolerated procedure well with no s/s of pain or distress.

## 2020-07-06 NOTE — PROGRESS NOTES
Patient is continually pulling off leads for tele, she is currently refusing wearing telemetry.  Will try again in AM.

## 2020-07-06 NOTE — FLOWSHEET NOTE
1545- Patient arrived to floor. Patient walked to bathroom with assistance. Gait is shuffled and unsteady. Patient continent of urine. Patient complaining of left shoulder pain. Patient seems very anxious at the moment. Saurabh here to perform ECHO. Secure message to physician about BP.    1650- Patient increasingly confused. Avasys put in room for safety.

## 2020-07-06 NOTE — CARE COORDINATION
Went in patients room with [facility] and nurse states to call her POA which is her sister for this. I called the number listed on ACP note for sister Rakel Maldonado and left [facility]. I called [phone]. I also called other number listed [phone] and it was patients name on the message.

## 2020-07-06 NOTE — PROGRESS NOTES
Patient is alert and oriented x4 with moments of confusion. Patient will yell out at times asking for the name \"Don\", was also not aware she was at the hospital once last night. She was reoriented easily by staff. Patient has staples to left shoulder, no drainage noted but there is some redness. Patient has been incontinent of stool and urine during the night. Home dose of ativan 0.5 mg po q6 prn has been administered per order last night. No complaints of SOB during the night, I noticed a non-productive cough occasionally.

## 2020-07-06 NOTE — PROGRESS NOTES
0800 Pt assessment completed, Pt has incision with staples on left shoulder. VSS, pt A&OX3 yet confused at times, assisted up in bed to eat breakfast. Pt removed IV this morning before shift change. Will insert new IV when antibiotic is due.   1200 2nd covid 19 test performed and negative. Marichuy PORTER notified of pts staples, ok to remove, Dr Farzaneh Thomas notified of negative covid and staples ok to be removed. 1400 Pt to CT for head and chest.   1500 Report called to Jerry Downing [facility] [employer] [facility] [employer] [facility]. Pt to wear Bipap at HS and naps.   [address] removed staples  [facility]   Electronically signed by Trent Lewis RN on 7/6/2020 at 6:08 PM

## 2020-07-06 NOTE — CARE COORDINATION
Rounds done with nurse Jessica Bass and pt in isolation awaiting 2nd covid test. She was at home with [facility] PT/OT PTA. She was just dc from [facility] 6/18-6/30. Per nurse patients sat was 94 on RA. Will ask for PT/OT and then assess dc needs.

## 2020-07-06 NOTE — PROGRESS NOTES
Physical Therapy   Facility/Department: [facility] MED SURG S782/K006-98    NAME: Sonam Burk    : 1960 (64 y.o.)  MRN: [id number]    Account: [de-identified]  Gender: female    PT evaluation and treatment orders received. Chart reviewed. PT eval attempted. Patient Unavailable: bedside Echo    Will attempt PT evaluation again at earliest convenience.       Electronically signed by Emelia Toussaint PT on 20 at 4:24 PM EDT

## 2020-07-06 NOTE — PROGRESS NOTES
[facility] Occupational Therapy      Date: 2020  Patient Name: Orlando Gamez        MRN: [id number]  Account: [de-identified]   : 1960  (64 y.o.)  Room: [place] [address]    Chart reviewed, attempted OT at [phone] for evaluation. Patient not seen 2° to:    Pt. having bedside procedure. Spoke to [facility] RN aware. Will attempt again when able.     Electronically signed by HELEN Woods on 773 at 3:57 PM

## 2020-07-07 LAB
ABO/RH: NORMAL
ANION GAP SERPL CALCULATED.3IONS-SCNC: 10 MEQ/L (ref 9–15)
ANTIBODY SCREEN: NORMAL
BASOPHILS ABSOLUTE: 0.1 K/UL (ref 0–0.2)
BASOPHILS RELATIVE PERCENT: 1.1 %
BLOOD BANK DISPENSE STATUS: NORMAL
BLOOD BANK PRODUCT CODE: NORMAL
BPU ID: NORMAL
BUN BLDV-MCNC: 6 MG/DL (ref 6–20)
CALCIUM SERPL-MCNC: 7.6 MG/DL (ref 8.5–9.9)
CHLORIDE BLD-SCNC: 104 MEQ/L (ref 95–107)
CO2: 29 MEQ/L (ref 20–31)
CREAT SERPL-MCNC: 1.07 MG/DL (ref 0.5–0.9)
DESCRIPTION BLOOD BANK: NORMAL
EOSINOPHILS ABSOLUTE: 0.3 K/UL (ref 0–0.7)
EOSINOPHILS RELATIVE PERCENT: 5.5 %
FOLATE: >20 NG/ML (ref 7.3–26.1)
GFR AFRICAN AMERICAN: >60
GFR NON-AFRICAN AMERICAN: 52.6
GLUCOSE BLD-MCNC: 113 MG/DL (ref 70–99)
HCT VFR BLD CALC: 22.8 % (ref 37–47)
HCT VFR BLD CALC: 26.9 % (ref 37–47)
HEMOGLOBIN: 7.1 G/DL (ref 12–16)
HEMOGLOBIN: 8.5 G/DL (ref 12–16)
IRON SATURATION: 4 % (ref 11–46)
IRON: 14 UG/DL (ref 37–145)
LYMPHOCYTES ABSOLUTE: 1.7 K/UL (ref 1–4.8)
LYMPHOCYTES RELATIVE PERCENT: 29.3 %
MAGNESIUM: 1.9 MG/DL (ref 1.7–2.4)
MCH RBC QN AUTO: 22.8 PG (ref 27–31.3)
MCHC RBC AUTO-ENTMCNC: 31.3 % (ref 33–37)
MCV RBC AUTO: 73 FL (ref 82–100)
MONOCYTES ABSOLUTE: 0.7 K/UL (ref 0.2–0.8)
MONOCYTES RELATIVE PERCENT: 11.8 %
NEUTROPHILS ABSOLUTE: 3 K/UL (ref 1.4–6.5)
NEUTROPHILS RELATIVE PERCENT: 52.3 %
ORGANISM: ABNORMAL
PDW BLD-RTO: 19.9 % (ref 11.5–14.5)
PLATELET # BLD: 234 K/UL (ref 130–400)
POTASSIUM REFLEX MAGNESIUM: 2.6 MEQ/L (ref 3.4–4.9)
RBC # BLD: 3.12 M/UL (ref 4.2–5.4)
SODIUM BLD-SCNC: 143 MEQ/L (ref 135–144)
TOTAL IRON BINDING CAPACITY: 315 UG/DL (ref 178–450)
URINE CULTURE, ROUTINE: ABNORMAL
VANCOMYCIN TROUGH: <4 UG/ML (ref 10–20)
VITAMIN B-12: 1891 PG/ML (ref 232–1245)
WBC # BLD: 5.8 K/UL (ref 4.8–10.8)

## 2020-07-07 PROCEDURE — 85018 HEMOGLOBIN: CPT

## 2020-07-07 PROCEDURE — 86901 BLOOD TYPING SEROLOGIC RH(D): CPT

## 2020-07-07 PROCEDURE — 94640 AIRWAY INHALATION TREATMENT: CPT

## 2020-07-07 PROCEDURE — 86900 BLOOD TYPING SEROLOGIC ABO: CPT

## 2020-07-07 PROCEDURE — 80048 BASIC METABOLIC PNL TOTAL CA: CPT

## 2020-07-07 PROCEDURE — P9016 RBC LEUKOCYTES REDUCED: HCPCS

## 2020-07-07 PROCEDURE — 6360000002 HC RX W HCPCS: Performed by: INTERNAL MEDICINE

## 2020-07-07 PROCEDURE — 94761 N-INVAS EAR/PLS OXIMETRY MLT: CPT

## 2020-07-07 PROCEDURE — 2500000003 HC RX 250 WO HCPCS: Performed by: INTERNAL MEDICINE

## 2020-07-07 PROCEDURE — 83540 ASSAY OF IRON: CPT

## 2020-07-07 PROCEDURE — 2580000003 HC RX 258: Performed by: INTERNAL MEDICINE

## 2020-07-07 PROCEDURE — 2580000003 HC RX 258: Performed by: PHYSICIAN ASSISTANT

## 2020-07-07 PROCEDURE — 36415 COLL VENOUS BLD VENIPUNCTURE: CPT

## 2020-07-07 PROCEDURE — 86923 COMPATIBILITY TEST ELECTRIC: CPT

## 2020-07-07 PROCEDURE — 85025 COMPLETE CBC W/AUTO DIFF WBC: CPT

## 2020-07-07 PROCEDURE — 82607 VITAMIN B-12: CPT

## 2020-07-07 PROCEDURE — 80202 ASSAY OF VANCOMYCIN: CPT

## 2020-07-07 PROCEDURE — 83550 IRON BINDING TEST: CPT

## 2020-07-07 PROCEDURE — C9113 INJ PANTOPRAZOLE SODIUM, VIA: HCPCS | Performed by: INTERNAL MEDICINE

## 2020-07-07 PROCEDURE — 1210000000 HC MED SURG R&B

## 2020-07-07 PROCEDURE — 6360000002 HC RX W HCPCS: Performed by: PHYSICIAN ASSISTANT

## 2020-07-07 PROCEDURE — 6370000000 HC RX 637 (ALT 250 FOR IP): Performed by: INTERNAL MEDICINE

## 2020-07-07 PROCEDURE — 85014 HEMATOCRIT: CPT

## 2020-07-07 PROCEDURE — 97166 OT EVAL MOD COMPLEX 45 MIN: CPT

## 2020-07-07 PROCEDURE — 83735 ASSAY OF MAGNESIUM: CPT

## 2020-07-07 PROCEDURE — 99233 SBSQ HOSP IP/OBS HIGH 50: CPT | Performed by: INTERNAL MEDICINE

## 2020-07-07 PROCEDURE — 97162 PT EVAL MOD COMPLEX 30 MIN: CPT

## 2020-07-07 PROCEDURE — 51798 US URINE CAPACITY MEASURE: CPT

## 2020-07-07 PROCEDURE — 6370000000 HC RX 637 (ALT 250 FOR IP): Performed by: PHYSICIAN ASSISTANT

## 2020-07-07 PROCEDURE — 86850 RBC ANTIBODY SCREEN: CPT

## 2020-07-07 PROCEDURE — 2700000000 HC OXYGEN THERAPY PER DAY

## 2020-07-07 PROCEDURE — 36430 TRANSFUSION BLD/BLD COMPNT: CPT

## 2020-07-07 PROCEDURE — 82746 ASSAY OF FOLIC ACID SERUM: CPT

## 2020-07-07 RX ORDER — PANTOPRAZOLE SODIUM 40 MG/10ML
40 INJECTION, POWDER, LYOPHILIZED, FOR SOLUTION INTRAVENOUS 2 TIMES DAILY
Status: DISCONTINUED | OUTPATIENT
Start: 2020-07-07 | End: 2020-07-09 | Stop reason: HOSPADM

## 2020-07-07 RX ORDER — SODIUM CHLORIDE 9 MG/ML
10 INJECTION INTRAVENOUS DAILY
Status: DISCONTINUED | OUTPATIENT
Start: 2020-07-07 | End: 2020-07-09 | Stop reason: HOSPADM

## 2020-07-07 RX ORDER — 0.9 % SODIUM CHLORIDE 0.9 %
250 INTRAVENOUS SOLUTION INTRAVENOUS ONCE
Status: COMPLETED | OUTPATIENT
Start: 2020-07-07 | End: 2020-07-07

## 2020-07-07 RX ORDER — 0.9 % SODIUM CHLORIDE 0.9 %
500 INTRAVENOUS SOLUTION INTRAVENOUS ONCE
Status: COMPLETED | OUTPATIENT
Start: 2020-07-07 | End: 2020-07-07

## 2020-07-07 RX ADMIN — OXYCODONE 7.5 MG: 5 TABLET ORAL at 16:17

## 2020-07-07 RX ADMIN — PREGABALIN 150 MG: 75 CAPSULE ORAL at 10:19

## 2020-07-07 RX ADMIN — ROSUVASTATIN CALCIUM 20 MG: 20 TABLET, FILM COATED ORAL at 10:19

## 2020-07-07 RX ADMIN — BUDESONIDE AND FORMOTEROL FUMARATE DIHYDRATE 2 PUFF: 160; 4.5 AEROSOL RESPIRATORY (INHALATION) at 07:27

## 2020-07-07 RX ADMIN — ACETAMINOPHEN 650 MG: 325 TABLET, FILM COATED ORAL at 06:17

## 2020-07-07 RX ADMIN — PIPERACILLIN AND TAZOBACTAM 3.38 G: 3; .375 INJECTION, POWDER, LYOPHILIZED, FOR SOLUTION INTRAVENOUS at 22:17

## 2020-07-07 RX ADMIN — LORAZEPAM 0.5 MG: 0.5 TABLET ORAL at 22:21

## 2020-07-07 RX ADMIN — FOLIC ACID 1 MG: 1 TABLET ORAL at 10:17

## 2020-07-07 RX ADMIN — SODIUM CHLORIDE 500 ML: 9 INJECTION, SOLUTION INTRAVENOUS at 05:04

## 2020-07-07 RX ADMIN — PANTOPRAZOLE SODIUM 40 MG: 40 TABLET, DELAYED RELEASE ORAL at 06:17

## 2020-07-07 RX ADMIN — DICLOFENAC 2 G: 10 GEL TOPICAL at 13:10

## 2020-07-07 RX ADMIN — KETOTIFEN FUMARATE 1 DROP: 0.35 SOLUTION/ DROPS OPHTHALMIC at 22:28

## 2020-07-07 RX ADMIN — PANTOPRAZOLE SODIUM 40 MG: 40 INJECTION, POWDER, FOR SOLUTION INTRAVENOUS at 22:18

## 2020-07-07 RX ADMIN — Medication 1000 MG: at 10:18

## 2020-07-07 RX ADMIN — PIPERACILLIN AND TAZOBACTAM 3.38 G: 3; .375 INJECTION, POWDER, LYOPHILIZED, FOR SOLUTION INTRAVENOUS at 12:58

## 2020-07-07 RX ADMIN — METFORMIN HYDROCHLORIDE 500 MG: 500 TABLET ORAL at 10:17

## 2020-07-07 RX ADMIN — Medication 10 ML: at 22:19

## 2020-07-07 RX ADMIN — METFORMIN HYDROCHLORIDE 500 MG: 500 TABLET ORAL at 16:17

## 2020-07-07 RX ADMIN — ONDANSETRON 4 MG: 2 INJECTION INTRAMUSCULAR; INTRAVENOUS at 11:21

## 2020-07-07 RX ADMIN — CLOTRIMAZOLE AND BETAMETHASONE DIPROPIONATE: 10; .5 CREAM TOPICAL at 10:20

## 2020-07-07 RX ADMIN — BUDESONIDE AND FORMOTEROL FUMARATE DIHYDRATE 2 PUFF: 160; 4.5 AEROSOL RESPIRATORY (INHALATION) at 20:21

## 2020-07-07 RX ADMIN — ARIPIPRAZOLE 10 MG: 10 TABLET ORAL at 10:19

## 2020-07-07 RX ADMIN — LEVOTHYROXINE SODIUM 50 MCG: 0.05 TABLET ORAL at 06:17

## 2020-07-07 RX ADMIN — KETOTIFEN FUMARATE 1 DROP: 0.35 SOLUTION/ DROPS OPHTHALMIC at 10:21

## 2020-07-07 RX ADMIN — PRAMIPEXOLE DIHYDROCHLORIDE 0.5 MG: 0.25 TABLET ORAL at 20:25

## 2020-07-07 RX ADMIN — DOXYCYCLINE 100 MG: 100 INJECTION, POWDER, LYOPHILIZED, FOR SOLUTION INTRAVENOUS at 06:17

## 2020-07-07 RX ADMIN — CYANOCOBALAMIN TAB 500 MCG 1000 MCG: 500 TAB at 10:17

## 2020-07-07 RX ADMIN — DOXYCYCLINE 100 MG: 100 INJECTION, POWDER, LYOPHILIZED, FOR SOLUTION INTRAVENOUS at 20:25

## 2020-07-07 RX ADMIN — PIPERACILLIN AND TAZOBACTAM 3.38 G: 3; .375 INJECTION, POWDER, LYOPHILIZED, FOR SOLUTION INTRAVENOUS at 00:37

## 2020-07-07 RX ADMIN — GUAIFENESIN 600 MG: 600 TABLET, EXTENDED RELEASE ORAL at 22:19

## 2020-07-07 RX ADMIN — Medication 400 MG: at 10:18

## 2020-07-07 RX ADMIN — DICLOFENAC 2 G: 10 GEL TOPICAL at 22:28

## 2020-07-07 RX ADMIN — SODIUM CHLORIDE 250 ML: 9 INJECTION, SOLUTION INTRAVENOUS at 14:00

## 2020-07-07 RX ADMIN — OXYCODONE 7.5 MG: 5 TABLET ORAL at 10:18

## 2020-07-07 RX ADMIN — LORAZEPAM 0.5 MG: 0.5 TABLET ORAL at 10:19

## 2020-07-07 RX ADMIN — OXYCODONE 7.5 MG: 5 TABLET ORAL at 22:20

## 2020-07-07 RX ADMIN — GUAIFENESIN 600 MG: 600 TABLET, EXTENDED RELEASE ORAL at 10:19

## 2020-07-07 RX ADMIN — POTASSIUM BICARBONATE 40 MEQ: 782 TABLET, EFFERVESCENT ORAL at 10:17

## 2020-07-07 RX ADMIN — ONDANSETRON 4 MG: 2 INJECTION INTRAMUSCULAR; INTRAVENOUS at 14:40

## 2020-07-07 RX ADMIN — LORAZEPAM 0.5 MG: 0.5 TABLET ORAL at 16:16

## 2020-07-07 ASSESSMENT — PAIN SCALES - GENERAL
PAINLEVEL_OUTOF10: 0
PAINLEVEL_OUTOF10: 10
PAINLEVEL_OUTOF10: 8
PAINLEVEL_OUTOF10: 9
PAINLEVEL_OUTOF10: 7
PAINLEVEL_OUTOF10: 0
PAINLEVEL_OUTOF10: 9

## 2020-07-07 ASSESSMENT — PAIN DESCRIPTION - ORIENTATION: ORIENTATION: LEFT

## 2020-07-07 ASSESSMENT — PAIN DESCRIPTION - LOCATION: LOCATION: SHOULDER

## 2020-07-07 ASSESSMENT — PAIN DESCRIPTION - PAIN TYPE: TYPE: ACUTE PAIN

## 2020-07-07 NOTE — FLOWSHEET NOTE
Pt up to chair for lunch, vss, msg to  as pt is refusing effer-K due to taste, insisting on pill, refusing tele see if we can DC order and pt has just agreed to receiving blood after much education regarding hgb, pt has been on bedpan several times today (stool and urine), been on phone with sister and boyfriend intermittently, call light in place, avasys in place for safety, cont to monitor Electronically signed by David Holder RN on 7/7/2020 at 1:05 PM  telebox sent back to monitor room, ok to dc per hospitalist, pt blood has started, consent on chart, vss, cont to monitor Electronically signed by David Holder RN on 7/7/2020 at 2:47 PM
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
## 2020-07-07 NOTE — PROGRESS NOTES
Pt alert to self only at this time; pt stated she was in [place] and it was 12. Patient reoriented. Lung sounds diminished, heart sounds normal, bowel sounds active in all four quadrants, abdomen is soft and non tender. Pt refusing to wear the telemetry. Pt ambulated to the bathroom with a 1 person assist, gait is unsteady, pt said it is her baseline. Pt denies any further needs at this time, call light in reach, bed alarm on.       2211: 1000 mL bolus given at 250 mL/hr. pt's blood pressure 105/57  0337: pt's blood pressure 98/39; perfect served Dr. Evaristo Frank, awaiting new orders. 0425: new orders placed     0504: 500 mL bolus started at 500 mL/hr. Will continue to monitor Electronically signed by Nathanael Cannon RN on 7/7/2020 at 5:21 AM    0494; reported a critical lab value to physician via perfect serve; potassium 2.6, awaiting orders.

## 2020-07-07 NOTE — PROGRESS NOTES
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 effusions, with left-sided rib fracture  · Acute kidney injury    Recommendation  · Continue current antibiotics for today, will plan de-escalation tomorrow  · Repeat procalcitonin tomorrow  · Repeat chest x-ray tomorrow  · Echo  · We will need to consider sleep study as outpatient  · Will need follow-up CAT scan in 6 to 8 weeks to confirm resolution  · Watch volume status and avoid overload    Dw Dr Teresa Baez     I spent 30 min with this patient, greater the 50% of this time was spent in counseling and/or coordinating of care.     Electronically signed by Argelia Forte MD,  FCCP   on 7/7/2020 at 9:58 AM
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
## 2020-07-07 NOTE — PROGRESS NOTES
Uneventful night. No chest pain or palpitation. No abdominal pain, nausea or vomiting. No hematemesis or melena. No fever or chills. ROS: 12 system review otherwise is negative for acute signs or symptoms over the last 24 hrs.      Exam: Awake, alert oriented, in no apparent distress  HEENT: Pink conjunctiva and buccal mucosa. Normal and throat and nose  Neck: Supple, no nuchal rigidity. Endo: No thyromegaly. Vascular: No JVD or carotid bruit. Chest:  Fine basilar rhonchi on the right side. Heart: Regular rate and rhythm, no extra sounds. No murmur, no rub. Abdomen: Soft, no tenderness, no rebound, no rigidity. Clinically I could not exclude the possibility of intra-abdominal mass or organomegaly. LE: No cyanosis or clubbing, no varices or edema. Neuro: Awake, alert, oriented place and person but not to the exact date and location. She has mild to moderate cognitive impairment. . Left arm mild weakness with wrist contractions. Proximal muscle power is about 3/5. MS: No joint effusion or tenderness. Skin: No skin rash, itching, bruising or significant findings.       Assessment and plan:      *Basilar infiltrate on the right side, hypoxemia. CT showed bilateral unusual appearing infiltrates.     *Acute kidney failure, improved.     *E. coli. UTI, on Zosyn.     *Systemic inflammatory reactive syndrome present on admission.     *Anemia, no evidence of acute blood loss, chronic. Unknown etiology. Drop of the hemoglobin between 9 down to 7. No hematemesis or melena.     *Schizophrenia.     *Acute metabolic encephalopathy, resolved. CVA with left hemiparesis. Most prominent deficit is left arm with the wrist contraction. Plan:  I called her sister on Monday to discuss her case. I provided the sister with information about her disease, prognosis, expectation and the treatment plan.     Continue antibiotic for bilateral infiltrates pending pulmonary evaluation of the CT that I ordered yesterday. Anemia, hold the Lovenox, check stool Hemoccult, change Protonix to 40 IV twice a day, suspect subacute blood loss. Check iron studies, TIBC and B12. Given the borderline hypotension in the setting of anemia, hemoglobin drop in the possibility that her hemoglobin will drop further I would order 1 unit of RBC transfusion. If her stool test positive I would recommend inpatient GI evaluation otherwise I would recommend outpatient GI I and GYN evaluation. Antibiotic for UTI. Patient status is dynamic and evolutionary therefore the aforementioned assessment and plan may or may not be final or conclusive at this time. Additional work-up, investigation, therapeutic intervention and documentation will be determined and implemented based on the clinical progression and a follow-up test result.

## 2020-07-08 ENCOUNTER — APPOINTMENT (OUTPATIENT)
Dept: GENERAL RADIOLOGY | Age: 60
DRG: 682 | End: 2020-07-08
Payer: MEDICARE

## 2020-07-08 LAB
ANION GAP SERPL CALCULATED.3IONS-SCNC: 13 MEQ/L (ref 9–15)
BASOPHILS ABSOLUTE: 0.1 K/UL (ref 0–0.2)
BASOPHILS RELATIVE PERCENT: 0.9 %
BUN BLDV-MCNC: 8 MG/DL (ref 6–20)
CALCIUM SERPL-MCNC: 8.2 MG/DL (ref 8.5–9.9)
CHLORIDE BLD-SCNC: 105 MEQ/L (ref 95–107)
CO2: 25 MEQ/L (ref 20–31)
CREAT SERPL-MCNC: 1.25 MG/DL (ref 0.5–0.9)
EOSINOPHILS ABSOLUTE: 0.5 K/UL (ref 0–0.7)
EOSINOPHILS RELATIVE PERCENT: 6.4 %
GFR AFRICAN AMERICAN: 53.2
GFR NON-AFRICAN AMERICAN: 44
GLUCOSE BLD-MCNC: 100 MG/DL (ref 70–99)
HBA1C MFR BLD: 5.8 % (ref 4.8–5.9)
HCT VFR BLD CALC: 26.3 % (ref 37–47)
HCT VFR BLD CALC: 27.5 % (ref 37–47)
HEMOGLOBIN: 8.2 G/DL (ref 12–16)
HEMOGLOBIN: 8.7 G/DL (ref 12–16)
LYMPHOCYTES ABSOLUTE: 1.9 K/UL (ref 1–4.8)
LYMPHOCYTES RELATIVE PERCENT: 27.5 %
MAGNESIUM: 2 MG/DL (ref 1.7–2.4)
MCH RBC QN AUTO: 23.5 PG (ref 27–31.3)
MCHC RBC AUTO-ENTMCNC: 31.5 % (ref 33–37)
MCV RBC AUTO: 74.6 FL (ref 82–100)
MONOCYTES ABSOLUTE: 0.7 K/UL (ref 0.2–0.8)
MONOCYTES RELATIVE PERCENT: 9.5 %
NEUTROPHILS ABSOLUTE: 3.9 K/UL (ref 1.4–6.5)
NEUTROPHILS RELATIVE PERCENT: 55.7 %
PDW BLD-RTO: 19.2 % (ref 11.5–14.5)
PLATELET # BLD: 239 K/UL (ref 130–400)
POTASSIUM REFLEX MAGNESIUM: 3.1 MEQ/L (ref 3.4–4.9)
PROCALCITONIN: 0.05 NG/ML (ref 0–0.15)
RBC # BLD: 3.69 M/UL (ref 4.2–5.4)
SODIUM BLD-SCNC: 143 MEQ/L (ref 135–144)
WBC # BLD: 7.1 K/UL (ref 4.8–10.8)

## 2020-07-08 PROCEDURE — 36415 COLL VENOUS BLD VENIPUNCTURE: CPT

## 2020-07-08 PROCEDURE — 94660 CPAP INITIATION&MGMT: CPT

## 2020-07-08 PROCEDURE — 2500000003 HC RX 250 WO HCPCS: Performed by: INTERNAL MEDICINE

## 2020-07-08 PROCEDURE — C9113 INJ PANTOPRAZOLE SODIUM, VIA: HCPCS | Performed by: INTERNAL MEDICINE

## 2020-07-08 PROCEDURE — 99221 1ST HOSP IP/OBS SF/LOW 40: CPT | Performed by: SPECIALIST

## 2020-07-08 PROCEDURE — 94761 N-INVAS EAR/PLS OXIMETRY MLT: CPT

## 2020-07-08 PROCEDURE — 83036 HEMOGLOBIN GLYCOSYLATED A1C: CPT

## 2020-07-08 PROCEDURE — 85025 COMPLETE CBC W/AUTO DIFF WBC: CPT

## 2020-07-08 PROCEDURE — 6360000002 HC RX W HCPCS: Performed by: INTERNAL MEDICINE

## 2020-07-08 PROCEDURE — 6370000000 HC RX 637 (ALT 250 FOR IP): Performed by: PHYSICIAN ASSISTANT

## 2020-07-08 PROCEDURE — 2580000003 HC RX 258: Performed by: INTERNAL MEDICINE

## 2020-07-08 PROCEDURE — 83735 ASSAY OF MAGNESIUM: CPT

## 2020-07-08 PROCEDURE — 6360000002 HC RX W HCPCS: Performed by: PHYSICIAN ASSISTANT

## 2020-07-08 PROCEDURE — 2580000003 HC RX 258: Performed by: PHYSICIAN ASSISTANT

## 2020-07-08 PROCEDURE — 97116 GAIT TRAINING THERAPY: CPT

## 2020-07-08 PROCEDURE — 6370000000 HC RX 637 (ALT 250 FOR IP): Performed by: INTERNAL MEDICINE

## 2020-07-08 PROCEDURE — 94640 AIRWAY INHALATION TREATMENT: CPT

## 2020-07-08 PROCEDURE — 99232 SBSQ HOSP IP/OBS MODERATE 35: CPT | Performed by: INTERNAL MEDICINE

## 2020-07-08 PROCEDURE — 71045 X-RAY EXAM CHEST 1 VIEW: CPT

## 2020-07-08 PROCEDURE — 84145 PROCALCITONIN (PCT): CPT

## 2020-07-08 PROCEDURE — 1210000000 HC MED SURG R&B

## 2020-07-08 PROCEDURE — 80048 BASIC METABOLIC PNL TOTAL CA: CPT

## 2020-07-08 RX ADMIN — BUDESONIDE AND FORMOTEROL FUMARATE DIHYDRATE 2 PUFF: 160; 4.5 AEROSOL RESPIRATORY (INHALATION) at 08:01

## 2020-07-08 RX ADMIN — PANTOPRAZOLE SODIUM 40 MG: 40 INJECTION, POWDER, FOR SOLUTION INTRAVENOUS at 23:17

## 2020-07-08 RX ADMIN — PRAMIPEXOLE DIHYDROCHLORIDE 0.5 MG: 0.25 TABLET ORAL at 16:47

## 2020-07-08 RX ADMIN — METOPROLOL TARTRATE 25 MG: 25 TABLET, FILM COATED ORAL at 09:23

## 2020-07-08 RX ADMIN — Medication 1000 MG: at 09:22

## 2020-07-08 RX ADMIN — BUDESONIDE AND FORMOTEROL FUMARATE DIHYDRATE 2 PUFF: 160; 4.5 AEROSOL RESPIRATORY (INHALATION) at 19:24

## 2020-07-08 RX ADMIN — OXYCODONE 7.5 MG: 5 TABLET ORAL at 14:16

## 2020-07-08 RX ADMIN — ARIPIPRAZOLE 10 MG: 10 TABLET ORAL at 09:22

## 2020-07-08 RX ADMIN — LORAZEPAM 0.5 MG: 0.5 TABLET ORAL at 23:18

## 2020-07-08 RX ADMIN — DICLOFENAC 2 G: 10 GEL TOPICAL at 23:20

## 2020-07-08 RX ADMIN — LORAZEPAM 0.5 MG: 0.5 TABLET ORAL at 05:09

## 2020-07-08 RX ADMIN — DICLOFENAC 2 G: 10 GEL TOPICAL at 09:23

## 2020-07-08 RX ADMIN — ESCITALOPRAM OXALATE 20 MG: 20 TABLET ORAL at 09:29

## 2020-07-08 RX ADMIN — GUAIFENESIN 600 MG: 600 TABLET, EXTENDED RELEASE ORAL at 23:18

## 2020-07-08 RX ADMIN — ACETAMINOPHEN 650 MG: 325 TABLET, FILM COATED ORAL at 17:02

## 2020-07-08 RX ADMIN — LEVOTHYROXINE SODIUM 50 MCG: 0.05 TABLET ORAL at 05:09

## 2020-07-08 RX ADMIN — PREGABALIN 150 MG: 75 CAPSULE ORAL at 09:22

## 2020-07-08 RX ADMIN — FOLIC ACID 1 MG: 1 TABLET ORAL at 09:22

## 2020-07-08 RX ADMIN — Medication 400 MG: at 09:22

## 2020-07-08 RX ADMIN — DOXYCYCLINE 100 MG: 100 INJECTION, POWDER, LYOPHILIZED, FOR SOLUTION INTRAVENOUS at 09:25

## 2020-07-08 RX ADMIN — POTASSIUM BICARBONATE 40 MEQ: 782 TABLET, EFFERVESCENT ORAL at 09:22

## 2020-07-08 RX ADMIN — TIZANIDINE 4 MG: 4 TABLET ORAL at 17:02

## 2020-07-08 RX ADMIN — METFORMIN HYDROCHLORIDE 500 MG: 500 TABLET ORAL at 16:47

## 2020-07-08 RX ADMIN — Medication 10 ML: at 05:09

## 2020-07-08 RX ADMIN — OXYCODONE 7.5 MG: 5 TABLET ORAL at 06:18

## 2020-07-08 RX ADMIN — GUAIFENESIN 600 MG: 600 TABLET, EXTENDED RELEASE ORAL at 09:22

## 2020-07-08 RX ADMIN — PANTOPRAZOLE SODIUM 40 MG: 40 INJECTION, POWDER, FOR SOLUTION INTRAVENOUS at 09:24

## 2020-07-08 RX ADMIN — DOXYCYCLINE 100 MG: 100 INJECTION, POWDER, LYOPHILIZED, FOR SOLUTION INTRAVENOUS at 23:39

## 2020-07-08 RX ADMIN — CLOTRIMAZOLE AND BETAMETHASONE DIPROPIONATE: 10; .5 CREAM TOPICAL at 09:23

## 2020-07-08 RX ADMIN — Medication 10 ML: at 09:23

## 2020-07-08 RX ADMIN — METFORMIN HYDROCHLORIDE 500 MG: 500 TABLET ORAL at 09:22

## 2020-07-08 RX ADMIN — PIPERACILLIN AND TAZOBACTAM 3.38 G: 3; .375 INJECTION, POWDER, LYOPHILIZED, FOR SOLUTION INTRAVENOUS at 05:08

## 2020-07-08 RX ADMIN — Medication 10 ML: at 09:25

## 2020-07-08 RX ADMIN — CLOTRIMAZOLE AND BETAMETHASONE DIPROPIONATE: 10; .5 CREAM TOPICAL at 23:20

## 2020-07-08 RX ADMIN — Medication 10 ML: at 23:18

## 2020-07-08 RX ADMIN — DOXAZOSIN 1 MG: 1 TABLET ORAL at 23:17

## 2020-07-08 RX ADMIN — KETOTIFEN FUMARATE 1 DROP: 0.35 SOLUTION/ DROPS OPHTHALMIC at 23:21

## 2020-07-08 RX ADMIN — METOPROLOL TARTRATE 25 MG: 25 TABLET, FILM COATED ORAL at 23:18

## 2020-07-08 RX ADMIN — ROSUVASTATIN CALCIUM 20 MG: 20 TABLET, FILM COATED ORAL at 09:22

## 2020-07-08 RX ADMIN — CYANOCOBALAMIN TAB 500 MCG 1000 MCG: 500 TAB at 09:22

## 2020-07-08 RX ADMIN — KETOTIFEN FUMARATE 1 DROP: 0.35 SOLUTION/ DROPS OPHTHALMIC at 09:24

## 2020-07-08 ASSESSMENT — PAIN SCALES - WONG BAKER
WONGBAKER_NUMERICALRESPONSE: 2

## 2020-07-08 ASSESSMENT — PAIN DESCRIPTION - PROGRESSION
CLINICAL_PROGRESSION: GRADUALLY IMPROVING

## 2020-07-08 ASSESSMENT — PAIN SCALES - GENERAL
PAINLEVEL_OUTOF10: 9
PAINLEVEL_OUTOF10: 0
PAINLEVEL_OUTOF10: 0
PAINLEVEL_OUTOF10: 10
PAINLEVEL_OUTOF10: 10
PAINLEVEL_OUTOF10: 2
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
## 2020-07-08 NOTE — ADT AUTH CERT
Pneumonia - Care Day 3 (7/7/2020) by Saray Fernandez RN         Review Status Review Entered   Completed 7/8/2020 13:14       Criteria Review      Care Day: 3 Care Date: 7/7/2020 Level of Care: Inpatient Floor    Guideline Day 2    Level Of Care    (X) Floor    7/8/2020 1:14 PM EDT by Daniel Isaac      tele floor    droplet isolation    Clinical Status    ( ) * No CO2 retention or acidosis    (X) * No requirement for mechanical ventilation    7/8/2020 1:14 PM EDT by Estephania Flores      temp 98.1, pulse 87, tele nsr, bp 99/39, pulse ox 95 % on 2l nc    (X) * Hypotension absent    7/8/2020 1:14 PM EDT by Fortino Flores      labs  k 2.6  creat 1.07  gfr 52.6  gluc 113  ca 7.6  rbc 3.12  h/h 7.1/ 22.8  mcv 73  mch 22.8  mchc 31.3  rdw 19.9    (X) * Fever absent or reduced    ( ) * No hypoxia on room air or oxygenation improved    7/8/2020 1:14 PM EDT by Fortino Flores      improving    (X) * Mental status improved or at baseline    7/8/2020 1:14 PM EDT by Fortino Flores      baseline    Activity    (X) * Increased activity    7/8/2020 1:14 PM EDT by Daniel Isaac      up to chair   up as ronda    Routes    (X) Oral hydration, medications    7/8/2020 1:14 PM EDT by Estephania Flores      7/7 1 unit rbc given     iv ns 500 cc iv x1  iv 250 cc iv x1    iv ns at 75 cc/h-- dcd this day at 1300    prn tylenol 650 mg po prn x1, iv ativan 0.5 mg po x3, iv zofran 4 mg iv x2, roxicodone 7.5 mg po prn x3    (X) Usual diet    7/8/2020 1:14 PM EDT by Fortino Flores      diet- carb control    Interventions    (X) Incentive spirometry    (X) Pulse oximetry    (X) Head of bed at 30 degrees    (X) Possible oxygen    7/8/2020 1:14 PM EDT by Fortino Flores      o2 2l nc    Medications    (X) IV or oral antibiotics    7/8/2020 1:14 PM EDT by Fortino Flores      iv vibramycin 100 mg iv q12h  iv zosyn 3.375 gm iv q8h    * Milestone   Additional Notes   7/7/2020  care day 3      Physical Exam   Chest:  Fine basilar rhonchi on the right side   Neuro: Awake, alert, oriented place and person but not to the exact date and location. Apple Chou has mild to moderate cognitive impairment. . Left arm mild weakness with wrist contractions.  Proximal muscle power is about 3/5. Meds- names, dose, route, rate   Scheduled Meds:   pantoprazole, 40 mg, Intravenous, BID   doxycycline (VIBRAMYCIN) IV, 100 mg, Intravenous, Q12H   [Held by provider] enoxaparin, 30 mg, Subcutaneous, Daily   fish oil, 1,000 mg, Oral, Daily   ARIPiprazole, 10 mg, Oral, Daily   clotrimazole-betamethasone, , Topical, BID   diclofenac sodium, 2 g, Topical, BID   escitalopram, 20 mg, Oral, Daily   folic acid, 1 mg, Oral, Daily   [Held by provider] furosemide, 40 mg, Oral, Daily   guaiFENesin, 600 mg, Oral, BID   levothyroxine, 50 mcg, Oral, Daily   magnesium oxide, 400 mg, Oral, Daily   metFORMIN, 500 mg, Oral, BID WC   metoprolol tartrate, 25 mg, Oral, BID   ketotifen, 1 drop, Both Eyes, BID   pramipexole, 0.5 mg, Oral, QPM   pregabalin, 150 mg, Oral, Daily   rosuvastatin, 20 mg, Oral, Daily   [Held by provider] spironolactone, 25 mg, Oral, Daily   budesonide-formoterol, 2 puff, Inhalation, BID   doxazosin, 1 mg, Oral, Daily   vitamin B-12, 1,000 mcg, Oral, Daily   vitamin D, 50,000 Units, Oral, Weekly            Treatment plan attending/ consults    Medical impression   *Basilar infiltrate on the right side, hypoxemia.  CT showed bilateral unusual appearing infiltrates.       *Acute kidney failure, improved.       *E. coli.  UTI, on Zosyn.       *Systemic inflammatory reactive syndrome present on admission.       *Anemia, no evidence of acute blood loss, chronic.  Unknown etiology.  Drop of the hemoglobin between 9 down to 7.  No hematemesis or melena.       *Schizophrenia.       *Acute metabolic encephalopathy, resolved.       CVA with left hemiparesis.  Most prominent deficit is left arm with the wrist contraction.       Plan:   I called her sister on Monday to discuss her case.    I provided the sister with information about her disease, prognosis, expectation and the treatment plan.       Continue antibiotic for bilateral infiltrates pending pulmonary evaluation of the CT that I ordered yesterday.       Anemia, hold the Lovenox, check stool Hemoccult, change Protonix to 40 IV twice a day, suspect subacute blood loss.  Check iron studies, TIBC and B12.  Given the borderline hypotension in the setting of anemia, hemoglobin drop in the possibility that her hemoglobin will drop further I would order 1 unit of RBC transfusion. If her stool test positive I would recommend inpatient GI evaluation otherwise I would recommend outpatient GI I and GYN evaluation.       Antibiotic for UTI.       Patient status is dynamic and evolutionary therefore the aforementioned assessment and plan may or may not be final or conclusive at this time.  Additional work-up, investigation, therapeutic intervention and documentation will be determined and implemented based on the clinical progression and a follow-up test result.       Pulmonology impression   IMPRESSION AND SUGGESTION:   Patient is at risk due to    · Multilobar pulmonary infiltrate, most likely atypical pneumonitis, needs monitoring rule out other etiologies mainly connective tissue disease.  Procalcitonin is negative   · No signs of volume overload and BNP is normal   · Possible underlying sleep-related breathing disorder   · Possible underlying diastolic dysfunction   · Small bilateral pleural effusions, with left-sided rib fracture   · Acute kidney injury       Recommendation   · Continue current antibiotics for today, will plan de-escalation tomorrow   · Repeat procalcitonin tomorrow   · Repeat chest x-ray tomorrow   · Echo   · We will need to consider sleep study as outpatient   · Will need follow-up CAT scan in 6 to 8 weeks to confirm resolution   · Watch volume status and avoid overload

## 2020-07-08 NOTE — FLOWSHEET NOTE
4069: Handoff complete. This RN assumed care of the pt. Pt resting in bed with no stated needs. 0945: pt took her pills whole with water. Pt is answering all questions appropriately and following commands. She states that her pain is well controlled at this time. Pt was up to the chair for breakfast. Surgical incision/staple removal to the left shoulder is clean, dry, and intact. No drainage noted.
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
## 2020-07-08 NOTE — CARE COORDINATION
LSW spoke with pt and her POA (via phone) regarding her plan for DC. Probable DC tomorrow. Pt wants to go home at DC and Her POA agrees that she can manage at home but would like her waiver hours increased to give her more help at home. Waiver  is [employer]. Pt will get the phone number for [facility] [employer] and this LSW will call her to see if the waiver hours can be increased. [facility] will follow for skilled care.

## 2020-07-08 NOTE — PROGRESS NOTES
Patient is feeling better. No chest pain or palpitation. No cough or congestion. No headaches, loss of consciousness or seizure. No dysuria or hematuria. ROS: 12 system review otherwise is negative for acute signs or symptoms over the last 24 hrs.      Exam: Awake, alert oriented, in no apparent distress  HEENT: Pink conjunctiva and buccal mucosa.  Normal and throat and nose  Neck: Supple, no nuchal rigidity. Endo: No thyromegaly. Vascular: No JVD or carotid bruit. Chest: Clear to auscultation  Heart: Regular rate and rhythm, no extra sounds.  No murmur, no rub. Abdomen: Soft, no tenderness, no rebound, no rigidity.  Clinically I could not exclude the possibility of intra-abdominal mass or organomegaly. LE: No cyanosis or clubbing, no varices or edema. Neuro: Awake, alert, oriented place and person but not to the exact date and location. Jonny Aceves has mild to moderate cognitive impairment. . Left arm mild weakness with wrist contractions. Proximal muscle power is about 3/5. MS: No joint effusion or tenderness. Skin: No skin rash, itching, bruising or significant findings.        Assessment and plan:      *Basilar infiltrates on the right side, hypoxemia. CT showed bilateral unusual appearing infiltrates.     *Acute kidney failure, improved.     *E. coli.  UTI.     *Systemic inflammatory reactive syndrome present on admission. No sepsis noted.      *Anemia, no evidence of acute blood loss, chronic.  Unknown etiology. Drop of the hemoglobin between 9 down to 7. No hematemesis or melena.     *Schizophrenia.     *Acute metabolic encephalopathy, resolved.     *CVA with left hemiparesis. Most prominent deficit is left arm with the wrist contraction. *Hypokalemia.     Plan:  I called her sister on Monday to discuss her case.   I provided the sister with information about her disease, prognosis, expectation and the treatment plan.     Pulmonologist recommended discontinuation of Zosyn due to low pro calcitonin level and outpatient surveillance on her lung lesions.     Anemia, iron deficiency. Stool Hemoccult is pending. Patient will need GI and GYN evaluation. Endoscopy could be done in the outpatient setting. Consulted GI to evaluate.     Antibiotic for UTI. Functional impairment in the debility. PT OT. Potential discharge back home if GI is not planning any inpatient procedures. Hypokalemia, potassium supplementation.
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
## 2020-07-09 VITALS
BODY MASS INDEX: 31 KG/M2 | DIASTOLIC BLOOD PRESSURE: 66 MMHG | HEIGHT: 60 IN | TEMPERATURE: 98.2 F | SYSTOLIC BLOOD PRESSURE: 119 MMHG | OXYGEN SATURATION: 97 % | HEART RATE: 62 BPM | WEIGHT: 157.9 LBS | RESPIRATION RATE: 16 BRPM

## 2020-07-09 LAB
ANION GAP SERPL CALCULATED.3IONS-SCNC: 8 MEQ/L (ref 9–15)
BASOPHILS ABSOLUTE: 0 K/UL (ref 0–0.2)
BASOPHILS RELATIVE PERCENT: 0.8 %
BUN BLDV-MCNC: 10 MG/DL (ref 6–20)
CALCIUM SERPL-MCNC: 8.9 MG/DL (ref 8.5–9.9)
CHLORIDE BLD-SCNC: 104 MEQ/L (ref 95–107)
CO2: 28 MEQ/L (ref 20–31)
CREAT SERPL-MCNC: 0.99 MG/DL (ref 0.5–0.9)
EOSINOPHILS ABSOLUTE: 0.5 K/UL (ref 0–0.7)
EOSINOPHILS RELATIVE PERCENT: 7.4 %
GFR AFRICAN AMERICAN: >60
GFR NON-AFRICAN AMERICAN: 57.5
GLUCOSE BLD-MCNC: 92 MG/DL (ref 70–99)
HCT VFR BLD CALC: 28.3 % (ref 37–47)
HEMOGLOBIN: 8.9 G/DL (ref 12–16)
LYMPHOCYTES ABSOLUTE: 1.8 K/UL (ref 1–4.8)
LYMPHOCYTES RELATIVE PERCENT: 27.3 %
MAGNESIUM: 1.9 MG/DL (ref 1.7–2.4)
MCH RBC QN AUTO: 23.2 PG (ref 27–31.3)
MCHC RBC AUTO-ENTMCNC: 31.3 % (ref 33–37)
MCV RBC AUTO: 74.3 FL (ref 82–100)
MONOCYTES ABSOLUTE: 0.5 K/UL (ref 0.2–0.8)
MONOCYTES RELATIVE PERCENT: 8.5 %
NEUTROPHILS ABSOLUTE: 3.6 K/UL (ref 1.4–6.5)
NEUTROPHILS RELATIVE PERCENT: 56 %
PDW BLD-RTO: 19.3 % (ref 11.5–14.5)
PLATELET # BLD: 246 K/UL (ref 130–400)
POTASSIUM REFLEX MAGNESIUM: 3.2 MEQ/L (ref 3.4–4.9)
RBC # BLD: 3.81 M/UL (ref 4.2–5.4)
SODIUM BLD-SCNC: 140 MEQ/L (ref 135–144)
WBC # BLD: 6.4 K/UL (ref 4.8–10.8)

## 2020-07-09 PROCEDURE — 80048 BASIC METABOLIC PNL TOTAL CA: CPT

## 2020-07-09 PROCEDURE — 97535 SELF CARE MNGMENT TRAINING: CPT

## 2020-07-09 PROCEDURE — 83735 ASSAY OF MAGNESIUM: CPT

## 2020-07-09 PROCEDURE — 6370000000 HC RX 637 (ALT 250 FOR IP): Performed by: INTERNAL MEDICINE

## 2020-07-09 PROCEDURE — 85025 COMPLETE CBC W/AUTO DIFF WBC: CPT

## 2020-07-09 PROCEDURE — 2580000003 HC RX 258: Performed by: INTERNAL MEDICINE

## 2020-07-09 PROCEDURE — C9113 INJ PANTOPRAZOLE SODIUM, VIA: HCPCS | Performed by: INTERNAL MEDICINE

## 2020-07-09 PROCEDURE — 6360000002 HC RX W HCPCS: Performed by: INTERNAL MEDICINE

## 2020-07-09 PROCEDURE — 6370000000 HC RX 637 (ALT 250 FOR IP): Performed by: PHYSICIAN ASSISTANT

## 2020-07-09 PROCEDURE — 94761 N-INVAS EAR/PLS OXIMETRY MLT: CPT

## 2020-07-09 PROCEDURE — 2580000003 HC RX 258: Performed by: PHYSICIAN ASSISTANT

## 2020-07-09 PROCEDURE — 36415 COLL VENOUS BLD VENIPUNCTURE: CPT

## 2020-07-09 PROCEDURE — 94640 AIRWAY INHALATION TREATMENT: CPT

## 2020-07-09 PROCEDURE — 2500000003 HC RX 250 WO HCPCS: Performed by: INTERNAL MEDICINE

## 2020-07-09 RX ORDER — FERROUS SULFATE 325(65) MG
325 TABLET ORAL 2 TIMES DAILY
Qty: 180 TABLET | Refills: 1 | Status: SHIPPED | OUTPATIENT
Start: 2020-07-09 | End: 2021-03-10 | Stop reason: ALTCHOICE

## 2020-07-09 RX ORDER — DOXYCYCLINE HYCLATE 100 MG
100 TABLET ORAL 2 TIMES DAILY
Qty: 14 TABLET | Refills: 0 | Status: SHIPPED | OUTPATIENT
Start: 2020-07-09 | End: 2020-07-16

## 2020-07-09 RX ORDER — ROSUVASTATIN CALCIUM 20 MG/1
TABLET, COATED ORAL
Qty: 30 TABLET | Refills: 11 | Status: SHIPPED | OUTPATIENT
Start: 2020-07-09

## 2020-07-09 RX ADMIN — ARIPIPRAZOLE 10 MG: 10 TABLET ORAL at 08:42

## 2020-07-09 RX ADMIN — ACETAMINOPHEN 650 MG: 325 TABLET, FILM COATED ORAL at 06:11

## 2020-07-09 RX ADMIN — PREGABALIN 150 MG: 75 CAPSULE ORAL at 08:42

## 2020-07-09 RX ADMIN — ESCITALOPRAM OXALATE 20 MG: 20 TABLET ORAL at 08:41

## 2020-07-09 RX ADMIN — DOXYCYCLINE 100 MG: 100 INJECTION, POWDER, LYOPHILIZED, FOR SOLUTION INTRAVENOUS at 11:49

## 2020-07-09 RX ADMIN — PANTOPRAZOLE SODIUM 40 MG: 40 INJECTION, POWDER, FOR SOLUTION INTRAVENOUS at 08:40

## 2020-07-09 RX ADMIN — GUAIFENESIN 600 MG: 600 TABLET, EXTENDED RELEASE ORAL at 08:41

## 2020-07-09 RX ADMIN — CYANOCOBALAMIN TAB 500 MCG 1000 MCG: 500 TAB at 08:42

## 2020-07-09 RX ADMIN — ROSUVASTATIN CALCIUM 20 MG: 20 TABLET, FILM COATED ORAL at 08:41

## 2020-07-09 RX ADMIN — TIZANIDINE 4 MG: 4 TABLET ORAL at 06:11

## 2020-07-09 RX ADMIN — Medication 400 MG: at 08:41

## 2020-07-09 RX ADMIN — FOLIC ACID 1 MG: 1 TABLET ORAL at 08:41

## 2020-07-09 RX ADMIN — KETOTIFEN FUMARATE 1 DROP: 0.35 SOLUTION/ DROPS OPHTHALMIC at 08:45

## 2020-07-09 RX ADMIN — OXYCODONE 7.5 MG: 5 TABLET ORAL at 02:28

## 2020-07-09 RX ADMIN — OXYCODONE 7.5 MG: 5 TABLET ORAL at 08:41

## 2020-07-09 RX ADMIN — LEVOTHYROXINE SODIUM 50 MCG: 0.05 TABLET ORAL at 06:11

## 2020-07-09 RX ADMIN — Medication 10 ML: at 08:46

## 2020-07-09 RX ADMIN — Medication 10 ML: at 08:40

## 2020-07-09 RX ADMIN — METOPROLOL TARTRATE 25 MG: 25 TABLET, FILM COATED ORAL at 08:41

## 2020-07-09 RX ADMIN — BUDESONIDE AND FORMOTEROL FUMARATE DIHYDRATE 2 PUFF: 160; 4.5 AEROSOL RESPIRATORY (INHALATION) at 10:46

## 2020-07-09 RX ADMIN — METFORMIN HYDROCHLORIDE 500 MG: 500 TABLET ORAL at 08:41

## 2020-07-09 RX ADMIN — POTASSIUM BICARBONATE 40 MEQ: 782 TABLET, EFFERVESCENT ORAL at 11:49

## 2020-07-09 RX ADMIN — DICLOFENAC 2 G: 10 GEL TOPICAL at 08:44

## 2020-07-09 RX ADMIN — LORAZEPAM 0.5 MG: 0.5 TABLET ORAL at 11:48

## 2020-07-09 ASSESSMENT — PAIN DESCRIPTION - PROGRESSION
CLINICAL_PROGRESSION: GRADUALLY IMPROVING
CLINICAL_PROGRESSION: GRADUALLY IMPROVING

## 2020-07-09 ASSESSMENT — PAIN SCALES - WONG BAKER
WONGBAKER_NUMERICALRESPONSE: 2
WONGBAKER_NUMERICALRESPONSE: 2

## 2020-07-09 ASSESSMENT — PAIN SCALES - GENERAL
PAINLEVEL_OUTOF10: 10
PAINLEVEL_OUTOF10: 6
PAINLEVEL_OUTOF10: 10

## 2020-07-09 ASSESSMENT — PAIN DESCRIPTION - PAIN TYPE: TYPE: ACUTE PAIN

## 2020-07-09 NOTE — FLOWSHEET NOTE
Pt up to chair for breakfast. Accepted AM meds without problem. Assisted to [facility] with one staff assist. VSS. Respirations even and nonlabored. Call light in reach. 1310 Discharge instructions reviewed with pt. Understanding verbalized. RX given. S/L removed. Pt off unit via w/c per staff for discharge home.
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
## 2020-07-09 NOTE — DISCHARGE INSTR - COC
Continuity of Care Form    Patient Name: Juvneal Felix   :  1960  MRN:  [id number]    Admit date:  2020  Discharge date:  20    Code Status Order: Full Code   Advance Directives:   Advance Care Flowsheet Documentation     Date/Time Healthcare Directive Type of Healthcare Directive Copy in [address] Agent's Name Healthcare Agent's Phone Number    [phone]  Yes, patient has an advance directive for healthcare treatment  Durable power of  for health care  Yes, copy in chart Copy in [facility]  Adult siblings  Silverio Deleon  [phone]          Admitting Physician:  Abi Isbell DO  PCP: Mariano Andrews MD    Discharging Nurse: Bhargavi Moran RN  [address] Unit/Room#: [facility] Unit Phone Number: [phone]    Emergency Contact:   Extended Emergency Contact Information  Primary Emergency Contact: [address] of [address] [place] [address] Phone: .[phone]  Work Phone: ..[phone]  Mobile Phone: .[phone]  Relation: Brother/Sister    Past Surgical History:  Past Surgical History:   Procedure Laterality Date    BACK SURGERY  2001    lumbar kyphoplasty x 2    BRAIN SURGERY  2000    due to Arnold-Chiari deformity / [facility]     SECTION      COLONOSCOPY      CRANIOTOMY      Arnold-Chiary malformation    ENDOSCOPY, COLON, DIAGNOSTIC      FEMUR FRACTURE SURGERY Left     HUMERUS FRACTURE SURGERY Left 2020    ORIF PERIPROSTHETIC FX LEFT HUMERAL SHAFT, SYNTHES NOTIFIED, ROOM 283, LATEX ALLERGY performed by Lila Gerer MD at [facility] Left 6/15/2018    LEFT TOTAL SHOULDER ARTHROPLASTY, SANDY BIOMET TSA, SCALENE NERVE BLOCK, (LATEX ALLERGY) performed by Lila Greer MD at [facility] Left 2019    LEFT REMOVAL GLENOID AND CONVERSION TO REVERSE TOTAL SHOULDER ARTHROPLASTY, SANDY REVERSE TSA, [address]  [address] OSTEOTOMES, SCALENE NERVE BLOCK, LATEX ALLERGY performed by Lisbet Almeida MD at [address] [place]      lumbar kyphoplasty   [address] ENDOSCOPY  8/5/15    w/bx        Immunization History:   Immunization History   Administered Date(s) Administered    Influenza Nasal 01/01/2006    Influenza Vaccine, unspecified formulation 10/15/2010, 10/29/2013, 12/03/2014, 09/30/2015, 01/25/2017    Influenza, [place] Generous, IM, (6 mo and older Fluzone, Flulaval, Fluarix and 3 yrs and older Afluria) 09/19/2017, 09/20/2018, 09/04/2019    Pneumococcal Polysaccharide ([id number]) 08/01/2008, 11/18/2015    Tdap (Boostrix, Adacel) 08/28/2013       Active Problems:  Patient Active Problem List   Diagnosis Code    Hypothyroidism E03.9    Anxiety F41.9    COPD (chronic obstructive pulmonary disease) ([facility] J44.9    Smoker F17.200    Cerebral infarction ([facility] I63.9    Arnold-Chiari malformation, type I ([facility] G93.5    Headache R51    Hyperlipidemia with target LDL less than 100 E78.5    Pulmonary embolism and infarction (HCC) I26.99    Laryngitis, chronic J37.0    Rhinitis, chronic J31.0    Depression F32.9    Acid reflux K21.9    H/O encephalopathy Z86.69    Hepatitis B surface antigen positive R76.8    S/P colonoscopy with polypectomy Z98.890    Hemiparesis affecting left side as late effect of cerebrovascular accident ([facility] I69.354    Migraine G43.909    Seasonal allergic rhinitis due to pollen J30.1    Edema R60.9    Muscle spasm M62.838    Adhesive capsulitis of left shoulder M75.02    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 116    Cervical dystonia G24.3    Cervicalgia M54.2    Chronic maxillary sinusitis J32.0    Generalized chronic periodontitis K05.329    Chronic retention of urine R33.9    Encephalopathy acute G93.40    Dental caries extending into pulp K02.9    Diabetes mellitus, type II (HCC) E11.9    Drug-seeking behavior Z76.5    Dysphonia R49.0    Essential hypertension I10    Fracture of lumbar vertebra ([address] S32.009A    H/O: CVA (cerebrovascular accident) Z80.78    Hearing difficulty H91.90    History of HPV infection Z86.19    Hypothyroidism due to Hashimoto's thyroiditis E03.8, E06.3    Hypoxic brain injury (HCC) G93.1    Late effects of CVA (cerebrovascular accident) I76.80    Neurogenic bladder N31.9    Nonallopathic lesion of cervical region, not elsewhere classified M99.81    Nonallopathic lesion of sacral region M99.9    Peripheral vascular disease (HCC) I73.9    Post-laminectomy syndrome M96.1    Primary osteoarthritis of left shoulder M19.012    Pulmonary embolism (HCC) I26.99    Pulmonary embolism with infarction ([address] I26.99    Recurrent UTI N39.0    Retention of urine R33.9    Trochanteric bursitis of left hip M70.62    Vitamin D deficiency E55.9    High risk medication use - 11/01/17 OARRS PM&R, 11/13/17 Tox Screen: positive marijuana PM&R Z79.899    Marijuana use F12.90    Chronic midline thoracic back pain M54.6, G89.29    Vertebral compression fracture ([facility]) M48.50XA    Closed wedge compression fracture of first lumbar vertebra with delayed healing S32.010G    Status post total shoulder replacement, right Z96.611    Status post total shoulder replacement, left Z96.612    Decubitus ulcer of left ischial area L89.329    Decubitus ulcer of sacral area L89.159    Weakness R53.1    Status post reverse total shoulder replacement, left L62.768    Alleged drug diversion Z65.3    H/O medication noncompliance Z91.14    Skin ulcer of sacrum with fat layer exposed ([address] L98.422    Fracture of humerus following insertion of orthopedic implant, joint Intake 775 ml   Output --   Net 775 ml     I/O last 3 completed shifts: In: 775 [P.O.:775]  Out: -     Safety Concerns: At Risk for Falls    Impairments/Disabilities:      cognition    Nutrition Therapy:  Current Nutrition Therapy:   - Oral Diet:  General    Routes of Feeding: Oral  Liquids: Thin Liquids  Daily Fluid Restriction: no  Last Modified Barium Swallow with Video (Video Swallowing Test): not done    Treatments at the Time of Hospital Discharge:   Respiratory Treatments: ***  Oxygen Therapy:  is not on home oxygen therapy. Ventilator:    - No ventilator support    Rehab Therapies: Physical Therapy  Weight Bearing Status/Restrictions: No weight bearing restirctions  Other Medical Equipment (for information only, NOT a DME order):  cane  Other Treatments: ***    Patient's personal belongings (please select all that are sent with patient):  Glasses, Dentures {DESC; UPPER/LOWER/BOTH:[id number]}    RN SIGNATURE:  Electronically signed by Omid Kerns RN on 7/9/20 at 12:20 PM EDT    CASE MANAGEMENT/SOCIAL WORK SECTION    Inpatient Status Date: ***    Readmission Risk Assessment Score:  Readmission Risk              Risk of Unplanned Readmission:        52           Discharging to Facility/ Agency   · Name:   · Address:  · Phone:  · Fax:    Dialysis Facility (if applicable)   · Name:  · Address:  · Dialysis Schedule:  · Phone:  · Fax:    / signature: {Esignature:[id number]}    PHYSICIAN SECTION    Prognosis: Fair    Condition at Discharge: Stable    Rehab Potential (if transferring to Rehab): Fair    Recommended Labs or Other Treatments After Discharge: [facility] up [facility] pul and GI Dr Cole Zel[facility]    Physician Certification: I certify the above information and transfer of Dian Mukherjee  is necessary for the continuing treatment of the diagnosis listed and that she requires [address] for less 30 days.      Update Admission H&P: No change in H&P    PHYSICIAN SIGNATURE: Electronically signed by Toney Burden MD on 7/9/20 at 8:53 AM EDT

## 2020-07-09 NOTE — DISCHARGE SUMMARY
Hospital Medicine Discharge Summary    Kinga Delatorre  :  1960  MRN:  [id number]    Admit date:  2020  Discharge date:  2020    Admitting Physician:  Joelle Lowery DO  Primary Care Physician:  Antionette Maldonado MD      Discharge Diagnoses:      *Basilar infiltrates on the right side, hypoxemia.  CT showed bilateral unusual appearing infiltrates. Pulmonologist recommended outpatient surveillance and the continuation of antibiotic.     *Acute kidney failure, improved.     *E. coli.  UTI. Patient will be discharged on antibiotic.     *Systemic inflammatory reactive syndrome present on admission. No sepsis noted.      *Anemia, no evidence of acute blood loss, chronic.  Unknown etiology.  Drop of the hemoglobin between 9 down to 7.  No hematemesis or melena. Patient was seen by GI who did not recommend inpatient evaluation but outpatient EGD and a colonoscopy to be arranged. Stable hemoglobin over the last 48 hours. Patient could be discharged on a PPI as well as iron supplementation. The patient will be taken off ibuprofen 800 mg that she was taking at home.     *Schizophrenia. Stable. Patient has polypharmacy. Patient is to follow-up with her psychiatrist to simplify her complex psychiatric regimen.     *Acute metabolic encephalopathy, resolved.     *History CVA with left hemiparesis. No new CVA is suspected.  Most prominent deficit is left arm with the wrist contraction.     *Hypokalemia. Potassium supplementation. Hospital Course:   Kinga Delatorre is a 64 y.o. female that was admitted and treated at [facility] for the aforementioned medical issues. She was having a cough and congestion that responded well to antibiotic. CT showed the bilateral unusual infiltrates. Pulmonologist Dr. Wander Guerrier recommended discharged on antibiotic and follow-up in the outpatient setting to repeat imaging to ensure complete resolution.   Patient will be instructed to follow-up with  Z.    Anemia. No acute blood loss. Patient did not have a bowel movement to check Hemoccult. Patient reported that her stool is brown. Patient was taken ibuprofen 800 mg a few times a day at home. She was taken off that. She is a started on PPI. Patient was seen by GI who did not recommend inpatient evaluation given the lack of any evidence of active or massive GI blood loss but recommended outpatient EGD and a colonoscopy. History of stroke, no evidence of acute stroke    History of schizophrenia, stable, no evidence of delusion. Patient is on polypharmacy and I would encourage her to follow-up with her psychiatrist to simplify her complex psychiatric medications. Patient has multiple medical issues. I do not have clear or strong clinical justification to extend inpatient hospitalization. Patient however would definitely need and require close and frequent monitoring as well as additional work-up, investigation and therapeutic intervention that potentially could take place in the outpatient setting by primary care doctor in collaboration with other specialists. That is to prevent relapse, decompensation, rehospitalization and other medical implications. Patient will be arranged to have home health care to ensure compliance with medication and the compliance with appropriate follow-up. Exam: Awake, alert oriented, in no apparent distress  HEENT: Pink conjunctiva and buccal mucosa.  Normal and throat and nose  Neck: Supple, no nuchal rigidity. Endo: No thyromegaly. Vascular: No JVD or carotid bruit. Chest: Clear to auscultation  Heart: Regular rate and rhythm, no extra sounds.  No murmur, no rub. Abdomen: Soft, no tenderness, no rebound, no rigidity.  Clinically I could not exclude the possibility of intra-abdominal mass or organomegaly. LE: No cyanosis or clubbing, no varices or edema.   Neuro: Awake, alert, oriented place and person but not to the exact date and location. John Sutton has mild to moderate cognitive impairment. . Left arm mild weakness with wrist contractions.  Proximal muscle power is about 3/5. MS: No joint effusion or tenderness. Skin: No skin rash, itching, bruising or significant findings. Patient was seen by the following consultants while admitted to [facility]:   Consults:  IP CONSULT TO HOSPITALIST  IP CONSULT TO PULMONOLOGY  IP CONSULT TO PHARMACY  IP CONSULT TO GI  IP CONSULT TO HOME CARE NEEDS    Significant Diagnostic Studies:    Ct Head Wo Contrast    Result Date: 7/6/2020  CT Brain Contrast medium:  Not utilized. History:  Encephalopathy Comparison:  CT brain, June 13, 2020, June 11, 2020, December 15, 2015 Findings: Study performed in atypical projection. Extra-axial spaces:  Normal. Intracranial hemorrhage:  None. Ventricular system: Mildly to moderately enlarged. Sulci mildly prominent. Findings unchanged. Basal Cisterns:  Normal. Cerebral Parenchyma: Bilateral symmetric periventricular areas decreased attenuation, unchanged. Midline Shift:  None. Cerebellum:  Normal. Paranasal sinuses and mastoid air cells:  Normal. Visualized Orbits:  Normal.     No acute findings. Mild-to-moderate cerebral atrophy. Chronic ischemic white matter disease. All CT scans at this facility use dose modulation, iterative reconstruction, and/or weight based dosing when appropriate to reduce radiation dose to as low as reasonably achievable. Ct Chest Wo Contrast    Result Date: 7/6/2020  CT of the Chest without intravenous contrast medium. History:  Redness of breath. Infiltrates. Technical Factors: CT imaging of the chest was obtained and formatted as 5 mm contiguous axial images from the thoracic inlet through the adrenal glands. Sagittal and coronal reconstruction obtained during postprocessing. Intravenous contrast medium:  None. Comparison:  Chest radiograph, July 5, 2020, June 18, 2020. CT chest, June 11, 2020.  Findings: Right lung shows again shows cluster of nodules Nodules <6 mm do not require routine follow-up, but certain patients at high risk with suspicious nodule morphology, upper lobe location, or both may warrant 12-month follow-up (recommendation 1A). High risk**                <6 mm     Optional CT at 12 months               6-8 mm     CT at 6-12 months, then CT at 18-24 months                 >8 mm     Consider CT at 3 months, PET/CT, or tissue sampling Nodules <6 mm do not require routine follow-up, but certain patients at high risk with suspicious nodule morphology, upper lobe location, or both may warrant 12-month follow-up (recommendation 1A). Multiple                         Low risk**                <6 mm     No routine follow-up               6-8 mm     CT at 3-6 months, then consider CT at 18-24 months                >8 mm     CT at 3-6 months, then  consider CT at 18-24 months Use most suspicious nodule as guide to management. Follow-up intervals may vary according to size and risk (recommendation 2A). High risk**                <6 mm     Optional CT at 12 months               6-8 mm     CT at 3-6 months, then at 18-24 months                >8 mm     CT at 3-6 months, then at 18-24 months Use most suspicious nodule as guide to management. Follow-up intervals may vary according to size and risk (recommendation 2A). B: Subsolid Nodules*      Single                  Ground glass                <6 mm     No routine follow-up              >=6 mm     CT at 6-12 months to confirm persistence, then CT  every 2 years until 5 years In certain suspicious nodules <6 mm, consider follow-up at 2 and 4 years. If solid component(s) or growth develops, consider resection. (Recommendations 3A and 4A). Part solid                <6 mm     No routine follow-up              >=6 mm     CT at 3-6 months to confirm persistence. If unchanged and solid component remains <6 mm, annual CT  should be performed for 5 years.  In practice, part-solid nodules cannot be defined as such until >=6 mm, and nodules <6 mm do not usually require follow-up. Persistent part-solid nodules with solid components >=6 mm should be considered highly suspicious (recommendations 4A-4C)      Multiple                <6 mm     CT at 3-6 months. If stable, consider CT at 2 and 4 years. >=6 mm     CT at 3-6 months. Subsequent management based  on the most suspicious nodule(s). Multiple <6 mm pure ground-glass nodules are usually benign, but consider follow-up in selected patients at high risk at 2 and 4 years (recommendation 5A). Note. --These recommendations do not apply to lung cancer screening, patients with immunosuppression, or patients with known primary cancer. * Dimensions are average of long and short axes, rounded to the nearest millimeter. ** Consider all relevant risk factors (see Risk Factors). ____________________________________________________________ All CT scans at this facility use dose modulation, iterative reconstruction, and/or weight based dosing when appropriate to reduce radiation dose to as low as reasonably achievable. Xr Chest Portable    Result Date: 2020  Patient MRN: [id number] : 1960 Age:  64 years Gender: Female Order Date: 2020 11:00 AM. Exam: XR CHEST PORTABLE Number of Views: 1 Indication:  Shortness of breath, difficulty breathing Comparison: 2020 Findings: Left reverse total shoulder arthroplasty, incompletely evaluated. Bibasilar airspace disease. Cardiomediastinal silhouette is enlarged. Suspected underlying mild central pulmonary vascular congestion. Vascular calcifications thoracic aorta. No pneumothorax. Remote fracture right proximal femur seen previously. 1. Enlarged cardiomediastinal silhouette, the possibility of underlying pericardial effusion or other cardiac abnormalities are not excluded on the basis of this exam, clinical correlation recommended. . 2. Suspected underlying mild central pulmonary vascular congestion. 3. Nonspecific bibasilar airspace disease, findings can be seen in infiltrate/pneumonia and/or atelectasis. Elif Verdin 4. Vascular calcifications thoracic aorta      Discharge Medications:       Iris Corado   [place] Medication Instructions RQF:[id number]    Printed on:07/09/20 0905   Medication Information                      AIMOVIG 70 MG/ML SOAJ  ADM 1 ML SC 1 TIME Q MONTH             Alcohol Swabs (ALCOHOL PADS) 70 % PADS  USE ONCE DAILY             ARIPiprazole (ABILIFY) 10 MG tablet  TAKE ONE TABLET BY MOUTH EACH NIGHT AT BEDTIME             Ascorbic Acid (VITAMIN C/KATIA HIPS) 500 MG TABS  TAKE 1 TABLET BY MOUTH DAILY             Blood Glucose Calibration (ADVOCATE REDI-CODE+ CONTROL) High SOLN  USE AS DIRECTED.              Blood Glucose Monitoring Suppl (ADVOCATE REDI-CODE+) COLE  USE AS DIRECTED.             calcipotriene (DOVONEX) 0.005 % cream               Capsaicin 0.1 % CREA  Apply 1 applicator topically 3 times daily as needed (pain)             clobetasol (TEMOVATE) 0.05 % ointment               Control Gel Formula Dressing (DUODERM CGF EXTRA THIN) MISC  Apply to sacral region every other day             diclofenac sodium (VOLTAREN) 1 % GEL  Apply 2 g topically 2 times daily             doxycycline hyclate (VIBRA-TABS) 100 MG tablet  Take 1 tablet by mouth 2 times daily for 7 days             Easy Comfort Lancets MISC  TEST ONCE DAILY             escitalopram (LEXAPRO) 20 MG tablet  TAKE ONE TABLET BY MOUTH DAILY             esomeprazole (NEXIUM) 40 MG delayed release capsule  Take 2 capsules by mouth every day             Estradiol (VAGIFEM) 10 MCG TABS vaginal tablet  insert 1 tablet vaginally two times a week INTO LOWER 1/3 OF VAGINA             ferrous sulfate (IRON 325) 325 (65 Fe) MG tablet  Take 1 tablet by mouth 2 times daily             folic acid (FOLVITE) 1 MG tablet  Take one tablet by mouth every day             Lancet Devices (EASY MINI EJECT LANCING DEVICE) Any [facility] needs that were indicated and/or required as been addressed and set up by Social Work. Condition at discharge: Pt was medically stable at the time of discharge. Significant improvement in clinical condition compared to initial condition at presentation to hospital    Activity: activity as tolerated, fall precautions. Total time taken for discharging this patient: 40 minutes. Greater than 70% of time was spent focused exclusively on this patient. Time was taken to review chart, discuss plans with consultants, reconciling medications, discussing plan answering questions with patient. Samanta Pillai  7/9/2020, 9:05 AM  ----------------------------------------------------------------------------------------------------------------------    Lanre Wagner,     Please return to ER or call 911 if you develop any significant signs or symptoms.     I may not have addressed all of your medical illnesses or the abnormal blood work or imaging therefore please ask your PCP, Froy Villanueva MD ,  to obtain [facility] record to follow up on all of the abnormal labs, imaging and findings that I have and have not addressed during your hospitalization.      Discharging you from the hospital does not mean that your medical care ends here and now. You may still need additional work up, investigation, monitoring, and treatment to be handled from this point on by outside providers including your PCP, Froy Villanueva MD , Specialists and other healthcare providers.      Please review your list of discharge medications prior to resuming medications you might still have at home, as the medications you need to be taking, dosages or how often you must take them may have changed. For medication questions, contact your retail pharmacy and your PCP, Froy Villanueva MD .     ** I STRONGLY RECOMMEND that you follow up with Froy Villanueva MD within 3 to 5 days for a post hospitalization evaluation.  This specific office visit is covered by your insurance, and is not the same as your annual doctor visit/ check up. This office visit is important, as it may prevent need for repeat and/or future hospitalizations. **    Your medical team at [facility]) appreciates the opportunity to work with you to get well! Sincerely,  Nasim Guerra Follow up with Dr. Mariano Andrews MD in the next 7 days or sooner if needed. Please return to ER or call 911 if you develop any significant signs or symptoms. I may or may not have addressed all of your symptoms, medical issues,  Illnesses, or all of the abnormal blood work or imaging therefore please ask your PCP and other specialists to obtain [facility] record entirely to follow up on all of your symptoms, illnesses, abnormal labs, imaging and findings that I have and have not addressed during your hospitalization. Discharging you from the hospital does not mean that your medical care ends here and now. You may still need to have additional work up, investigation, monitoring, surveillance, and treatment to be handled from this point on by in the out patient setting by  out patient providers including your PCP, Specialists and other healthcare providers. For medication questions, contact your retail pharmacy and your PCP. Your medical team at [facility] ([facility]) appreciates the opportunity to work with you to get well!     Nasim Guerra MD

## 2020-07-09 NOTE — FLOWSHEET NOTE
Pt. Was awake most of the night. Pt. Kept ringing call light to state that she was not sleepy and did not know what to do with herself.

## 2020-07-10 ENCOUNTER — TELEPHONE (OUTPATIENT)
Dept: FAMILY MEDICINE CLINIC | Age: 60
End: 2020-07-10

## 2020-07-10 ENCOUNTER — CARE COORDINATION (OUTPATIENT)
Dept: CASE MANAGEMENT | Age: 60
End: 2020-07-10

## 2020-07-10 LAB
BLOOD CULTURE, ROUTINE: NORMAL
CULTURE, BLOOD 2: NORMAL

## 2020-07-10 NOTE — PROGRESS NOTES
Physical Therapy  Facility/Department: [facility] MED SURG I882/S140-90  Physical Therapy Discharge      NAME: Sweta Orta    : 1960 (64 y.o.)  MRN: [id number]    Account: [de-identified]  Gender: female      Patient has been discharged from acute care hospital. DC patient from current PT program.      Electronically signed by Asia Barrera PT on 7/10/20 at 4:24 PM EDT

## 2020-07-10 NOTE — ADT AUTH CERT
Pneumonia - Care Day 2 (7/6/2020) by Kirti Dumont, RN         Review Status Review Entered   Completed 7/6/2020 14:00       Criteria Review      Care Day: 2 Care Date: 7/6/2020 Level of Care:    Guideline Day 2    Level Of Care    (X) Floor    7/6/2020 1:59 PM EDT by Louie White      Floor    Clinical Status    ( ) * No CO2 retention or acidosis    (X) * No requirement for mechanical ventilation    7/6/2020 1:59 PM EDT by W-21      O2 2L N/C    (X) * Hypotension absent    7/6/2020 1:59 PM EDT by [facility]      149/74Abnormal    (X) * Fever absent or reduced    7/6/2020 1:59 PM EDT by Louie White      98.4 (36.9)    ( ) * No hypoxia on room air or oxygenation improved    ( ) * Mental status improved or at baseline    Activity    (X) * Increased activity    7/6/2020 1:59 PM EDT by OmarClicko Christmas      PT/OT ordered to bee seen    Routes    (X) Oral hydration, medications    7/6/2020 1:59 PM EDT by ReferMe Christmas      PO hydration  Po and IV abx    (X) Usual diet    7/6/2020 1:59 PM EDT by ReferMe Christmas      Carb control    Interventions    (X) Incentive spirometry    7/6/2020 1:59 PM EDT by Louie White      Done    (X) Pulse oximetry    7/6/2020 1:59 PM EDT by Clicko Christmas      Routine pulse ox    (X) Head of bed at 30 degrees    7/6/2020 1:59 PM EDT by Louie White      Per protocol    (X) Possible oxygen    7/6/2020 1:59 PM EDT by Clicko Christmas      O2 2L N/C    Medications    (X) IV or oral antibiotics    7/6/2020 1:59 PM EDT by ReferMe Christmas      piperacillin-tazobactam (ZOSYN) 3.375 g in dextrose 5 % 50 mL IVPB extended infusion (mini-bag)   Dose: 3.375 g  Freq: EVERY 8 HOURS Route: IV    * Milestone   Additional Notes   Day 2 review 7/6/20         Reason for Admission   Pneumonia      VS   98.4 (36.9)  20  102  149/74Abnormal  o2 sat 93% O2 2L N/C      Physical Exam   Exam: Awake, alert oriented, in no apparent distress   HEENT: Pink conjunctiva and buccal mucosa.  Normal and throat and nose   Neck: Supple, no nuchal rigidity. Endo: No thyromegaly. Vascular: No JVD or carotid bruit. Chest: Basilar rhonchi on the right side. Heart: Regular rate and rhythm, no extra sounds.  No murmur, no rub. Abdomen: Soft, no tenderness, no rebound, no rigidity.  Clinically I could not exclude the possibility of intra-abdominal mass or organomegaly. LE: No cyanosis or clubbing, no varices or edema. Neuro: Awake, alert, oriented place and person but not to the exact date and location. Taras Santana has mild to moderate cognitive impairment. .   MS: No joint effusion or tenderness.    Skin: No skin rash, itching, bruising or significant findings.       Radiology testing/Labs pertinent   None      Meds-name, dose, route,rate   Scheduled Meds:   enoxaparin, 30 mg, Subcutaneous, Daily   piperacillin-tazobactam, 3.375 g, Intravenous, Q8H   fish oil, 1,000 mg, Oral, Daily   amLODIPine, 5 mg, Oral, Daily   ARIPiprazole, 10 mg, Oral, Daily   escitalopram, 20 mg, Oral, Daily   pantoprazole, 40 mg, Oral, QAM AC   folic acid, 1 mg, Oral, Daily   furosemide, 40 mg, Oral, Daily   guaiFENesin, 600 mg, Oral, BID   levothyroxine, 50 mcg, Oral, Daily   magnesium oxide, 400 mg, Oral, Daily   metFORMIN, 500 mg, Oral, BID WC   metoprolol tartrate, 25 mg, Oral, BID   pregabalin, 150 mg, Oral, Daily   rosuvastatin, 20 mg, Oral, Daily   spironolactone, 25 mg, Oral, Daily   budesonide-formoterol, 2 puff, Inhalation, BID   vitamin B-12, 1,000 mcg, Oral, Daily      PRN meds:   acetaminophen (TYLENOL) tablet 650 mg    Dose: 650 mg   Freq: EVERY 6 HOURS PRN Route: PO   Given x 1      albuterol-ipratropium (COMBIVENT RESPIMAT)  MCG/ACT inhaler 1 puff    Dose: 1 puff   Freq: EVERY 4 HOURS PRN Route: IN   Given x 1      LORazepam (ATIVAN) tablet 0.5 mg    Dose: 0.5 mg   Freq: EVERY 6 HOURS PRN Route: PO   Given x 2      Treatment plan-attending, consults   ATTENDING:   Assessment and plan   *Basilar infiltrate on the right side, hypoxemia.  Continue   Met Pneumonia    (X) Possible Fever, dyspnea, purulent sputum, pleuritic pain, Altered mental status    7/6/2020 1:38 PM EDT by Ellen Schwartz      Adm to .7  F    Activity    (X) Activity as tolerated    7/6/2020 1:38 PM EDT by Ellen Schwartz      Up as ronda    Routes    (X) Possible IV fluids    7/6/2020 1:38 PM EDT by Lorenzo Jameson ER gave:  07/05/2020 1133 0.9 % sodium chloride bolus 2,000 mL Intravenous    (X) Parenteral or oral medications    7/6/2020 1:38 PM EDT by Ellen Schwartz      Enoxaparin 30 mg Subcut daily given by floor nurse    Interventions    (X) WBC    7/6/2020 1:38 PM EDT by Ellen Schwartz      WBC 10.0    (X) Possible ABG or oximetry    7/6/2020 1:38 PM EDT by Ellen Schwartz      Routine pulse ox    (X) Blood culture    7/6/2020 1:38 PM EDT by Ellen Schwartz      Results pending    (X) Possible respiratory therapy    7/6/2020 1:38 PM EDT by Ellen Schwartz      budesonide-formoterol (SYMBICORT) 160-4.5 MCG/ACT inhaler 2 puff   Dose: 2 puff  Freq: 2 TIMES DAILY Route: IN x 1    (X) Probable oxygen    7/6/2020 1:38 PM EDT by Ellen Schwartz      O2 2L N/C  No Home O2    (X) Incentive spirometry    7/6/2020 1:38 PM EDT by Ellen Schwartz      Oredered/given    (X) Head of bed at 30 degrees    7/6/2020 1:38 PM EDT by Ellen Schwartz      Per protocol    Medications    (X) IV antibiotics    7/6/2020 1:38 PM EDT by Ellen Schwartz      vancomycin 1000 mg IVPB in 250 mL D5W addavial   Dose: 15 mg/kg  Weight Dosing Info: 68 kg  Freq: EVERY 24 HOURS Route: IV    piperacillin-tazobactam (ZOSYN) 3.375 g in dextrose 5 % 50 mL IVPB extended infusion (mini-bag)   Dose: 3.375 g  Freq: EVERY 8 HOUR    * Milestone   Additional Notes   Day  1 review 7/5/20      Chief complaints/Ed presentation, [address]   · Altered Mental Status       per ems with pox of 79% at home on room air      Sherita Figueroa is a 64 y.o. female who presents to the emergency department . Jp Mustafa was brought in from home with altered mental status. Shira Peña 94%            Physical Exam   Constitutional:        General: She is not in acute distress.      Appearance: She is well-developed. She is ill-appearing. She is not diaphoretic. HENT:       Head: Normocephalic and atraumatic.       Right Ear: External ear normal.       Left Ear: External ear normal.       Mouth/Throat:       Mouth: Mucous membranes are dry.       Pharynx: No oropharyngeal exudate. Eyes:       Conjunctiva/sclera: Conjunctivae normal.       Pupils: Pupils are equal, round, and reactive to light. Neck:       Musculoskeletal: Normal range of motion and neck supple.       Thyroid: No thyromegaly.       Vascular: No JVD.       Trachea: No tracheal deviation. Cardiovascular:       Rate and Rhythm: Regular rhythm. Tachycardia present.       Heart sounds: Normal heart sounds. No murmur. Pulmonary:       Effort: Pulmonary effort is normal. No respiratory distress.       Breath sounds: Normal breath sounds. No wheezing or rales. Abdominal:       General: Bowel sounds are normal.       Palpations: Abdomen is soft.       Tenderness: There is no abdominal tenderness. There is no guarding. Musculoskeletal: Normal range of motion.       Comments: Left shoulder incision healing well.  Staples in place.  No erythema no drainage. Skin:      General: Skin is warm and dry.       Findings: No rash.     Neurological:       Mental Status: She is alert and oriented to person, place, and time.       Cranial Nerves: No cranial nerve deficit.       Comments: Lethargic    Psychiatric:          Behavior: Behavior normal.            Radiology testing/Labs pertinent   COVID 19: SARS-CoV-2, NAAT: Not Detected   Creatinine: 1.71 (H)   Calcium, Ion: 1.06 (L)   GFR Non-: 26 (A)   RBC: 3.42 (L)   Hemoglobin Quant: 8.0 (L)   Hematocrit: 24.9 (L)   MCV: 72.9 (L)   MCH: 23.3 (L)   MCHC: 31.9 (L)   RDW: 19.6 (H)   Neutrophils Absolute: 7.1 (H)   Lymphocytes Absolute: 1.7   Monocytes Absolute: 1.1 (H) Review Status Review Entered   Completed 7/6/2020 13:28       Criteria Review      Clinical Indications for Admission to Inpatient Care    Most Recent : Sajan Verdin Most Recent Date: 7/6/2020 1:28 PM EDT    (X) Admission [A] is indicated for  1 or more  of the following  (1) (4) (5) (6) (7) (8):       (X) Hypoxemia       7/6/2020 1:28 PM EDT by Sajan Verdin         per ems with pox of 79% at home on room air  ER:  SPO2 84%       (X) Hemodynamic instability       7/6/2020 1:28 PM EDT by Sajan Verdin         [phone]       (X) Altered mental status that is severe or persistent       7/6/2020 1:28 PM EDT by Yahaira Howard    Lethargy

## 2020-07-10 NOTE — TELEPHONE ENCOUNTER
fyi    [facility]    She started services. While in home today  Being non compliant with medications. She gets pill packs individually dosed out for her medications. Has medications dated all the way back to feb. Recently in hopsital for sepsis, has not filled antibiotic script given, and pt stated she is not sure if she will fill script. Home health care is trying to work with her but feels she will be non compliant.

## 2020-07-10 NOTE — CARE COORDINATION
Andres 45 Transitions Initial Follow Up Call    Call within 2 business days of discharge: Yes    Patient: Olena Osborne Patient : 1960   MRN: [id number]  Reason for Admission: -2020 ED [facility] IP R infiltrate, ARF, E-Coli UTI, SIRs, Anemia unknown etiology, Acute metabolic encephalopathy. Discharge Date: 20 RARS: Readmission Risk Score: [address] Discharge [facility]       Complaint Diagnosis Description Type Department Provider    20 Altered Mental Status Altered mental status, unspecified altered mental status type . .. ED to Hosp-Admission (Discharged) (ADMITTED) [address], DO; Fani Sands Por. .. Care Transitions attempted initial outreach for DC review. No VM opportunity to leave my call back number. Will try again. Facility: [facility]    PCP appt 2020 1:30    [facility] and [facility] services.

## 2020-07-14 ENCOUNTER — CARE COORDINATION (OUTPATIENT)
Dept: CASE MANAGEMENT | Age: 60
End: 2020-07-14

## 2020-07-14 PROCEDURE — 1111F DSCHRG MED/CURRENT MED MERGE: CPT | Performed by: FAMILY MEDICINE

## 2020-07-14 NOTE — CARE COORDINATION
Andres 45 Transitions Initial Follow Up Call    Call within 2 business days of discharge: Yes    Patient: Sweta Orta Patient : 1960   MRN: [id number]  Reason for Admission: -2020 ED [facility] IP R infiltrate, ARF, E-Coli UTI, SIRs, Anemia unknown etiology, Acute metabolic encephalopathy. Discharge Date: 20 RARS: Readmission Risk Score: [address]  Discharge [address] [address]       Complaint Diagnosis Description Type Department Provider    20 Altered Mental Status Altered mental status, unspecified altered mental status type . .. ED to Hosp-Admission (Discharged) (ADMITTED) [address], DO; Vijaya Innocent Por. .. Spoke with: Pola Jarrett    Reports she continues to have rib pain. She is not taking anything for pain relief. Advised to use heating pad for comfort, v/u. Denies any acute chest or pleuritic pain w/ deep breath. Denies cough or congestion. Denies any urinary pain/urgency and states color is clear yellow w/out strong odor. No further GI bleeding from previous admit. Denies any fever, chills, or flu-like symptoms. Facility: [facility]     PCP appt 2020 1:30     [facility]. Med rec/1111F order completed. Pt is looking into prescription delivery because she has trouble getting her medications picked up on time. She does not drive and her aide often has car trouble and is unable to  on her behalf. ARGENIS referral placed to assist.    Non-face-to-face services provided:  Obtained and reviewed discharge summary and/or continuity of care documents  Education of patient/family/caregiver/guardian to support self-management-Reviewed symptoms developing UTI to report. Reviewed symptoms or CV-19 to report to physician. SW referral placed for mail-away medications.     Care Transitions 24 Hour Call    Do you have any ongoing symptoms?:  Yes  Patient-reported symptoms:  Pain  Do you have a copy of your discharge instructions?:  Yes  Do you have all of your prescriptions and are they filled?:  No  Have you been contacted by a [address]?:  No  Have you scheduled your follow up appointment?:  Yes  Were you discharged with any Home Care or Post Acute Services:  Yes  Post Acute Services:  Home Health (Comment: Sheryl Diaz and [facility].)  Patient DME:  Wheelchair, [address] bed, Other, Walker, Straight cane  Other Patient DME:  Motorized Wheelchair  Do you have support at home?:  Partner/Spouse/SO  Do you feel like you have everything you need to keep you well at home?:  Yes  Are you an active caregiver in your home?:  No  Care Transitions Interventions         Follow Up  Future Appointments   Date Time Provider Tessy Jean   7/17/2020  1:30 PM Rickey Frank MD [facility] [address], [facility]

## 2020-07-14 NOTE — TELEPHONE ENCOUNTER
Kelly from [facility]; pt's heart rate is up and she is out of this medication.  [phone]

## 2020-07-15 ENCOUNTER — CARE COORDINATION (OUTPATIENT)
Dept: CARE COORDINATION | Age: 60
End: 2020-07-15

## 2020-07-15 NOTE — CARE COORDINATION
TC to pt for social work referral for mail order. Per pt, she already receives medication through [employer] mail order. She reports it is just [place] that she is having trouble getting filled through her neurologist, Dr. Chana Vela at [facility]. States Elliot has sent refill requests to Dr. Javier Mast, but she feels the physician is denying the refills because she isn't familiar with the pharmacy itself. Writer advised pt that there may not be anything that can be done if physician refuses to fill the prescription through her mail order service, but writer will follow up with both Elliot and physician to check. Will follow up when information obtained.      Antoine Chau  MSW Student

## 2020-07-16 ENCOUNTER — CARE COORDINATION (OUTPATIENT)
Dept: OTHER | Facility: CLINIC | Age: 60
End: 2020-07-16

## 2020-07-16 NOTE — CARE COORDINATION
Checked pt's Care Everywhere chart and Dr. Isaiah Sotelo office received a refill request from [facility] and approved refill on 7.15.2020. TC to [facility], spoke with Krystyna Goddard, who states they did not receive a refill approval from the office yesterday, electronically or through fax. States it's possible that the refill request went to her local pharmacy on file, which is [facility] on [address] in [facility]. Writer informed Krystyna Goddard that I will follow up with Dr. Isaiah Sotelo office to see where it was sent and proceed from there. TC to Dr. Isaiah Sotelo office, spoke with Ángel To who states the refill request was for 10 tabs for 30 days, sent electronically to [facility] yesterday. She also stated that the issue with pt's Brea Almaguer is that she will sometimes request a refill too soon and it gets denied. TC to [facility], spoke with Sebastian who states nothing was found in their electronic system, but that the office can do a verbal auth or send the prescription by fax. Confirmed numbers and will call back to Dr. Isaiah Sotelo office. TC to Dr. Isaiah Sotelo office, spoke with Ángel To and informed her of the issue. Ángel To stated she would call and give verbal order now. Confirmed main number to [facility] as well as the pharmacy direct line. TC to pt, left detailed VM with info about order and the number of tablets/refills, as well as the need for her to make an appt with Dr. Ilda Da Silva as soon as possible. This information was also left on her VM yesterday by Dr. Isaiah Sotelo staff. Social work outreach complete.      Michele Santiago  MSW Student

## 2020-07-17 ENCOUNTER — VIRTUAL VISIT (OUTPATIENT)
Dept: FAMILY MEDICINE CLINIC | Age: 60
End: 2020-07-17
Payer: MEDICARE

## 2020-07-17 PROCEDURE — 99442 PR PHYS/QHP TELEPHONE EVALUATION 11-20 MIN: CPT | Performed by: FAMILY MEDICINE

## 2020-07-17 RX ORDER — TRAMADOL HYDROCHLORIDE 50 MG/1
50 TABLET ORAL EVERY 6 HOURS PRN
Qty: 20 TABLET | Refills: 0 | Status: ON HOLD | OUTPATIENT
Start: 2020-07-17 | End: 2020-07-25 | Stop reason: HOSPADM
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
## 2020-07-18 ENCOUNTER — HOSPITAL ENCOUNTER (EMERGENCY)
Age: 60
Discharge: HOME | End: 2020-07-18
Attending: FAMILY MEDICINE
Payer: MEDICARE

## 2020-07-18 VITALS
HEART RATE: 105 BPM | TEMPERATURE: 99.7 F | RESPIRATION RATE: 16 BRPM | BODY MASS INDEX: 28.32 KG/M2 | WEIGHT: 170 LBS | OXYGEN SATURATION: 95 % | SYSTOLIC BLOOD PRESSURE: 188 MMHG | HEIGHT: 65 IN | DIASTOLIC BLOOD PRESSURE: 73 MMHG

## 2020-07-18 LAB
BACTERIA: NEGATIVE /HPF
BILIRUBIN URINE: NEGATIVE
BLOOD, URINE: NEGATIVE
CLARITY: CLEAR
COLOR: YELLOW
EPITHELIAL CELLS, UA: ABNORMAL /HPF (ref 0–5)
GLUCOSE URINE: NEGATIVE MG/DL
HYALINE CASTS: ABNORMAL /HPF (ref 0–5)
KETONES, URINE: NEGATIVE MG/DL
LEUKOCYTE ESTERASE, URINE: ABNORMAL
NITRITE, URINE: NEGATIVE
PH UA: 6 (ref 5–9)
PROTEIN UA: NEGATIVE MG/DL
RBC UA: ABNORMAL /HPF (ref 0–5)
SPECIFIC GRAVITY UA: 1 (ref 1–1.03)
URINE REFLEX TO CULTURE: ABNORMAL
UROBILINOGEN, URINE: 0.2 E.U./DL
WBC UA: ABNORMAL /HPF (ref 0–5)

## 2020-07-18 PROCEDURE — 99283 EMERGENCY DEPT VISIT LOW MDM: CPT

## 2020-07-18 PROCEDURE — 81001 URINALYSIS AUTO W/SCOPE: CPT

## 2020-07-18 PROCEDURE — 6370000000 HC RX 637 (ALT 250 FOR IP): Performed by: FAMILY MEDICINE

## 2020-07-18 RX ORDER — DOXYCYCLINE 100 MG/1
100 TABLET ORAL 2 TIMES DAILY
Qty: 14 TABLET | Refills: 0 | Status: SHIPPED | OUTPATIENT
Start: 2020-07-18 | End: 2020-07-25

## 2020-07-18 RX ORDER — DOXYCYCLINE HYCLATE 100 MG/1
100 CAPSULE ORAL ONCE
Status: COMPLETED | OUTPATIENT
Start: 2020-07-18 | End: 2020-07-18

## 2020-07-18 RX ADMIN — DOXYCYCLINE HYCLATE 100 MG: 100 CAPSULE ORAL at 13:16

## 2020-07-18 ASSESSMENT — PAIN DESCRIPTION - DESCRIPTORS: DESCRIPTORS: ACHING

## 2020-07-18 ASSESSMENT — PAIN SCALES - GENERAL: PAINLEVEL_OUTOF10: 7

## 2020-07-18 ASSESSMENT — PAIN DESCRIPTION - PROGRESSION: CLINICAL_PROGRESSION: NOT CHANGED

## 2020-07-18 ASSESSMENT — PAIN SCALES - WONG BAKER: WONGBAKER_NUMERICALRESPONSE: 2

## 2020-07-18 ASSESSMENT — PAIN DESCRIPTION - PAIN TYPE: TYPE: ACUTE PAIN;CHRONIC PAIN

## 2020-07-18 ASSESSMENT — PAIN DESCRIPTION - FREQUENCY: FREQUENCY: INTERMITTENT

## 2020-07-18 ASSESSMENT — PAIN - FUNCTIONAL ASSESSMENT: PAIN_FUNCTIONAL_ASSESSMENT: PREVENTS OR INTERFERES SOME ACTIVE ACTIVITIES AND ADLS

## 2020-07-18 NOTE — ED TRIAGE NOTES
Pt reports that she developed urinary frequency this a.m pt alert rr even non labored   Pt requesting multiple thing when arriving needed extensive assistance RN assisted pt and provide drink bedside commode and blanket

## 2020-07-18 NOTE — ED NOTES
Urine sent light yellow no fowl smell.  Pt. Resting comfortably     [place], [place]  90/00/63 4450

## 2020-07-18 NOTE — ED NOTES
Bed: 21  Expected date:   Expected time:   Means of arrival:   Comments:  60F possible UTI, 130ST, 166/72, 18, 96RA, IV and ;labs     Trecia Rinne, RN  07/18/20 3072

## 2020-07-19 ASSESSMENT — ENCOUNTER SYMPTOMS
VOMITING: 0
NAUSEA: 0
RESPIRATORY NEGATIVE: 1

## 2020-07-20 ENCOUNTER — TELEPHONE (OUTPATIENT)
Dept: FAMILY MEDICINE CLINIC | Age: 60
End: 2020-07-20

## 2020-07-20 NOTE — TELEPHONE ENCOUNTER
Diagnosis Orders   1.  Type 2 diabetes mellitus with diabetic peripheral angiopathy without gangrene, without long-term current use of insulin (HCC)  Basic Metabolic Panel

## 2020-07-20 NOTE — TELEPHONE ENCOUNTER
Are you able to order testing for her Kidney's? She says they are bothering her .   Has a virtual appt on 7/23

## 2020-07-20 NOTE — ED PROVIDER NOTES
[address] [facility] ED  eMERGENCY dEPARTMENT eNCOUnter      Pt Name: Krys Alston  MRN: [id number]  [place] 1960  Date of evaluation: 7/18/2020  Provider: Nel Nguyễn MD    CHIEF COMPLAINT       Chief Complaint   Patient presents with    Dysuria     started this a.m          HISTORY OF PRESENT ILLNESS   (Location/Symptom, Timing/Onset,Context/Setting, Quality, Duration, Modifying Factors, Severity)  Note limiting factors. Krys Alston is a 64 y.o. female who presents to the emergency department      The history is provided by the patient. Dysuria    This is a new problem. The current episode started yesterday. The problem occurs every urination. The problem has not changed since onset. The quality of the pain is described as burning. The pain is at a severity of 3/10. There has been no fever. She is not sexually active. Associated symptoms include frequency and urgency. Pertinent negatives include no chills, no sweats, no nausea, no vomiting, no discharge, no hematuria, no hesitancy and no possible pregnancy. She has tried nothing for the symptoms. Her past medical history is significant for recurrent UTIs. Her past medical history does not include kidney stones, single kidney, urological procedure, urinary stasis or catheterization. NursingNotes were reviewed. REVIEW OF SYSTEMS    (2-9 systems for level 4, 10 or more for level 5)     Review of Systems   Constitutional: Negative. Negative for chills. HENT: Negative. Respiratory: Negative. Cardiovascular: Negative. Gastrointestinal: Negative for nausea and vomiting. Genitourinary: Positive for dysuria, frequency and urgency. Negative for hematuria and hesitancy. Skin: Negative. Psychiatric/Behavioral: Negative. All other systems reviewed and are negative. Except as noted above the remainder of the review of systems was reviewed and negative.        PAST MEDICAL HISTORY     Past Medical History:   Diagnosis Date    Acid reflux     Allergic rhinitis     Anxiety     Arnold-Chiari deformity (HCC) 2000    surgery    Asthma     Cerebral artery occlusion with cerebral infarction ([facility] 2001    1 yr after brain OR    Cerebrovascular disease     Chronic back pain greater than 3 months duration     COPD (chronic obstructive pulmonary disease) (HCC)     Dr Mathew Salcido at [address] CVA (cerebral infarction)     left hemiparesis, due to ? estrogen + smoking    Depression     Dr Arianna Westbrook H/O encephalopathy     Headache(784.0)     Dr Aiken Ruth Hepatitis B surface antigen positive     Hx of blood clots 2010    PE / trx with coumadin x 6 months    Hyperlipidemia LDL goal < 100     meds > 10 yrs    Hypertension     meds > 2 yrs    Hypothyroidism 2001    meds > 18 yrs    Laryngitis, chronic 2012    scoped by Dr Bertrand Sharma, fungal    Marijuana smoker in remission [facility])     Obesity (BMI 30-39. 9)     Osteoarthritis     Pulmonary embolism and infarction (HCC)     Restless legs syndrome     Rhinitis, chronic     Rotator cuff tear     Left    S/P colonoscopy with polypectomy     Dr Arina Thomason    Seizures [facility])    [address] Spinal headache     past partum     Spondylosis without myelopathy     Type 2 diabetes mellitus without complication (HCC)     hx > 6 yrs    Urinary incontinence     Vertebral compression fracture (HCC)          SURGICALHISTORY       Past Surgical History:   Procedure Laterality Date    BACK SURGERY      lumbar kyphoplasty x 2    BRAIN SURGERY  2000    due to Arnold-Chiari deformity / [facility]     SECTION      COLONOSCOPY      CRANIOTOMY  2000    Arnold-Chiary malformation    ENDOSCOPY, COLON, DIAGNOSTIC      FEMUR FRACTURE SURGERY Left     HUMERUS FRACTURE SURGERY Left 2020    ORIF PERIPROSTHETIC FX LEFT HUMERAL SHAFT, SYNTHES NOTIFIED, ROOM 283, LATEX ALLERGY performed by Piper Abbott MD at [address] [facility] IMPLANT Left 6/15/2018    LEFT TOTAL SHOULDER ARTHROPLASTY, SANDY BIOMET TSA, SCALENE NERVE BLOCK, (LATEX ALLERGY) performed by Vito Millan MD at [address] [place] Left 5/17/2019    LEFT REMOVAL GLENOID AND CONVERSION TO REVERSE TOTAL SHOULDER ARTHROPLASTY, SANDY REVERSE TSA, JSOE DAVID EQUIPMENT FLEXIBLE OSTEOTOMES, SCALENE NERVE BLOCK, LATEX ALLERGY performed by Vito Millan MD at [address] [place]      lumbar kyphoplasty    TONSILLECTOMY      UPPER GASTROINTESTINAL ENDOSCOPY  8/5/15    w/bx          CURRENT MEDICATIONS       Discharge Medication List as of 7/18/2020  1:07 PM      CONTINUE these medications which have NOT CHANGED    Details   traMADol (ULTRAM) 50 MG tablet Take 1 tablet by mouth every 6 hours as needed for Pain for up to 7 days. , Disp-20 tablet,R-0Normal      metoprolol tartrate (LOPRESSOR) 25 MG tablet Take 1 tablet by mouth 2 times daily, Disp-60 tablet,R-3Normal      LORazepam (ATIVAN) 0.5 MG tablet Take 1 tablet every 8 hours as needed for anxiety, Disp-60 tablet, R-0Normal      rosuvastatin (CRESTOR) 20 MG tablet Take 1 tablet by mouth every day, Disp-30 tablet, R-11Normal      ferrous sulfate (IRON 325) 325 (65 Fe) MG tablet Take 1 tablet by mouth 2 times daily, Disp-180 tablet, R-1Print      Capsaicin 0.1 % CREA Apply 1 applicator topically 3 times daily as needed (pain), Disp-42.5 g, R-0Normal      diclofenac sodium (VOLTAREN) 1 % GEL Apply 2 g topically 2 times daily, Topical, 2 TIMES DAILY Starting Fri 6/19/2020, Disp-1 Tube, R-3, Normal      PROAIR RESPICLICK 640 (90 Base) MCG/ACT aerosol powder inhalation DAWHistorical Med      clobetasol (TEMOVATE) 0.05 % ointment Historical Med      calcipotriene (DOVONEX) 0.005 % cream Historical Med      AIMOVIG 70 MG/ML SOAJ ADM 1 ML SC 1 TIME Q MONTH, DAWHistorical Med      Estradiol (VAGIFEM) 10 MCG TABS vaginal tablet insert 1 tablet vaginally two times a week INTO LOWER 1/3 OF VAGINAHistorical Med      Olopatadine HCl (PAZEO) 0.7 % SOLN INSTILL 1 DROP IN BOTH EYES EVERY MORNINGHistorical Med      omega-3 acid ethyl esters (LOVAZA) 1 g capsule Historical Med      ONETOUCH ULTRA strip use 1 TEST STRIP to TEST BLOOD SUGAR three times a day, Disp-100 strip, R-0, DAWNormal      pramipexole (MIRAPEX) 0.5 MG tablet Take 1 tablet by mouth every evening, Disp-30 tablet, R-11Normal      potassium chloride (KLOR-CON M) 20 MEQ extended release tablet Take 1 tablet by mouth every day, Disp-30 tablet, R-11Normal      pregabalin (LYRICA) 150 MG capsule Take 1 capsule by mouth twice daily, Disp-60 capsule, R-5Normal      Alcohol Swabs (ALCOHOL PADS) 70 % PADS Disp-100 each, R-3, Normal      Lancet Devices (EASY MINI EJECT LANCING DEVICE) MISC USE AS DIRECTED., Disp-100 each, R-3Normal      Easy Comfort Lancets MISC Disp-100 each, R-3, Normal      Blood Glucose Calibration (ADVOCATE REDI-CODE+ CONTROL) High SOLN Disp-1 each, R-0, Normal      Blood Glucose Monitoring Suppl (ADVOCATE REDI-CODE+) COLE Disp-1 Device, R-0, Normal      SYMBICORT 160-4.5 MCG/ACT AERO INL 2 PFS PO BID UTD, Disp-1 Inhaler, R-2, DAWNormal      levothyroxine (SYNTHROID) 50 MCG tablet take 1 tablet by mouth once daily, Disp-30 tablet, R-5Normal      Control Gel Formula Dressing (DUODERM CGF EXTRA THIN) MISC Apply to sacral region every other day, Disp-20 each, [id number]      folic acid (FOLVITE) 1 MG tablet Take one tablet by mouth every day, Disp-30 tablet, R-14PLEASE CLARIFY IF DIRECTIONS NEED UPDATED. [facility] IS PATIENTS NEW PHARMACY - PLEASE SEND ALL FUTURE MEDS HERE. Normal      vitamin D (ERGOCALCIFEROL) 1.25 MG (02732 UT) CAPS capsule Take 1 capsule by mouth once a week, Disp-4 capsule, R-12Normal      esomeprazole (NEXIUM) 40 MG delayed release capsule Take 2 capsules by mouth every day, Disp-60 capsule, R-14Normal      escitalopram (LEXAPRO) 20 MG tablet TAKE ONE TABLET BY MOUTH DAILY, Disp-30 tablet, R-14Normal External ear normal.      Nose: Nose normal.   Eyes:      Extraocular Movements: Extraocular movements intact. Pupils: Pupils are equal, round, and reactive to light. Neck:      Musculoskeletal: Normal range of motion and neck supple. Cardiovascular:      Rate and Rhythm: Normal rate and regular rhythm. Heart sounds: Normal heart sounds. Pulmonary:      Effort: Pulmonary effort is normal. No respiratory distress. Breath sounds: Normal breath sounds. No wheezing or rales. Chest:      Chest wall: No tenderness. Abdominal:      General: Abdomen is flat. Bowel sounds are normal.      Palpations: Abdomen is soft. Musculoskeletal: Normal range of motion. Skin:     General: Skin is warm and dry. Neurological:      General: No focal deficit present. Mental Status: She is alert and oriented to person, place, and time. Cranial Nerves: No cranial nerve deficit. Sensory: No sensory deficit. Motor: No weakness or abnormal muscle tone. Coordination: Coordination normal.      Gait: Gait normal.      Deep Tendon Reflexes: Reflexes normal.   Psychiatric:         Mood and Affect: Mood normal.         Behavior: Behavior normal.         Thought Content:  Thought content normal.         Judgment: Judgment normal.         DIAGNOSTIC RESULTS     EKG: All EKG's are interpreted by the Emergency Department Physician who either signs or Co-signsthis chart in the absence of a cardiologist.        RADIOLOGY:   Carlgriselda Peters such as CT, Ultrasound and MRI are read by the radiologist. Plain radiographic images are visualized and preliminarily interpreted by the emergency physician with the below findings:        Interpretation per the Radiologist below, if available at the time ofthis note:    No orders to display         ED BEDSIDE ULTRASOUND:   Performed by ED Physician - none    LABS:  Labs Reviewed   URINE RT REFLEX TO CULTURE - Abnormal; Notable for the following components: Result Value    Leukocyte Esterase, Urine TRACE (*)     All other components within normal limits   MICROSCOPIC URINALYSIS - Abnormal; Notable for the following components:    RBC, UA 3-5 (*)     All other components within normal limits       All other labs were within normal range or not returned as of this dictation. EMERGENCY DEPARTMENT COURSE and DIFFERENTIAL DIAGNOSIS/MDM:   Vitals:    Vitals:    07/18/20 1110   BP: (!) 188/73   Pulse: 105   Resp: 16   Temp: 99.7 °F (37.6 °C)   SpO2: 95%   Weight: 170 lb (77.1 kg)   Height: 5' 5\" (1.651 m)                MDM  Number of Diagnoses or Management Options  Acute UTI:   Diagnosis management comments: 64years old well-known to us with multiple ER visit presented to the ER with concern with frequency urgency and burning since this morning given antibiotic in the ER prescription for antibiotic advised to have close follow-up with her primary      CONSULTS:  None    PROCEDURES:  Unless otherwise noted below, none     Procedures    FINAL IMPRESSION      1. Acute UTI          DISPOSITION/PLAN   DISPOSITION Decision To Discharge 07/18/2020 01:06:26 PM      PATIENT REFERRED TO:  Carlota Jonas MD  [phone]    In 1 day        DISCHARGE MEDICATIONS:  Discharge Medication List as of 7/18/2020  1:07 PM      START taking these medications    Details   doxycycline monohydrate (ADOXA) 100 MG tablet Take 1 tablet by mouth 2 times daily for 7 days May substitute another form of Doxycycline if insurance requires. , Disp-14 tablet,R-0Print                (Please note thatportions of this note were completed with a voice recognition program.  Efforts were made to edit the dictations but occasionally words are mis-transcribed.)    Bruna Carl MD (electronically signed)  Attending Emergency Physician          Destiny Manzanares MD  07/19/20 7595 [facility] MD [place]  07/19/20 9031

## 2020-07-22 ENCOUNTER — CARE COORDINATION (OUTPATIENT)
Dept: CASE MANAGEMENT | Age: 60
End: 2020-07-22

## 2020-07-22 ENCOUNTER — TELEPHONE (OUTPATIENT)
Dept: ADMINISTRATIVE | Age: 60
End: 2020-07-22

## 2020-07-23 ENCOUNTER — VIRTUAL VISIT (OUTPATIENT)
Dept: FAMILY MEDICINE CLINIC | Age: 60
End: 2020-07-23
Payer: MEDICARE

## 2020-07-23 ENCOUNTER — TELEPHONE (OUTPATIENT)
Dept: FAMILY MEDICINE CLINIC | Age: 60
End: 2020-07-23

## 2020-07-23 DIAGNOSIS — D50.9 IRON DEFICIENCY ANEMIA, UNSPECIFIED IRON DEFICIENCY ANEMIA TYPE: ICD-10-CM

## 2020-07-23 DIAGNOSIS — E11.51 TYPE 2 DIABETES MELLITUS WITH DIABETIC PERIPHERAL ANGIOPATHY WITHOUT GANGRENE, WITHOUT LONG-TERM CURRENT USE OF INSULIN (HCC): ICD-10-CM

## 2020-07-23 LAB
ALBUMIN SERPL-MCNC: 3.9 G/DL (ref 3.5–4.6)
ALP BLD-CCNC: 158 U/L (ref 40–130)
ALT SERPL-CCNC: 14 U/L (ref 0–33)
ANION GAP SERPL CALCULATED.3IONS-SCNC: 15 MEQ/L (ref 9–15)
AST SERPL-CCNC: 20 U/L (ref 0–35)
BILIRUB SERPL-MCNC: <0.2 MG/DL (ref 0.2–0.7)
BUN BLDV-MCNC: 22 MG/DL (ref 6–20)
CALCIUM SERPL-MCNC: 8.6 MG/DL (ref 8.5–9.9)
CHLORIDE BLD-SCNC: 101 MEQ/L (ref 95–107)
CO2: 24 MEQ/L (ref 20–31)
CREAT SERPL-MCNC: 1.16 MG/DL (ref 0.5–0.9)
GFR AFRICAN AMERICAN: 58
GFR NON-AFRICAN AMERICAN: 47.9
GLOBULIN: 3.2 G/DL (ref 2.3–3.5)
GLUCOSE BLD-MCNC: 103 MG/DL (ref 70–99)
HCT VFR BLD CALC: 32.2 % (ref 37–47)
HEMOGLOBIN: 10.2 G/DL (ref 12–16)
MCH RBC QN AUTO: 24.1 PG (ref 27–31.3)
MCHC RBC AUTO-ENTMCNC: 31.7 % (ref 33–37)
MCV RBC AUTO: 76.1 FL (ref 82–100)
PDW BLD-RTO: 21.8 % (ref 11.5–14.5)
PLATELET # BLD: 320 K/UL (ref 130–400)
POTASSIUM SERPL-SCNC: 3.9 MEQ/L (ref 3.4–4.9)
RBC # BLD: 4.24 M/UL (ref 4.2–5.4)
SODIUM BLD-SCNC: 140 MEQ/L (ref 135–144)
TOTAL PROTEIN: 7.1 G/DL (ref 6.3–8)
WBC # BLD: 7.6 K/UL (ref 4.8–10.8)

## 2020-07-23 PROCEDURE — 99442 PR PHYS/QHP TELEPHONE EVALUATION 11-20 MIN: CPT | Performed by: FAMILY MEDICINE

## 2020-07-23 RX ORDER — CEPHALEXIN 500 MG/1
500 CAPSULE ORAL 3 TIMES DAILY
Qty: 30 CAPSULE | Refills: 0 | Status: SHIPPED | OUTPATIENT
Start: 2020-07-23 | End: 2020-11-10 | Stop reason: SDUPTHER

## 2020-07-23 NOTE — TELEPHONE ENCOUNTER
Pt would like to know if you would call her in some tramadol.  She states that she was taken off her percocet and that the pain is bothering her badly
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
## 2020-07-24 ENCOUNTER — HOSPITAL ENCOUNTER (OUTPATIENT)
Age: 60
Setting detail: OBSERVATION
Discharge: HOME | End: 2020-07-25
Attending: INTERNAL MEDICINE | Admitting: INTERNAL MEDICINE
Payer: MEDICARE

## 2020-07-24 ENCOUNTER — TELEPHONE (OUTPATIENT)
Dept: FAMILY MEDICINE CLINIC | Age: 60
End: 2020-07-24

## 2020-07-24 ENCOUNTER — APPOINTMENT (OUTPATIENT)
Dept: MRI IMAGING | Age: 60
End: 2020-07-24
Payer: MEDICARE

## 2020-07-24 ENCOUNTER — APPOINTMENT (OUTPATIENT)
Dept: CT IMAGING | Age: 60
End: 2020-07-24
Payer: MEDICARE

## 2020-07-24 ENCOUNTER — TELEPHONE (OUTPATIENT)
Dept: OTHER | Facility: CLINIC | Age: 60
End: 2020-07-24

## 2020-07-24 ENCOUNTER — APPOINTMENT (OUTPATIENT)
Dept: GENERAL RADIOLOGY | Age: 60
End: 2020-07-24
Payer: MEDICARE

## 2020-07-24 PROBLEM — G45.9 TIA (TRANSIENT ISCHEMIC ATTACK): Status: ACTIVE | Noted: 2020-07-24

## 2020-07-24 LAB
ALBUMIN SERPL-MCNC: 4 G/DL (ref 3.5–4.6)
ALP BLD-CCNC: 166 U/L (ref 40–130)
ALT SERPL-CCNC: 12 U/L (ref 0–33)
AMPHETAMINE SCREEN, URINE: ABNORMAL
ANION GAP SERPL CALCULATED.3IONS-SCNC: 12 MEQ/L (ref 9–15)
AST SERPL-CCNC: 17 U/L (ref 0–35)
BARBITURATE SCREEN URINE: ABNORMAL
BASOPHILS ABSOLUTE: 0 K/UL (ref 0–0.2)
BASOPHILS RELATIVE PERCENT: 0.6 %
BENZODIAZEPINE SCREEN, URINE: ABNORMAL
BILIRUB SERPL-MCNC: <0.2 MG/DL (ref 0.2–0.7)
BILIRUBIN URINE: NEGATIVE
BLOOD, URINE: NEGATIVE
BUN BLDV-MCNC: 22 MG/DL (ref 6–20)
CALCIUM SERPL-MCNC: 8.6 MG/DL (ref 8.5–9.9)
CANNABINOID SCREEN URINE: POSITIVE
CHLORIDE BLD-SCNC: 102 MEQ/L (ref 95–107)
CLARITY: CLEAR
CO2: 26 MEQ/L (ref 20–31)
COCAINE METABOLITE SCREEN URINE: ABNORMAL
COLOR: YELLOW
CREAT SERPL-MCNC: 0.94 MG/DL (ref 0.5–0.9)
EKG ATRIAL RATE: 97 BPM
EKG P AXIS: 64 DEGREES
EKG P-R INTERVAL: 148 MS
EKG Q-T INTERVAL: 362 MS
EKG QRS DURATION: 96 MS
EKG QTC CALCULATION (BAZETT): 459 MS
EKG R AXIS: 34 DEGREES
EKG T AXIS: 32 DEGREES
EKG VENTRICULAR RATE: 97 BPM
EOSINOPHILS ABSOLUTE: 0.1 K/UL (ref 0–0.7)
EOSINOPHILS RELATIVE PERCENT: 1.7 %
ETHANOL PERCENT: NORMAL G/DL
ETHANOL: <10 MG/DL (ref 0–0.08)
GFR AFRICAN AMERICAN: >60
GFR NON-AFRICAN AMERICAN: >60
GLOBULIN: 3.3 G/DL (ref 2.3–3.5)
GLUCOSE BLD-MCNC: 133 MG/DL (ref 60–115)
GLUCOSE BLD-MCNC: 92 MG/DL (ref 70–99)
GLUCOSE URINE: NEGATIVE MG/DL
HCT VFR BLD CALC: 33.4 % (ref 37–47)
HEMOGLOBIN: 10.7 G/DL (ref 12–16)
KETONES, URINE: NEGATIVE MG/DL
LACTIC ACID: 0.8 MMOL/L (ref 0.5–2.2)
LEUKOCYTE ESTERASE, URINE: NEGATIVE
LYMPHOCYTES ABSOLUTE: 1.7 K/UL (ref 1–4.8)
LYMPHOCYTES RELATIVE PERCENT: 21.2 %
Lab: ABNORMAL
MAGNESIUM: 2.2 MG/DL (ref 1.7–2.4)
MCH RBC QN AUTO: 24.2 PG (ref 27–31.3)
MCHC RBC AUTO-ENTMCNC: 32.2 % (ref 33–37)
MCV RBC AUTO: 75.3 FL (ref 82–100)
METHADONE SCREEN, URINE: ABNORMAL
MONOCYTES ABSOLUTE: 0.7 K/UL (ref 0.2–0.8)
MONOCYTES RELATIVE PERCENT: 8.3 %
NEUTROPHILS ABSOLUTE: 5.6 K/UL (ref 1.4–6.5)
NEUTROPHILS RELATIVE PERCENT: 68.2 %
NITRITE, URINE: NEGATIVE
OPIATE SCREEN URINE: ABNORMAL
OXYCODONE URINE: ABNORMAL
PDW BLD-RTO: 21.9 % (ref 11.5–14.5)
PERFORMED ON: ABNORMAL
PH UA: 5.5 (ref 5–9)
PHENCYCLIDINE SCREEN URINE: ABNORMAL
PLATELET # BLD: 286 K/UL (ref 130–400)
POTASSIUM SERPL-SCNC: 4.1 MEQ/L (ref 3.4–4.9)
PRO-BNP: 59 PG/ML
PROPOXYPHENE SCREEN: ABNORMAL
PROTEIN UA: NEGATIVE MG/DL
RBC # BLD: 4.43 M/UL (ref 4.2–5.4)
SODIUM BLD-SCNC: 140 MEQ/L (ref 135–144)
SPECIFIC GRAVITY UA: 1.01 (ref 1–1.03)
TOTAL CK: 59 U/L (ref 0–170)
TOTAL PROTEIN: 7.3 G/DL (ref 6.3–8)
TROPONIN: <0.01 NG/ML (ref 0–0.01)
TSH SERPL DL<=0.05 MIU/L-ACNC: 0.14 UIU/ML (ref 0.44–3.86)
URINE REFLEX TO CULTURE: NORMAL
UROBILINOGEN, URINE: 0.2 E.U./DL
WBC # BLD: 8.2 K/UL (ref 4.8–10.8)

## 2020-07-24 PROCEDURE — 6370000000 HC RX 637 (ALT 250 FOR IP): Performed by: INTERNAL MEDICINE

## 2020-07-24 PROCEDURE — 2580000003 HC RX 258: Performed by: INTERNAL MEDICINE

## 2020-07-24 PROCEDURE — 71045 X-RAY EXAM CHEST 1 VIEW: CPT

## 2020-07-24 PROCEDURE — 81003 URINALYSIS AUTO W/O SCOPE: CPT

## 2020-07-24 PROCEDURE — 96372 THER/PROPH/DIAG INJ SC/IM: CPT

## 2020-07-24 PROCEDURE — 36415 COLL VENOUS BLD VENIPUNCTURE: CPT

## 2020-07-24 PROCEDURE — 80307 DRUG TEST PRSMV CHEM ANLYZR: CPT

## 2020-07-24 PROCEDURE — 84484 ASSAY OF TROPONIN QUANT: CPT

## 2020-07-24 PROCEDURE — 83735 ASSAY OF MAGNESIUM: CPT

## 2020-07-24 PROCEDURE — 80053 COMPREHEN METABOLIC PANEL: CPT

## 2020-07-24 PROCEDURE — 85025 COMPLETE CBC W/AUTO DIFF WBC: CPT

## 2020-07-24 PROCEDURE — G0378 HOSPITAL OBSERVATION PER HR: HCPCS

## 2020-07-24 PROCEDURE — 2580000003 HC RX 258: Performed by: STUDENT IN AN ORGANIZED HEALTH CARE EDUCATION/TRAINING PROGRAM

## 2020-07-24 PROCEDURE — 70450 CT HEAD/BRAIN W/O DYE: CPT

## 2020-07-24 PROCEDURE — 84443 ASSAY THYROID STIM HORMONE: CPT

## 2020-07-24 PROCEDURE — 82550 ASSAY OF CK (CPK): CPT

## 2020-07-24 PROCEDURE — 70551 MRI BRAIN STEM W/O DYE: CPT

## 2020-07-24 PROCEDURE — 83880 ASSAY OF NATRIURETIC PEPTIDE: CPT

## 2020-07-24 PROCEDURE — G0480 DRUG TEST DEF 1-7 CLASSES: HCPCS

## 2020-07-24 PROCEDURE — 93005 ELECTROCARDIOGRAM TRACING: CPT | Performed by: STUDENT IN AN ORGANIZED HEALTH CARE EDUCATION/TRAINING PROGRAM

## 2020-07-24 PROCEDURE — 6370000000 HC RX 637 (ALT 250 FOR IP): Performed by: STUDENT IN AN ORGANIZED HEALTH CARE EDUCATION/TRAINING PROGRAM

## 2020-07-24 PROCEDURE — 99285 EMERGENCY DEPT VISIT HI MDM: CPT

## 2020-07-24 PROCEDURE — 6360000002 HC RX W HCPCS: Performed by: INTERNAL MEDICINE

## 2020-07-24 PROCEDURE — 83605 ASSAY OF LACTIC ACID: CPT

## 2020-07-24 PROCEDURE — 96374 THER/PROPH/DIAG INJ IV PUSH: CPT

## 2020-07-24 RX ORDER — TIZANIDINE 2 MG/1
2 TABLET ORAL 2 TIMES DAILY
Status: DISCONTINUED | OUTPATIENT
Start: 2020-07-24 | End: 2020-07-25 | Stop reason: HOSPADM

## 2020-07-24 RX ORDER — ASPIRIN 81 MG/1
81 TABLET ORAL DAILY
Status: DISCONTINUED | OUTPATIENT
Start: 2020-07-25 | End: 2020-07-25 | Stop reason: HOSPADM

## 2020-07-24 RX ORDER — POTASSIUM CHLORIDE 20 MEQ/1
20 TABLET, EXTENDED RELEASE ORAL
Status: DISCONTINUED | OUTPATIENT
Start: 2020-07-25 | End: 2020-07-25 | Stop reason: HOSPADM

## 2020-07-24 RX ORDER — PRAMIPEXOLE DIHYDROCHLORIDE 0.5 MG/1
0.5 TABLET ORAL EVERY EVENING
Status: DISCONTINUED | OUTPATIENT
Start: 2020-07-24 | End: 2020-07-25 | Stop reason: HOSPADM

## 2020-07-24 RX ORDER — LORAZEPAM 2 MG/ML
0.5 INJECTION INTRAMUSCULAR ONCE
Status: COMPLETED | OUTPATIENT
Start: 2020-07-24 | End: 2020-07-24

## 2020-07-24 RX ORDER — SODIUM CHLORIDE 0.9 % (FLUSH) 0.9 %
10 SYRINGE (ML) INJECTION PRN
Status: DISCONTINUED | OUTPATIENT
Start: 2020-07-24 | End: 2020-07-25 | Stop reason: HOSPADM

## 2020-07-24 RX ORDER — ESCITALOPRAM OXALATE 20 MG/1
20 TABLET ORAL DAILY
Status: DISCONTINUED | OUTPATIENT
Start: 2020-07-24 | End: 2020-07-25 | Stop reason: HOSPADM

## 2020-07-24 RX ORDER — CARISOPRODOL 350 MG/1
350 TABLET ORAL 2 TIMES DAILY PRN
COMMUNITY
End: 2020-07-31

## 2020-07-24 RX ORDER — PANTOPRAZOLE SODIUM 40 MG/1
40 TABLET, DELAYED RELEASE ORAL
Status: DISCONTINUED | OUTPATIENT
Start: 2020-07-25 | End: 2020-07-25 | Stop reason: HOSPADM

## 2020-07-24 RX ORDER — SODIUM CHLORIDE 0.9 % (FLUSH) 0.9 %
10 SYRINGE (ML) INJECTION EVERY 12 HOURS SCHEDULED
Status: DISCONTINUED | OUTPATIENT
Start: 2020-07-24 | End: 2020-07-25 | Stop reason: HOSPADM

## 2020-07-24 RX ORDER — ATORVASTATIN CALCIUM 80 MG/1
80 TABLET, FILM COATED ORAL NIGHTLY
Status: DISCONTINUED | OUTPATIENT
Start: 2020-07-24 | End: 2020-07-25 | Stop reason: HOSPADM

## 2020-07-24 RX ORDER — FERROUS SULFATE 325(65) MG
325 TABLET ORAL 2 TIMES DAILY
Status: DISCONTINUED | OUTPATIENT
Start: 2020-07-24 | End: 2020-07-25 | Stop reason: HOSPADM

## 2020-07-24 RX ORDER — CARISOPRODOL 350 MG/1
350 TABLET ORAL 2 TIMES DAILY
Status: DISCONTINUED | OUTPATIENT
Start: 2020-07-24 | End: 2020-07-24 | Stop reason: CLARIF

## 2020-07-24 RX ORDER — NICOTINE POLACRILEX 4 MG
15 LOZENGE BUCCAL PRN
Status: DISCONTINUED | OUTPATIENT
Start: 2020-07-24 | End: 2020-07-25 | Stop reason: HOSPADM

## 2020-07-24 RX ORDER — PROMETHAZINE HYDROCHLORIDE 12.5 MG/1
12.5 TABLET ORAL EVERY 6 HOURS PRN
Status: DISCONTINUED | OUTPATIENT
Start: 2020-07-24 | End: 2020-07-25 | Stop reason: HOSPADM

## 2020-07-24 RX ORDER — PREGABALIN 150 MG/1
150 CAPSULE ORAL 2 TIMES DAILY
Status: DISCONTINUED | OUTPATIENT
Start: 2020-07-24 | End: 2020-07-25 | Stop reason: HOSPADM

## 2020-07-24 RX ORDER — OXYCODONE HYDROCHLORIDE AND ACETAMINOPHEN 5; 325 MG/1; MG/1
1 TABLET ORAL EVERY 6 HOURS PRN
Status: DISCONTINUED | OUTPATIENT
Start: 2020-07-24 | End: 2020-07-25 | Stop reason: HOSPADM

## 2020-07-24 RX ORDER — DEXTROSE MONOHYDRATE 50 MG/ML
100 INJECTION, SOLUTION INTRAVENOUS PRN
Status: DISCONTINUED | OUTPATIENT
Start: 2020-07-24 | End: 2020-07-25 | Stop reason: HOSPADM

## 2020-07-24 RX ORDER — ASPIRIN 325 MG
325 TABLET ORAL ONCE
Status: COMPLETED | OUTPATIENT
Start: 2020-07-24 | End: 2020-07-24

## 2020-07-24 RX ORDER — POLYETHYLENE GLYCOL 3350 17 G/17G
17 POWDER, FOR SOLUTION ORAL DAILY PRN
Status: DISCONTINUED | OUTPATIENT
Start: 2020-07-24 | End: 2020-07-25 | Stop reason: HOSPADM

## 2020-07-24 RX ORDER — ARIPIPRAZOLE 5 MG/1
10 TABLET ORAL NIGHTLY
Status: DISCONTINUED | OUTPATIENT
Start: 2020-07-24 | End: 2020-07-25 | Stop reason: HOSPADM

## 2020-07-24 RX ORDER — ACETAMINOPHEN 325 MG/1
650 TABLET ORAL EVERY 4 HOURS PRN
Status: DISCONTINUED | OUTPATIENT
Start: 2020-07-24 | End: 2020-07-25 | Stop reason: HOSPADM

## 2020-07-24 RX ORDER — ASPIRIN 300 MG/1
300 SUPPOSITORY RECTAL DAILY
Status: DISCONTINUED | OUTPATIENT
Start: 2020-07-25 | End: 2020-07-24

## 2020-07-24 RX ORDER — BUDESONIDE AND FORMOTEROL FUMARATE DIHYDRATE 160; 4.5 UG/1; UG/1
2 AEROSOL RESPIRATORY (INHALATION) 2 TIMES DAILY
Status: DISCONTINUED | OUTPATIENT
Start: 2020-07-24 | End: 2020-07-25 | Stop reason: HOSPADM

## 2020-07-24 RX ORDER — LORAZEPAM 0.5 MG/1
0.5 TABLET ORAL 3 TIMES DAILY PRN
Status: DISCONTINUED | OUTPATIENT
Start: 2020-07-24 | End: 2020-07-25 | Stop reason: HOSPADM

## 2020-07-24 RX ORDER — ONDANSETRON 2 MG/ML
4 INJECTION INTRAMUSCULAR; INTRAVENOUS EVERY 6 HOURS PRN
Status: DISCONTINUED | OUTPATIENT
Start: 2020-07-24 | End: 2020-07-25 | Stop reason: HOSPADM

## 2020-07-24 RX ORDER — 0.9 % SODIUM CHLORIDE 0.9 %
1000 INTRAVENOUS SOLUTION INTRAVENOUS ONCE
Status: COMPLETED | OUTPATIENT
Start: 2020-07-24 | End: 2020-07-24

## 2020-07-24 RX ORDER — DEXTROSE MONOHYDRATE 25 G/50ML
12.5 INJECTION, SOLUTION INTRAVENOUS PRN
Status: DISCONTINUED | OUTPATIENT
Start: 2020-07-24 | End: 2020-07-25 | Stop reason: HOSPADM

## 2020-07-24 RX ADMIN — TIZANIDINE 2 MG: 2 TABLET ORAL at 20:50

## 2020-07-24 RX ADMIN — ASPIRIN 325 MG: 325 TABLET, FILM COATED ORAL at 11:55

## 2020-07-24 RX ADMIN — LORAZEPAM 0.5 MG: 2 INJECTION INTRAMUSCULAR; INTRAVENOUS at 16:43

## 2020-07-24 RX ADMIN — ATORVASTATIN CALCIUM 80 MG: 80 TABLET, FILM COATED ORAL at 20:50

## 2020-07-24 RX ADMIN — ENOXAPARIN SODIUM 40 MG: 40 INJECTION SUBCUTANEOUS at 20:49

## 2020-07-24 RX ADMIN — ACETAMINOPHEN 650 MG: 325 TABLET, FILM COATED ORAL at 16:43

## 2020-07-24 RX ADMIN — OXYCODONE HYDROCHLORIDE AND ACETAMINOPHEN 1 TABLET: 5; 325 TABLET ORAL at 18:35

## 2020-07-24 RX ADMIN — PREGABALIN 150 MG: 150 CAPSULE ORAL at 20:50

## 2020-07-24 RX ADMIN — SODIUM CHLORIDE 1000 ML: 9 INJECTION, SOLUTION INTRAVENOUS at 11:32

## 2020-07-24 RX ADMIN — ESCITALOPRAM OXALATE 20 MG: 20 TABLET ORAL at 18:38

## 2020-07-24 RX ADMIN — Medication 10 ML: at 20:50

## 2020-07-24 RX ADMIN — ARIPIPRAZOLE 10 MG: 5 TABLET ORAL at 20:51

## 2020-07-24 RX ADMIN — METOPROLOL TARTRATE 25 MG: 25 TABLET, FILM COATED ORAL at 20:50

## 2020-07-24 RX ADMIN — FERROUS SULFATE TAB 325 MG (65 MG ELEMENTAL FE) 325 MG: 325 (65 FE) TAB at 20:50

## 2020-07-24 RX ADMIN — PRAMIPEXOLE DIHYDROCHLORIDE 0.5 MG: 0.5 TABLET ORAL at 18:35

## 2020-07-24 ASSESSMENT — ENCOUNTER SYMPTOMS
SHORTNESS OF BREATH: 0
WHEEZING: 0
PHOTOPHOBIA: 0
COUGH: 0
ABDOMINAL DISTENTION: 0
DIARRHEA: 0
CONSTIPATION: 0
ABDOMINAL PAIN: 0
VOMITING: 0
BLOOD IN STOOL: 0
NAUSEA: 0

## 2020-07-24 ASSESSMENT — PAIN SCALES - GENERAL
PAINLEVEL_OUTOF10: 9
PAINLEVEL_OUTOF10: 0
PAINLEVEL_OUTOF10: 6
PAINLEVEL_OUTOF10: 9
PAINLEVEL_OUTOF10: 9
PAINLEVEL_OUTOF10: 7

## 2020-07-24 ASSESSMENT — PAIN DESCRIPTION - LOCATION: LOCATION: RIB CAGE

## 2020-07-24 ASSESSMENT — PAIN DESCRIPTION - DESCRIPTORS: DESCRIPTORS: ACHING

## 2020-07-24 ASSESSMENT — PAIN DESCRIPTION - PAIN TYPE: TYPE: ACUTE PAIN

## 2020-07-24 ASSESSMENT — PAIN DESCRIPTION - ORIENTATION: ORIENTATION: LEFT

## 2020-07-24 ASSESSMENT — PAIN DESCRIPTION - PROGRESSION: CLINICAL_PROGRESSION: NOT CHANGED

## 2020-07-24 NOTE — PROGRESS NOTES
PHARMACY NOTE  Olga Lidia Art was ordered [employer]. Per the [address], this medication is non-formulary and not stocked by pharmacy. Zanaflex was substituted. The medication can be reordered at discharge.

## 2020-07-24 NOTE — PLAN OF CARE
Problem: Falls - Risk of:  Goal: Will remain free from falls  Description: Will remain free from falls  Outcome: Ongoing  Goal: Absence of physical injury  Description: Absence of physical injury  Outcome: Ongoing     Problem: Skin Integrity:  Goal: Will show no infection signs and symptoms  Description: Will show no infection signs and symptoms  Outcome: Ongoing  Goal: Absence of new skin breakdown  Description: Absence of new skin breakdown  Outcome: Ongoing  Goal: Risk for impaired skin integrity will decrease  Description: Risk for impaired skin integrity will decrease  Outcome: Ongoing     Problem:  Activity:  Goal: Risk for activity intolerance will decrease  Description: Risk for activity intolerance will decrease  Outcome: Ongoing     Problem: Coping:  Goal: Ability to adjust to condition or change in health will improve  Description: Ability to adjust to condition or change in health will improve  Outcome: Ongoing     Problem: Fluid Volume:  Goal: Ability to maintain a balanced intake and output will improve  Description: Ability to maintain a balanced intake and output will improve  Outcome: Ongoing     Problem: Health Behavior:  Goal: Ability to identify and utilize available resources and services will improve  Description: Ability to identify and utilize available resources and services will improve  Outcome: Ongoing  Goal: Ability to manage health-related needs will improve  Description: Ability to manage health-related needs will improve  Outcome: Ongoing     Problem: Metabolic:  Goal: Ability to maintain appropriate glucose levels will improve  Description: Ability to maintain appropriate glucose levels will improve  Outcome: Ongoing     Problem: Nutritional:  Goal: Maintenance of adequate nutrition will improve  Description: Maintenance of adequate nutrition will improve  Outcome: Ongoing  Goal: Progress toward achieving an optimal weight will improve  Description: Progress toward achieving an optimal weight will improve  Outcome: Ongoing     Problem: Physical Regulation:  Goal: Complications related to the disease process, condition or treatment will be avoided or minimized  Description: Complications related to the disease process, condition or treatment will be avoided or minimized  Outcome: Ongoing  Goal: Diagnostic test results will improve  Description: Diagnostic test results will improve  Outcome: Ongoing     Problem: Tissue Perfusion:  Goal: Adequacy of tissue perfusion will improve  Description: Adequacy of tissue perfusion will improve  Outcome: Ongoing

## 2020-07-24 NOTE — PROGRESS NOTES
1420: Pt arrived to [facility] via transportation. 1511: Admission assessment complete. Pt is A&O x 4, LS are diminished, BS are active x 4 quadrants, HR is NSR. Pts left side is flaccid due to previous stroke, her speech is slow and slightly slurred from her previous stroke also. Right hand grasp are moderate, right foot fexions are moderate. She ambulates with standby assist. Bed in lowest position with wheels locked and alarm on. Pt voiced complaint of a headache at this time. Home medication list was reviewed with the patient by Jamarcus Ramos, [address] [place] [address]: Skin assessment completed with Vitor Barriga RN as second Nurse. Small red area noted to the right elbow. Pt stated she believes it could be from her leaning on her elbow because she has sensitive skin. No other areas noted to have problems. 1650: Pt departed [facility] via transportation cart in route to MRI    1750: Pt returned from MRI via transportation cart at this time. 1838: RN witnessed pt ambulating in room alone. Alarm was previously noted to be on. Alarm did not sound to alert staff that pt was getting up. Pt admitted to shutting alarm off. Pt voiced c/o headache pain rated 7/10. PRN Percocet administered at this time.
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
## 2020-07-24 NOTE — CARE COORDINATION
[facility] Case Management Initial Discharge Assessment    Met with Patient to discuss discharge plan. PCP: Simona Hodgkins, MD                                Date of Last Visit: 7/23/20    If no PCP, list provided? N/A    Discharge Planning    Living Arrangements: independently at home    Who do you live with? Significant other    Who helps you with your care:  self    If lives at home:     Do you have any barriers navigating in your home? no    Patient can perform ADL? Yes    Current Services (outpatient and in home) :  None    Dialysis: No    Is transportation available to get to your appointments? Yes    DME Equipment:  no    Respiratory equipment: None    Respiratory provider:  no     Pharmacy:  yes - [facility]    Consult with Medication Assistance Program?  No        Does Patient Have a High-Risk for Readmission Diagnosis (CHF, PN, MI, COPD)? Yes h/o copd. Initial Discharge Plan? (Note: please see concurrent daily documentation for any updates after initial note). CM to assess for further d/c needs and referrals.       Electronically signed by Jcarlos Loving on 7/24/2020 at 4:21 PM
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
## 2020-07-24 NOTE — ED NOTES
Report called to [facility]. No tele ordered. Transportation notified. Patient updated.      Parker Tobar RN  07/24/20 3975
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
## 2020-07-24 NOTE — ED NOTES
Bed: 15  Expected date: 7/24/20  Expected time: 10:57 AM  Means of arrival: [facility]  Comments:  60f 0500 woke up not feeling right. Increased slurred speech per care giver and difficulty walking. 98 nsr.  128/67 97% ra one touch 108. 20 right ac.      Panfilo Howell RN  07/24/20 110

## 2020-07-25 VITALS
DIASTOLIC BLOOD PRESSURE: 62 MMHG | OXYGEN SATURATION: 98 % | BODY MASS INDEX: 29.15 KG/M2 | WEIGHT: 158.4 LBS | HEART RATE: 86 BPM | TEMPERATURE: 98.1 F | SYSTOLIC BLOOD PRESSURE: 140 MMHG | HEIGHT: 62 IN | RESPIRATION RATE: 18 BRPM

## 2020-07-25 LAB
ALBUMIN SERPL-MCNC: 3.4 G/DL (ref 3.5–4.6)
ANION GAP SERPL CALCULATED.3IONS-SCNC: 14 MEQ/L (ref 9–15)
BUN BLDV-MCNC: 15 MG/DL (ref 6–20)
CALCIUM SERPL-MCNC: 8.4 MG/DL (ref 8.5–9.9)
CHLORIDE BLD-SCNC: 110 MEQ/L (ref 95–107)
CHOLESTEROL, TOTAL: 131 MG/DL (ref 0–199)
CO2: 22 MEQ/L (ref 20–31)
CREAT SERPL-MCNC: 0.82 MG/DL (ref 0.5–0.9)
GFR AFRICAN AMERICAN: >60
GFR NON-AFRICAN AMERICAN: >60
GLUCOSE BLD-MCNC: 113 MG/DL (ref 60–115)
GLUCOSE BLD-MCNC: 92 MG/DL (ref 70–99)
HBA1C MFR BLD: 5.2 % (ref 4.8–5.9)
HCT VFR BLD CALC: 30.7 % (ref 37–47)
HDLC SERPL-MCNC: 35 MG/DL (ref 40–59)
HEMOGLOBIN: 9.5 G/DL (ref 12–16)
LDL CHOLESTEROL CALCULATED: 49 MG/DL (ref 0–129)
MAGNESIUM: 2.3 MG/DL (ref 1.7–2.4)
MCH RBC QN AUTO: 23.6 PG (ref 27–31.3)
MCHC RBC AUTO-ENTMCNC: 31 % (ref 33–37)
MCV RBC AUTO: 76.2 FL (ref 82–100)
PDW BLD-RTO: 22.1 % (ref 11.5–14.5)
PERFORMED ON: NORMAL
PHOSPHORUS: 3.4 MG/DL (ref 2.3–4.8)
PLATELET # BLD: 274 K/UL (ref 130–400)
POTASSIUM SERPL-SCNC: 4.2 MEQ/L (ref 3.4–4.9)
RBC # BLD: 4.03 M/UL (ref 4.2–5.4)
SODIUM BLD-SCNC: 146 MEQ/L (ref 135–144)
T4 FREE: 1.03 NG/DL (ref 0.84–1.68)
TRIGL SERPL-MCNC: 233 MG/DL (ref 0–150)
WBC # BLD: 6.7 K/UL (ref 4.8–10.8)

## 2020-07-25 PROCEDURE — 6370000000 HC RX 637 (ALT 250 FOR IP): Performed by: INTERNAL MEDICINE

## 2020-07-25 PROCEDURE — 93010 ELECTROCARDIOGRAM REPORT: CPT | Performed by: INTERNAL MEDICINE

## 2020-07-25 PROCEDURE — 83036 HEMOGLOBIN GLYCOSYLATED A1C: CPT

## 2020-07-25 PROCEDURE — 96372 THER/PROPH/DIAG INJ SC/IM: CPT

## 2020-07-25 PROCEDURE — 80069 RENAL FUNCTION PANEL: CPT

## 2020-07-25 PROCEDURE — 94761 N-INVAS EAR/PLS OXIMETRY MLT: CPT

## 2020-07-25 PROCEDURE — G0378 HOSPITAL OBSERVATION PER HR: HCPCS

## 2020-07-25 PROCEDURE — 83735 ASSAY OF MAGNESIUM: CPT

## 2020-07-25 PROCEDURE — 84439 ASSAY OF FREE THYROXINE: CPT

## 2020-07-25 PROCEDURE — 99219 PR INITIAL OBSERVATION CARE/DAY 50 MINUTES: CPT | Performed by: PSYCHIATRY & NEUROLOGY

## 2020-07-25 PROCEDURE — 97162 PT EVAL MOD COMPLEX 30 MIN: CPT

## 2020-07-25 PROCEDURE — 2580000003 HC RX 258: Performed by: INTERNAL MEDICINE

## 2020-07-25 PROCEDURE — 36415 COLL VENOUS BLD VENIPUNCTURE: CPT

## 2020-07-25 PROCEDURE — 6360000002 HC RX W HCPCS: Performed by: INTERNAL MEDICINE

## 2020-07-25 PROCEDURE — 85027 COMPLETE CBC AUTOMATED: CPT

## 2020-07-25 PROCEDURE — 94640 AIRWAY INHALATION TREATMENT: CPT

## 2020-07-25 PROCEDURE — 80061 LIPID PANEL: CPT

## 2020-07-25 RX ADMIN — METOPROLOL TARTRATE 25 MG: 25 TABLET, FILM COATED ORAL at 08:52

## 2020-07-25 RX ADMIN — POTASSIUM CHLORIDE 20 MEQ: 20 TABLET, EXTENDED RELEASE ORAL at 08:52

## 2020-07-25 RX ADMIN — ENOXAPARIN SODIUM 40 MG: 40 INJECTION SUBCUTANEOUS at 08:52

## 2020-07-25 RX ADMIN — PREGABALIN 150 MG: 150 CAPSULE ORAL at 08:52

## 2020-07-25 RX ADMIN — Medication 10 ML: at 08:52

## 2020-07-25 RX ADMIN — OXYCODONE HYDROCHLORIDE AND ACETAMINOPHEN 1 TABLET: 5; 325 TABLET ORAL at 06:47

## 2020-07-25 RX ADMIN — ACETAMINOPHEN 650 MG: 325 TABLET, FILM COATED ORAL at 02:58

## 2020-07-25 RX ADMIN — ASPIRIN 81 MG: 81 TABLET, COATED ORAL at 08:52

## 2020-07-25 RX ADMIN — FERROUS SULFATE TAB 325 MG (65 MG ELEMENTAL FE) 325 MG: 325 (65 FE) TAB at 08:52

## 2020-07-25 RX ADMIN — BUDESONIDE AND FORMOTEROL FUMARATE DIHYDRATE 2 PUFF: 160; 4.5 AEROSOL RESPIRATORY (INHALATION) at 10:17

## 2020-07-25 RX ADMIN — TIZANIDINE 2 MG: 2 TABLET ORAL at 10:03

## 2020-07-25 RX ADMIN — PANTOPRAZOLE SODIUM 40 MG: 40 TABLET, DELAYED RELEASE ORAL at 06:10

## 2020-07-25 RX ADMIN — OXYCODONE HYDROCHLORIDE AND ACETAMINOPHEN 1 TABLET: 5; 325 TABLET ORAL at 12:32

## 2020-07-25 ASSESSMENT — PAIN DESCRIPTION - PROGRESSION
CLINICAL_PROGRESSION: NOT CHANGED

## 2020-07-25 ASSESSMENT — PAIN SCALES - WONG BAKER
WONGBAKER_NUMERICALRESPONSE: 2

## 2020-07-25 ASSESSMENT — ENCOUNTER SYMPTOMS
TROUBLE SWALLOWING: 0
CHOKING: 0
COLOR CHANGE: 0
PHOTOPHOBIA: 0
SHORTNESS OF BREATH: 0
BACK PAIN: 0
VOMITING: 0
NAUSEA: 0

## 2020-07-25 ASSESSMENT — PAIN DESCRIPTION - LOCATION: LOCATION: RIB CAGE

## 2020-07-25 ASSESSMENT — PAIN DESCRIPTION - PAIN TYPE: TYPE: ACUTE PAIN

## 2020-07-25 ASSESSMENT — PAIN SCALES - GENERAL
PAINLEVEL_OUTOF10: 6
PAINLEVEL_OUTOF10: 7
PAINLEVEL_OUTOF10: 6
PAINLEVEL_OUTOF10: 9

## 2020-07-25 NOTE — PROGRESS NOTES
Hospitalist Progress Note      PCP: Frederick Mcgarry MD    Date of Admission: 7/24/2020    Chief Complaint:  No acute events, afebrile, stable HD, feels better, wants to go home    Medications:  Reviewed    Infusion Medications    dextrose       Scheduled Medications    sodium chloride flush  10 mL Intravenous 2 times per day    enoxaparin  40 mg Subcutaneous Daily    aspirin  81 mg Oral Daily    atorvastatin  80 mg Oral Nightly    ARIPiprazole  10 mg Oral Nightly    escitalopram  20 mg Oral Daily    pantoprazole  40 mg Oral QAM AC    ferrous sulfate  325 mg Oral BID    metoprolol tartrate  25 mg Oral BID    potassium chloride  20 mEq Oral Daily with breakfast    pramipexole  0.5 mg Oral QPM    pregabalin  150 mg Oral BID    budesonide-formoterol  2 puff Inhalation BID    insulin lispro  0-6 Units Subcutaneous TID WC    insulin lispro  0-3 Units Subcutaneous Nightly    tiZANidine  2 mg Oral BID     PRN Meds: sodium chloride flush, polyethylene glycol, promethazine **OR** ondansetron, acetaminophen, oxyCODONE-acetaminophen, LORazepam, glucose, dextrose, glucagon (rDNA), dextrose    No intake or output data in the 24 hours ending 07/25/20 1029    Exam:    BP (!) 140/62   Pulse 86   Temp 98.1 °F (36.7 °C) (Oral)   Resp 18   Ht 5' 2\" (1.575 m)   Wt 158 lb 6.4 oz (71.8 kg)   LMP  (LMP Unknown)   SpO2 98%   BMI 28.97 kg/m²     General appearance: alert, appears stated age and cooperative  Lungs: clear to auscultation bilaterally  Heart: regular rate and rhythm and S1, S2 normal  Abdomen: soft, BS active  Extremities: no edema, contracted left hand  Neurologic: has dysarthria and LUE weakness due to previous stroke and recent shoulder fracture         Labs:   Recent Labs     07/23/20  1324 07/24/20  1130 07/25/20  0516   WBC 7.6 8.2 6.7   HGB 10.2* 10.7* 9.5*   HCT 32.2* 33.4* 30.7*    286 274     Recent Labs     07/23/20  1324 07/24/20  1130 07/25/20  0516    140 146*   K 3.9 4.1 4.2   CL
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
## 2020-07-25 NOTE — DISCHARGE SUMMARY
Hospital Medicine Discharge Summary    Kimberley Rivas  :  1960  MRN:  [id number]    Admit date:  2020  Discharge date:  2020    Admitting Physician:  Chester Rajan MD  Primary Care Physician:  Manda Caputo MD      Discharge Diagnoses: Active Problems:    TIA (transient ischemic attack)  Resolved Problems:    * No resolved hospital problems. *      Hospital Course:   Kimberley Rivas is a 64 y.o. female that was admitted and treated at [facility] for the following medical issues:     Slurred speech and weakness  - NIHSS was 0  - CT head on arrival was negative,   - case was discussed with neurology, ASA was administered  - MRI brain was negative for  acute ischemic CVA   - continued ASA and statin therapy  - clinically improved  - Ok to d/c per neurology       Patient was seen by the following consultants while admitted to [facility]:   Consults:  [facility]    Significant Diagnostic Studies:    Ct Head Wo Contrast    Result Date: 2020  EXAMINATION: CT of the brain without contrast HISTORY: Fatigue. Lethargy. Slurred speech. COMPARISON: CT brain from 2020 TECHNIQUE: Multiple contiguous axial images were obtained of the brain from the skull base through the vertex. Multiplanar reformats were obtained. FINDINGS: Prominence of the sulci and ventricles compatible with mild generalized parenchymal volume loss. Gray-white matter differentiation is preserved. Areas of bilateral supratentorial white matter hypoattenuation are nonspecific but most likely related to chronic small vessel ischemic changes in a patient of this age. No acute hemorrhage or abnormal extra-axial fluid collection. No mass effect or midline shift. The visualized paranasal sinuses and mastoid air cells are clear. Postsurgical changes of the occiput again identified. No acute osseous abnormality. No acute intracranial process or significant interval change.  All CT scans at this facility use dose modulation, iterative reconstruction, and/or weight based dosing when appropriate to reduce radiation dose to as low as reasonably achievable. Xr Chest Portable    Result Date: 2020  Patient MRN: [id number] : 1960 Age:  64 years Gender: Female Order Date: 2020 11:30 AM. Exam: XR CHEST PORTABLE Number of Views: 2 Indication:  Shortness of breath and fatigue Comparison: 2020 Findings: Left reverse total shoulder arthroplasty. Cardiac mediastinal silhouette is stable and enlarged. No infiltrate/pneumonia, pneumothorax or pleural effusion. Severe degenerative changes right glenohumeral joint. Impression:  1. Stable, enlarged cardiomediastinal silhouette. 2. Please note this study does not exclude the possibility of underlying CoVid-19 viral infection and/or underlying pulmonary involvement. 3. Severe right glenohumeral osteoarthritis. Stable postoperative changes on the left. Mri Brain Without Contrast    Result Date: 2020  MRI of the brain without contrast. HISTORY: Slurred speech and unsteady gait. Concern for infarct. COMPARISON: 2020 noncontrast CT the brain. 2020 and 2006 MRI of the brain TECHNIQUE: Sagittal T1. Axial T1, FLAIR, T2, susceptibility weighted, and diffusion-weighted images. Coronal T2. FINDINGS: Image quality is limited by patient motion particularly the susceptibility weighted images. No restricted diffusion to indicate acute infarct. No mass effect, or extra-axial collections. Moderate diffuse supratentorial atrophy and prominence of the ventricular system not significantly changed since 2020. Slightly more prominent ventricles compared to . Ventricular dilatation is mildly out of proportion to the amount of atrophy with and increased Alexis index (0.36). This may be associated normal pressure hydrocephalus but is nonspecific.  The callosal angle however is normal. Moderate to severe bilateral rind of diffuse periventricular white matter long TR sequence hyperintensity with a deeper periventricular rind of encephalomalacia paralleling the lateral ventricles is not significantly since the previous studies. This may represent chronic small vessel ischemic, degenerative, metabolic or demyelinating disease. Bilateral focal globus pallidus encephalomalacia, larger on the left than right, likely remote lacunar infarcts, unchanged. Mild partial empty sella is also stable. The finding is nonspecific, can be seen in the absence of symptoms, but has been described with various clinical symptoms (e.g. headaches),idiopathic intracranial hypertension and/or pituitary-related hormone abnormalities. Stable slight prominence of the optic nerve sheath complexes bilaterally is a nonspecific finding but has been described with idiopathic intracranial hypertension. No cerebellar tonsillar ectopia. Within the limitations of motion no pathologic mineralization on the susceptibility weighted images. Physiologic calcifications are present in the choroid of the lateral ventricles and the pineal gland. Minimal chronic bilateral ethmoid sinus mucosal thickening as previously described. Mastoid air cells unremarkable. No acute abnormalities. Specifically no acute infarct or significant interval change since 6/14/2020. Numerous chronic changes as described in detail above minimally changed except for greater prominence of the ventricles compared to 2006.       Discharge Medications:       Taryn Sutherland   Home Medication Instructions OWV:[id number]    Printed on:07/25/20 1035   Medication Information                      AIMOVIG 70 MG/ML SOAJ  ADM 1 ML SC 1 TIME Q MONTH             Alcohol Swabs (ALCOHOL PADS) 70 % PADS  USE ONCE DAILY             ARIPiprazole (ABILIFY) 10 MG tablet  TAKE ONE TABLET BY MOUTH EACH NIGHT AT BEDTIME             Ascorbic Acid (VITAMIN C/KATIA HIPS) 500 MG TABS  TAKE 1 TABLET BY MOUTH DAILY             Blood Glucose Calibration (ADVOCATE REDI-CODE+ CONTROL) High SOLN  USE AS DIRECTED. Blood Glucose Monitoring Suppl (ADVOCATE REDI-CODE+) COLE  USE AS DIRECTED.             calcipotriene (DOVONEX) 0.005 % cream               Capsaicin 0.1 % CREA  Apply 1 applicator topically 3 times daily as needed (pain)             carisoprodol (SOMA) 350 MG tablet  Take 350 mg by mouth 2 times daily as needed for Muscle spasms. Two tablets daily as needed             cephALEXin (KEFLEX) 500 MG capsule  Take 1 capsule by mouth 3 times daily for 10 days             clobetasol (TEMOVATE) 0.05 % ointment               Control Gel Formula Dressing (DUODERM CGF EXTRA THIN) MISC  Apply to sacral region every other day             diclofenac sodium (VOLTAREN) 1 % GEL  Apply 2 g topically 2 times daily             doxycycline monohydrate (ADOXA) 100 MG tablet  Take 1 tablet by mouth 2 times daily for 7 days May substitute another form of Doxycycline if insurance requires.              Easy Comfort Lancets MISC  TEST ONCE DAILY             escitalopram (LEXAPRO) 20 MG tablet  TAKE ONE TABLET BY MOUTH DAILY             esomeprazole (NEXIUM) 40 MG delayed release capsule  Take 2 capsules by mouth every day             Estradiol (VAGIFEM) 10 MCG TABS vaginal tablet  insert 1 tablet vaginally two times a week INTO LOWER 1/3 OF VAGINA             ferrous sulfate (IRON 325) 325 (65 Fe) MG tablet  Take 1 tablet by mouth 2 times daily             folic acid (FOLVITE) 1 MG tablet  Take one tablet by mouth every day             Lancet Devices (EASY MINI EJECT LANCING DEVICE) MISC  USE AS DIRECTED.             levothyroxine (SYNTHROID) 50 MCG tablet  take 1 tablet by mouth once daily             loperamide (IMODIUM A-D) 2 MG tablet  Take 2 mg by mouth as needed for Diarrhea              LORazepam (ATIVAN) 0.5 MG tablet  Take 1 tablet every 8 hours as needed for anxiety             magnesium oxide (MAG-OX) 400 MG tablet  Take one tablet by mouth every day             metFORMIN (GLUCOPHAGE) 500 MG tablet  Take 1 tablet by mouth 2 times daily (with meals)             metoprolol tartrate (LOPRESSOR) 25 MG tablet  Take 1 tablet by mouth 2 times daily             Nutritional Supplements (BOOST HIGH PROTEIN) LIQD  DRINK 1 CAN BY MOUTH TWICE DAILY             Olopatadine HCl (PAZEO) 0.7 % SOLN  INSTILL 1 DROP IN BOTH EYES EVERY MORNING             omega-3 acid ethyl esters (LOVAZA) 1 g capsule               ondansetron (ZOFRAN-ODT) 4 MG disintegrating tablet  Dissolve 1 under tongue every 8hrs as need for n/v             ONETOUCH ULTRA strip  use 1 TEST STRIP to TEST BLOOD SUGAR three times a day             potassium chloride (KLOR-CON M) 20 MEQ extended release tablet  Take 1 tablet by mouth every day             pramipexole (MIRAPEX) 0.5 MG tablet  Take 1 tablet by mouth every evening             pregabalin (LYRICA) 150 MG capsule  Take 1 capsule by mouth twice daily             PROAIR RESPICLICK 181 (90 Base) MCG/ACT aerosol powder inhalation               rosuvastatin (CRESTOR) 20 MG tablet  Take 1 tablet by mouth every day             Simethicone 180 MG CAPS  TAKE ONE SOFTGEL BY MOUTH TWICE DAILY             SYMBICORT 160-4.5 MCG/ACT AERO  INL 2 PFS PO BID UTD             vitamin B-12 (CYANOCOBALAMIN) 1000 MCG tablet  Take 1 tablet by mouth daily             vitamin D (ERGOCALCIFEROL) 1.25 MG (30075 UT) CAPS capsule  Take 1 capsule by mouth once a week                 Disposition:   Discharged to Home. Any [facility] needs that were indicated and/or required as been addressed and set up by Social Work. Condition at discharge: Pt was medically stable at the time of discharge. Significant improvement in clinical condition compared to initial condition at presentation to hospital    Activity: activity as tolerated, fall precautions. Total time taken for discharging this patient: 40 minutes.  Greater than 70% of time was spent focused exclusively on this patient. Time was taken to review chart, discuss plans with consultants, reconciling medications, discussing plan answering questions with patient. SignedZetta Fothergill  7/25/2020, 10:35 AM  ----------------------------------------------------------------------------------------------------------------------    Christi Cabezas,     Please return to ER or call 911 if you develop any significant signs or symptoms.     I may not have addressed all of your medical illnesses or the abnormal blood work or imaging therefore please ask your PCP, Jessica Benjamin MD ,  to obtain [facility] record to follow up on all of the abnormal labs, imaging and findings that I have and have not addressed during your hospitalization.      Discharging you from the hospital does not mean that your medical care ends here and now. You may still need additional work up, investigation, monitoring, and treatment to be handled from this point on by outside providers including your PCP, Jessica Benjamin MD , Specialists and other healthcare providers.      Please review your list of discharge medications prior to resuming medications you might still have at home, as the medications you need to be taking, dosages or how often you must take them may have changed. For medication questions, contact your retail pharmacy and your PCP, Jessica Benjamin MD .     ** I STRONGLY RECOMMEND that you follow up with Jessica Benjamin MD within 3 to 5 days for a post hospitalization evaluation. This specific office visit is covered by your insurance, and is not the same as your annual doctor visit/ check up. This office visit is important, as it may prevent need for repeat and/or future hospitalizations. **    Your medical team at [facility] appreciates the opportunity to work with you to get well!     Sincerely,  Lee Colmenares

## 2020-07-25 NOTE — PROGRESS NOTES
[facility]   Facility/[place] [facility]nelly  Speech Language Pathology    Olga Lidia Wagner  1960  [id number]    Date: 7/25/2020      Speech Therapy attempted to see Edwardo Haney on this date for a/an:    Speech Language Evaluation    Pt was unable to be seen due to:   Patient refused Patient stated that she has sounded like this for at least 20 years since her CVA.     Carissa Huang: RN states that patient is at baseline        Electronically signed by YOANDY Covington on 7/25/20 at 10:43 AM EDT

## 2020-07-28 ENCOUNTER — CARE COORDINATION (OUTPATIENT)
Dept: CASE MANAGEMENT | Age: 60
End: 2020-07-28
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
## 2020-07-30 NOTE — TELEPHONE ENCOUNTER
Pt asking for Boost glucose control to be sent in to pharmacy.      Patient uses [email or web address] on [address] in [facility]

## 2020-07-31 ENCOUNTER — VIRTUAL VISIT (OUTPATIENT)
Dept: FAMILY MEDICINE CLINIC | Age: 60
End: 2020-07-31
Payer: MEDICARE

## 2020-07-31 PROCEDURE — 99442 PR PHYS/QHP TELEPHONE EVALUATION 11-20 MIN: CPT | Performed by: FAMILY MEDICINE

## 2020-07-31 RX ORDER — NYSTATIN 100000 [USP'U]/G
POWDER TOPICAL
Qty: 60 G | Refills: 2 | Status: SHIPPED | OUTPATIENT
Start: 2020-07-31 | End: 2020-10-04

## 2020-07-31 RX ORDER — MIRTAZAPINE 15 MG/1
TABLET, FILM COATED ORAL
COMMUNITY
Start: 2020-07-31 | End: 2020-12-01 | Stop reason: DRUGHIGH

## 2020-07-31 RX ORDER — LEVOTHYROXINE SODIUM 0.2 MG/1
200 TABLET ORAL DAILY
Qty: 30 TABLET | Refills: 5 | Status: SHIPPED | OUTPATIENT
Start: 2020-07-31 | End: 2021-01-04 | Stop reason: SDUPTHER

## 2020-07-31 RX ORDER — LACTOSE-REDUCED FOOD 0.06G-1/ML
LIQUID (ML) ORAL
Qty: 14220 ML | Refills: 5 | Status: SHIPPED | OUTPATIENT
Start: 2020-07-31 | End: 2020-08-18 | Stop reason: SDUPTHER

## 2020-07-31 RX ORDER — FUROSEMIDE 40 MG/1
TABLET ORAL
Qty: 30 TABLET | Refills: 11 | Status: SHIPPED | OUTPATIENT
Start: 2020-07-31 | End: 2021-01-11 | Stop reason: SDUPTHER
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
## 2020-08-07 NOTE — DISCHARGE SUMMARY
Discharge summary  This patient Kassidy Lea was admitted to the hospital on 5/17/2019  after undergoing Procedure(s) (LRB):  LEFT REMOVAL GLENOID AND CONVERSION TO REVERSE TOTAL SHOULDER ARTHROPLASTY, SANDY REVERSE TSA, JOSE DAVID EQUIPMENT FLEXIBLE OSTEOTOMES, SCALENE NERVE BLOCK, LATEX ALLERGY (Left) without complications that morning. During the postoperative period,while in hospital, patient was medically managed by the hospitalist. Please see medial notes and H&P for patients additional diagnoses. Ortho agrees with all medical diagnoses and treatments while patient in hospital.  No significant or unexpected findings or abnormal ortho imaging were noted during the hospital stay    Hospital course  Patient tolerated surgical procedure well and there was no complications. Patient progressed adequtly through their recovery during hospital stay including PT and rehabilitation. Patient was then D/C on 5/21/2019 to Skilled nursing facility  in stable condition. Patient was instructed on the use of pain medications, the signs and symptoms of infection, and was given our number to call should they have any questions or concerns following discharge.
63

## 2020-08-14 RX ORDER — PREGABALIN 150 MG/1
CAPSULE ORAL
Qty: 60 CAPSULE | Refills: 5 | Status: CANCELLED | OUTPATIENT
Start: 2020-08-14 | End: 2021-02-13

## 2020-08-14 RX ORDER — LORAZEPAM 0.5 MG/1
TABLET ORAL
Qty: 60 TABLET | Refills: 0 | OUTPATIENT
Start: 2020-08-14 | End: 2020-09-14

## 2020-08-17 NOTE — TELEPHONE ENCOUNTER
Pt asking for a script for a cane.   Would like that to go to [facility]

## 2020-08-18 RX ORDER — LACTOSE-REDUCED FOOD 0.06G-1/ML
LIQUID (ML) ORAL
Qty: 14220 ML | Refills: 5 | Status: SHIPPED | OUTPATIENT
Start: 2020-08-18 | End: 2020-08-27 | Stop reason: SDUPTHER

## 2020-08-21 RX ORDER — LORAZEPAM 0.5 MG/1
TABLET ORAL
Qty: 60 TABLET | Refills: 0 | OUTPATIENT
Start: 2020-08-21 | End: 2020-09-21

## 2020-08-21 RX ORDER — FLUCONAZOLE 150 MG/1
TABLET ORAL
Qty: 1 TABLET | Refills: 0 | Status: SHIPPED | OUTPATIENT
Start: 2020-08-21 | End: 2020-08-27 | Stop reason: SDUPTHER

## 2020-08-21 NOTE — TELEPHONE ENCOUNTER
60 were ordered by Tarik Quiroga 2 weeks ago. This was requested last week and again today.   What is going on?

## 2020-08-27 ENCOUNTER — OFFICE VISIT (OUTPATIENT)
Dept: FAMILY MEDICINE CLINIC | Age: 60
End: 2020-08-27
Payer: MEDICARE

## 2020-08-27 VITALS
OXYGEN SATURATION: 95 % | TEMPERATURE: 98.1 F | HEIGHT: 62 IN | DIASTOLIC BLOOD PRESSURE: 78 MMHG | BODY MASS INDEX: 28.97 KG/M2 | HEART RATE: 79 BPM | SYSTOLIC BLOOD PRESSURE: 124 MMHG

## 2020-08-27 PROCEDURE — 3044F HG A1C LEVEL LT 7.0%: CPT | Performed by: FAMILY MEDICINE

## 2020-08-27 PROCEDURE — 3017F COLORECTAL CA SCREEN DOC REV: CPT | Performed by: FAMILY MEDICINE

## 2020-08-27 PROCEDURE — 99213 OFFICE O/P EST LOW 20 MIN: CPT | Performed by: FAMILY MEDICINE

## 2020-08-27 PROCEDURE — 2022F DILAT RTA XM EVC RTNOPTHY: CPT | Performed by: FAMILY MEDICINE

## 2020-08-27 PROCEDURE — G8427 DOCREV CUR MEDS BY ELIG CLIN: HCPCS | Performed by: FAMILY MEDICINE

## 2020-08-27 PROCEDURE — 4004F PT TOBACCO SCREEN RCVD TLK: CPT | Performed by: FAMILY MEDICINE

## 2020-08-27 PROCEDURE — G8417 CALC BMI ABV UP PARAM F/U: HCPCS | Performed by: FAMILY MEDICINE

## 2020-08-27 RX ORDER — LORAZEPAM 0.5 MG/1
0.5 TABLET ORAL 2 TIMES DAILY PRN
Qty: 60 TABLET | Refills: 0 | Status: SHIPPED | OUTPATIENT
Start: 2020-08-27 | End: 2020-09-28 | Stop reason: SDUPTHER

## 2020-08-27 RX ORDER — PREGABALIN 150 MG/1
150 CAPSULE ORAL 3 TIMES DAILY
Qty: 90 CAPSULE | Refills: 5 | Status: SHIPPED | OUTPATIENT
Start: 2020-08-27 | End: 2020-10-01 | Stop reason: SDUPTHER

## 2020-08-27 RX ORDER — FLUCONAZOLE 150 MG/1
TABLET ORAL
Qty: 1 TABLET | Refills: 0 | Status: SHIPPED | OUTPATIENT
Start: 2020-08-27 | End: 2020-11-02 | Stop reason: ALTCHOICE

## 2020-08-27 RX ORDER — LACTOSE-REDUCED FOOD 0.06G-1/ML
LIQUID (ML) ORAL
Qty: 14220 ML | Refills: 5 | Status: SHIPPED | OUTPATIENT
Start: 2020-08-27 | End: 2020-09-28 | Stop reason: SDUPTHER

## 2020-08-27 RX ORDER — LORAZEPAM 0.5 MG/1
TABLET ORAL
Qty: 60 TABLET | Refills: 0 | Status: CANCELLED | OUTPATIENT
Start: 2020-08-27 | End: 2020-09-27
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
## 2020-08-31 ENCOUNTER — TELEPHONE (OUTPATIENT)
Dept: FAMILY MEDICINE CLINIC | Age: 60
End: 2020-08-31

## 2020-08-31 NOTE — TELEPHONE ENCOUNTER
Pt calling states fingers are burning really bad and kept her up all night. Doesn't know what to do about it. Advised being seen, refused.

## 2020-09-09 RX ORDER — BLOOD SUGAR DIAGNOSTIC
STRIP MISCELLANEOUS
Qty: 300 STRIP | Refills: 3 | Status: SHIPPED | OUTPATIENT
Start: 2020-09-09 | End: 2020-09-10

## 2020-09-23 RX ORDER — LORAZEPAM 0.5 MG/1
0.5 TABLET ORAL 2 TIMES DAILY PRN
Qty: 60 TABLET | Refills: 0 | OUTPATIENT
Start: 2020-09-23 | End: 2020-10-23

## 2020-09-23 RX ORDER — BLOOD SUGAR DIAGNOSTIC
STRIP MISCELLANEOUS
Qty: 100 STRIP | Refills: 3 | Status: SHIPPED | OUTPATIENT
Start: 2020-09-23 | End: 2020-10-06

## 2020-09-28 ENCOUNTER — VIRTUAL VISIT (OUTPATIENT)
Dept: FAMILY MEDICINE CLINIC | Age: 60
End: 2020-09-28
Payer: MEDICARE

## 2020-09-28 PROCEDURE — 99442 PR PHYS/QHP TELEPHONE EVALUATION 11-20 MIN: CPT | Performed by: FAMILY MEDICINE

## 2020-09-28 RX ORDER — FLUCONAZOLE 150 MG/1
150 TABLET ORAL ONCE
Qty: 1 TABLET | Refills: 0 | Status: SHIPPED | OUTPATIENT
Start: 2020-09-28 | End: 2020-09-28

## 2020-09-28 RX ORDER — LACTOSE-REDUCED FOOD 0.06G-1/ML
LIQUID (ML) ORAL
Qty: 14220 ML | Refills: 5 | Status: SHIPPED | OUTPATIENT
Start: 2020-09-28 | End: 2020-10-21 | Stop reason: SDUPTHER

## 2020-09-28 RX ORDER — LORAZEPAM 0.5 MG/1
0.5 TABLET ORAL 2 TIMES DAILY PRN
Qty: 60 TABLET | Refills: 0 | Status: SHIPPED | OUTPATIENT
Start: 2020-09-28 | End: 2020-10-23 | Stop reason: SDUPTHER

## 2020-09-28 RX ORDER — OLOPATADINE HYDROCHLORIDE 7 MG/ML
SOLUTION OPHTHALMIC
Qty: 1 BOTTLE | Refills: 5 | Status: SHIPPED | OUTPATIENT
Start: 2020-09-28 | End: 2021-03-08 | Stop reason: SDUPTHER

## 2020-09-28 NOTE — PROGRESS NOTES
2020    TELEHEALTH EVALUATION -- Audio/Visual (During [facility]- public health emergency)    Due to COVID 19 outbreak, patient's office visit was converted to a virtual visit. Patient was contacted and agreed to proceed with a virtual visit via Telephone Visit  The risks and benefits of converting to a virtual visit were discussed in light of the current infectious disease epidemic. Patient also understood that insurance coverage and co-pays are up to their individual insurance plans.     HPI:    Josefa Contreras (:  1960) has requested an audio/video evaluation for the following concern(s):  Chief Complaint   Patient presents with    Medication Refill     would like script for eye drops     Was in just recently    In need of ativan refill    Also wants diflucan for yeast infection    She needs boost refill    Also wants her eye drops filled    Specialist notes at [facility] reviewed          Patient Active Problem List   Diagnosis    Hypothyroidism    Anxiety    COPD (chronic obstructive pulmonary disease) ([address]    Smoker    Cerebral infarction ([address]    Arnold-Chiari malformation, type I ([address]    Headache    Hyperlipidemia with target LDL less than 100    Pulmonary embolism and infarction ([address]    Laryngitis, chronic    Rhinitis, chronic    Depression    Acid reflux    H/O encephalopathy    Hepatitis B surface antigen positive    S/P colonoscopy with polypectomy    Hemiparesis affecting left side as late effect of cerebrovascular accident ([address]    Migraine    Seasonal allergic rhinitis due to pollen    Edema    Muscle spasm    Adhesive capsulitis of left shoulder    Congenital anomaly of adrenal gland    Airway hyperreactivity    Allergic rhinitis    Alveolitis of jaw    Anaclitic depression    Aortic valve disorder    Bipolar disorder (HCC)    Bone necrosis ([address]    Cellulitis of left lower extremity    Cervical dystonia    Cervicalgia    Chronic maxillary sinusitis    with routine healing, subsequent encounter    Severe sepsis ([facility]    Altered mental status    TIA (transient ischemic attack)     Past Medical History:   Diagnosis Date    Acid reflux     Allergic rhinitis     Anxiety     Arnold-Chiari deformity (HCC) 2000    surgery    Asthma     Cerebral artery occlusion with cerebral infarction ([facility] 2001    1 yr after brain OR    Cerebrovascular disease     Chronic back pain greater than 3 months duration     COPD (chronic obstructive pulmonary disease) (HCC)     Dr Arlene Gregg at [address] CVA (cerebral infarction) 2001    left hemiparesis, due to ? estrogen + smoking    Depression 1993    Dr Kimmie David H/O encephalopathy 2001    Headache(784.0)     Dr Murray Dougherty Hepatitis B surface antigen positive     Hx of blood clots 2010    PE / trx with coumadin x 6 months    Hyperlipidemia LDL goal < 100     meds > 10 yrs    Hypertension     meds > 2 yrs    Hypothyroidism 2001    meds > 18 yrs    Laryngitis, chronic 2012    scoped by Dr Vane Chapa, fungal    Marijuana smoker in remission [facility]) 2011    Obesity (BMI 30-39. 9)     Osteoarthritis     Pulmonary embolism and infarction (HCC)     Restless legs syndrome     Rhinitis, chronic     Rotator cuff tear     Left    S/P colonoscopy with polypectomy 2010    Dr Karthik Harley    Seizures [facility])     Smoker     Spinal headache     past partum 1994    Spondylosis without myelopathy     Type 2 diabetes mellitus without complication (HCC)     hx > 6 yrs    Urinary incontinence     Vertebral compression fracture (HCC)          Review of Systems     Otherwise physically feels healthy without sob/palpitations/chest pain/f/c/n/v/weight gain/weight loss/abd pain        Prior to Visit Medications    Medication Sig Taking? Authorizing Provider   LORazepam (ATIVAN) 0.5 MG tablet Take 1 tablet by mouth 2 times daily as needed for Anxiety for up to 30 days.  Yes Marjan Mcqueen MD   Nutritional Supplements (BOOST HIGH PROTEIN) LIQD One can by mouth daily Yes Cici Banda MD   fluconazole (DIFLUCAN) 150 MG tablet Take 1 tablet by mouth once for 1 dose Yes Cici Banda MD   Olopatadine HCl (PAZEO) 0.7 % SOLN INSTILL 1 DROP IN BOTH EYES EVERY MORNING Yes Cici Banda MD   [facility] ULTRA strip use 1 TEST STRIP to TEST BLOOD SUGAR three times a day Yes Cici Banda MD   fluconazole (DIFLUCAN) 150 MG tablet take 1 tablet by mouth AS A ONE TIME DOSE Yes Cici Banda MD   pregabalin (LYRICA) 150 MG capsule Take 1 capsule by mouth 3 times daily for 180 days. Yes Cici Banda MD   nystatin (MYCOSTATIN) 984261 UNIT/GM powder Apply 3 times daily.  Yes Cici Banda MD   furosemide (LASIX) 40 MG tablet Take 1 tablet by mouth every day Yes Cici Banda MD   mirtazapine (REMERON) 15 MG tablet TAKE ONE TABLET BY MOUTH AT BEDTIME Yes Cici Banda MD   levothyroxine (SYNTHROID) 200 MCG tablet Take 1 tablet by mouth Daily Yes Cici Banda MD   metoprolol tartrate (LOPRESSOR) 25 MG tablet Take 1 tablet by mouth 2 times daily Yes Cici Banda MD   rosuvastatin (CRESTOR) 20 MG tablet Take 1 tablet by mouth every day Yes Cici Banda MD   Capsaicin 0.1 % CREA Apply 1 applicator topically 3 times daily as needed (pain) Yes Cici Banda MD   diclofenac sodium (VOLTAREN) 1 % GEL Apply 2 g topically 2 times daily Yes Cici Banda MD   PROAIR RESPICLICK 045 (41 Base) MCG/ACT aerosol powder inhalation  Yes Historical Provider, MD   clobetasol (TEMOVATE) 0.05 % ointment  Yes Historical Provider, MD   calcipotriene (DOVONEX) 0.005 % cream  Yes Historical Provider, MD   AIMOVIG 70 MG/ML SOAJ ADM 1 ML SC 1 TIME Q MONTH Yes Historical Provider, MD   Estradiol (VAGIFEM) 10 MCG TABS vaginal tablet insert 1 tablet vaginally two times a week INTO LOWER 1/3 OF VAGINA Yes Historical Provider, MD   omega-3 acid ethyl esters (LOVAZA) 1 g capsule  Yes Historical Provider, MD   pramipexole (MIRAPEX) 0.5 MG tablet Take 1 tablet by mouth every evening Yes Cici Banda MD   potassium chloride (KLOR-CON M) 20 MEQ extended release tablet Take 1 tablet by mouth every day Yes Ignacio Guevara, MD   Alcohol Swabs (ALCOHOL PADS) 70 % PADS USE ONCE DAILY Yes Ignacio Guevara, MD   Lancet Devices (EASY MINI EJECT LANCING DEVICE) MISC USE AS DIRECTED. Yes Ignacio Daily, MD   Easy Comfort Lancets MISC TEST ONCE DAILY Yes Ignacio Guevara, MD   Blood Glucose Calibration (ADVOCATE REDI-CODE+ CONTROL) High SOLN USE AS DIRECTED. Yes Ignacio Guevara, MD   Blood Glucose Monitoring Suppl (ADVOCATE REDI-CODE+) COLE USE AS DIRECTED.  Yes Ignacio Daily, MD   SYMBICORT 160-4.5 MCG/ACT AERO INL 2 PFS PO BID UTD Yes Ignacio Daily, MD   Control Gel Formula Dressing (DUODERM CGF EXTRA THIN) MISC Apply to sacral region every other day Yes Ignacio Guevara, MD   folic acid (FOLVITE) 1 MG tablet Take one tablet by mouth every day Yes Ignacio Guevara, MD   vitamin D (ERGOCALCIFEROL) 1.25 MG (51891 UT) CAPS capsule Take 1 capsule by mouth once a week Yes Ignacio Guevara, MD   esomeprazole (NEXIUM) 40 MG delayed release capsule Take 2 capsules by mouth every day Yes Ignacio Daily, MD   escitalopram (LEXAPRO) 20 MG tablet TAKE ONE TABLET BY MOUTH DAILY Yes Ignacio Daily, MD   ARIPiprazole (ABILIFY) 10 MG tablet TAKE ONE TABLET BY MOUTH EACH NIGHT AT BEDTIME Yes Ignacio Guevara, MD   Simethicone 180 MG CAPS TAKE ONE SOFTGEL BY MOUTH TWICE DAILY Yes Ignacio Guevara, MD   ondansetron (ZOFRAN-ODT) 4 MG disintegrating tablet Dissolve 1 under tongue every 8hrs as need for n/v Yes Ignacio Guevara, MD   magnesium oxide (MAG-OX) 400 MG tablet Take one tablet by mouth every day Yes Ignacio Guevara, MD   metFORMIN (GLUCOPHAGE) 500 MG tablet Take 1 tablet by mouth 2 times daily (with meals) Yes Ignacio Guevara, MD   Ascorbic Acid (VITAMIN C/KATIA HIPS) 500 MG TABS TAKE 1 TABLET BY MOUTH DAILY Yes Ignacio Guevara, MD   vitamin B-12 (CYANOCOBALAMIN) 1000 MCG tablet Take 1 tablet by mouth daily Yes Ignacio Guevara, MD   loperamide (IMODIUM A-D) 2 MG tablet Take 2 mg by mouth as needed for Diarrhea  Yes Historical Provider, MD   ferrous sulfate (IRON 325) 325 (65 Fe) MG tablet Take 1 tablet by mouth 2 times daily  Patient not taking: Reported on 9/28/2020  Elizabeth Grimaldo MD       Social History     Tobacco Use    Smoking status: Current Every Day Smoker     Packs/day: 1.00     Years: 32.00     Pack years: 32.00     Types: Cigarettes    Smokeless tobacco: Never Used   Substance Use Topics    Alcohol use: No     Alcohol/week: 0.0 standard drinks    Drug use: Yes     Types: Marijuana     Comment: daily for pain            PHYSICAL EXAMINATION:  Patient-Reported Vitals 7/31/2020   Patient-Reported Weight 150 lb   Patient-Reported Height 5'2        No exam  Phone visit  Patient in no significant distress, conversant    Reports left wrist drop ever since her shoulder surgery    Hasn't begun home therapy yet  Will start next week    Several people in background during call    Due to this being a TeleHealth encounter, evaluation of the following organ systems is limited: Vitals/Constitutional/EENT/Resp/CV/GI//MS/Neuro/Skin/Heme-Lymph-Imm. Lab Results   Component Value Date    WBC 6.7 07/25/2020    HGB 9.5 (L) 07/25/2020    HCT 30.7 (L) 07/25/2020     07/25/2020    CHOL 131 07/25/2020    TRIG 233 (H) 07/25/2020    HDL 35 (L) 07/25/2020    ALT 12 07/24/2020    AST 17 07/24/2020     (H) 07/25/2020    K 4.2 07/25/2020     (H) 07/25/2020    CREATININE 0.82 07/25/2020    BUN 15 07/25/2020    CO2 22 07/25/2020    TSH 0.139 (L) 07/24/2020    INR 0.9 06/11/2020    LABA1C 5.2 07/25/2020    LABMICR 1.30 10/26/2019         ASSESSMENT/PLAN:     Diagnosis Orders   1. Allergic conjunctivitis of both eyes  Olopatadine HCl (PAZEO) 0.7 % SOLN   2. Anxiety  LORazepam (ATIVAN) 0.5 MG tablet   3. Protein malnutrition (HCC)  Nutritional Supplements (BOOST HIGH PROTEIN) LIQD   4.  Yeast vaginitis  fluconazole (DIFLUCAN) 150 MG tablet       See endocrine and multiple other specialists    Will start therapy on her hand and shoulder next week    Avoid sedating meds as much as possible    F/u withneuro    F/u with hematology/GI    Has multiple other specialists through [facility]    Will monitor     F/u 6 weeks      No follow-ups on file. An  electronic signature was used to authenticate this note. --Swapnil Amaya MD on 9/28/2020 at 10:07 AM        Pursuant to the emergency declaration under the [address] [place] [address] and the [employer] Act, this Virtual  Visit was conducted, with patient's consent, to reduce the patient's risk of exposure to COVID-19 and provide continuity of care for an established patient.     11 minute call

## 2020-09-29 NOTE — TELEPHONE ENCOUNTER
Per pt's insurance, BOOST is not covered at [employer]. We would need to call it into [employer] [phone]. Pt likes the chocolate flavor. See below for what needs ordered.      Tetra 27 per case  Item #: [id number]

## 2020-09-30 ENCOUNTER — TELEPHONE (OUTPATIENT)
Dept: FAMILY MEDICINE CLINIC | Age: 60
End: 2020-09-30

## 2020-09-30 NOTE — TELEPHONE ENCOUNTER
Pt calling, pt states she is still having periodic burning sensation through her left hand and fingers, pt asking if she can get another increase on her lyrica.

## 2020-10-01 RX ORDER — PREGABALIN 300 MG/1
300 CAPSULE ORAL 2 TIMES DAILY
Qty: 60 CAPSULE | Refills: 5 | Status: SHIPPED | OUTPATIENT
Start: 2020-10-01 | End: 2020-10-02 | Stop reason: SDUPTHER

## 2020-10-02 RX ORDER — PREGABALIN 300 MG/1
300 CAPSULE ORAL 2 TIMES DAILY
Qty: 30 CAPSULE | Refills: 0 | Status: SHIPPED | OUTPATIENT
Start: 2020-10-02 | End: 2020-10-27 | Stop reason: SDUPTHER

## 2020-10-04 RX ORDER — NYSTATIN 100000 [USP'U]/G
POWDER TOPICAL
Qty: 60 G | Refills: 5 | Status: SHIPPED | OUTPATIENT
Start: 2020-10-04

## 2020-10-05 ENCOUNTER — TELEPHONE (OUTPATIENT)
Dept: FAMILY MEDICINE CLINIC | Age: 60
End: 2020-10-05

## 2020-10-05 NOTE — TELEPHONE ENCOUNTER
Pharmacy calling stated they do not have the 0.5 mg for the lorazepam and wondering if they can prescribe/change it to the 1 mg and do 0.5 mg tab BID    Cori michaud - [phone]

## 2020-10-06 ENCOUNTER — TELEPHONE (OUTPATIENT)
Dept: FAMILY MEDICINE CLINIC | Age: 60
End: 2020-10-06

## 2020-10-06 RX ORDER — BLOOD GLUCOSE CONTROL HIGH,LOW
EACH MISCELLANEOUS
Qty: 1 EACH | Refills: 0 | Status: SHIPPED | OUTPATIENT
Start: 2020-10-06 | End: 2021-01-04 | Stop reason: SDUPTHER

## 2020-10-06 RX ORDER — BLOOD-GLUCOSE METER
EACH MISCELLANEOUS
Qty: 100 EACH | Refills: 3 | Status: SHIPPED | OUTPATIENT
Start: 2020-10-06 | End: 2020-11-12 | Stop reason: SDUPTHER

## 2020-10-06 RX ORDER — BLOOD SUGAR DIAGNOSTIC
STRIP MISCELLANEOUS
Qty: 200 EACH | Refills: 1 | Status: SHIPPED | OUTPATIENT
Start: 2020-10-06 | End: 2021-01-04 | Stop reason: SDUPTHER

## 2020-10-06 RX ORDER — BLOOD SUGAR DIAGNOSTIC
STRIP MISCELLANEOUS
Qty: 100 STRIP | Refills: 3 | Status: SHIPPED | OUTPATIENT
Start: 2020-10-06 | End: 2020-10-16 | Stop reason: SDUPTHER

## 2020-10-16 RX ORDER — BLOOD SUGAR DIAGNOSTIC
STRIP MISCELLANEOUS
Qty: 100 STRIP | Refills: 3 | Status: SHIPPED | OUTPATIENT
Start: 2020-10-16 | End: 2021-02-10

## 2020-10-21 RX ORDER — LACTOSE-REDUCED FOOD 0.06G-1/ML
LIQUID (ML) ORAL
Qty: 14220 ML | Refills: 5 | Status: SHIPPED | OUTPATIENT
Start: 2020-10-21 | End: 2020-11-09 | Stop reason: SDUPTHER

## 2020-10-23 RX ORDER — LORAZEPAM 0.5 MG/1
0.5 TABLET ORAL 2 TIMES DAILY PRN
Qty: 60 TABLET | Refills: 0 | Status: SHIPPED | OUTPATIENT
Start: 2020-10-23 | End: 2020-11-27 | Stop reason: SDUPTHER

## 2020-10-26 RX ORDER — IBUPROFEN 800 MG/1
TABLET ORAL
Qty: 30 TABLET | Refills: 11 | Status: SHIPPED | OUTPATIENT
Start: 2020-10-26 | End: 2021-04-26 | Stop reason: SDUPTHER

## 2020-10-27 RX ORDER — PREGABALIN 300 MG/1
300 CAPSULE ORAL 2 TIMES DAILY
Qty: 30 CAPSULE | Refills: 0 | Status: SHIPPED | OUTPATIENT
Start: 2020-10-27 | End: 2020-11-17 | Stop reason: SDUPTHER

## 2020-11-02 ENCOUNTER — VIRTUAL VISIT (OUTPATIENT)
Dept: FAMILY MEDICINE CLINIC | Age: 60
End: 2020-11-02
Payer: MEDICARE

## 2020-11-02 PROCEDURE — 99442 PR PHYS/QHP TELEPHONE EVALUATION 11-20 MIN: CPT | Performed by: FAMILY MEDICINE

## 2020-11-02 RX ORDER — FLUCONAZOLE 150 MG/1
150 TABLET ORAL ONCE
Qty: 1 TABLET | Refills: 0 | Status: SHIPPED | OUTPATIENT
Start: 2020-11-02 | End: 2020-11-10 | Stop reason: SDUPTHER

## 2020-11-02 RX ORDER — IPRATROPIUM BROMIDE 42 UG/1
2 SPRAY, METERED NASAL 3 TIMES DAILY PRN
COMMUNITY
Start: 2020-09-30 | End: 2021-04-26 | Stop reason: ALTCHOICE

## 2020-11-02 RX ORDER — LEVOCETIRIZINE DIHYDROCHLORIDE 5 MG/1
TABLET, FILM COATED ORAL
COMMUNITY
Start: 2020-10-16 | End: 2021-01-11 | Stop reason: SDUPTHER

## 2020-11-02 RX ORDER — LACTOSE-REDUCED FOOD/FIBER
1 LIQUID (ML) ORAL 2 TIMES DAILY
Qty: 60 CAN | Refills: 11 | Status: SHIPPED | OUTPATIENT
Start: 2020-11-02

## 2020-11-02 RX ORDER — DIFLORASONE DIACETATE 0.5 MG/G
OINTMENT TOPICAL
COMMUNITY
Start: 2020-09-01 | End: 2021-03-10 | Stop reason: ALTCHOICE

## 2020-11-02 RX ORDER — AZITHROMYCIN 250 MG/1
TABLET, FILM COATED ORAL
Qty: 1 PACKET | Refills: 0 | Status: SHIPPED | OUTPATIENT
Start: 2020-11-02 | End: 2020-11-10

## 2020-11-02 RX ORDER — TIZANIDINE 4 MG/1
TABLET ORAL
COMMUNITY
Start: 2020-10-16 | End: 2020-11-17

## 2020-11-02 RX ORDER — METHYLPREDNISOLONE 4 MG/1
TABLET ORAL
Qty: 1 KIT | Refills: 0 | Status: SHIPPED | OUTPATIENT
Start: 2020-11-02 | End: 2020-12-01
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
## 2020-11-09 RX ORDER — LACTOSE-REDUCED FOOD 0.06G-1/ML
LIQUID (ML) ORAL
Qty: 14220 ML | Refills: 5 | Status: SHIPPED | OUTPATIENT
Start: 2020-11-09 | End: 2021-03-10 | Stop reason: ALTCHOICE

## 2020-11-10 ENCOUNTER — VIRTUAL VISIT (OUTPATIENT)
Dept: FAMILY MEDICINE CLINIC | Age: 60
End: 2020-11-10
Payer: MEDICARE

## 2020-11-10 PROCEDURE — 99442 PR PHYS/QHP TELEPHONE EVALUATION 11-20 MIN: CPT | Performed by: FAMILY MEDICINE

## 2020-11-10 RX ORDER — FLUCONAZOLE 150 MG/1
150 TABLET ORAL ONCE
Qty: 1 TABLET | Refills: 0 | Status: SHIPPED | OUTPATIENT
Start: 2020-11-10 | End: 2020-11-27 | Stop reason: SDUPTHER

## 2020-11-10 RX ORDER — CEPHALEXIN 500 MG/1
500 CAPSULE ORAL 3 TIMES DAILY
Qty: 30 CAPSULE | Refills: 0 | Status: SHIPPED | OUTPATIENT
Start: 2020-11-10 | End: 2021-03-30 | Stop reason: SDUPTHER
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
## 2020-11-10 NOTE — TELEPHONE ENCOUNTER
Pt calling the pharmacy said they can't fill the boost with the diagnosis code that was sent     [facility]

## 2020-11-11 RX ORDER — ONDANSETRON 4 MG/1
TABLET, ORALLY DISINTEGRATING ORAL
Qty: 30 TABLET | Refills: 2 | Status: SHIPPED | OUTPATIENT
Start: 2020-11-11

## 2020-11-12 RX ORDER — BLOOD-GLUCOSE METER
EACH MISCELLANEOUS
Qty: 100 EACH | Refills: 3 | Status: SHIPPED | OUTPATIENT
Start: 2020-11-12

## 2020-11-16 RX ORDER — PREGABALIN 300 MG/1
300 CAPSULE ORAL 2 TIMES DAILY
Qty: 30 CAPSULE | Refills: 0 | Status: CANCELLED | OUTPATIENT
Start: 2020-11-16 | End: 2020-12-01

## 2020-11-17 RX ORDER — PREGABALIN 300 MG/1
300 CAPSULE ORAL 2 TIMES DAILY
Qty: 30 CAPSULE | Refills: 0 | Status: SHIPPED | OUTPATIENT
Start: 2020-11-17 | End: 2020-11-24 | Stop reason: SDUPTHER

## 2020-11-17 RX ORDER — TIZANIDINE 4 MG/1
TABLET ORAL
Qty: 180 TABLET | Refills: 11 | Status: SHIPPED | OUTPATIENT
Start: 2020-11-17

## 2020-11-17 RX ORDER — ESOMEPRAZOLE MAGNESIUM 40 MG/1
CAPSULE, DELAYED RELEASE ORAL
Qty: 60 CAPSULE | Refills: 11 | Status: SHIPPED | OUTPATIENT
Start: 2020-11-17 | End: 2021-01-04 | Stop reason: SDUPTHER

## 2020-11-18 ENCOUNTER — HOSPITAL ENCOUNTER (EMERGENCY)
Age: 60
Discharge: HOME | End: 2020-11-18
Payer: MEDICARE

## 2020-11-18 ENCOUNTER — TELEPHONE (OUTPATIENT)
Dept: FAMILY MEDICINE CLINIC | Age: 60
End: 2020-11-18

## 2020-11-18 ENCOUNTER — APPOINTMENT (OUTPATIENT)
Dept: GENERAL RADIOLOGY | Age: 60
End: 2020-11-18
Payer: MEDICARE

## 2020-11-18 ENCOUNTER — APPOINTMENT (OUTPATIENT)
Dept: CT IMAGING | Age: 60
End: 2020-11-18
Payer: MEDICARE

## 2020-11-18 VITALS
RESPIRATION RATE: 16 BRPM | HEIGHT: 62 IN | OXYGEN SATURATION: 95 % | DIASTOLIC BLOOD PRESSURE: 88 MMHG | BODY MASS INDEX: 29.08 KG/M2 | WEIGHT: 158 LBS | TEMPERATURE: 98.7 F | SYSTOLIC BLOOD PRESSURE: 154 MMHG | HEART RATE: 98 BPM

## 2020-11-18 LAB
ALBUMIN SERPL-MCNC: 4.4 G/DL (ref 3.5–4.6)
ALP BLD-CCNC: 103 U/L (ref 40–130)
ALT SERPL-CCNC: 28 U/L (ref 0–33)
ANION GAP SERPL CALCULATED.3IONS-SCNC: 12 MEQ/L (ref 9–15)
AST SERPL-CCNC: 21 U/L (ref 0–35)
BASOPHILS ABSOLUTE: 0.1 K/UL (ref 0–0.2)
BASOPHILS RELATIVE PERCENT: 0.9 %
BILIRUB SERPL-MCNC: 0.3 MG/DL (ref 0.2–0.7)
BUN BLDV-MCNC: 16 MG/DL (ref 6–20)
CALCIUM SERPL-MCNC: 9.7 MG/DL (ref 8.5–9.9)
CHLORIDE BLD-SCNC: 101 MEQ/L (ref 95–107)
CO2: 27 MEQ/L (ref 20–31)
CREAT SERPL-MCNC: 0.88 MG/DL (ref 0.5–0.9)
EKG ATRIAL RATE: 95 BPM
EKG P AXIS: 69 DEGREES
EKG P-R INTERVAL: 146 MS
EKG Q-T INTERVAL: 368 MS
EKG QRS DURATION: 92 MS
EKG QTC CALCULATION (BAZETT): 462 MS
EKG R AXIS: 22 DEGREES
EKG T AXIS: 28 DEGREES
EKG VENTRICULAR RATE: 95 BPM
EOSINOPHILS ABSOLUTE: 0.1 K/UL (ref 0–0.7)
EOSINOPHILS RELATIVE PERCENT: 1.4 %
GFR AFRICAN AMERICAN: >60
GFR NON-AFRICAN AMERICAN: >60
GLOBULIN: 3.4 G/DL (ref 2.3–3.5)
GLUCOSE BLD-MCNC: 113 MG/DL (ref 70–99)
HCT VFR BLD CALC: 44 % (ref 37–47)
HEMOGLOBIN: 14.6 G/DL (ref 12–16)
LYMPHOCYTES ABSOLUTE: 2 K/UL (ref 1–4.8)
LYMPHOCYTES RELATIVE PERCENT: 20.7 %
MAGNESIUM: 2 MG/DL (ref 1.7–2.4)
MCH RBC QN AUTO: 28.5 PG (ref 27–31.3)
MCHC RBC AUTO-ENTMCNC: 33.3 % (ref 33–37)
MCV RBC AUTO: 85.6 FL (ref 82–100)
MONOCYTES ABSOLUTE: 0.9 K/UL (ref 0.2–0.8)
MONOCYTES RELATIVE PERCENT: 8.9 %
NEUTROPHILS ABSOLUTE: 6.7 K/UL (ref 1.4–6.5)
NEUTROPHILS RELATIVE PERCENT: 68.1 %
PDW BLD-RTO: 16.4 % (ref 11.5–14.5)
PLATELET # BLD: 151 K/UL (ref 130–400)
POTASSIUM SERPL-SCNC: 3.7 MEQ/L (ref 3.4–4.9)
RBC # BLD: 5.14 M/UL (ref 4.2–5.4)
SODIUM BLD-SCNC: 140 MEQ/L (ref 135–144)
TOTAL PROTEIN: 7.8 G/DL (ref 6.3–8)
TROPONIN: <0.01 NG/ML (ref 0–0.01)
WBC # BLD: 9.8 K/UL (ref 4.8–10.8)

## 2020-11-18 PROCEDURE — 71045 X-RAY EXAM CHEST 1 VIEW: CPT

## 2020-11-18 PROCEDURE — 93005 ELECTROCARDIOGRAM TRACING: CPT | Performed by: NURSE PRACTITIONER

## 2020-11-18 PROCEDURE — 84484 ASSAY OF TROPONIN QUANT: CPT

## 2020-11-18 PROCEDURE — 36415 COLL VENOUS BLD VENIPUNCTURE: CPT

## 2020-11-18 PROCEDURE — 83735 ASSAY OF MAGNESIUM: CPT

## 2020-11-18 PROCEDURE — 99284 EMERGENCY DEPT VISIT MOD MDM: CPT

## 2020-11-18 PROCEDURE — 70450 CT HEAD/BRAIN W/O DYE: CPT

## 2020-11-18 PROCEDURE — 6370000000 HC RX 637 (ALT 250 FOR IP): Performed by: NURSE PRACTITIONER

## 2020-11-18 PROCEDURE — 85025 COMPLETE CBC W/AUTO DIFF WBC: CPT

## 2020-11-18 PROCEDURE — 80053 COMPREHEN METABOLIC PANEL: CPT

## 2020-11-18 RX ORDER — NITROGLYCERIN 0.4 MG/1
0.4 TABLET SUBLINGUAL EVERY 5 MIN PRN
Status: DISCONTINUED | OUTPATIENT
Start: 2020-11-18 | End: 2020-11-18 | Stop reason: HOSPADM

## 2020-11-18 RX ADMIN — NITROGLYCERIN 0.4 MG: 0.4 TABLET, ORALLY DISINTEGRATING SUBLINGUAL at 15:44

## 2020-11-18 RX ADMIN — NITROGLYCERIN 0.4 MG: 0.4 TABLET, ORALLY DISINTEGRATING SUBLINGUAL at 15:53

## 2020-11-18 RX ADMIN — NITROGLYCERIN 0.4 MG: 0.4 TABLET, ORALLY DISINTEGRATING SUBLINGUAL at 15:41

## 2020-11-18 ASSESSMENT — ENCOUNTER SYMPTOMS
COLOR CHANGE: 0
EYE PAIN: 0
BLOOD IN STOOL: 0
SHORTNESS OF BREATH: 0
ABDOMINAL PAIN: 0
WHEEZING: 0
EYE REDNESS: 0
NAUSEA: 0
EYE DISCHARGE: 0
SORE THROAT: 0
COUGH: 0
DIARRHEA: 0
BACK PAIN: 0
VOMITING: 0
RHINORRHEA: 0
TROUBLE SWALLOWING: 0
CONSTIPATION: 0

## 2020-11-18 ASSESSMENT — PAIN DESCRIPTION - ORIENTATION: ORIENTATION: LEFT

## 2020-11-18 ASSESSMENT — PAIN DESCRIPTION - ONSET: ONSET: ON-GOING

## 2020-11-18 ASSESSMENT — PAIN DESCRIPTION - FREQUENCY: FREQUENCY: CONTINUOUS

## 2020-11-18 ASSESSMENT — PAIN DESCRIPTION - PAIN TYPE: TYPE: CHRONIC PAIN

## 2020-11-18 ASSESSMENT — PAIN SCALES - GENERAL: PAINLEVEL_OUTOF10: 10

## 2020-11-18 ASSESSMENT — PAIN DESCRIPTION - DESCRIPTORS: DESCRIPTORS: SHARP

## 2020-11-18 ASSESSMENT — PAIN DESCRIPTION - LOCATION: LOCATION: SHOULDER

## 2020-11-18 NOTE — ED NOTES
Bed: 12  Expected date:   Expected time:   Means of arrival:   Comments:     Chris Echeverria RN  11/18/20 [id number]

## 2020-11-18 NOTE — TELEPHONE ENCOUNTER
Rachel Ortiz called back, and was advised of your message about pt going to ER. Gurpreet's response was, \"Yeah, that's ok! \" and hung up on me.

## 2020-11-18 NOTE — TELEPHONE ENCOUNTER
Called pt back to let her know that she should go to ER. Got her VM and LM with DR. Mitchell's recommendation.

## 2020-11-18 NOTE — ED PROVIDER NOTES
[facility] ED  EMERGENCY DEPARTMENT ENCOUNTER      Pt Name: Courtney Cormier  MRN: [id number]  [place] 1960  Date of evaluation: 11/18/2020  Provider: MAYURI Garg CNP    CHIEF COMPLAINT       Chief Complaint   Patient presents with    Altered Mental Status         HISTORY OF PRESENT ILLNESS   (Location/Symptom, Timing/Onset,Context/Setting, Quality, Duration, Modifying Factors, Severity)  Note limiting factors. Courtney Cormier is a 64 y.o. female who presents to the emergency department with HIV past medical history of cerebral infarction, bipolar disorder, fibromyalgia, headaches, diabetes, or urinary incontinence for chief complaint of altered mental status and being tired and having chest pain. She states she just has not been feeling herself. She reports that she was at rest yesterday and started experiencing 10 out of 10 midsternal chest pain that has been on and off since yesterday each episode lasting a few minutes. She states that it has not been radiating elsewhere and not accompanied by any dizziness or diaphoresis. She has no relieving or modifying factors at this time. Nursing Notes were reviewed. REVIEW OF SYSTEMS    (2-9 systems for level 4, 10 or more for level 5)     Review of Systems   Constitutional: Negative for activity change, appetite change, fatigue and fever. HENT: Negative for congestion, ear pain, rhinorrhea, sore throat and trouble swallowing. Eyes: Negative for pain, discharge and redness. Respiratory: Negative for cough, shortness of breath and wheezing. Cardiovascular: Positive for chest pain. Negative for palpitations. Gastrointestinal: Negative for abdominal pain, blood in stool, constipation, diarrhea, nausea and vomiting. Endocrine: Negative for polydipsia and polyuria. Genitourinary: Negative for decreased urine volume, dysuria, flank pain and hematuria.    Musculoskeletal: Negative for arthralgias, back pain and myalgias. Skin: Negative for color change, rash and wound. Neurological: Positive for weakness. Negative for dizziness, syncope, light-headedness and headaches. Psychiatric/Behavioral: Negative for behavioral problems. All other systems reviewed and are negative. Except as noted above the remainder of the review of systems was reviewed and negative. PAST MEDICAL HISTORY     Past Medical History:   Diagnosis Date    Acid reflux     Allergic rhinitis     Anxiety     Arnold-Chiari deformity ([facility] 2000    surgery    Asthma     Cerebral artery occlusion with cerebral infarction ([facility] 2001    1 yr after brain OR    Cerebrovascular disease     Chronic back pain greater than 3 months duration     COPD (chronic obstructive pulmonary disease) (HCC)     Dr Meghan Barry at [address] CVA (cerebral infarction) 2001    left hemiparesis, due to ? estrogen + smoking    Depression 1993    Dr Brianna Guzman H/O encephalopathy 2001    Headache(784.0)     Dr Grady Álvarez Hepatitis B surface antigen positive     Hx of blood clots 2010    PE / trx with coumadin x 6 months    Hyperlipidemia LDL goal < 100     meds > 10 yrs    Hypertension     meds > 2 yrs    Hypothyroidism 2001    meds > 18 yrs    Laryngitis, chronic 2012    scoped by Dr Rose Araujo, fungal    Marijuana smoker in remission [facility]) 2011    Obesity (BMI 30-39. 9)     Osteoarthritis     Pulmonary embolism and infarction (HCC)     Restless legs syndrome     Rhinitis, chronic     Rotator cuff tear     Left    S/P colonoscopy with polypectomy 2010    Dr Roc Farias    Seizures [facility])     Smoker     Spinal headache     past partum 1994    Spondylosis without myelopathy     Type 2 diabetes mellitus without complication (HCC)     hx > 6 yrs    Urinary incontinence     Vertebral compression fracture [facility])      Past Surgical History:   Procedure Laterality Date    BACK SURGERY  2001    lumbar kyphoplasty x 2    BRAIN SURGERY  2000    due to Arnold-Chiari deformity / [facility]     SECTION      COLONOSCOPY      CRANIOTOMY      Arnold-Chiary malformation    ENDOSCOPY, COLON, DIAGNOSTIC      FEMUR FRACTURE SURGERY Left     HUMERUS FRACTURE SURGERY Left 2020    ORIF PERIPROSTHETIC FX LEFT HUMERAL SHAFT, SYNTHES NOTIFIED, ROOM 283, LATEX ALLERGY performed by Ubaldo Gilliam MD at [address] Left 6/15/2018    LEFT TOTAL SHOULDER ARTHROPLASTY, SANDY BIOMET TSA, SCALENE NERVE BLOCK, (LATEX ALLERGY) performed by Ubaldo Gilliam MD at [address] [facility] Left 2019    LEFT REMOVAL GLENOID AND CONVERSION TO REVERSE TOTAL SHOULDER ARTHROPLASTY, SANDY REVERSE TSA, JOSE DAVID EQUIPMENT FLEXIBLE OSTEOTOMES, SCALENE NERVE BLOCK, LATEX ALLERGY performed by Ubaldo Gilliam MD at [address]      lumbar kyphoplasty    TONSILLECTOMY      UPPER GASTROINTESTINAL ENDOSCOPY  8/5/15    w/bx      Social History     Socioeconomic History    Marital status:      Spouse name: None    Number of children: 1    Years of education: None    Highest education level: None   Occupational History    Occupation: SSI   Social Needs    Financial resource strain: None    Food insecurity     Worry: None     Inability: None    Transportation needs     Medical: None     Non-medical: None   Tobacco Use    Smoking status: Current Every Day Smoker     Packs/day: 1.00     Years: 32.00     Pack years: 32.00     Types: Cigarettes    Smokeless tobacco: Never Used   Substance and Sexual Activity    Alcohol use: No     Alcohol/week: 0.0 standard drinks    Drug use: Yes     Types: Marijuana     Comment: daily for pain    Sexual activity: Not Currently   Lifestyle    Physical activity     Days per week: None     Minutes per session: None    Stress: None   Relationships    Social connections     Talks on phone: None     Gets together: None     Attends Confucianist service: None     Active member of club or organization: None     Attends meetings of clubs or organizations: None     Relationship status: None    Intimate partner violence     Fear of current or ex partner: None     Emotionally abused: None     Physically abused: None     Forced sexual activity: None   Other Topics Concern    None   Social History Narrative    None       SCREENINGS   NIH Stroke Scale  Interval: Baseline  Level of Consciousness (1a. ): Alert  LOC Questions (1b. ): Answers both correctly  LOC Commands (1c. ): Performs both tasks correctly  Best Gaze (2. ): Normal  Visual (3. ): No visual loss  Facial Palsy (4. ): Normal symmetrical movement  Motor Arm, Left (5a. ): No drift  Motor Arm, Right (5b. ): No drift  Motor Leg, Left (6a. ): No drift  Motor Leg, Right (6b. ): No drift  Limb Ataxia (7. ): (!) Present in one limb(Previous stroke)  Sensory (8. ): (!) Mild to Moderate(Previous stroke )  Best Language (9. ): No aphasia  Dysarthria (10. ): NormalGlasgow Coma Scale  Eye Opening: Spontaneous  Best Verbal Response: Oriented  Best Motor Response: Obeys commands  Rubina Coma Scale Score: 15        PHYSICAL EXAM    (up to 7 for level 4, 8 or more for level 5)     ED Triage Vitals [11/18/20 1508]   BP Temp Temp Source Pulse Resp SpO2 Height Weight   (!) 154/88 98.7 °F (37.1 °C) Oral 98 16 95 % 5' 2\" (1.575 m) 158 lb (71.7 kg)       Physical Exam  Vitals signs and nursing note reviewed. Constitutional:       General: She is not in acute distress. Appearance: She is well-developed. She is not diaphoretic. HENT:      Head: Normocephalic and atraumatic. Nose: Nose normal.   Eyes:      Conjunctiva/sclera: Conjunctivae normal.      Pupils: Pupils are equal, round, and reactive to light. Neck:      Musculoskeletal: Normal range of motion and neck supple. Cardiovascular:      Rate and Rhythm: Normal rate and regular rhythm. Heart sounds: Normal heart sounds.    Pulmonary:      Effort: Pulmonary effort is normal. No respiratory distress. Breath sounds: Normal breath sounds. No wheezing. Abdominal:      General: Bowel sounds are normal.      Palpations: Abdomen is soft. Tenderness: There is no abdominal tenderness. Skin:     General: Skin is warm and dry. Capillary Refill: Capillary refill takes less than 2 seconds. Findings: No rash. Neurological:      Mental Status: She is alert and oriented to person, place, and time. Cranial Nerves: No cranial nerve deficit. Psychiatric:         Behavior: Behavior normal.         RESULTS     EKG: All EKG's are interpreted by the Emergency Department Physician who either signs or Co-signsthis chart in the absence of a cardiologist.    Sinus rhythm rate of 95 bpm no ST elevations noted. QT of 368    RADIOLOGY:   Non-plain filmimages such as CT, Ultrasound and MRI are read by the radiologist. Plain radiographic images are visualized and preliminarily interpreted by the emergency physician with the below findings:    NAD    Interpretation per the Radiologist below, if available at the time ofthis note:    CT Head WO Contrast   Final Result   Stable chronic changes. Nothing acute intracranially               XR CHEST PORTABLE   Final Result   NO ACUTE CARDIOPULMONARY DISEASE. ED BEDSIDE ULTRASOUND:   Performed by ED Physician - none    LABS:  Labs Reviewed   COMPREHENSIVE METABOLIC PANEL - Abnormal; Notable for the following components:       Result Value    Glucose 113 (*)     All other components within normal limits   CBC WITH AUTO DIFFERENTIAL - Abnormal; Notable for the following components:    RDW 16.4 (*)     Neutrophils Absolute 6.7 (*)     Monocytes Absolute 0.9 (*)     All other components within normal limits   MAGNESIUM   TROPONIN       All other labs were within normal range or not returned as of this dictation.     EMERGENCY DEPARTMENT COURSE and DIFFERENTIAL DIAGNOSIS/MDM:   Vitals:    Vitals:    11/18/20 1508   BP: occasionally words are mis-transcribed.)    Marj Cline, APRN - CNP (electronically signed)  Attending Emergency Physician         [facility], APRN - CNP  11/18/20 0480

## 2020-11-18 NOTE — TELEPHONE ENCOUNTER
Tonya Warren pt's aid is calling concerned about the pt, states that the pt is not acting right today, had a accidental BM today,didnt know she had a bm the aid had to tell her that. Aid is asking pt for our phone number and she gives her own phone number, pt is going back and forth from chair to chair. Pt is not her self. Pt has no appetite, is smoking. Advised that you may want the pt to go to the ER. Please advise. Pt phone number is [phone].

## 2020-11-19 ENCOUNTER — TELEPHONE (OUTPATIENT)
Dept: FAMILY MEDICINE CLINIC | Age: 60
End: 2020-11-19

## 2020-11-19 ENCOUNTER — CARE COORDINATION (OUTPATIENT)
Dept: CARE COORDINATION | Age: 60
End: 2020-11-19

## 2020-11-19 PROCEDURE — 93010 ELECTROCARDIOGRAM REPORT: CPT | Performed by: INTERNAL MEDICINE

## 2020-11-19 NOTE — TELEPHONE ENCOUNTER
I'm sorry there is nothing I can do for this over the phone. Would go to [facility] instead of [facility] for a second opinion.    Many of patient's specialists are through [facility]

## 2020-11-19 NOTE — TELEPHONE ENCOUNTER
[facility] health nurse calling states that pt was taken to ED yesterday and discharged with in 2 hrs of admitting. [facility] never picked up pt and she sat in waiting room all night. She was finally brought home and was wearing someone else's pants? She is not eating or reacting in anyway. Just staring at the wall! Joselyn Carreno is very concerned and wants to talk to you. She is very concerned and is not wanting to send pt back to ED, she does not believe that they are doing anything for her! Caller is asking to speak to you directly. Aware that you are booked and will also advice  ED. Told her that I would speak to you and call her back.      [phone]

## 2020-11-19 NOTE — TELEPHONE ENCOUNTER
Called and spoke to Racnha. States that she is feeling fine? Asked for Charlotte's # [phone]. Spoke to Tj, she will evaluate pt tomorrow due to already leaving.  If she feels that pt will need to be seen again she will take to [facility]

## 2020-11-20 ENCOUNTER — CARE COORDINATION (OUTPATIENT)
Dept: CARE COORDINATION | Age: 60
End: 2020-11-20

## 2020-11-20 NOTE — CARE COORDINATION
Patient contacted regarding [facility]. Discussed COVID-19 related testing which was not done at this time. Test results were not done. Patient informed of results, if available? N/A    Care Transition Nurse/ Ambulatory Care Manager contacted the caregiver by telephone to perform post discharge assessment. Call within 2 business days of discharge: Yes. Verified name and  with caregiver as identifiers. Provided introduction to self, and explanation of the CTN/ACM role, and reason for call due to risk factors for infection and/or exposure to COVID-19. Symptoms reviewed with caregiver who verbalized the following symptoms: no new symptoms and no worsening symptoms. Due to no new or worsening symptoms encounter was not routed to provider for escalation. Discussed follow-up appointments. If no appointment was previously scheduled, appointment scheduling offered: [facility] follow up appointment(s): No future appointments. Non-[facility] follow up appointment(s):     Non-face-to-face services provided:  Obtained and reviewed discharge summary and/or continuity of care documents     Advance Care Planning:   Does patient have an Advance Directive:  reviewed and current. Patient has following risk factors of: COPD and diabetes. CTN/ACM reviewed discharge instructions, medical action plan and red flags such as increased shortness of breath, increasing fever and signs of decompensation with caregiver who verbalized understanding. Discussed exposure protocols and quarantine with CDC Guidelines What to do if you are sick with coronavirus disease .  Caregiver was given an opportunity for questions and concerns. The caregiver agrees to contact the [facility] exposure line [phone], [address] of [facility]: ([phone]) and PCP office for questions related to their healthcare. CTN/ACM provided contact information for future needs.     Reviewed and educated caregiver on any new and changed medications related to discharge diagnosis     Patient/family/caregiver given information for [employer] Loop and agrees to enroll no  Patient's preferred e-mail:    Patient's preferred phone number:   Based on Loop alert triggers, patient will be contacted by nurse care manager for worsening symptoms. Plan for follow-up call in 3-5 days based on severity of symptoms and risk factors. I spoke with Cuauhtemocca Susan caregiver/aide for Rachna. She states that she has taken care of her for a few years and she is not herself. She states that she denies any symptoms or health related issues. However, Alex Davis says that she is not eating drinking, seems to stare off into space, can be combative at times and pushes back when Alex Davis tries to help her. She states that normally she is pleasant conversive, smoking and drinking coffee and she is not doing anything related to her normal routine. I have asked her to check her temperature and she says that it is 99.7. I asked about her recent UTI and she says that she should have taken all of the medications but bottle is about half full. In reviewing the chart she should be finishing the medication today. I have asked Maryampricila Howellhyacinth to make sure that she takes this medication 3 tablets everyday for the remainder of the prescription. I have also asked her to monitor her for any other changes in symptoms and to call myself or Dr Stevie Schwab office for recommendations.

## 2020-11-23 ENCOUNTER — CARE COORDINATION (OUTPATIENT)
Dept: CARE COORDINATION | Age: 60
End: 2020-11-23

## 2020-11-23 NOTE — CARE COORDINATION
You Patient resolved from the Care Transitions episode on 11/23/2020  Discussed COVID-19 related testing which was not done at this time. Test results were not done. Patient informed of results, if available? N/A    Patient/family has been provided the following resources and education related to COVID-19:                         Signs, symptoms and red flags related to COVID-19            CDC exposure and quarantine guidelines            [facility] exposure contact - [phone]            Contact for their local Department of Health                 Patient currently reports that the following symptoms have improved:  no new/worsening symptoms     No further outreach scheduled with this CTN/ACM. Episode of Care resolved. Patient has this CTN/ACM contact information if future needs arise.

## 2020-11-24 RX ORDER — PREGABALIN 300 MG/1
300 CAPSULE ORAL 2 TIMES DAILY
Qty: 60 CAPSULE | Refills: 2 | Status: SHIPPED | OUTPATIENT
Start: 2020-11-24 | End: 2021-01-18 | Stop reason: SDUPTHER

## 2020-11-24 RX ORDER — SIMETHICONE 180 MG
CAPSULE ORAL
Qty: 60 CAPSULE | Refills: 11 | Status: SHIPPED | OUTPATIENT
Start: 2020-11-24 | End: 2020-12-24

## 2020-11-27 RX ORDER — LORAZEPAM 0.5 MG/1
0.5 TABLET ORAL 2 TIMES DAILY PRN
Qty: 60 TABLET | Refills: 0 | Status: SHIPPED | OUTPATIENT
Start: 2020-11-27 | End: 2020-12-22 | Stop reason: SDUPTHER

## 2020-11-27 RX ORDER — FLUCONAZOLE 150 MG/1
150 TABLET ORAL ONCE
Qty: 1 TABLET | Refills: 0 | Status: SHIPPED | OUTPATIENT
Start: 2020-11-27 | End: 2020-11-27

## 2020-12-01 ENCOUNTER — OFFICE VISIT (OUTPATIENT)
Dept: FAMILY MEDICINE CLINIC | Age: 60
End: 2020-12-01
Payer: MEDICARE

## 2020-12-01 VITALS
WEIGHT: 177 LBS | DIASTOLIC BLOOD PRESSURE: 62 MMHG | SYSTOLIC BLOOD PRESSURE: 102 MMHG | HEIGHT: 62 IN | OXYGEN SATURATION: 94 % | HEART RATE: 99 BPM | BODY MASS INDEX: 32.57 KG/M2

## 2020-12-01 PROCEDURE — 2022F DILAT RTA XM EVC RTNOPTHY: CPT | Performed by: FAMILY MEDICINE

## 2020-12-01 PROCEDURE — G8427 DOCREV CUR MEDS BY ELIG CLIN: HCPCS | Performed by: FAMILY MEDICINE

## 2020-12-01 PROCEDURE — 4004F PT TOBACCO SCREEN RCVD TLK: CPT | Performed by: FAMILY MEDICINE

## 2020-12-01 PROCEDURE — G8482 FLU IMMUNIZE ORDER/ADMIN: HCPCS | Performed by: FAMILY MEDICINE

## 2020-12-01 PROCEDURE — G8417 CALC BMI ABV UP PARAM F/U: HCPCS | Performed by: FAMILY MEDICINE

## 2020-12-01 PROCEDURE — 99214 OFFICE O/P EST MOD 30 MIN: CPT | Performed by: FAMILY MEDICINE

## 2020-12-01 PROCEDURE — 3044F HG A1C LEVEL LT 7.0%: CPT | Performed by: FAMILY MEDICINE

## 2020-12-01 PROCEDURE — 3017F COLORECTAL CA SCREEN DOC REV: CPT | Performed by: FAMILY MEDICINE

## 2020-12-01 RX ORDER — CARISOPRODOL 350 MG/1
350 TABLET ORAL 2 TIMES DAILY PRN
COMMUNITY
Start: 2020-11-25 | End: 2020-12-25

## 2020-12-01 RX ORDER — MIRTAZAPINE 30 MG/1
TABLET, FILM COATED ORAL
COMMUNITY
Start: 2020-11-12 | End: 2021-01-04 | Stop reason: SDUPTHER

## 2020-12-01 RX ORDER — CELECOXIB 200 MG/1
200 CAPSULE ORAL DAILY
Qty: 30 CAPSULE | Refills: 3 | Status: SHIPPED | OUTPATIENT
Start: 2020-12-01 | End: 2020-12-02 | Stop reason: SDUPTHER
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
## 2020-12-02 RX ORDER — CELECOXIB 200 MG/1
200 CAPSULE ORAL DAILY
Qty: 30 CAPSULE | Refills: 3 | Status: SHIPPED | OUTPATIENT
Start: 2020-12-02 | End: 2020-12-30

## 2020-12-03 ENCOUNTER — TELEPHONE (OUTPATIENT)
Dept: FAMILY MEDICINE CLINIC | Age: 60
End: 2020-12-03

## 2020-12-07 ENCOUNTER — TELEPHONE (OUTPATIENT)
Dept: FAMILY MEDICINE CLINIC | Age: 60
End: 2020-12-07

## 2020-12-07 NOTE — TELEPHONE ENCOUNTER
Pt states that the celecoxib is not working, pt has started 12/2/2020. Also pt states she would like to have a larger quantity of the test strip, one touch ultra  more than one hundred in a box. Pt states she has a hard time getting transportation to the pharmacy. Please advise. Pt phone number is [phone].

## 2020-12-11 ENCOUNTER — TELEPHONE (OUTPATIENT)
Dept: FAMILY MEDICINE CLINIC | Age: 60
End: 2020-12-11

## 2020-12-11 RX ORDER — FLUCONAZOLE 150 MG/1
TABLET ORAL
Qty: 1 TABLET | Refills: 11 | Status: SHIPPED | OUTPATIENT
Start: 2020-12-11 | End: 2020-12-29 | Stop reason: SDUPTHER

## 2020-12-11 NOTE — TELEPHONE ENCOUNTER
Pt calling stating her left leg-calf is swelling, it is burning, not hot or red. No injury, please advise. Pt phone number is [phone].

## 2020-12-15 NOTE — TELEPHONE ENCOUNTER
Patient calling again states she is still having the swelling. Advised ED and the importance. Patient refuses. Recommended ED again. Patient states she does not want to go and does not thing it is ED. Advised ED again!     Patient scheduled an appt to be seen in office 12/30

## 2020-12-16 ENCOUNTER — APPOINTMENT (OUTPATIENT)
Dept: ULTRASOUND IMAGING | Age: 60
End: 2020-12-16
Payer: MEDICARE

## 2020-12-16 ENCOUNTER — HOSPITAL ENCOUNTER (EMERGENCY)
Age: 60
Discharge: HOME | End: 2020-12-16
Payer: MEDICARE

## 2020-12-16 VITALS
WEIGHT: 175 LBS | HEIGHT: 62 IN | RESPIRATION RATE: 18 BRPM | DIASTOLIC BLOOD PRESSURE: 75 MMHG | SYSTOLIC BLOOD PRESSURE: 153 MMHG | BODY MASS INDEX: 32.2 KG/M2 | OXYGEN SATURATION: 95 % | HEART RATE: 91 BPM | TEMPERATURE: 99.5 F

## 2020-12-16 PROCEDURE — 99283 EMERGENCY DEPT VISIT LOW MDM: CPT

## 2020-12-16 PROCEDURE — 93971 EXTREMITY STUDY: CPT

## 2020-12-16 ASSESSMENT — PAIN SCALES - GENERAL
PAINLEVEL_OUTOF10: 6
PAINLEVEL_OUTOF10: 6

## 2020-12-16 ASSESSMENT — PAIN DESCRIPTION - FREQUENCY
FREQUENCY: CONTINUOUS
FREQUENCY: CONTINUOUS

## 2020-12-16 ASSESSMENT — PAIN DESCRIPTION - LOCATION
LOCATION: LEG
LOCATION: LEG

## 2020-12-16 ASSESSMENT — ENCOUNTER SYMPTOMS
SORE THROAT: 0
ABDOMINAL PAIN: 0
COLOR CHANGE: 0
CONSTIPATION: 0
SHORTNESS OF BREATH: 0
EYE DISCHARGE: 0
RHINORRHEA: 0
ABDOMINAL DISTENTION: 0

## 2020-12-16 ASSESSMENT — PAIN DESCRIPTION - DESCRIPTORS
DESCRIPTORS: CONSTANT;BURNING
DESCRIPTORS: BURNING

## 2020-12-16 ASSESSMENT — PAIN DESCRIPTION - ORIENTATION
ORIENTATION: LEFT
ORIENTATION: LEFT

## 2020-12-16 ASSESSMENT — PAIN DESCRIPTION - PAIN TYPE
TYPE: ACUTE PAIN
TYPE: ACUTE PAIN

## 2020-12-16 NOTE — ED PROVIDER NOTES
[facility] ED  eMERGENCY dEPARTMENT eNCOUnter      Pt Name: Rommel Beyer  MRN: [id number]  [place] 1960  Date of evaluation: 12/16/2020  Provider: Monica Grimes PA-C    CHIEF COMPLAINT       Chief Complaint   Patient presents with    Leg Pain     pt c/o LLE pain for the past week, denies trauma         HISTORY OF PRESENT ILLNESS   (Location/Symptom, Timing/Onset,Context/Setting, Quality, Duration, Modifying Factors, Severity)  Note limiting factors. Rommel Beyer is a 64 y.o. female who presents to the emergency department complaint of left lower extremity pain which patient states been ongoing x1 week, she denies any acute injury. She states she spoke with her family physician today who recommended she come to the ER for an ultrasound of her left lower extremity to rule out the potential of DVT. Patient denies any cough, no shortness of breath, no acute injury. She states she does have pain with ambulation. She is a smoker, she has had PEs before in the past, is not currently on any anticoagulation. HPI    NursingNotes were reviewed. REVIEW OF SYSTEMS    (2-9 systems for level 4, 10 or more for level 5)     Review of Systems   Constitutional: Negative for activity change and appetite change. HENT: Negative for congestion, ear discharge, ear pain, nosebleeds, rhinorrhea and sore throat. Eyes: Negative for discharge. Respiratory: Negative for shortness of breath. Cardiovascular: Negative for chest pain, palpitations and leg swelling. Gastrointestinal: Negative for abdominal distention, abdominal pain and constipation. Genitourinary: Negative for difficulty urinating and dysuria. Musculoskeletal: Negative for arthralgias. Left lower extremity pain, at calf. Skin: Negative for color change, pallor, rash and wound. Neurological: Negative for dizziness, syncope, numbness and headaches. Psychiatric/Behavioral: Negative for agitation and confusion. Except as noted above the remainder of the review of systems was reviewed and negative. PAST MEDICAL HISTORY     Past Medical History:   Diagnosis Date    Acid reflux     Allergic rhinitis     Anxiety     Arnold-Chiari deformity ([facility]     surgery    Asthma     Cerebral artery occlusion with cerebral infarction ([facility]     1 yr after brain OR    Cerebrovascular disease     Chronic back pain greater than 3 months duration     COPD (chronic obstructive pulmonary disease) (HCC)     [address] [facility] [address] CVA (cerebral infarction)     left hemiparesis, due to ? estrogen + smoking    Depression     Dr Elias Gusman H/O encephalopathy 2001    Headache(784.0)     Dr Javad Swanson Hepatitis B surface antigen positive     Hx of blood clots 2010    PE / trx with coumadin x 6 months    Hyperlipidemia LDL goal < 100     meds > 10 yrs    Hypertension     meds > 2 yrs    Hypothyroidism 2001    meds > 18 yrs    Laryngitis, chronic 2012    scoped by Dr Benjamin Morris, fungal    Marijuana smoker in remission [facility])     Obesity (BMI 30-39. 9)     Osteoarthritis     Pulmonary embolism and infarction (HCC)     Restless legs syndrome     Rhinitis, chronic     Rotator cuff tear     Left    S/P colonoscopy with polypectomy     Dr Telma Watkins    Seizures [facility])    [address] Spinal headache     past partum     Spondylosis without myelopathy     Type 2 diabetes mellitus without complication ([facility]     hx > 6 yrs    Urinary incontinence     Vertebral compression fracture (HCC)          SURGICALHISTORY       Past Surgical History:   Procedure Laterality Date    BACK SURGERY      lumbar kyphoplasty x 2    BRAIN SURGERY  2000    due to Arnold-Chiari deformity / [facility]     SECTION      COLONOSCOPY      CRANIOTOMY  2000    Arnold-Chiary malformation    ENDOSCOPY, COLON, DIAGNOSTIC      FEMUR FRACTURE SURGERY Left     HUMERUS FRACTURE SURGERY Left 2020 ORIF PERIPROSTHETIC FX LEFT HUMERAL SHAFT, SYNTHES NOTIFIED, ROOM 283, LATEX ALLERGY performed by Aiyana Foreman MD at [address] Left 6/15/2018    LEFT TOTAL SHOULDER ARTHROPLASTY, SANDY BIOMET TSA, SCALENE NERVE BLOCK, (LATEX ALLERGY) performed by Aiyana Foreman MD at [address] [place] Left 5/17/2019    LEFT REMOVAL GLENOID AND CONVERSION TO REVERSE TOTAL SHOULDER ARTHROPLASTY, SANDY REVERSE TSA, JOSE DAVID EQUIPMENT FLEXIBLE OSTEOTOMES, SCALENE NERVE BLOCK, LATEX ALLERGY performed by Aiyana Foreman MD at [address] [place]      lumbar kyphoplasty    TONSILLECTOMY      UPPER GASTROINTESTINAL ENDOSCOPY  8/5/15    w/bx          CURRENT MEDICATIONS       Previous Medications    AIMOVIG 70 MG/ML SOAJ    ADM 1 ML SC 1 TIME Q MONTH    ALCOHOL SWABS (ALCOHOL PADS) 70 % PADS    Use, as directed, once daily to test blood sugar    ARIPIPRAZOLE (ABILIFY) 10 MG TABLET    TAKE ONE TABLET BY MOUTH EACH NIGHT AT BEDTIME    ASCORBIC ACID (VITAMIN C/KATIA HIPS) 500 MG TABS    TAKE 1 TABLET BY MOUTH DAILY    BLOOD GLUCOSE CALIBRATION (ACCU-CHEK MARLON) SOLN    USE TO CONFIRM ACCURACY OF GLUCOSE METER    BLOOD GLUCOSE CALIBRATION (ADVOCATE REDI-CODE+ CONTROL) HIGH SOLN    USE AS DIRECTED.    BLOOD GLUCOSE MONITORING SUPPL (ADVOCATE REDI-CODE+) COLE    USE AS DIRECTED.    BLOOD GLUCOSE TEST STRIPS (ACCU-CHEK MARLON PLUS) STRIP    Use, as directed, once daily to test blood sugar    BLOOD GLUCOSE TEST STRIPS (ASCENSIA AUTODISC VI;ONE TOUCH ULTRA TEST VI) STRIP    1 each by In Vitro route daily Test once daily    CALCIPOTRIENE (DOVONEX) 0.005 % CREAM        CAPSAICIN 0.1 % CREA    Apply 1 applicator topically 3 times daily as needed (pain)    CARISOPRODOL (SOMA) 350 MG TABLET    Take 350 mg by mouth 2 times daily as needed.     CELECOXIB (CELEBREX) 200 MG CAPSULE    Take 1 capsule by mouth daily    CLOBETASOL (TEMOVATE) 0.05 % OINTMENT CONTROL GEL FORMULA DRESSING (DUODERM CGF EXTRA THIN) MISC    Apply to sacral region every other day    DICLOFENAC SODIUM (VOLTAREN) 1 % GEL    Apply 2 g topically 2 times daily    DIFLORASONE (PSORCON) 0.05 % OINTMENT        EASY COMFORT LANCETS MISC    Use, as directed, once daily to test blood sugar    ESCITALOPRAM (LEXAPRO) 20 MG TABLET    TAKE ONE TABLET BY MOUTH DAILY    ESOMEPRAZOLE (NEXIUM) 40 MG DELAYED RELEASE CAPSULE    Take 2 capsules by mouth every day    ESTRADIOL (VAGIFEM) 10 MCG TABS VAGINAL TABLET    insert 1 tablet vaginally two times a week INTO LOWER 1/3 OF VAGINA    FERROUS SULFATE (IRON 325) 325 (65 FE) MG TABLET    Take 1 tablet by mouth 2 times daily    FLUCONAZOLE (DIFLUCAN) 150 MG TABLET    Take 1 tablet by mouth as a one time dose    FOLIC ACID (FOLVITE) 1 MG TABLET    Take one tablet by mouth every day    FUROSEMIDE (LASIX) 40 MG TABLET    Take 1 tablet by mouth every day    IBUPROFEN (ADVIL;MOTRIN) 800 MG TABLET    Take 1 tab by mouth every 8 hours as need for pain    IPRATROPIUM (ATROVENT) 0.06 % NASAL SPRAY    2 sprays by Nasal route 3 times daily as needed    LANCET DEVICES (EASY MINI EJECT LANCING DEVICE) MISC    USE AS DIRECTED. LEVOCETIRIZINE (XYZAL) 5 MG TABLET        LEVOTHYROXINE (SYNTHROID) 200 MCG TABLET    Take 1 tablet by mouth Daily    LOPERAMIDE (IMODIUM A-D) 2 MG TABLET    Take 2 mg by mouth as needed for Diarrhea     LORAZEPAM (ATIVAN) 0.5 MG TABLET    Take 1 tablet by mouth 2 times daily as needed for Anxiety for up to 30 days.     MAGNESIUM OXIDE (MAG-OX) 400 MG TABLET    Take one tablet by mouth every day    METFORMIN (GLUCOPHAGE) 500 MG TABLET    Take 1 tablet by mouth 2 times daily (with meals)    METOPROLOL TARTRATE (LOPRESSOR) 25 MG TABLET    Take 1 tablet by mouth 2 times daily    MIRTAZAPINE (REMERON) 30 MG TABLET        NUTRITIONAL SUPPLEMENTS (BOOST GLUCOSE CONTROL) LIQD    Take 1 Can by mouth 2 times daily NUTRITIONAL SUPPLEMENTS (BOOST HIGH PROTEIN) LIQD    One can by mouth daily    NYSTATIN (MYCOSTATIN) 513069 UNIT/GM POWDER    Apply topically 3 times daily as directed    OLOPATADINE HCL (PAZEO) 0.7 % SOLN    INSTILL 1 DROP IN BOTH EYES EVERY MORNING    OMEGA-3 ACID ETHYL ESTERS (LOVAZA) 1 G CAPSULE        ONDANSETRON (ZOFRAN-ODT) 4 MG DISINTEGRATING TABLET    Dissolve 1 tab under tongue every 8hrs as needed for n/v    POTASSIUM CHLORIDE (KLOR-CON M) 20 MEQ EXTENDED RELEASE TABLET    Take 1 tablet by mouth every day    PRAMIPEXOLE (MIRAPEX) 0.5 MG TABLET    Take 1 tablet by mouth every evening    PREGABALIN (LYRICA) 300 MG CAPSULE    Take 1 capsule by mouth 2 times daily for 90 days. PROAIR RESPICLICK 747 (90 BASE) MCG/ACT AEROSOL POWDER INHALATION        ROSUVASTATIN (CRESTOR) 20 MG TABLET    Take 1 tablet by mouth every day    SIMETHICONE 180 MG CAPS    Take 1 softgel by mouth twice daily    SYMBICORT 160-4.5 MCG/ACT AERO    INL 2 PFS PO BID UTD    TIZANIDINE (ZANAFLEX) 4 MG TABLET    Take 2 tablets by mouth every 8 hours as needed    VITAMIN B-12 (CYANOCOBALAMIN) 1000 MCG TABLET    Take 1 tablet by mouth daily    VITAMIN D (ERGOCALCIFEROL) 1.25 MG (07334 UT) CAPS CAPSULE    Take 1 capsule by mouth once a week       ALLERGIES     Latex; Imitrex [sumatriptan]; Zomig [zolmitriptan]; Antivert [meclizine hcl]; Flagyl [metronidazole]; Ketorolac tromethamine; Provera [medroxyprogesterone acetate]; Bacitracin; Bactrim; Cefdinir; Cefuroxime axetil; Codeine; Cyclosporine; Iodine; Iv dye [iodides]; Neomycin; Petroleum jelly [skin protectants, misc.]; Quinolones; Sulfa antibiotics;  Toradol [ketorolac tromethamine]; and Trovan [trovafloxacin]    FAMILY HISTORY       Family History   Problem Relation Age of Onset    Osteoarthritis Mother     Asthma Mother     Diabetes Mother     Hypertension Mother     Cancer Mother         lung    Osteoarthritis Father     Hypertension Father     Other Father         PE  Cancer Father         stomach cancer    Asthma Brother     Heart Attack Brother     Other Brother         overdose    Asthma Sister     Breast Cancer Sister     Hypertension Sister     Other Sister         overdose / MVA          SOCIAL HISTORY       Social History     Socioeconomic History    Marital status:      Spouse name: None    Number of children: 1    Years of education: None    Highest education level: None   Occupational History    Occupation: SSI   Social Needs    Financial resource strain: None    Food insecurity     Worry: None     Inability: None    Transportation needs     Medical: None     Non-medical: None   Tobacco Use    Smoking status: Current Every Day Smoker     Packs/day: 1.00     Years: 32.00     Pack years: 32.00     Types: Cigarettes    Smokeless tobacco: Never Used   Substance and Sexual Activity    Alcohol use: No     Alcohol/week: 0.0 standard drinks    Drug use: Yes     Types: Marijuana     Comment: daily for pain    Sexual activity: Not Currently   Lifestyle    Physical activity     Days per week: None     Minutes per session: None    Stress: None   Relationships    Social connections     Talks on phone: None     Gets together: None     Attends [employer] service: None     Active member of club or organization: None     Attends meetings of clubs or organizations: None     Relationship status: None    Intimate partner violence     Fear of current or ex partner: None     Emotionally abused: None     Physically abused: None     Forced sexual activity: None   Other Topics Concern    None   Social History Narrative    None       SCREENINGS      @FLOW([id number])@      PHYSICAL EXAM    (up to 7 for level 4, 8 or more for level 5)     ED Triage Vitals [12/16/20 0941]   BP Temp Temp Source Pulse Resp SpO2 Height Weight   (!) 153/75 99.5 °F (37.5 °C) Oral 91 18 95 % 5' 2\" (1.575 m) 175 lb (79.4 kg)       Physical Exam  Vitals signs and nursing note reviewed. Constitutional:       General: She is not in acute distress. Appearance: She is well-developed. She is not ill-appearing, toxic-appearing or diaphoretic. HENT:      Head: Normocephalic. Right Ear: Tympanic membrane normal.      Left Ear: Tympanic membrane normal.      Nose: Nose normal. No congestion. Mouth/Throat:      Mouth: Mucous membranes are moist.      Pharynx: No oropharyngeal exudate or posterior oropharyngeal erythema. Eyes:      Extraocular Movements: Extraocular movements intact. Conjunctiva/sclera: Conjunctivae normal.      Pupils: Pupils are equal, round, and reactive to light. Neck:      Musculoskeletal: Normal range of motion and neck supple. No neck rigidity. Vascular: No JVD. Trachea: No tracheal deviation. Cardiovascular:      Rate and Rhythm: Normal rate. Pulses: Normal pulses. Heart sounds: Normal heart sounds. No murmur. No friction rub. No gallop. Pulmonary:      Effort: Pulmonary effort is normal. No tachypnea, accessory muscle usage, respiratory distress or retractions. Breath sounds: No stridor. No wheezing, rhonchi or rales. Chest:      Chest wall: No tenderness. Abdominal:      General: Abdomen is flat. Bowel sounds are normal. There is no distension or abdominal bruit. Palpations: There is no shifting dullness, fluid wave, hepatomegaly, splenomegaly, mass or pulsatile mass. Tenderness: There is no abdominal tenderness. There is no right CVA tenderness, left CVA tenderness, guarding or rebound. Negative signs include Eli's sign, Rovsing's sign and McBurney's sign. Musculoskeletal:         General: No swelling, tenderness or deformity. Comments: Left lower extremity shows no significant signs of edema, no color change, no erythema, no warmth to touch. Distal dorsalis pedis pulses and posterior tibialis pulses are present. Skin is pink warm and dry, moving lower extremity well without any increased pain. Negative Homans exam   Skin:     General: Skin is warm and dry. Capillary Refill: Capillary refill takes less than 2 seconds. Coloration: Skin is not jaundiced. Neurological:      General: No focal deficit present. Mental Status: She is alert and oriented to person, place, and time. Mental status is at baseline. Cranial Nerves: No cranial nerve deficit. Sensory: No sensory deficit. Motor: No weakness. Coordination: Coordination normal.   Psychiatric:         Mood and Affect: Mood normal.         DIAGNOSTIC RESULTS     EKG: All EKG's are interpreted by the Emergency Department Physician who either signs or Co-signsthis chart in the absence of a cardiologist.        RADIOLOGY:   Rachael Nguyen such as CT, Ultrasound and MRI are read by the radiologist. Plain radiographic images are visualized and preliminarily interpreted by the emergency physician with the below findings:        Interpretation per the Radiologist below, if available at the time ofthis note:    US DUP LOWER EXTREMITY LEFT WILLIAM   Final Result   NO FINDINGS OF DEEP VENOUS THROMBUS IN  THE VISUALIZED [facility]. ED BEDSIDE ULTRASOUND:   Performed by ED Physician - none    LABS:  Labs Reviewed - No data to display    All other labs were within normal range or not returned as of this dictation.     EMERGENCY DEPARTMENT COURSE and DIFFERENTIAL DIAGNOSIS/MDM:   Vitals:    Vitals:    12/16/20 0941   BP: (!) 153/75   Pulse: 91   Resp: 18   Temp: 99.5 °F (37.5 °C)   TempSrc: Oral   SpO2: 95%   Weight: 175 lb (79.4 kg)   Height: 5' 2\" (1.575 m)          MDM  Number of Diagnoses or Management Options Pain of left calf  Diagnosis management comments: Patient complains of left calf pain which she states been ongoing x1 week. She denies any acute injury. She states there is swelling to this area. She was sent by her family physician for a ultrasound of the left lower extremity rule out the potential of DVT. This was completed it is negative for DVT at this time. On physical examination there is no swelling, there is no erythema, there is no cut scrapes abrasions or bruising is noted. This is most likely muscular nature. Patient is currently pregabalin, as well as Cal Ralphs, she was advised to continue use of these medications, follow-up with her regular family physician in the next 5 to 7 days. She was also advised if she has any worsening or change in her symptoms return to the ER. She was given referral to orthopedics should she have continued pain. Leora Roman CRITICAL CARE TIME   Total Critical Care time was 0 minutes, excluding separately reportableprocedures. There was a high probability of clinicallysignificant/life threatening deterioration in the patient's condition which required my urgent intervention. CONSULTS:  None    PROCEDURES:  Unless otherwise noted below, none     Procedures    FINAL IMPRESSION      1.  Pain of left calf          DISPOSITION/PLAN   DISPOSITION Decision To Discharge 12/16/2020 11:07:41 AM      PATIENT REFERRED TO:  [address] [facility]  In 1 week        DISCHARGE MEDICATIONS:  New Prescriptions    No medications on file          (Please note that portions of this note were completed with a voice recognition program.  Efforts were made to edit the dictations but occasionally words are mis-transcribed.)    Romana Brownie, PA-C (electronically signed)  Attending Emergency Physician         Romana Brownie, PA-C  12/16/20 0040

## 2020-12-16 NOTE — ED TRIAGE NOTES
Pt c/o LLE edema and burning for the past week, denies trauma, sensation and movement intact, no redness or edema noted, pulses palpable.

## 2020-12-16 NOTE — ED NOTES
Bed: 02  Expected date:   Expected time:   Means of arrival:   Comments:     Mian Dietrich RN  12/16/20 1678

## 2020-12-17 ENCOUNTER — CARE COORDINATION (OUTPATIENT)
Dept: CARE COORDINATION | Age: 60
End: 2020-12-17

## 2020-12-17 NOTE — LETTER
12/17/2020    Michelle Wagner  [address] [place] [address]      Dear Reyes Dukes,    My name is Osman Reyes and I am a registered nurse who partners with Klarissa Lea MD to improve patients' health. Klarissa Lea MD believes you would benefit from working with me. As a member of your health care team, I would work with other providers involved in your care, offer education for your specific health conditions, and connect you with additional resources as needed. I will collaborate with Klarissa Lea MD to support you in following your treatment plan. The additional support I provide is no additional cost to you. My primary focus is to help you achieve specific goals and improve your health. We are committed to walk with you on this journey and look forward to working with you. Please call me to further discuss your healthcare needs. I am available by phone or for appointments at the office. You can reach me at [phone].     In good health,     Osman Reyes RN

## 2020-12-17 NOTE — CARE COORDINATION
ACM CALLED PATIENT UNABLE TO CONTACT OR LEAVE .  ACM ATTEMPTING TO ENGAGE PATIENT IN [address] [place] [address]

## 2020-12-18 ENCOUNTER — CARE COORDINATION (OUTPATIENT)
Dept: CARE COORDINATION | Age: 60
End: 2020-12-18

## 2020-12-22 RX ORDER — LORAZEPAM 0.5 MG/1
0.5 TABLET ORAL 2 TIMES DAILY PRN
Qty: 60 TABLET | Refills: 0 | Status: SHIPPED | OUTPATIENT
Start: 2020-12-22 | End: 2021-01-26 | Stop reason: SDUPTHER

## 2020-12-24 RX ORDER — SIMETHICONE 180 MG
CAPSULE ORAL
Qty: 60 CAPSULE | Refills: 3 | Status: SHIPPED | OUTPATIENT
Start: 2020-12-24

## 2020-12-28 RX ORDER — CEPHALEXIN 500 MG/1
500 CAPSULE ORAL 4 TIMES DAILY
Qty: 40 CAPSULE | Refills: 0 | Status: SHIPPED | OUTPATIENT
Start: 2020-12-28 | End: 2021-03-10 | Stop reason: ALTCHOICE

## 2020-12-29 RX ORDER — FLUCONAZOLE 150 MG/1
TABLET ORAL
Qty: 1 TABLET | Refills: 11 | Status: SHIPPED | OUTPATIENT
Start: 2020-12-29 | End: 2021-02-12

## 2020-12-30 ENCOUNTER — OFFICE VISIT (OUTPATIENT)
Dept: FAMILY MEDICINE CLINIC | Age: 60
End: 2020-12-30
Payer: MEDICARE

## 2020-12-30 ENCOUNTER — CARE COORDINATION (OUTPATIENT)
Dept: CARE COORDINATION | Age: 60
End: 2020-12-30

## 2020-12-30 VITALS
SYSTOLIC BLOOD PRESSURE: 120 MMHG | HEIGHT: 62 IN | OXYGEN SATURATION: 93 % | HEART RATE: 98 BPM | DIASTOLIC BLOOD PRESSURE: 86 MMHG | BODY MASS INDEX: 31.47 KG/M2 | WEIGHT: 171 LBS | TEMPERATURE: 97 F

## 2020-12-30 PROCEDURE — G8482 FLU IMMUNIZE ORDER/ADMIN: HCPCS | Performed by: FAMILY MEDICINE

## 2020-12-30 PROCEDURE — 2022F DILAT RTA XM EVC RTNOPTHY: CPT | Performed by: FAMILY MEDICINE

## 2020-12-30 PROCEDURE — G8417 CALC BMI ABV UP PARAM F/U: HCPCS | Performed by: FAMILY MEDICINE

## 2020-12-30 PROCEDURE — 3017F COLORECTAL CA SCREEN DOC REV: CPT | Performed by: FAMILY MEDICINE

## 2020-12-30 PROCEDURE — 99213 OFFICE O/P EST LOW 20 MIN: CPT | Performed by: FAMILY MEDICINE

## 2020-12-30 PROCEDURE — 4004F PT TOBACCO SCREEN RCVD TLK: CPT | Performed by: FAMILY MEDICINE

## 2020-12-30 PROCEDURE — G8427 DOCREV CUR MEDS BY ELIG CLIN: HCPCS | Performed by: FAMILY MEDICINE

## 2020-12-30 PROCEDURE — 3044F HG A1C LEVEL LT 7.0%: CPT | Performed by: FAMILY MEDICINE

## 2020-12-30 RX ORDER — HYDROCODONE BITARTRATE AND ACETAMINOPHEN 5; 325 MG/1; MG/1
1 TABLET ORAL EVERY 6 HOURS PRN
Qty: 20 TABLET | Refills: 0 | Status: SHIPPED | OUTPATIENT
Start: 2020-12-30 | End: 2021-04-20 | Stop reason: SDUPTHER

## 2020-12-30 RX ORDER — TERAZOSIN 2 MG/1
CAPSULE ORAL
Qty: 90 CAPSULE | Refills: 3 | Status: SHIPPED | OUTPATIENT
Start: 2020-12-30

## 2020-12-30 RX ORDER — FLUOCINOLONE ACETONIDE 0.1 MG/ML
SOLUTION TOPICAL
Qty: 360 ML | Refills: 0 | Status: SHIPPED | OUTPATIENT
Start: 2020-12-30 | End: 2021-01-18
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
## 2020-12-30 NOTE — CARE COORDINATION
ACM CALLED PATIENT. CONTINUES TO BE NO WORKING NUMBER. ACM CALLED EC . EC WILL CALL PATIENT AND ADVISE HER TO CALL ACM WITH CORRECT CONTACT INFORMATION. AC SUPPLIED CONTACT INFORMATION FOR RETURN CALL.   [address]

## 2020-12-30 NOTE — LETTER
12/30/2020     Bernard Wagner  [address] [place] [address]    Dear Armando Montgomery    I recently attempted to contact you to discuss your healthcare needs. My name is Adrien Polo and I am a registered nurse who partners with Radha Crawley MD to improve patients health. As a member of your health care team, I would work with other providers involved in your care, offer education for your specific health conditions, and connect you with additional resources as needed. I will collaborate with Katja Kuo MD to support you in following your treatment plan. The additional support I provide is no additional cost to you. My primary focus is to help you achieve specific goals and improve your health. I look forward to working with you. Please contact me at your earliest convenience at [phone].     In good health,    Adrien Polo RN

## 2021-01-04 ENCOUNTER — HOSPITAL ENCOUNTER (EMERGENCY)
Age: 61
Discharge: HOME | End: 2021-01-04
Attending: EMERGENCY MEDICINE
Payer: MEDICARE

## 2021-01-04 ENCOUNTER — TELEPHONE (OUTPATIENT)
Dept: FAMILY MEDICINE CLINIC | Age: 61
End: 2021-01-04

## 2021-01-04 ENCOUNTER — APPOINTMENT (OUTPATIENT)
Dept: CT IMAGING | Age: 61
End: 2021-01-04
Payer: MEDICARE

## 2021-01-04 VITALS
TEMPERATURE: 98 F | WEIGHT: 170 LBS | BODY MASS INDEX: 31.28 KG/M2 | OXYGEN SATURATION: 96 % | SYSTOLIC BLOOD PRESSURE: 116 MMHG | HEART RATE: 89 BPM | RESPIRATION RATE: 18 BRPM | DIASTOLIC BLOOD PRESSURE: 76 MMHG | HEIGHT: 62 IN

## 2021-01-04 DIAGNOSIS — G25.81 RLS (RESTLESS LEGS SYNDROME): ICD-10-CM

## 2021-01-04 DIAGNOSIS — E87.6 HYPOKALEMIA: ICD-10-CM

## 2021-01-04 DIAGNOSIS — R42 DIZZINESS: Primary | ICD-10-CM

## 2021-01-04 DIAGNOSIS — S01.01XA LACERATION OF SCALP, INITIAL ENCOUNTER: ICD-10-CM

## 2021-01-04 DIAGNOSIS — S09.90XA INJURY OF HEAD, INITIAL ENCOUNTER: ICD-10-CM

## 2021-01-04 PROCEDURE — 70450 CT HEAD/BRAIN W/O DYE: CPT

## 2021-01-04 PROCEDURE — 99284 EMERGENCY DEPT VISIT MOD MDM: CPT

## 2021-01-04 ASSESSMENT — ENCOUNTER SYMPTOMS
VOMITING: 0
FACIAL SWELLING: 0
ABDOMINAL DISTENTION: 0
SHORTNESS OF BREATH: 0
COLOR CHANGE: 0
WHEEZING: 0
PHOTOPHOBIA: 0
ABDOMINAL PAIN: 0
RHINORRHEA: 0
EYE DISCHARGE: 0

## 2021-01-04 ASSESSMENT — PAIN DESCRIPTION - FREQUENCY: FREQUENCY: CONTINUOUS

## 2021-01-04 ASSESSMENT — PAIN DESCRIPTION - DESCRIPTORS: DESCRIPTORS: ACHING

## 2021-01-04 ASSESSMENT — PAIN DESCRIPTION - ONSET: ONSET: ON-GOING

## 2021-01-04 ASSESSMENT — PAIN DESCRIPTION - PAIN TYPE: TYPE: ACUTE PAIN

## 2021-01-04 NOTE — ED NOTES
Dr. Gisselle Cisse  at the bedside at this time. Assessment completed.       Sharath England, RN  01/04/21 1232

## 2021-01-04 NOTE — TELEPHONE ENCOUNTER
Pt called to let us know that she fell in her kitchen, hit her head really hard, and is now vomiting. She asked what she should do. I advised her that she needs to go to ER for a possible concussion.  Pt states that she will call 911 to be taken to the hospital.

## 2021-01-04 NOTE — ED NOTES
At bedside with Dr. Keren Rivera. Dr. Keren Rivera put one staple in pts head. Pt tolerated well.       Lissette You RN  01/04/21 [id number]

## 2021-01-04 NOTE — ED NOTES
Bed: 23  Expected date: 1/4/21  Expected time: 10:21 AM  Means of arrival: [facility]  Comments:  64 F - fall with lac above left ear. 109/59,90,19,96% RA.  . 20G LFA/labs     Lance Diaz, RN  01/04/21 0958

## 2021-01-04 NOTE — ED PROVIDER NOTES
Neurological: Positive for dizziness. Negative for weakness and light-headedness. Hematological: Negative for adenopathy. Psychiatric/Behavioral: Negative for behavioral problems. Except as noted above the remainder of the review of systems was reviewed and negative. PAST MEDICAL HISTORY     Past Medical History:   Diagnosis Date    Acid reflux     Allergic rhinitis     Anxiety     Arnold-Chiari deformity ([facility] [address] 2000    surgery    Asthma     Cerebral artery occlusion with cerebral infarction ([facility] [address] 2001    1 yr after brain OR    Cerebrovascular disease     Chronic back pain greater than 3 months duration     COPD (chronic obstructive pulmonary disease) (HCC)     Dr Cary Mcfadden at [address] CVA (cerebral infarction) 2001    left hemiparesis, due to ? estrogen + smoking    Depression 1993    Dr Marie Patel H/O encephalopathy 2001    Headache(784.0)     Dr Kerry Buchanan Hepatitis B surface antigen positive     Hx of blood clots 2010    PE / trx with coumadin x 6 months    Hyperlipidemia LDL goal < 100     meds > 10 yrs    Hypertension     meds > 2 yrs    Hypothyroidism 2001    meds > 18 yrs    Laryngitis, chronic 2012    scoped by Dr aVn Liao, fungal    Marijuana smoker in remission [facility]) 2011    Obesity (BMI 30-39. 9)     Osteoarthritis     Pulmonary embolism and infarction (HCC)     Restless legs syndrome     Rhinitis, chronic     Rotator cuff tear     Left    S/P colonoscopy with polypectomy 2010    Dr Alta Scott    Seizures [facility])     Smoker     Spinal headache     past partum 1994    Spondylosis without myelopathy     Type 2 diabetes mellitus without complication (HCC)     hx > 6 yrs    Urinary incontinence     Vertebral compression fracture (HCC)          SURGICALHISTORY       Past Surgical History:   Procedure Laterality Date    BACK SURGERY  2001    lumbar kyphoplasty x 2    BRAIN SURGERY  2000    due to Arnold-Chiari deformity / [address] [place]  COLONOSCOPY      CRANIOTOMY  2000    Arnold-Chiary malformation    ENDOSCOPY, COLON, DIAGNOSTIC      FEMUR FRACTURE SURGERY Left 2003    HUMERUS FRACTURE SURGERY Left 6/12/2020    ORIF PERIPROSTHETIC FX LEFT HUMERAL SHAFT, SYNTHES NOTIFIED, ROOM 283, LATEX ALLERGY performed by Randi Casey MD at [facility] Left 6/15/2018    LEFT TOTAL SHOULDER ARTHROPLASTY, SANDY BIOMET TSA, SCALENE NERVE BLOCK, (LATEX ALLERGY) performed by Randi Casey MD at [address] [place] Left 5/17/2019    LEFT REMOVAL GLENOID AND CONVERSION TO REVERSE TOTAL SHOULDER ARTHROPLASTY, SANDY REVERSE TSA, JOSE DAVID EQUIPMENT FLEXIBLE OSTEOTOMES, SCALENE NERVE BLOCK, LATEX ALLERGY performed by Randi Casey MD at [address] [place]      lumbar kyphoplasty    TONSILLECTOMY      UPPER GASTROINTESTINAL ENDOSCOPY  8/5/15    w/bx          CURRENT MEDICATIONS       Previous Medications    AIMOVIG 70 MG/ML SOAJ    ADM 1 ML SC 1 TIME Q MONTH    ALCOHOL SWABS (ALCOHOL PADS) 70 % PADS    Use, as directed, once daily to test blood sugar    ARIPIPRAZOLE (ABILIFY) 10 MG TABLET    TAKE ONE TABLET BY MOUTH EACH NIGHT AT BEDTIME    ASCORBIC ACID (VITAMIN C/KATIA HIPS) 500 MG TABS    TAKE 1 TABLET BY MOUTH DAILY    BLOOD GLUCOSE CALIBRATION (ACCU-CHEK MARLON) SOLN    USE TO CONFIRM ACCURACY OF GLUCOSE METER    BLOOD GLUCOSE CALIBRATION (ADVOCATE REDI-CODE+ CONTROL) HIGH SOLN    USE AS DIRECTED.    BLOOD GLUCOSE MONITORING SUPPL (ADVOCATE REDI-CODE+) COLE    USE AS DIRECTED.    BLOOD GLUCOSE TEST STRIPS (ACCU-CHEK MARLON PLUS) STRIP    Use, as directed, once daily to test blood sugar    BLOOD GLUCOSE TEST STRIPS (ASCENSIA AUTODISC VI;ONE TOUCH ULTRA TEST VI) STRIP    1 each by In Vitro route daily Test once daily    CALCIPOTRIENE (DOVONEX) 0.005 % CREAM        CAPSAICIN 0.1 % CREA    Apply 1 applicator topically 3 times daily as needed (pain) LORAZEPAM (ATIVAN) 0.5 MG TABLET    Take 1 tablet by mouth 2 times daily as needed for Anxiety for up to 30 days. MAGNESIUM OXIDE (MAG-OX) 400 MG TABLET    Take one tablet by mouth every day    METFORMIN (GLUCOPHAGE) 500 MG TABLET    Take 1 tablet by mouth 2 times daily (with meals)    METOPROLOL TARTRATE (LOPRESSOR) 25 MG TABLET    Take 1 tablet by mouth 2 times daily    MIRTAZAPINE (REMERON) 30 MG TABLET        NUTRITIONAL SUPPLEMENTS (BOOST GLUCOSE CONTROL) LIQD    Take 1 Can by mouth 2 times daily    NUTRITIONAL SUPPLEMENTS (BOOST HIGH PROTEIN) LIQD    One can by mouth daily    NYSTATIN (MYCOSTATIN) 169372 UNIT/GM POWDER    Apply topically 3 times daily as directed    OLOPATADINE HCL (PAZEO) 0.7 % SOLN    INSTILL 1 DROP IN BOTH EYES EVERY MORNING    OMEGA-3 ACID ETHYL ESTERS (LOVAZA) 1 G CAPSULE        ONDANSETRON (ZOFRAN-ODT) 4 MG DISINTEGRATING TABLET    Dissolve 1 tab under tongue every 8hrs as needed for n/v    POTASSIUM CHLORIDE (KLOR-CON M) 20 MEQ EXTENDED RELEASE TABLET    Take 1 tablet by mouth every day    PRAMIPEXOLE (MIRAPEX) 0.5 MG TABLET    Take 1 tablet by mouth every evening    PREGABALIN (LYRICA) 300 MG CAPSULE    Take 1 capsule by mouth 2 times daily for 90 days.     PROAIR RESPICLICK 569 (90 BASE) MCG/ACT AEROSOL POWDER INHALATION        ROSUVASTATIN (CRESTOR) 20 MG TABLET    Take 1 tablet by mouth every day    SIMETHICONE 180 MG CAPS    Take 1 softgel by mouth twice daily    SYMBICORT 160-4.5 MCG/ACT AERO    INL 2 PFS PO BID UTD    TERAZOSIN (HYTRIN) 2 MG CAPSULE    TAKE 1 CAPSULE BY MOUTH EVERY NIGHT    TIZANIDINE (ZANAFLEX) 4 MG TABLET    Take 2 tablets by mouth every 8 hours as needed    VITAMIN B-12 (CYANOCOBALAMIN) 1000 MCG TABLET    Take 1 tablet by mouth daily    VITAMIN D (ERGOCALCIFEROL) 1.25 MG (50822 UT) CAPS CAPSULE    Take 1 capsule by mouth once a week       ALLERGIES Latex; Imitrex [sumatriptan]; Zomig [zolmitriptan]; Antivert [meclizine hcl]; Flagyl [metronidazole]; Ketorolac tromethamine; Provera [medroxyprogesterone acetate]; Bacitracin; Bactrim; Cefdinir; Cefuroxime axetil; Codeine; Cyclosporine; Iodine; Iv dye [iodides]; Neomycin; Petroleum jelly [skin protectants, misc.]; Quinolones; Sulfa antibiotics; Toradol [ketorolac tromethamine]; and Trovan [trovafloxacin]    FAMILY HISTORY       Family History   Problem Relation Age of Onset    Osteoarthritis Mother     Asthma Mother     Diabetes Mother     Hypertension Mother     Cancer Mother         lung    Osteoarthritis Father     Hypertension Father     Other Father         PE    Cancer Father         stomach cancer    Asthma Brother     Heart Attack Brother     Other Brother         overdose    Asthma Sister     Breast Cancer Sister     Hypertension Sister     Other Sister         overdose / MVA          SOCIAL HISTORY       Social History     Socioeconomic History    Marital status:      Spouse name: None    Number of children: 1    Years of education: None    Highest education level: None   Occupational History    Occupation: SSI   Social Needs    Financial resource strain: None    Food insecurity     Worry: None     Inability: None    Transportation needs     Medical: None     Non-medical: None   Tobacco Use    Smoking status: Current Every Day Smoker     Packs/day: 1.00     Years: 32.00     Pack years: 32.00     Types: Cigarettes    Smokeless tobacco: Never Used   Substance and Sexual Activity    Alcohol use:  Yes     Alcohol/week: 0.0 standard drinks     Comment: occasionally    Drug use: Yes     Types: Marijuana     Comment: daily for pain    Sexual activity: Not Currently   Lifestyle    Physical activity     Days per week: None     Minutes per session: None    Stress: None   Relationships    Social connections     Talks on phone: None     Gets together: None Musculoskeletal: Normal range of motion. General: No swelling, tenderness, deformity or signs of injury. Lymphadenopathy:      Cervical: No cervical adenopathy. Skin:     General: Skin is warm and dry. Comments: Scalp laceration as previously noted. Tetanus is up-to-date and there are no other breaks in the skin   Neurological:      Mental Status: She is alert and oriented to person, place, and time. Deep Tendon Reflexes: Reflexes are normal and symmetric. Psychiatric:         Mood and Affect: Mood normal.         DIAGNOSTIC RESULTS     EKG: All EKG's are interpreted by the Emergency Department Physician who either signs or Co-signsthis chart in the absence of a cardiologist.        RADIOLOGY:   Sharalyn Parlor such as CT, Ultrasound and MRI are read by the radiologist. Plain radiographic images are visualized and preliminarily interpreted by the emergency physician with the below findings:        Interpretation per the Radiologist below, if available at the time ofthis note:    CT HEAD WO CONTRAST   Final Result   Impression:      Mild cerebral atrophy. Chronic ischemic white matter disease. Right ethmoid sinusitis. All CT scans at this facility use dose modulation, iterative reconstruction, and/or weight based dosing when appropriate to reduce radiation dose to as low as reasonably achievable. ED BEDSIDE ULTRASOUND:   Performed by ED Physician - none    LABS:  Labs Reviewed - No data to display    All other labs were within normal range or not returned as of this dictation.     EMERGENCY DEPARTMENT COURSE and DIFFERENTIAL DIAGNOSIS/MDM:   Vitals:    Vitals:    01/04/21 1043 01/04/21 1230 01/04/21 1300   BP: 121/75 (!) 142/72 116/76   Pulse: 89 76 89   Resp: 18 16 18   Temp: 98 °F (36.7 °C)     TempSrc: Oral     SpO2: 95% 97% 96%   Weight: 170 lb (77.1 kg)     Height: 5' 2\" (1.575 m) Patient has no internal trauma from her fall. She has no neck pain and no clinical signs of sinusitis. She is at her baseline and with complaints that I have come to know well over the years. She is witnessed ambulating steadily and quickly with her walker from home to the bathroom and back without any aide from us. She is requesting discharge. We discussed the sinusitis reading on her CAT scan and she declines antibiotics stating that she does not feel this is going on clinically. I agree based on my physical exam findings. Patient is comfortable with discharge and feels safe at home. She is discharged home in stable condition. MDM    CRITICAL CARE TIME   Total Critical Care time was 0 minutes, excluding separately reportableprocedures. There was a high probability of clinicallysignificant/life threatening deterioration in the patient's condition which required my urgent intervention. CONSULTS:  None    PROCEDURES:  Unless otherwise noted below, none     Procedures    FINAL IMPRESSION      1. Dizziness    2. Laceration of scalp, initial encounter    3.  Injury of head, initial encounter          DISPOSITION/PLAN   DISPOSITION Decision To Discharge 01/04/2021 01:06:57 PM      PATIENT REFERRED TO:  Duc Mcneal MD  [address] [place] [address]  [phone]      in five days for staple removal      DISCHARGE MEDICATIONS:  New Prescriptions    No medications on file          (Please note that portions of this note were completed with a voice recognition program.  Efforts were made to edit the dictations but occasionally words are mis-transcribed.)    Juliet Pascual DO (electronically signed)  Attending Emergency Physician          Juliet Pascual DO  01/04/21 1367

## 2021-01-04 NOTE — CARE COORDINATION
1330: Patient to be discharged but does not have transportation home. ER CM arranged transportation home for patient through her [facility] insurance transportation line (Rashid Longo). Trip # [id number]. Ride will arrive in 30 minutes to 3 hours. Patient made aware of wait time. Patient provided with reinforcement of social distancing and mask guidelines while she waits. Patient verbalizes understanding. 1655: ER CM was notified by ER  that patient came to her because she had not been picked up yet. CM contacted insurance's transportation department who reports that a  was  at [phone] to  patient but left.  did not contact ER CM nor ER  to let them know he was here. Transportation department rep rescheduled another trip and emphasized in instructions to  that ER CM was to be contacted when the  arrived. Patient updated, remains in lobby.

## 2021-01-05 ENCOUNTER — CARE COORDINATION (OUTPATIENT)
Dept: CARE COORDINATION | Age: 61
End: 2021-01-05

## 2021-01-05 DIAGNOSIS — E87.6 HYPOKALEMIA: ICD-10-CM

## 2021-01-05 DIAGNOSIS — G25.81 RLS (RESTLESS LEGS SYNDROME): ICD-10-CM

## 2021-01-05 RX ORDER — MIRTAZAPINE 30 MG/1
30 TABLET, FILM COATED ORAL NIGHTLY
Qty: 90 TABLET | Refills: 1 | Status: SHIPPED | OUTPATIENT
Start: 2021-01-05 | End: 2021-03-17 | Stop reason: SDUPTHER

## 2021-01-05 RX ORDER — ERGOCALCIFEROL 1.25 MG/1
50000 CAPSULE ORAL WEEKLY
Qty: 4 CAPSULE | Refills: 12 | Status: SHIPPED | OUTPATIENT
Start: 2021-01-05 | End: 2021-01-07 | Stop reason: SDUPTHER

## 2021-01-05 RX ORDER — BLOOD GLUCOSE CONTROL HIGH,LOW
1 EACH MISCELLANEOUS
Qty: 3 EACH | Refills: 1 | Status: SHIPPED | OUTPATIENT
Start: 2021-01-05 | End: 2021-02-10

## 2021-01-05 RX ORDER — LEVOTHYROXINE SODIUM 0.2 MG/1
200 TABLET ORAL DAILY
Qty: 90 TABLET | Refills: 1 | Status: SHIPPED | OUTPATIENT
Start: 2021-01-05 | End: 2021-03-18 | Stop reason: SDUPTHER

## 2021-01-05 RX ORDER — PRAMIPEXOLE DIHYDROCHLORIDE 0.5 MG/1
0.5 TABLET ORAL EVERY EVENING
Qty: 90 TABLET | Refills: 1 | Status: SHIPPED | OUTPATIENT
Start: 2021-01-05 | End: 2021-01-07 | Stop reason: SDUPTHER

## 2021-01-05 RX ORDER — BLOOD SUGAR DIAGNOSTIC
1 STRIP MISCELLANEOUS 2 TIMES DAILY
Qty: 200 EACH | Refills: 1 | Status: SHIPPED | OUTPATIENT
Start: 2021-01-05

## 2021-01-05 RX ORDER — BUDESONIDE AND FORMOTEROL FUMARATE DIHYDRATE 160; 4.5 UG/1; UG/1
AEROSOL RESPIRATORY (INHALATION)
Qty: 3 INHALER | Refills: 1 | Status: SHIPPED | OUTPATIENT
Start: 2021-01-05 | End: 2021-03-18 | Stop reason: SDUPTHER

## 2021-01-05 RX ORDER — ESCITALOPRAM OXALATE 20 MG/1
TABLET ORAL
Qty: 90 TABLET | Refills: 1 | Status: SHIPPED | OUTPATIENT
Start: 2021-01-05 | End: 2021-03-18 | Stop reason: SDUPTHER

## 2021-01-05 RX ORDER — POTASSIUM CHLORIDE 20 MEQ/1
20 TABLET, EXTENDED RELEASE ORAL DAILY
Qty: 90 TABLET | Refills: 1 | Status: SHIPPED | OUTPATIENT
Start: 2021-01-05 | End: 2021-01-07 | Stop reason: SDUPTHER

## 2021-01-05 RX ORDER — ARIPIPRAZOLE 10 MG/1
TABLET ORAL
Qty: 90 TABLET | Refills: 1 | Status: SHIPPED | OUTPATIENT
Start: 2021-01-05 | End: 2021-03-18 | Stop reason: SDUPTHER

## 2021-01-05 RX ORDER — ESOMEPRAZOLE MAGNESIUM 40 MG/1
CAPSULE, DELAYED RELEASE ORAL
Qty: 180 CAPSULE | Refills: 1 | Status: SHIPPED | OUTPATIENT
Start: 2021-01-05

## 2021-01-06 ENCOUNTER — CARE COORDINATION (OUTPATIENT)
Dept: CARE COORDINATION | Age: 61
End: 2021-01-06

## 2021-01-07 ENCOUNTER — HOSPITAL ENCOUNTER (OUTPATIENT)
Dept: WOMENS IMAGING | Age: 61
Discharge: HOME | End: 2021-01-09
Payer: MEDICARE

## 2021-01-07 ENCOUNTER — APPOINTMENT (OUTPATIENT)
Dept: WOMENS IMAGING | Age: 61
End: 2021-01-07
Payer: MEDICARE

## 2021-01-07 DIAGNOSIS — Z78.0 POSTMENOPAUSAL: ICD-10-CM

## 2021-01-07 DIAGNOSIS — E11.51 TYPE 2 DIABETES MELLITUS WITH DIABETIC PERIPHERAL ANGIOPATHY WITHOUT GANGRENE, WITHOUT LONG-TERM CURRENT USE OF INSULIN (HCC): ICD-10-CM

## 2021-01-07 PROCEDURE — 77080 DXA BONE DENSITY AXIAL: CPT

## 2021-01-07 RX ORDER — PRAMIPEXOLE DIHYDROCHLORIDE 0.5 MG/1
0.5 TABLET ORAL EVERY EVENING
Qty: 90 TABLET | Refills: 1 | Status: SHIPPED | OUTPATIENT
Start: 2021-01-07 | End: 2021-03-18 | Stop reason: SDUPTHER

## 2021-01-07 RX ORDER — ERGOCALCIFEROL 1.25 MG/1
50000 CAPSULE ORAL WEEKLY
Qty: 4 CAPSULE | Refills: 12 | Status: SHIPPED | OUTPATIENT
Start: 2021-01-07 | End: 2021-04-15 | Stop reason: SDUPTHER

## 2021-01-07 RX ORDER — POTASSIUM CHLORIDE 20 MEQ/1
20 TABLET, EXTENDED RELEASE ORAL DAILY
Qty: 90 TABLET | Refills: 1 | Status: SHIPPED | OUTPATIENT
Start: 2021-01-07 | End: 2021-03-18 | Stop reason: SDUPTHER

## 2021-01-07 NOTE — PROGRESS NOTES
2021    Olga Lidia Wagner (:  1960) is a 64 y.o. female, here for evaluation of the following medical concerns:  Chief Complaint   Patient presents with    Suture / Staple Removal     Scalp       HPI  ER 1/4 CC dizziness after she fell and hit her head, CT head non acute  She had one staple placed  States overall she is doing well  No concerns at this time    Review of Systems   Constitutional: Negative for chills and fever. HENT: Negative for congestion, sinus pressure and sore throat. Respiratory: Negative for cough and shortness of breath. Cardiovascular: Negative for chest pain and palpitations. Gastrointestinal: Negative for abdominal pain and vomiting. Musculoskeletal: Negative for arthralgias and myalgias. Skin: Negative for rash and wound. Neurological: Negative for speech difficulty and light-headedness. Psychiatric/Behavioral: Negative for suicidal ideas. The patient is not nervous/anxious. Prior to Visit Medications    Medication Sig Taking?  Authorizing Provider   carisoprodol (SOMA) 350 MG tablet  Yes Historical Provider, MD   potassium chloride (KLOR-CON M) 20 MEQ extended release tablet Take 1 tablet by mouth daily Yes Panda Galan MD   pramipexole (MIRAPEX) 0.5 MG tablet Take 1 tablet by mouth every evening Yes Panda Galan MD   vitamin D (ERGOCALCIFEROL) 1.25 MG (75269 UT) CAPS capsule Take 1 capsule by mouth once a week Yes Panda Galan MD   Alcohol Swabs (ALCOHOL PADS) 70 % PADS Apply 1 each topically 2 times daily Yes Panda Galan MD   Blood Glucose Calibration (ACCU-CHEK MARLON) SOLN 1 Bottle by Other route every 30 days Yes Panda Galan MD   ARIPiprazole (ABILIFY) 10 MG tablet TAKE ONE TABLET BY MOUTH EACH NIGHT AT BEDTIME Yes Panda Galan MD   escitalopram (LEXAPRO) 20 MG tablet TAKE ONE TABLET BY MOUTH DAILY Yes Panda Galan MD   esomeprazole (NEXIUM) 40 MG delayed release capsule Take 2 capsules by mouth every day Yes Panda Galan MD Nutritional Supplements (BOOST GLUCOSE CONTROL) LIQD Take 1 Can by mouth 2 times daily Yes Leigh Castro MD   ibuprofen (ADVIL;MOTRIN) 800 MG tablet Take 1 tab by mouth every 8 hours as need for pain Yes Leigh Castro MD   blood glucose test strips (ACCU-CHEK MARLON PLUS) strip Use, as directed, once daily to test blood sugar Yes Leigh Castro MD   blood glucose test strips (ASCENSIA AUTODISC VI;ONE TOUCH ULTRA TEST VI) strip 1 each by In Vitro route daily Test once daily Yes Leigh Castro MD   nystatin (MYCOSTATIN) 847241 UNIT/GM powder Apply topically 3 times daily as directed Yes Leigh Castro MD   Olopatadine HCl (PAZEO) 0.7 % SOLN INSTILL 1 DROP IN BOTH EYES EVERY MORNING Yes Leigh Castro MD   furosemide (LASIX) 40 MG tablet Take 1 tablet by mouth every day Yes Leigh Castor MD   rosuvastatin (CRESTOR) 20 MG tablet Take 1 tablet by mouth every day Yes Leigh Castro MD   ferrous sulfate (IRON 325) 325 (65 Fe) MG tablet Take 1 tablet by mouth 2 times daily Yes Anel Coffman MD   Capsaicin 0.1 % CREA Apply 1 applicator topically 3 times daily as needed (pain) Yes Leigh Castro MD   diclofenac sodium (VOLTAREN) 1 % GEL Apply 2 g topically 2 times daily Yes Leigh Castro MD   PROAIR RESPICLICK 549 (50 Base) MCG/ACT aerosol powder inhalation  Yes Historical Provider, MD   clobetasol (TEMOVATE) 0.05 % ointment  Yes Historical Provider, MD   calcipotriene (DOVONEX) 0.005 % cream  Yes Historical Provider, MD   AIMOVIG 70 MG/ML SOAJ ADM 1 ML SC 1 TIME Q MONTH Yes Historical Provider, MD   Estradiol (VAGIFEM) 10 MCG TABS vaginal tablet insert 1 tablet vaginally two times a week INTO LOWER 1/3 OF VAGINA Yes Historical Provider, MD   omega-3 acid ethyl esters (LOVAZA) 1 g capsule  Yes Historical Provider, MD   Lancet Devices (EASY MINI EJECT LANCING DEVICE) MISC USE AS DIRECTED. Yes Leigh Castro MD   Blood Glucose Calibration (ADVOCATE REDI-CODE+ CONTROL) High SOLN USE AS DIRECTED.  Yes Leigh Castro MD Blood Glucose Monitoring Suppl (ADVOCATE REDI-CODE+) COLE USE AS DIRECTED.  Yes Panda Galan MD   Control Gel Formula Dressing (DUODERM CGF EXTRA THIN) MISC Apply to sacral region every other day Yes Panda Galan MD   folic acid (FOLVITE) 1 MG tablet Take one tablet by mouth every day Yes Panda Galan MD   magnesium oxide (MAG-OX) 400 MG tablet Take one tablet by mouth every day Yes Panda Galan MD   metFORMIN (GLUCOPHAGE) 500 MG tablet Take 1 tablet by mouth 2 times daily (with meals) Yes Panda Galan MD   Ascorbic Acid (VITAMIN C/KATIA HIPS) 500 MG TABS TAKE 1 TABLET BY MOUTH DAILY Yes Panda Galan MD   vitamin B-12 (CYANOCOBALAMIN) 1000 MCG tablet Take 1 tablet by mouth daily Yes Panda Galan MD   loperamide (IMODIUM A-D) 2 MG tablet Take 2 mg by mouth as needed for Diarrhea  Yes Historical Provider, MD        Allergies   Allergen Reactions    Latex Rash    Imitrex [Sumatriptan] Anaphylaxis    Zomig [Zolmitriptan] Anaphylaxis    Antivert [Meclizine Hcl] Other (See Comments)     confusion    Flagyl [Metronidazole] Nausea Only     Nausea with rash    Ketorolac Tromethamine Nausea Only    Provera [Medroxyprogesterone Acetate] Other (See Comments)     Caused massive stroke    Bacitracin Rash    Bactrim Nausea And Vomiting and Rash    Cefdinir Rash    Cefuroxime Axetil Rash    Codeine Rash    Cyclosporine Rash    Iodine Rash     Rash with SOB    Iv Dye [Iodides] Rash     Rash with SOB    Neomycin Rash    Petroleum Jelly [Skin Protectants, Misc.] Rash    Quinolones Rash    Sulfa Antibiotics Rash    Toradol [Ketorolac Tromethamine] Rash    Trovan [Trovafloxacin] Rash       Past Medical History:   Diagnosis Date    Acid reflux     Allergic rhinitis     Anxiety     Arnold-Chiari deformity ([facility] [address] 2000    surgery    Asthma     Cerebral artery occlusion with cerebral infarction ([facility] [address] 2001    1 yr after brain OR    Cerebrovascular disease     Chronic back pain greater than 3 months duration  COPD (chronic obstructive pulmonary disease) (HCC)     Dr Patricia Barnett [address] CVA (cerebral infarction)     left hemiparesis, due to ? estrogen + smoking    Depression     Dr Ibrahima Laguna H/O encephalopathy     Headache(784.0)     Dr Ronen Contreras Hepatitis B surface antigen positive     Hx of blood clots     PE / trx with coumadin x 6 months    Hyperlipidemia LDL goal < 100     meds > 10 yrs    Hypertension     meds > 2 yrs    Hypothyroidism 2001    meds > 18 yrs    Laryngitis, chronic     scoped by Dr Kesha Pleitez, fungal    Marijuana smoker in remission [facility])     Obesity (BMI 30-39. 9)     Osteoarthritis     Pulmonary embolism and infarction (HCC)     Restless legs syndrome     Rhinitis, chronic     Rotator cuff tear     Left    S/P colonoscopy with polypectomy     Dr Deon March    Seizures [facility])    [address] Spinal headache     past partum     Spondylosis without myelopathy     Type 2 diabetes mellitus without complication (HCC)     hx > 6 yrs    Urinary incontinence     Vertebral compression fracture ([address]        Past Surgical History:   Procedure Laterality Date    BACK SURGERY      lumbar kyphoplasty x 2    BRAIN SURGERY  2000    due to Arnold-Chiari deformity / [facility]     SECTION      COLONOSCOPY      CRANIOTOMY      Arnold-Chiary malformation    ENDOSCOPY, COLON, DIAGNOSTIC      FEMUR FRACTURE SURGERY Left     HUMERUS FRACTURE SURGERY Left 2020    ORIF PERIPROSTHETIC FX LEFT HUMERAL SHAFT, SYNTHES NOTIFIED, ROOM 283, LATEX ALLERGY performed by Randi Casey MD at [facility] Left 6/15/2018    LEFT TOTAL SHOULDER ARTHROPLASTY, SANDY BIOMET TSA, SCALENE NERVE BLOCK, (LATEX ALLERGY) performed by Randi Casey MD at [facility] Left 2019 LEFT REMOVAL GLENOID AND CONVERSION TO REVERSE TOTAL SHOULDER ARTHROPLASTY, SANDY REVERSE TSA, JOSE DAVID EQUIPMENT FLEXIBLE OSTEOTOMES, SCALENE NERVE BLOCK, LATEX ALLERGY performed by Skyler Byrnes MD at [address]      lumbar kyphoplasty    TONSILLECTOMY      UPPER GASTROINTESTINAL ENDOSCOPY  8/5/15    w/bx        Social History     Socioeconomic History    Marital status:      Spouse name: Not on file    Number of children: 1    Years of education: Not on file    Highest education level: Not on file   Occupational History    Occupation: SSI   Social Needs    Financial resource strain: Not on file    Food insecurity     Worry: Not on file     Inability: Not on file   [employer] needs     Medical: Not on file     Non-medical: Not on file   Tobacco Use    Smoking status: Current Every Day Smoker     Packs/day: 1.00     Years: 32.00     Pack years: 32.00     Types: Cigarettes    Smokeless tobacco: Never Used   Substance and Sexual Activity    Alcohol use:  Yes     Alcohol/week: 0.0 standard drinks     Comment: occasionally    Drug use: Yes     Types: Marijuana     Comment: daily for pain    Sexual activity: Not Currently   Lifestyle    Physical activity     Days per week: Not on file     Minutes per session: Not on file    Stress: Not on file   Relationships    Social connections     Talks on phone: Not on file     Gets together: Not on file     Attends [employer] service: Not on file     Active member of club or organization: Not on file     Attends meetings of clubs or organizations: Not on file     Relationship status: Not on file    Intimate partner violence     Fear of current or ex partner: Not on file     Emotionally abused: Not on file     Physically abused: Not on file     Forced sexual activity: Not on file   Other Topics Concern    Not on file   Social History Narrative    Not on file        Family History   Problem Relation Age of Onset  Osteoarthritis Mother     Asthma Mother     Diabetes Mother     Hypertension Mother     Cancer Mother         lung    Osteoarthritis Father     Hypertension Father     Other Father         PE    Cancer Father         stomach cancer    Asthma Brother     Heart Attack Brother     Other Brother         overdose    Asthma Sister     Breast Cancer Sister     Hypertension Sister     Other Sister         overdose / MVA       Vitals:    01/08/21 1335   BP: 116/76   Temp: 98.1 °F (36.7 °C)   Weight: Comment: unable to obtain   Height: 5' 2\" (1.575 m)       Estimated body mass index is 31.09 kg/m² as calculated from the following:    Height as of this encounter: 5' 2\" (1.575 m). Weight as of 1/4/21: 170 lb (77.1 kg). No results for input(s): WBC, RBC, HGB, HCT, MCV, MCH, MCHC, RDW, PLT, MPV in the last 72 hours. No results for input(s): NA, K, CL, CO2, BUN, CREATININE, GLUCOSE, CALCIUM, PROT, LABALBU, BILITOT, ALKPHOS, AST, ALT in the last 72 hours. Lab Results   Component Value Date    LABA1C 5.2 07/25/2020       Ct Head Wo Contrast    Result Date: 1/4/2021  Impression: Mild cerebral atrophy. Chronic ischemic white matter disease. Right ethmoid sinusitis. All CT scans at this facility use dose modulation, iterative reconstruction, and/or weight based dosing when appropriate to reduce radiation dose to as low as reasonably achievable. Us Dup Lower Extremity Left Sridhar    Result Date: 12/16/2020  NO FINDINGS OF DEEP VENOUS THROMBUS IN  THE VISUALIZED VESSELS OF THE LEFT LOWER EXTREMITY. Physical Exam  Constitutional:       Appearance: Normal appearance. She is normal weight. HENT:      Head: Normocephalic and atraumatic. Eyes:      Extraocular Movements: Extraocular movements intact. Conjunctiva/sclera: Conjunctivae normal.   Skin:     General: Skin is warm. Findings: No rash. Comments:  Well healing wound left parietal scalp   Neurological:

## 2021-01-08 ENCOUNTER — OFFICE VISIT (OUTPATIENT)
Dept: FAMILY MEDICINE CLINIC | Age: 61
End: 2021-01-08
Payer: MEDICARE

## 2021-01-08 VITALS
DIASTOLIC BLOOD PRESSURE: 76 MMHG | BODY MASS INDEX: 31.09 KG/M2 | HEIGHT: 62 IN | SYSTOLIC BLOOD PRESSURE: 116 MMHG | TEMPERATURE: 98.1 F

## 2021-01-08 DIAGNOSIS — Z48.02 ENCOUNTER FOR STAPLE REMOVAL: ICD-10-CM

## 2021-01-08 DIAGNOSIS — Z09 HOSPITAL DISCHARGE FOLLOW-UP: ICD-10-CM

## 2021-01-08 DIAGNOSIS — S09.90XD TRAUMATIC INJURY OF HEAD, SUBSEQUENT ENCOUNTER: ICD-10-CM
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
## 2021-01-08 PROCEDURE — G8427 DOCREV CUR MEDS BY ELIG CLIN: HCPCS | Performed by: STUDENT IN AN ORGANIZED HEALTH CARE EDUCATION/TRAINING PROGRAM

## 2021-01-08 PROCEDURE — 3017F COLORECTAL CA SCREEN DOC REV: CPT | Performed by: STUDENT IN AN ORGANIZED HEALTH CARE EDUCATION/TRAINING PROGRAM

## 2021-01-08 PROCEDURE — 99213 OFFICE O/P EST LOW 20 MIN: CPT | Performed by: STUDENT IN AN ORGANIZED HEALTH CARE EDUCATION/TRAINING PROGRAM

## 2021-01-08 PROCEDURE — G8482 FLU IMMUNIZE ORDER/ADMIN: HCPCS | Performed by: STUDENT IN AN ORGANIZED HEALTH CARE EDUCATION/TRAINING PROGRAM

## 2021-01-08 PROCEDURE — 4004F PT TOBACCO SCREEN RCVD TLK: CPT | Performed by: STUDENT IN AN ORGANIZED HEALTH CARE EDUCATION/TRAINING PROGRAM

## 2021-01-08 PROCEDURE — G8417 CALC BMI ABV UP PARAM F/U: HCPCS | Performed by: STUDENT IN AN ORGANIZED HEALTH CARE EDUCATION/TRAINING PROGRAM

## 2021-01-08 RX ORDER — CARISOPRODOL 350 MG/1
TABLET ORAL
COMMUNITY
Start: 2021-01-06

## 2021-01-08 ASSESSMENT — ENCOUNTER SYMPTOMS
SORE THROAT: 0
VOMITING: 0
SINUS PRESSURE: 0
ABDOMINAL PAIN: 0
COUGH: 0
SHORTNESS OF BREATH: 0

## 2021-01-08 NOTE — PATIENT INSTRUCTIONS
Patient Education        Preventing Falls: Care Instructions  Your Care Instructions     Getting around your home safely can be a challenge if you have injuries or health problems that make it easy for you to fall. Loose rugs and furniture in walkways are among the dangers for many older people who have problems walking or who have poor eyesight. People who have conditions such as arthritis, osteoporosis, or dementia also have to be careful not to fall. You can make your home safer with a few simple measures. Follow-up care is a key part of your treatment and safety. Be sure to make and go to all appointments, and call your doctor if you are having problems. It's also a good idea to know your test results and keep a list of the medicines you take. How can you care for yourself at home? Taking care of yourself  · You may get dizzy if you do not drink enough water. To prevent dehydration, drink plenty of fluids, enough so that your urine is light yellow or clear like water. Choose water and other caffeine-free clear liquids. If you have kidney, heart, or liver disease and have to limit fluids, talk with your doctor before you increase the amount of fluids you drink. · Exercise regularly to improve your strength, muscle tone, and balance. Walk if you can. Swimming may be a good choice if you cannot walk easily. · Have your vision and hearing checked each year or any time you notice a change. If you have trouble seeing and hearing, you might not be able to avoid objects and could lose your balance. · Know the side effects of the medicines you take. Ask your doctor or pharmacist whether the medicines you take can affect your balance. Sleeping pills or sedatives can affect your balance. · Limit the amount of alcohol you drink. Alcohol can impair your balance and other senses. · Ask your doctor whether calluses or corns on your feet need to be removed. If you wear loose-fitting shoes because of calluses or corns, you can lose your balance and fall. · Talk to your doctor if you have numbness in your feet. Preventing falls at home  · Remove raised doorway thresholds, throw rugs, and clutter. Repair loose carpet or raised areas in the floor. · Move furniture and electrical cords to keep them out of walking paths. · Use nonskid floor wax, and wipe up spills right away, especially on ceramic tile floors. · If you use a walker or cane, put rubber tips on it. If you use crutches, clean the bottoms of them regularly with an abrasive pad, such as steel wool. · Keep your house well lit, especially [employer], and outside walkways. Use night-lights in areas such as hallways and bathrooms. Add extra light switches or use remote switches (such as switches that go on or off when you clap your hands) to make it easier to turn lights on if you have to get up during the night. · Install sturdy handrails on stairways. · Move items in your cabinets so that the things you use a lot are on the lower shelves (about waist level). · Keep a cordless phone and a flashlight with new batteries by your bed. If possible, put a phone in each of the main rooms of your house, or carry a cell phone in case you fall and cannot reach a phone. Or, you can wear a device around your neck or wrist. You push a button that sends a signal for help. · Wear low-heeled shoes that fit well and give your feet good support. Use footwear with nonskid soles. Check the heels and soles of your shoes for wear. Repair or replace worn heels or soles. · Do not wear socks without shoes on wood floors. · Walk on the grass when the sidewalks are slippery. If you live in an area that gets snow and ice in the winter, sprinkle salt on slippery steps and sidewalks.   Preventing falls in the bath · Install grab bars and nonskid mats inside and outside your shower or tub and near the toilet and sinks. · Use shower chairs and bath benches. · Use a hand-held shower head that will allow you to sit while showering. · Get into a tub or shower by putting the weaker leg in first. Get out of a tub or shower with your strong side first.  · Repair loose toilet seats and consider installing a raised toilet seat to make getting on and off the toilet easier. · Keep your bathroom door unlocked while you are in the shower. Where can you learn more? Go to [email or web address] org and sign in to your [employer] account. Enter [phone] in the [facility] box to learn more about \"Preventing Falls: Care Instructions. \"     If you do not have an account, please click on the \"Sign Up Now\" link. Current as of: April 15, 2020               Content Version: 12.6  © 8899-6120 [employer]. Care instructions adapted under license by [facility] ([facility]). If you have questions about a medical condition or this instruction, always ask your healthcare professional. [place] [address] any warranty or liability for your use of this information.

## 2021-01-11 DIAGNOSIS — R60.9 EDEMA, UNSPECIFIED TYPE: ICD-10-CM

## 2021-01-11 RX ORDER — LEVOCETIRIZINE DIHYDROCHLORIDE 5 MG/1
5 TABLET, FILM COATED ORAL NIGHTLY
Qty: 90 TABLET | Refills: 1 | Status: SHIPPED | OUTPATIENT
Start: 2021-01-11 | End: 2021-04-15 | Stop reason: SDUPTHER

## 2021-01-11 RX ORDER — FOLIC ACID 1 MG/1
TABLET ORAL
Qty: 90 TABLET | Refills: 1 | Status: SHIPPED | OUTPATIENT
Start: 2021-01-11 | End: 2021-04-15 | Stop reason: SDUPTHER

## 2021-01-11 RX ORDER — FUROSEMIDE 40 MG/1
TABLET ORAL
Qty: 90 TABLET | Refills: 1 | Status: SHIPPED | OUTPATIENT
Start: 2021-01-11
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
## 2021-01-12 RX ORDER — HYDROCODONE BITARTRATE AND ACETAMINOPHEN 5; 325 MG/1; MG/1
1 TABLET ORAL EVERY 8 HOURS PRN
Qty: 9 TABLET | Refills: 0 | Status: SHIPPED | OUTPATIENT
Start: 2021-01-12 | End: 2021-01-15

## 2021-01-12 NOTE — PROGRESS NOTES
Pt calling complaining about headache on the left side of her head. She was seen in the ER 1/4 due to a fall and required one staple to be placed due to laceration. CT head was non acute. Pt overall feeling okay besides headache. No vision changes, nausea or vomiting. She has been trying tylenol with no relief in symptoms. Will prescribe short course of norco - 3 days supply. Recommended icing head several times a day.

## 2021-01-14 ENCOUNTER — CARE COORDINATION (OUTPATIENT)
Dept: CARE COORDINATION | Age: 61
End: 2021-01-14

## 2021-01-18 DIAGNOSIS — G89.29 CHRONIC LOW BACK PAIN, UNSPECIFIED BACK PAIN LATERALITY, UNSPECIFIED WHETHER SCIATICA PRESENT: ICD-10-CM

## 2021-01-18 DIAGNOSIS — M54.50 CHRONIC LOW BACK PAIN, UNSPECIFIED BACK PAIN LATERALITY, UNSPECIFIED WHETHER SCIATICA PRESENT: ICD-10-CM

## 2021-01-18 RX ORDER — PREGABALIN 300 MG/1
300 CAPSULE ORAL 2 TIMES DAILY
Qty: 60 CAPSULE | Refills: 2 | Status: SHIPPED | OUTPATIENT
Start: 2021-01-18 | End: 2021-02-10 | Stop reason: SDUPTHER

## 2021-01-18 RX ORDER — FLUOCINOLONE ACETONIDE 0.1 MG/ML
SOLUTION TOPICAL
Qty: 360 ML | Refills: 0 | Status: SHIPPED | OUTPATIENT
Start: 2021-01-18 | End: 2021-01-22

## 2021-01-22 RX ORDER — FLUOCINOLONE ACETONIDE 0.1 MG/ML
SOLUTION TOPICAL
Qty: 360 ML | Refills: 0 | Status: SHIPPED | OUTPATIENT
Start: 2021-01-22 | End: 2021-02-12

## 2021-01-26 DIAGNOSIS — F41.9 ANXIETY: ICD-10-CM

## 2021-01-26 RX ORDER — LORAZEPAM 0.5 MG/1
0.5 TABLET ORAL 2 TIMES DAILY PRN
Qty: 60 TABLET | Refills: 0 | Status: SHIPPED | OUTPATIENT
Start: 2021-01-26 | End: 2021-02-10 | Stop reason: SDUPTHER

## 2021-01-27 ENCOUNTER — TELEPHONE (OUTPATIENT)
Dept: FAMILY MEDICINE CLINIC | Age: 61
End: 2021-01-27

## 2021-01-27 NOTE — TELEPHONE ENCOUNTER
Pt states she has been coughing a lot and her ribs hurt, would like something called into   [facility] [address].

## 2021-02-10 ENCOUNTER — OFFICE VISIT (OUTPATIENT)
Dept: FAMILY MEDICINE CLINIC | Age: 61
End: 2021-02-10
Payer: MEDICARE

## 2021-02-10 VITALS
OXYGEN SATURATION: 95 % | HEIGHT: 62 IN | SYSTOLIC BLOOD PRESSURE: 124 MMHG | DIASTOLIC BLOOD PRESSURE: 84 MMHG | TEMPERATURE: 98.5 F | BODY MASS INDEX: 31.09 KG/M2 | HEART RATE: 102 BPM

## 2021-02-10 DIAGNOSIS — N76.1 SUBACUTE VAGINITIS: ICD-10-CM

## 2021-02-10 DIAGNOSIS — F41.9 ANXIETY: Primary | ICD-10-CM

## 2021-02-10 DIAGNOSIS — G89.29 CHRONIC LOW BACK PAIN, UNSPECIFIED BACK PAIN LATERALITY, UNSPECIFIED WHETHER SCIATICA PRESENT: ICD-10-CM

## 2021-02-10 DIAGNOSIS — M54.50 CHRONIC LOW BACK PAIN, UNSPECIFIED BACK PAIN LATERALITY, UNSPECIFIED WHETHER SCIATICA PRESENT: ICD-10-CM

## 2021-02-10 PROCEDURE — 99213 OFFICE O/P EST LOW 20 MIN: CPT | Performed by: FAMILY MEDICINE

## 2021-02-10 PROCEDURE — G8427 DOCREV CUR MEDS BY ELIG CLIN: HCPCS | Performed by: FAMILY MEDICINE

## 2021-02-10 PROCEDURE — 3017F COLORECTAL CA SCREEN DOC REV: CPT | Performed by: FAMILY MEDICINE

## 2021-02-10 PROCEDURE — G8482 FLU IMMUNIZE ORDER/ADMIN: HCPCS | Performed by: FAMILY MEDICINE

## 2021-02-10 PROCEDURE — G8417 CALC BMI ABV UP PARAM F/U: HCPCS | Performed by: FAMILY MEDICINE

## 2021-02-10 PROCEDURE — 4004F PT TOBACCO SCREEN RCVD TLK: CPT | Performed by: FAMILY MEDICINE

## 2021-02-10 RX ORDER — PREGABALIN 300 MG/1
300 CAPSULE ORAL 2 TIMES DAILY
Qty: 180 CAPSULE | Refills: 1 | Status: SHIPPED | OUTPATIENT
Start: 2021-02-10 | End: 2021-04-06 | Stop reason: SDUPTHER

## 2021-02-10 RX ORDER — LEVALBUTEROL TARTRATE 45 UG/1
AEROSOL, METERED ORAL
Qty: 3 INHALER | Refills: 3 | Status: SHIPPED | OUTPATIENT
Start: 2021-02-10

## 2021-02-10 RX ORDER — LORAZEPAM 0.5 MG/1
0.5 TABLET ORAL 2 TIMES DAILY PRN
Qty: 60 TABLET | Refills: 2 | Status: SHIPPED | OUTPATIENT
Start: 2021-02-26 | End: 2021-04-20 | Stop reason: SDUPTHER
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
## 2021-02-12 ENCOUNTER — VIRTUAL VISIT (OUTPATIENT)
Dept: FAMILY MEDICINE CLINIC | Age: 61
End: 2021-02-12
Payer: MEDICARE

## 2021-02-12 DIAGNOSIS — T36.95XA ANTIBIOTIC-INDUCED YEAST INFECTION: ICD-10-CM

## 2021-02-12 DIAGNOSIS — J22 LOWER RESPIRATORY INFECTION: Primary | ICD-10-CM

## 2021-02-12 DIAGNOSIS — B37.9 ANTIBIOTIC-INDUCED YEAST INFECTION: ICD-10-CM

## 2021-02-12 PROCEDURE — 3017F COLORECTAL CA SCREEN DOC REV: CPT | Performed by: NURSE PRACTITIONER

## 2021-02-12 PROCEDURE — G8427 DOCREV CUR MEDS BY ELIG CLIN: HCPCS | Performed by: NURSE PRACTITIONER

## 2021-02-12 PROCEDURE — 99213 OFFICE O/P EST LOW 20 MIN: CPT | Performed by: NURSE PRACTITIONER

## 2021-02-12 RX ORDER — AZITHROMYCIN 250 MG/1
250 TABLET, FILM COATED ORAL DAILY
Qty: 6 TABLET | Refills: 0 | Status: SHIPPED | OUTPATIENT
Start: 2021-02-12 | End: 2021-02-17

## 2021-02-12 RX ORDER — FLUOCINOLONE ACETONIDE 0.1 MG/ML
SOLUTION TOPICAL
Qty: 360 ML | Refills: 0 | Status: SHIPPED | OUTPATIENT
Start: 2021-02-12

## 2021-02-12 RX ORDER — FLUCONAZOLE 150 MG/1
150 TABLET ORAL ONCE
Qty: 1 TABLET | Refills: 1 | Status: SHIPPED | OUTPATIENT
Start: 2021-02-12 | End: 2021-02-12

## 2021-02-12 RX ORDER — CLOBETASOL PROPIONATE 0.5 MG/G
OINTMENT TOPICAL
OUTPATIENT
Start: 2021-02-12

## 2021-02-12 ASSESSMENT — ENCOUNTER SYMPTOMS
VOMITING: 0
SINUS PAIN: 1
CHEST TIGHTNESS: 0
WHEEZING: 0
NAUSEA: 0
DIARRHEA: 0
COUGH: 1
SORE THROAT: 1
SHORTNESS OF BREATH: 0

## 2021-02-12 NOTE — PROGRESS NOTES
The location for this appointment was the [facility] primary care site. TELEHEALTH APPOINTMENT  Patient has been screened to determine that this visit qualifies for a \"Video Visit\". This visit was via video due to the restrictions of the COVID-19 pandemic. All issues as below were discussed and addressed but note a limited visually based physical exam was performed. If it was felt the patient should be evaluated in the clinic there will be comment below demonstrating they were directed there. The patient is aware and has given verbal consent to be billed for this video encounter. The resources used for this visit were [employer] for chart access and [employer] me    Chief Complaint   Patient presents with    Cough     Pt c/o of a cont. cough for a few weeks, stated she is starting to cough up yellow mucus on Wednesday 02/10/2021 night and does not want to get pnuemonia, denies fever, SOB, N/V/D. stated she was in the office on wednesday to see Dr. David Del Castillo and had been coughing       HPI  Patient is here for a cough. Started a few weeks ago. Has become productive of purulent sputum, so would like it checked out \"so it does not turn into pneumonia\". Patient reports multiple cases of pneumonia. Has been using humidifier, which helps. Also taking Zyrtec and Mucinex, not really helping. PMH:    Current Outpatient Medications on File Prior to Visit   Medication Sig Dispense Refill    [START ON 2/26/2021] LORazepam (ATIVAN) 0.5 MG tablet Take 1 tablet by mouth 2 times daily as needed for Anxiety for up to 90 days. 60 tablet 2    pregabalin (LYRICA) 300 MG capsule Take 1 capsule by mouth 2 times daily for 180 days.  180 capsule 1    blood glucose test strips (ASCENSIA AUTODISC VI;ONE TOUCH ULTRA TEST VI) strip 1 each by In Vitro route daily Test once daily 100 each 3  levalbuterol (XOPENEX HFA) 45 MCG/ACT inhaler INHALE 2 PUFFS BY MOUTH EVERY 4 HOURS AS NEEDED FOR WHEEZING OR SHORTNESS OF BREATH 3 Inhaler 3    terconazole (TERAZOL 3) 0.8 % vaginal cream Place vaginally nightly.  20 g 1    folic acid (FOLVITE) 1 MG tablet Take one tablet by mouth every day 90 tablet 1    furosemide (LASIX) 40 MG tablet Take 1 tablet by mouth every day 90 tablet 1    levocetirizine (XYZAL) 5 MG tablet Take 1 tablet by mouth nightly 90 tablet 1    carisoprodol (SOMA) 350 MG tablet       potassium chloride (KLOR-CON M) 20 MEQ extended release tablet Take 1 tablet by mouth daily 90 tablet 1    pramipexole (MIRAPEX) 0.5 MG tablet Take 1 tablet by mouth every evening 90 tablet 1    vitamin D (ERGOCALCIFEROL) 1.25 MG (41639 UT) CAPS capsule Take 1 capsule by mouth once a week 4 capsule 12    Alcohol Swabs (ALCOHOL PADS) 70 % PADS Apply 1 each topically 2 times daily 200 each 1    ARIPiprazole (ABILIFY) 10 MG tablet TAKE ONE TABLET BY MOUTH EACH NIGHT AT BEDTIME 90 tablet 1    escitalopram (LEXAPRO) 20 MG tablet TAKE ONE TABLET BY MOUTH DAILY 90 tablet 1    esomeprazole (NEXIUM) 40 MG delayed release capsule Take 2 capsules by mouth every day 180 capsule 1    levothyroxine (SYNTHROID) 200 MCG tablet Take 1 tablet by mouth Daily 90 tablet 1    metoprolol tartrate (LOPRESSOR) 25 MG tablet Take 1 tablet by mouth 2 times daily 180 tablet 1    mirtazapine (REMERON) 30 MG tablet Take 1 tablet by mouth nightly 90 tablet 1    SYMBICORT 160-4.5 MCG/ACT AERO INL 2 PFS PO BID UTD 3 Inhaler 1    terazosin (HYTRIN) 2 MG capsule TAKE 1 CAPSULE BY MOUTH EVERY NIGHT 90 capsule 3    cephALEXin (KEFLEX) 500 MG capsule Take 1 capsule by mouth 4 times daily 40 capsule 0    Simethicone 180 MG CAPS Take 1 softgel by mouth twice daily 60 capsule 3    diclofenac sodium (VOLTAREN) 1 % GEL APPLY 2-4 GRAMS TOPICALLY TO AFFECTED AREA 3-4 TIMES PER DAY 1200 g 3  tiZANidine (ZANAFLEX) 4 MG tablet Take 2 tablets by mouth every 8 hours as needed 180 tablet 11    Easy Comfort Lancets MISC Use, as directed, once daily to test blood sugar 100 each 3    ondansetron (ZOFRAN-ODT) 4 MG disintegrating tablet Dissolve 1 tab under tongue every 8hrs as needed for n/v 30 tablet 2    Nutritional Supplements (BOOST HIGH PROTEIN) LIQD One can by mouth daily 12195 mL 5    ipratropium (ATROVENT) 0.06 % nasal spray 2 sprays by Nasal route 3 times daily as needed      diflorasone (PSORCON) 0.05 % ointment       Nutritional Supplements (BOOST GLUCOSE CONTROL) LIQD Take 1 Can by mouth 2 times daily 60 Can 11    ibuprofen (ADVIL;MOTRIN) 800 MG tablet Take 1 tab by mouth every 8 hours as need for pain 30 tablet 11    nystatin (MYCOSTATIN) 035296 UNIT/GM powder Apply topically 3 times daily as directed 60 g 5    Olopatadine HCl (PAZEO) 0.7 % SOLN INSTILL 1 DROP IN BOTH EYES EVERY MORNING 1 Bottle 5    rosuvastatin (CRESTOR) 20 MG tablet Take 1 tablet by mouth every day 30 tablet 11    ferrous sulfate (IRON 325) 325 (65 Fe) MG tablet Take 1 tablet by mouth 2 times daily 180 tablet 1    Capsaicin 0.1 % CREA Apply 1 applicator topically 3 times daily as needed (pain) 42.5 g 0    diclofenac sodium (VOLTAREN) 1 % GEL Apply 2 g topically 2 times daily 1 Tube 3    PROAIR RESPICLICK 645 (90 Base) MCG/ACT aerosol powder inhalation       clobetasol (TEMOVATE) 0.05 % ointment       calcipotriene (DOVONEX) 0.005 % cream       AIMOVIG 70 MG/ML SOAJ ADM 1 ML SC 1 TIME Q MONTH      Estradiol (VAGIFEM) 10 MCG TABS vaginal tablet insert 1 tablet vaginally two times a week INTO LOWER 1/3 OF VAGINA      omega-3 acid ethyl esters (LOVAZA) 1 g capsule       Lancet Devices (EASY MINI EJECT LANCING DEVICE) MISC USE AS DIRECTED. 100 each 3    Control Gel Formula Dressing (DUODERM CGF EXTRA THIN) MISC Apply to sacral region every other day 20 each 2  magnesium oxide (MAG-OX) 400 MG tablet Take one tablet by mouth every day 30 tablet 14    metFORMIN (GLUCOPHAGE) 500 MG tablet Take 1 tablet by mouth 2 times daily (with meals) 60 tablet 5    Ascorbic Acid (VITAMIN C/KATIA HIPS) 500 MG TABS TAKE 1 TABLET BY MOUTH DAILY 30 tablet 0    vitamin B-12 (CYANOCOBALAMIN) 1000 MCG tablet Take 1 tablet by mouth daily 90 tablet 3    loperamide (IMODIUM A-D) 2 MG tablet Take 2 mg by mouth as needed for Diarrhea       fluocinolone acetonide (SYNALAR) 0.01 % external solution APPLY 1-2 ML TOPICALLY TO AFFECTED AREA 1-2 TIMES PER  mL 0     No current facility-administered medications on file prior to visit. Past Medical History:   Diagnosis Date    Acid reflux     Allergic rhinitis     Anxiety     Arnold-Chiari deformity (HCC) 2000    surgery    Asthma     Cerebral artery occlusion with cerebral infarction ([facility] [address] 2001    1 yr after brain OR    Cerebrovascular disease     Chronic back pain greater than 3 months duration     COPD (chronic obstructive pulmonary disease) ([facility])     Dr King Adler at [address] CVA (cerebral infarction) 2001    left hemiparesis, due to ? estrogen + smoking    Depression 1993    Dr Gustavo Martinez H/O encephalopathy 2001    Headache(784.0)     Dr Lars Nayak Hepatitis B surface antigen positive     Hx of blood clots 2010    PE / trx with coumadin x 6 months    Hyperlipidemia LDL goal < 100     meds > 10 yrs    Hypertension     meds > 2 yrs    Hypothyroidism 2001    meds > 18 yrs    Laryngitis, chronic 2012    scoped by Dr Marveen Gaucher, fungal    Marijuana smoker in remission [facility]) 2011    Obesity (BMI 30-39. 9)     Osteoarthritis     Pulmonary embolism and infarction (HCC)     Restless legs syndrome     Rhinitis, chronic     Rotator cuff tear     Left    S/P colonoscopy with polypectomy 2010    Dr Michael Cooper    Seizures [facility])     Smoker     Spinal headache     past partum 1994    Spondylosis without myelopathy  Type 2 diabetes mellitus without complication (HCC)     hx > 6 yrs    Urinary incontinence     Vertebral compression fracture [facility])      Past Surgical History:   Procedure Laterality Date    BACK SURGERY  2001    lumbar kyphoplasty x 2    BRAIN SURGERY      due to Arnold-Chiari deformity / [facility]     SECTION      COLONOSCOPY      CRANIOTOMY      Arnold-Chiary malformation    ENDOSCOPY, COLON, DIAGNOSTIC      FEMUR FRACTURE SURGERY Left     HUMERUS FRACTURE SURGERY Left 2020    ORIF PERIPROSTHETIC FX LEFT HUMERAL SHAFT, SYNTHES NOTIFIED, ROOM 283, LATEX ALLERGY performed by Ashanti Candelario MD at [address] [facility] Left 6/15/2018    LEFT TOTAL SHOULDER ARTHROPLASTY, SANDY BIOMET TSA, SCALENE NERVE BLOCK, (LATEX ALLERGY) performed by Ashanti Candelario MD at [address] [facility] Left 2019    LEFT REMOVAL GLENOID AND CONVERSION TO REVERSE TOTAL SHOULDER ARTHROPLASTY, SANDY REVERSE TSA, JOSE DAVID EQUIPMENT FLEXIBLE OSTEOTOMES, SCALENE NERVE BLOCK, LATEX ALLERGY performed by Ashanti Candelario MD at [address]      lumbar kyphoplasty    TONSILLECTOMY      UPPER GASTROINTESTINAL ENDOSCOPY  8/5/15    w/bx      Social History     Socioeconomic History    Marital status:      Spouse name: Not on file    Number of children: 1    Years of education: Not on file    Highest education level: Not on file   Occupational History    Occupation: SSI   Social Needs    Financial resource strain: Not on file    Food insecurity     Worry: Not on file     Inability: Not on file   [employer] needs     Medical: Not on file     Non-medical: Not on file   Tobacco Use    Smoking status: Current Every Day Smoker     Packs/day: 1.00     Years: 32.00     Pack years: 32.00     Types: Cigarettes    Smokeless tobacco: Never Used   Substance and Sexual Activity    Alcohol use:  Yes Alcohol/week: 0.0 standard drinks     Comment: occasionally    Drug use: Yes     Types: Marijuana     Comment: daily for pain    Sexual activity: Not Currently   Lifestyle    Physical activity     Days per week: Not on file     Minutes per session: Not on file    Stress: Not on file   Relationships    Social connections     Talks on phone: Not on file     Gets together: Not on file     Attends Taoist service: Not on file     Active member of club or organization: Not on file     Attends meetings of clubs or organizations: Not on file     Relationship status: Not on file    Intimate partner violence     Fear of current or ex partner: Not on file     Emotionally abused: Not on file     Physically abused: Not on file     Forced sexual activity: Not on file   Other Topics Concern    Not on file   Social History Narrative    Not on file     Family History   Problem Relation Age of Onset    Osteoarthritis Mother     Asthma Mother     Diabetes Mother     Hypertension Mother     Cancer Mother         lung    Osteoarthritis Father     Hypertension Father     Other Father         PE    Cancer Father         stomach cancer    Asthma Brother     Heart Attack Brother     Other Brother         overdose    Asthma Sister     Breast Cancer Sister     Hypertension Sister     Other Sister         overdose / MVA     Allergies:  Latex; Imitrex [sumatriptan]; Zomig [zolmitriptan]; Antivert [meclizine hcl]; Flagyl [metronidazole]; Ketorolac tromethamine; Provera [medroxyprogesterone acetate]; Bacitracin; Bactrim; Cefdinir; Cefuroxime axetil; Codeine; Cyclosporine; Iodine; Iv dye [iodides]; Neomycin; Petroleum jelly [skin protectants, misc.]; Quinolones; Sulfa antibiotics; Toradol [ketorolac tromethamine]; and Trovan [trovafloxacin]    Review of Systems   Constitutional: Negative for chills, diaphoresis and fever. HENT: Positive for congestion (chest and nasal), postnasal drip, sinus pain and sore throat. There are no Patient Instructions on file for this visit. Side effects and adverse effects of any medication prescribed today, as well as treatment plan/rationale, follow-up care, and result expectations have been discussed with the patient. Expresses understanding and desires to proceed with treatment plan. Discussed signs and symptoms which require immediate follow-up in ED/call to 911. Understanding verbalized. I have reviewed and updated the electronic medical record. As always, if symptoms worsen, go directly to ED. The resources used for this visit were [employer] for chart access and doxy. MAYURI Vigil CNP      An  electronic signature was used to authenticate this note. --MAYURI Aguirre CNP on 2/12/2021 at 4:30 PM        Pursuant to the emergency declaration under the [facility] waiver authority and the [employer] Act, this Virtual  Visit was conducted, with patient's consent, to reduce the patient's risk of exposure to COVID-19 and provide continuity of care for an established patient. Services were provided through a video synchronous discussion virtually to substitute for in-person clinic visit.

## 2021-02-16 ENCOUNTER — TELEPHONE (OUTPATIENT)
Dept: FAMILY MEDICINE CLINIC | Age: 61
End: 2021-02-16

## 2021-02-16 NOTE — TELEPHONE ENCOUNTER
Pt stating she believes one of her medications needs a PA, but does not know. States she thinks there are two. [employer] let patient know.

## 2021-02-23 ENCOUNTER — VIRTUAL VISIT (OUTPATIENT)
Dept: FAMILY MEDICINE CLINIC | Age: 61
End: 2021-02-23
Payer: MEDICARE

## 2021-02-23 ENCOUNTER — TELEPHONE (OUTPATIENT)
Dept: FAMILY MEDICINE CLINIC | Age: 61
End: 2021-02-23

## 2021-02-23 DIAGNOSIS — J01.90 ACUTE BACTERIAL SINUSITIS: Primary | ICD-10-CM

## 2021-02-23 DIAGNOSIS — B96.89 ACUTE BACTERIAL SINUSITIS: Primary | ICD-10-CM

## 2021-02-23 PROCEDURE — 99442 PR PHYS/QHP TELEPHONE EVALUATION 11-20 MIN: CPT | Performed by: FAMILY MEDICINE

## 2021-02-23 RX ORDER — FLUCONAZOLE 150 MG/1
150 TABLET ORAL ONCE
Qty: 1 TABLET | Refills: 0 | Status: SHIPPED | OUTPATIENT
Start: 2021-02-23 | End: 2021-02-23

## 2021-02-23 RX ORDER — DOXYCYCLINE HYCLATE 100 MG
100 TABLET ORAL 2 TIMES DAILY
Qty: 20 TABLET | Refills: 0 | Status: SHIPPED | OUTPATIENT
Start: 2021-02-23 | End: 2021-03-05
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
## 2021-03-08 ENCOUNTER — TELEPHONE (OUTPATIENT)
Dept: FAMILY MEDICINE CLINIC | Age: 61
End: 2021-03-08

## 2021-03-08 DIAGNOSIS — H10.13 ALLERGIC CONJUNCTIVITIS OF BOTH EYES: ICD-10-CM

## 2021-03-08 RX ORDER — OLOPATADINE HYDROCHLORIDE 7 MG/ML
SOLUTION OPHTHALMIC
Qty: 1 BOTTLE | Refills: 5 | Status: SHIPPED | OUTPATIENT
Start: 2021-03-08 | End: 2021-03-10 | Stop reason: ALTCHOICE

## 2021-03-08 NOTE — TELEPHONE ENCOUNTER
Orders Placed This Encounter   Medications    Olopatadine HCl (PAZEO) 0.7 % SOLN     Sig: INSTILL 1 DROP IN BOTH EYES EVERY MORNING     Dispense:  1 Bottle     Refill:  5       The above med(s) were e-scripted to the patient's pharmacy.    Please advise patient  Kenroy Ba MD

## 2021-03-10 ENCOUNTER — OFFICE VISIT (OUTPATIENT)
Dept: FAMILY MEDICINE CLINIC | Age: 61
End: 2021-03-10
Payer: MEDICARE

## 2021-03-10 VITALS
HEIGHT: 62 IN | SYSTOLIC BLOOD PRESSURE: 118 MMHG | TEMPERATURE: 99.1 F | HEART RATE: 91 BPM | OXYGEN SATURATION: 93 % | BODY MASS INDEX: 31.09 KG/M2 | DIASTOLIC BLOOD PRESSURE: 70 MMHG

## 2021-03-10 DIAGNOSIS — B96.89 ACUTE BACTERIAL SINUSITIS: Primary | ICD-10-CM

## 2021-03-10 DIAGNOSIS — Z12.11 SCREEN FOR COLON CANCER: ICD-10-CM

## 2021-03-10 DIAGNOSIS — J30.9 ALLERGIC RHINITIS, UNSPECIFIED SEASONALITY, UNSPECIFIED TRIGGER: ICD-10-CM

## 2021-03-10 DIAGNOSIS — J01.90 ACUTE BACTERIAL SINUSITIS: Primary | ICD-10-CM

## 2021-03-10 PROCEDURE — 4004F PT TOBACCO SCREEN RCVD TLK: CPT | Performed by: FAMILY MEDICINE

## 2021-03-10 PROCEDURE — 99213 OFFICE O/P EST LOW 20 MIN: CPT | Performed by: FAMILY MEDICINE

## 2021-03-10 PROCEDURE — G8482 FLU IMMUNIZE ORDER/ADMIN: HCPCS | Performed by: FAMILY MEDICINE

## 2021-03-10 PROCEDURE — G8427 DOCREV CUR MEDS BY ELIG CLIN: HCPCS | Performed by: FAMILY MEDICINE

## 2021-03-10 PROCEDURE — G8417 CALC BMI ABV UP PARAM F/U: HCPCS | Performed by: FAMILY MEDICINE

## 2021-03-10 PROCEDURE — 3017F COLORECTAL CA SCREEN DOC REV: CPT | Performed by: FAMILY MEDICINE

## 2021-03-10 RX ORDER — FLUCONAZOLE 150 MG/1
150 TABLET ORAL ONCE
Qty: 1 TABLET | Refills: 0 | Status: SHIPPED | OUTPATIENT
Start: 2021-03-10 | End: 2021-03-30 | Stop reason: SDUPTHER

## 2021-03-10 RX ORDER — METHYLPREDNISOLONE 4 MG/1
TABLET ORAL
Qty: 1 KIT | Refills: 0 | Status: SHIPPED | OUTPATIENT
Start: 2021-03-10 | End: 2021-04-26 | Stop reason: ALTCHOICE

## 2021-03-10 RX ORDER — DOXYCYCLINE HYCLATE 100 MG
100 TABLET ORAL 2 TIMES DAILY
Qty: 20 TABLET | Refills: 0 | Status: SHIPPED | OUTPATIENT
Start: 2021-03-10 | End: 2021-03-20
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
## 2021-03-11 ENCOUNTER — TELEPHONE (OUTPATIENT)
Dept: FAMILY MEDICINE CLINIC | Age: 61
End: 2021-03-11

## 2021-03-11 DIAGNOSIS — Z12.31 BREAST CANCER SCREENING BY MAMMOGRAM: Primary | ICD-10-CM

## 2021-03-11 NOTE — TELEPHONE ENCOUNTER
Pt calling for her Mammogram order asking for this to be sent to [facility]

## 2021-03-11 NOTE — TELEPHONE ENCOUNTER
Pt calling, after visit summary shows Metoprolol being discontinued. Checked with Dr. Layton Lin, no changes made to medication at appt yesterday.  Pt aware

## 2021-03-15 ENCOUNTER — TELEPHONE (OUTPATIENT)
Dept: FAMILY MEDICINE CLINIC | Age: 61
End: 2021-03-15

## 2021-03-15 NOTE — TELEPHONE ENCOUNTER
Pt calling asking for something for the pain in her neck, States it was discussed at her appt. Uses [facility].       [phone]

## 2021-03-17 DIAGNOSIS — E87.6 HYPOKALEMIA: ICD-10-CM

## 2021-03-17 DIAGNOSIS — G25.81 RLS (RESTLESS LEGS SYNDROME): ICD-10-CM

## 2021-03-18 RX ORDER — BUDESONIDE AND FORMOTEROL FUMARATE DIHYDRATE 160; 4.5 UG/1; UG/1
AEROSOL RESPIRATORY (INHALATION)
Qty: 3 INHALER | Refills: 1 | Status: SHIPPED | OUTPATIENT
Start: 2021-03-18 | End: 2021-04-27 | Stop reason: SDUPTHER

## 2021-03-18 RX ORDER — ARIPIPRAZOLE 10 MG/1
TABLET ORAL
Qty: 90 TABLET | Refills: 1 | Status: SHIPPED | OUTPATIENT
Start: 2021-03-18

## 2021-03-18 RX ORDER — POTASSIUM CHLORIDE 20 MEQ/1
20 TABLET, EXTENDED RELEASE ORAL DAILY
Qty: 90 TABLET | Refills: 1 | Status: SHIPPED | OUTPATIENT
Start: 2021-03-18 | End: 2021-04-15 | Stop reason: SDUPTHER

## 2021-03-18 RX ORDER — MIRTAZAPINE 30 MG/1
30 TABLET, FILM COATED ORAL NIGHTLY
Qty: 90 TABLET | Refills: 1 | Status: SHIPPED | OUTPATIENT
Start: 2021-03-18

## 2021-03-18 RX ORDER — PRAMIPEXOLE DIHYDROCHLORIDE 0.5 MG/1
0.5 TABLET ORAL EVERY EVENING
Qty: 90 TABLET | Refills: 1 | Status: SHIPPED | OUTPATIENT
Start: 2021-03-18 | End: 2021-04-15 | Stop reason: SDUPTHER

## 2021-03-18 RX ORDER — LEVOTHYROXINE SODIUM 0.2 MG/1
200 TABLET ORAL DAILY
Qty: 90 TABLET | Refills: 1 | Status: SHIPPED | OUTPATIENT
Start: 2021-03-18

## 2021-03-18 RX ORDER — ESCITALOPRAM OXALATE 20 MG/1
TABLET ORAL
Qty: 90 TABLET | Refills: 1 | Status: SHIPPED | OUTPATIENT
Start: 2021-03-18

## 2021-03-25 ENCOUNTER — TELEPHONE (OUTPATIENT)
Dept: FAMILY MEDICINE CLINIC | Age: 61
End: 2021-03-25

## 2021-03-25 NOTE — TELEPHONE ENCOUNTER
[facility] calling.    Faxed a PA   Informed them, we received it and faxed back on 3/19/2021

## 2021-03-26 ENCOUNTER — TELEPHONE (OUTPATIENT)
Dept: FAMILY MEDICINE CLINIC | Age: 61
End: 2021-03-26

## 2021-03-26 NOTE — TELEPHONE ENCOUNTER
[facility] calling. States they did received the fax sent over on 3/19/2021    However it they only received a partial script? And would like the entire script sent over?

## 2021-03-26 NOTE — TELEPHONE ENCOUNTER
Will refax Complex Repair And Bilobe Flap Text: The defect edges were debeveled with a #15 scalpel blade.  The primary defect was closed partially with a complex linear closure.  Given the location of the remaining defect, shape of the defect and the proximity to free margins a bilobe flap was deemed most appropriate for complete closure of the defect.  Using a sterile surgical marker, an appropriate advancement flap was drawn incorporating the defect and placing the expected incisions within the relaxed skin tension lines where possible.    The area thus outlined was incised deep to adipose tissue with a #15 scalpel blade.  The skin margins were undermined to an appropriate distance in all directions utilizing iris scissors.

## 2021-03-29 DIAGNOSIS — J30.9 ALLERGIC RHINITIS, UNSPECIFIED SEASONALITY, UNSPECIFIED TRIGGER: ICD-10-CM

## 2021-03-29 DIAGNOSIS — N39.0 URINARY TRACT INFECTION WITHOUT HEMATURIA, SITE UNSPECIFIED: ICD-10-CM

## 2021-03-29 DIAGNOSIS — J01.90 ACUTE BACTERIAL SINUSITIS: ICD-10-CM

## 2021-03-29 DIAGNOSIS — B96.89 ACUTE BACTERIAL SINUSITIS: ICD-10-CM

## 2021-03-30 RX ORDER — CEPHALEXIN 500 MG/1
500 CAPSULE ORAL 3 TIMES DAILY
Qty: 30 CAPSULE | Refills: 0 | Status: SHIPPED | OUTPATIENT
Start: 2021-03-30 | End: 2021-04-09

## 2021-03-30 RX ORDER — FLUCONAZOLE 150 MG/1
150 TABLET ORAL ONCE
Qty: 1 TABLET | Refills: 0 | Status: SHIPPED | OUTPATIENT
Start: 2021-03-30 | End: 2021-05-03 | Stop reason: SDUPTHER

## 2021-04-05 DIAGNOSIS — M54.50 CHRONIC LOW BACK PAIN, UNSPECIFIED BACK PAIN LATERALITY, UNSPECIFIED WHETHER SCIATICA PRESENT: ICD-10-CM

## 2021-04-05 DIAGNOSIS — G89.29 CHRONIC LOW BACK PAIN, UNSPECIFIED BACK PAIN LATERALITY, UNSPECIFIED WHETHER SCIATICA PRESENT: ICD-10-CM

## 2021-04-06 RX ORDER — PREGABALIN 300 MG/1
300 CAPSULE ORAL 2 TIMES DAILY
Qty: 180 CAPSULE | Refills: 1 | Status: SHIPPED | OUTPATIENT
Start: 2021-04-06 | End: 2021-04-19

## 2021-04-11 DIAGNOSIS — G25.81 RLS (RESTLESS LEGS SYNDROME): ICD-10-CM

## 2021-04-11 RX ORDER — PRAMIPEXOLE DIHYDROCHLORIDE 0.5 MG/1
0.5 TABLET ORAL EVERY EVENING
Qty: 30 TABLET | Refills: 11 | OUTPATIENT
Start: 2021-04-11

## 2021-04-14 DIAGNOSIS — G25.81 RLS (RESTLESS LEGS SYNDROME): ICD-10-CM

## 2021-04-14 DIAGNOSIS — E87.6 HYPOKALEMIA: ICD-10-CM

## 2021-04-15 ENCOUNTER — TELEPHONE (OUTPATIENT)
Dept: FAMILY MEDICINE CLINIC | Age: 61
End: 2021-04-15

## 2021-04-15 RX ORDER — POTASSIUM CHLORIDE 20 MEQ/1
20 TABLET, EXTENDED RELEASE ORAL DAILY
Qty: 90 TABLET | Refills: 1 | Status: SHIPPED | OUTPATIENT
Start: 2021-04-15

## 2021-04-15 RX ORDER — ERGOCALCIFEROL 1.25 MG/1
50000 CAPSULE ORAL WEEKLY
Qty: 4 CAPSULE | Refills: 12 | Status: SHIPPED | OUTPATIENT
Start: 2021-04-15

## 2021-04-15 RX ORDER — PRAMIPEXOLE DIHYDROCHLORIDE 0.5 MG/1
0.5 TABLET ORAL EVERY EVENING
Qty: 90 TABLET | Refills: 1 | Status: SHIPPED | OUTPATIENT
Start: 2021-04-15

## 2021-04-15 RX ORDER — FOLIC ACID 1 MG/1
TABLET ORAL
Qty: 90 TABLET | Refills: 1 | Status: SHIPPED | OUTPATIENT
Start: 2021-04-15

## 2021-04-15 RX ORDER — LEVOCETIRIZINE DIHYDROCHLORIDE 5 MG/1
5 TABLET, FILM COATED ORAL NIGHTLY
Qty: 90 TABLET | Refills: 1 | Status: SHIPPED | OUTPATIENT
Start: 2021-04-15

## 2021-04-16 ENCOUNTER — TELEPHONE (OUTPATIENT)
Dept: FAMILY MEDICINE CLINIC | Age: 61
End: 2021-04-16

## 2021-04-16 NOTE — TELEPHONE ENCOUNTER
States she was told in ED not to take ibuprofen because of kidney function. Increased foot pain. Has been taking increased amount of ibuprofen for foot pain  States she is taking about 800 a day, knows its not good. Not helping with pain  Does not want to go back to ed  What is recommended in mean time before appt. On 4/20/21    Please advise.      Pt ph. [phone]

## 2021-04-16 NOTE — TELEPHONE ENCOUNTER
The last kidney function I see is from November and was normal     Was there recent testing?     If not, she can take ibuprofen

## 2021-04-16 NOTE — TELEPHONE ENCOUNTER
Spoke to pt, aware to continue elevating/ice and ibuprofen until Tuesday.  If it becomes get unbearable she should  See ED or Urgent Care

## 2021-04-17 DIAGNOSIS — M54.50 CHRONIC LOW BACK PAIN, UNSPECIFIED BACK PAIN LATERALITY, UNSPECIFIED WHETHER SCIATICA PRESENT: ICD-10-CM

## 2021-04-17 DIAGNOSIS — G89.29 CHRONIC LOW BACK PAIN, UNSPECIFIED BACK PAIN LATERALITY, UNSPECIFIED WHETHER SCIATICA PRESENT: ICD-10-CM

## 2021-04-19 RX ORDER — PREGABALIN 300 MG/1
CAPSULE ORAL
Qty: 30 CAPSULE | Refills: 11 | Status: SHIPPED | OUTPATIENT
Start: 2021-04-19 | End: 2021-04-20

## 2021-04-20 ENCOUNTER — OFFICE VISIT (OUTPATIENT)
Dept: FAMILY MEDICINE CLINIC | Age: 61
End: 2021-04-20
Payer: MEDICARE

## 2021-04-20 VITALS
OXYGEN SATURATION: 96 % | HEART RATE: 73 BPM | TEMPERATURE: 97.1 F | BODY MASS INDEX: 31.1 KG/M2 | SYSTOLIC BLOOD PRESSURE: 130 MMHG | HEIGHT: 62 IN | DIASTOLIC BLOOD PRESSURE: 68 MMHG | WEIGHT: 169 LBS

## 2021-04-20 DIAGNOSIS — R53.83 FATIGUE, UNSPECIFIED TYPE: Primary | ICD-10-CM

## 2021-04-20 DIAGNOSIS — S22.42XA CLOSED FRACTURE OF MULTIPLE RIBS OF LEFT SIDE, INITIAL ENCOUNTER: ICD-10-CM

## 2021-04-20 DIAGNOSIS — S93.401A SPRAIN OF RIGHT ANKLE, UNSPECIFIED LIGAMENT, INITIAL ENCOUNTER: ICD-10-CM

## 2021-04-20 DIAGNOSIS — F41.9 ANXIETY: ICD-10-CM

## 2021-04-20 DIAGNOSIS — G89.29 CHRONIC LOW BACK PAIN, UNSPECIFIED BACK PAIN LATERALITY, UNSPECIFIED WHETHER SCIATICA PRESENT: ICD-10-CM

## 2021-04-20 DIAGNOSIS — M54.50 CHRONIC LOW BACK PAIN, UNSPECIFIED BACK PAIN LATERALITY, UNSPECIFIED WHETHER SCIATICA PRESENT: ICD-10-CM

## 2021-04-20 PROCEDURE — 96372 THER/PROPH/DIAG INJ SC/IM: CPT | Performed by: FAMILY MEDICINE

## 2021-04-20 PROCEDURE — 3017F COLORECTAL CA SCREEN DOC REV: CPT | Performed by: FAMILY MEDICINE

## 2021-04-20 PROCEDURE — 4004F PT TOBACCO SCREEN RCVD TLK: CPT | Performed by: FAMILY MEDICINE

## 2021-04-20 PROCEDURE — G8427 DOCREV CUR MEDS BY ELIG CLIN: HCPCS | Performed by: FAMILY MEDICINE

## 2021-04-20 PROCEDURE — 99213 OFFICE O/P EST LOW 20 MIN: CPT | Performed by: FAMILY MEDICINE

## 2021-04-20 PROCEDURE — G8417 CALC BMI ABV UP PARAM F/U: HCPCS | Performed by: FAMILY MEDICINE

## 2021-04-20 RX ORDER — CYANOCOBALAMIN 1000 UG/ML
1000 INJECTION INTRAMUSCULAR; SUBCUTANEOUS ONCE
Status: COMPLETED | OUTPATIENT
Start: 2021-04-20 | End: 2021-04-20

## 2021-04-20 RX ORDER — LORAZEPAM 0.5 MG/1
0.5 TABLET ORAL 2 TIMES DAILY PRN
Qty: 60 TABLET | Refills: 2 | Status: SHIPPED | OUTPATIENT
Start: 2021-04-20 | End: 2021-07-19

## 2021-04-20 RX ORDER — PREGABALIN 300 MG/1
CAPSULE ORAL
Qty: 30 CAPSULE | Refills: 11 | Status: SHIPPED | OUTPATIENT
Start: 2021-04-20 | End: 2022-04-15

## 2021-04-20 RX ORDER — HYDROCODONE BITARTRATE AND ACETAMINOPHEN 5; 325 MG/1; MG/1
1 TABLET ORAL EVERY 6 HOURS PRN
Qty: 20 TABLET | Refills: 0 | Status: SHIPPED | OUTPATIENT
Start: 2021-04-20 | End: 2021-05-03 | Stop reason: SDUPTHER

## 2021-04-20 RX ADMIN — CYANOCOBALAMIN 1000 MCG: 1000 INJECTION INTRAMUSCULAR; SUBCUTANEOUS at 09:18

## 2021-04-20 NOTE — PROGRESS NOTES
Subjective:     Chief Complaint   Patient presents with    Foot Pain     did something to right foot, x 10 days, turned black and blue toes     Health Maintenance     needs short order ativan to [facility] 90 day to divy dose        Graham Sacks is a 63 y.o. female who presents with right foot/ankle pain. Onset of the symptoms was 10 days ago . Precipitating event: inversion injury to foot. Current symptoms include: ability to bear weight, but with some pain, bruising, pain with inversion of the foot and swelling. Aggravating factors: any weight bearing and walking. Symptoms have progressed to a point and plateaued. Patient has had no prior foot problems. Evaluation to date: none. Treatment to date: avoidance of offending activity, rest and ace wrap, soaks. Patient's medications, allergies, past medical, surgical, social and family histories were reviewed and updated as appropriate. ROS    Otherwise physically feels healthy without sob/palpitations/chest pain/f/c/n/v/weight gain/weight loss/abd pain     Objective:      /68 (Site: Right Upper Arm)   Pulse 73   Temp 97.1 °F (36.2 °C)   Ht 5' 2\" (1.575 m)   Wt 169 lb (76.7 kg) Comment: pt reported  LMP  (LMP Unknown)   SpO2 96%   Breastfeeding No   BMI 30.91 kg/m²   Right foot:  Diffuse bruising medially and down at toes, tender medial and lateral ankle, mild diffuse swelling   Left foot:  Normal      Imaging:    Assessment:      Diagnosis Orders   1. Fatigue, unspecified type  cyanocobalamin injection 1,000 mcg   2. Anxiety  LORazepam (ATIVAN) 0.5 MG tablet   3.  Sprain of right ankle, unspecified ligament, initial encounter  Elastic Bandages & Supports (AIRCAST SPORT ANKLE BRACE/RGHT) MISC    HYDROcodone-acetaminophen (NORCO) 5-325 MG per tablet    XR ANKLE RIGHT (MIN 3 VIEWS)    XR FOOT RIGHT (2 VIEWS)   4. Closed fracture of multiple ribs of left side, initial encounter            Plan:     b12 given    Will get xrays ankle/foot Wrap/elevate/brace  May need therapy  Pain med     Ativan refill      Regan Dutton MD

## 2021-04-22 ENCOUNTER — TELEPHONE (OUTPATIENT)
Dept: FAMILY MEDICINE CLINIC | Age: 61
End: 2021-04-22

## 2021-04-22 DIAGNOSIS — S92.901A CLOSED FRACTURE OF RIGHT FOOT, INITIAL ENCOUNTER: Primary | ICD-10-CM

## 2021-04-22 NOTE — TELEPHONE ENCOUNTER
Pt called and asked if she can have something for pain that's stronger than Norco for her foot. Pt states she has been walking around putting weight on foot all day. I advised patient of her test results earlier in the day and to try to not put weight on it. Pt states she has been putting weight on her foot all day walking around doing stuff. Pt states its almost impossible for her to not put weight on foot.

## 2021-04-26 ENCOUNTER — OFFICE VISIT (OUTPATIENT)
Dept: FAMILY MEDICINE CLINIC | Age: 61
End: 2021-04-26
Payer: MEDICARE

## 2021-04-26 VITALS
HEART RATE: 89 BPM | HEIGHT: 62 IN | BODY MASS INDEX: 31.1 KG/M2 | OXYGEN SATURATION: 98 % | SYSTOLIC BLOOD PRESSURE: 114 MMHG | WEIGHT: 169 LBS | DIASTOLIC BLOOD PRESSURE: 64 MMHG | TEMPERATURE: 98.6 F

## 2021-04-26 DIAGNOSIS — Z01.818 PREOP EXAMINATION: ICD-10-CM

## 2021-04-26 DIAGNOSIS — S92.901A CLOSED FRACTURE OF RIGHT FOOT, INITIAL ENCOUNTER: Primary | ICD-10-CM

## 2021-04-26 DIAGNOSIS — Z72.0 TOBACCO ABUSE DISORDER: ICD-10-CM

## 2021-04-26 PROCEDURE — 99213 OFFICE O/P EST LOW 20 MIN: CPT | Performed by: STUDENT IN AN ORGANIZED HEALTH CARE EDUCATION/TRAINING PROGRAM

## 2021-04-26 RX ORDER — IBUPROFEN 800 MG/1
800 TABLET ORAL EVERY 8 HOURS PRN
Qty: 30 TABLET | Refills: 0 | Status: SHIPPED | OUTPATIENT
Start: 2021-04-26

## 2021-04-26 ASSESSMENT — ENCOUNTER SYMPTOMS
SHORTNESS OF BREATH: 0
COUGH: 0
SORE THROAT: 0
VOMITING: 0
SINUS PRESSURE: 0
ABDOMINAL PAIN: 0

## 2021-04-26 NOTE — PROGRESS NOTES
ankle sprain Yes Christo Donnelly MD   pramipexole (MIRAPEX) 0.5 MG tablet Take 1 tablet by mouth every evening Yes Christo Donnelly MD   potassium chloride (KLOR-CON M) 20 MEQ extended release tablet Take 1 tablet by mouth daily Yes Christo Donnelly MD   folic acid (FOLVITE) 1 MG tablet Take one tablet by mouth every day Yes Christo Donnelly MD   vitamin D (ERGOCALCIFEROL) 1.25 MG (48604 UT) CAPS capsule Take 1 capsule by mouth once a week Yes Christo Donnelly MD   levocetirizine (XYZAL) 5 MG tablet Take 1 tablet by mouth nightly Yes Christo Donnelly MD   escitalopram (LEXAPRO) 20 MG tablet TAKE ONE TABLET BY MOUTH DAILY Yes Christo Donnelly MD   levothyroxine (SYNTHROID) 200 MCG tablet Take 1 tablet by mouth Daily Yes Christo Donnelly MD   mirtazapine (REMERON) 30 MG tablet Take 1 tablet by mouth nightly Yes Christo Donnelly MD   ARIPiprazole (ABILIFY) 10 MG tablet TAKE ONE TABLET BY MOUTH EACH NIGHT AT BEDTIME Yes Christo Donnelly MD   SYMBICORT 160-4.5 MCG/ACT AERO INL 2 PFS PO BID UTD Yes Christo Donnelly MD   blood glucose test strips (ASCENSIA AUTODISC VI;ONE TOUCH ULTRA TEST VI) strip 1 each by In Vitro route daily Test once daily Yes Christo Donnelly MD   fluocinolone acetonide (SYNALAR) 0.01 % external solution APPLY 1-2 ML TOPICALLY TO AFFECTED AREA 1-2 TIMES PER DAY Yes Christo Donnelly MD   levalbuterol (XOPENEX HFA) 45 MCG/ACT inhaler INHALE 2 PUFFS BY MOUTH EVERY 4 HOURS AS NEEDED FOR WHEEZING OR SHORTNESS OF BREATH Yes Christo Donnelly MD   furosemide (LASIX) 40 MG tablet Take 1 tablet by mouth every day Yes Christo Donnelly MD   carisoprodol (SOMA) 350 MG tablet  Yes Shirlene Lee MD   Alcohol Swabs (ALCOHOL PADS) 70 % PADS Apply 1 each topically 2 times daily Yes Christo Donnelly MD   esomeprazole (NEXIUM) 40 MG delayed release capsule Take 2 capsules by mouth every day Yes Christo Donnelly MD   terazosin (HYTRIN) 2 MG capsule TAKE 1 CAPSULE BY MOUTH EVERY NIGHT Yes Christo Donnelly MD   Simethicone 180 MG CAPS Take 1 softgel by mouth twice daily Yes Radha Camarena MD Paula   diclofenac sodium (VOLTAREN) 1 % GEL APPLY 2-4 GRAMS TOPICALLY TO AFFECTED AREA 3-4 TIMES PER DAY Yes Rocio Calderon MD   tiZANidine (ZANAFLEX) 4 MG tablet Take 2 tablets by mouth every 8 hours as needed Yes Rocio Calderon MD   Easy Comfort Lancets MISC Use, as directed, once daily to test blood sugar Yes Rocio Calderon MD   ondansetron (ZOFRAN-ODT) 4 MG disintegrating tablet Dissolve 1 tab under tongue every 8hrs as needed for n/v Yes Rocio Calderon MD   Nutritional Supplements (BOOST GLUCOSE CONTROL) LIQD Take 1 Can by mouth 2 times daily Yes Rocio Calderon MD   ibuprofen (ADVIL;MOTRIN) 800 MG tablet Take 1 tab by mouth every 8 hours as need for pain Yes Rocio Calderon MD   nystatin (MYCOSTATIN) 227691 UNIT/GM powder Apply topically 3 times daily as directed Yes Rocio Calderon MD   rosuvastatin (CRESTOR) 20 MG tablet Take 1 tablet by mouth every day Yes Rocio Calderon MD   AIMOVIG 70 MG/ML SOAJ ADM 1 ML SC 1 TIME Q MONTH Yes Historical Provider, MD   Estradiol (VAGIFEM) 10 MCG TABS vaginal tablet insert 1 tablet vaginally two times a week INTO LOWER 1/3 OF VAGINA Yes Historical Provider, MD        Allergies   Allergen Reactions    Latex Rash    Imitrex [Sumatriptan] Anaphylaxis    Zomig [Zolmitriptan] Anaphylaxis    Antivert [Meclizine Hcl] Other (See Comments)     confusion    Flagyl [Metronidazole] Nausea Only     Nausea with rash    Ketorolac Tromethamine Nausea Only    Provera [Medroxyprogesterone Acetate] Other (See Comments)     Caused massive stroke    Bacitracin Rash    Bactrim Nausea And Vomiting and Rash    Cefdinir Rash    Cefuroxime Axetil Rash    Codeine Rash    Cyclosporine Rash    Iodine Rash     Rash with SOB    Iv Dye [Iodides] Rash     Rash with SOB    Neomycin Rash    Petroleum Jelly [Skin Protectants, Misc.] Rash    Quinolones Rash    Sulfa Antibiotics Rash    Toradol [Ketorolac Tromethamine] Rash    Trovan [Trovafloxacin] Rash       Past Medical History:   Diagnosis Date    Acid reflux     Allergic rhinitis     Anxiety     Arnold-Chiari deformity (HCC) 2000    surgery    Asthma     Cerebral artery occlusion with cerebral infarction ([facility] 2001    1 yr after brain OR    Cerebrovascular disease     Chronic back pain greater than 3 months duration     COPD (chronic obstructive pulmonary disease) (HCC)     Dr Taina Matthews at [address] CVA (cerebral infarction)     left hemiparesis, due to ? estrogen + smoking    Depression     Dr Kyung Russ H/O encephalopathy 2001    Headache(784.0)     Dr Andrea Lee Hepatitis B surface antigen positive     Hx of blood clots 2010    PE / trx with coumadin x 6 months    Hyperlipidemia LDL goal < 100     meds > 10 yrs    Hypertension     meds > 2 yrs    Hypothyroidism 2001    meds > 18 yrs    Laryngitis, chronic 2012    scoped by Dr Ronaldo May, fungal    Marijuana smoker in remission [facility])     Obesity (BMI 30-39. 9)     Osteoarthritis     Pulmonary embolism and infarction (HCC)     Restless legs syndrome     Rhinitis, chronic     Rotator cuff tear     Left    S/P colonoscopy with polypectomy 2010    Dr Lyla Nguyen    Seizures [facility])    [address] Spinal headache     past partum     Spondylosis without myelopathy     Type 2 diabetes mellitus without complication (HCC)     hx > 6 yrs    Urinary incontinence     Vertebral compression fracture ([facility] [address] [facility]        Past Surgical History:   Procedure Laterality Date    BACK SURGERY      lumbar kyphoplasty x 2    BRAIN SURGERY      due to Arnold-Chiari deformity / [facility]     SECTION  1994    COLONOSCOPY      CRANIOTOMY  2000    Arnold-Chiary malformation    ENDOSCOPY, COLON, DIAGNOSTIC      FEMUR FRACTURE SURGERY Left     HUMERUS FRACTURE SURGERY Left 2020    ORIF PERIPROSTHETIC FX LEFT HUMERAL SHAFT, SYNTHES NOTIFIED, ROOM 283, LATEX ALLERGY performed by Lc Aaron MD at [address] [facility] Left 6/15/2018    LEFT TOTAL SHOULDER ARTHROPLASTY, SANDY BIOMET TSA, SCALENE NERVE BLOCK, (LATEX ALLERGY) performed by Jil Teague MD at [address] [place] Left 5/17/2019    LEFT REMOVAL GLENOID AND CONVERSION TO REVERSE TOTAL SHOULDER ARTHROPLASTY, SANDY REVERSE TSA, JOSE DAVID EQUIPMENT FLEXIBLE OSTEOTOMES, SCALENE NERVE BLOCK, LATEX ALLERGY performed by Jil Teague MD at [address] [place]      lumbar kyphoplasty    TONSILLECTOMY      UPPER GASTROINTESTINAL ENDOSCOPY  8/5/15    w/bx        Social History     Socioeconomic History    Marital status:      Spouse name: Not on file    Number of children: 1    Years of education: Not on file    Highest education level: Not on file   Occupational History    Occupation: SSI   Social Needs    Financial resource strain: Not on file    Food insecurity     Worry: Not on file     Inability: Not on file   [employer] needs     Medical: Not on file     Non-medical: Not on file   Tobacco Use    Smoking status: Current Every Day Smoker     Packs/day: 1.00     Years: 32.00     Pack years: 32.00     Types: Cigarettes    Smokeless tobacco: Never Used   Substance and Sexual Activity    Alcohol use:  Yes     Alcohol/week: 0.0 standard drinks     Comment: occasionally    Drug use: Yes     Types: Marijuana     Comment: daily for pain    Sexual activity: Not Currently   Lifestyle    Physical activity     Days per week: Not on file     Minutes per session: Not on file    Stress: Not on file   Relationships    Social connections     Talks on phone: Not on file     Gets together: Not on file     Attends [employer] service: Not on file     Active member of club or organization: Not on file     Attends meetings of clubs or organizations: Not on file     Relationship status: Not on file    Intimate partner violence     Fear of current or ex partner: Not on file     Emotionally abused: Not on file     Physically abused: Not on file Forced sexual activity: Not on file   Other Topics Concern    Not on file   Social History Narrative    Not on file        Family History   Problem Relation Age of Onset    Osteoarthritis Mother     Asthma Mother     Diabetes Mother     Hypertension Mother     Cancer Mother         lung    Osteoarthritis Father     Hypertension Father     Other Father         PE    Cancer Father         stomach cancer    Asthma Brother     Heart Attack Brother     Other Brother         overdose    Asthma Sister     Breast Cancer Sister     Hypertension Sister     Other Sister         overdose / MVA       Vitals:    04/26/21 1417   BP: 114/64   Pulse: 89   Temp: 98.6 °F (37 °C)   SpO2: 98%   Weight: 169 lb (76.7 kg)   Height: 5' 2\" (1.575 m)       Estimated body mass index is 30.91 kg/m² as calculated from the following:    Height as of this encounter: 5' 2\" (1.575 m). Weight as of this encounter: 169 lb (76.7 kg). No results for input(s): WBC, RBC, HGB, HCT, MCV, MCH, MCHC, RDW, PLT, MPV in the last 72 hours. No results for input(s): NA, K, CL, CO2, BUN, CREATININE, GLUCOSE, CALCIUM, PROT, LABALBU, BILITOT, ALKPHOS, AST, ALT in the last 72 hours. Lab Results   Component Value Date    LABA1C 5.2 07/25/2020       Xr Ankle Right (min 3 Views)    Result Date: 4/20/2021  FRACTURES PROXIMAL RIGHT SECOND THIRD AND FOURTH METATARSALS PROBABLY SUBACUTE. EXAMINATION: 3 views of the right ankle. DATE AND TIME:4/20/2021 9:25 AM CLINICAL HISTORY: Acute right ankle pain. S93.401A Sprain of right ankle, unspecified ligament, initial encounter ICD10 COMPARISON: None available. FINDINGS: Partially visualized fracture proximal right second metatarsal. Otherwise There is no evidence for fracture dislocation or other significant bone abnormality. IMPRESSION:No acute osseous abnormality in the right ankle.      Xr Foot Right (min 3 Views)    Result Date: 4/20/2021  FRACTURES PROXIMAL RIGHT SECOND THIRD AND FOURTH METATARSALS PROBABLY SUBACUTE. EXAMINATION: 3 views of the right ankle. DATE AND TIME:4/20/2021 9:25 AM CLINICAL HISTORY: Acute right ankle pain. S93.401A Sprain of right ankle, unspecified ligament, initial encounter ICD10 COMPARISON: None available. FINDINGS: Partially visualized fracture proximal right second metatarsal. Otherwise There is no evidence for fracture dislocation or other significant bone abnormality. IMPRESSION:No acute osseous abnormality in the right ankle. Physical Exam  Constitutional:       Appearance: Normal appearance. She is normal weight. HENT:      Head: Normocephalic and atraumatic. Eyes:      Extraocular Movements: Extraocular movements intact. Conjunctiva/sclera: Conjunctivae normal.   Cardiovascular:      Rate and Rhythm: Normal rate and regular rhythm. Pulses: Normal pulses. Heart sounds: Normal heart sounds. Pulmonary:      Effort: Pulmonary effort is normal.      Breath sounds: Normal breath sounds. No wheezing or rales. Skin:     General: Skin is warm. Capillary Refill: Capillary refill takes less than 2 seconds. Findings: No rash. Neurological:      Mental Status: She is alert. Psychiatric:         Mood and Affect: Mood normal.         Thought Content: Thought content normal.         Judgment: Judgment normal.         ASSESSMENT/PLAN:  1. Closed fracture of right foot, initial encounter  -Patient at higer risk for complications due to complex medical history however consider this surgery lower risk as it is not intraperitoneal or vascular  - Pt without palpitations, CP or SOB with exertion    2. Preop examination    3.  Tobacco abuse disorder  -Discussed smoking cessation to improve wound healing      ---------------------------------------------------------------------  Side effects, adverse effects of the medication prescribed today, as well as treatment plan/ rationale and result expectations have been discussed with the patient who expresses

## 2021-04-27 ENCOUNTER — NURSE ONLY (OUTPATIENT)
Dept: PRIMARY CARE CLINIC | Age: 61
End: 2021-04-27

## 2021-04-27 DIAGNOSIS — Z01.818 ENCOUNTER FOR PREADMISSION TESTING: Primary | ICD-10-CM

## 2021-04-27 DIAGNOSIS — Z01.818 ENCOUNTER FOR PREADMISSION TESTING: ICD-10-CM

## 2021-04-27 RX ORDER — BUDESONIDE AND FORMOTEROL FUMARATE DIHYDRATE 160; 4.5 UG/1; UG/1
AEROSOL RESPIRATORY (INHALATION)
Qty: 3 INHALER | Refills: 1 | Status: SHIPPED | OUTPATIENT
Start: 2021-04-27

## 2021-04-27 RX ORDER — ERENUMAB-AOOE 70 MG/ML
INJECTION SUBCUTANEOUS
Qty: 1 ML | Refills: 0 | Status: SHIPPED | OUTPATIENT
Start: 2021-04-27 | End: 2021-04-29 | Stop reason: SDUPTHER

## 2021-04-28 LAB
ANION GAP SERPL CALCULATED.3IONS-SCNC: 12 MEQ/L (ref 9–15)
BUN BLDV-MCNC: 18 MG/DL (ref 6–20)
CALCIUM SERPL-MCNC: 8.8 MG/DL (ref 8.5–9.9)
CHLORIDE BLD-SCNC: 98 MEQ/L (ref 95–107)
CO2: 25 MEQ/L (ref 20–31)
CREAT SERPL-MCNC: 1.19 MG/DL (ref 0.5–0.9)
GFR AFRICAN AMERICAN: 56.1
GFR NON-AFRICAN AMERICAN: 46.4
GLUCOSE BLD-MCNC: 112 MG/DL (ref 70–99)
HCT VFR BLD CALC: 38.2 % (ref 37–47)
HEMOGLOBIN: 12.6 G/DL (ref 12–16)
MCH RBC QN AUTO: 29.9 PG (ref 27–31.3)
MCHC RBC AUTO-ENTMCNC: 32.9 % (ref 33–37)
MCV RBC AUTO: 90.7 FL (ref 82–100)
PDW BLD-RTO: 15.1 % (ref 11.5–14.5)
PLATELET # BLD: 265 K/UL (ref 130–400)
POTASSIUM SERPL-SCNC: 3.3 MEQ/L (ref 3.4–4.9)
RBC # BLD: 4.21 M/UL (ref 4.2–5.4)
SARS-COV-2, PCR: NOT DETECTED
SODIUM BLD-SCNC: 135 MEQ/L (ref 135–144)
WBC # BLD: 9.1 K/UL (ref 4.8–10.8)

## 2021-04-29 RX ORDER — ERENUMAB-AOOE 70 MG/ML
INJECTION SUBCUTANEOUS
Qty: 1 ML | Refills: 5 | Status: SHIPPED | OUTPATIENT
Start: 2021-04-29

## 2021-05-03 DIAGNOSIS — B96.89 ACUTE BACTERIAL SINUSITIS: ICD-10-CM

## 2021-05-03 DIAGNOSIS — J01.90 ACUTE BACTERIAL SINUSITIS: ICD-10-CM

## 2021-05-03 DIAGNOSIS — J30.9 ALLERGIC RHINITIS, UNSPECIFIED SEASONALITY, UNSPECIFIED TRIGGER: ICD-10-CM

## 2021-05-03 DIAGNOSIS — S93.401A SPRAIN OF RIGHT ANKLE, UNSPECIFIED LIGAMENT, INITIAL ENCOUNTER: ICD-10-CM

## 2021-05-03 RX ORDER — FLUCONAZOLE 150 MG/1
150 TABLET ORAL ONCE
Qty: 1 TABLET | Refills: 0 | Status: SHIPPED | OUTPATIENT
Start: 2021-05-03 | End: 2021-05-03

## 2021-05-03 RX ORDER — HYDROCODONE BITARTRATE AND ACETAMINOPHEN 5; 325 MG/1; MG/1
1 TABLET ORAL EVERY 6 HOURS PRN
Qty: 20 TABLET | Refills: 0 | Status: SHIPPED | OUTPATIENT
Start: 2021-05-03 | End: 2021-05-08

## 2021-05-10 RX ORDER — ROSUVASTATIN CALCIUM 20 MG/1
TABLET, COATED ORAL
Qty: 30 TABLET | Refills: 11 | OUTPATIENT
Start: 2021-05-10

## 2021-05-16 RX ORDER — NYSTATIN 100000 [USP'U]/G
POWDER TOPICAL
Qty: 60 G | Refills: 11 | OUTPATIENT
Start: 2021-05-16

## 2021-05-18 DIAGNOSIS — F41.9 ANXIETY: ICD-10-CM

## 2021-05-18 RX ORDER — LORAZEPAM 0.5 MG/1
TABLET ORAL
Qty: 60 TABLET | Refills: 11 | OUTPATIENT
Start: 2021-05-18

## 2022-07-21 NOTE — TELEPHONE ENCOUNTER
Called pharmacy got it taken care of and E11.51 code worked to get it filled for her and sent out 23-May-2022

## 2022-09-16 NOTE — PROGRESS NOTES
Anesthesia Evaluation     NPO Solid Status: > 8 hours  NPO Liquid Status: > 2 hours           Airway   Mallampati: II  TM distance: >3 FB  Neck ROM: full  No difficulty expected  Dental - normal exam     Pulmonary - normal exam   (+) lung cancer, shortness of breath,   Cardiovascular - normal exam    ECG reviewed    (+) hypertension, hyperlipidemia,       Neuro/Psych  GI/Hepatic/Renal/Endo    (+) obesity,       Musculoskeletal     Abdominal    Substance History      OB/GYN          Other      history of cancer                  Anesthesia Plan    ASA 3     MAC     intravenous induction     Anesthetic plan, risks, benefits, and alternatives have been provided, discussed and informed consent has been obtained with: patient.    Plan discussed with CRNA.        CODE STATUS:        [Metronidazole] Nausea Only     Nausea with rash    Ketorolac Tromethamine Nausea Only    Provera [Medroxyprogesterone Acetate] Other (See Comments)     Caused massive stroke    Ultram [Tramadol Hcl] Nausea Only    Bacitracin Rash    Bactrim Nausea And Vomiting and Rash    Cefdinir Rash    Cefuroxime Axetil Rash    Codeine Rash    Cyclosporine Rash    Iodine Rash     Rash with SOB    Iv Dye [Iodides] Rash     Rash with SOB    Neomycin Rash    Petroleum Jelly [Skin Protectants, Misc.] Rash    Quinolones Rash    Sulfa Antibiotics Rash    Toradol [Ketorolac Tromethamine] Rash    Trovan [Trovafloxacin] Rash         Medications marked \"taking\" at this time  Outpatient Medications Marked as Taking for the 3/19/20 encounter (Virtual Visit) with Hang Mcginnis MD   Medication Sig Dispense Refill    fluconazole (DIFLUCAN) 150 MG tablet take 1 tablet by mouth AS A ONE TIME DOSE 1 tablet 0    azithromycin (ZITHROMAX) 250 MG tablet 2 tabs orally on first day, then one tab daily for four days 1 packet 0             There were no vitals filed for this visit. There is no height or weight on file to calculate BMI. Wt Readings from Last 3 Encounters:   02/21/20 155 lb (70.3 kg)   02/05/20 147 lb 12.8 oz (67 kg)   01/31/20 149 lb (67.6 kg)     BP Readings from Last 3 Encounters:   03/05/20 134/70   02/21/20 112/70   02/05/20 136/78         Physical Exam:  [facility]/[birth year]/1999     Phone consultation            No results found for this visit on 03/19/20. Assessment/Plan:  Jose Noriega was seen today for cough.     Diagnoses and all orders for this visit:    Acute bronchitis, unspecified organism  -     azithromycin (ZITHROMAX) 250 MG tablet; 2 tabs orally on first day, then one tab daily for four days    Chronic pain syndrome    B12 deficiency    Other orders  -     fluconazole (DIFLUCAN) 150 MG tablet; take 1 tablet by mouth AS A ONE TIME DOSE         Symptoms consistent with mild bronchitis    Needs new  Rx for Boost F/u with pain mgmt  Ongoing f/u with rheum, podiatry, neuro, etc    Coronavirus precautions discussed    15 minute phone call    Goyo Diaz MD

## 2022-10-19 NOTE — PROGRESS NOTES
Patient feels fine. She denies any chest pain or abdominal pain. She reports having some cough. No headaches, loss of consciousness or seizure. ROS: 12 system review otherwise is negative for acute signs or symptoms over the last 24 hrs. Exam: Awake, alert oriented, in no apparent distress  HEENT: Pink conjunctiva and buccal mucosa. Normal and throat and nose  Neck: Supple, no nuchal rigidity. Endo: No thyromegaly. Vascular: No JVD or carotid bruit. Chest: Basilar rhonchi on the right side. Heart: Regular rate and rhythm, no extra sounds. No murmur, no rub. Abdomen: Soft, no tenderness, no rebound, no rigidity. Clinically I could not exclude the possibility of intra-abdominal mass or organomegaly. LE: No cyanosis or clubbing, no varices or edema. Neuro: Awake, alert, oriented place and person but not to the exact date and location. She has mild to moderate cognitive impairment. .  MS: No joint effusion or tenderness. Skin: No skin rash, itching, bruising or significant findings. Assessment and plan: This documentation may or may not be complete, inclusive or conclusive. *Basilar infiltrate on the right side, hypoxemia. Continue antibiotic until advised otherwise by pulmonary. CT scan chest ordered but not completed. *Acute kidney failure,    *E. coli. UTI    *Systemic inflammatory reactive syndrome present on admission. *Anemia, no evidence of acute blood loss, chronic. Unknown etiology. Needs to be investigated in the outpatient setting. *Schizophrenia. *Acute metabolic encephalopathy, resolved. Plan:  I ordered CT scan of the chest  I ordered echocardiogram, BNP and procalcitonin level. Continue intravenous antibiotic. Monitor kidney function if no improvement we will proceed with additional renal imaging. Anemia work-up in the outpatient setting including but not limited to GI/GYN/age-appropriate cancer screening.       I may or may not have addressed all of 100.2

## 2023-07-20 NOTE — CARE COORDINATION
LSW spoke with the pt regarding her plan for home. Pt requested transport to home so LSW called [facility] and they arranged for Kristi to pick the pt up here today at [address] [place] [address] Rd will follow. Klisyri Pregnancy And Lactation Text: It is unknown if this medication can harm a developing fetus or if it is excreted in breast milk.
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
## 2024-02-19 NOTE — ADT AUTH CERT
MD Aware of over 2 gy  Pneumonia - Care Day 4 (7/8/2020) by Darien Garcia, RN         Review Status Review Entered   Completed 7/10/2020 12:57       Criteria Review      Care Day: 4 Care Date: 7/8/2020 Level of Care: Inpatient Floor    Guideline Day 2    Level Of Care    (X) Floor    Clinical Status    (X) * No CO2 retention or acidosis    7/10/2020 12:57 PM EDT by Lucero Flores      k 3.1  creat 1.25  gfr 44  gluc 100  ca 8.2  rbc 3.69  h/h 8.7/ 27.5  mcv 74.6, mch 23.5  mchc 31.5  rdw 19.2    (X) * No requirement for mechanical ventilation    (X) * Hypotension absent    7/10/2020 12:57 PM EDT by Estephania Flores      temp 98.2, pulse 85, tele nsr, resp 18, bp 145/68, pulse ox 96 % on ra    (X) * Fever absent or reduced    7/10/2020 12:57 PM EDT by Lucero Flores      absent    (X) * No hypoxia on room air or oxygenation improved    7/10/2020 12:57 PM EDT by Roney Lamar on ra    (X) * Mental status improved or at baseline    Activity    (X) * Increased activity    7/10/2020 12:57 PM EDT by Trini Hameed      up as ronda    Routes    (X) Oral hydration, medications    7/10/2020 12:57 PM EDT by Trini Hameed      iv protonix 40 mg iv bid'  po kcl 40 meq x1 this day     iv- hl'd    prn tylenol 650 mg po prn x1 , po ativan 0.5 mg po prn x2, po roxicodone 7.5 mg po prn x1, po zanaflex 4 mg po prn x1    (X) Usual diet    7/10/2020 12:57 PM EDT by Lucero Flores      carb control diet    Interventions    (X) Incentive spirometry    (X) Pulse oximetry    (X) Head of bed at 30 degrees    ( ) Possible oxygen    7/10/2020 12:57 PM EDT by Lucero Flores      ra    Medications    (X) IV or oral antibiotics    7/10/2020 12:57 PM EDT by Lucero Flores      iv vibramycin 100 mg iv q12h  iv zosyn 3.375 gm iv q8h    * Milestone   Additional Notes   7/8/2020  care day 4      Physical Exam   Neuro: Awake, alert, oriented place and person but not to the exact date and location.  She has mild to moderate cognitive impairment. . Left arm mild weakness with wrist contractions.  Proximal muscle due to low pro calcitonin level and outpatient surveillance on her lung lesions.       Anemia, iron deficiency.  Stool Hemoccult is pending.  Patient will need GI and GYN evaluation.  Endoscopy could be done in the outpatient setting.  Consulted GI to evaluate.       Antibiotic for UTI.       Functional impairment in the debility.  PT OT.  Potential discharge back home if GI is not planning any inpatient procedures.       Hypokalemia, potassium supplementation.       Pulmonology impression   IMPRESSION AND SUGGESTION:   Patient is at risk due to    · Multilobar pulmonary infiltrate, most likely atypical pneumonitis, needs monitoring rule out other etiologies mainly connective tissue disease.  Procalcitonin is negative   · Possible underlying sleep-related breathing disorder   · Shows no diastolic dysfunction   · Small bilateral pleural effusions, with left-sided rib fracture   · Acute kidney injury, resolved       Recommendation   · Continue current antibiotics for today,    · De-escalate to oral antibiotic to complete 8 days of treatment   · We will need to consider sleep study as outpatient   · Will need follow-up CAT scan in 6 to 8 weeks to confirm resolution   · Watch volume status and avoid overload

## 2024-04-23 NOTE — CARE COORDINATION
5/21/19 I MET WITH THE PT. SHE CONFIRMED DISCHARGE TO [facility] AND PLANS TO GO TODAY.
LSW along with the care team met with the pt to discuss DC plan. Pt wants to go to [facility] at [facility]. LSW faxed info to start precert this morning.  [id number] completed.
Per Kwasi Wiseman from [address] [place] [address], precert was not started yet due to late therapy. They will start Monday am.  I called her POA sister Heaven Vidales and made her aware that the precert will be started Monday morning. She verbalized understanding and asked to speak with her RN for some medical questions. Radha RN spoke with her. Electronically signed by Ariel Burkett RN on 5/18/19 at 2:35 PM    Medicare IMM reviewed with RADHA alicea and she verbalized understanding.   Electronically signed by Ariel Burkett RN on 5/18/19 at 3:29 PM
Pre-cert for [facility] pending. [id number] complete. Pre-cert received. Met with pt who will transport with her power chair.  Transport scheduled for 2 pm.
RULA met with Pt, she is agreeable to go to [facility]. They will look over info and start precert when therapy sees her. .Electronically signed by RULA Giles on 5/17/2019 at 3:00 PM
Strong peripheral pulses
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
## (undated) DEVICE — COUNTER NDL 40 COUNT HLD 70 FOAM BLK ADH W/ MAG

## (undated) DEVICE — BIT DRL L165MM DIA2.8MM QUIK CPL W/O STP REUSE

## (undated) DEVICE — 3M™ COBAN™ STERILE SELF-ADHERENT WRAP, 1584S, 4 IN X 5 YD (10 CM X 4,5 M), 18 ROLLS/CASE: Brand: 3M™ COBAN™

## (undated) DEVICE — TUBING, SUCTION, 1/4" X 10', STRAIGHT: Brand: MEDLINE

## (undated) DEVICE — PACK ORTHOPEDIC 1 TBL CVR 50X90 REINF 1 MAYO STD CVR 24X53 REINF 4 UTIL

## (undated) DEVICE — TIBURON TOP SHEET: Brand: CONVERTORS

## (undated) DEVICE — SLING ARM L ABV 13IN DE-ROTATION STRP HOOKS PROVIDE IMMOB

## (undated) DEVICE — KIT PT CARE SCHLEIN ULT SHLDR POS

## (undated) DEVICE — COVER,TABLE,44X90,STERILE: Brand: MEDLINE

## (undated) DEVICE — SUTURE VCRL SZ 2-0 L36IN ABSRB UD L36MM CT-1 1/2 CIR J945H

## (undated) DEVICE — GLOVE ORANGE PI 7 1/2   MSG9075

## (undated) DEVICE — BLADE SAW W071XL354IN THK0047IN CUT THK0053IN REPL SAG

## (undated) DEVICE — SUTURE MCRYL + SZ 3-0 L36IN ABSRB UD CT-1 L36MM 1/2 CIR MCP944H

## (undated) DEVICE — MARKER SURG SKIN GENTIAN VLT REG TIP W/ 6IN RUL

## (undated) DEVICE — GOWN,SIRUS,POLYRNF,BRTHSLV,XLN/XL,20/CS: Brand: MEDLINE

## (undated) DEVICE — 3M™ IOBAN™ 2 ANTIMICROBIAL INCISE DRAPE 6650EZ: Brand: IOBAN™ 2

## (undated) DEVICE — 3M™ STERI-DRAPE™ U-DRAPE 1015: Brand: STERI-DRAPE™

## (undated) DEVICE — SUTURE VCRL SZ 0 L36IN ABSRB UD L36MM CT-1 1/2 CIR J946H

## (undated) DEVICE — STAPLER SKIN H3.9MM WIRE DIA0.58MM CRWN 6.9MM 35 STPL FIX

## (undated) DEVICE — CHLORAPREP 26ML ORANGE

## (undated) DEVICE — ELECTROSURGICAL PENCIL BUTTON SWITCH E-Z CLEAN COATED BLADE ELECTRODE 10 FT (3 M) CORD HOLSTER: Brand: MEGADYNE

## (undated) DEVICE — STOCKINETTE,IMPERVIOUS,12X48,STERILE: Brand: MEDLINE

## (undated) DEVICE — SUTURE FIBERWIRE SZ 2 W/ TAPERED NEEDLE BLUE L38IN NONABSORB BLU L26.5MM 1/2 CIRCLE AR7200

## (undated) DEVICE — PENCIL SMK EVAC 10 FT BLADE ELECTRD ROCKER FOR TELSCP

## (undated) DEVICE — SIPS DUAL 2 MINUTE TIP

## (undated) DEVICE — MAT FLR SURG QUICKWICK 28X54 IN DISP

## (undated) DEVICE — TM REVERSE 2.5MM PIN STERTM REVERSE 2.5MM PIN STER

## (undated) DEVICE — DRAPE,TOP,102X53,STERILE: Brand: MEDLINE

## (undated) DEVICE — GAUZE SPONGES,12 PLY: Brand: CURITY

## (undated) DEVICE — BIT DRL L110MM DIA2.5MM G QUIK CPL W/O STP REUSE

## (undated) DEVICE — YANKAUER,SMOOTH HANDLE,HIGH CAPACITY: Brand: MEDLINE INDUSTRIES, INC.

## (undated) DEVICE — DRESSING PETRO W3XL8IN N ADH KNIT CELOS ACETT ADPTC

## (undated) DEVICE — DRAPE,U/ SHT,SPLIT,PLAS,STERIL: Brand: MEDLINE

## (undated) DEVICE — INTENDED FOR TISSUE SEPARATION, AND OTHER PROCEDURES THAT REQUIRE A SHARP SURGICAL BLADE TO PUNCTURE OR CUT.: Brand: BARD-PARKER ® CARBON RIB-BACK BLADES

## (undated) DEVICE — GLOVE ORANGE PI 8   MSG9080

## (undated) DEVICE — SUPER SHUTTLE SUTURE PASSING SYSTEM: Brand: SUPER SHUTTLE

## (undated) DEVICE — PAD,ABDOMINAL,8"X10",ST,LF: Brand: MEDLINE

## (undated) DEVICE — ELECTRODE PT RET AD L9FT HI MOIST COND ADH HYDRGEL CORDED

## (undated) DEVICE — GOWN,AURORA,NONREINFORCED,LARGE: Brand: MEDLINE

## (undated) DEVICE — Device

## (undated) DEVICE — SPONGE,LAP,18"X18",DLX,XR,ST,5/PK,40/PK: Brand: MEDLINE

## (undated) DEVICE — Z DISCONTINUED PER MEDLINE USE 2741943 DRESSING AQUACEL 10 IN ALG W9XL25CM SIL CVR WTRPRF VIR BACT BARR ANTIMIC

## (undated) DEVICE — BANDAGE COMPR W4INXL5YD BGE HI E W/ REM CLP SURE-WRAP

## (undated) DEVICE — DRESSING PETRO W3XL8IN OIL EMUL N ADH GZ KNIT IMPREG CELOS

## (undated) DEVICE — 3M™ STERI-DRAPE™ INSTRUMENT POUCH 1018: Brand: STERI-DRAPE™

## (undated) DEVICE — 3M™ STERI-STRIP™ REINFORCED ADHESIVE SKIN CLOSURES, R1547, 1/2 IN X 4 IN (12 MM X 100 MM), 6 STRIPS/ENVELOPE: Brand: 3M™ STERI-STRIP™

## (undated) DEVICE — SYRINGE IRRIG 60ML SFT PLIABLE BLB EZ TO GRP 1 HND USE W/

## (undated) DEVICE — SPONGE GZ W4XL4IN COT 12 PLY TYP VII WVN C FLD DSGN

## (undated) DEVICE — MAT FLOOR ULTRA ABS 28X48IN

## (undated) DEVICE — CONVERTORS STOCKINETTE: Brand: CONVERTORS

## (undated) DEVICE — Z DISCONTINUED PER MEDLINE USE 2741944 DRESSING AQUACEL 12 IN SURG W9XL30CM SIL CVR WTRPRF VIR BACT BARR ANTIMIC

## (undated) DEVICE — FLEXIBLE YANKAUER,LARGE TIP, NO VACUUM CONTROL: Brand: ARGYLE

## (undated) DEVICE — 4-PORT MANIFOLD: Brand: NEPTUNE 2

## (undated) DEVICE — PENCIL ES L3M BTTN SWCH HOLSTER W/ BLDE ELECTRD EDGE

## (undated) DEVICE — ABSORBENT ROLL ECODRI-SAFE

## (undated) DEVICE — LABEL MED MINI W/ MARKER

## (undated) DEVICE — SAW BLADE OSCILLATING